# Patient Record
Sex: MALE | Race: WHITE | NOT HISPANIC OR LATINO | URBAN - METROPOLITAN AREA
[De-identification: names, ages, dates, MRNs, and addresses within clinical notes are randomized per-mention and may not be internally consistent; named-entity substitution may affect disease eponyms.]

---

## 2017-01-09 ENCOUNTER — COMMUNICATION - HEALTHEAST (OUTPATIENT)
Dept: FAMILY MEDICINE | Facility: CLINIC | Age: 67
End: 2017-01-09

## 2017-01-09 DIAGNOSIS — I10 HTN (HYPERTENSION): ICD-10-CM

## 2017-01-11 ENCOUNTER — COMMUNICATION - HEALTHEAST (OUTPATIENT)
Dept: FAMILY MEDICINE | Facility: CLINIC | Age: 67
End: 2017-01-11

## 2017-01-11 DIAGNOSIS — I25.10 CORONARY ATHEROSCLEROSIS: ICD-10-CM

## 2017-02-17 ENCOUNTER — COMMUNICATION - HEALTHEAST (OUTPATIENT)
Dept: FAMILY MEDICINE | Facility: CLINIC | Age: 67
End: 2017-02-17

## 2017-02-17 DIAGNOSIS — E11.8 DIABETES MELLITUS TYPE 2 WITH COMPLICATIONS (H): ICD-10-CM

## 2017-03-29 ENCOUNTER — COMMUNICATION - HEALTHEAST (OUTPATIENT)
Dept: FAMILY MEDICINE | Facility: CLINIC | Age: 67
End: 2017-03-29

## 2017-03-29 DIAGNOSIS — E11.8 DIABETES MELLITUS TYPE 2 WITH COMPLICATIONS (H): ICD-10-CM

## 2017-03-29 DIAGNOSIS — I25.10 CORONARY ATHEROSCLEROSIS: ICD-10-CM

## 2017-03-30 ENCOUNTER — COMMUNICATION - HEALTHEAST (OUTPATIENT)
Dept: FAMILY MEDICINE | Facility: CLINIC | Age: 67
End: 2017-03-30

## 2017-03-30 DIAGNOSIS — I10 HTN (HYPERTENSION): ICD-10-CM

## 2017-03-31 ENCOUNTER — COMMUNICATION - HEALTHEAST (OUTPATIENT)
Dept: FAMILY MEDICINE | Facility: CLINIC | Age: 67
End: 2017-03-31

## 2017-04-25 ENCOUNTER — COMMUNICATION - HEALTHEAST (OUTPATIENT)
Dept: NURSING | Facility: CLINIC | Age: 67
End: 2017-04-25

## 2017-06-09 ENCOUNTER — COMMUNICATION - HEALTHEAST (OUTPATIENT)
Dept: FAMILY MEDICINE | Facility: CLINIC | Age: 67
End: 2017-06-09

## 2017-06-09 DIAGNOSIS — E78.5 HYPERLIPIDEMIA: ICD-10-CM

## 2017-06-09 DIAGNOSIS — I10 HTN (HYPERTENSION): ICD-10-CM

## 2017-06-09 DIAGNOSIS — I25.10 CORONARY ATHEROSCLEROSIS: ICD-10-CM

## 2017-06-14 ENCOUNTER — COMMUNICATION - HEALTHEAST (OUTPATIENT)
Dept: FAMILY MEDICINE | Facility: CLINIC | Age: 67
End: 2017-06-14

## 2017-06-14 DIAGNOSIS — I10 HTN (HYPERTENSION): ICD-10-CM

## 2017-11-20 ENCOUNTER — COMMUNICATION - HEALTHEAST (OUTPATIENT)
Dept: FAMILY MEDICINE | Facility: CLINIC | Age: 67
End: 2017-11-20

## 2017-11-20 ENCOUNTER — AMBULATORY - HEALTHEAST (OUTPATIENT)
Dept: NURSING | Facility: CLINIC | Age: 67
End: 2017-11-20

## 2017-11-20 DIAGNOSIS — E78.5 HYPERLIPIDEMIA: ICD-10-CM

## 2017-11-20 DIAGNOSIS — I10 HTN (HYPERTENSION): ICD-10-CM

## 2017-11-20 DIAGNOSIS — I25.10 CORONARY ATHEROSCLEROSIS: ICD-10-CM

## 2017-11-20 DIAGNOSIS — Z23 FLU VACCINE NEED: ICD-10-CM

## 2017-11-24 ENCOUNTER — COMMUNICATION - HEALTHEAST (OUTPATIENT)
Dept: FAMILY MEDICINE | Facility: CLINIC | Age: 67
End: 2017-11-24

## 2017-11-24 DIAGNOSIS — E11.8 TYPE 2 DIABETES WITH COMPLICATION (H): ICD-10-CM

## 2017-11-24 DIAGNOSIS — E11.9 DIABETES (H): ICD-10-CM

## 2017-11-30 ENCOUNTER — COMMUNICATION - HEALTHEAST (OUTPATIENT)
Dept: LAB | Facility: CLINIC | Age: 67
End: 2017-11-30

## 2017-11-30 ENCOUNTER — OFFICE VISIT - HEALTHEAST (OUTPATIENT)
Dept: FAMILY MEDICINE | Facility: CLINIC | Age: 67
End: 2017-11-30

## 2017-11-30 DIAGNOSIS — E11.9 TYPE 2 DIABETES MELLITUS (H): ICD-10-CM

## 2017-11-30 DIAGNOSIS — F43.9 STRESS AT HOME: ICD-10-CM

## 2017-11-30 DIAGNOSIS — I10 ESSENTIAL HYPERTENSION: ICD-10-CM

## 2017-11-30 DIAGNOSIS — E78.2 MIXED HYPERLIPIDEMIA: ICD-10-CM

## 2017-11-30 DIAGNOSIS — Z12.11 SCREEN FOR COLON CANCER: ICD-10-CM

## 2017-11-30 LAB
CHOLEST SERPL-MCNC: 163 MG/DL
FASTING STATUS PATIENT QL REPORTED: YES
HBA1C MFR BLD: >14 % (ref 3.5–6)
HDLC SERPL-MCNC: 48 MG/DL
LDLC SERPL CALC-MCNC: 90 MG/DL
TRIGL SERPL-MCNC: 123 MG/DL

## 2017-12-05 ENCOUNTER — COMMUNICATION - HEALTHEAST (OUTPATIENT)
Dept: FAMILY MEDICINE | Facility: CLINIC | Age: 67
End: 2017-12-05

## 2017-12-13 ENCOUNTER — COMMUNICATION - HEALTHEAST (OUTPATIENT)
Dept: FAMILY MEDICINE | Facility: CLINIC | Age: 67
End: 2017-12-13

## 2017-12-13 DIAGNOSIS — E11.9 DIABETES (H): ICD-10-CM

## 2018-01-08 ENCOUNTER — COMMUNICATION - HEALTHEAST (OUTPATIENT)
Dept: FAMILY MEDICINE | Facility: CLINIC | Age: 68
End: 2018-01-08

## 2018-01-08 DIAGNOSIS — E78.5 HYPERLIPIDEMIA: ICD-10-CM

## 2018-01-11 ENCOUNTER — COMMUNICATION - HEALTHEAST (OUTPATIENT)
Dept: FAMILY MEDICINE | Facility: CLINIC | Age: 68
End: 2018-01-11

## 2018-01-11 DIAGNOSIS — E11.9 DIABETES (H): ICD-10-CM

## 2018-02-16 ENCOUNTER — COMMUNICATION - HEALTHEAST (OUTPATIENT)
Dept: FAMILY MEDICINE | Facility: CLINIC | Age: 68
End: 2018-02-16

## 2018-02-16 DIAGNOSIS — I10 HTN (HYPERTENSION): ICD-10-CM

## 2018-03-07 ENCOUNTER — RECORDS - HEALTHEAST (OUTPATIENT)
Dept: ADMINISTRATIVE | Facility: OTHER | Age: 68
End: 2018-03-07

## 2018-03-16 ENCOUNTER — OFFICE VISIT - HEALTHEAST (OUTPATIENT)
Dept: FAMILY MEDICINE | Facility: CLINIC | Age: 68
End: 2018-03-16

## 2018-03-16 DIAGNOSIS — G47.00 INSOMNIA: ICD-10-CM

## 2018-03-16 DIAGNOSIS — Z00.00 HEALTH CARE MAINTENANCE: ICD-10-CM

## 2018-03-16 DIAGNOSIS — E11.9 TYPE 2 DIABETES MELLITUS (H): ICD-10-CM

## 2018-03-16 LAB
CREAT UR-MCNC: 121.6 MG/DL
HBA1C MFR BLD: 10 % (ref 3.5–6)
MICROALBUMIN UR-MCNC: 16.71 MG/DL (ref 0–1.99)
MICROALBUMIN/CREAT UR: 137.4 MG/G
TSH SERPL DL<=0.005 MIU/L-ACNC: 2.54 UIU/ML (ref 0.3–5)

## 2018-03-19 ENCOUNTER — COMMUNICATION - HEALTHEAST (OUTPATIENT)
Dept: FAMILY MEDICINE | Facility: CLINIC | Age: 68
End: 2018-03-19

## 2018-03-20 ENCOUNTER — COMMUNICATION - HEALTHEAST (OUTPATIENT)
Dept: FAMILY MEDICINE | Facility: CLINIC | Age: 68
End: 2018-03-20

## 2018-03-27 ENCOUNTER — COMMUNICATION - HEALTHEAST (OUTPATIENT)
Dept: FAMILY MEDICINE | Facility: CLINIC | Age: 68
End: 2018-03-27

## 2018-03-27 DIAGNOSIS — E11.8 TYPE 2 DIABETES WITH COMPLICATION (H): ICD-10-CM

## 2018-03-28 ENCOUNTER — COMMUNICATION - HEALTHEAST (OUTPATIENT)
Dept: FAMILY MEDICINE | Facility: CLINIC | Age: 68
End: 2018-03-28

## 2018-03-28 DIAGNOSIS — E11.8 TYPE 2 DIABETES WITH COMPLICATION (H): ICD-10-CM

## 2018-04-11 ENCOUNTER — OFFICE VISIT - HEALTHEAST (OUTPATIENT)
Dept: FAMILY MEDICINE | Facility: CLINIC | Age: 68
End: 2018-04-11

## 2018-04-11 DIAGNOSIS — E11.9 DIABETES (H): ICD-10-CM

## 2018-04-27 ENCOUNTER — COMMUNICATION - HEALTHEAST (OUTPATIENT)
Dept: FAMILY MEDICINE | Facility: CLINIC | Age: 68
End: 2018-04-27

## 2018-04-27 DIAGNOSIS — I25.10 CORONARY ATHEROSCLEROSIS: ICD-10-CM

## 2018-06-04 ENCOUNTER — COMMUNICATION - HEALTHEAST (OUTPATIENT)
Dept: FAMILY MEDICINE | Facility: CLINIC | Age: 68
End: 2018-06-04

## 2018-06-04 DIAGNOSIS — I25.10 CORONARY ATHEROSCLEROSIS: ICD-10-CM

## 2018-06-22 ENCOUNTER — COMMUNICATION - HEALTHEAST (OUTPATIENT)
Dept: FAMILY MEDICINE | Facility: CLINIC | Age: 68
End: 2018-06-22

## 2018-06-22 ENCOUNTER — OFFICE VISIT - HEALTHEAST (OUTPATIENT)
Dept: FAMILY MEDICINE | Facility: CLINIC | Age: 68
End: 2018-06-22

## 2018-06-22 DIAGNOSIS — F33.1 MODERATE EPISODE OF RECURRENT MAJOR DEPRESSIVE DISORDER (H): ICD-10-CM

## 2018-06-22 ASSESSMENT — MIFFLIN-ST. JEOR: SCORE: 1682.98

## 2018-07-09 ENCOUNTER — COMMUNICATION - HEALTHEAST (OUTPATIENT)
Dept: FAMILY MEDICINE | Facility: CLINIC | Age: 68
End: 2018-07-09

## 2018-07-09 DIAGNOSIS — I10 HTN (HYPERTENSION): ICD-10-CM

## 2018-07-26 ENCOUNTER — COMMUNICATION - HEALTHEAST (OUTPATIENT)
Dept: FAMILY MEDICINE | Facility: CLINIC | Age: 68
End: 2018-07-26

## 2018-07-26 DIAGNOSIS — E11.8 TYPE 2 DIABETES WITH COMPLICATION (H): ICD-10-CM

## 2018-08-21 ENCOUNTER — OFFICE VISIT - HEALTHEAST (OUTPATIENT)
Dept: FAMILY MEDICINE | Facility: CLINIC | Age: 68
End: 2018-08-21

## 2018-08-21 DIAGNOSIS — E11.9 DIABETES MELLITUS (H): ICD-10-CM

## 2018-08-21 DIAGNOSIS — F33.9 MAJOR DEPRESSION, RECURRENT (H): ICD-10-CM

## 2018-08-21 LAB — HBA1C MFR BLD: 9.7 % (ref 3.5–6)

## 2018-08-21 ASSESSMENT — MIFFLIN-ST. JEOR: SCORE: 1663.02

## 2018-10-10 ENCOUNTER — COMMUNICATION - HEALTHEAST (OUTPATIENT)
Dept: FAMILY MEDICINE | Facility: CLINIC | Age: 68
End: 2018-10-10

## 2018-10-10 DIAGNOSIS — E11.9 DIABETES (H): ICD-10-CM

## 2018-10-23 ENCOUNTER — AMBULATORY - HEALTHEAST (OUTPATIENT)
Dept: NURSING | Facility: CLINIC | Age: 68
End: 2018-10-23

## 2018-10-23 DIAGNOSIS — Z23 FLU VACCINE NEED: ICD-10-CM

## 2018-10-31 ENCOUNTER — COMMUNICATION - HEALTHEAST (OUTPATIENT)
Dept: FAMILY MEDICINE | Facility: CLINIC | Age: 68
End: 2018-10-31

## 2018-10-31 DIAGNOSIS — I25.10 CORONARY ATHEROSCLEROSIS: ICD-10-CM

## 2018-10-31 DIAGNOSIS — E78.5 HYPERLIPIDEMIA: ICD-10-CM

## 2018-12-20 ENCOUNTER — COMMUNICATION - HEALTHEAST (OUTPATIENT)
Dept: FAMILY MEDICINE | Facility: CLINIC | Age: 68
End: 2018-12-20

## 2018-12-20 DIAGNOSIS — E11.8 TYPE 2 DIABETES WITH COMPLICATION (H): ICD-10-CM

## 2019-01-10 ENCOUNTER — COMMUNICATION - HEALTHEAST (OUTPATIENT)
Dept: FAMILY MEDICINE | Facility: CLINIC | Age: 69
End: 2019-01-10

## 2019-02-22 ENCOUNTER — COMMUNICATION - HEALTHEAST (OUTPATIENT)
Dept: FAMILY MEDICINE | Facility: CLINIC | Age: 69
End: 2019-02-22

## 2019-02-22 DIAGNOSIS — I25.10 CORONARY ATHEROSCLEROSIS: ICD-10-CM

## 2019-03-08 ENCOUNTER — OFFICE VISIT - HEALTHEAST (OUTPATIENT)
Dept: FAMILY MEDICINE | Facility: CLINIC | Age: 69
End: 2019-03-08

## 2019-03-08 DIAGNOSIS — E11.65 TYPE 2 DIABETES MELLITUS WITH HYPERGLYCEMIA, WITHOUT LONG-TERM CURRENT USE OF INSULIN (H): ICD-10-CM

## 2019-03-08 DIAGNOSIS — I25.10 CORONARY ARTERY DISEASE INVOLVING NATIVE HEART WITHOUT ANGINA PECTORIS, UNSPECIFIED VESSEL OR LESION TYPE: ICD-10-CM

## 2019-03-08 DIAGNOSIS — Z12.5 ENCOUNTER FOR SCREENING FOR MALIGNANT NEOPLASM OF PROSTATE: ICD-10-CM

## 2019-03-08 DIAGNOSIS — Z12.11 SCREEN FOR COLON CANCER: ICD-10-CM

## 2019-03-08 DIAGNOSIS — Z00.00 HEALTH CARE MAINTENANCE: ICD-10-CM

## 2019-03-08 DIAGNOSIS — I10 ESSENTIAL HYPERTENSION: ICD-10-CM

## 2019-03-08 LAB
ALBUMIN SERPL-MCNC: 3.8 G/DL (ref 3.5–5)
ALP SERPL-CCNC: 120 U/L (ref 45–120)
ALT SERPL W P-5'-P-CCNC: <9 U/L (ref 0–45)
ANION GAP SERPL CALCULATED.3IONS-SCNC: 12 MMOL/L (ref 5–18)
AST SERPL W P-5'-P-CCNC: 14 U/L (ref 0–40)
BILIRUB SERPL-MCNC: 0.7 MG/DL (ref 0–1)
BUN SERPL-MCNC: 16 MG/DL (ref 8–22)
CALCIUM SERPL-MCNC: 9.4 MG/DL (ref 8.5–10.5)
CHLORIDE BLD-SCNC: 103 MMOL/L (ref 98–107)
CHOLEST SERPL-MCNC: 131 MG/DL
CO2 SERPL-SCNC: 22 MMOL/L (ref 22–31)
CREAT SERPL-MCNC: 0.83 MG/DL (ref 0.7–1.3)
ERYTHROCYTE [DISTWIDTH] IN BLOOD BY AUTOMATED COUNT: 10.8 % (ref 11–14.5)
FASTING STATUS PATIENT QL REPORTED: YES
GFR SERPL CREATININE-BSD FRML MDRD: >60 ML/MIN/1.73M2
GLUCOSE BLD-MCNC: 266 MG/DL (ref 70–125)
HBA1C MFR BLD: 10.8 % (ref 3.5–6)
HCT VFR BLD AUTO: 48 % (ref 40–54)
HDLC SERPL-MCNC: 41 MG/DL
HGB BLD-MCNC: 16.1 G/DL (ref 14–18)
LDLC SERPL CALC-MCNC: 70 MG/DL
MCH RBC QN AUTO: 29.9 PG (ref 27–34)
MCHC RBC AUTO-ENTMCNC: 33.5 G/DL (ref 32–36)
MCV RBC AUTO: 89 FL (ref 80–100)
PLATELET # BLD AUTO: 244 THOU/UL (ref 140–440)
PMV BLD AUTO: 7.8 FL (ref 7–10)
POTASSIUM BLD-SCNC: 3.9 MMOL/L (ref 3.5–5)
PROT SERPL-MCNC: 7 G/DL (ref 6–8)
PSA SERPL-MCNC: 0.2 NG/ML (ref 0–4.5)
RBC # BLD AUTO: 5.37 MILL/UL (ref 4.4–6.2)
SODIUM SERPL-SCNC: 137 MMOL/L (ref 136–145)
TRIGL SERPL-MCNC: 102 MG/DL
WBC: 9 THOU/UL (ref 4–11)

## 2019-03-12 ENCOUNTER — COMMUNICATION - HEALTHEAST (OUTPATIENT)
Dept: FAMILY MEDICINE | Facility: CLINIC | Age: 69
End: 2019-03-12

## 2019-03-12 ENCOUNTER — RECORDS - HEALTHEAST (OUTPATIENT)
Dept: HEALTH INFORMATION MANAGEMENT | Facility: CLINIC | Age: 69
End: 2019-03-12

## 2019-03-14 ENCOUNTER — COMMUNICATION - HEALTHEAST (OUTPATIENT)
Dept: FAMILY MEDICINE | Facility: CLINIC | Age: 69
End: 2019-03-14

## 2019-03-14 ENCOUNTER — RECORDS - HEALTHEAST (OUTPATIENT)
Dept: ADMINISTRATIVE | Facility: OTHER | Age: 69
End: 2019-03-14

## 2019-03-14 LAB — COLOGUARD-ABSTRACT: NEGATIVE

## 2019-03-19 ENCOUNTER — COMMUNICATION - HEALTHEAST (OUTPATIENT)
Dept: FAMILY MEDICINE | Facility: CLINIC | Age: 69
End: 2019-03-19

## 2019-03-19 DIAGNOSIS — E11.9 DIABETES (H): ICD-10-CM

## 2019-04-01 ENCOUNTER — COMMUNICATION - HEALTHEAST (OUTPATIENT)
Dept: FAMILY MEDICINE | Facility: CLINIC | Age: 69
End: 2019-04-01

## 2019-04-01 ENCOUNTER — RECORDS - HEALTHEAST (OUTPATIENT)
Dept: HEALTH INFORMATION MANAGEMENT | Facility: CLINIC | Age: 69
End: 2019-04-01

## 2019-04-02 ENCOUNTER — COMMUNICATION - HEALTHEAST (OUTPATIENT)
Dept: FAMILY MEDICINE | Facility: CLINIC | Age: 69
End: 2019-04-02

## 2019-04-02 DIAGNOSIS — E78.5 HYPERLIPIDEMIA: ICD-10-CM

## 2019-04-16 ENCOUNTER — COMMUNICATION - HEALTHEAST (OUTPATIENT)
Dept: FAMILY MEDICINE | Facility: CLINIC | Age: 69
End: 2019-04-16

## 2019-04-16 DIAGNOSIS — I10 HTN (HYPERTENSION): ICD-10-CM

## 2019-04-16 DIAGNOSIS — E11.8 TYPE 2 DIABETES WITH COMPLICATION (H): ICD-10-CM

## 2019-05-21 ENCOUNTER — COMMUNICATION - HEALTHEAST (OUTPATIENT)
Dept: FAMILY MEDICINE | Facility: CLINIC | Age: 69
End: 2019-05-21

## 2019-05-21 DIAGNOSIS — E11.9 DIABETES (H): ICD-10-CM

## 2019-06-21 ENCOUNTER — OFFICE VISIT - HEALTHEAST (OUTPATIENT)
Dept: FAMILY MEDICINE | Facility: CLINIC | Age: 69
End: 2019-06-21

## 2019-06-21 ENCOUNTER — COMMUNICATION - HEALTHEAST (OUTPATIENT)
Dept: FAMILY MEDICINE | Facility: CLINIC | Age: 69
End: 2019-06-21

## 2019-06-21 DIAGNOSIS — N52.9 ERECTILE DYSFUNCTION, UNSPECIFIED ERECTILE DYSFUNCTION TYPE: ICD-10-CM

## 2019-06-21 DIAGNOSIS — E11.9 DIABETES (H): ICD-10-CM

## 2019-06-21 DIAGNOSIS — I10 ESSENTIAL HYPERTENSION: ICD-10-CM

## 2019-06-21 DIAGNOSIS — F33.1 MODERATE EPISODE OF RECURRENT MAJOR DEPRESSIVE DISORDER (H): ICD-10-CM

## 2019-06-21 DIAGNOSIS — E78.2 MIXED HYPERLIPIDEMIA: ICD-10-CM

## 2019-06-21 DIAGNOSIS — E11.65 TYPE 2 DIABETES MELLITUS WITH HYPERGLYCEMIA, WITHOUT LONG-TERM CURRENT USE OF INSULIN (H): ICD-10-CM

## 2019-06-21 LAB — HBA1C MFR BLD: 9.8 % (ref 3.5–6)

## 2019-07-07 ENCOUNTER — COMMUNICATION - HEALTHEAST (OUTPATIENT)
Dept: FAMILY MEDICINE | Facility: CLINIC | Age: 69
End: 2019-07-07

## 2019-07-07 DIAGNOSIS — I10 HTN (HYPERTENSION): ICD-10-CM

## 2019-07-17 ENCOUNTER — RECORDS - HEALTHEAST (OUTPATIENT)
Dept: ADMINISTRATIVE | Facility: OTHER | Age: 69
End: 2019-07-17

## 2019-07-22 ENCOUNTER — COMMUNICATION - HEALTHEAST (OUTPATIENT)
Dept: FAMILY MEDICINE | Facility: CLINIC | Age: 69
End: 2019-07-22

## 2019-07-22 DIAGNOSIS — E11.8 TYPE 2 DIABETES WITH COMPLICATION (H): ICD-10-CM

## 2019-07-23 ENCOUNTER — RECORDS - HEALTHEAST (OUTPATIENT)
Dept: HEALTH INFORMATION MANAGEMENT | Facility: CLINIC | Age: 69
End: 2019-07-23

## 2019-08-06 ENCOUNTER — COMMUNICATION - HEALTHEAST (OUTPATIENT)
Dept: SCHEDULING | Facility: CLINIC | Age: 69
End: 2019-08-06

## 2019-08-09 ENCOUNTER — OFFICE VISIT - HEALTHEAST (OUTPATIENT)
Dept: FAMILY MEDICINE | Facility: CLINIC | Age: 69
End: 2019-08-09

## 2019-08-09 DIAGNOSIS — M79.602 LEFT ARM PAIN: ICD-10-CM

## 2019-08-20 ENCOUNTER — COMMUNICATION - HEALTHEAST (OUTPATIENT)
Dept: FAMILY MEDICINE | Facility: CLINIC | Age: 69
End: 2019-08-20

## 2019-08-20 DIAGNOSIS — I25.10 CORONARY ATHEROSCLEROSIS: ICD-10-CM

## 2019-10-09 ENCOUNTER — AMBULATORY - HEALTHEAST (OUTPATIENT)
Dept: NURSING | Facility: CLINIC | Age: 69
End: 2019-10-09

## 2019-10-09 DIAGNOSIS — Z23 NEEDS FLU SHOT: ICD-10-CM

## 2019-10-22 ENCOUNTER — COMMUNICATION - HEALTHEAST (OUTPATIENT)
Dept: FAMILY MEDICINE | Facility: CLINIC | Age: 69
End: 2019-10-22

## 2019-10-22 DIAGNOSIS — E11.9 DIABETES (H): ICD-10-CM

## 2019-10-30 ENCOUNTER — COMMUNICATION - HEALTHEAST (OUTPATIENT)
Dept: FAMILY MEDICINE | Facility: CLINIC | Age: 69
End: 2019-10-30

## 2019-10-30 DIAGNOSIS — E11.8 TYPE 2 DIABETES WITH COMPLICATION (H): ICD-10-CM

## 2019-11-07 ENCOUNTER — COMMUNICATION - HEALTHEAST (OUTPATIENT)
Dept: FAMILY MEDICINE | Facility: CLINIC | Age: 69
End: 2019-11-07

## 2019-11-07 DIAGNOSIS — E11.9 DIABETES (H): ICD-10-CM

## 2019-11-15 ENCOUNTER — OFFICE VISIT - HEALTHEAST (OUTPATIENT)
Dept: FAMILY MEDICINE | Facility: CLINIC | Age: 69
End: 2019-11-15

## 2019-11-15 DIAGNOSIS — E11.9 DIABETES (H): ICD-10-CM

## 2019-11-15 DIAGNOSIS — E66.811 CLASS 1 OBESITY WITH SERIOUS COMORBIDITY AND BODY MASS INDEX (BMI) OF 32.0 TO 32.9 IN ADULT, UNSPECIFIED OBESITY TYPE: ICD-10-CM

## 2019-11-15 DIAGNOSIS — F33.2 SEVERE EPISODE OF RECURRENT MAJOR DEPRESSIVE DISORDER, WITHOUT PSYCHOTIC FEATURES (H): ICD-10-CM

## 2019-11-15 DIAGNOSIS — E11.65 TYPE 2 DIABETES MELLITUS WITH HYPERGLYCEMIA, WITHOUT LONG-TERM CURRENT USE OF INSULIN (H): ICD-10-CM

## 2019-11-15 DIAGNOSIS — I10 HTN (HYPERTENSION): ICD-10-CM

## 2019-11-15 LAB
ALBUMIN SERPL-MCNC: 3.9 G/DL (ref 3.5–5)
ALP SERPL-CCNC: 99 U/L (ref 45–120)
ALT SERPL W P-5'-P-CCNC: 12 U/L (ref 0–45)
ANION GAP SERPL CALCULATED.3IONS-SCNC: 10 MMOL/L (ref 5–18)
AST SERPL W P-5'-P-CCNC: 19 U/L (ref 0–40)
BILIRUB SERPL-MCNC: 0.7 MG/DL (ref 0–1)
BUN SERPL-MCNC: 19 MG/DL (ref 8–22)
CALCIUM SERPL-MCNC: 9 MG/DL (ref 8.5–10.5)
CHLORIDE BLD-SCNC: 105 MMOL/L (ref 98–107)
CHOLEST SERPL-MCNC: 134 MG/DL
CO2 SERPL-SCNC: 22 MMOL/L (ref 22–31)
CREAT SERPL-MCNC: 0.8 MG/DL (ref 0.7–1.3)
CREAT UR-MCNC: 219.7 MG/DL
FASTING STATUS PATIENT QL REPORTED: YES
GFR SERPL CREATININE-BSD FRML MDRD: >60 ML/MIN/1.73M2
GLUCOSE BLD-MCNC: 218 MG/DL (ref 70–125)
HBA1C MFR BLD: 11.1 % (ref 3.5–6)
HDLC SERPL-MCNC: 45 MG/DL
LDLC SERPL CALC-MCNC: 64 MG/DL
MICROALBUMIN UR-MCNC: 37.35 MG/DL (ref 0–1.99)
MICROALBUMIN/CREAT UR: 170 MG/G
POTASSIUM BLD-SCNC: 4 MMOL/L (ref 3.5–5)
PROT SERPL-MCNC: 7 G/DL (ref 6–8)
SODIUM SERPL-SCNC: 137 MMOL/L (ref 136–145)
TRIGL SERPL-MCNC: 124 MG/DL

## 2019-11-15 ASSESSMENT — MIFFLIN-ST. JEOR: SCORE: 1791.94

## 2019-11-19 ENCOUNTER — COMMUNICATION - HEALTHEAST (OUTPATIENT)
Dept: FAMILY MEDICINE | Facility: CLINIC | Age: 69
End: 2019-11-19

## 2020-02-11 ENCOUNTER — COMMUNICATION - HEALTHEAST (OUTPATIENT)
Dept: FAMILY MEDICINE | Facility: CLINIC | Age: 70
End: 2020-02-11

## 2020-02-11 DIAGNOSIS — I10 HTN (HYPERTENSION): ICD-10-CM

## 2020-02-17 ENCOUNTER — COMMUNICATION - HEALTHEAST (OUTPATIENT)
Dept: FAMILY MEDICINE | Facility: CLINIC | Age: 70
End: 2020-02-17

## 2020-02-17 ENCOUNTER — AMBULATORY - HEALTHEAST (OUTPATIENT)
Dept: FAMILY MEDICINE | Facility: CLINIC | Age: 70
End: 2020-02-17

## 2020-02-17 DIAGNOSIS — Z79.4 TYPE 2 DIABETES MELLITUS WITHOUT COMPLICATION, WITH LONG-TERM CURRENT USE OF INSULIN (H): ICD-10-CM

## 2020-02-17 DIAGNOSIS — E11.9 TYPE 2 DIABETES MELLITUS WITHOUT COMPLICATION, WITH LONG-TERM CURRENT USE OF INSULIN (H): ICD-10-CM

## 2020-02-27 ENCOUNTER — COMMUNICATION - HEALTHEAST (OUTPATIENT)
Dept: FAMILY MEDICINE | Facility: CLINIC | Age: 70
End: 2020-02-27

## 2020-02-27 DIAGNOSIS — E11.8 TYPE 2 DIABETES WITH COMPLICATION (H): ICD-10-CM

## 2020-03-24 ENCOUNTER — COMMUNICATION - HEALTHEAST (OUTPATIENT)
Dept: FAMILY MEDICINE | Facility: CLINIC | Age: 70
End: 2020-03-24

## 2020-03-24 DIAGNOSIS — E78.5 HYPERLIPIDEMIA: ICD-10-CM

## 2020-04-28 ENCOUNTER — COMMUNICATION - HEALTHEAST (OUTPATIENT)
Dept: FAMILY MEDICINE | Facility: CLINIC | Age: 70
End: 2020-04-28

## 2020-04-28 DIAGNOSIS — I25.10 CORONARY ATHEROSCLEROSIS: ICD-10-CM

## 2020-05-04 ENCOUNTER — COMMUNICATION - HEALTHEAST (OUTPATIENT)
Dept: FAMILY MEDICINE | Facility: CLINIC | Age: 70
End: 2020-05-04

## 2020-05-04 DIAGNOSIS — I10 HTN (HYPERTENSION): ICD-10-CM

## 2020-07-01 ENCOUNTER — COMMUNICATION - HEALTHEAST (OUTPATIENT)
Dept: FAMILY MEDICINE | Facility: CLINIC | Age: 70
End: 2020-07-01

## 2020-07-01 DIAGNOSIS — E11.9 DIABETES (H): ICD-10-CM

## 2020-07-26 ENCOUNTER — COMMUNICATION - HEALTHEAST (OUTPATIENT)
Dept: FAMILY MEDICINE | Facility: CLINIC | Age: 70
End: 2020-07-26

## 2020-07-26 DIAGNOSIS — I25.10 CORONARY ATHEROSCLEROSIS: ICD-10-CM

## 2020-08-10 ENCOUNTER — RECORDS - HEALTHEAST (OUTPATIENT)
Dept: ADMINISTRATIVE | Facility: OTHER | Age: 70
End: 2020-08-10

## 2020-08-10 LAB — RETINOPATHY: NEGATIVE

## 2020-08-13 ENCOUNTER — RECORDS - HEALTHEAST (OUTPATIENT)
Dept: HEALTH INFORMATION MANAGEMENT | Facility: CLINIC | Age: 70
End: 2020-08-13

## 2020-09-03 ENCOUNTER — COMMUNICATION - HEALTHEAST (OUTPATIENT)
Dept: FAMILY MEDICINE | Facility: CLINIC | Age: 70
End: 2020-09-03

## 2020-09-03 DIAGNOSIS — E11.9 DIABETES (H): ICD-10-CM

## 2020-09-04 ENCOUNTER — COMMUNICATION - HEALTHEAST (OUTPATIENT)
Dept: FAMILY MEDICINE | Facility: CLINIC | Age: 70
End: 2020-09-04

## 2020-09-04 DIAGNOSIS — E11.9 DIABETES (H): ICD-10-CM

## 2020-09-19 ASSESSMENT — MIFFLIN-ST. JEOR: SCORE: 1915.22

## 2020-09-20 ASSESSMENT — MIFFLIN-ST. JEOR
SCORE: 1915.22
SCORE: 1915.22

## 2020-09-21 ENCOUNTER — COMMUNICATION - HEALTHEAST (OUTPATIENT)
Dept: SCHEDULING | Facility: CLINIC | Age: 70
End: 2020-09-21

## 2020-09-22 ENCOUNTER — SURGERY - HEALTHEAST (OUTPATIENT)
Dept: CARDIOLOGY | Facility: CLINIC | Age: 70
End: 2020-09-22

## 2020-09-22 ENCOUNTER — SURGERY - HEALTHEAST (OUTPATIENT)
Dept: ADMINISTRATIVE | Facility: CLINIC | Age: 70
End: 2020-09-22

## 2020-09-22 DIAGNOSIS — I25.10 CAD (CORONARY ARTERY DISEASE): ICD-10-CM

## 2020-09-22 DIAGNOSIS — I48.91 NEW ONSET A-FIB (H): ICD-10-CM

## 2020-09-22 ASSESSMENT — MIFFLIN-ST. JEOR: SCORE: 1879.39

## 2020-09-23 ENCOUNTER — AMBULATORY - HEALTHEAST (OUTPATIENT)
Dept: CARDIOLOGY | Facility: CLINIC | Age: 70
End: 2020-09-23

## 2020-09-23 DIAGNOSIS — I50.22 CHRONIC SYSTOLIC CONGESTIVE HEART FAILURE (H): ICD-10-CM

## 2020-09-23 ASSESSMENT — MIFFLIN-ST. JEOR: SCORE: 1884.38

## 2020-09-24 ENCOUNTER — COMMUNICATION - HEALTHEAST (OUTPATIENT)
Dept: FAMILY MEDICINE | Facility: CLINIC | Age: 70
End: 2020-09-24

## 2020-09-24 DIAGNOSIS — E11.69 TYPE 2 DIABETES MELLITUS WITH OTHER SPECIFIED COMPLICATION, WITH LONG-TERM CURRENT USE OF INSULIN (H): ICD-10-CM

## 2020-09-24 DIAGNOSIS — Z79.4 TYPE 2 DIABETES MELLITUS WITH OTHER SPECIFIED COMPLICATION, WITH LONG-TERM CURRENT USE OF INSULIN (H): ICD-10-CM

## 2020-09-25 ENCOUNTER — COMMUNICATION - HEALTHEAST (OUTPATIENT)
Dept: FAMILY MEDICINE | Facility: CLINIC | Age: 70
End: 2020-09-25

## 2020-09-25 DIAGNOSIS — E11.9 DIABETES (H): ICD-10-CM

## 2020-09-28 ENCOUNTER — COMMUNICATION - HEALTHEAST (OUTPATIENT)
Dept: SCHEDULING | Facility: CLINIC | Age: 70
End: 2020-09-28

## 2020-09-28 DIAGNOSIS — Z79.4 TYPE 2 DIABETES MELLITUS WITHOUT COMPLICATION, WITH LONG-TERM CURRENT USE OF INSULIN (H): ICD-10-CM

## 2020-09-28 DIAGNOSIS — Z79.4 TYPE 2 DIABETES MELLITUS WITH OTHER SPECIFIED COMPLICATION, WITH LONG-TERM CURRENT USE OF INSULIN (H): ICD-10-CM

## 2020-09-28 DIAGNOSIS — E11.9 TYPE 2 DIABETES MELLITUS WITHOUT COMPLICATION, WITH LONG-TERM CURRENT USE OF INSULIN (H): ICD-10-CM

## 2020-09-28 DIAGNOSIS — E11.69 TYPE 2 DIABETES MELLITUS WITH OTHER SPECIFIED COMPLICATION, WITH LONG-TERM CURRENT USE OF INSULIN (H): ICD-10-CM

## 2020-09-29 ENCOUNTER — COMMUNICATION - HEALTHEAST (OUTPATIENT)
Dept: CARDIOLOGY | Facility: CLINIC | Age: 70
End: 2020-09-29

## 2020-09-29 ENCOUNTER — OFFICE VISIT - HEALTHEAST (OUTPATIENT)
Dept: PHARMACY | Facility: CLINIC | Age: 70
End: 2020-09-29

## 2020-09-29 DIAGNOSIS — E11.9 DIABETES (H): ICD-10-CM

## 2020-09-29 DIAGNOSIS — E78.5 HYPERLIPIDEMIA, UNSPECIFIED HYPERLIPIDEMIA TYPE: ICD-10-CM

## 2020-09-29 DIAGNOSIS — I48.91 NEW ONSET ATRIAL FIBRILLATION (H): ICD-10-CM

## 2020-09-29 PROCEDURE — 99605 MTMS BY PHARM NP 15 MIN: CPT | Performed by: PHARMACIST

## 2020-09-29 PROCEDURE — 99607 MTMS BY PHARM ADDL 15 MIN: CPT | Performed by: PHARMACIST

## 2020-09-30 ENCOUNTER — COMMUNICATION - HEALTHEAST (OUTPATIENT)
Dept: FAMILY MEDICINE | Facility: CLINIC | Age: 70
End: 2020-09-30

## 2020-09-30 DIAGNOSIS — E11.69 TYPE 2 DIABETES MELLITUS WITH OTHER SPECIFIED COMPLICATION, WITH LONG-TERM CURRENT USE OF INSULIN (H): ICD-10-CM

## 2020-09-30 DIAGNOSIS — Z79.4 TYPE 2 DIABETES MELLITUS WITH OTHER SPECIFIED COMPLICATION, WITH LONG-TERM CURRENT USE OF INSULIN (H): ICD-10-CM

## 2020-10-06 ENCOUNTER — COMMUNICATION - HEALTHEAST (OUTPATIENT)
Dept: CARDIOLOGY | Facility: CLINIC | Age: 70
End: 2020-10-06

## 2020-10-07 ENCOUNTER — AMBULATORY - HEALTHEAST (OUTPATIENT)
Dept: CARDIOLOGY | Facility: CLINIC | Age: 70
End: 2020-10-07

## 2020-10-07 DIAGNOSIS — I50.22 CHRONIC SYSTOLIC CONGESTIVE HEART FAILURE (H): ICD-10-CM

## 2020-10-07 LAB
ANION GAP SERPL CALCULATED.3IONS-SCNC: 10 MMOL/L (ref 5–18)
BUN SERPL-MCNC: 16 MG/DL (ref 8–22)
CALCIUM SERPL-MCNC: 8.7 MG/DL (ref 8.5–10.5)
CHLORIDE BLD-SCNC: 105 MMOL/L (ref 98–107)
CO2 SERPL-SCNC: 21 MMOL/L (ref 22–31)
CREAT SERPL-MCNC: 0.76 MG/DL (ref 0.7–1.3)
GFR SERPL CREATININE-BSD FRML MDRD: >60 ML/MIN/1.73M2
GLUCOSE BLD-MCNC: 334 MG/DL (ref 70–125)
POTASSIUM BLD-SCNC: 4.1 MMOL/L (ref 3.5–5)
SODIUM SERPL-SCNC: 136 MMOL/L (ref 136–145)

## 2020-10-08 ENCOUNTER — OFFICE VISIT - HEALTHEAST (OUTPATIENT)
Dept: FAMILY MEDICINE | Facility: CLINIC | Age: 70
End: 2020-10-08

## 2020-10-08 DIAGNOSIS — I48.91 NEW ONSET ATRIAL FIBRILLATION (H): ICD-10-CM

## 2020-10-08 DIAGNOSIS — E11.9 TYPE 2 DIABETES MELLITUS WITHOUT COMPLICATION, WITH LONG-TERM CURRENT USE OF INSULIN (H): ICD-10-CM

## 2020-10-08 DIAGNOSIS — Z79.4 TYPE 2 DIABETES MELLITUS WITHOUT COMPLICATION, WITH LONG-TERM CURRENT USE OF INSULIN (H): ICD-10-CM

## 2020-10-08 DIAGNOSIS — I25.10 CORONARY ARTERY DISEASE INVOLVING NATIVE HEART WITHOUT ANGINA PECTORIS, UNSPECIFIED VESSEL OR LESION TYPE: ICD-10-CM

## 2020-10-08 DIAGNOSIS — I10 ESSENTIAL HYPERTENSION: ICD-10-CM

## 2020-10-08 ASSESSMENT — MIFFLIN-ST. JEOR: SCORE: 1777.33

## 2020-10-22 ENCOUNTER — COMMUNICATION - HEALTHEAST (OUTPATIENT)
Dept: CARDIOLOGY | Facility: CLINIC | Age: 70
End: 2020-10-22

## 2020-11-04 ENCOUNTER — COMMUNICATION - HEALTHEAST (OUTPATIENT)
Dept: CARDIOLOGY | Facility: CLINIC | Age: 70
End: 2020-11-04

## 2020-11-05 ENCOUNTER — OFFICE VISIT - HEALTHEAST (OUTPATIENT)
Dept: CARDIOLOGY | Facility: CLINIC | Age: 70
End: 2020-11-05

## 2020-11-05 DIAGNOSIS — I48.19 PERSISTENT ATRIAL FIBRILLATION (H): ICD-10-CM

## 2020-11-05 DIAGNOSIS — E78.2 MIXED HYPERLIPIDEMIA: ICD-10-CM

## 2020-11-05 DIAGNOSIS — E11.9 DM2 (DIABETES MELLITUS, TYPE 2) (H): ICD-10-CM

## 2020-11-05 DIAGNOSIS — I10 BENIGN ESSENTIAL HYPERTENSION: ICD-10-CM

## 2020-11-05 DIAGNOSIS — I50.23 ACUTE ON CHRONIC SYSTOLIC CONGESTIVE HEART FAILURE (H): ICD-10-CM

## 2020-11-05 DIAGNOSIS — I25.119 CORONARY ARTERY DISEASE INVOLVING NATIVE HEART WITH ANGINA PECTORIS, UNSPECIFIED VESSEL OR LESION TYPE (H): ICD-10-CM

## 2020-11-05 ASSESSMENT — MIFFLIN-ST. JEOR: SCORE: 1782.77

## 2020-11-06 LAB
ATRIAL RATE - MUSE: 88 BPM
DIASTOLIC BLOOD PRESSURE - MUSE: NORMAL
INTERPRETATION ECG - MUSE: NORMAL
P AXIS - MUSE: NORMAL
PR INTERVAL - MUSE: NORMAL
QRS DURATION - MUSE: 154 MS
QT - MUSE: 410 MS
QTC - MUSE: 547 MS
R AXIS - MUSE: -67 DEGREES
SYSTOLIC BLOOD PRESSURE - MUSE: NORMAL
T AXIS - MUSE: 7 DEGREES
VENTRICULAR RATE- MUSE: 107 BPM

## 2020-12-02 ENCOUNTER — COMMUNICATION - HEALTHEAST (OUTPATIENT)
Dept: CARDIOLOGY | Facility: CLINIC | Age: 70
End: 2020-12-02

## 2020-12-02 ENCOUNTER — HOSPITAL ENCOUNTER (INPATIENT)
Dept: CARDIOLOGY | Facility: CLINIC | Age: 70
Discharge: NURSING, REHAB OR LONG-TERM CARE | End: 2020-12-28
Attending: SURGERY | Admitting: INTERNAL MEDICINE
Payer: COMMERCIAL

## 2020-12-02 ENCOUNTER — TRANSFERRED RECORDS (OUTPATIENT)
Dept: HEALTH INFORMATION MANAGEMENT | Facility: CLINIC | Age: 70
End: 2020-12-02

## 2020-12-02 DIAGNOSIS — Z95.1 S/P CABG (CORONARY ARTERY BYPASS GRAFT): ICD-10-CM

## 2020-12-02 DIAGNOSIS — I50.21 ACUTE SYSTOLIC HEART FAILURE (H): ICD-10-CM

## 2020-12-02 DIAGNOSIS — I25.118 CORONARY ARTERY DISEASE INVOLVING NATIVE HEART WITH OTHER FORM OF ANGINA PECTORIS, UNSPECIFIED VESSEL OR LESION TYPE (H): ICD-10-CM

## 2020-12-02 DIAGNOSIS — T14.8XXA OPEN WOUND: ICD-10-CM

## 2020-12-02 DIAGNOSIS — I48.20 CHRONIC ATRIAL FIBRILLATION (H): ICD-10-CM

## 2020-12-02 DIAGNOSIS — I21.4 NSTEMI (NON-ST ELEVATED MYOCARDIAL INFARCTION) (H): ICD-10-CM

## 2020-12-02 DIAGNOSIS — I48.91 A-FIB (H): ICD-10-CM

## 2020-12-02 DIAGNOSIS — R50.9 FEVER, UNSPECIFIED FEVER CAUSE: ICD-10-CM

## 2020-12-02 DIAGNOSIS — E11.9 DIABETES MELLITUS WITHOUT COMPLICATION (H): ICD-10-CM

## 2020-12-02 DIAGNOSIS — G93.41 METABOLIC ENCEPHALOPATHY: ICD-10-CM

## 2020-12-02 LAB
ALBUMIN SERPL-MCNC: 3.2 G/DL (ref 3.5–5)
ALP SERPL-CCNC: 169 U/L (ref 45–120)
ALT SERPL W P-5'-P-CCNC: 9 U/L (ref 0–45)
ANION GAP SERPL CALCULATED.3IONS-SCNC: 10 MMOL/L (ref 5–18)
APTT PPP: 29 SECONDS (ref 24–37)
AST SERPL W P-5'-P-CCNC: 23 U/L (ref 0–40)
ATRIAL RATE - MUSE: 111 BPM
BILIRUB SERPL-MCNC: 0.8 MG/DL (ref 0–1)
BNP SERPL-MCNC: 674 PG/ML (ref 0–64)
BUN SERPL-MCNC: 17 MG/DL (ref 8–22)
CALCIUM SERPL-MCNC: 9.1 MG/DL (ref 8.5–10.5)
CHLORIDE BLD-SCNC: 104 MMOL/L (ref 98–107)
CO2 SERPL-SCNC: 25 MMOL/L (ref 22–31)
CREAT SERPL-MCNC: 0.91 MG/DL (ref 0.7–1.3)
DIASTOLIC BLOOD PRESSURE - MUSE: 101 MMHG
ERYTHROCYTE [DISTWIDTH] IN BLOOD BY AUTOMATED COUNT: 14.5 % (ref 11–14.5)
GFR SERPL CREATININE-BSD FRML MDRD: >60 ML/MIN/1.73M2
GLUCOSE BLD-MCNC: 324 MG/DL (ref 70–125)
GLUCOSE BLDC GLUCOMTR-MCNC: 288 MG/DL (ref 70–139)
GLUCOSE BLDC GLUCOMTR-MCNC: 302 MG/DL (ref 70–139)
GLUCOSE BLDC GLUCOMTR-MCNC: 378 MG/DL (ref 70–139)
HCT VFR BLD AUTO: 47.7 % (ref 40–54)
HGB BLD-MCNC: 14.7 G/DL (ref 14–18)
INR PPP: 1.08 (ref 0.9–1.1)
INTERPRETATION ECG - MUSE: NORMAL
LACTATE SERPL-SCNC: 1.7 MMOL/L (ref 0.7–2)
MCH RBC QN AUTO: 26.7 PG (ref 27–34)
MCHC RBC AUTO-ENTMCNC: 30.8 G/DL (ref 32–36)
MCV RBC AUTO: 87 FL (ref 80–100)
P AXIS - MUSE: NORMAL
PLATELET # BLD AUTO: 309 THOU/UL (ref 140–440)
PMV BLD AUTO: 9.4 FL (ref 8.5–12.5)
POTASSIUM BLD-SCNC: 4 MMOL/L (ref 3.5–5)
PR INTERVAL - MUSE: NORMAL
PROT SERPL-MCNC: 7.4 G/DL (ref 6–8)
QRS DURATION - MUSE: 146 MS
QT - MUSE: 394 MS
QTC - MUSE: 535 MS
R AXIS - MUSE: -78 DEGREES
RBC # BLD AUTO: 5.51 MILL/UL (ref 4.4–6.2)
SARS-COV-2 PCR COMMENT: NORMAL
SARS-COV-2 RNA SPEC QL NAA+PROBE: NEGATIVE
SARS-COV-2 VIRUS SPECIMEN SOURCE: NORMAL
SODIUM SERPL-SCNC: 139 MMOL/L (ref 136–145)
SYSTOLIC BLOOD PRESSURE - MUSE: 179 MMHG
T AXIS - MUSE: 21 DEGREES
TROPONIN I SERPL-MCNC: 1.21 NG/ML (ref 0–0.29)
TROPONIN I SERPL-MCNC: 1.63 NG/ML (ref 0–0.29)
UFH PPP CHRO-ACNC: 0.44 IU/ML
VENTRICULAR RATE- MUSE: 111 BPM
WBC: 9.4 THOU/UL (ref 4–11)

## 2020-12-02 ASSESSMENT — MIFFLIN-ST. JEOR
SCORE: 1788.21
SCORE: 1788.21
SCORE: 1849
SCORE: 1849

## 2020-12-03 ENCOUNTER — COMMUNICATION - HEALTHEAST (OUTPATIENT)
Dept: SCHEDULING | Facility: CLINIC | Age: 70
End: 2020-12-03

## 2020-12-03 ENCOUNTER — SURGERY - HEALTHEAST (OUTPATIENT)
Dept: ADMINISTRATIVE | Facility: CLINIC | Age: 70
End: 2020-12-03

## 2020-12-03 DIAGNOSIS — I25.10 CAD (CORONARY ARTERY DISEASE): ICD-10-CM

## 2020-12-03 DIAGNOSIS — I48.91 A-FIB (H): ICD-10-CM

## 2020-12-03 LAB
ANION GAP SERPL CALCULATED.3IONS-SCNC: 7 MMOL/L (ref 5–18)
AORTIC ROOT: 4.2 CM
BSA FOR ECHO PROCEDURE: 2.23 M2
BUN SERPL-MCNC: 23 MG/DL (ref 8–22)
CALCIUM SERPL-MCNC: 8.7 MG/DL (ref 8.5–10.5)
CHLORIDE BLD-SCNC: 105 MMOL/L (ref 98–107)
CO2 SERPL-SCNC: 27 MMOL/L (ref 22–31)
CREAT SERPL-MCNC: 0.75 MG/DL (ref 0.7–1.3)
CV BLOOD PRESSURE: ABNORMAL MMHG
CV ECHO HEIGHT: 70 IN
CV ECHO WEIGHT: 223 LBS
DOP CALC LVOT AREA: 3.8 CM2
DOP CALC LVOT DIAMETER: 2.2 CM
DOP CALC LVOT PEAK VEL: 82.9 CM/S
DOP CALC LVOT STROKE VOLUME: 53.6 CM3
DOP CALCLVOT PEAK VEL VTI: 14.1 CM
EJECTION FRACTION: 50 % (ref 55–75)
FRACTIONAL SHORTENING: 18.5 % (ref 28–44)
GFR SERPL CREATININE-BSD FRML MDRD: >60 ML/MIN/1.73M2
GLUCOSE BLD-MCNC: 105 MG/DL (ref 70–125)
GLUCOSE BLDC GLUCOMTR-MCNC: 131 MG/DL (ref 70–139)
GLUCOSE BLDC GLUCOMTR-MCNC: 217 MG/DL (ref 70–139)
GLUCOSE BLDC GLUCOMTR-MCNC: 94 MG/DL (ref 70–139)
GLUCOSE BLDC GLUCOMTR-MCNC: 96 MG/DL (ref 70–139)
HBA1C MFR BLD: 12.6 %
INTERVENTRICULAR SEPTUM IN END DIASTOLE: 1.34 CM (ref 0.6–1)
IVS/PW RATIO: 1
LA AREA 1: 30.4 CM2
LA AREA 2: 28.5 CM2
LEFT ATRIUM LENGTH: 6.4 CM
LEFT ATRIUM SIZE: 4.4 CM
LEFT ATRIUM VOLUME INDEX: 51.6 ML/M2
LEFT ATRIUM VOLUME: 115.1 ML
LEFT VENTRICLE CARDIAC INDEX: 2 L/MIN/M2
LEFT VENTRICLE CARDIAC OUTPUT: 4.5 L/MIN
LEFT VENTRICLE DIASTOLIC VOLUME INDEX: 59.6 CM3/M2 (ref 34–74)
LEFT VENTRICLE DIASTOLIC VOLUME: 133 CM3 (ref 62–150)
LEFT VENTRICLE HEART RATE: 84 BPM
LEFT VENTRICLE MASS INDEX: 103.8 G/M2
LEFT VENTRICLE SYSTOLIC VOLUME INDEX: 30.1 CM3/M2 (ref 11–31)
LEFT VENTRICLE SYSTOLIC VOLUME: 67.1 CM3 (ref 21–61)
LEFT VENTRICULAR INTERNAL DIMENSION IN DIASTOLE: 4.55 CM (ref 4.2–5.8)
LEFT VENTRICULAR INTERNAL DIMENSION IN SYSTOLE: 3.71 CM (ref 2.5–4)
LEFT VENTRICULAR MASS: 231.5 G
LEFT VENTRICULAR OUTFLOW TRACT MEAN GRADIENT: 2 MMHG
LEFT VENTRICULAR OUTFLOW TRACT MEAN VELOCITY: 58.3 CM/S
LEFT VENTRICULAR OUTFLOW TRACT PEAK GRADIENT: 3 MMHG
LEFT VENTRICULAR POSTERIOR WALL IN END DIASTOLE: 1.3 CM (ref 0.6–1)
LV STROKE VOLUME INDEX: 24 ML/M2
MAGNESIUM SERPL-MCNC: 1.8 MG/DL (ref 1.8–2.6)
MV AVERAGE E/E' RATIO: 13.4 CM/S
MV DECELERATION TIME: 264 MS
MV E'TISSUE VEL-LAT: 4.56 CM/S
MV E'TISSUE VEL-MED: 4.33 CM/S
MV LATERAL E/E' RATIO: 13.1
MV MEDIAL E/E' RATIO: 13.8
MV PEAK E VELOCITY: 59.7 CM/S
NUC REST DIASTOLIC VOLUME INDEX: 3568 LBS
NUC REST SYSTOLIC VOLUME INDEX: 70 IN
POTASSIUM BLD-SCNC: 3.7 MMOL/L (ref 3.5–5)
SODIUM SERPL-SCNC: 139 MMOL/L (ref 136–145)
TRICUSPID VALVE ANULAR PLANE SYSTOLIC EXCURSION: 0.9 CM
TROPONIN I SERPL-MCNC: 1.6 NG/ML (ref 0–0.29)
TROPONIN I SERPL-MCNC: 1.88 NG/ML (ref 0–0.29)
UFH PPP CHRO-ACNC: 0.16 IU/ML
UFH PPP CHRO-ACNC: 0.16 IU/ML
UFH PPP CHRO-ACNC: 0.23 IU/ML
UFH PPP CHRO-ACNC: 0.25 IU/ML

## 2020-12-03 ASSESSMENT — MIFFLIN-ST. JEOR
SCORE: 1782.77
SCORE: 1782.77

## 2020-12-04 ENCOUNTER — ANESTHESIA - HEALTHEAST (OUTPATIENT)
Dept: SURGERY | Facility: CLINIC | Age: 70
End: 2020-12-04

## 2020-12-04 LAB
ALBUMIN UR-MCNC: NEGATIVE MG/DL
APPEARANCE UR: CLEAR
BACTERIA #/AREA URNS HPF: ABNORMAL HPF
BILIRUB UR QL STRIP: NEGATIVE
COLOR UR AUTO: YELLOW
GLUCOSE BLDC GLUCOMTR-MCNC: 163 MG/DL (ref 70–139)
GLUCOSE BLDC GLUCOMTR-MCNC: 189 MG/DL (ref 70–139)
GLUCOSE BLDC GLUCOMTR-MCNC: 227 MG/DL (ref 70–139)
GLUCOSE BLDC GLUCOMTR-MCNC: 258 MG/DL (ref 70–139)
GLUCOSE UR STRIP-MCNC: NEGATIVE MG/DL
HGB UR QL STRIP: NEGATIVE
HYALINE CASTS #/AREA URNS LPF: ABNORMAL LPF
KETONES UR STRIP-MCNC: NEGATIVE MG/DL
LEUKOCYTE ESTERASE UR QL STRIP: ABNORMAL
MUCOUS THREADS #/AREA URNS LPF: ABNORMAL LPF
NITRATE UR QL: NEGATIVE
PH UR STRIP: 5 [PH] (ref 4.5–8)
PREALB SERPL-MCNC: 12.8 MG/DL (ref 19–38)
RBC #/AREA URNS AUTO: ABNORMAL HPF
SP GR UR STRIP: 1.01 (ref 1–1.03)
SQUAMOUS #/AREA URNS AUTO: ABNORMAL LPF
UFH PPP CHRO-ACNC: 0.31 IU/ML
UFH PPP CHRO-ACNC: 0.38 IU/ML
UROBILINOGEN UR STRIP-ACNC: ABNORMAL
WBC #/AREA URNS AUTO: ABNORMAL HPF

## 2020-12-04 ASSESSMENT — MIFFLIN-ST. JEOR
SCORE: 1796.38
SCORE: 1796.38

## 2020-12-05 LAB
BACTERIA SPEC CULT: NORMAL
GLUCOSE BLDC GLUCOMTR-MCNC: 210 MG/DL (ref 70–139)
GLUCOSE BLDC GLUCOMTR-MCNC: 229 MG/DL (ref 70–139)
GLUCOSE BLDC GLUCOMTR-MCNC: 259 MG/DL (ref 70–139)
GLUCOSE BLDC GLUCOMTR-MCNC: 269 MG/DL (ref 70–139)
HGB BLD-MCNC: 14 G/DL (ref 14–18)
PLATELET # BLD AUTO: 276 THOU/UL (ref 140–440)
UFH PPP CHRO-ACNC: 0.35 IU/ML

## 2020-12-05 ASSESSMENT — MIFFLIN-ST. JEOR
SCORE: 1769.62
SCORE: 1769.62

## 2020-12-06 LAB
ABO/RH(D): NORMAL
ANION GAP SERPL CALCULATED.3IONS-SCNC: 11 MMOL/L (ref 5–18)
ANTIBODY SCREEN: NEGATIVE
BLD PROD TYP BPU: NORMAL
BLD PROD TYP BPU: NORMAL
BLOOD TYPE: 5100
BLOOD TYPE: 5100
BUN SERPL-MCNC: 15 MG/DL (ref 8–22)
CALCIUM SERPL-MCNC: 8.4 MG/DL (ref 8.5–10.5)
CHLORIDE BLD-SCNC: 97 MMOL/L (ref 98–107)
CO2 SERPL-SCNC: 26 MMOL/L (ref 22–31)
CODING SYSTEM: NORMAL
CODING SYSTEM: NORMAL
COMPONENT (HISTORICAL CONVERSION): NORMAL
COMPONENT (HISTORICAL CONVERSION): NORMAL
CREAT SERPL-MCNC: 0.73 MG/DL (ref 0.7–1.3)
CROSSMATCH: NORMAL
CROSSMATCH: NORMAL
GFR SERPL CREATININE-BSD FRML MDRD: >60 ML/MIN/1.73M2
GLUCOSE BLD-MCNC: 234 MG/DL (ref 70–125)
GLUCOSE BLDC GLUCOMTR-MCNC: 182 MG/DL (ref 70–139)
GLUCOSE BLDC GLUCOMTR-MCNC: 184 MG/DL (ref 70–139)
GLUCOSE BLDC GLUCOMTR-MCNC: 232 MG/DL (ref 70–139)
GLUCOSE BLDC GLUCOMTR-MCNC: 243 MG/DL (ref 70–139)
POTASSIUM BLD-SCNC: 3.7 MMOL/L (ref 3.5–5)
SODIUM SERPL-SCNC: 134 MMOL/L (ref 136–145)
STATUS (HISTORICAL CONVERSION): NORMAL
STATUS (HISTORICAL CONVERSION): NORMAL
UFH PPP CHRO-ACNC: 0.25 IU/ML
UNIT ABO/RH (HISTORICAL CONVERSION): NORMAL
UNIT ABO/RH (HISTORICAL CONVERSION): NORMAL
UNIT NUMBER: NORMAL
UNIT NUMBER: NORMAL

## 2020-12-06 ASSESSMENT — MIFFLIN-ST. JEOR
SCORE: 1770.98
SCORE: 1770.98

## 2020-12-07 ENCOUNTER — SURGERY - HEALTHEAST (OUTPATIENT)
Dept: SURGERY | Facility: CLINIC | Age: 70
End: 2020-12-07

## 2020-12-07 LAB
ANION GAP SERPL CALCULATED.3IONS-SCNC: 7 MMOL/L (ref 5–18)
APTT PPP: 44 SECONDS (ref 24–37)
BASE EXCESS BLDA CALC-SCNC: -0.2 MMOL/L
BASE EXCESS BLDA CALC-SCNC: 1.2 MMOL/L
BASE EXCESS BLDA CALC-SCNC: 3.7 MMOL/L
BASE EXCESS BLDA CALC-SCNC: 3.9 MMOL/L
BASE EXCESS BLDV CALC-SCNC: 3 MMOL/L
BASE EXCESS BLDV CALC-SCNC: 5 MMOL/L
BASE EXCESS BLDV CALC-SCNC: 5 MMOL/L
BASE EXCESS BLDV CALC-SCNC: 7 MMOL/L
BASOPHILS # BLD AUTO: 0.1 THOU/UL (ref 0–0.2)
BASOPHILS NFR BLD AUTO: 0 % (ref 0–2)
BSA FOR ECHO PROCEDURE: 2.23 M2
BUN SERPL-MCNC: 16 MG/DL (ref 8–22)
CA-I BLD-MCNC: 1.12 MMOL/L (ref 1.11–1.3)
CA-I BLD-MCNC: 1.27 MMOL/L (ref 1.11–1.3)
CALCIUM SERPL-MCNC: 8.2 MG/DL (ref 8.5–10.5)
CALCIUM, IONIZED MEASURED: 1.13 MMOL/L (ref 1.11–1.3)
CHLORIDE BLD-SCNC: 108 MMOL/L (ref 98–107)
CO2 SERPL-SCNC: 25 MMOL/L (ref 22–31)
COHGB MFR BLD: 89.1 % (ref 95–96)
COHGB MFR BLD: 96.8 % (ref 95–96)
COHGB MFR BLD: 96.8 % (ref 95–96)
COHGB MFR BLD: 98.5 % (ref 95–96)
CREAT SERPL-MCNC: 0.73 MG/DL (ref 0.7–1.3)
CV BLOOD PRESSURE: NORMAL MMHG
CV ECHO HEIGHT: 70 IN
CV ECHO WEIGHT: 219 LBS
EOSINOPHIL # BLD AUTO: 0.1 THOU/UL (ref 0–0.4)
EOSINOPHIL NFR BLD AUTO: 1 % (ref 0–6)
ERYTHROCYTE [DISTWIDTH] IN BLOOD BY AUTOMATED COUNT: 14.3 % (ref 11–14.5)
ERYTHROCYTE [DISTWIDTH] IN BLOOD BY AUTOMATED COUNT: 14.3 % (ref 11–14.5)
FIBRINOGEN PPP-MCNC: 407 MG/DL (ref 170–410)
GFR SERPL CREATININE-BSD FRML MDRD: >60 ML/MIN/1.73M2
GLUCOSE BLD-MCNC: 144 MG/DL (ref 70–125)
GLUCOSE BLD-MCNC: 145 MG/DL (ref 70–125)
GLUCOSE BLD-MCNC: 168 MG/DL (ref 70–125)
GLUCOSE BLDC GLUCOMTR-MCNC: 110 MG/DL (ref 70–139)
GLUCOSE BLDC GLUCOMTR-MCNC: 134 MG/DL (ref 70–139)
GLUCOSE BLDC GLUCOMTR-MCNC: 140 MG/DL (ref 70–139)
GLUCOSE BLDC GLUCOMTR-MCNC: 145 MG/DL (ref 70–139)
GLUCOSE BLDC GLUCOMTR-MCNC: 150 MG/DL (ref 70–139)
GLUCOSE BLDC GLUCOMTR-MCNC: 151 MG/DL (ref 70–139)
GLUCOSE BLDC GLUCOMTR-MCNC: 152 MG/DL (ref 70–139)
GLUCOSE BLDC GLUCOMTR-MCNC: 154 MG/DL (ref 70–139)
GLUCOSE BLDC GLUCOMTR-MCNC: 154 MG/DL (ref 70–139)
GLUCOSE BLDC GLUCOMTR-MCNC: 156 MG/DL (ref 70–139)
GLUCOSE BLDC GLUCOMTR-MCNC: 157 MG/DL (ref 70–139)
GLUCOSE BLDC GLUCOMTR-MCNC: 159 MG/DL (ref 70–139)
GLUCOSE BLDC GLUCOMTR-MCNC: 175 MG/DL (ref 70–139)
HCO3 BLDA-SCNC: 26.7 MMOL/L (ref 23–29)
HCO3 BLDA-SCNC: 28.4 MMOL/L (ref 23–29)
HCO3 BLDA-SCNC: 28.6 MMOL/L (ref 23–29)
HCO3 BLDV-SCNC: 29.1 MMOL/L (ref 24–30)
HCO3, ARTERIAL CALC - HISTORICAL: 24 MMOL/L (ref 23–29)
HCO3, ARTERIAL CALC - HISTORICAL: 25.3 MMOL/L (ref 23–29)
HCO3, ARTERIAL CALC - HISTORICAL: 26.9 MMOL/L (ref 23–29)
HCO3, ARTERIAL CALC - HISTORICAL: 27.4 MMOL/L (ref 23–29)
HCT VFR BLD AUTO: 36.7 % (ref 40–54)
HCT VFR BLD AUTO: 41.2 % (ref 40–54)
HGB BLD-MCNC: 10.1 G/DL (ref 14–18)
HGB BLD-MCNC: 11.6 G/DL (ref 14–18)
HGB BLD-MCNC: 12.1 G/DL (ref 14–18)
HGB BLD-MCNC: 13.2 G/DL (ref 14–18)
IMM GRANULOCYTES # BLD: 0.2 THOU/UL
IMM GRANULOCYTES NFR BLD: 1 %
INR PPP: 1.26 (ref 0.9–1.1)
INR PPP: 1.35 (ref 0.9–1.1)
ION CA PH 7.4: 1.13 MMOL/L (ref 1.11–1.3)
LACTATE BLD-SCNC: 0.8 MMOL/L (ref 0.7–2)
LACTATE BLD-SCNC: 1.3 MMOL/L (ref 0.7–2)
LEFT VENTRICLE HEART RATE: 78 BPM
LYMPHOCYTES # BLD AUTO: 1.9 THOU/UL (ref 0.8–4.4)
LYMPHOCYTES NFR BLD AUTO: 8 % (ref 20–40)
MAGNESIUM SERPL-MCNC: 2.9 MG/DL (ref 1.8–2.6)
MCH RBC QN AUTO: 26.7 PG (ref 27–34)
MCH RBC QN AUTO: 26.9 PG (ref 27–34)
MCHC RBC AUTO-ENTMCNC: 31.6 G/DL (ref 32–36)
MCHC RBC AUTO-ENTMCNC: 32 G/DL (ref 32–36)
MCV RBC AUTO: 84 FL (ref 80–100)
MCV RBC AUTO: 85 FL (ref 80–100)
MONOCYTES # BLD AUTO: 1.4 THOU/UL (ref 0–0.9)
MONOCYTES NFR BLD AUTO: 6 % (ref 2–10)
NEUTROPHILS # BLD AUTO: 19.4 THOU/UL (ref 2–7.7)
NEUTROPHILS NFR BLD AUTO: 84 % (ref 50–70)
NUC REST DIASTOLIC VOLUME INDEX: 3496 LBS
NUC REST SYSTOLIC VOLUME INDEX: 70 IN
O2/TOTAL GAS SETTING VFR VENT: 0.8 %
O2/TOTAL GAS SETTING VFR VENT: 40 %
O2/TOTAL GAS SETTING VFR VENT: ABNORMAL %
O2/TOTAL GAS SETTING VFR VENT: ABNORMAL %
OXYHEMOGLOBIN - HISTORICAL: 87.1 % (ref 95–96)
OXYHEMOGLOBIN - HISTORICAL: 94.9 % (ref 95–96)
OXYHEMOGLOBIN - HISTORICAL: 95.7 % (ref 95–96)
OXYHEMOGLOBIN - HISTORICAL: 97.1 % (ref 95–96)
PCO2 BLD: 42 MM HG (ref 35–45)
PCO2 BLD: 42 MM HG (ref 35–45)
PCO2 BLD: 46 MM HG (ref 35–45)
PCO2 BLD: 52 MM HG (ref 35–45)
PCO2 BLDA: 46 MMHG (ref 35–45)
PCO2 BLDA: 49 MMHG (ref 35–45)
PCO2 BLDA: 50 MMHG (ref 35–45)
PCO2 BLDV: 56 MMHG (ref 35–50)
PEEP: 5 CM H2O
PEEP: 5 CM H2O
PH BLD: 7.35 [PH] (ref 7.37–7.44)
PH BLD: 7.36 [PH] (ref 7.37–7.44)
PH BLD: 7.4 [PH] (ref 7.37–7.44)
PH BLD: 7.43 [PH] (ref 7.37–7.44)
PH BLDA: 7.39 [PH] (ref 7.37–7.44)
PH BLDA: 7.39 [PH] (ref 7.37–7.44)
PH BLDA: 7.4 [PH] (ref 7.37–7.44)
PH BLDV: 7.37 [PH] (ref 7.35–7.45)
PH: 7.4 (ref 7.35–7.45)
PLATELET # BLD AUTO: 208 THOU/UL (ref 140–440)
PLATELET # BLD AUTO: 247 THOU/UL (ref 140–440)
PMV BLD AUTO: 9.8 FL (ref 8.5–12.5)
PMV BLD AUTO: 9.9 FL (ref 8.5–12.5)
PO2 BLD: 104 MM HG (ref 75–85)
PO2 BLD: 185 MM HG (ref 75–85)
PO2 BLD: 59 MM HG (ref 75–85)
PO2 BLD: 94 MM HG (ref 75–85)
PO2 BLDA: 349 MMHG (ref 75–85)
PO2 BLDA: 410 MMHG (ref 75–85)
PO2 BLDA: 422 MMHG (ref 75–85)
PO2 BLDV: 67 MMHG (ref 25–47)
POTASSIUM BLD-SCNC: 3.4 MMOL/L (ref 3.5–5)
POTASSIUM BLD-SCNC: 3.5 MMOL/L (ref 3.5–5)
POTASSIUM BLD-SCNC: 4.2 MMOL/L (ref 3.5–5)
PSV (LAB BLOOD GAS): 5 CM H2O
RATE: 16 RR/MIN
RBC # BLD AUTO: 4.34 MILL/UL (ref 4.4–6.2)
RBC # BLD AUTO: 4.91 MILL/UL (ref 4.4–6.2)
SAO2 % BLDV: 89.8 % (ref 70–75)
SAT POCT - HISTORICAL: 100 % (ref 95–96)
SODIUM BLD-SCNC: 138 MMOL/L (ref 136–145)
SODIUM BLD-SCNC: 141 MMOL/L (ref 136–145)
SODIUM SERPL-SCNC: 140 MMOL/L (ref 136–145)
TEMPERATURE: 37 DEGREES C
UFH PPP CHRO-ACNC: <0.1 IU/ML
VENTILATION MODE: ABNORMAL
VENTILATION MODE: ABNORMAL
VENTILATOR TIDAL VOLUME: 500 ML
WBC: 22.7 THOU/UL (ref 4–11)
WBC: 23.1 THOU/UL (ref 4–11)

## 2020-12-07 ASSESSMENT — MIFFLIN-ST. JEOR
SCORE: 1762.36
SCORE: 1762.36

## 2020-12-08 LAB
ANION GAP SERPL CALCULATED.3IONS-SCNC: 4 MMOL/L (ref 5–18)
ATRIAL RATE - MUSE: 75 BPM
ATRIAL RATE - MUSE: 84 BPM
BASE EXCESS BLDA CALC-SCNC: 3.4 MMOL/L
BASE EXCESS BLDA CALC-SCNC: 3.9 MMOL/L
BASE EXCESS BLDV CALC-SCNC: 4.7 MMOL/L
BASOPHILS # BLD AUTO: 0 THOU/UL (ref 0–0.2)
BASOPHILS NFR BLD AUTO: 0 % (ref 0–2)
BUN SERPL-MCNC: 18 MG/DL (ref 8–22)
CALCIUM SERPL-MCNC: 8 MG/DL (ref 8.5–10.5)
CALCIUM, IONIZED MEASURED: 1.1 MMOL/L (ref 1.11–1.3)
CHLORIDE BLD-SCNC: 110 MMOL/L (ref 98–107)
CO2 SERPL-SCNC: 27 MMOL/L (ref 22–31)
COHGB MFR BLD: 97.8 % (ref 95–96)
COHGB MFR BLD: 98.3 % (ref 95–96)
CREAT SERPL-MCNC: 0.72 MG/DL (ref 0.7–1.3)
DIASTOLIC BLOOD PRESSURE - MUSE: NORMAL
DIASTOLIC BLOOD PRESSURE - MUSE: NORMAL
EOSINOPHIL # BLD AUTO: 0 THOU/UL (ref 0–0.4)
EOSINOPHIL NFR BLD AUTO: 0 % (ref 0–6)
ERYTHROCYTE [DISTWIDTH] IN BLOOD BY AUTOMATED COUNT: 14.4 % (ref 11–14.5)
GFR SERPL CREATININE-BSD FRML MDRD: >60 ML/MIN/1.73M2
GLUCOSE BLD-MCNC: 146 MG/DL (ref 70–125)
GLUCOSE BLDC GLUCOMTR-MCNC: 103 MG/DL (ref 70–139)
GLUCOSE BLDC GLUCOMTR-MCNC: 104 MG/DL (ref 70–139)
GLUCOSE BLDC GLUCOMTR-MCNC: 104 MG/DL (ref 70–139)
GLUCOSE BLDC GLUCOMTR-MCNC: 107 MG/DL (ref 70–139)
GLUCOSE BLDC GLUCOMTR-MCNC: 108 MG/DL (ref 70–139)
GLUCOSE BLDC GLUCOMTR-MCNC: 112 MG/DL (ref 70–139)
GLUCOSE BLDC GLUCOMTR-MCNC: 113 MG/DL (ref 70–139)
GLUCOSE BLDC GLUCOMTR-MCNC: 114 MG/DL (ref 70–139)
GLUCOSE BLDC GLUCOMTR-MCNC: 117 MG/DL (ref 70–139)
GLUCOSE BLDC GLUCOMTR-MCNC: 121 MG/DL (ref 70–139)
GLUCOSE BLDC GLUCOMTR-MCNC: 128 MG/DL (ref 70–139)
GLUCOSE BLDC GLUCOMTR-MCNC: 129 MG/DL (ref 70–139)
GLUCOSE BLDC GLUCOMTR-MCNC: 97 MG/DL (ref 70–139)
GLUCOSE BLDC GLUCOMTR-MCNC: 98 MG/DL (ref 70–139)
GLUCOSE BLDC GLUCOMTR-MCNC: 99 MG/DL (ref 70–139)
HCO3, ARTERIAL CALC - HISTORICAL: 27.2 MMOL/L (ref 23–29)
HCO3, ARTERIAL CALC - HISTORICAL: 27.9 MMOL/L (ref 23–29)
HCO3, VENOUS, CALC - HISTORICAL: 27.5 MMOL/L (ref 24–30)
HCT VFR BLD AUTO: 33.6 % (ref 40–54)
HGB BLD-MCNC: 10.4 G/DL (ref 14–18)
IMM GRANULOCYTES # BLD: 0.1 THOU/UL
IMM GRANULOCYTES NFR BLD: 1 %
INR PPP: 1.41 (ref 0.9–1.1)
INTERPRETATION ECG - MUSE: NORMAL
INTERPRETATION ECG - MUSE: NORMAL
ION CA PH 7.4: 1.13 MMOL/L (ref 1.11–1.3)
LYMPHOCYTES # BLD AUTO: 0.7 THOU/UL (ref 0.8–4.4)
LYMPHOCYTES NFR BLD AUTO: 6 % (ref 20–40)
MAGNESIUM SERPL-MCNC: 2.1 MG/DL (ref 1.8–2.6)
MCH RBC QN AUTO: 26.5 PG (ref 27–34)
MCHC RBC AUTO-ENTMCNC: 31 G/DL (ref 32–36)
MCV RBC AUTO: 86 FL (ref 80–100)
MONOCYTES # BLD AUTO: 1.1 THOU/UL (ref 0–0.9)
MONOCYTES NFR BLD AUTO: 9 % (ref 2–10)
NEUTROPHILS # BLD AUTO: 11 THOU/UL (ref 2–7.7)
NEUTROPHILS NFR BLD AUTO: 85 % (ref 50–70)
O2/TOTAL GAS SETTING VFR VENT: 30 %
O2/TOTAL GAS SETTING VFR VENT: 30 %
OXYHEMOGLOBIN (VBG) - HISTORICAL: 72.2 % (ref 70–75)
OXYHEMOGLOBIN - HISTORICAL: 96.2 % (ref 95–96)
OXYHEMOGLOBIN - HISTORICAL: 96.4 % (ref 95–96)
OXYHGB MFR BLDV: 73.9 % (ref 70–75)
P AXIS - MUSE: 86 DEGREES
P AXIS - MUSE: 94 DEGREES
PCO2 BLD: 34 MM HG (ref 35–45)
PCO2 BLD: 41 MM HG (ref 35–45)
PCO2 BLDV: 47 MM HG (ref 35–50)
PEEP: 5 CM H2O
PEEP: 5 CM H2O
PH BLD: 7.44 [PH] (ref 7.37–7.44)
PH BLD: 7.51 [PH] (ref 7.37–7.44)
PH BLDV: 7.4 [PH] (ref 7.35–7.45)
PH: 7.45 (ref 7.35–7.45)
PLATELET # BLD AUTO: 189 THOU/UL (ref 140–440)
PMV BLD AUTO: 10 FL (ref 8.5–12.5)
PO2 BLD: 109 MM HG (ref 75–85)
PO2 BLD: 93 MM HG (ref 75–85)
PO2 BLDV: 39 MM HG (ref 25–47)
POTASSIUM BLD-SCNC: 4.5 MMOL/L (ref 3.5–5)
PR INTERVAL - MUSE: 208 MS
PR INTERVAL - MUSE: 216 MS
QRS DURATION - MUSE: 130 MS
QRS DURATION - MUSE: 132 MS
QT - MUSE: 442 MS
QT - MUSE: 492 MS
QTC - MUSE: 522 MS
QTC - MUSE: 549 MS
R AXIS - MUSE: -56 DEGREES
R AXIS - MUSE: -79 DEGREES
RATE: 16 RR/MIN
RATE: 16 RR/MIN
RBC # BLD AUTO: 3.93 MILL/UL (ref 4.4–6.2)
SODIUM SERPL-SCNC: 141 MMOL/L (ref 136–145)
SYSTOLIC BLOOD PRESSURE - MUSE: NORMAL
SYSTOLIC BLOOD PRESSURE - MUSE: NORMAL
T AXIS - MUSE: -53 DEGREES
T AXIS - MUSE: 27 DEGREES
TEMPERATURE: 37 DEGREES C
TEMPERATURE: 37 DEGREES C
VENTILATION MODE: ABNORMAL
VENTILATION MODE: ABNORMAL
VENTILATOR TIDAL VOLUME: 500 ML
VENTILATOR TIDAL VOLUME: 500 ML
VENTRICULAR RATE- MUSE: 75 BPM
VENTRICULAR RATE- MUSE: 84 BPM
WBC: 13 THOU/UL (ref 4–11)

## 2020-12-08 ASSESSMENT — MIFFLIN-ST. JEOR
SCORE: 1766.25
SCORE: 1766.25

## 2020-12-09 LAB
ANION GAP SERPL CALCULATED.3IONS-SCNC: 5 MMOL/L (ref 5–18)
BASE EXCESS BLDA CALC-SCNC: 2.2 MMOL/L
BASE EXCESS BLDA CALC-SCNC: 3.8 MMOL/L
BASE EXCESS BLDV CALC-SCNC: 3.6 MMOL/L
BLD PROD TYP BPU: NORMAL
BLD PROD TYP BPU: NORMAL
BLOOD TYPE: 5100
BLOOD TYPE: 5100
BUN SERPL-MCNC: 27 MG/DL (ref 8–22)
CALCIUM SERPL-MCNC: 8.3 MG/DL (ref 8.5–10.5)
CHLORIDE BLD-SCNC: 110 MMOL/L (ref 98–107)
CO2 SERPL-SCNC: 26 MMOL/L (ref 22–31)
CODING SYSTEM: NORMAL
CODING SYSTEM: NORMAL
COHGB MFR BLD: 93.3 % (ref 95–96)
COHGB MFR BLD: 94.8 % (ref 95–96)
COMPONENT (HISTORICAL CONVERSION): NORMAL
COMPONENT (HISTORICAL CONVERSION): NORMAL
CREAT SERPL-MCNC: 0.71 MG/DL (ref 0.7–1.3)
CROSSMATCH: NORMAL
CROSSMATCH: NORMAL
ERYTHROCYTE [DISTWIDTH] IN BLOOD BY AUTOMATED COUNT: 14.6 % (ref 11–14.5)
GFR SERPL CREATININE-BSD FRML MDRD: >60 ML/MIN/1.73M2
GLUCOSE BLD-MCNC: 208 MG/DL (ref 70–125)
GLUCOSE BLDC GLUCOMTR-MCNC: 142 MG/DL (ref 70–139)
GLUCOSE BLDC GLUCOMTR-MCNC: 152 MG/DL (ref 70–139)
GLUCOSE BLDC GLUCOMTR-MCNC: 154 MG/DL (ref 70–139)
GLUCOSE BLDC GLUCOMTR-MCNC: 167 MG/DL (ref 70–139)
GLUCOSE BLDC GLUCOMTR-MCNC: 175 MG/DL (ref 70–139)
GLUCOSE BLDC GLUCOMTR-MCNC: 176 MG/DL (ref 70–139)
GLUCOSE BLDC GLUCOMTR-MCNC: 178 MG/DL (ref 70–139)
GLUCOSE BLDC GLUCOMTR-MCNC: 214 MG/DL (ref 70–139)
GLUCOSE BLDC GLUCOMTR-MCNC: 215 MG/DL (ref 70–139)
HCO3, ARTERIAL CALC - HISTORICAL: 26.3 MMOL/L (ref 23–29)
HCO3, ARTERIAL CALC - HISTORICAL: 27.6 MMOL/L (ref 23–29)
HCO3, VENOUS, CALC - HISTORICAL: 26.7 MMOL/L (ref 24–30)
HCT VFR BLD AUTO: 33.1 % (ref 40–54)
HGB BLD-MCNC: 10 G/DL (ref 14–18)
LAB AP CHARGES (HE HISTORICAL CONVERSION): NORMAL
MAGNESIUM SERPL-MCNC: 1.9 MG/DL (ref 1.8–2.6)
MCH RBC QN AUTO: 26.2 PG (ref 27–34)
MCHC RBC AUTO-ENTMCNC: 30.2 G/DL (ref 32–36)
MCV RBC AUTO: 87 FL (ref 80–100)
O2/TOTAL GAS SETTING VFR VENT: 0.3 %
O2/TOTAL GAS SETTING VFR VENT: 0.3 %
OXYHEMOGLOBIN (VBG) - HISTORICAL: 59.1 % (ref 70–75)
OXYHEMOGLOBIN - HISTORICAL: 91.1 % (ref 95–96)
OXYHEMOGLOBIN - HISTORICAL: 93.3 % (ref 95–96)
OXYHGB MFR BLDV: 60.4 % (ref 70–75)
PATH REPORT.COMMENTS IMP SPEC: NORMAL
PATH REPORT.FINAL DX SPEC: NORMAL
PATH REPORT.GROSS SPEC: NORMAL
PATH REPORT.MICROSCOPIC SPEC OTHER STN: NORMAL
PATH REPORT.RELEVANT HX SPEC: NORMAL
PCO2 BLD: 37 MM HG (ref 35–45)
PCO2 BLD: 38 MM HG (ref 35–45)
PCO2 BLDV: 42 MM HG (ref 35–50)
PEEP: 5 CM H2O
PEEP: 5 CM H2O
PH BLD: 7.46 [PH] (ref 7.37–7.44)
PH BLD: 7.47 [PH] (ref 7.37–7.44)
PH BLDV: 7.43 [PH] (ref 7.35–7.45)
PLATELET # BLD AUTO: 154 THOU/UL (ref 140–440)
PMV BLD AUTO: 10.3 FL (ref 8.5–12.5)
PO2 BLD: 63 MM HG (ref 75–85)
PO2 BLD: 72 MM HG (ref 75–85)
PO2 BLDV: 32 MM HG (ref 25–47)
POTASSIUM BLD-SCNC: 4.2 MMOL/L (ref 3.5–5)
RATE: 16 RR/MIN
RATE: 16 RR/MIN
RBC # BLD AUTO: 3.81 MILL/UL (ref 4.4–6.2)
RESULT FLAG (HE HISTORICAL CONVERSION): NORMAL
SODIUM SERPL-SCNC: 141 MMOL/L (ref 136–145)
STATUS (HISTORICAL CONVERSION): NORMAL
STATUS (HISTORICAL CONVERSION): NORMAL
TEMPERATURE: 37 DEGREES C
TEMPERATURE: 37 DEGREES C
UNIT ABO/RH (HISTORICAL CONVERSION): NORMAL
UNIT ABO/RH (HISTORICAL CONVERSION): NORMAL
UNIT NUMBER: NORMAL
UNIT NUMBER: NORMAL
VENTILATION MODE: ABNORMAL
VENTILATION MODE: ABNORMAL
VENTILATOR TIDAL VOLUME: 500 ML
VENTILATOR TIDAL VOLUME: 500 ML
WBC: 10.3 THOU/UL (ref 4–11)

## 2020-12-09 ASSESSMENT — MIFFLIN-ST. JEOR
SCORE: 1756.25
SCORE: 1756.25

## 2020-12-10 LAB
ALBUMIN SERPL-MCNC: 2 G/DL (ref 3.5–5)
ALBUMIN UR-MCNC: NEGATIVE MG/DL
ALP SERPL-CCNC: 63 U/L (ref 45–120)
ALT SERPL W P-5'-P-CCNC: <9 U/L (ref 0–45)
AMMONIA PLAS-SCNC: 28 UMOL/L (ref 11–35)
ANION GAP SERPL CALCULATED.3IONS-SCNC: 10 MMOL/L (ref 5–18)
ANION GAP SERPL CALCULATED.3IONS-SCNC: 9 MMOL/L (ref 5–18)
APPEARANCE UR: CLEAR
AST SERPL W P-5'-P-CCNC: 17 U/L (ref 0–40)
BASE EXCESS BLDA CALC-SCNC: 3.2 MMOL/L
BASE EXCESS BLDA CALC-SCNC: 4.5 MMOL/L
BASE EXCESS BLDA CALC-SCNC: 5.3 MMOL/L
BASE EXCESS BLDA CALC-SCNC: 5.9 MMOL/L
BILIRUB DIRECT SERPL-MCNC: 0.5 MG/DL
BILIRUB SERPL-MCNC: 0.9 MG/DL (ref 0–1)
BILIRUB UR QL STRIP: NEGATIVE
BUN SERPL-MCNC: 26 MG/DL (ref 8–22)
BUN SERPL-MCNC: 29 MG/DL (ref 8–22)
CALCIUM SERPL-MCNC: 8.3 MG/DL (ref 8.5–10.5)
CALCIUM SERPL-MCNC: 8.4 MG/DL (ref 8.5–10.5)
CHLORIDE BLD-SCNC: 108 MMOL/L (ref 98–107)
CHLORIDE BLD-SCNC: 108 MMOL/L (ref 98–107)
CO2 SERPL-SCNC: 23 MMOL/L (ref 22–31)
CO2 SERPL-SCNC: 27 MMOL/L (ref 22–31)
COHGB MFR BLD: 95.4 % (ref 95–96)
COHGB MFR BLD: 95.6 % (ref 95–96)
COHGB MFR BLD: 96.7 % (ref 95–96)
COHGB MFR BLD: 98.4 % (ref 95–96)
COLOR UR AUTO: YELLOW
CREAT SERPL-MCNC: 0.73 MG/DL (ref 0.7–1.3)
CREAT SERPL-MCNC: 0.77 MG/DL (ref 0.7–1.3)
ERYTHROCYTE [DISTWIDTH] IN BLOOD BY AUTOMATED COUNT: 14.4 % (ref 11–14.5)
ERYTHROCYTE [DISTWIDTH] IN BLOOD BY AUTOMATED COUNT: 14.5 % (ref 11–14.5)
FLOW: 4 LPM
GFR SERPL CREATININE-BSD FRML MDRD: >60 ML/MIN/1.73M2
GFR SERPL CREATININE-BSD FRML MDRD: >60 ML/MIN/1.73M2
GLUCOSE BLD-MCNC: 200 MG/DL (ref 70–125)
GLUCOSE BLD-MCNC: 216 MG/DL (ref 70–125)
GLUCOSE BLDC GLUCOMTR-MCNC: 174 MG/DL (ref 70–139)
GLUCOSE BLDC GLUCOMTR-MCNC: 179 MG/DL (ref 70–139)
GLUCOSE BLDC GLUCOMTR-MCNC: 182 MG/DL (ref 70–139)
GLUCOSE BLDC GLUCOMTR-MCNC: 185 MG/DL (ref 70–139)
GLUCOSE BLDC GLUCOMTR-MCNC: 190 MG/DL (ref 70–139)
GLUCOSE BLDC GLUCOMTR-MCNC: 212 MG/DL (ref 70–139)
GLUCOSE UR STRIP-MCNC: NEGATIVE MG/DL
HCO3, ARTERIAL CALC - HISTORICAL: 27.2 MMOL/L (ref 23–29)
HCO3, ARTERIAL CALC - HISTORICAL: 28.3 MMOL/L (ref 23–29)
HCO3, ARTERIAL CALC - HISTORICAL: 29 MMOL/L (ref 23–29)
HCO3, ARTERIAL CALC - HISTORICAL: 29.3 MMOL/L (ref 23–29)
HCT VFR BLD AUTO: 33 % (ref 40–54)
HCT VFR BLD AUTO: 36.9 % (ref 40–54)
HGB BLD-MCNC: 10.4 G/DL (ref 14–18)
HGB BLD-MCNC: 11.6 G/DL (ref 14–18)
HGB UR QL STRIP: NEGATIVE
KETONES UR STRIP-MCNC: NEGATIVE MG/DL
LEUKOCYTE ESTERASE UR QL STRIP: NEGATIVE
MAGNESIUM SERPL-MCNC: 2 MG/DL (ref 1.8–2.6)
MCH RBC QN AUTO: 26.4 PG (ref 27–34)
MCH RBC QN AUTO: 26.4 PG (ref 27–34)
MCHC RBC AUTO-ENTMCNC: 31.4 G/DL (ref 32–36)
MCHC RBC AUTO-ENTMCNC: 31.5 G/DL (ref 32–36)
MCV RBC AUTO: 84 FL (ref 80–100)
MCV RBC AUTO: 84 FL (ref 80–100)
NITRATE UR QL: NEGATIVE
O2/TOTAL GAS SETTING VFR VENT: 0.4 %
O2/TOTAL GAS SETTING VFR VENT: 0.4 %
O2/TOTAL GAS SETTING VFR VENT: ABNORMAL %
OXYHEMOGLOBIN - HISTORICAL: 94.2 % (ref 95–96)
OXYHEMOGLOBIN - HISTORICAL: 94.5 % (ref 95–96)
OXYHEMOGLOBIN - HISTORICAL: 95.4 % (ref 95–96)
OXYHEMOGLOBIN - HISTORICAL: 96.8 % (ref 95–96)
PCO2 BLD: 35 MM HG (ref 35–45)
PCO2 BLD: 36 MM HG (ref 35–45)
PCO2 BLD: 36 MM HG (ref 35–45)
PCO2 BLD: 41 MM HG (ref 35–45)
PEEP: 5 CM H2O
PH BLD: 7.47 [PH] (ref 7.37–7.44)
PH BLD: 7.48 [PH] (ref 7.37–7.44)
PH BLD: 7.5 [PH] (ref 7.37–7.44)
PH BLD: 7.5 [PH] (ref 7.37–7.44)
PH UR STRIP: 5 [PH] (ref 4.5–8)
PLATELET # BLD AUTO: 199 THOU/UL (ref 140–440)
PLATELET # BLD AUTO: 256 THOU/UL (ref 140–440)
PMV BLD AUTO: 9.8 FL (ref 8.5–12.5)
PMV BLD AUTO: 9.9 FL (ref 8.5–12.5)
PO2 BLD: 75 MM HG (ref 75–85)
PO2 BLD: 80 MM HG (ref 75–85)
PO2 BLD: 93 MM HG (ref 75–85)
PO2 BLD: 94 MM HG (ref 75–85)
POTASSIUM BLD-SCNC: 3.5 MMOL/L (ref 3.5–5)
POTASSIUM BLD-SCNC: 3.6 MMOL/L (ref 3.5–5)
POTASSIUM BLD-SCNC: 3.6 MMOL/L (ref 3.5–5)
PROT SERPL-MCNC: 5.3 G/DL (ref 6–8)
PSV (LAB BLOOD GAS): 5 CM H2O
RATE: 16 RR/MIN
RATE: 16 RR/MIN
RBC # BLD AUTO: 3.94 MILL/UL (ref 4.4–6.2)
RBC # BLD AUTO: 4.4 MILL/UL (ref 4.4–6.2)
SODIUM SERPL-SCNC: 141 MMOL/L (ref 136–145)
SODIUM SERPL-SCNC: 144 MMOL/L (ref 136–145)
SP GR UR STRIP: 1.01 (ref 1–1.03)
TEMPERATURE: 37 DEGREES C
UROBILINOGEN UR STRIP-ACNC: NORMAL
VENTILATION MODE: ABNORMAL
VENTILATOR TIDAL VOLUME: 500 ML
VENTILATOR TIDAL VOLUME: 500 ML
WBC: 11.8 THOU/UL (ref 4–11)
WBC: 13.1 THOU/UL (ref 4–11)

## 2020-12-10 ASSESSMENT — MIFFLIN-ST. JEOR
SCORE: 1732.88
SCORE: 1732.88

## 2020-12-11 LAB
ANION GAP SERPL CALCULATED.3IONS-SCNC: 10 MMOL/L (ref 5–18)
BACTERIA SPEC CULT: ABNORMAL
BACTERIA SPEC CULT: ABNORMAL
BUN SERPL-MCNC: 26 MG/DL (ref 8–22)
CALCIUM SERPL-MCNC: 7.5 MG/DL (ref 8.5–10.5)
CHLORIDE BLD-SCNC: 114 MMOL/L (ref 98–107)
CO2 SERPL-SCNC: 24 MMOL/L (ref 22–31)
CREAT SERPL-MCNC: 0.76 MG/DL (ref 0.7–1.3)
ERYTHROCYTE [DISTWIDTH] IN BLOOD BY AUTOMATED COUNT: 14.5 % (ref 11–14.5)
GFR SERPL CREATININE-BSD FRML MDRD: >60 ML/MIN/1.73M2
GLUCOSE BLD-MCNC: 204 MG/DL (ref 70–125)
GLUCOSE BLDC GLUCOMTR-MCNC: 186 MG/DL (ref 70–139)
GLUCOSE BLDC GLUCOMTR-MCNC: 194 MG/DL (ref 70–139)
GLUCOSE BLDC GLUCOMTR-MCNC: 214 MG/DL (ref 70–139)
GLUCOSE BLDC GLUCOMTR-MCNC: 216 MG/DL (ref 70–139)
GLUCOSE BLDC GLUCOMTR-MCNC: 217 MG/DL (ref 70–139)
GLUCOSE BLDC GLUCOMTR-MCNC: 229 MG/DL (ref 70–139)
GLUCOSE BLDC GLUCOMTR-MCNC: 229 MG/DL (ref 70–139)
GRAM STAIN RESULT: ABNORMAL
HCT VFR BLD AUTO: 36.4 % (ref 40–54)
HGB BLD-MCNC: 11.1 G/DL (ref 14–18)
LEVETIRACETAM (KEPPRA): 16.6 UG/ML (ref 6–46)
MAGNESIUM SERPL-MCNC: 1.9 MG/DL (ref 1.8–2.6)
MCH RBC QN AUTO: 26.1 PG (ref 27–34)
MCHC RBC AUTO-ENTMCNC: 30.5 G/DL (ref 32–36)
MCV RBC AUTO: 85 FL (ref 80–100)
PLATELET # BLD AUTO: 260 THOU/UL (ref 140–440)
PMV BLD AUTO: 10.1 FL (ref 8.5–12.5)
POTASSIUM BLD-SCNC: 3.2 MMOL/L (ref 3.5–5)
POTASSIUM BLD-SCNC: 3.7 MMOL/L (ref 3.5–5)
RBC # BLD AUTO: 4.26 MILL/UL (ref 4.4–6.2)
SODIUM SERPL-SCNC: 148 MMOL/L (ref 136–145)
WBC: 10.7 THOU/UL (ref 4–11)

## 2020-12-11 ASSESSMENT — MIFFLIN-ST. JEOR
SCORE: 1725.16
SCORE: 1725.16

## 2020-12-12 LAB
ANION GAP SERPL CALCULATED.3IONS-SCNC: 10 MMOL/L (ref 5–18)
ANION GAP SERPL CALCULATED.3IONS-SCNC: 10 MMOL/L (ref 5–18)
ATRIAL RATE - MUSE: 136 BPM
BASE EXCESS BLDA CALC-SCNC: 4.5 MMOL/L
BUN SERPL-MCNC: 25 MG/DL (ref 8–22)
BUN SERPL-MCNC: 29 MG/DL (ref 8–22)
CALCIUM SERPL-MCNC: 8.1 MG/DL (ref 8.5–10.5)
CALCIUM SERPL-MCNC: 8.4 MG/DL (ref 8.5–10.5)
CHLORIDE BLD-SCNC: 115 MMOL/L (ref 98–107)
CHLORIDE BLD-SCNC: 116 MMOL/L (ref 98–107)
CO2 SERPL-SCNC: 23 MMOL/L (ref 22–31)
CO2 SERPL-SCNC: 24 MMOL/L (ref 22–31)
COHGB MFR BLD: 93.4 % (ref 95–96)
CREAT SERPL-MCNC: 0.92 MG/DL (ref 0.7–1.3)
CREAT SERPL-MCNC: 0.92 MG/DL (ref 0.7–1.3)
DIASTOLIC BLOOD PRESSURE - MUSE: NORMAL
ERYTHROCYTE [DISTWIDTH] IN BLOOD BY AUTOMATED COUNT: 14.7 % (ref 11–14.5)
GFR SERPL CREATININE-BSD FRML MDRD: >60 ML/MIN/1.73M2
GFR SERPL CREATININE-BSD FRML MDRD: >60 ML/MIN/1.73M2
GLUCOSE BLD-MCNC: 278 MG/DL (ref 70–125)
GLUCOSE BLD-MCNC: 292 MG/DL (ref 70–125)
GLUCOSE BLDC GLUCOMTR-MCNC: 226 MG/DL (ref 70–139)
GLUCOSE BLDC GLUCOMTR-MCNC: 258 MG/DL (ref 70–139)
GLUCOSE BLDC GLUCOMTR-MCNC: 268 MG/DL (ref 70–139)
GLUCOSE BLDC GLUCOMTR-MCNC: 281 MG/DL (ref 70–139)
GLUCOSE BLDC GLUCOMTR-MCNC: 307 MG/DL (ref 70–139)
GLUCOSE BLDC GLUCOMTR-MCNC: 335 MG/DL (ref 70–139)
GLUCOSE BLDC GLUCOMTR-MCNC: 335 MG/DL (ref 70–139)
HCO3, ARTERIAL CALC - HISTORICAL: 28.3 MMOL/L (ref 23–29)
HCT VFR BLD AUTO: 37.9 % (ref 40–54)
HGB BLD-MCNC: 11.7 G/DL (ref 14–18)
INTERPRETATION ECG - MUSE: NORMAL
MAGNESIUM SERPL-MCNC: 2.2 MG/DL (ref 1.8–2.6)
MCH RBC QN AUTO: 26.5 PG (ref 27–34)
MCHC RBC AUTO-ENTMCNC: 30.9 G/DL (ref 32–36)
MCV RBC AUTO: 86 FL (ref 80–100)
O2/TOTAL GAS SETTING VFR VENT: 21 %
OXYHEMOGLOBIN - HISTORICAL: 91.3 % (ref 95–96)
P AXIS - MUSE: NORMAL
PCO2 BLD: 35 MM HG (ref 35–45)
PH BLD: 7.5 [PH] (ref 7.37–7.44)
PLATELET # BLD AUTO: 403 THOU/UL (ref 140–440)
PMV BLD AUTO: 9.9 FL (ref 8.5–12.5)
PO2 BLD: 63 MM HG (ref 75–85)
POTASSIUM BLD-SCNC: 3.5 MMOL/L (ref 3.5–5)
POTASSIUM BLD-SCNC: 4.2 MMOL/L (ref 3.5–5)
PR INTERVAL - MUSE: NORMAL
QRS DURATION - MUSE: 132 MS
QT - MUSE: 388 MS
QTC - MUSE: 555 MS
R AXIS - MUSE: -72 DEGREES
RBC # BLD AUTO: 4.42 MILL/UL (ref 4.4–6.2)
SODIUM SERPL-SCNC: 149 MMOL/L (ref 136–145)
SODIUM SERPL-SCNC: 149 MMOL/L (ref 136–145)
SODIUM SERPL-SCNC: 150 MMOL/L (ref 136–145)
SODIUM SERPL-SCNC: 150 MMOL/L (ref 136–145)
SYSTOLIC BLOOD PRESSURE - MUSE: NORMAL
T AXIS - MUSE: 84 DEGREES
TEMPERATURE: 37 DEGREES C
VANCOMYCIN SERPL-MCNC: 19.1 UG/ML
VENTILATION MODE: ABNORMAL
VENTRICULAR RATE- MUSE: 123 BPM
WBC: 14.3 THOU/UL (ref 4–11)

## 2020-12-12 ASSESSMENT — MIFFLIN-ST. JEOR
SCORE: 1709.74
SCORE: 1709.74

## 2020-12-13 LAB
ANION GAP SERPL CALCULATED.3IONS-SCNC: 7 MMOL/L (ref 5–18)
BUN SERPL-MCNC: 20 MG/DL (ref 8–22)
CALCIUM SERPL-MCNC: 8 MG/DL (ref 8.5–10.5)
CHLORIDE BLD-SCNC: 117 MMOL/L (ref 98–107)
CO2 SERPL-SCNC: 25 MMOL/L (ref 22–31)
CREAT SERPL-MCNC: 0.82 MG/DL (ref 0.7–1.3)
ERYTHROCYTE [DISTWIDTH] IN BLOOD BY AUTOMATED COUNT: 14.7 % (ref 11–14.5)
GFR SERPL CREATININE-BSD FRML MDRD: >60 ML/MIN/1.73M2
GLUCOSE BLD-MCNC: 166 MG/DL (ref 70–125)
GLUCOSE BLDC GLUCOMTR-MCNC: 104 MG/DL (ref 70–139)
GLUCOSE BLDC GLUCOMTR-MCNC: 108 MG/DL (ref 70–139)
GLUCOSE BLDC GLUCOMTR-MCNC: 110 MG/DL (ref 70–139)
GLUCOSE BLDC GLUCOMTR-MCNC: 116 MG/DL (ref 70–139)
GLUCOSE BLDC GLUCOMTR-MCNC: 118 MG/DL (ref 70–139)
GLUCOSE BLDC GLUCOMTR-MCNC: 121 MG/DL (ref 70–139)
GLUCOSE BLDC GLUCOMTR-MCNC: 122 MG/DL (ref 70–139)
GLUCOSE BLDC GLUCOMTR-MCNC: 142 MG/DL (ref 70–139)
GLUCOSE BLDC GLUCOMTR-MCNC: 147 MG/DL (ref 70–139)
GLUCOSE BLDC GLUCOMTR-MCNC: 151 MG/DL (ref 70–139)
GLUCOSE BLDC GLUCOMTR-MCNC: 151 MG/DL (ref 70–139)
GLUCOSE BLDC GLUCOMTR-MCNC: 178 MG/DL (ref 70–139)
GLUCOSE BLDC GLUCOMTR-MCNC: 184 MG/DL (ref 70–139)
GLUCOSE BLDC GLUCOMTR-MCNC: 211 MG/DL (ref 70–139)
GLUCOSE BLDC GLUCOMTR-MCNC: 231 MG/DL (ref 70–139)
GLUCOSE BLDC GLUCOMTR-MCNC: 232 MG/DL (ref 70–139)
GLUCOSE BLDC GLUCOMTR-MCNC: 277 MG/DL (ref 70–139)
HCT VFR BLD AUTO: 37.4 % (ref 40–54)
HGB BLD-MCNC: 11.1 G/DL (ref 14–18)
MAGNESIUM SERPL-MCNC: 1.8 MG/DL (ref 1.8–2.6)
MCH RBC QN AUTO: 26 PG (ref 27–34)
MCHC RBC AUTO-ENTMCNC: 29.7 G/DL (ref 32–36)
MCV RBC AUTO: 88 FL (ref 80–100)
PLATELET # BLD AUTO: 339 THOU/UL (ref 140–440)
PLATELET # BLD AUTO: 378 THOU/UL (ref 140–440)
PMV BLD AUTO: 9.9 FL (ref 8.5–12.5)
POTASSIUM BLD-SCNC: 3.2 MMOL/L (ref 3.5–5)
POTASSIUM BLD-SCNC: 3.6 MMOL/L (ref 3.5–5)
RBC # BLD AUTO: 4.27 MILL/UL (ref 4.4–6.2)
SODIUM SERPL-SCNC: 142 MMOL/L (ref 136–145)
SODIUM SERPL-SCNC: 146 MMOL/L (ref 136–145)
SODIUM SERPL-SCNC: 148 MMOL/L (ref 136–145)
SODIUM SERPL-SCNC: 149 MMOL/L (ref 136–145)
SODIUM SERPL-SCNC: 149 MMOL/L (ref 136–145)
WBC: 12.1 THOU/UL (ref 4–11)

## 2020-12-14 LAB
ANION GAP SERPL CALCULATED.3IONS-SCNC: 6 MMOL/L (ref 5–18)
ATRIAL RATE - MUSE: 119 BPM
ATRIAL RATE - MUSE: 170 BPM
BUN SERPL-MCNC: 12 MG/DL (ref 8–22)
CALCIUM SERPL-MCNC: 7.8 MG/DL (ref 8.5–10.5)
CHLORIDE BLD-SCNC: 111 MMOL/L (ref 98–107)
CO2 SERPL-SCNC: 26 MMOL/L (ref 22–31)
CREAT SERPL-MCNC: 0.97 MG/DL (ref 0.7–1.3)
DIASTOLIC BLOOD PRESSURE - MUSE: NORMAL
DIASTOLIC BLOOD PRESSURE - MUSE: NORMAL
ERYTHROCYTE [DISTWIDTH] IN BLOOD BY AUTOMATED COUNT: 14.6 % (ref 11–14.5)
GFR SERPL CREATININE-BSD FRML MDRD: >60 ML/MIN/1.73M2
GLUCOSE BLD-MCNC: 199 MG/DL (ref 70–125)
GLUCOSE BLDC GLUCOMTR-MCNC: 145 MG/DL (ref 70–139)
GLUCOSE BLDC GLUCOMTR-MCNC: 159 MG/DL (ref 70–139)
GLUCOSE BLDC GLUCOMTR-MCNC: 192 MG/DL (ref 70–139)
GLUCOSE BLDC GLUCOMTR-MCNC: 192 MG/DL (ref 70–139)
GLUCOSE BLDC GLUCOMTR-MCNC: 197 MG/DL (ref 70–139)
GLUCOSE BLDC GLUCOMTR-MCNC: 263 MG/DL (ref 70–139)
HCT VFR BLD AUTO: 38.5 % (ref 40–54)
HGB BLD-MCNC: 11.7 G/DL (ref 14–18)
INTERPRETATION ECG - MUSE: NORMAL
INTERPRETATION ECG - MUSE: NORMAL
MAGNESIUM SERPL-MCNC: 1.9 MG/DL (ref 1.8–2.6)
MCH RBC QN AUTO: 26.1 PG (ref 27–34)
MCHC RBC AUTO-ENTMCNC: 30.4 G/DL (ref 32–36)
MCV RBC AUTO: 86 FL (ref 80–100)
P AXIS - MUSE: NORMAL
P AXIS - MUSE: NORMAL
PLATELET # BLD AUTO: 369 THOU/UL (ref 140–440)
PMV BLD AUTO: 9.8 FL (ref 8.5–12.5)
POTASSIUM BLD-SCNC: 3.6 MMOL/L (ref 3.5–5)
POTASSIUM BLD-SCNC: 3.7 MMOL/L (ref 3.5–5)
PR INTERVAL - MUSE: NORMAL
PR INTERVAL - MUSE: NORMAL
QRS DURATION - MUSE: 138 MS
QRS DURATION - MUSE: 142 MS
QT - MUSE: 416 MS
QT - MUSE: 422 MS
QTC - MUSE: 557 MS
QTC - MUSE: 580 MS
R AXIS - MUSE: -66 DEGREES
R AXIS - MUSE: -67 DEGREES
RBC # BLD AUTO: 4.48 MILL/UL (ref 4.4–6.2)
SODIUM SERPL-SCNC: 142 MMOL/L (ref 136–145)
SODIUM SERPL-SCNC: 143 MMOL/L (ref 136–145)
SYSTOLIC BLOOD PRESSURE - MUSE: NORMAL
SYSTOLIC BLOOD PRESSURE - MUSE: NORMAL
T AXIS - MUSE: 54 DEGREES
T AXIS - MUSE: 96 DEGREES
VENTRICULAR RATE- MUSE: 105 BPM
VENTRICULAR RATE- MUSE: 117 BPM
WBC: 17.7 THOU/UL (ref 4–11)

## 2020-12-14 ASSESSMENT — MIFFLIN-ST. JEOR
SCORE: 1751.47
SCORE: 1751.47

## 2020-12-15 LAB
AEROBIC BLOOD CULTURE BOTTLE: NO GROWTH
AEROBIC BLOOD CULTURE BOTTLE: NO GROWTH
ALBUMIN UR-MCNC: ABNORMAL MG/DL
ANAEROBIC BLOOD CULTURE BOTTLE: ABNORMAL
ANAEROBIC BLOOD CULTURE BOTTLE: NO GROWTH
APPEARANCE UR: ABNORMAL
ATRIAL RATE - MUSE: 81 BPM
BACTERIA #/AREA URNS HPF: ABNORMAL HPF
BACTERIA SPEC CULT: ABNORMAL
BILIRUB UR QL STRIP: NEGATIVE
COLOR UR AUTO: YELLOW
CREAT SERPL-MCNC: 1.06 MG/DL (ref 0.7–1.3)
DIASTOLIC BLOOD PRESSURE - MUSE: NORMAL
ERYTHROCYTE [DISTWIDTH] IN BLOOD BY AUTOMATED COUNT: 14.7 % (ref 11–14.5)
GFR SERPL CREATININE-BSD FRML MDRD: >60 ML/MIN/1.73M2
GLUCOSE BLDC GLUCOMTR-MCNC: 136 MG/DL (ref 70–139)
GLUCOSE BLDC GLUCOMTR-MCNC: 143 MG/DL (ref 70–139)
GLUCOSE BLDC GLUCOMTR-MCNC: 147 MG/DL (ref 70–139)
GLUCOSE BLDC GLUCOMTR-MCNC: 164 MG/DL (ref 70–139)
GLUCOSE BLDC GLUCOMTR-MCNC: 183 MG/DL (ref 70–139)
GLUCOSE UR STRIP-MCNC: NEGATIVE MG/DL
GRAM STAIN RESULT: ABNORMAL
HCT VFR BLD AUTO: 40.4 % (ref 40–54)
HGB BLD-MCNC: 12.1 G/DL (ref 14–18)
HGB UR QL STRIP: ABNORMAL
INTERPRETATION ECG - MUSE: NORMAL
KETONES UR STRIP-MCNC: ABNORMAL MG/DL
LEUKOCYTE ESTERASE UR QL STRIP: ABNORMAL
LEVETIRACETAM (KEPPRA): 14.4 UG/ML (ref 6–46)
MAGNESIUM SERPL-MCNC: 2 MG/DL (ref 1.8–2.6)
MCH RBC QN AUTO: 25.8 PG (ref 27–34)
MCHC RBC AUTO-ENTMCNC: 30 G/DL (ref 32–36)
MCV RBC AUTO: 86 FL (ref 80–100)
MUCOUS THREADS #/AREA URNS LPF: ABNORMAL LPF
NITRATE UR QL: NEGATIVE
P AXIS - MUSE: NORMAL
PH UR STRIP: 5 [PH] (ref 4.5–8)
PLATELET # BLD AUTO: 335 THOU/UL (ref 140–440)
PMV BLD AUTO: 9.9 FL (ref 8.5–12.5)
POTASSIUM BLD-SCNC: 3.7 MMOL/L (ref 3.5–5)
PR INTERVAL - MUSE: NORMAL
PROCALCITONIN SERPL-MCNC: 0.14 NG/ML (ref 0–0.49)
QRS DURATION - MUSE: 144 MS
QT - MUSE: 466 MS
QTC - MUSE: 563 MS
R AXIS - MUSE: -56 DEGREES
RBC # BLD AUTO: 4.69 MILL/UL (ref 4.4–6.2)
RBC #/AREA URNS AUTO: ABNORMAL HPF
SODIUM SERPL-SCNC: 142 MMOL/L (ref 136–145)
SP GR UR STRIP: 1.02 (ref 1–1.03)
SQUAMOUS #/AREA URNS AUTO: ABNORMAL LPF
SYSTOLIC BLOOD PRESSURE - MUSE: NORMAL
T AXIS - MUSE: 53 DEGREES
UROBILINOGEN UR STRIP-ACNC: ABNORMAL
VENTRICULAR RATE- MUSE: 88 BPM
WBC #/AREA URNS AUTO: ABNORMAL HPF
WBC: 18.3 THOU/UL (ref 4–11)

## 2020-12-15 ASSESSMENT — MIFFLIN-ST. JEOR
SCORE: 1732.88
SCORE: 1732.88

## 2020-12-16 LAB
ANION GAP SERPL CALCULATED.3IONS-SCNC: 10 MMOL/L (ref 5–18)
ATRIAL RATE - MUSE: 107 BPM
BACTERIA SPEC CULT: NO GROWTH
BUN SERPL-MCNC: 16 MG/DL (ref 8–22)
CALCIUM SERPL-MCNC: 7.6 MG/DL (ref 8.5–10.5)
CHLORIDE BLD-SCNC: 113 MMOL/L (ref 98–107)
CO2 SERPL-SCNC: 21 MMOL/L (ref 22–31)
CREAT SERPL-MCNC: 1.1 MG/DL (ref 0.7–1.3)
DIASTOLIC BLOOD PRESSURE - MUSE: NORMAL
ERYTHROCYTE [DISTWIDTH] IN BLOOD BY AUTOMATED COUNT: 14.8 % (ref 11–14.5)
GFR SERPL CREATININE-BSD FRML MDRD: >60 ML/MIN/1.73M2
GLUCOSE BLD-MCNC: 169 MG/DL (ref 70–125)
GLUCOSE BLDC GLUCOMTR-MCNC: 140 MG/DL (ref 70–139)
GLUCOSE BLDC GLUCOMTR-MCNC: 148 MG/DL (ref 70–139)
GLUCOSE BLDC GLUCOMTR-MCNC: 155 MG/DL (ref 70–139)
GLUCOSE BLDC GLUCOMTR-MCNC: 158 MG/DL (ref 70–139)
GLUCOSE BLDC GLUCOMTR-MCNC: 184 MG/DL (ref 70–139)
GLUCOSE BLDC GLUCOMTR-MCNC: 190 MG/DL (ref 70–139)
HCT VFR BLD AUTO: 37 % (ref 40–54)
HGB BLD-MCNC: 11.3 G/DL (ref 14–18)
INTERPRETATION ECG - MUSE: NORMAL
MCH RBC QN AUTO: 26 PG (ref 27–34)
MCHC RBC AUTO-ENTMCNC: 30.5 G/DL (ref 32–36)
MCV RBC AUTO: 85 FL (ref 80–100)
P AXIS - MUSE: NORMAL
PLATELET # BLD AUTO: 263 THOU/UL (ref 140–440)
PMV BLD AUTO: 10 FL (ref 8.5–12.5)
POTASSIUM BLD-SCNC: 3.6 MMOL/L (ref 3.5–5)
PR INTERVAL - MUSE: NORMAL
QRS DURATION - MUSE: 144 MS
QT - MUSE: 454 MS
QTC - MUSE: 573 MS
R AXIS - MUSE: -65 DEGREES
RBC # BLD AUTO: 4.34 MILL/UL (ref 4.4–6.2)
SODIUM SERPL-SCNC: 144 MMOL/L (ref 136–145)
SYSTOLIC BLOOD PRESSURE - MUSE: NORMAL
T AXIS - MUSE: 51 DEGREES
VENTRICULAR RATE- MUSE: 96 BPM
WBC: 13.8 THOU/UL (ref 4–11)

## 2020-12-16 ASSESSMENT — MIFFLIN-ST. JEOR
SCORE: 1711.56

## 2020-12-17 LAB
AEROBIC BLOOD CULTURE BOTTLE: NO GROWTH
AEROBIC BLOOD CULTURE BOTTLE: NO GROWTH
ANAEROBIC BLOOD CULTURE BOTTLE: NO GROWTH
ANAEROBIC BLOOD CULTURE BOTTLE: NO GROWTH
ATRIAL RATE - MUSE: 102 BPM
DIASTOLIC BLOOD PRESSURE - MUSE: NORMAL
GLUCOSE BLDC GLUCOMTR-MCNC: 184 MG/DL (ref 70–139)
GLUCOSE BLDC GLUCOMTR-MCNC: 198 MG/DL (ref 70–139)
GLUCOSE BLDC GLUCOMTR-MCNC: 213 MG/DL (ref 70–139)
GLUCOSE BLDC GLUCOMTR-MCNC: 241 MG/DL (ref 70–139)
INTERPRETATION ECG - MUSE: NORMAL
P AXIS - MUSE: NORMAL
POTASSIUM BLD-SCNC: 3.5 MMOL/L (ref 3.5–5)
POTASSIUM BLD-SCNC: 3.9 MMOL/L (ref 3.5–5)
PR INTERVAL - MUSE: NORMAL
QRS DURATION - MUSE: 146 MS
QT - MUSE: 442 MS
QTC - MUSE: 549 MS
R AXIS - MUSE: -66 DEGREES
SYSTOLIC BLOOD PRESSURE - MUSE: NORMAL
T AXIS - MUSE: 56 DEGREES
VENTRICULAR RATE- MUSE: 93 BPM

## 2020-12-17 ASSESSMENT — MIFFLIN-ST. JEOR
SCORE: 1744.67
SCORE: 1744.67

## 2020-12-18 LAB
ALBUMIN UR-MCNC: NEGATIVE MG/DL
ANION GAP SERPL CALCULATED.3IONS-SCNC: 8 MMOL/L (ref 5–18)
APPEARANCE UR: CLEAR
ATRIAL RATE - MUSE: 131 BPM
BILIRUB UR QL STRIP: NEGATIVE
BUN SERPL-MCNC: 14 MG/DL (ref 8–22)
CALCIUM SERPL-MCNC: 8.1 MG/DL (ref 8.5–10.5)
CHLORIDE BLD-SCNC: 111 MMOL/L (ref 98–107)
CO2 SERPL-SCNC: 26 MMOL/L (ref 22–31)
COLOR UR AUTO: YELLOW
CREAT SERPL-MCNC: 0.83 MG/DL (ref 0.7–1.3)
DIASTOLIC BLOOD PRESSURE - MUSE: NORMAL
ERYTHROCYTE [DISTWIDTH] IN BLOOD BY AUTOMATED COUNT: 15.5 % (ref 11–14.5)
FASTING STATUS PATIENT QL REPORTED: NO
GFR SERPL CREATININE-BSD FRML MDRD: >60 ML/MIN/1.73M2
GLUCOSE BLD-MCNC: 239 MG/DL (ref 70–125)
GLUCOSE BLDC GLUCOMTR-MCNC: 188 MG/DL (ref 70–139)
GLUCOSE BLDC GLUCOMTR-MCNC: 194 MG/DL (ref 70–139)
GLUCOSE BLDC GLUCOMTR-MCNC: 222 MG/DL (ref 70–139)
GLUCOSE BLDC GLUCOMTR-MCNC: 311 MG/DL (ref 70–139)
GLUCOSE UR STRIP-MCNC: ABNORMAL MG/DL
HCT VFR BLD AUTO: 39.4 % (ref 40–54)
HGB BLD-MCNC: 11.8 G/DL (ref 14–18)
HGB UR QL STRIP: NEGATIVE
INTERPRETATION ECG - MUSE: NORMAL
KETONES UR STRIP-MCNC: NEGATIVE MG/DL
LACTATE SERPL-SCNC: 2 MMOL/L (ref 0.7–2)
LEUKOCYTE ESTERASE UR QL STRIP: NEGATIVE
MAGNESIUM SERPL-MCNC: 1.9 MG/DL (ref 1.8–2.6)
MCH RBC QN AUTO: 25.7 PG (ref 27–34)
MCHC RBC AUTO-ENTMCNC: 29.9 G/DL (ref 32–36)
MCV RBC AUTO: 86 FL (ref 80–100)
NITRATE UR QL: NEGATIVE
P AXIS - MUSE: NORMAL
PH UR STRIP: 5.5 [PH] (ref 4.5–8)
PHOSPHATE SERPL-MCNC: 2.7 MG/DL (ref 2.5–4.5)
PLATELET # BLD AUTO: 285 THOU/UL (ref 140–440)
PMV BLD AUTO: 10.3 FL (ref 8.5–12.5)
POTASSIUM BLD-SCNC: 3.8 MMOL/L (ref 3.5–5)
PR INTERVAL - MUSE: NORMAL
QRS DURATION - MUSE: 142 MS
QT - MUSE: 422 MS
QTC - MUSE: 521 MS
R AXIS - MUSE: -61 DEGREES
RBC # BLD AUTO: 4.6 MILL/UL (ref 4.4–6.2)
SODIUM SERPL-SCNC: 145 MMOL/L (ref 136–145)
SP GR UR STRIP: 1.01 (ref 1–1.03)
SYSTOLIC BLOOD PRESSURE - MUSE: NORMAL
T AXIS - MUSE: 45 DEGREES
TRIGL FLD-MCNC: 20 MG/DL
TRIGL SERPL-MCNC: 124 MG/DL
UROBILINOGEN UR STRIP-ACNC: ABNORMAL
VENTRICULAR RATE- MUSE: 92 BPM
WBC: 12.5 THOU/UL (ref 4–11)

## 2020-12-18 ASSESSMENT — MIFFLIN-ST. JEOR
SCORE: 1694.77
SCORE: 1694.77

## 2020-12-19 LAB
ATRIAL RATE - MUSE: 133 BPM
DIASTOLIC BLOOD PRESSURE - MUSE: NORMAL
GLUCOSE BLDC GLUCOMTR-MCNC: 212 MG/DL (ref 70–139)
GLUCOSE BLDC GLUCOMTR-MCNC: 245 MG/DL (ref 70–139)
GLUCOSE BLDC GLUCOMTR-MCNC: 248 MG/DL (ref 70–139)
GLUCOSE BLDC GLUCOMTR-MCNC: 281 MG/DL (ref 70–139)
INTERPRETATION ECG - MUSE: NORMAL
P AXIS - MUSE: NORMAL
POTASSIUM BLD-SCNC: 3.5 MMOL/L (ref 3.5–5)
POTASSIUM BLD-SCNC: 3.8 MMOL/L (ref 3.5–5)
PR INTERVAL - MUSE: NORMAL
QRS DURATION - MUSE: 148 MS
QT - MUSE: 436 MS
QTC - MUSE: 556 MS
R AXIS - MUSE: -71 DEGREES
SYSTOLIC BLOOD PRESSURE - MUSE: NORMAL
T AXIS - MUSE: 63 DEGREES
VENTRICULAR RATE- MUSE: 98 BPM

## 2020-12-19 ASSESSMENT — MIFFLIN-ST. JEOR
SCORE: 1687.06
SCORE: 1687.06

## 2020-12-20 LAB
ANION GAP SERPL CALCULATED.3IONS-SCNC: 9 MMOL/L (ref 5–18)
BUN SERPL-MCNC: 15 MG/DL (ref 8–22)
CALCIUM SERPL-MCNC: 8.1 MG/DL (ref 8.5–10.5)
CHLORIDE BLD-SCNC: 110 MMOL/L (ref 98–107)
CO2 SERPL-SCNC: 24 MMOL/L (ref 22–31)
CREAT SERPL-MCNC: 0.89 MG/DL (ref 0.7–1.3)
GFR SERPL CREATININE-BSD FRML MDRD: >60 ML/MIN/1.73M2
GLUCOSE BLD-MCNC: 273 MG/DL (ref 70–125)
GLUCOSE BLDC GLUCOMTR-MCNC: 246 MG/DL (ref 70–139)
GLUCOSE BLDC GLUCOMTR-MCNC: 262 MG/DL (ref 70–139)
GLUCOSE BLDC GLUCOMTR-MCNC: 284 MG/DL (ref 70–139)
GLUCOSE BLDC GLUCOMTR-MCNC: 322 MG/DL (ref 70–139)
POTASSIUM BLD-SCNC: 3.9 MMOL/L (ref 3.5–5)
SODIUM SERPL-SCNC: 143 MMOL/L (ref 136–145)

## 2020-12-20 ASSESSMENT — MIFFLIN-ST. JEOR
SCORE: 1631.1
SCORE: 1631.1

## 2020-12-21 LAB
AEROBIC BLOOD CULTURE BOTTLE: NO GROWTH
ANAEROBIC BLOOD CULTURE BOTTLE: NO GROWTH
ATRIAL RATE - MUSE: 110 BPM
ATRIAL RATE - MUSE: 66 BPM
DIASTOLIC BLOOD PRESSURE - MUSE: NORMAL
DIASTOLIC BLOOD PRESSURE - MUSE: NORMAL
ERYTHROCYTE [DISTWIDTH] IN BLOOD BY AUTOMATED COUNT: 15.8 % (ref 11–14.5)
GLUCOSE BLDC GLUCOMTR-MCNC: 203 MG/DL (ref 70–139)
GLUCOSE BLDC GLUCOMTR-MCNC: 244 MG/DL (ref 70–139)
GLUCOSE BLDC GLUCOMTR-MCNC: 276 MG/DL (ref 70–139)
GLUCOSE BLDC GLUCOMTR-MCNC: 290 MG/DL (ref 70–139)
GLUCOSE BLDC GLUCOMTR-MCNC: 294 MG/DL (ref 70–139)
HCT VFR BLD AUTO: 40 % (ref 40–54)
HGB BLD-MCNC: 11.9 G/DL (ref 14–18)
INTERPRETATION ECG - MUSE: NORMAL
INTERPRETATION ECG - MUSE: NORMAL
MCH RBC QN AUTO: 25.6 PG (ref 27–34)
MCHC RBC AUTO-ENTMCNC: 29.8 G/DL (ref 32–36)
MCV RBC AUTO: 86 FL (ref 80–100)
P AXIS - MUSE: NORMAL
P AXIS - MUSE: NORMAL
PLATELET # BLD AUTO: 364 THOU/UL (ref 140–440)
PMV BLD AUTO: 10.6 FL (ref 8.5–12.5)
POTASSIUM BLD-SCNC: 3.7 MMOL/L (ref 3.5–5)
PR INTERVAL - MUSE: NORMAL
PR INTERVAL - MUSE: NORMAL
QRS DURATION - MUSE: 148 MS
QRS DURATION - MUSE: 150 MS
QT - MUSE: 448 MS
QT - MUSE: 460 MS
QTC - MUSE: 527 MS
QTC - MUSE: 548 MS
R AXIS - MUSE: -72 DEGREES
R AXIS - MUSE: -80 DEGREES
RBC # BLD AUTO: 4.64 MILL/UL (ref 4.4–6.2)
SYSTOLIC BLOOD PRESSURE - MUSE: NORMAL
SYSTOLIC BLOOD PRESSURE - MUSE: NORMAL
T AXIS - MUSE: 46 DEGREES
T AXIS - MUSE: 54 DEGREES
VENTRICULAR RATE- MUSE: 79 BPM
VENTRICULAR RATE- MUSE: 90 BPM
WBC: 13.1 THOU/UL (ref 4–11)

## 2020-12-21 ASSESSMENT — MIFFLIN-ST. JEOR
SCORE: 1632.63
SCORE: 1632.63

## 2020-12-22 LAB
ATRIAL RATE - MUSE: 250 BPM
DIASTOLIC BLOOD PRESSURE - MUSE: NORMAL
GLUCOSE BLDC GLUCOMTR-MCNC: 216 MG/DL (ref 70–139)
GLUCOSE BLDC GLUCOMTR-MCNC: 263 MG/DL (ref 70–139)
GLUCOSE BLDC GLUCOMTR-MCNC: 268 MG/DL (ref 70–139)
GLUCOSE BLDC GLUCOMTR-MCNC: 293 MG/DL (ref 70–139)
INTERPRETATION ECG - MUSE: NORMAL
P AXIS - MUSE: NORMAL
POTASSIUM BLD-SCNC: 4 MMOL/L (ref 3.5–5)
PR INTERVAL - MUSE: NORMAL
QRS DURATION - MUSE: 148 MS
QT - MUSE: 470 MS
QTC - MUSE: 552 MS
R AXIS - MUSE: -73 DEGREES
SYSTOLIC BLOOD PRESSURE - MUSE: NORMAL
T AXIS - MUSE: 36 DEGREES
VENTRICULAR RATE- MUSE: 83 BPM

## 2020-12-22 ASSESSMENT — MIFFLIN-ST. JEOR
SCORE: 1617.66
SCORE: 1617.66

## 2020-12-23 LAB
ATRIAL RATE - MUSE: 79 BPM
DIASTOLIC BLOOD PRESSURE - MUSE: NORMAL
GLUCOSE BLDC GLUCOMTR-MCNC: 165 MG/DL (ref 70–139)
GLUCOSE BLDC GLUCOMTR-MCNC: 176 MG/DL (ref 70–139)
GLUCOSE BLDC GLUCOMTR-MCNC: 292 MG/DL (ref 70–139)
INTERPRETATION ECG - MUSE: NORMAL
P AXIS - MUSE: NORMAL
POTASSIUM BLD-SCNC: 4 MMOL/L (ref 3.5–5)
PR INTERVAL - MUSE: NORMAL
QRS DURATION - MUSE: 152 MS
QT - MUSE: 466 MS
QTC - MUSE: 531 MS
R AXIS - MUSE: -73 DEGREES
SYSTOLIC BLOOD PRESSURE - MUSE: NORMAL
T AXIS - MUSE: 75 DEGREES
VENTRICULAR RATE- MUSE: 78 BPM

## 2020-12-23 ASSESSMENT — MIFFLIN-ST. JEOR
SCORE: 1605.87
SCORE: 1605.87

## 2020-12-24 LAB
ALBUMIN SERPL-MCNC: 2.7 G/DL (ref 3.5–5)
ALP SERPL-CCNC: 144 U/L (ref 45–120)
ALT SERPL W P-5'-P-CCNC: 10 U/L (ref 0–45)
ANION GAP SERPL CALCULATED.3IONS-SCNC: 11 MMOL/L (ref 5–18)
AST SERPL W P-5'-P-CCNC: 19 U/L (ref 0–40)
ATRIAL RATE - MUSE: NORMAL
BASOPHILS # BLD AUTO: 0.1 THOU/UL (ref 0–0.2)
BASOPHILS NFR BLD AUTO: 0 % (ref 0–2)
BILIRUB SERPL-MCNC: 0.7 MG/DL (ref 0–1)
BUN SERPL-MCNC: 32 MG/DL (ref 8–22)
C DIFF TOX B STL QL: NEGATIVE
CALCIUM SERPL-MCNC: 8.8 MG/DL (ref 8.5–10.5)
CHLORIDE BLD-SCNC: 102 MMOL/L (ref 98–107)
CO2 SERPL-SCNC: 28 MMOL/L (ref 22–31)
CREAT SERPL-MCNC: 1.08 MG/DL (ref 0.7–1.3)
CREAT SERPL-MCNC: 1.19 MG/DL (ref 0.7–1.3)
DIASTOLIC BLOOD PRESSURE - MUSE: NORMAL
EOSINOPHIL # BLD AUTO: 0.4 THOU/UL (ref 0–0.4)
EOSINOPHIL NFR BLD AUTO: 3 % (ref 0–6)
ERYTHROCYTE [DISTWIDTH] IN BLOOD BY AUTOMATED COUNT: 16.4 % (ref 11–14.5)
GFR SERPL CREATININE-BSD FRML MDRD: >60 ML/MIN/1.73M2
GFR SERPL CREATININE-BSD FRML MDRD: >60 ML/MIN/1.73M2
GLUCOSE BLD-MCNC: 179 MG/DL (ref 70–125)
GLUCOSE BLDC GLUCOMTR-MCNC: 142 MG/DL (ref 70–139)
GLUCOSE BLDC GLUCOMTR-MCNC: 158 MG/DL (ref 70–139)
GLUCOSE BLDC GLUCOMTR-MCNC: 177 MG/DL (ref 70–139)
GLUCOSE BLDC GLUCOMTR-MCNC: 198 MG/DL (ref 70–139)
GLUCOSE BLDC GLUCOMTR-MCNC: 233 MG/DL (ref 70–139)
HCT VFR BLD AUTO: 40.6 % (ref 40–54)
HGB BLD-MCNC: 12.7 G/DL (ref 14–18)
IMM GRANULOCYTES # BLD: 0.1 THOU/UL
IMM GRANULOCYTES NFR BLD: 1 %
INTERPRETATION ECG - MUSE: NORMAL
LYMPHOCYTES # BLD AUTO: 1.8 THOU/UL (ref 0.8–4.4)
LYMPHOCYTES NFR BLD AUTO: 12 % (ref 20–40)
MCH RBC QN AUTO: 26.4 PG (ref 27–34)
MCHC RBC AUTO-ENTMCNC: 31.3 G/DL (ref 32–36)
MCV RBC AUTO: 84 FL (ref 80–100)
MONOCYTES # BLD AUTO: 0.8 THOU/UL (ref 0–0.9)
MONOCYTES NFR BLD AUTO: 6 % (ref 2–10)
NEUTROPHILS # BLD AUTO: 11.6 THOU/UL (ref 2–7.7)
NEUTROPHILS NFR BLD AUTO: 79 % (ref 50–70)
P AXIS - MUSE: NORMAL
PLATELET # BLD AUTO: 498 THOU/UL (ref 140–440)
PMV BLD AUTO: 10.9 FL (ref 8.5–12.5)
POTASSIUM BLD-SCNC: 3.8 MMOL/L (ref 3.5–5)
POTASSIUM BLD-SCNC: 4 MMOL/L (ref 3.5–5)
PR INTERVAL - MUSE: NORMAL
PROT SERPL-MCNC: 7.2 G/DL (ref 6–8)
QRS DURATION - MUSE: 156 MS
QT - MUSE: 476 MS
QTC - MUSE: 545 MS
R AXIS - MUSE: -75 DEGREES
RBC # BLD AUTO: 4.81 MILL/UL (ref 4.4–6.2)
RIBOTYPE 027/NAP1/BI: NORMAL
SODIUM SERPL-SCNC: 141 MMOL/L (ref 136–145)
SYSTOLIC BLOOD PRESSURE - MUSE: NORMAL
T AXIS - MUSE: 50 DEGREES
VENTRICULAR RATE- MUSE: 79 BPM
WBC: 14.8 THOU/UL (ref 4–11)

## 2020-12-24 ASSESSMENT — MIFFLIN-ST. JEOR
SCORE: 1601.79
SCORE: 1601.79

## 2020-12-25 ENCOUNTER — AMBULATORY - HEALTHEAST (OUTPATIENT)
Dept: CARDIOLOGY | Facility: CLINIC | Age: 70
End: 2020-12-25

## 2020-12-25 DIAGNOSIS — I63.9 CEREBROVASCULAR ACCIDENT (CVA), UNSPECIFIED MECHANISM (H): ICD-10-CM

## 2020-12-25 LAB
ALBUMIN SERPL-MCNC: 2.5 G/DL (ref 3.5–5)
ALP SERPL-CCNC: 127 U/L (ref 45–120)
ALT SERPL W P-5'-P-CCNC: 10 U/L (ref 0–45)
ANION GAP SERPL CALCULATED.3IONS-SCNC: 10 MMOL/L (ref 5–18)
AST SERPL W P-5'-P-CCNC: 24 U/L (ref 0–40)
BASOPHILS # BLD AUTO: 0.1 THOU/UL (ref 0–0.2)
BASOPHILS NFR BLD AUTO: 0 % (ref 0–2)
BILIRUB SERPL-MCNC: 0.6 MG/DL (ref 0–1)
BSA FOR ECHO PROCEDURE: 2.23 M2
BUN SERPL-MCNC: 32 MG/DL (ref 8–22)
CALCIUM SERPL-MCNC: 8.9 MG/DL (ref 8.5–10.5)
CHLORIDE BLD-SCNC: 102 MMOL/L (ref 98–107)
CO2 SERPL-SCNC: 28 MMOL/L (ref 22–31)
CREAT SERPL-MCNC: 1.22 MG/DL (ref 0.7–1.3)
CV BLOOD PRESSURE: NORMAL MMHG
CV ECHO HEIGHT: 70 IN
CV ECHO WEIGHT: 180 LBS
ECHO EJECTION FRACTION ESTIMATED: 35 %
EJECTION FRACTION: 41 % (ref 55–75)
EOSINOPHIL # BLD AUTO: 0.5 THOU/UL (ref 0–0.4)
EOSINOPHIL NFR BLD AUTO: 3 % (ref 0–6)
ERYTHROCYTE [DISTWIDTH] IN BLOOD BY AUTOMATED COUNT: 16.6 % (ref 11–14.5)
GFR SERPL CREATININE-BSD FRML MDRD: 59 ML/MIN/1.73M2
GLUCOSE BLD-MCNC: 149 MG/DL (ref 70–125)
GLUCOSE BLDC GLUCOMTR-MCNC: 129 MG/DL (ref 70–139)
GLUCOSE BLDC GLUCOMTR-MCNC: 149 MG/DL (ref 70–139)
GLUCOSE BLDC GLUCOMTR-MCNC: 154 MG/DL (ref 70–139)
GLUCOSE BLDC GLUCOMTR-MCNC: 174 MG/DL (ref 70–139)
HCT VFR BLD AUTO: 40.2 % (ref 40–54)
HGB BLD-MCNC: 12.4 G/DL (ref 14–18)
IMM GRANULOCYTES # BLD: 0 THOU/UL
IMM GRANULOCYTES NFR BLD: 0 %
LEFT VENTRICLE DIASTOLIC VOLUME INDEX: 45.7 CM3/M2 (ref 34–74)
LEFT VENTRICLE DIASTOLIC VOLUME: 102 CM3 (ref 62–150)
LEFT VENTRICLE HEART RATE: 84 BPM
LEFT VENTRICLE SYSTOLIC VOLUME INDEX: 26.9 CM3/M2 (ref 11–31)
LEFT VENTRICLE SYSTOLIC VOLUME: 59.9 CM3 (ref 21–61)
LYMPHOCYTES # BLD AUTO: 1.8 THOU/UL (ref 0.8–4.4)
LYMPHOCYTES NFR BLD AUTO: 13 % (ref 20–40)
MCH RBC QN AUTO: 26.3 PG (ref 27–34)
MCHC RBC AUTO-ENTMCNC: 30.8 G/DL (ref 32–36)
MCV RBC AUTO: 85 FL (ref 80–100)
MONOCYTES # BLD AUTO: 0.8 THOU/UL (ref 0–0.9)
MONOCYTES NFR BLD AUTO: 6 % (ref 2–10)
NEUTROPHILS # BLD AUTO: 10.5 THOU/UL (ref 2–7.7)
NEUTROPHILS NFR BLD AUTO: 77 % (ref 50–70)
NUC REST DIASTOLIC VOLUME INDEX: 2875.2 LBS
NUC REST SYSTOLIC VOLUME INDEX: 70 IN
PLATELET # BLD AUTO: 413 THOU/UL (ref 140–440)
PMV BLD AUTO: 10.6 FL (ref 8.5–12.5)
POTASSIUM BLD-SCNC: 3.9 MMOL/L (ref 3.5–5)
PROT SERPL-MCNC: 6.5 G/DL (ref 6–8)
RBC # BLD AUTO: 4.71 MILL/UL (ref 4.4–6.2)
SARS-COV-2 PCR RESULT-HE - HISTORICAL: NEGATIVE
SODIUM SERPL-SCNC: 140 MMOL/L (ref 136–145)
TRICUSPID VALVE ANULAR PLANE SYSTOLIC EXCURSION: 0.7 CM
WBC: 13.6 THOU/UL (ref 4–11)

## 2020-12-25 ASSESSMENT — MIFFLIN-ST. JEOR
SCORE: 1586.36
SCORE: 1586.36

## 2020-12-26 LAB
ALBUMIN SERPL-MCNC: 2.5 G/DL (ref 3.5–5)
ALP SERPL-CCNC: 125 U/L (ref 45–120)
ALT SERPL W P-5'-P-CCNC: 10 U/L (ref 0–45)
ANION GAP SERPL CALCULATED.3IONS-SCNC: 12 MMOL/L (ref 5–18)
AST SERPL W P-5'-P-CCNC: 22 U/L (ref 0–40)
ATRIAL RATE - MUSE: NORMAL
ATRIAL RATE - MUSE: NORMAL
BASOPHILS # BLD AUTO: 0.1 THOU/UL (ref 0–0.2)
BASOPHILS NFR BLD AUTO: 0 % (ref 0–2)
BILIRUB SERPL-MCNC: 0.7 MG/DL (ref 0–1)
BUN SERPL-MCNC: 38 MG/DL (ref 8–22)
CALCIUM SERPL-MCNC: 8.7 MG/DL (ref 8.5–10.5)
CHLORIDE BLD-SCNC: 99 MMOL/L (ref 98–107)
CO2 SERPL-SCNC: 27 MMOL/L (ref 22–31)
CREAT SERPL-MCNC: 1.51 MG/DL (ref 0.7–1.3)
DIASTOLIC BLOOD PRESSURE - MUSE: NORMAL
DIASTOLIC BLOOD PRESSURE - MUSE: NORMAL
EOSINOPHIL # BLD AUTO: 0.4 THOU/UL (ref 0–0.4)
EOSINOPHIL NFR BLD AUTO: 3 % (ref 0–6)
ERYTHROCYTE [DISTWIDTH] IN BLOOD BY AUTOMATED COUNT: 16.5 % (ref 11–14.5)
GFR SERPL CREATININE-BSD FRML MDRD: 46 ML/MIN/1.73M2
GLUCOSE BLD-MCNC: 163 MG/DL (ref 70–125)
GLUCOSE BLDC GLUCOMTR-MCNC: 186 MG/DL (ref 70–139)
GLUCOSE BLDC GLUCOMTR-MCNC: 189 MG/DL (ref 70–139)
GLUCOSE BLDC GLUCOMTR-MCNC: 190 MG/DL (ref 70–139)
GLUCOSE BLDC GLUCOMTR-MCNC: 331 MG/DL (ref 70–139)
HCT VFR BLD AUTO: 39.9 % (ref 40–54)
HGB BLD-MCNC: 12.4 G/DL (ref 14–18)
IMM GRANULOCYTES # BLD: 0.1 THOU/UL
IMM GRANULOCYTES NFR BLD: 0 %
INTERPRETATION ECG - MUSE: NORMAL
INTERPRETATION ECG - MUSE: NORMAL
LACTATE SERPL-SCNC: 1.3 MMOL/L (ref 0.7–2)
LYMPHOCYTES # BLD AUTO: 1.7 THOU/UL (ref 0.8–4.4)
LYMPHOCYTES NFR BLD AUTO: 14 % (ref 20–40)
MCH RBC QN AUTO: 26.3 PG (ref 27–34)
MCHC RBC AUTO-ENTMCNC: 31.1 G/DL (ref 32–36)
MCV RBC AUTO: 85 FL (ref 80–100)
MONOCYTES # BLD AUTO: 0.7 THOU/UL (ref 0–0.9)
MONOCYTES NFR BLD AUTO: 6 % (ref 2–10)
NEUTROPHILS # BLD AUTO: 9.6 THOU/UL (ref 2–7.7)
NEUTROPHILS NFR BLD AUTO: 77 % (ref 50–70)
P AXIS - MUSE: NORMAL
P AXIS - MUSE: NORMAL
PLATELET # BLD AUTO: 394 THOU/UL (ref 140–440)
PMV BLD AUTO: 10.1 FL (ref 8.5–12.5)
POTASSIUM BLD-SCNC: 4 MMOL/L (ref 3.5–5)
PR INTERVAL - MUSE: NORMAL
PR INTERVAL - MUSE: NORMAL
PROT SERPL-MCNC: 6.9 G/DL (ref 6–8)
QRS DURATION - MUSE: 154 MS
QRS DURATION - MUSE: 166 MS
QT - MUSE: 484 MS
QT - MUSE: 498 MS
QTC - MUSE: 560 MS
QTC - MUSE: 562 MS
R AXIS - MUSE: -80 DEGREES
R AXIS - MUSE: -86 DEGREES
RBC # BLD AUTO: 4.72 MILL/UL (ref 4.4–6.2)
SODIUM SERPL-SCNC: 138 MMOL/L (ref 136–145)
SYSTOLIC BLOOD PRESSURE - MUSE: NORMAL
SYSTOLIC BLOOD PRESSURE - MUSE: NORMAL
T AXIS - MUSE: 50 DEGREES
T AXIS - MUSE: 52 DEGREES
VENTRICULAR RATE- MUSE: 76 BPM
VENTRICULAR RATE- MUSE: 81 BPM
WBC: 12.4 THOU/UL (ref 4–11)

## 2020-12-26 ASSESSMENT — MIFFLIN-ST. JEOR
SCORE: 1574.12
SCORE: 1575.48
SCORE: 1575.48
SCORE: 1574.12

## 2020-12-27 LAB
ALBUMIN SERPL-MCNC: 2.6 G/DL (ref 3.5–5)
ALP SERPL-CCNC: 122 U/L (ref 45–120)
ALT SERPL W P-5'-P-CCNC: 10 U/L (ref 0–45)
ANION GAP SERPL CALCULATED.3IONS-SCNC: 12 MMOL/L (ref 5–18)
AST SERPL W P-5'-P-CCNC: 20 U/L (ref 0–40)
BASOPHILS # BLD AUTO: 0.1 THOU/UL (ref 0–0.2)
BASOPHILS NFR BLD AUTO: 1 % (ref 0–2)
BILIRUB SERPL-MCNC: 0.6 MG/DL (ref 0–1)
BUN SERPL-MCNC: 39 MG/DL (ref 8–22)
CALCIUM SERPL-MCNC: 8.5 MG/DL (ref 8.5–10.5)
CHLORIDE BLD-SCNC: 100 MMOL/L (ref 98–107)
CO2 SERPL-SCNC: 27 MMOL/L (ref 22–31)
CREAT SERPL-MCNC: 1.25 MG/DL (ref 0.7–1.3)
EOSINOPHIL # BLD AUTO: 0.4 THOU/UL (ref 0–0.4)
EOSINOPHIL NFR BLD AUTO: 3 % (ref 0–6)
ERYTHROCYTE [DISTWIDTH] IN BLOOD BY AUTOMATED COUNT: 16.3 % (ref 11–14.5)
GFR SERPL CREATININE-BSD FRML MDRD: 57 ML/MIN/1.73M2
GLUCOSE BLD-MCNC: 108 MG/DL (ref 70–125)
GLUCOSE BLDC GLUCOMTR-MCNC: 102 MG/DL (ref 70–139)
GLUCOSE BLDC GLUCOMTR-MCNC: 113 MG/DL (ref 70–139)
GLUCOSE BLDC GLUCOMTR-MCNC: 131 MG/DL (ref 70–139)
GLUCOSE BLDC GLUCOMTR-MCNC: 133 MG/DL (ref 70–139)
GLUCOSE BLDC GLUCOMTR-MCNC: 78 MG/DL (ref 70–139)
GLUCOSE BLDC GLUCOMTR-MCNC: 98 MG/DL (ref 70–139)
HCT VFR BLD AUTO: 40.7 % (ref 40–54)
HGB BLD-MCNC: 12.4 G/DL (ref 14–18)
IMM GRANULOCYTES # BLD: 0.1 THOU/UL
IMM GRANULOCYTES NFR BLD: 0 %
LYMPHOCYTES # BLD AUTO: 1.4 THOU/UL (ref 0.8–4.4)
LYMPHOCYTES NFR BLD AUTO: 12 % (ref 20–40)
MCH RBC QN AUTO: 25.8 PG (ref 27–34)
MCHC RBC AUTO-ENTMCNC: 30.5 G/DL (ref 32–36)
MCV RBC AUTO: 85 FL (ref 80–100)
MONOCYTES # BLD AUTO: 0.7 THOU/UL (ref 0–0.9)
MONOCYTES NFR BLD AUTO: 6 % (ref 2–10)
NEUTROPHILS # BLD AUTO: 9 THOU/UL (ref 2–7.7)
NEUTROPHILS NFR BLD AUTO: 78 % (ref 50–70)
PLATELET # BLD AUTO: 383 THOU/UL (ref 140–440)
PMV BLD AUTO: 10.4 FL (ref 8.5–12.5)
POTASSIUM BLD-SCNC: 3.8 MMOL/L (ref 3.5–5)
PROT SERPL-MCNC: 6.8 G/DL (ref 6–8)
RBC # BLD AUTO: 4.8 MILL/UL (ref 4.4–6.2)
SODIUM SERPL-SCNC: 139 MMOL/L (ref 136–145)
WBC: 11.6 THOU/UL (ref 4–11)

## 2020-12-27 ASSESSMENT — MIFFLIN-ST. JEOR
SCORE: 1572.76
SCORE: 1572.76

## 2020-12-28 ENCOUNTER — HOSPITAL ENCOUNTER (INPATIENT)
Facility: CLINIC | Age: 70
LOS: 16 days | Discharge: NURSING, REHAB OR LONG-TERM CARE | DRG: 056 | End: 2021-01-13
Attending: PHYSICAL MEDICINE & REHABILITATION | Admitting: PHYSICAL MEDICINE & REHABILITATION
Payer: MEDICARE

## 2020-12-28 DIAGNOSIS — G40.909 SEIZURE DISORDER (H): ICD-10-CM

## 2020-12-28 DIAGNOSIS — Z95.1 S/P CABG X 4: ICD-10-CM

## 2020-12-28 DIAGNOSIS — R73.9 HYPERGLYCEMIA: ICD-10-CM

## 2020-12-28 DIAGNOSIS — G93.40 ENCEPHALOPATHY: ICD-10-CM

## 2020-12-28 DIAGNOSIS — I10 HYPERTENSION, UNSPECIFIED TYPE: ICD-10-CM

## 2020-12-28 DIAGNOSIS — R13.12 OROPHARYNGEAL DYSPHAGIA: ICD-10-CM

## 2020-12-28 DIAGNOSIS — I63.10 CEREBROVASCULAR ACCIDENT (CVA) DUE TO EMBOLISM OF PRECEREBRAL ARTERY (H): Primary | ICD-10-CM

## 2020-12-28 LAB
ALBUMIN SERPL-MCNC: 2.8 G/DL (ref 3.5–5)
ALP SERPL-CCNC: 131 U/L (ref 45–120)
ALT SERPL W P-5'-P-CCNC: <9 U/L (ref 0–45)
ANION GAP SERPL CALCULATED.3IONS-SCNC: 13 MMOL/L (ref 5–18)
AST SERPL W P-5'-P-CCNC: 25 U/L (ref 0–40)
ATRIAL RATE - MUSE: 277 BPM
BASOPHILS # BLD AUTO: 0.1 THOU/UL (ref 0–0.2)
BASOPHILS NFR BLD AUTO: 1 % (ref 0–2)
BILIRUB SERPL-MCNC: 0.6 MG/DL (ref 0–1)
BUN SERPL-MCNC: 32 MG/DL (ref 8–22)
CALCIUM SERPL-MCNC: 9.2 MG/DL (ref 8.5–10.5)
CHLORIDE BLD-SCNC: 105 MMOL/L (ref 98–107)
CO2 SERPL-SCNC: 25 MMOL/L (ref 22–31)
CREAT SERPL-MCNC: 0.99 MG/DL (ref 0.7–1.3)
DIASTOLIC BLOOD PRESSURE - MUSE: NORMAL
EOSINOPHIL # BLD AUTO: 0.4 THOU/UL (ref 0–0.4)
EOSINOPHIL NFR BLD AUTO: 3 % (ref 0–6)
ERYTHROCYTE [DISTWIDTH] IN BLOOD BY AUTOMATED COUNT: 16.9 % (ref 11–14.5)
GFR SERPL CREATININE-BSD FRML MDRD: >60 ML/MIN/1.73M2
GLUCOSE BLD-MCNC: 120 MG/DL (ref 70–125)
GLUCOSE BLDC GLUCOMTR-MCNC: 104 MG/DL (ref 70–139)
GLUCOSE BLDC GLUCOMTR-MCNC: 106 MG/DL (ref 70–99)
GLUCOSE BLDC GLUCOMTR-MCNC: 156 MG/DL (ref 70–99)
GLUCOSE BLDC GLUCOMTR-MCNC: 189 MG/DL (ref 70–99)
GLUCOSE BLDC GLUCOMTR-MCNC: 91 MG/DL (ref 70–139)
HCT VFR BLD AUTO: 43.4 % (ref 40–54)
HGB BLD-MCNC: 13.3 G/DL (ref 14–18)
IMM GRANULOCYTES # BLD: 0.1 THOU/UL
IMM GRANULOCYTES NFR BLD: 1 %
INTERPRETATION ECG - MUSE: NORMAL
LYMPHOCYTES # BLD AUTO: 1.3 THOU/UL (ref 0.8–4.4)
LYMPHOCYTES NFR BLD AUTO: 10 % (ref 20–40)
MCH RBC QN AUTO: 26.3 PG (ref 27–34)
MCHC RBC AUTO-ENTMCNC: 30.6 G/DL (ref 32–36)
MCV RBC AUTO: 86 FL (ref 80–100)
MONOCYTES # BLD AUTO: 0.8 THOU/UL (ref 0–0.9)
MONOCYTES NFR BLD AUTO: 6 % (ref 2–10)
NEUTROPHILS # BLD AUTO: 10.3 THOU/UL (ref 2–7.7)
NEUTROPHILS NFR BLD AUTO: 80 % (ref 50–70)
P AXIS - MUSE: NORMAL
PLATELET # BLD AUTO: 368 THOU/UL (ref 140–440)
PMV BLD AUTO: 10.3 FL (ref 8.5–12.5)
POTASSIUM BLD-SCNC: 4.4 MMOL/L (ref 3.5–5)
PR INTERVAL - MUSE: NORMAL
PROT SERPL-MCNC: 7.2 G/DL (ref 6–8)
QRS DURATION - MUSE: 160 MS
QT - MUSE: 490 MS
QTC - MUSE: 558 MS
R AXIS - MUSE: -79 DEGREES
RBC # BLD AUTO: 5.05 MILL/UL (ref 4.4–6.2)
SODIUM SERPL-SCNC: 143 MMOL/L (ref 136–145)
SYSTOLIC BLOOD PRESSURE - MUSE: NORMAL
T AXIS - MUSE: 26 DEGREES
VENTRICULAR RATE- MUSE: 78 BPM
WBC: 13 THOU/UL (ref 4–11)

## 2020-12-28 PROCEDURE — 97162 PT EVAL MOD COMPLEX 30 MIN: CPT | Mod: GP

## 2020-12-28 PROCEDURE — 250N000012 HC RX MED GY IP 250 OP 636 PS 637: Performed by: PHYSICIAN ASSISTANT

## 2020-12-28 PROCEDURE — 250N000009 HC RX 250: Performed by: PHYSICIAN ASSISTANT

## 2020-12-28 PROCEDURE — 99223 1ST HOSP IP/OBS HIGH 75: CPT | Mod: GC | Performed by: PHYSICAL MEDICINE & REHABILITATION

## 2020-12-28 PROCEDURE — 93005 ELECTROCARDIOGRAM TRACING: CPT

## 2020-12-28 PROCEDURE — 250N000013 HC RX MED GY IP 250 OP 250 PS 637: Performed by: PHYSICIAN ASSISTANT

## 2020-12-28 PROCEDURE — 128N000003 HC R&B REHAB

## 2020-12-28 PROCEDURE — 96372 THER/PROPH/DIAG INJ SC/IM: CPT | Performed by: PHYSICIAN ASSISTANT

## 2020-12-28 PROCEDURE — 93010 ELECTROCARDIOGRAM REPORT: CPT | Performed by: INTERNAL MEDICINE

## 2020-12-28 PROCEDURE — 999N001017 HC STATISTIC GLUCOSE BY METER IP

## 2020-12-28 PROCEDURE — 272N000083 HC NUTRITION PRODUCT SEMIELEM INTERMED LITER

## 2020-12-28 PROCEDURE — 250N000011 HC RX IP 250 OP 636: Performed by: PHYSICIAN ASSISTANT

## 2020-12-28 RX ORDER — LANOLIN ALCOHOL/MO/W.PET/CERES
3 CREAM (GRAM) TOPICAL AT BEDTIME
COMMUNITY

## 2020-12-28 RX ORDER — QUETIAPINE FUMARATE 25 MG/1
12.5 TABLET, FILM COATED ORAL AT BEDTIME
Status: ON HOLD | COMMUNITY
End: 2021-01-13

## 2020-12-28 RX ORDER — ATORVASTATIN CALCIUM 80 MG/1
80 TABLET, FILM COATED ORAL EVERY EVENING
Status: DISCONTINUED | OUTPATIENT
Start: 2020-12-28 | End: 2021-01-05

## 2020-12-28 RX ORDER — HEPARIN SODIUM 5000 [USP'U]/.5ML
5000 INJECTION, SOLUTION INTRAVENOUS; SUBCUTANEOUS EVERY 12 HOURS
Status: DISCONTINUED | OUTPATIENT
Start: 2020-12-28 | End: 2021-01-11

## 2020-12-28 RX ORDER — GLIPIZIDE 5 MG/1
5 TABLET ORAL
Status: ON HOLD | COMMUNITY
End: 2021-01-13

## 2020-12-28 RX ORDER — NICOTINE POLACRILEX 4 MG
15-30 LOZENGE BUCCAL
Status: DISCONTINUED | OUTPATIENT
Start: 2020-12-28 | End: 2021-01-13 | Stop reason: HOSPADM

## 2020-12-28 RX ORDER — DEXTROSE MONOHYDRATE 25 G/50ML
25-50 INJECTION, SOLUTION INTRAVENOUS
Status: DISCONTINUED | OUTPATIENT
Start: 2020-12-28 | End: 2021-01-13 | Stop reason: HOSPADM

## 2020-12-28 RX ORDER — METFORMIN HYDROCHLORIDE 500 MG/5ML
1000 SOLUTION ORAL
Status: DISCONTINUED | OUTPATIENT
Start: 2020-12-28 | End: 2021-01-05

## 2020-12-28 RX ORDER — LEVETIRACETAM 500 MG/1
500 TABLET ORAL 2 TIMES DAILY
Status: ON HOLD | COMMUNITY
End: 2021-01-13

## 2020-12-28 RX ORDER — ACETAMINOPHEN 325 MG/10.15ML
650 LIQUID ORAL EVERY 4 HOURS PRN
Status: DISCONTINUED | OUTPATIENT
Start: 2020-12-28 | End: 2021-01-05

## 2020-12-28 RX ORDER — METOPROLOL TARTRATE 25 MG/1
25 TABLET, FILM COATED ORAL 2 TIMES DAILY
Status: ON HOLD | COMMUNITY
End: 2021-01-13

## 2020-12-28 RX ORDER — ATORVASTATIN CALCIUM 80 MG/1
80 TABLET, FILM COATED ORAL EVERY EVENING
Status: ON HOLD | COMMUNITY
End: 2021-01-13

## 2020-12-28 RX ORDER — ASPIRIN 81 MG/1
81 TABLET, CHEWABLE ORAL DAILY
Status: ON HOLD | COMMUNITY
End: 2021-01-13

## 2020-12-28 RX ORDER — AMIODARONE HYDROCHLORIDE 200 MG/1
200 TABLET ORAL DAILY
Status: DISCONTINUED | OUTPATIENT
Start: 2020-12-29 | End: 2021-01-05

## 2020-12-28 RX ORDER — TORSEMIDE 5 MG
50 TABLET ORAL DAILY
Status: DISCONTINUED | OUTPATIENT
Start: 2020-12-29 | End: 2020-12-28 | Stop reason: RX

## 2020-12-28 RX ORDER — LEVETIRACETAM 100 MG/ML
500 SOLUTION ORAL 2 TIMES DAILY
Status: DISCONTINUED | OUTPATIENT
Start: 2020-12-28 | End: 2021-01-05

## 2020-12-28 RX ORDER — FAMOTIDINE 20 MG/1
20 TABLET, FILM COATED ORAL 2 TIMES DAILY
Status: ON HOLD | COMMUNITY
End: 2021-01-13

## 2020-12-28 RX ORDER — TORSEMIDE 5 MG
50 TABLET ORAL DAILY
Status: ON HOLD | COMMUNITY
End: 2021-01-13

## 2020-12-28 RX ORDER — ASPIRIN 81 MG/1
81 TABLET, CHEWABLE ORAL DAILY
Status: DISCONTINUED | OUTPATIENT
Start: 2020-12-29 | End: 2021-01-05

## 2020-12-28 RX ORDER — DEXTROSE MONOHYDRATE 100 MG/ML
INJECTION, SOLUTION INTRAVENOUS CONTINUOUS PRN
Status: DISCONTINUED | OUTPATIENT
Start: 2020-12-28 | End: 2021-01-13 | Stop reason: HOSPADM

## 2020-12-28 RX ORDER — AMIODARONE HYDROCHLORIDE 200 MG/1
200 TABLET ORAL DAILY
Status: ON HOLD | COMMUNITY
End: 2021-01-13

## 2020-12-28 RX ORDER — HEPARIN SODIUM 5000 [USP'U]/.5ML
5000 INJECTION, SOLUTION INTRAVENOUS; SUBCUTANEOUS EVERY 12 HOURS
Status: ON HOLD | COMMUNITY
End: 2021-01-13

## 2020-12-28 RX ORDER — FAMOTIDINE 40 MG/5ML
20 POWDER, FOR SUSPENSION ORAL 2 TIMES DAILY
Status: DISCONTINUED | OUTPATIENT
Start: 2020-12-28 | End: 2021-01-05

## 2020-12-28 RX ADMIN — METOPROLOL TARTRATE 25 MG: 100 TABLET, FILM COATED ORAL at 21:51

## 2020-12-28 RX ADMIN — INSULIN ASPART 1 UNITS: 100 INJECTION, SOLUTION INTRAVENOUS; SUBCUTANEOUS at 22:15

## 2020-12-28 RX ADMIN — HEPARIN SODIUM 5000 UNITS: 5000 INJECTION, SOLUTION INTRAVENOUS; SUBCUTANEOUS at 21:46

## 2020-12-28 RX ADMIN — INSULIN ASPART 1 UNITS: 100 INJECTION, SOLUTION INTRAVENOUS; SUBCUTANEOUS at 17:42

## 2020-12-28 RX ADMIN — FAMOTIDINE 20 MG: 40 POWDER, FOR SUSPENSION ORAL at 21:46

## 2020-12-28 RX ADMIN — LEVETIRACETAM 500 MG: 100 SOLUTION ORAL at 21:47

## 2020-12-28 RX ADMIN — ATORVASTATIN CALCIUM 80 MG: 80 TABLET, FILM COATED ORAL at 21:47

## 2020-12-28 RX ADMIN — Medication 3 MG: at 21:47

## 2020-12-28 RX ADMIN — Medication 12.5 MG: at 21:46

## 2020-12-28 RX ADMIN — INSULIN GLARGINE 25 UNITS: 100 INJECTION, SOLUTION SUBCUTANEOUS at 22:15

## 2020-12-28 RX ADMIN — METFORMIN HYDROCHLORIDE 1000 MG: 500 SOLUTION ORAL at 22:40

## 2020-12-28 RX ADMIN — GLIPIZIDE 5 MG: 5 TABLET ORAL at 17:39

## 2020-12-28 ASSESSMENT — MIFFLIN-ST. JEOR: SCORE: 1595.45

## 2020-12-28 NOTE — PROGRESS NOTES
"   12/28/20 1700   Quick Adds   Type of Visit Initial PT Evaluation      Language English   Living Environment   People in home spouse   Current Living Arrangements house   Home Accessibility stairs to enter home;stairs within home   Transportation Anticipated family or friend will provide   Living Environment Comments PT: Pt is unable to articulate his living situation, states he lives with his \"mom and dad and  and wife\" per chart review, it appears he lives with his wife. Pt replies \"yes\" when asked if he has stairs in his home..   Self-Care   Usual Activity Tolerance fair   Current Activity Tolerance poor   Activity/Exercise/Self-Care Comment PT: Pt unable to articulate prior function, per chart review, it appears he was mostly IND with mobility prior to ED admission, author assumes no use of AD   Disability/Function   Hearing Difficulty or Deaf no   Wear Glasses or Blind no   Concentrating, Remembering or Making Decisions Difficulty no   Difficulty Communicating no   Difficulty Eating/Swallowing no   Walking or Climbing Stairs Difficulty no   Dressing/Bathing Difficulty no   Toileting issues no   Doing Errands Independently Difficulty (such as shopping) no   Fall history within last six months no   Change in Functional Status Since Onset of Current Illness/Injury yes   General Information   Onset of Illness/Injury or Date of Surgery 12/02/20   Referring Physician Dr. Yonathan Jerome MD   Patient/Family Therapy Goals Statement (PT) None articulated d/t aphasia   Pertinent History of Current Problem (include personal factors and/or comorbidities that impact the POC) Taken per chart review: \"Mika Alvarez is a 70 y.o. male who was admitted to [facility] on 12/2/2020 following ED presentation for shortness of breath. He was eventually diagnosed with decompensated heart failure. Our team had actually seen the patient a month prior following coronary angiogram demonstrating severe multi-vessel " "coronary artery disease, but the patient preferred to delay surgery. He was again referred to CV surgery for evaluation for possible coronary artery revascularization. \"   Existing Precautions/Restrictions fall;sternal   General Observations PT: Pt is lying semi supine with sitter present, able to commuincate verbally but voice is very hoarse, difficulty with speech that is difficult to tell if it is aphasic or confusion, history giving   Cognition   Orientation Status (Cognition) person;time   Affect/Mental Status (Cognition) confused   Follows Commands (Cognition) WFL   Cognitive Status Comments PT: Defer to OT, per conversation, pt appears confused    Pain Assessment   Patient Currently in Pain No   Integumentary/Edema   Integumentary/Edema Comments PT: Pt has feeding tube in place, sternal incision   Posture    Posture Forward head position   Range of Motion (ROM)   ROM Comment PT: B LE WFL   Strength   Strength Comments PT: Pt has grossly 4/5 B LE strength   ARC Assessment Only   Acute Rehab Functional Assessment See IP Rehab Daily Documentation Flowsheet for Functional Mobility/ADL Assessment   Balance   Balance Comments PT: Able to sit unsupported EOB without concern, standing with FWW, SBA for ~ 1 min no sway   Sensory Examination   Sensory Perception Comments PT: Pt unable to follow cuing for fine touch test, replies \"yes\" to author asking \"can you feel in your legs?\"   Coordination   Coordination Comments PT: Reduced speed but smooth B heel/shin test   Muscle Tone   Muscle Tone no deficits were identified   Clinical Impression   Criteria for Skilled Therapeutic Intervention yes, treatment indicated   PT Diagnosis (PT) PT: Decreased strength, endurance, and tolerance for all actiivty from baseline values   Influenced by the following impairments Medical Status   Functional limitations due to impairments Ambulation, transfers, out of bed mobility   Clinical Presentation Evolving/Changing   Clinical " Presentation Rationale PMH, recent hospitalization, post surgical status, age   Clinical Decision Making (Complexity) moderate complexity   Therapy Frequency (PT) 6x/week   Predicted Duration of Therapy Intervention (days/wks) 2 weeks   Planned Therapy Interventions (PT) balance training;bed mobility training;cryotherapy;E-stim;gait training;groups;home exercise program;joint mobilization;lumbar stabilization;manual therapy techniques;motor coordination training;neuromuscular re-education;orthotic fitting/training;patient/family education;postural re-education;prosthetic fitting/training;ROM (range of motion);stair training;strengthening;stretching;swiss ball techniques;TENS;thermotherapy;transfer training;wheelchair management/propulsion training   Anticipated Equipment Needs at Discharge (PT)   (Unclear d/t poor subjective history)   Risk & Benefits of therapy have been explained care plan/treatment goals reviewed;evaluation/treatment results reviewed;risks/benefits reviewed;current/potential barriers reviewed;participants voiced agreement with care plan;participants included;patient   Clinical Impression Comments PT: Pt is a 71 y/o male who presents following hospitalization for CABG procedure with subsequent CVA complications. PT evaluation revealed pt to be a poor historian with difficulty in subjective reporting, poor endurance, and strength from documented baseline values. Pt will benefit from skilled PT in order to restore functional mobility, aide in safe discharge, and reduce risk of hospital readmission.    PT Discharge Planning    PT Discharge Recommendation (DC Rec) home with assist;home with home care physical therapy   PT Rationale for DC Rec PT: Current level of function   PT Brief overview of current status  PT: Min A for mobility   Total Evaluation Time   Total Evaluation Time (Minutes) 20

## 2020-12-28 NOTE — PLAN OF CARE
FOCUS/GOAL  Bladder management and Mobility    ASSESSMENT, INTERVENTIONS AND CONTINUING PLAN FOR GOAL:  Patient arrived to unit around 1300 from [facility] [place] [address]. Settled into room and unit orientation gone over. Pt became fixated on wanting ice chips even though per nursing report pt is NPO and on continuous TF. Writer paged admitting MD to ask them if it was ok. Before MD responded pt became more restless and self-transferred x2 and also became increasingly verbally aggressive. Charge RN asked Speech Therapist to evaluate pt for ice chips. Speech therapist gave ok for pt to have ice chips. Pt was calmer after being able to have ice chips but still remained impulsive even with PA and nursing assistant in room. 1:1 initiated and attendant is now at bedside. TF running at 50 ml/hr. Able to void x1 w/ a PVR of 16 ml. Continue w/ plan of care.

## 2020-12-28 NOTE — PLAN OF CARE
Per chart review of prior SLP notes, patient okay to have ice chips provided that oral cares have been completed.  Current program would encourage completion of oral cares each time ice chips are brought to patient's room to decrease risk of aspiration pneumonia.  Further clarification and changes to occur pending dysphagia evaluation on 12/29.

## 2020-12-28 NOTE — PROGRESS NOTES
"CLINICAL NUTRITION SERVICES - ASSESSMENT NOTE     Nutrition Prescription    RECOMMENDATIONS FOR MDs/PROVIDERS TO ORDER:  None today    Malnutrition Status:     Unable to determine due to no NFPA today    Recommendations already ordered by Registered Dietitian (RD):  RD will update TF and flush orders to reflect is a ND tube not NG    Future/Additional Recommendations:  Monitor tolerate to TF, weight and lab trends  Diet advancement if/when appropriate per SLP      REASON FOR ASSESSMENT  Mika Alvarez is a/an 70 year old male assessed by the dietitian for Provider Order - Registered Dietitian to Assess and Order TF per Medical Nutrition Therapy Protocol    NUTRITION HISTORY  Per chart review: Pt admits to ARU for rehabilitation in the setting of acute decompensated heart failure s/p four vessel CABG on 12/7/20. Post operative course was complicated by A-fib with RVR, severe dysphagia, altered mental status, possible simple partial seizure, and left frontal stroke. Other past medical history noted for A-fib, HF, HTN, HLD, DM type 2, and known multi vessel CAD.    Pt transferred on ND TF per prior facility RD notes at continuous goal with Peptamen 1.5.    Face to face visit will be deferred today, pt with confusion, aphasia, presently requiring 1:1 sitter. RD did speak with bedside RN, RN reports no concerns with TF/pt appears to be tolerating thus far.     CURRENT NUTRITION ORDERS  Diet: NPO  Nutrition Support: Continuous NG TF with Peptamen 1.5 at 50 ml/hr x24 hrs with 180 ml water flush q 4 hrs to provide 1800 kcals, 81 gm protein, 220 gm CHO, no fiber, 2004 ml water/day    LABS  Labs reviewed    MEDICATIONS  Keppra, Glucotrol, medium resistance correction scale insulin q 4 hrs, Lantus BID, Metformin, Lipitor, Seroquel, Demadex, MVI, Pepcid, IVF bolus x1 today     ANTHROPOMETRICS  Height: 180.3 cm (5' 11\")  Most Recent Weight: 80.3 kg (177 lb 1.6 oz)    IBW: 78.2 kg  BMI: Normal BMI  Weight History: Difficult to " "determine, per prior facility RD notes, weight trends were in the \"180's\" with pt on diuretic therapy     Dosing Weight: 80.3 kg - current weight     ASSESSED NUTRITION NEEDS  Estimated Energy Needs: 2005 kcals/day (25 kcals/kg)  Justification: Maintenance  Estimated Protein Needs: 80-95 grams protein/day (1 - 1.2 grams of pro/kg)  Justification: Maintenance vs increased needs  Estimated Fluid Needs: 2005 mL/day (25 mL/kg)   Justification: Maintenance    PHYSICAL FINDINGS  Skin tears to lower extremities and surgical incision to chest noted per RN documentation     MALNUTRITION  % Intake: Intake does not meet criteria as pt is being supported by EN   % Weight Loss: Difficult to determine at present   Subcutaneous Fat Loss: Unable to assess today   Muscle Loss: Unable to assess to assess today   Fluid Accumulation/Edema: None noted  Malnutrition Diagnosis: Unable to determine due to no NFPA today     NUTRITION DIAGNOSIS  Inadequate oral intake related to chewing/swallowing difficulty as evidenced by NPO status with pt requiring EN to meet estimated needs       INTERVENTIONS  Implementation   Enteral Nutrition/water flushes - continue as ordered upon admit, RD will update orders to reflect this is an ND tube     Goals  Total avg nutritional intake to meet a minimum of 25 kcal/kg and 1.0 g PRO/kg daily (per dosing wt 80.3 kg).     Monitoring/Evaluation  Progress toward goals will be monitored and evaluated per protocol.    Meredith Howe RD, LD  [facility] RD pager: [phone]  Weekend/holiday pager: [phone]  "

## 2020-12-28 NOTE — H&P
[facility]   Acute Rehabilitation Unit  Admission History and Physical    CHIEF COMPLAINT   Impaired cognition, swallow, strength/activity tolerance, s/p CABG x 4    HISTORY OF PRESENT ILLNESS  Mika Alvarez is a 70 year old man with past medical history of heart failure, A-fib, HTN, HLD,  DM type 2, and known multi vessel CAD who presented with acute decompensated heart failure 12/2/20. He was diuresed and underwent four vessel CABG 12/7/20.  Post operative course was complicated by A-fib with RVR, severe dysphagia, altered mental status, possible simple partial seizure (leg jerking post CABG),  Left frontal stroke, urinary retention, and leucocytosis and fever of unclear etiology.      Per chart review had hospitalization 9/19-9/23  with diagnosis of severe CAD, newly diagnosed A-fib, and pulmonary edema CABG was recommended but patient deferred he presented to cardiology follow ups most recently 11/5/20 with perceived heart failure exacerbation and recommendation to present to hospital, appears patient returned home and was not seen until ED presentation 12/2/20. Upon presentation patient volume status optimized prior to CABG 12/7/20.    While in ED noted to have leg jerking and was seen by neurology felt to be possible simple partial seizure, EEG negative.  Given ongoing altered mental status and leg movement underwent head CT 12/8/20  which demonstrated left frontal lobe stroke, not felt to explain leg movement.  Neurology started keppra and has continued on this medication with recommendation to follow up neurology (Dr. Bailey or Tito to assess ongoing need for levetiracetam).     Was seen by ID for fever, leucocytosis, workup overall unremarkable,  received course of zosyn felt infection possible 2/2 aspiration now off antibiotics.  Severe dysphagia with nasal feeding tube in place with intention diet will advance and patient will be off tube feeds prior to discharge from  "rehab.        During acute hospitalization, patient was seen and evaluated by PT, SLP, and OT, who collectively recommended that patient would benefit from ongoing therapies in the acute inpatient rehabilitation setting.       In review of the therapy notes Mr Alvarez is noted to have dysphagia, impaired cognition, impaired strength , coordination, and activit tolerance.  He is currently sba-cga for seated grooming an hygiene.  He requires max assist for upper body dressing and is dependent for lower body dressing.  He is min assist for sit to stand transfers and ambulating up to 125 feet with cga.  Goals for MO D I with mobility, and basic adls. Goal for advance diete to least restrictive diet and improve communication so he can effectively communicate his needs.     On arrival to rehab is alert to self and somewhat to situation. He has issues with word finding, memory, voice is hoarse on admission.  He is able to follow single step commands, though needs repetition or simplification if longer/ more complicated than this.  He denies n/v/d, sob, fevers, dizziness, and pain.  He reports he is very thirsty, he reports he only feels comfortable with fan blowing, he is distractible and attempted to get out of bed multiple times during visit.  Mr. Alvarez was encouraged to call for help before getting out of bed, states \"I will do that if the fan stays on my face\".  He is motivated to get home with support from daughter Joanie.       PAST MEDICAL HISTORY   Reviewed and updated in Epic.  Past Medical History:   Diagnosis Date     Acute on chronic systolic heart failure (H)      Chronic a-fib (H)      Coronary artery disease involving native coronary artery of native heart      Essential hypertension      Type 2 diabetes mellitus with hyperglycemia, without long-term current use of insulin (H)         SURGICAL HISTORY  Reviewed and updated in Epic.  Past Surgical History:   Procedure Laterality Date     CORONARY ARTERY BYPASS  " 12/07/2020       SOCIAL HISTORY  Reviewed and updated in Epic.  Marital Status:   Living situation: lived in house with 4 sumanth split level  Family support: supportive daughter Joanie who is reportedly helping care for patient's wife  Vocational History: retired  Tobacco use: none  Alcohol use: rare  Illicit drug use: none  Social History     Socioeconomic History     Marital status: Not on file     Spouse name: Not on file     Number of children: Not on file     Years of education: Not on file     Highest education level: Not on file   Occupational History     Not on file   Social Needs     Financial resource strain: Not on file     Food insecurity     Worry: Not on file     Inability: Not on file     Transportation needs     Medical: Not on file     Non-medical: Not on file   Tobacco Use     Smoking status: Not on file   Substance and Sexual Activity     Alcohol use: Not on file     Drug use: Not on file     Sexual activity: Not on file   Lifestyle     Physical activity     Days per week: Not on file     Minutes per session: Not on file     Stress: Not on file   Relationships     Social connections     Talks on phone: Not on file     Gets together: Not on file     Attends Shinto service: Not on file     Active member of club or organization: Not on file     Attends meetings of clubs or organizations: Not on file     Relationship status: Not on file     Intimate partner violence     Fear of current or ex partner: Not on file     Emotionally abused: Not on file     Physically abused: Not on file     Forced sexual activity: Not on file   Other Topics Concern     Not on file   Social History Narrative     Not on file       FAMILY HISTORY  Reviewed and updated in Epic.  No family history on file.   Father with heart disease.       PRIOR FUNCTIONAL HISTORY   Pt was independent with all ADLs/IADLs, transfers, mobility and gait.  Reports he was taking care of wife prior to discharge.     MEDICATIONS  Scheduled  "meds  Medications Prior to Admission   Medication Sig Dispense Refill Last Dose     amiodarone (PACERONE) 200 MG tablet 200 mg by Per Feeding Tube route daily X 14 days        aspirin (ASA) 81 MG chewable tablet 81 mg by Per Feeding Tube route daily        atorvastatin (LIPITOR) 80 MG tablet 80 mg by Per Feeding Tube route every evening        famotidine (PEPCID) 20 MG tablet 20 mg by Per Feeding Tube route 2 times daily        glipiZIDE (GLUCOTROL) 5 MG tablet 5 mg by Per Feeding Tube route 2 times daily (before meals)        Heparin Sodium 5000 UNIT/0.5ML injection Inject 5,000 Units Subcutaneous every 12 hours        insulin aspart (NOVOLOG PEN) 100 UNIT/ML pen Inject Subcutaneous every 4 hours        insulin glargine (LANTUS PEN) 100 UNIT/ML pen Inject 25 Units Subcutaneous 2 times daily        levETIRAcetam (KEPPRA) 500 MG tablet 500 mg by Per Feeding Tube route 2 times daily        melatonin 3 MG tablet 3 mg by Per Feeding Tube route At Bedtime        metFORMIN (GLUCOPHAGE) 500 MG tablet 1,000 mg by Per Feeding Tube route 2 times daily (with meals)        metoprolol tartrate (LOPRESSOR) 25 MG tablet 25 mg by Per Feeding Tube route 2 times daily        multivitamins w/minerals (CERTAVITE) liquid Take 15 mLs by mouth daily        QUEtiapine (SEROQUEL) 25 MG tablet 12.5 mg by Per Feeding Tube route At Bedtime        torsemide (DEMADEX) suspension 50 mg by Per Feeding Tube route daily          ALLERGIES   Not on File      REVIEW OF SYSTEMS  A 10 point ROS was performed and negative unless otherwise noted in HPI.       PHYSICAL EXAM  VITAL SIGNS:  /71   Pulse 75   Temp 96  F (35.6  C) (Oral)   Resp 16   Ht 1.803 m (5' 11\")   Wt 80.3 kg (177 lb 1.6 oz)   SpO2 96%   BMI 24.70 kg/m    BMI:  There is no height or weight on file to calculate BMI.     General: awake alert  HEENT: mucus membranes dry nasal feeding tube in place with bridle  Pulmonary: non labored clear   Cardiovascular: irregularly irregular- " incisions dry without redness or warmth  Abdominal: soft non tender  Extremities: warm dry skin tears bilateral shins no edema.   MSK/neuro:   Mental Status:  alert and oriented (self/situation)    Cranial Nerves: grossly normal    Sensory: reports symmetrical decreased sensation to ble   Strength: 4/5 in all muscle groups of the upper and lower extremities (end of exam very distracted did not demonstrate ankle movement)    Reflexes: Present and symmetrical   Babinski reflex: downgoing bilaterally    Abnormal movements: None    Coordination: No dysmetria on finger to nose b/l    Speech: vocal hoarseness impaired word finding   Cognition: impaired memory- short and long term, impaired command following, distractible, tangential   Gait: not tested  Skin: dry skin, skin tears on left shin, covered bandages on right shin sternal incision cdi.       LABS  12/28  K 4.4    BUN 32- Cr 0.99  Albumin 2.8    WBC 13.0  Hgb 13.3  PLTs 368    CXR 12/27 Stable tiny biapical pneumothoraces with 2 mm pleural separation on the right. No focal pneumonic consolidation or pleural effusion. Stable heart size. Post open-heart surgery. Enteric tube with the distal visualized portion below the diaphragm.    IMPRESSION/PLAN:  Mika Alvarez is a 70 year old man with a past medical history of systolic heart failure, A-fib, DM type 2, severe multivessel CAD, who presented 12/2/20 with acute systolic heart failure exacerbation required diuresis then underwent 4 vessel CABG 12/7/20, course complicated by severe dysphagia, possible sieuzre like activity left frontal lobe stroke, sepsis, acute hypoxia with small bi apical pneumothoraces and, altered mental status. Admitted to acute rehab unit 12/28/20 for ongoing rehabilitation and medical management.       Admission to acute inpatient rehab s/p cabg x 4.    Impairment group code: 0.9      1. PT, OT and SLP 60 minutes of each on a daily basis, in addition to rehab nursing and close management of  physiatrist.      2. Impairment of ADL's: Noted to have impaired strength, coordination, activity tolerance, and cognition leading to impaired ability to complete self cares, continue OT with goal for MOD I to I with basic adls.     3. Impairment of mobility:  Noted to have impaired strength, activity tolerance, and cognition leading to impaired mobility will benefit from ongoing PT with goal for MOD I to I with basic mobility and stairs.     4. Impairment of cognition/language/swallow:  Noted to have severe dysphagia and impaired cognition will benefit from ongoing SLP with goal for least restrictive diet tolerated and improved ability to communicate needs.     5. Medical Conditions  CAD  S/P CABG x 4 (12/7)  Acute on chronic systolic heart failure- 2/2 ischemic cardiomyopathy  HTN  A-fib with RVR-   Most recent Echo 12/25/20 with left ventricular wall globally hypokinetic, EF estimated 35%. Right ventricle mildly dilated with systolic function severely reduced.  Had A-fib with RVR early in hospital stay on amiodarone taper. surgeon preference is to not anticoagulate for a-fib   -continue amiodarone 200 mg daily x 2 weeks then stop  -continue metoprolol 25 mg bid,   -continue asa, statin  -sternal precautions (6 weeks from 12/7)- limit 10 lbs then as tolerated   - continue toresemide 50 mg daily- appreciate hospitalist consult  -monitor bmp, weights, intake and output, edema/sob  -f/u cardiology as scheduled    Subacute Left frontal stroke with petechial hemorrhage  Possible simple partial seizure  Acute metabolic encephalopathy  Neurology consulted for left leg shaking POD 1, EEG neg, head CT obtained in setting of ongoing leg shaking and ams- demonstrated stroke 12/12 (most recent imaging stable)  1.  Petechial hemorrhage in the left frontal opercular infarct correlating with the blood products noted on MRI,  Chronic left cerebellar infarct.  -follow up neurology within one month- Dr. Bailey/ Tito- follow up  possible seizure-   -continue keppra until neurology follow up  -bedside attendant dcd 12/17 though on admission 12/28 attempting to get out of bed- bedside attendant for safety  -continue seroquel at bedtime     DM type 2- Hgb A1C 12.6%.   -continue metformin 1000 mg bid, glipizide 5 mg bid, lantus 25 mg bid, and ssi   -continue to monitor glucose closely  -will need to titrate pending diet advancement/ tf cessation    oral pharyngeal dysphagia  Impaired oral intake -   slp monitoring currently NPO with nd in place 12/21 video swallow with ongoing significant dysphagia s/p ND placed 12/15.  -appreciate ongoing slp  -appreciate nutrition support  -continue tf    Possible aspiration pneumonia- fever, known dysphagia, leucocytosis,  hypoxia, other workup unremarkable received zosyn course seen by id.  Ongoing leucoctyosis WBC 13 12/28.    -appreciate ongoing SLP support  -trend WBC    Bilateral shin wounds- mepilex changes as ordered keep clean and dry.     Urinary retention- gardner recently removed, reportedly voiding without issue  -monitor PVRs  -was started on flomax at prior hospital - had flomax suspension- as npo and only pill form will hold and monitor as above.      Creatinine elevation - Cr 0.99 BUN 32 12/28 recent Cr 1.51 12/26 BUN 38  -monitor bmp  -torsemide as above.     6. Adjustment to disability: plan for psychology consult when more consistently alert  7. FEN: NPO with tt  8. Bowel: monitor  9. Bladder: monitor pvrs on admission.  10. DVT Prophylaxis: subcutaneous heparin  11. GI Prophylaxis: ppi  12. Code: full- prior and patient confirmed on admission  13. Disposition: goal for home  14. ELOS:  2+ weeks.  15. Rehab prognosis:  Fair- medically complex with significant impairments in cognition and swallow and milder strength/activity tolerance deficits goal for home pending supervision needs and family support  16. Follow up Appointments on Discharge: cardiology, cv surgery, pcp.      Seen and  discussed with Dr. Jerome , PM&R staff physician     Yokasta More PA-C  Rehab Service

## 2020-12-28 NOTE — PLAN OF CARE
"Discharge Planner Post-Acute Rehab PT:     Discharge Plan: Unclear at this time, pt is poor historian, unclear if d/t confusion or aphasia. Per chart review, it appears pt lives with wife in house in [place]. When asked if he has stairs, he replies only \"yes.\"    Precautions: Fall, Sternal    Current Status:  Transfer: CGA with FWW  Bed Mobility: Min A for trunk support  Gait: 25 ft in room with FWW, CGA  Stairs: Not tested at eval  Balance: Static standing with FWW, 1 min no sway    Assessment:  Pt is a 69 y/o male who presents following hospitalization for CABG procedure with subsequent CVA complications. PT evaluation revealed pt to be a poor historian with difficulty in subjective reporting, poor endurance, and strength from documented baseline values. Pt will benefit from skilled PT in order to restore functional mobility, aide in safe discharge, and reduce risk of hospital readmission.     Other Barriers to Discharge (DME, Family Training, etc): Unclear at this time, further subjective history is needed.    "

## 2020-12-28 NOTE — PHARMACY-CONSULT NOTE
Pharmacy Tube Feeding Consult    Medication reviewed for administration by feeding tube and for potential food/drug interactions.    Recommendation: No changes are needed at this time. Meds in liquid or crushable form.     Pharmacy will continue to follow as new medications are ordered.    Oumou Costello, EmilyD, BCPS

## 2020-12-29 ENCOUNTER — APPOINTMENT (OUTPATIENT)
Dept: PHYSICAL THERAPY | Facility: CLINIC | Age: 70
End: 2020-12-29
Attending: PHYSICIAN ASSISTANT
Payer: COMMERCIAL

## 2020-12-29 ENCOUNTER — APPOINTMENT (OUTPATIENT)
Dept: OCCUPATIONAL THERAPY | Facility: CLINIC | Age: 70
End: 2020-12-29
Payer: COMMERCIAL

## 2020-12-29 ENCOUNTER — APPOINTMENT (OUTPATIENT)
Dept: SPEECH THERAPY | Facility: CLINIC | Age: 70
End: 2020-12-29
Payer: COMMERCIAL

## 2020-12-29 LAB
ANION GAP SERPL CALCULATED.3IONS-SCNC: 7 MMOL/L (ref 3–14)
BUN SERPL-MCNC: 36 MG/DL (ref 7–30)
CALCIUM SERPL-MCNC: 8.9 MG/DL (ref 8.5–10.1)
CHLORIDE SERPL-SCNC: 104 MMOL/L (ref 94–109)
CO2 SERPL-SCNC: 28 MMOL/L (ref 20–32)
CREAT SERPL-MCNC: 1.08 MG/DL (ref 0.66–1.25)
ERYTHROCYTE [DISTWIDTH] IN BLOOD BY AUTOMATED COUNT: 16.7 % (ref 10–15)
GFR SERPL CREATININE-BSD FRML MDRD: 70 ML/MIN/{1.73_M2}
GLUCOSE BLDC GLUCOMTR-MCNC: 138 MG/DL (ref 70–99)
GLUCOSE BLDC GLUCOMTR-MCNC: 149 MG/DL (ref 70–99)
GLUCOSE BLDC GLUCOMTR-MCNC: 156 MG/DL (ref 70–99)
GLUCOSE BLDC GLUCOMTR-MCNC: 170 MG/DL (ref 70–99)
GLUCOSE BLDC GLUCOMTR-MCNC: 186 MG/DL (ref 70–99)
GLUCOSE BLDC GLUCOMTR-MCNC: 208 MG/DL (ref 70–99)
GLUCOSE SERPL-MCNC: 146 MG/DL (ref 70–99)
HCT VFR BLD AUTO: 46.4 % (ref 40–53)
HGB BLD-MCNC: 13.7 G/DL (ref 13.3–17.7)
INTERPRETATION ECG - MUSE: NORMAL
MCH RBC QN AUTO: 26 PG (ref 26.5–33)
MCHC RBC AUTO-ENTMCNC: 29.5 G/DL (ref 31.5–36.5)
MCV RBC AUTO: 88 FL (ref 78–100)
PLATELET # BLD AUTO: 371 10E9/L (ref 150–450)
POTASSIUM SERPL-SCNC: 4.4 MMOL/L (ref 3.4–5.3)
RBC # BLD AUTO: 5.26 10E12/L (ref 4.4–5.9)
SODIUM SERPL-SCNC: 139 MMOL/L (ref 133–144)
WBC # BLD AUTO: 11.9 10E9/L (ref 4–11)

## 2020-12-29 PROCEDURE — 97110 THERAPEUTIC EXERCISES: CPT | Mod: GP

## 2020-12-29 PROCEDURE — 99207 PR CONSULT E&M CHANGED TO INITIAL LEVEL: CPT | Performed by: INTERNAL MEDICINE

## 2020-12-29 PROCEDURE — 258N000003 HC RX IP 258 OP 636

## 2020-12-29 PROCEDURE — 36415 COLL VENOUS BLD VENIPUNCTURE: CPT | Performed by: PHYSICIAN ASSISTANT

## 2020-12-29 PROCEDURE — 96372 THER/PROPH/DIAG INJ SC/IM: CPT | Performed by: PHYSICIAN ASSISTANT

## 2020-12-29 PROCEDURE — 99207 PR CDG-CODE CATEGORY CHANGED: CPT | Performed by: PSYCHIATRY & NEUROLOGY

## 2020-12-29 PROCEDURE — 250N000011 HC RX IP 250 OP 636: Performed by: PHYSICIAN ASSISTANT

## 2020-12-29 PROCEDURE — 250N000013 HC RX MED GY IP 250 OP 250 PS 637: Performed by: PHYSICIAN ASSISTANT

## 2020-12-29 PROCEDURE — 128N000003 HC R&B REHAB

## 2020-12-29 PROCEDURE — 99223 1ST HOSP IP/OBS HIGH 75: CPT | Performed by: INTERNAL MEDICINE

## 2020-12-29 PROCEDURE — 80048 BASIC METABOLIC PNL TOTAL CA: CPT | Performed by: PHYSICIAN ASSISTANT

## 2020-12-29 PROCEDURE — 250N000013 HC RX MED GY IP 250 OP 250 PS 637: Performed by: PHYSICAL MEDICINE & REHABILITATION

## 2020-12-29 PROCEDURE — 999N001017 HC STATISTIC GLUCOSE BY METER IP

## 2020-12-29 PROCEDURE — 272N000083 HC NUTRITION PRODUCT SEMIELEM INTERMED LITER

## 2020-12-29 PROCEDURE — 97167 OT EVAL HIGH COMPLEX 60 MIN: CPT | Mod: GO | Performed by: STUDENT IN AN ORGANIZED HEALTH CARE EDUCATION/TRAINING PROGRAM

## 2020-12-29 PROCEDURE — 250N000009 HC RX 250: Performed by: PHYSICIAN ASSISTANT

## 2020-12-29 PROCEDURE — 85027 COMPLETE CBC AUTOMATED: CPT | Performed by: PHYSICIAN ASSISTANT

## 2020-12-29 PROCEDURE — 97535 SELF CARE MNGMENT TRAINING: CPT | Mod: GO | Performed by: STUDENT IN AN ORGANIZED HEALTH CARE EDUCATION/TRAINING PROGRAM

## 2020-12-29 PROCEDURE — 99222 1ST HOSP IP/OBS MODERATE 55: CPT | Performed by: PSYCHIATRY & NEUROLOGY

## 2020-12-29 PROCEDURE — 92610 EVALUATE SWALLOWING FUNCTION: CPT | Mod: GN | Performed by: SPEECH-LANGUAGE PATHOLOGIST

## 2020-12-29 RX ORDER — SODIUM CHLORIDE 9 MG/ML
INJECTION, SOLUTION INTRAVENOUS
Status: COMPLETED
Start: 2020-12-29 | End: 2020-12-29

## 2020-12-29 RX ORDER — QUETIAPINE FUMARATE 25 MG/1
25 TABLET, FILM COATED ORAL 3 TIMES DAILY PRN
Status: DISCONTINUED | OUTPATIENT
Start: 2020-12-29 | End: 2021-01-03

## 2020-12-29 RX ORDER — TORSEMIDE 20 MG/1
40 TABLET ORAL DAILY
Status: DISCONTINUED | OUTPATIENT
Start: 2020-12-30 | End: 2020-12-30

## 2020-12-29 RX ADMIN — ASPIRIN 81 MG CHEWABLE TABLET 81 MG: 81 TABLET CHEWABLE at 10:04

## 2020-12-29 RX ADMIN — ATORVASTATIN CALCIUM 80 MG: 80 TABLET, FILM COATED ORAL at 21:59

## 2020-12-29 RX ADMIN — METFORMIN HYDROCHLORIDE 1000 MG: 500 SOLUTION ORAL at 13:21

## 2020-12-29 RX ADMIN — AMIODARONE HYDROCHLORIDE 200 MG: 200 TABLET ORAL at 10:04

## 2020-12-29 RX ADMIN — INSULIN ASPART 1 UNITS: 100 INJECTION, SOLUTION INTRAVENOUS; SUBCUTANEOUS at 10:20

## 2020-12-29 RX ADMIN — INSULIN GLARGINE 25 UNITS: 100 INJECTION, SOLUTION SUBCUTANEOUS at 10:19

## 2020-12-29 RX ADMIN — SODIUM CHLORIDE 250 ML: 9 INJECTION, SOLUTION INTRAVENOUS at 13:21

## 2020-12-29 RX ADMIN — ACETAMINOPHEN 650 MG: 325 SOLUTION ORAL at 02:20

## 2020-12-29 RX ADMIN — INSULIN ASPART 1 UNITS: 100 INJECTION, SOLUTION INTRAVENOUS; SUBCUTANEOUS at 06:11

## 2020-12-29 RX ADMIN — LEVETIRACETAM 500 MG: 100 SOLUTION ORAL at 10:05

## 2020-12-29 RX ADMIN — GLIPIZIDE 5 MG: 5 TABLET ORAL at 10:04

## 2020-12-29 RX ADMIN — MULTIVITAMIN 15 ML: LIQUID ORAL at 10:05

## 2020-12-29 RX ADMIN — Medication 12.5 MG: at 22:00

## 2020-12-29 RX ADMIN — HEPARIN SODIUM 5000 UNITS: 5000 INJECTION, SOLUTION INTRAVENOUS; SUBCUTANEOUS at 22:12

## 2020-12-29 RX ADMIN — Medication 250 ML: at 13:21

## 2020-12-29 RX ADMIN — LEVETIRACETAM 500 MG: 100 SOLUTION ORAL at 22:02

## 2020-12-29 RX ADMIN — INSULIN ASPART 1 UNITS: 100 INJECTION, SOLUTION INTRAVENOUS; SUBCUTANEOUS at 22:08

## 2020-12-29 RX ADMIN — METOPROLOL TARTRATE 25 MG: 100 TABLET, FILM COATED ORAL at 10:05

## 2020-12-29 RX ADMIN — ACETAMINOPHEN 650 MG: 325 SOLUTION ORAL at 22:00

## 2020-12-29 RX ADMIN — FAMOTIDINE 20 MG: 40 POWDER, FOR SUSPENSION ORAL at 22:15

## 2020-12-29 RX ADMIN — HEPARIN SODIUM 5000 UNITS: 5000 INJECTION, SOLUTION INTRAVENOUS; SUBCUTANEOUS at 10:04

## 2020-12-29 RX ADMIN — METFORMIN HYDROCHLORIDE 1000 MG: 500 SOLUTION ORAL at 22:02

## 2020-12-29 RX ADMIN — INSULIN GLARGINE 25 UNITS: 100 INJECTION, SOLUTION SUBCUTANEOUS at 22:03

## 2020-12-29 RX ADMIN — Medication 3 MG: at 22:03

## 2020-12-29 RX ADMIN — INSULIN ASPART 2 UNITS: 100 INJECTION, SOLUTION INTRAVENOUS; SUBCUTANEOUS at 13:30

## 2020-12-29 RX ADMIN — FAMOTIDINE 20 MG: 40 POWDER, FOR SUSPENSION ORAL at 11:55

## 2020-12-29 RX ADMIN — INSULIN ASPART 1 UNITS: 100 INJECTION, SOLUTION INTRAVENOUS; SUBCUTANEOUS at 02:19

## 2020-12-29 RX ADMIN — GLIPIZIDE 5 MG: 5 TABLET ORAL at 17:14

## 2020-12-29 RX ADMIN — TORSEMIDE 50 MG: 20 TABLET ORAL at 10:04

## 2020-12-29 ASSESSMENT — MIFFLIN-ST. JEOR: SCORE: 1586.38

## 2020-12-29 NOTE — PLAN OF CARE
"Discharge Planner Post-Acute Rehab PT:     Discharge Plan: Unclear at this time, pt is poor historian, unclear if d/t confusion or aphasia. Pt does state he lives in a split level home today, bedroom on upper level; no need to go to lower level; there are stairs to enter, pt does not state how many or if either stairs have railings. Pt also reports his wife has a stroke a year ago and that he cares for her at baseline. Pt's dtr may be able to assist pt and spouse    Precautions: Fall, Sternal, NPO; agitated 1:1 sitter    Current Status:  Transfer: [facility] with FWW  Bed Mobility: Min A for trunk support  Gait: 25 ft in room with W, [facility]  Stairs: Not tested at eval  Balance: Static standing with FWW, 1 min no sway    Assessment:  Attempted scheduled session. Pt repeatedly stating he \"hates this place\" and \"I've gotta get out of here.\" PT provided therapeutic listening and discussed safety concerns for discharge at this time. PT also educated pt on PT role, POC and sternal precautions. Pt not open to education, need reinforcement. Pt agitated throughout session, impulsively rolling and sitting up on either side of bed and attempting to pull self to HOB with UE overhead. PT provides assistance and redirection to prevent breaking sternal precautions. Pt does stand several times and transfers to bedside chairx1 but continues to refuse gait. Unable to complete progressive mobility assessment.     Other Barriers to Discharge (DME, Family Training, etc): Unclear at this time, further subjective history is needed.    "

## 2020-12-29 NOTE — PLAN OF CARE
FOCUS/GOAL  Bowel management, Bladder management, and Pain management    ASSESSMENT, INTERVENTIONS AND CONTINUING PLAN FOR GOAL:    Pt was restless and agitated during the overnight, he was constantly asking for  ice chips and food. 1:1 attendant at bedside. Continuous TF running at 50 ml/hr with 180 ml of water flush. Pt tolerating TF well.  and 170, correction insulin was given. Continue with plan of care.

## 2020-12-29 NOTE — CONSULTS
[facility]   Consult Note - Hospitalist Service     Date of Admission:  12/28/2020  Consult Requested by: Yokasta More PA-C  Reason for Consult: Management of HF, encephalopathy and general co management of medical complexities     Assessment & Plan     Mika Alvarez is a 70 year old man with a past medical history of systolic heart failure, A-fib, DM type 2, severe multivessel CAD, who presented 12/2/20 with acute systolic heart failure exacerbation required diuresis then underwent 4 vessel CABG 12/7/20, course complicated by severe dysphagia, possible sieuzre like activity left frontal lobe stroke, sepsis, acute hypoxia with small bi apical pneumothoraces and, altered mental status. Admitted to acute rehab unit 12/28/20 for ongoing rehabilitation and medical management. Internal medicine consulted for co-management.   Individual problems and their management are outlined below      #Multivessel CAD S/P CABG  #Acute diastolic heart failure, improving  - Midline incision healing well  -Continue Torsemide 50 mg daily, Daily weights ( weight as of 12/29 at 175 lbs)   -Monitor BMP Bi weekly   - Resume Metoprolol 25 mg BID   - ASA  - Atorvastatin     #A-fib with RVR  - Currently rate controlled   - Continue amiodarone. Metoprolol as above     #Uncontrolled type II diabetes mellitus, HgbA1c  12.6%  - Continue metformin 1000 mg BID  - Continue glipizide 5 mg BID  - Continue insulin glargine 25 units BID  - SSI( Medium Intesnity) Q 4 hrs while on tube feeding   - Hypoglycemia protocol  - Diabetic diet        Acute hypoxic respiratory failure, resolved  -  Encourage IC  - Supplemental oxygen as needed     Acute metabolic encephalopathy  - Supportive care  - Likely multifactorial ( post operative status , stroke and possibly pre existing status secondary to uncontrolled DM type 2  And HTN)   - We may need to explore more around decision making capacity etc.      Subacute left  frontal stroke with petechial hemorrhage  - ASA  - Atorvastatin  - Continue levetiracetam    #Sepsis, Resolved   - Fever, Leukocytosis, tachycardia noted 12/10/2020  - UA was negative. One of two blood cultures grew staph epi, likely contaminant.  - Sputum culture grew beta-hemolytic strep  - Started on emperic pip-tazo IV,  completed 7d on 12/17, fever and WBC resolving.  - WBC down to 11.9 on 12/29     #Severe pharyngeal dysphagia  # Malnutrition, unclassified     - SLP team to follow patient here in ARU   video swallow study on 12/21 showed ongoing significant dysphagia  - NGT in place  - Clinical monitoring     Primary hypertension  - BP controlled  - Continue metoprolol tartrate  - Monitoring BP    # Bilateral Pneumothoraces   X Ray on 12/26 with  Tiny apical pneumothoraces without significant change. No consolidation or focal pneumonia in the lungs. No pulmonary edema. No other significant change.  Continue to monitor clinically      VTE risk reduction. Subcutaneous heparin.              The patient's care was discussed with the Bedside Nurse, Patient and Primary team.    Paulo Soria MD  [facility]     ______________________________________________________________________    Chief Complaint   Admission to ARU for intense rehabilitation following a complicated post operative course following CABG, don semi-emergently for acute systolic HF    History is obtained from the patient as well as chart review     History of Present Illness   Mika Alvarez is a 70 year old man with a past medical history of systolic heart failure, A-fib, DM type 2, severe multivessel CAD, who presented 12/2/20 with acute systolic heart failure exacerbation required diuresis then underwent 4 vessel CABG 12/7/20, course complicated by severe dysphagia, possible sieuzre like activity left frontal lobe stroke, sepsis, acute hypoxia with small bi apical pneumothoraces and, altered  mental status. Admitted to acute rehab unit 12/28/20 for ongoing rehabilitation and medical management.   This morning, when I went to see patient, he had a bedside sitter, which was initiated last night as patient was becoming fairly restless going back and forth between the bed in the chair.  Patient was very emphatic that he did not like staying here in the acute rehab unit and wanted to be sent home.  At some point he told me that he did not want to talk to me anymore.    I did discuss with him in length that the reason why he was sent over to the acute rehab unit was to get better strength and energy so that he could go home.  He specifically denies any chest pain or shortness of breath.  He denies any fevers or chills.  He denies any nausea, vomiting or diarrhea.  He then closed his eyes and refused to answer any of my questions.  He allowed me to examine him however.         Review of Systems   The 10 point Review of Systems is negative other than noted in the HPI or here.     Past Medical History    I have reviewed this patient's medical history and updated it with pertinent information if needed.   Past Medical History:   Diagnosis Date     Acute on chronic systolic heart failure (H)      Chronic a-fib (H)      Coronary artery disease involving native coronary artery of native heart      Essential hypertension      Type 2 diabetes mellitus with hyperglycemia, without long-term current use of insulin (H)        Past Surgical History   I have reviewed this patient's surgical history and updated it with pertinent information if needed.  Past Surgical History:   Procedure Laterality Date     CORONARY ARTERY BYPASS  12/07/2020       Social History   I have reviewed this patient's social history and updated it with pertinent information if needed.  Social History     Tobacco Use     Smoking status: Not on file   Substance Use Topics     Alcohol use: Not on file     Drug use: Not on file       Family History   I  have reviewed this patient's family history and updated it with pertinent information if needed.       Medications   I have reviewed this patient's current medications    Allergies   Not on File    Physical Exam   Vital Signs: Temp: 95.3  F (35.2  C) Temp src: Oral BP: (!) 84/54 Pulse: 69   Resp: 16 SpO2: 100 % O2 Device: None (Room air)    Weight: 175 lbs 1.6 oz    Constitutional: awake, alert, cooperative, no apparent distress, appears stated age and normal weight  Eyes: Lids and lashes normal, pupils equal, round and reactive to light, extra ocular muscles intact, sclera clear, conjunctiva normal  ENT: Normocephalic, without obvious abnormality, atraumatic, sinuses nontender on palpation, external ears without lesions, oral pharynx with moist mucous membranes  Respiratory: No increased work of breathing, faint crackles bi-basally. Chest midline incision and drain incision appears to be healing well   Cardiovascular: Normal apical impulse, regular rate and rhythm, normal S1 and S2, no S3 or S4, and no murmur noted  GI: No scars, normal bowel sounds, soft, non-distended, non-tender, no masses palpated, no hepatosplenomegally  Skin: no bruising or bleeding  Musculoskeletal: no lower extremity pitting edema present  Neurologic: unable to comprehensively assess neuro exam, Aware that he is in a new hospital. Moving all 4 limbs. ANNE   Data   I personally reviewed no images or EKG's today.

## 2020-12-29 NOTE — PHARMACY-MEDICATION REGIMEN REVIEW
Pharmacy Medication Regimen Review  Mika Alvarez is a 70 year old male who is currently in the Acute Rehab Unit.    Assessment: Upon review of the medications and patient chart the following irregularities were found:     -Psychotropic medications without adequate indications/durations: Patient was not on Seroquel PTA, would be ideal to eventually taper off. However, patient appears to still be requiring it based on noted behaviors/symptoms, so ok to continue for now.    -Significant Drug Interactions:  Amiodarone can increase the amounts of the torsemide, glipizide, and atorvastatin (watch for side effects).     QTC prolonging medications: amiodarone, Keppra, Pepcid, Seroquel, and torsemide. QTC on 12/28 =531.    Plan:   Continue current medications/plan. Continue to watch QTC. Taper Seroquel when able, ok to continue as is for now given current state.    Attending provider will be sent this note for review.  If there are any emergent issues noted above, pharmacist will contact provider directly by phone.      Pharmacy will periodically review the resident's medication regimen for any PRN medications not administered in > 72 hours and discontinue them. The pharmacist will discuss gradual dose reductions of psychopharmacologic medications with interdisciplinary team on a regular basis.    Please contact pharmacy if the above does not answer specific medication questions/concerns.  Oumou Costello, EmilyD, BCPS    Background:  A pharmacist has reviewed all medications and pertinent medical history today.  Medications were reviewed for appropriate use and any irregularities found are listed with recommendations.      Current Facility-Administered Medications:      0.9% sodium chloride BOLUS, 250 mL, Intravenous, Once, Brody Martínez MD     acetaminophen (TYLENOL) solution 650 mg, 650 mg, Per Feeding Tube, Q4H PRN, Yokasta More PA, 650 mg at 12/29/20 0220     amiodarone (PACERONE) tablet 200 mg, 200  mg, Per Feeding Tube, Daily, Yokasta More PA, 200 mg at 12/29/20 1004     aspirin (ASA) chewable tablet 81 mg, 81 mg, Per Feeding Tube, Daily, Yokasta More PA, 81 mg at 12/29/20 1004     atorvastatin (LIPITOR) tablet 80 mg, 80 mg, Per Feeding Tube, QPM, Yokasta More PA, 80 mg at 12/28/20 2147     dextrose 10% infusion, , Intravenous, Continuous PRN, Yokasta More PA     glucose gel 15-30 g, 15-30 g, Oral, Q15 Min PRN **OR** dextrose 50 % injection 25-50 mL, 25-50 mL, Intravenous, Q15 Min PRN **OR** glucagon kit 1 mg, 1 mg, Subcutaneous, Q15 Min PRN, Yokasta More PA     famotidine (PEPCID) suspension 20 mg, 20 mg, Per Feeding Tube, BID, Yokasta More PA, 20 mg at 12/29/20 1155     glipiZIDE (GLUCOTROL) tablet 5 mg, 5 mg, Per Feeding Tube, BID AC, Yokasta More PA, 5 mg at 12/29/20 1004     heparin ANTICOAGULANT injection 5,000 Units, 5,000 Units, Subcutaneous, Q12H, Yokasta More PA, 5,000 Units at 12/29/20 1004     insulin aspart (NovoLOG) injection (RAPID ACTING), 1-6 Units, Subcutaneous, Q4H, Yokasta More PA, 1 Units at 12/29/20 1020     insulin glargine (LANTUS PEN) injection 25 Units, 25 Units, Subcutaneous, BID, Yokasta More PA, 25 Units at 12/29/20 1019     levETIRAcetam (KEPPRA) solution 500 mg, 500 mg, Per Feeding Tube, BID, Yokasta More PA, 500 mg at 12/29/20 1005     melatonin liquid 3 mg, 3 mg, Per Feeding Tube, At Bedtime, oYkasta More PA, 3 mg at 12/28/20 2147     metFORMIN (GLUCOPHAGE) solution 1,000 mg, 1,000 mg, Per Feeding Tube, 2 times daily, Yokasta More PA, 1,000 mg at 12/28/20 2240     metoprolol (LOPRESSOR) suspension 25 mg, 25 mg, Per Feeding Tube, BID, Yokasta More PA, 25 mg at 12/29/20 1005     multivitamins w/minerals (CERTAVITE) liquid 15 mL, 15 mL, Per NG tube, Daily, Yokasta More PA, 15 mL at 12/29/20 1005     QUEtiapine (SEROquel) half-tab 12.5 mg, 12.5 mg, Per Feeding Tube, At Bedtime,  Yokasta More PA, 12.5 mg at 12/28/20 2146     QUEtiapine (SEROquel) tablet 25 mg, 25 mg, Per Feeding Tube, TID PRN, Dmitry Jerome MD     [START ON 12/30/2020] torsemide (DEMADEX) tablet 40 mg, 40 mg, Per Feeding Tube, Daily, Brody Martínez MD  No current outpatient prescriptions on file.   PMH: Acute decompensated heart failure 12/2/20, heart failure, A-fib, HTN, HLD,  DM type 2, and known multi vessel CAD

## 2020-12-29 NOTE — PLAN OF CARE
"SLP -30 pt somnolent,  unable  to stay awake alert, eyes closed except for couple brief moments. Will continue eval attempts later in day. Clarified \"free water\", posted guidelines in room, ok up to approximately 4-5 ice chips per hour after oral cares (will assess if pt ok for teeth brushing with assist), or swab, with supervision, sitting up. Pt may still demonstrate aspiration sx.  "

## 2020-12-29 NOTE — PROGRESS NOTES
I was asked to follow on EKG. Patient is on Afib with controlled rate. He also has RBBB, and LAFB. These are not new,and present prior to admission to [facility]  Internal Medicine  Hospitalist  Pager no: [phone]

## 2020-12-29 NOTE — CONSULTS
"Pt interests: golf, night owl, \"LOVES\" rock n roll music, [employer] band, rock n carl band, best friend Eulalio and grandchildren (6 total). [facility] [employer] golf place in [place] he enjoys going to. Enjoys shopping and browse the internet for 'everything'. Dtr will send pictures of family.     Pt is a night owl per family, goes to bed around 2am and wakes up around 10am. Set therapy schedule? Family stated that they have all been calling pt to encourage him. Suggest if pt declines to call family and have them encourage pt.     See below for details around discussion about discharge.      20 1400   Living Arrangements   People in home spouse   Able to Return to Prior Arrangements other (see comments)   Living Arrangement Comments Pending support and pt needs   Home Safety   Patient Feels Safe Living in Home? other (see comments)  (Did not discuss with pt )   CM/SW Discharge Planning   Expected Discharge Date 21   Patient/Family Anticipates Transition to home with family;home with help/services;other (see comments)  (Pending )   Concerns to be Addressed basic needs;cognitive/perceptual;compliance issue;decision making;discharge planning;home safety   Patient/family educated on Medicare website which has current facility and service quality ratings no   Transportation Anticipated family or friend will provide   Anticipated Discharge Disposition (CM/SW) Home Care;Assisted Living;Transitional Care;Long Term Care   Patient/Family in Agreement with Plan unable to assess        Social Work: Initial Assessment with Discharge Plan    Patient Name: Mika Alvarez  : 1950  Age: 70 year old  MRN: [id number]  Completed assessment with: Chart review and interview with pt dtr Lilly Staples over the phone (due to pt cognition).   Admitted to ARU: 2020    Presenting Information   Date of SW assessment: 2020  Health Care Directive: Provided education and Health Care Directive Agent (if patient not able to " "make decisions)  Primary Health Care Agent: Patient (until further assessed)   Secondary Health Care Agent: Pt wife Bel AKERS, per pt dtr pt wife cognition \"is really good\". Per pt wife, no HCD or POA. Discussed importance of POA and emailed pt dtr a blank copy.   Living Situation: Pt lives in a home with his spouse Bel in [place], [place]. No pets in the home. [address]. Split-level home with 8 stairs up and 7 stairs down. Railing on both side. Walk-in and tub. All needs can be met on the main level. Lower level is where laundry located.   Previous Functional Status: Indep with ADLs PTA. Managing his medications and finances. Pt family stated that he was managing his diabetes PTA. Driving PTA. Pt wife denied falls at home. Not working PTA. In free time, pt shops and watches tv. Enjoys golf. Describes him as a night owl. Typically gets his day started around 10am.   DME available: Grab bars in the walk-in shower (which pt typically doesn't use), grab bars by the toilet, shower chair, raised toilet seat.   Patient and family understanding of hospitalization: Unclear at this time, Agitation and confusion per RN notes. 1:1 sitter.   Cultural/Language/Spiritual Considerations: 69 y/o male, English-speaking and . Ivette and Restorationism not discussed or listed on face sheet.       Physical Health  Reason for admission: Mika Alvarez is a 70 year old man with a past medical history of systolic heart failure, A-fib, DM type 2, severe multivessel CAD, who presented 12/2/20 with acute systolic heart failure exacerbation required diuresis then underwent 4 vessel CABG 12/7/20, course complicated by severe dysphagia, possible sieuzre like activity left frontal lobe stroke, sepsis, acute hypoxia with small bi apical pneumothoraces and, altered mental status. Admitted to acute rehab unit 12/28/20 for ongoing rehabilitation and medical management.    Provider Information   Primary Care Physician: [facility] " "[place]    Angel Claire MD    [address] [place] [address]    [phone] (PHONE)  [phone] (Fax)    : None reported although ILAN and RN CC may be available for support from the [facility] clinic.     Mental Health/Chemical Dependency:   Diagnosis: Per pt dtr, no hx of psych problems per psych note. Note from PCP on 06/21/2019 stated \"Patient continues to have problems with depression but is unwilling to start medication\".     Another office note from pt's PCP on 08/21/2018 stated \"Majority of discussion today discussing patient's uncontrolled depressive symptoms. Patient denies any plan to hurt himself but has had thoughts that he be better off not being here-he clearly states however that he would never act upon it. He is not willing to try another antidepressant medication as he felt so bad after taking 1 of the pills previously of sertraline. Offered him referral to psychology and psychiatry which he declined. Patient's feels the combination of different life factors on him right now are just not able to be changed and so nothing will help him. I discussed with him clearly that I think he could benefit from getting more assistance into his home to help his wife which he would benefit from greatly as well both physically and mentally. Patient is in a work with his wife and their primary doctor to get home care at their home for the wife to hopefully get nursing physical therapy and Occupational Therapy in the home. Again patient declined any medications to help with his depressive symptoms. He has had thoughts on a regular basis on how he does not like his life -but he states he never act upon it he does not have a plan at this time  Like to have him follow-up at shorter intervals-he is to work on getting an appointment his wife's primary physician\"     When asking family, pt wife denied any depression or anxiety. Pt wife denied any anger/frustration and not " "baseline.     Alcohol/Tobacco/Narcotis: Pt family denied.   Support/Services in Place: None reported   Services Needed/Recommended: Supportive services available by consult (Health Psychology and [facility] services)   Sexuality/Intimacy: Not discussed     Support System  Marital Status: Bel, pt wife. Was at  ARU last year. Pt wife stated she needs help with her ADLs. Bel sister and other family coming to assist pt at home. When asking pt how she is managing at home and pt stated \"ok\". Needs help bathing, cooking, shopping and household chores.   Family support: 6 children total, 4 surviving. Son recently  this last year of a suicide (end of September) and another dtr  of [place]. Joanie in [place], another dtr Chad, son in [place] and another in [place]. In-laws. All work full-time and have kids.   Other support available: Not discussed     Community Resources  Current in home services: None reported   Previous services: None reported although pt wife has [facility] (although coming to an end). Pt wife has been to [facility], [place], family familiar with that location.     Financial/Employment/Education  Employment Status: Retired   Income Source: SSI  Education: Not discussed   Financial Concerns:  None reported, denied   Insurance: Medicare and [employer] Open Access     Discharge Plan   Patient and family discharge goal: Pending progress and support at home  Provided Education on discharge plan: Yes  Patient agreeable to discharge plan:  Yes  Provided education and attained signature for Medicare IM and IRF Patient Rights and Privacy Information provided to patient : Verbal consent from wife and dtr over the phone   Provided patient with [place] [facility] [place] Resources: Not at this time   Barriers to discharge: Support, safety and willingness to participate in therapy during ARU stay     Discharge Recommendations   Disposition: See above   Transportation Needs: Family assistance   Name of " Transportation Company and Phone: N/A     Additional comments   Pt currently on 1:1, impulsive, NGT in place and refusing some therapies.     SW completed assessment with pt dtr Joanie and wife Bel over the phone. Discussed pt behaviors and limited participation according to pt chart. Reiterated to pt wife and dtr that pt WILL need 24/7 supervision and assistance. Pt dtr inquired what the reason for 24/7 and SW discussed pt behaviors, cognition, and possibly some physical assistance and IALDs. Stressed to the wife and dtr that this is not only because of pt needs but also because pt wife can not provide. Pointed out pt behaviors from last night according to RN note (trying to bang laptop against the door/wall, aggression with sitter and yelling in the hallway). Stated that ARU LOS is typically 10-14 days and is dependent on pt progress and participation. If pt is not able to go home and still not participating, he would most likely be declined from a TCU for the same reason. SW strongly suggested and encouraged family to discuss together the possibilities of home with 24/7 from other family, home with hiring in help, possible short TCU stay but needing long-term plan vs moving into a facility for both pt and pt wife that can meet their needs. SW informed pt dtr that SW would call upon return Monday to check on the status of that conversation and what family is considering. Pt dtr Joanie stated that they have talked about this for a while but was still hoping pt would be able to return home. Pt wife Bel quickly stated that pt would be able to come home with HHC. SWer educated pt wife on the frequency of HHC and difference between short term care from [facility] vs level of supervision that is anticipated at this time. Pt dtr and wife expressed understanding. SW emailed a blank POA document to pt dtr so that pt wife could complete one. Pt would not be able to complete at this time. SW will continue to follow.     ILAN  suggested in huddle that staff communicate pt behaviors clearly with family if/when they call. Family appears to lack some insight into pt deficits and it's important for them to have the most realistic expectations.       Please invite to Care Conference:  Edgard Benoit Ph: [phone]  Wife Bel Ph: [phone] or Ph: [phone]    HENRIK Lester, [facility] Acute Rehab Unit   Phone: [phone]  I   Pager: [phone]

## 2020-12-29 NOTE — PROGRESS NOTES
Pt is A&Ox4 this shift. Has very soft/whisper voice. Denies pain, SOB, numb/tinging, N/V, dizziness. NPO, cont TF running with flushes running according to orders. Pt has episodes of restlessness and continuous movement. Aof 1 with walker.     Pt was ok'd by speech to have ice chips.Writer gave pt ice chips while pt was sitting up and using chin tuck technique- he still coughed. He verbalized himself that his cough is from the ice chips. Pt coughed with ice chips on two attempts during shift so writer removed ice chips and told pt for now to not have anymore due to choking potential-sticky note written to physicians for this to be re-evaluated. Pt was not happy but agreed. He kept bringing up wanting to eat food through out shift stating he is very hungry- pt had a lot of agitation regarding this. Writer re- explained multiple times that tonight TF will be in place and pt is still not allowed food due to physicians orders bc of dysphagia. Writer explained this is all part of his safety. Pt never accepted any explanation and continued to complain.    He is Inconsistent with cooperation. Writer was not able to do profile questions since pt was so agitated about inability to eat and he would not give appropriate answers. Writer tried to attempt to do more admit questions later in shift but he was very sleepy- passed on to night nurse to have AM nurse follow up.     Daughter called and asked writer questions, all questions were answered appropriately. She asked writer to emphasize to team to please have pt evaluated by speech ASAP so he can get off TF and eat as soon as possible- sticky note written to physicians and asked if they can update her after speech tomorrow.     Cont of urine in urinal this shift. PVRs under 100. No BM this shift, LBM 12/27 per nursing report. Sticky note written to physicians to put in WOC consult for wounds (see LDAs). EKG done and Hospitalist saw results (see his note) no new interventions  at this time.     Pt in bed in lowest position, bed locked at 20 degrees, call light within reach and sitter at bedside. Continue POC.

## 2020-12-29 NOTE — CONSULTS
"          Initial Psychiatric Consult   Consult date: 2020         Reason for Consult, requesting source:    \"Delirium, agitation, SI.   Requesting source: Marleni Jerome        HPI:   From IM consult:  \"Mika Alvarez is a 70 year old man with a past medical history of systolic heart failure, A-fib, DM type 2, severe multivessel CAD, who presented 20 with acute systolic heart failure exacerbation required diuresis then underwent 4 vessel CABG 20, course complicated by severe dysphagia, possible sieuzre like activity left frontal lobe stroke, sepsis, acute hypoxia with small bi apical pneumothoraces and, altered mental status. Admitted to acute rehab unit 20 for ongoing rehabilitation and medical management. Internal medicine consulted for co-management.\"    Per patient; \"I had a stroke\". Psychiatry was consulted in regards to his AMS, intermittent agitation, and the order said suicidal ideation, although I could find no mention in his chart. However, it is conceivable that he said something about SI; per nursing, he is quite sarcastic, says things without thinking, and is very impulsive. Currently has a sitter due to his impulsivity and concern that he would not follow swallowing precautions. Is not on suicide precautions.   He denies being depressed or suicidal. Is not hopeless, but has chronic sleep difficulties for which he has tried melatonin and OTC meds. Minimal help. It is not clear if he has had sleep problems lately. He is a poor historian and very tangential; I could not get him to focus on one subject at time.     When I asked him about number of children he mentioned a daughter had  an a son had  several months ago by suicide after a divorce. I had a chance to speak with his daughter Joanie along with his wife. They verified the suicide and the sister had  of an MI. They have noticed the ongoing confusion and poor memory that Mika is having.           Past Psychiatric " "History:   Per mother and daughter; no history of psychiatric problems.         Substance Use and History:   No problems with drugs, alcohol or tobacco.         Past Medical History:   PAST MEDICAL HISTORY:   Past Medical History:   Diagnosis Date     Acute on chronic systolic heart failure (H)      Chronic a-fib (H)      Coronary artery disease involving native coronary artery of native heart      Essential hypertension      Type 2 diabetes mellitus with hyperglycemia, without long-term current use of insulin (H)        PAST SURGICAL HISTORY:   Past Surgical History:   Procedure Laterality Date     CORONARY ARTERY BYPASS  12/07/2020             Family History:   FAMILY HISTORY: no history of mental illness, CD issues         Social History:   SOCIAL HISTORY:   Social History     Tobacco Use     Smoking status: Not on file   Substance Use Topics     Alcohol use: Not on file     Was living with his wife in [place]. Working until recently. Had 6 children, 4 surviving.          Physical ROS:   The patient endorsed \"no complaints\". The remainder of 10-point review of systems was negative except as noted in HPI.           Medications:     Current Facility-Administered Medications   Medication     acetaminophen (TYLENOL) solution 650 mg     amiodarone (PACERONE) tablet 200 mg     aspirin (ASA) chewable tablet 81 mg     atorvastatin (LIPITOR) tablet 80 mg     dextrose 10% infusion     glucose gel 15-30 g    Or     dextrose 50 % injection 25-50 mL    Or     glucagon kit 1 mg     famotidine (PEPCID) suspension 20 mg     glipiZIDE (GLUCOTROL) tablet 5 mg     heparin ANTICOAGULANT injection 5,000 Units     insulin aspart (NovoLOG) injection (RAPID ACTING)     insulin glargine (LANTUS PEN) injection 25 Units     levETIRAcetam (KEPPRA) solution 500 mg     melatonin liquid 3 mg     metFORMIN (GLUCOPHAGE) solution 1,000 mg     metoprolol (LOPRESSOR) suspension 25 mg     multivitamins w/minerals (CERTAVITE) liquid 15 mL     " QUEtiapine (SEROquel) half-tab 12.5 mg     QUEtiapine (SEROquel) tablet 25 mg     [START ON 12/30/2020] torsemide (DEMADEX) tablet 40 mg              Allergies:   Not on File       Labs:     Recent Results (from the past 48 hour(s))   Glucose by meter    Collection Time: 12/28/20  2:08 PM   Result Value Ref Range    Glucose 106 (H) 70 - 99 mg/dL   Glucose by meter    Collection Time: 12/28/20  5:41 PM   Result Value Ref Range    Glucose 156 (H) 70 - 99 mg/dL   EKG 12-lead, complete    Collection Time: 12/28/20  5:56 PM   Result Value Ref Range    Interpretation ECG Click View Image link to view waveform and result    Glucose by meter    Collection Time: 12/28/20 10:01 PM   Result Value Ref Range    Glucose 189 (H) 70 - 99 mg/dL   Glucose by meter    Collection Time: 12/29/20  2:13 AM   Result Value Ref Range    Glucose 186 (H) 70 - 99 mg/dL   Glucose by meter    Collection Time: 12/29/20  6:03 AM   Result Value Ref Range    Glucose 170 (H) 70 - 99 mg/dL   Basic metabolic panel    Collection Time: 12/29/20  7:03 AM   Result Value Ref Range    Sodium 139 133 - 144 mmol/L    Potassium 4.4 3.4 - 5.3 mmol/L    Chloride 104 94 - 109 mmol/L    Carbon Dioxide 28 20 - 32 mmol/L    Anion Gap 7 3 - 14 mmol/L    Glucose 146 (H) 70 - 99 mg/dL    Urea Nitrogen 36 (H) 7 - 30 mg/dL    Creatinine 1.08 0.66 - 1.25 mg/dL    GFR Estimate 70 >60 mL/min/[1.73_m2]    GFR Estimate If Black 81 >60 mL/min/[1.73_m2]    Calcium 8.9 8.5 - 10.1 mg/dL   CBC with platelets    Collection Time: 12/29/20  7:03 AM   Result Value Ref Range    WBC 11.9 (H) 4.0 - 11.0 10e9/L    RBC Count 5.26 4.4 - 5.9 10e12/L    Hemoglobin 13.7 13.3 - 17.7 g/dL    Hematocrit 46.4 40.0 - 53.0 %    MCV 88 78 - 100 fl    MCH 26.0 (L) 26.5 - 33.0 pg    MCHC 29.5 (L) 31.5 - 36.5 g/dL    RDW 16.7 (H) 10.0 - 15.0 %    Platelet Count 371 150 - 450 10e9/L   Glucose by meter    Collection Time: 12/29/20  9:58 AM   Result Value Ref Range    Glucose 149 (H) 70 - 99 mg/dL   Glucose  "by meter    Collection Time: 12/29/20  1:25 PM   Result Value Ref Range    Glucose 208 (H) 70 - 99 mg/dL          Physical and Psychiatric Examination:     BP 98/68 (BP Location: Right arm)   Pulse 51   Temp 95.3  F (35.2  C) (Oral)   Resp 16   Ht 1.803 m (5' 11\")   Wt 79.4 kg (175 lb 1.6 oz)   SpO2 99%   BMI 24.42 kg/m    Weight is 175 lbs 1.6 oz  Body mass index is 24.42 kg/m .    Physical Exam:  I have reviewed the physical exam as documented by by the medical team and agree with findings and assessment and have no additional findings to add at this time.    Mental Status Exam:  Lying quietly in the hospital bed, and sits up without difficulty. He is fairly well groomed, pleasant, cooperative. Speech slightly dysarthric. Moves upper extremities without difficulty. Associations loose. Mood is \"OK.\"  Affect congruent. Thought process tangential Thought content negative for suicidal thoughts or delusions. Oriented to person and year, not place, day or date.  Recent and remote memory, attention span and concentration are very impaired. Fund of knowledge, use of language; difficult to assess. Insight and judgment poor.              DSM-5 Diagnosis:   Possible delirium   unspecified neurocognitive disorder           Assessment:   I do not see any evidence of depression, anxiety or suicidal ideation. Per notes he had a left frontal CVA, but couldn't find imagining from OSH, so I'm not sure of the extent of the cortical loss. Impulsivity and dysphagia could certainly be due to this. Hopefully the confusion and memory difficulties are related to some residual symptoms of delirium, it which case he should clear with time.   A change in Seroquel dosing may help; at least short term, it is reasonable to use this.           Summary of Recommendations:   Try changing PRN Seroquel to 12.5 to 25 mg TID, but if agitation occurs at a certain time of day you could change it to scheduled.   Would increase HS Seroquel to 25-50 " "mg   May taper off Seroquel if he clears.   Avoid benzodiazepines and anticholinergics.   Delirium precautions (up during the day with lights on; lights off at night, avoid interruptions during the night as much as possible; family phone calls; encourage wearing glasses; reorientation)  Page me at [phone], or re-consult psychiatry as needed.        \"This dictation was performed with voice recognition software and may contain errors,  omissions and inadvertent word substitution.\"           "

## 2020-12-29 NOTE — PLAN OF CARE
Discharge Planner Post-Acute Rehab OT:     Discharge Plan: TBD pending pt's progress.     Precautions: fall, sternal, NPO    Current Status:  ADLs: Pt had low participation this AM and tnus may have required  more assist than necessary. MAX A for LB dressing, MAX A for toileting, MAX A for UB dressing, pt refused grooming and sponge bath. 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 Pt's daughter is taking care of pt's wife now but this is not the long term plan for them  Vision/Cognition: significant cognitive impairments however needs to be assessed more. Pt is impulsive and currently has a 1:1    Assessment: Eval attempted. Pt needed max encouragement to wake up and participate and only seemed to get up when he needed to use the bathroom. Overall pt followed simple directions  however he refused a lot of ADL and would not help with dressing when asked. Pt needs reminders for sternal precautions. Hard to say what the pt's true abilities are at this time. Pt did c/o dizziness and BP was low 87/62. Nurse is aware. ELOS is pending progress, participation.     Other Barriers to Discharge (DME, Family Training, etc): Therapist called pt's daughter for background information and to discuss level of support available at discharge. Daughter said that the family has not figured that out at this time and is hoping that the pt will recover enough to be IND andbe able to be caregiver for his wife again. Educated her on the probable need for 24/7 supervision and assist with IADL especially caregiver responsibilities.

## 2020-12-29 NOTE — PLAN OF CARE
Alert first 1st 2-3 hours of the shift, then pt was sleeping/ somnolent for remainder of the shift. Orientation hard to assess, pt does not respond to some questions and gave sarcastic answers when he did respond. Admission questions unable to be completed due to pt behaviors. Pt was hypotensive at beginning of shift, see flowsheet. Extra water flushes given and order for IV bolus started. PIV initiated. BP is now improving. BG levels 149 and 208, insulin given per order. TF running with no difficulty, new bag hanged with new tubing this shift. New order for free water protocol this shift and new order for PRN seroquel added. Note left by previous RN to put in wound consult, was not specified for what purpose. Pt has blanchable redness on coccyx and multiple wounds covered by mepilex's that were not assessed. Will pass onto ling shift to assess for need for wound consult.

## 2020-12-30 ENCOUNTER — APPOINTMENT (OUTPATIENT)
Dept: SPEECH THERAPY | Facility: CLINIC | Age: 70
End: 2020-12-30
Payer: COMMERCIAL

## 2020-12-30 ENCOUNTER — APPOINTMENT (OUTPATIENT)
Dept: OCCUPATIONAL THERAPY | Facility: CLINIC | Age: 70
End: 2020-12-30
Payer: COMMERCIAL

## 2020-12-30 ENCOUNTER — APPOINTMENT (OUTPATIENT)
Dept: PHYSICAL THERAPY | Facility: CLINIC | Age: 70
End: 2020-12-30
Payer: COMMERCIAL

## 2020-12-30 LAB
GLUCOSE BLDC GLUCOMTR-MCNC: 106 MG/DL (ref 70–99)
GLUCOSE BLDC GLUCOMTR-MCNC: 133 MG/DL (ref 70–99)
GLUCOSE BLDC GLUCOMTR-MCNC: 135 MG/DL (ref 70–99)
GLUCOSE BLDC GLUCOMTR-MCNC: 144 MG/DL (ref 70–99)
GLUCOSE BLDC GLUCOMTR-MCNC: 62 MG/DL (ref 70–99)
GLUCOSE BLDC GLUCOMTR-MCNC: 85 MG/DL (ref 70–99)
GLUCOSE BLDC GLUCOMTR-MCNC: 96 MG/DL (ref 70–99)
GLUCOSE BLDC GLUCOMTR-MCNC: 99 MG/DL (ref 70–99)

## 2020-12-30 PROCEDURE — 250N000013 HC RX MED GY IP 250 OP 250 PS 637: Performed by: PHYSICIAN ASSISTANT

## 2020-12-30 PROCEDURE — 128N000003 HC R&B REHAB

## 2020-12-30 PROCEDURE — 92526 ORAL FUNCTION THERAPY: CPT | Mod: GN | Performed by: SPEECH-LANGUAGE PATHOLOGIST

## 2020-12-30 PROCEDURE — 99233 SBSQ HOSP IP/OBS HIGH 50: CPT | Performed by: INTERNAL MEDICINE

## 2020-12-30 PROCEDURE — 96372 THER/PROPH/DIAG INJ SC/IM: CPT | Performed by: PHYSICIAN ASSISTANT

## 2020-12-30 PROCEDURE — 999N000125 HC STATISTIC PATIENT MED CONFERENCE < 30 MIN: Performed by: OCCUPATIONAL THERAPIST

## 2020-12-30 PROCEDURE — 999N000150 HC STATISTIC PT MED CONFERENCE < 30 MIN: Performed by: STUDENT IN AN ORGANIZED HEALTH CARE EDUCATION/TRAINING PROGRAM

## 2020-12-30 PROCEDURE — 999N001017 HC STATISTIC GLUCOSE BY METER IP

## 2020-12-30 PROCEDURE — 97535 SELF CARE MNGMENT TRAINING: CPT | Mod: GO | Performed by: OCCUPATIONAL THERAPIST

## 2020-12-30 PROCEDURE — 250N000013 HC RX MED GY IP 250 OP 250 PS 637: Performed by: PHYSICAL MEDICINE & REHABILITATION

## 2020-12-30 PROCEDURE — 250N000011 HC RX IP 250 OP 636: Performed by: PHYSICIAN ASSISTANT

## 2020-12-30 PROCEDURE — 999N000125 HC STATISTIC PATIENT MED CONFERENCE < 30 MIN: Performed by: SPEECH-LANGUAGE PATHOLOGIST

## 2020-12-30 PROCEDURE — 99207 PR CDG-CUT & PASTE-POTENTIAL IMPACT ON LEVEL: CPT | Performed by: INTERNAL MEDICINE

## 2020-12-30 PROCEDURE — 272N000083 HC NUTRITION PRODUCT SEMIELEM INTERMED LITER

## 2020-12-30 PROCEDURE — 99233 SBSQ HOSP IP/OBS HIGH 50: CPT | Performed by: PHYSICAL MEDICINE & REHABILITATION

## 2020-12-30 RX ORDER — TORSEMIDE 20 MG/1
20 TABLET ORAL DAILY
Status: DISCONTINUED | OUTPATIENT
Start: 2020-12-30 | End: 2021-01-05

## 2020-12-30 RX ORDER — QUETIAPINE FUMARATE 25 MG/1
25 TABLET, FILM COATED ORAL AT BEDTIME
Status: DISCONTINUED | OUTPATIENT
Start: 2020-12-30 | End: 2021-01-03 | Stop reason: DRUGHIGH

## 2020-12-30 RX ADMIN — METFORMIN HYDROCHLORIDE 1000 MG: 500 SOLUTION ORAL at 09:40

## 2020-12-30 RX ADMIN — ATORVASTATIN CALCIUM 80 MG: 80 TABLET, FILM COATED ORAL at 20:56

## 2020-12-30 RX ADMIN — INSULIN GLARGINE 25 UNITS: 100 INJECTION, SOLUTION SUBCUTANEOUS at 09:57

## 2020-12-30 RX ADMIN — HEPARIN SODIUM 5000 UNITS: 5000 INJECTION, SOLUTION INTRAVENOUS; SUBCUTANEOUS at 09:59

## 2020-12-30 RX ADMIN — FAMOTIDINE 20 MG: 40 POWDER, FOR SUSPENSION ORAL at 20:57

## 2020-12-30 RX ADMIN — FAMOTIDINE 20 MG: 40 POWDER, FOR SUSPENSION ORAL at 09:39

## 2020-12-30 RX ADMIN — ASPIRIN 81 MG CHEWABLE TABLET 81 MG: 81 TABLET CHEWABLE at 09:36

## 2020-12-30 RX ADMIN — QUETIAPINE 25 MG: 25 TABLET ORAL at 03:34

## 2020-12-30 RX ADMIN — INSULIN GLARGINE 25 UNITS: 100 INJECTION, SOLUTION SUBCUTANEOUS at 20:58

## 2020-12-30 RX ADMIN — ACETAMINOPHEN 650 MG: 325 SOLUTION ORAL at 03:33

## 2020-12-30 RX ADMIN — LEVETIRACETAM 500 MG: 100 SOLUTION ORAL at 09:40

## 2020-12-30 RX ADMIN — GLIPIZIDE 5 MG: 5 TABLET ORAL at 18:34

## 2020-12-30 RX ADMIN — METFORMIN HYDROCHLORIDE 1000 MG: 500 SOLUTION ORAL at 20:57

## 2020-12-30 RX ADMIN — GLIPIZIDE 5 MG: 5 TABLET ORAL at 09:37

## 2020-12-30 RX ADMIN — LEVETIRACETAM 500 MG: 100 SOLUTION ORAL at 20:57

## 2020-12-30 RX ADMIN — AMIODARONE HYDROCHLORIDE 200 MG: 200 TABLET ORAL at 09:37

## 2020-12-30 RX ADMIN — QUETIAPINE 25 MG: 25 TABLET ORAL at 20:56

## 2020-12-30 RX ADMIN — QUETIAPINE 25 MG: 25 TABLET ORAL at 22:16

## 2020-12-30 RX ADMIN — ACETAMINOPHEN 650 MG: 325 SOLUTION ORAL at 22:16

## 2020-12-30 RX ADMIN — HEPARIN SODIUM 5000 UNITS: 5000 INJECTION, SOLUTION INTRAVENOUS; SUBCUTANEOUS at 20:56

## 2020-12-30 RX ADMIN — MULTIVITAMIN 15 ML: LIQUID ORAL at 09:41

## 2020-12-30 RX ADMIN — Medication 3 MG: at 20:58

## 2020-12-30 NOTE — PROGRESS NOTES
[facility]     Medicine Progress Note - Hospitalist Service       Date of Admission:  12/28/2020  Assessment & Plan        Mika Alvarez is a 70 year old man with a past medical history of systolic heart failure, A-fib, DM type 2, severe multivessel CAD, who presented 12/2/20 with acute systolic heart failure exacerbation required diuresis then underwent 4 vessel CABG 12/7/20, course complicated by severe dysphagia, possible sieuzre like activity left frontal lobe stroke, sepsis, acute hypoxia with small bi apical pneumothoraces and, altered mental status. Admitted to acute rehab unit 12/28/20 for ongoing rehabilitation and medical management. Internal medicine consulted for co-management.   Individual problems and their management are outlined below        #Multivessel CAD S/P CABG  #Acute diastolic and systolic heart failure, improving  - Midline incision healing well  -Reduce torsemide to 20 mg daily, Daily weights ( weight as of 12/29 at 175 lbs)   -Monitor BMP Bi weekly   - Resume Metoprolol 25 mg BID   - ASA  - Atorvastatin  - Echo on 12/25:  The left ventricular wall motion is globally hypokinetic. The estimated  left ventricular ejection fraction is 35%. This represents a moderately  decreased ejection fraction.The right ventricle is mildly dilated and its systolic function is  severely reduced. TAPSE is abnormal, which is consistent with abnormal  right ventricular systolic function.  Biatrial volume is moderately increased.     #A-fib with RVR  - Currently rate controlled   - Continue amiodarone ( expected for 14 more days and stop) . Metoprolol as above   -Of note, surgeon preference is to not anticoagulate for a-fib s/p LAAE( direct extract from patient's discharge summary)      #Uncontrolled type II diabetes mellitus, HgbA1c  12.6%  - Continue metformin 1000 mg BID  - Continue glipizide 5 mg BID  - Continue insulin glargine 25 units BID  - SSI( Medium Intesnity)  Q 4 hrs while on tube feeding   - Hypoglycemia protocol  - Diabetic diet  - When patient advances with oral diet, the regimen will have to be re-looked into         Acute hypoxic respiratory failure, resolved  -  Encourage IC  - Supplemental oxygen as needed     Acute metabolic encephalopathy  - Supportive care  - Likely multifactorial ( post operative status , stroke and possibly pre existing status secondary to uncontrolled DM type 2  And HTN)   - We may need to explore more around decision making capacity etc.      Subacute left frontal stroke with petechial hemorrhage  - ASA  - Atorvastatin  - Continue levetiracetam    #Sepsis, Resolved   - Fever, Leukocytosis, tachycardia noted 12/10/2020  - UA was negative. One of two blood cultures grew staph epi, likely contaminant.  - Sputum culture grew beta-hemolytic strep  - Started on emperic pip-tazo IV,  completed 7d on 12/17, fever and WBC resolving.  - WBC down to 11.9 on 12/29     #Severe pharyngeal dysphagia  # Malnutrition, unclassified     - SLP team to follow patient here in ARU   video swallow study on 12/21 showed ongoing significant dysphagia  - NGT in place  - Clinical monitoring     Primary hypertension  - BP controlled  - Continue metoprolol tartrate  - Monitoring BP     # Bilateral Pneumothoraces   X Ray on 12/26 with  Tiny apical pneumothoraces without significant change. No consolidation or focal pneumonia in the lungs. No pulmonary edema. No other significant change.  Continue to monitor clinically      VTE risk reduction. Subcutaneous heparin.                  Diet: NPO for Medical/Clinical Reasons Except for: NPO but receiving Tube Feeding, Ice Chips  Adult Formula Drip Feeding: Continuous Peptamen 1.5; Nasoduodenal tube; Goal Rate: 50; mL/hr; Medication - Feeding Tube Flush Frequency: At least 15-30 mL water before and after medication administration and with tube clogging; Amount to Send (Nut...    DVT Prophylaxis: Heparin SQ  Koenig Catheter: not  present  Code Status: Full Code           Disposition Plan   Expected discharge:   To be determined by the primary team     The patient's care was discussed with the Bedside Nurse and Primary team.    Paulo Soria MD  Hospitalist Service  [facility]   Contact information available via [facility] Paging/Directory    ______________________________________________________________________    Interval History   Events in the last 24 hrs noted. Patient was restless overnight, but calmed down after PRN doses of Seroquel.  This morning, when I went to see patient, he was resting comfortable. Appeared very calm and agreed that he will follow with PT/OT today  No fever or chills  Tolerating tube feeds well     Data reviewed today: I reviewed all medications, new labs and imaging results over the last 24 hours. I personally reviewed no images or EKG's today.    Physical Exam   Vital Signs:     BP: 92/72 Pulse: 76   Resp: 16 SpO2: 100 % O2 Device: None (Room air)    Weight: 175 lbs 1.6 oz  General Appearance: Resting comfortably. Not in distress  Respiratory: Clear breath sounds bilaterally   Cardiovascular: Normal heart sounds. No murmurs   GI: Soft, non tender. Normal bowel sounds   Skin: Multiple areas in the lower extremity with dressings   Other: Awake and alert. Moving all 4 limbs     Data   Recent Labs   Lab 12/29/20  0703   WBC 11.9*   HGB 13.7   MCV 88         POTASSIUM 4.4   CHLORIDE 104   CO2 28   BUN 36*   CR 1.08   ANIONGAP 7   JEET 8.9   *

## 2020-12-30 NOTE — PLAN OF CARE
"Acute Rehab Care Conference/Team Rounds      Type: Team Rounds    Present: Dr. Dmitry Jerome, Ivanna Oneil PA, Sofiya Vanessa RN, Mary Ellen Salazar SW, Yasmin Faye OT, Oumou Watt PT, Cindi Reed SLP, Sarah Evans , Meredith Howe Dietician      Discharge Barriers/Treatment/Education    Rehab Diagnosis: CVA, CAD s/p CABG x4      Active Medical Co-morbidities/Prognosis: Seizures, HTN, dysphagia with tube feeds, CHF, ischemic cardiomyopathy, a. Fib with RVR, encephalopathy, DM II, aspiration PNA, bilateral skin wounds, HUMBERTO    Safety: Requires 1:1     Pain: Denies    Medications, Skin, Tubes/Lines: NG tube    Swallowing/Nutrition:   pt was seen for clincical lswallowing evaluation. Pt had been seen by SLP during hospitalization for swallowing including 4x VFSS. Pt with severe oropharyngeal dysphagia.Per recent VFSS does not appear safe for any po texture.  He was discharged with ok for \"free wter\" ice chips only with supervsion after oral cares.  Pt  noted to have dysphonic voice, weak cough. after set up for oral cares (ok small dot paste, swab/spit to clear residue after)observed pt with single ice chips, no outward sx aspiration but cannot r/o silent aspiration . Did not assess nectar or honey 2/2 recent known aspiration. Pt assessed with 1/4 tsp pureed x2, noted cough after second swallow, with pt asking for ice chip \"to help it go down - likely pharyngeal stasis. Pt continues with severe oropharyngeal dysphagia.  Ok for \"free water\" per SLP guidelines at this time limited ice chips after oral cares with supervision (ie 4-5 per hour, but may liberalize as appropriate per SLP). continue NPO, with repeat VFSS before initiating oral diet.   Bowel/Bladder:    Psychosocial: , lives in a home with wife. Dtr supportive and involved in care. Assessment pending at this time. See  assessment for more details.     ADLs/IADLs: OT eval initiated 12/29. Requiring max encouragement to participate, very " fatigued. Pt requiring MAX A for LB dressing, MAX A for toileting, MAX A for UB dressing, and declining grooming/sponge bath. Pt requiring CGA transfers/short distance mobility using fww. Pt performance limited by fatigue, activity precautions, deconditioning, low BP-symptomatic, cognitive impairement-requiring 1:1 supervision, and  limited participation. Goals to maximize IND and safety with ADLs and functional mobility. Further assessment in all areas needed. Pt is the primary caregiver for his wife who had a stroke 1 yr ago, currently the pt daughter is caring for his wife. Family goals is for pt to return home as the primary caregiver. ELOS pending progressing and participation, currently set for 1/11/20. Discharge disposition guarded, currently recommending home with HC vs TCU.     Mobility: early in rehab stay, thus far limited participation d/t fatigue, cognition, agitation, deconditioning, low BP-symptomatic. Requires MIN A for bed mobility d/t sternal precautions, CGA for transfers and gait 25' with FWW. Demo poor safety with FWW picking it up at times, decreased foot clearance B'ly but no LOB.  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 Family goals is for pt to return home as the primary caregiver. ELOS pending progressing and participation, currently set for 1/11/20. Discharge disposition guarded, currently recommending home with 24/7 support, HC vs TCU.     Cognition/Language: No yet formally assessed- plan to complete cognitive-linguistic eval today     Community Re-Entry: not yet ready    Transportation: would need family training for transfer    Plan of Care and goals reviewed and updated.    Discharge Plan/Recommendations    Fall Precautions: continue    Overall plan for the patient:   Medical team assistance appreciated, in particular for monitoring fluid status.  Has been intermittently  agitated and non-participatory in evaluations and therapies.  Requires 1:1 for safety, impulsiveness, and decreased insight.  If continues to not participate will need to begin looking into alternative levels of care.  Will need 24 hr supervision upon discharge regardless and will need to discuss availability from family.       Utilization Review and Continued Stay Justification    Medical Necessity Criteria:    For any criteria that is not met, please document reason and plan for discharge, transfer, or modification of plan of care to address.    Requires intensive rehabilitation program to treat functional deficits?: Yes    Requires 3x per week or greater involvement of rehabilitation physician to oversee rehabilitation program?: Yes    Requires rehabilitation nursing interventions?: Yes    Patient is making functional progress?: Unable to assess at this time    There is a potential for additional functional progress? Yes    Patient is participating in therapy 3 hours per day a minimum of 5 days per week or 15 hours per week in 7 day period?:No    Has discharge needs that require coordinated discharge planning approach?:Yes      Barriers/Concerns related to meeting medical necessity criteria:  Agitation, decreased insight    Team Plan to Address Concern:  Ongoing encouragement, family involvement      Final Physician Sign off    Statement of Approval: I have reviewed and agree with the recommendations and documentation in this care conference note.       Patient Goals  Social Work Goals: Confirm discharge recommendations with therapy, coordinate safe discharge plan and remain available to support and assist as needed.    OT goal: hygiene/grooming: supervision/stand-by assist  OT goal: upper body dressing: Supervision/stand-by assist  OT goal: lower body dressing: Supervision/stand-by assist  OT goal: upper body bathing: Supervision/stand-by assist  OT goal: lower body bathing: Supervision/stand-by assist  OT Goal:  transfer: Supervision/stand-by assist(walk in shower)  OT goal: toilet transfer/toileting: Supervision/stand-by assist, cleaning and garment management, toilet transfer  OT goal: cognitive: Patient/caregiver will verbalize understanding of cognitive assessment results/recommendations as needed for safe discharge planning  OT goal 1: Pt will be SBA for HEP to improve strength and endurance for ADL and mobility     PT Frequency: Daily  PT goal: bed mobility: Independent, Supine to/from sit, Rolling  PT goal: transfers: Supervision/stand-by assist, Sit to/from stand, Bed to/from chair, Assistive device, Within precautions  PT goal: gait: Supervision/stand-by assist, 150 feet, Assistive device  PT goal: stairs: 8 stairs, Supervision/stand-by assist, Assistive device  PT goal: perform aerobic activity with stable cardiovascular response: continuous activity, 10 minutes, NuStep   PT goal 1: IND with HEP at time of discharge to allow for continued healing  PT Goal 2: SUP with car transfer to allow for discharge home with wife.    SLP Frequency: daily  SLP Swallow Goal:  Safely tolerate diet without signs/symptoms of aspiration: one P.O. texture, with use of compensatory swallow strategies, independently, with use of swallow precautions                                     Goal: Skin Integrity: pt will remain free of coccyx pressure ulcer through out ARU stay                    Goal: Safety Management: pt will remain free of falls through out ARU stay              Individualized Goal 1: Pt will be able taken off NPO by 01/05/2020

## 2020-12-30 NOTE — PROGRESS NOTES
12/29/20 1700   General Information   Onset of Illness/Injury or Date of Surgery 12/02/20   Referring Physician Dr. Jerome   Behavioral Issues other (see comments)  (some agitation reported 1:1 sitter present)   Pertinent History of Current Problem Mika Alvarez is a 70 year old man with past medical history of heart failure, A-fib, HTN, HLD,  DM type 2, and known multi vessel CAD who presented with acute decompensated heart failure 12/2/20. He was diuresed and underwent four vessel CABG 12/7/20.  Post operative course was complicated by A-fib with RVR, severe dysphagia, altered mental status, possible simple partial seizure (leg jerking post CABG),  Left frontal stroke, urinary retention, and leucocytosis and fever of unclear etiology.    General Observations SLP: pt had been seen by SLP during hospitalization for swallowing including 4 VFSS. recomending ice chips/free water after oral cares   Past History of Dysphagia   (none known)   Type of Evaluation   Type of Evaluation Swallow Evaluation   Oral Motor   Oral Musculature generally intact   Dentition (Oral Motor)   Dentition (Oral Motor) natural dentition   Vocal Quality/Secretion Management (Oral Motor)   Vocal Quality (Oral Motor) aphonia;dysphonic;hoarse   Comment, Vocal Quality/Secretion Management (Oral Motor) SLP s/p intubation   General Swallowing Observations   Current Diet/Method of Nutritional Intake (General Swallowing Observations, NIS) NPO  (ice chips with supervision)   Respiratory Support (General Swallowing Observations) none   Comment, Secretions/Suctioning   (spits out secretions occasionally)   Swallowing Evaluation Clinical swallow evaluation   Clinical Swallow Evaluation   Clinical Swallow Evaluation Textures Trialed Thin Liquids;Puree Textures   Clinical Swallow Eval: Thin Liquid Texture Trial   Mode of Presentation, Thin Liquids spoon;other (see comments)  (ice chips)   Volume of Liquid or Food Presented   (4 ice chips)   Oral Phase of  "Swallow WFL   Pharyngeal Phase of Swallow other (see comments)  (no outward sx)   Diagnostic Statement SLP pt only given ice chips 2/2 known aspiration risk/histor recent VFSS and clinical reports sx. nursing reporting coughing prior day with ice chips   Clinical Swallow Evaluation: Puree Solid Texture Trial   Mode of Presentation, Puree spoon   Volume of Puree Presented   (1/4 tsp x2)   Oral Phase, Puree WFL   Diagnostic Statement SLP: recent VFSS describe oropharyngeal deficits with aspiration risk, (pt demonstrated cough following limited trials, and also asked for ice chip  \"to help get it down\") did not assess honey or nectar 2/2 ongoing aspiration results on VFSS    Esophageal Phase of Swallow   Patient reports or presents with symptoms of esophageal dysphagia No   Swallowing Recommendations   Diet Consistency Recommendations NPO;ice chips only   Supervision Level for Intake 1:1 supervision needed   Mode of Delivery Recommendations bolus size, small  (1-2 ice chips per swallow)   Instrumental Assessment Recommendations VFSS (videofluroscopic swallowing study)  (when appropriate, pt had past exams 12/2-12/21/2020)   Comment, Swallowing Recommendations pt brushed teeth with set up, small dot paste, wettened brush, spit/swabbed to clear residue after   General Therapy Interventions   Planned Therapy Interventions Dysphagia Treatment   Dysphagia treatment Oropharyngeal exercise training;Modified diet education;Instruction of safe swallow strategies;Compensatory strategies for swallowing   SLP Therapy Assessment/Plan   Criteria for Skilled Therapeutic Interventions Met (SLP Eval) yes   SLP Diagnosis sevre oropharyngeal dysphagia   Rehab Potential (SLP Eval) good, to achieve stated therapy goals   Therapy Frequency (SLP Eval) daily   Predicted Duration of Therapy Intervention (SLP Eval) estimated 2-3 weeks   Comment, Therapy Assessment/Plan (SLP) SLP: pt was seen for clincical lswallowing evaluation. Pt had been " "seen by SLP during hospitalization for swallowing including 4x VFSS. Pt with sever oropharyngeal dysphagia.Per rect VFSS does not appear safe for any po texture.  He was [id number] with ok for \"free wter\" ice chips only with supervsion after oral cares.  Pt  noted to have dysphonic voice, weak cough. aftrer set up for oral cares (ok small dot paste, swa/spit to clear residue after)oberved pt with single ice chips, no outward sx aspiration but cannot r/o silent aspiration did not assesds nextar or honey 2/2 recent known aspiration. Pt assessed with 1/4 tsp pureed x2, noted cough after second swallow, with pt asking for ice chip \"to help it go down - likely pharyngeal stasis. Pt continues with severe oropharyngeal dysphagia.  Ok for \"free wter\" per SLP guidelines at this time limited ice chips after oral cares with supervision (ie 4-5 per hour, but may liberalize as appropriate per SLP). continue NPO, with repeat VFSS before initiating oral diet.     Total Evaluation Time   Total Evaluation Time (Minutes) 30     "

## 2020-12-30 NOTE — PLAN OF CARE
"FOCUS/GOAL  Medical management and Safety management    ASSESSMENT, INTERVENTIONS AND CONTINUING PLAN FOR GOAL:  Patient was up almost the entire night mostly lying in bed and calm. Around 03:00 his TF alarm went off. Writer went to the room and the aide reported patient pulling on the NG tube.  He was lying on it. The issue was fixed. Alerted by the aide call writer found the patient agitated, trying to get of the bed. He stated that he was cold. Warm blanket given, room temperature adjusted. Writer went to  get PRN med in pyxis, found patient at his door fighting with the laptop used by the aide, pushing against it trying to get in the wall completely ignoring the ng tube attached to the pump and the the feeding.  TF disconnected to allowing him to have free movement. Patient started walking in the hallway yelling \"help\" , writer, the aide and the patient walked to the nursing station, Patient was agitated, opened bathroom door.  Male co-worker called to help by the charge Nurse. Patient seemed  Cooperative. He was re conducted in his room with the coworker help.   He asked writer to leave the room as he was trusting the male coworker.  PRN Seroquel and Tylenol given to coworker for administration,  Patient was transferred to coworker has the patient was in trust and did not want to see writer. He had one other agitation moment after that and then calm down for the rest of the shift.  BG were fine no correction needed. Will continue with POC.    "

## 2020-12-30 NOTE — PROGRESS NOTES
Attempted to see pt for scheduled am PT appt, however pt to fatigued for meaningful participation. Was able to help pt reposition in bed such that appears more comfortable and could adjust HOB to 30* d/t TF.  Pt was also able to tell writer that he likes classic rock & roll. Updated white board with info for online music streaming and music preferences to help stimulate pt during day. Missed 60 minutes

## 2020-12-30 NOTE — PLAN OF CARE
"Discharge Planner Post-Acute Rehab SLP:     Discharge Plan:home with assist    Precautions: fall, swallowing    Current Status:  Communication: dysphonic voice, communication to be evaluated  Cognition: to be asseessed  Swallow: *severe oropharyngeal dysphagia      Assessment: SLP: pt was seen for clincical lswallowing evaluation. Pt had been seen by SLP during hospitalization for swallowing including 4x VFSS. Pt with severe oropharyngeal dysphagia.Per recent VFSS does not appear safe for any po texture.  He was discharged with ok for \"free wter\" ice chips only with supervsion after oral cares.  Pt  noted to have dysphonic voice, weak cough. after set up for oral cares (ok small dot paste, swab/spit to clear residue after)observed pt with single ice chips, no outward sx aspiration but cannot r/o silent aspiration . Did not assess nectar or honey 2/2 recent known aspiration. Pt assessed with 1/4 tsp pureed x2, noted cough after second swallow, with pt asking for ice chip \"to help it go down - likely pharyngeal stasis. Pt continues with severe oropharyngeal dysphagia.  Ok for \"free water\" per SLP guidelines at this time limited ice chips after oral cares with supervision (ie 4-5 per hour, but may liberalize as appropriate per SLP). continue NPO, with repeat VFSS before initiating oral diet.         Other Barriers to Discharge (Family Training, etc): to be determined  "

## 2020-12-30 NOTE — PLAN OF CARE
OT: Pt. Declined OT session reporting fatigue.  Pt. Initially sat up to get ice but returned to supine and would not open eyes shaking head no when asked if he would participate.

## 2020-12-30 NOTE — PLAN OF CARE
"Discharge Planner Post-Acute Rehab SLP:      Discharge Plan:home with assist     Precautions: fall, swallowing     Current Status:  Communication: dysphonic voice, communication to be evaluated  Cognition: to be assessed but pt currently is refusing cognitive -linguistic assessment  Swallow: *severe oropharyngeal dysphagia        Assessment: Pt seen for swallow tx initially- pt not wanting to do swallow exs but did complete oral cares with oral swab. Pt taking ice chips - but no t following cues to slow rate and take 1 ice chip at a time-- took several in a row- compliance is limited even with supervision. Pt had some coughing 1x with ice chips- no outward s/s of aspiration with other ice chips. Pt stating he wants to eat - is very angry-- explained again the risks of aspiration as well as results of 4 prior VFSS's . Pt remains at high risk of aspiration- continues with severe oral pharyngeal dysphagia(ok small dot paste, swab/spit to clear residue after) Ok for \"free water\" per SLP guidelines at this time limited ice chips after oral cares with supervision (ie 4-5 per hour, but may liberalize as appropriate per SLP). continue NPO, with repeat VFSS before initiating oral diet.     Attempted to initiate cognitive- linguistic eval- pt refusing to participate and started getting up to get back into bed- had to be cued to wait to get the FWW and gait belt on. Once in bed-pt still refusing to participate in cog-ing eval- will reattempt tomorrow-- missed 25 mins due to this.            Other Barriers to Discharge (Family Training, etc): to be determined  "

## 2020-12-30 NOTE — PLAN OF CARE
Individualized Overall Plan Of Care (IOPOC)      Rehab diagnosis/Impairment Group Code: 0.9  cardiac: s/p [id number] and subacute l frontal embolic stroke 2/2 afib  S/p cabg x 4     Expected functional outcome: 24 hr supervision for ambulation, mobility, and ADLs    Clinical Impression Comments: PT: Pt is a 69 y/o male who presents following hospitalization for CABG procedure with subsequent CVA complications. PT evaluation revealed pt to be a poor historian with difficulty in subjective reporting, poor endurance, and strength from documented baseline values. Pt will benefit from skilled PT in order to restore functional mobility, aide in safe discharge, and reduce risk of hospital readmission.     Mobility:  Transfer: CGA with FWW  Bed Mobility: Min A for trunk support  Gait: 25 ft in room with FWW, CGA  Stairs: Not tested at eval  Balance: Static standing with FWW, 1 min no sway       ADL:    ADLs: Pt had low participation this AM and tnus may have required  more assist than necessary. MAX A for LB dressing, MAX A for toileting, MAX A for UB dressing, pt refused grooming and sponge bath. 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 Pt's daughter is taking care of pt's wife now but this is not the long term plan for them  Vision/Cognition: significant cognitive impairments however needs to be assessed more. Pt is impulsive and currently has a 1:1    Communication/Cognition/Swallow:      Communication: dysphonic voice, communication to be evaluated  Cognition: to be assessed but pt currently is refusing cognitive -linguistic assessment  Swallow: *severe oropharyngeal dysphagia    Intensity of therapy:   PT 60 minutes 7 days per week 14 days  OT 60 minutes 7 days per week 14 days  SLP 60 minutes 7 days per week 14 days    Orthotics None  Education stroke, diabetes and tube feeding  Neuropsychology Testing: TBD  Other:  None      Medical  Prognosis: Fair    Physician summary statement: Pt thus far resistant to participation and intermittently agitated.  He has been impulsive and is on a 1:1 for safety.  Will need improved participation otherwise being looking into different level of care.  Even if begins participating, he will require 24 hr supervision and at this time unclear if that will be available.       Discharge destination: TBD  Discharge rehabilitation needs: PT, OT and SLP      Estimated length of stay: 2 weeks      Rehabilitation Physician Dmitry Jerome MD

## 2020-12-30 NOTE — PROGRESS NOTES
"  [facility]   Acute Rehabilitation Unit  Daily progress note    INTERVAL HISTORY  Overnight events reviewed.  Continues to be agitated intermittently and required PRN dose of Seroquel.  This morning he continues to be non-participatory with therapy or evaluation.  He describes his hate for being at the ARU and wants to go home.  I tried to provide encouragement and that the more he improves the faster he can get home, but he states \"no\".  Denies pain and is angry that \"everyone keeps asking me if I have pain\".  Functionally therapy evaluations/sessions have been limited by lack of participation or fatigue.  He impulsively sat up to the edge of the bed during my visit, nearly pulling at his NG tube, and then laid back down onto the tube.        MEDICATIONS  Scheduled:    amiodarone  200 mg Per Feeding Tube Daily     aspirin  81 mg Per Feeding Tube Daily     atorvastatin  80 mg Per Feeding Tube QPM     famotidine  20 mg Per Feeding Tube BID     glipiZIDE  5 mg Per Feeding Tube BID AC     heparin ANTICOAGULANT  5,000 Units Subcutaneous Q12H     insulin aspart  1-6 Units Subcutaneous Q4H     insulin glargine  25 Units Subcutaneous BID     levETIRAcetam  500 mg Per Feeding Tube BID     melatonin  3 mg Per Feeding Tube At Bedtime     metFORMIN  1,000 mg Per Feeding Tube 2 times daily     metoprolol  25 mg Per Feeding Tube BID     multivitamins w/minerals  15 mL Per NG tube Daily     QUEtiapine  25 mg Per Feeding Tube At Bedtime     torsemide  20 mg Per Feeding Tube Daily        PRN:  acetaminophen, dextrose, glucose **OR** dextrose **OR** glucagon, QUEtiapine       PHYSICAL EXAM  Patient Vitals for the past 24 hrs:   BP Temp Temp src Pulse Resp SpO2   12/30/20 1545 93/67 95.6  F (35.3  C) Oral 60 18 97 %   12/30/20 0736 92/72 -- -- 76 16 100 %   12/29/20 2200 96/69 -- -- -- -- --       GEN: NAD, irritable, restless  HEENT: NG tube in place  CVS: RRR, S1+S2, no m/r/g  PULM: CTA b/l, no " w/r/r  ABD: Soft, NT, ND, bowel sounds present  EXT: No LE edema or calf tenderness b/l  Neuro: Answers appropriately, follows commands.  +Dysphonic.    LABS  CBC RESULTS:   Recent Labs   Lab Test 12/29/20  0703   WBC 11.9*   RBC 5.26   HGB 13.7   HCT 46.4   MCV 88   MCH 26.0*   MCHC 29.5*   RDW 16.7*          Last Comprehensive Metabolic Panel:  Sodium   Date Value Ref Range Status   12/29/2020 139 133 - 144 mmol/L Final     Potassium   Date Value Ref Range Status   12/29/2020 4.4 3.4 - 5.3 mmol/L Final     Comment:     Specimen slightly hemolyzed, potassium may be falsely elevated     Chloride   Date Value Ref Range Status   12/29/2020 104 94 - 109 mmol/L Final     Carbon Dioxide   Date Value Ref Range Status   12/29/2020 28 20 - 32 mmol/L Final     Anion Gap   Date Value Ref Range Status   12/29/2020 7 3 - 14 mmol/L Final     Glucose   Date Value Ref Range Status   12/29/2020 146 (H) 70 - 99 mg/dL Final     Urea Nitrogen   Date Value Ref Range Status   12/29/2020 36 (H) 7 - 30 mg/dL Final     Creatinine   Date Value Ref Range Status   12/29/2020 1.08 0.66 - 1.25 mg/dL Final     GFR Estimate   Date Value Ref Range Status   12/29/2020 70 >60 mL/min/[1.73_m2] Final     Comment:     Non  GFR Calc  Starting 12/18/2018, serum creatinine based estimated GFR (eGFR) will be   calculated using the Chronic Kidney Disease Epidemiology Collaboration   (CKD-EPI) equation.       Calcium   Date Value Ref Range Status   12/29/2020 8.9 8.5 - 10.1 mg/dL Final       Recent Labs   Lab 12/30/20  1325 12/30/20  1322 12/30/20  1004 12/30/20  0658 12/30/20  0353 12/30/20  0134 12/29/20  0703 12/29/20  0703   GLC  --   --   --   --   --   --   --  146*   BGM 85 62* 106* 133* 144* 135*   < >  --     < > = values in this interval not displayed.         IMPRESSION/PLAN:  Mika Alvarez is a 70 year old man with a past medical history of systolic heart failure, A-fib, DM type 2, severe multivessel CAD, who presented 12/2/20  with acute systolic heart failure exacerbation required diuresis then underwent 4 vessel CABG 12/7/20, course complicated by severe dysphagia, possible sieuzre like activity left frontal lobe stroke, sepsis, acute hypoxia with small bi apical pneumothoraces and, altered mental status. Admitted to acute rehab unit 12/28/20 for ongoing rehabilitation and medical management.         Admission to acute inpatient rehab s/p cabg x 4.    Impairment group code: 0.9        1. PT, OT and SLP 60 minutes of each on a daily basis, in addition to rehab nursing and close management of physiatrist.       2. Impairment of ADL's: Noted to have impaired strength, coordination, activity tolerance, and cognition leading to impaired ability to complete self cares, continue OT with goal for MOD I to I with basic adls.      3. Impairment of mobility:  Noted to have impaired strength, activity tolerance, and cognition leading to impaired mobility will benefit from ongoing PT with goal for MOD I to I with basic mobility and stairs.      4. Impairment of cognition/language/swallow:  Noted to have severe dysphagia and impaired cognition will benefit from ongoing SLP with goal for least restrictive diet tolerated and improved ability to communicate needs.      5. Medical Conditions  CAD  S/P CABG x 4 (12/7)  Acute on chronic systolic heart failure- 2/2 ischemic cardiomyopathy  HTN  A-fib with RVR-   Most recent Echo 12/25/20 with left ventricular wall globally hypokinetic, EF estimated 35%. Right ventricle mildly dilated with systolic function severely reduced.  Had A-fib with RVR early in hospital stay on amiodarone taper. surgeon preference is to not anticoagulate for a-fib   -continue amiodarone 200 mg daily x 2 weeks then stop  -continue metoprolol 25 mg bid,   -continue asa, statin  -sternal precautions (6 weeks from 12/7)- limit 10 lbs then as tolerated   - continue toresemide, decreased to 20 mg daily- appreciate hospitalist  consult  -monitor bmp, weights, intake and output, edema/sob  -f/u cardiology as scheduled     Subacute Left frontal stroke with petechial hemorrhage  Possible simple partial seizure  Acute metabolic encephalopathy  Neurology consulted for left leg shaking POD 1, EEG neg, head CT obtained in setting of ongoing leg shaking and ams- demonstrated stroke 12/12 (most recent imaging stable)  1.  Petechial hemorrhage in the left frontal opercular infarct correlating with the blood products noted on MRI,  Chronic left cerebellar infarct.  -follow up neurology within one month- Dr. Bailey/ [facility]- follow up possible seizure-   -continue keppra until neurology follow up  -bedside attendant dcd 12/17 though on admission 12/28 attempting to get out of bed- bedside attendant for safety  -continue seroquel at bedtime.   -Psychiatry assistance appreciated.  Increased Seroquel to 25 mg at bedtime and 25 mg TID PRN for agitation.  He had expressed suicidal ideations to therapy staff, however he denied any to psych team.    DM type 2- Hgb A1C 12.6%.   -continue metformin 1000 mg bid, glipizide 5 mg bid, lantus 25 mg bid, and ssi   -continue to monitor glucose closely  -will need to titrate pending diet advancement/ tf cessation     oral pharyngeal dysphagia  Impaired oral intake -   slp monitoring currently NPO with nd in place 12/21 video swallow with ongoing significant dysphagia s/p ND placed 12/15.  -appreciate ongoing slp  -appreciate nutrition support  -continue tf     Possible aspiration pneumonia- fever, known dysphagia, leucocytosis,  hypoxia, other workup unremarkable received zosyn course seen by id.  Ongoing leucoctyosis WBC 13 12/28.    -appreciate ongoing SLP support  -trend WBC     Bilateral shin wounds- mepilex changes as ordered keep clean and dry.      Urinary retention- gardner recently removed, reportedly voiding without issue  -PVRs x2 were 16 and 23 ml.  May check PRN only.   -was started on flomax at prior  hospital - had flomax suspension- as npo and only pill form will hold and monitor as above.        Creatinine elevation - Cr 1.08 BUN 36 12/29, recent Cr 1.51 12/26 BUN 38  -monitor bmp  -Torsemide dose decreased to 20 mg daily     6. Adjustment to disability: plan for psychology consult when more consistently alert  7. FEN: NPO with tube feeds  8. Bowel: monitor  9. Bladder: PVRs WNL  10. DVT Prophylaxis: subcutaneous heparin  11. GI Prophylaxis: ppi  12. Code: full- prior and patient confirmed on admission  13. Disposition: goal for home  14. ELOS:  2+ weeks.  15. Rehab prognosis:  Fair- medically complex with significant impairments in cognition and swallow and milder strength/activity tolerance deficits goal for home pending supervision needs and family support  16. Follow up Appointments on Discharge: cardiology, cv surgery, pcp.          Yonathan Jerome MD  Department of Rehabilitation Medicine      Time Spent on this Encounter   I, Yonathan Jerome, spent a total of 35 minutes face-to-face or managing the care of Mika Alvarez. Over 50% of my time on the unit was spent counseling the patient and coordinating care. See note for details.

## 2020-12-30 NOTE — PLAN OF CARE
Patient is alert but confused. No SOB/PENA noted. Denied pain and no signs seen. NPO had ice chips and NG tube on continuous at 50ml/hr. BG was low and no insulin covered. Pt was trying to take off the tube and needed more redirection and reorientation.  Daughter called about pt's swallow test and left a sticky note for the provider. Bed side attendant present and will continue with POC.

## 2020-12-30 NOTE — PLAN OF CARE
FOCUS/GOAL   Skin integrity, and Safety management     ASSESSMENT, INTERVENTIONS AND CONTINUING PLAN FOR GOAL:  A/O. VSS. No IV fluids. ND tube- TF 50 ml/hr with free water flush q 4. Aspiration precautions. Seizure precations. NPO. Ok ice chips. Cuing to take slowly and use chin tuck. Infrequent cough. Continent of bladder/bowel, last BM 12/29. Uses urinal. Ax1, gait belt and walker. No c/o n/v/d, cp. Mild pain tonight. PRN tylenol administered. Dressings CDI. NPO. Takes meds through ND tube. Metoprolol held- did not met order parameters. Continue w/ POC and 1:1 attendant for pt safety.

## 2020-12-30 NOTE — PLAN OF CARE
1:1 sitter at bedside for safety. Pleasant and talkative with me this evening. Tolerating tube feeding per NG at 50 ml/hr. Taking ice chips sparingly to moisten mouth. Sitting upright when taking ice chips. Multiple skin wounds/incisions. Most covered with Mepilex (see flowsheet).

## 2020-12-31 ENCOUNTER — APPOINTMENT (OUTPATIENT)
Dept: GENERAL RADIOLOGY | Facility: CLINIC | Age: 70
DRG: 056 | End: 2020-12-31
Attending: INTERNAL MEDICINE
Payer: MEDICARE

## 2020-12-31 LAB
ANION GAP SERPL CALCULATED.3IONS-SCNC: 6 MMOL/L (ref 3–14)
BUN SERPL-MCNC: 36 MG/DL (ref 7–30)
CALCIUM SERPL-MCNC: 9 MG/DL (ref 8.5–10.1)
CHLORIDE SERPL-SCNC: 106 MMOL/L (ref 94–109)
CO2 SERPL-SCNC: 26 MMOL/L (ref 20–32)
CREAT SERPL-MCNC: 1.05 MG/DL (ref 0.66–1.25)
ERYTHROCYTE [DISTWIDTH] IN BLOOD BY AUTOMATED COUNT: 16.6 % (ref 10–15)
GFR SERPL CREATININE-BSD FRML MDRD: 72 ML/MIN/{1.73_M2}
GLUCOSE BLDC GLUCOMTR-MCNC: 110 MG/DL (ref 70–99)
GLUCOSE BLDC GLUCOMTR-MCNC: 113 MG/DL (ref 70–99)
GLUCOSE BLDC GLUCOMTR-MCNC: 72 MG/DL (ref 70–99)
GLUCOSE BLDC GLUCOMTR-MCNC: 73 MG/DL (ref 70–99)
GLUCOSE BLDC GLUCOMTR-MCNC: 76 MG/DL (ref 70–99)
GLUCOSE BLDC GLUCOMTR-MCNC: 83 MG/DL (ref 70–99)
GLUCOSE SERPL-MCNC: 72 MG/DL (ref 70–99)
HCT VFR BLD AUTO: 46.9 % (ref 40–53)
HGB BLD-MCNC: 13.5 G/DL (ref 13.3–17.7)
MCH RBC QN AUTO: 26.7 PG (ref 26.5–33)
MCHC RBC AUTO-ENTMCNC: 28.8 G/DL (ref 31.5–36.5)
MCV RBC AUTO: 93 FL (ref 78–100)
NT-PROBNP SERPL-MCNC: 1732 PG/ML (ref 0–900)
PLATELET # BLD AUTO: 316 10E9/L (ref 150–450)
POTASSIUM SERPL-SCNC: 4.5 MMOL/L (ref 3.4–5.3)
RBC # BLD AUTO: 5.05 10E12/L (ref 4.4–5.9)
SODIUM SERPL-SCNC: 138 MMOL/L (ref 133–144)
WBC # BLD AUTO: 11.5 10E9/L (ref 4–11)

## 2020-12-31 PROCEDURE — 99207 PR CDG-MDM COMPONENT: MEETS HIGH - UP CODED: CPT | Performed by: INTERNAL MEDICINE

## 2020-12-31 PROCEDURE — 97535 SELF CARE MNGMENT TRAINING: CPT | Mod: GO | Performed by: OCCUPATIONAL THERAPIST

## 2020-12-31 PROCEDURE — 83880 ASSAY OF NATRIURETIC PEPTIDE: CPT | Performed by: PHYSICIAN ASSISTANT

## 2020-12-31 PROCEDURE — 128N000003 HC R&B REHAB

## 2020-12-31 PROCEDURE — 80048 BASIC METABOLIC PNL TOTAL CA: CPT | Performed by: PHYSICIAN ASSISTANT

## 2020-12-31 PROCEDURE — 71046 X-RAY EXAM CHEST 2 VIEWS: CPT

## 2020-12-31 PROCEDURE — 97530 THERAPEUTIC ACTIVITIES: CPT | Mod: GP | Performed by: STUDENT IN AN ORGANIZED HEALTH CARE EDUCATION/TRAINING PROGRAM

## 2020-12-31 PROCEDURE — 272N000083 HC NUTRITION PRODUCT SEMIELEM INTERMED LITER

## 2020-12-31 PROCEDURE — 99233 SBSQ HOSP IP/OBS HIGH 50: CPT | Performed by: INTERNAL MEDICINE

## 2020-12-31 PROCEDURE — 36415 COLL VENOUS BLD VENIPUNCTURE: CPT | Performed by: PHYSICIAN ASSISTANT

## 2020-12-31 PROCEDURE — 250N000013 HC RX MED GY IP 250 OP 250 PS 637: Performed by: PHYSICIAN ASSISTANT

## 2020-12-31 PROCEDURE — 999N001017 HC STATISTIC GLUCOSE BY METER IP

## 2020-12-31 PROCEDURE — 97110 THERAPEUTIC EXERCISES: CPT | Mod: GO | Performed by: OCCUPATIONAL THERAPIST

## 2020-12-31 PROCEDURE — 99233 SBSQ HOSP IP/OBS HIGH 50: CPT | Performed by: PHYSICAL MEDICINE & REHABILITATION

## 2020-12-31 PROCEDURE — 250N000013 HC RX MED GY IP 250 OP 250 PS 637: Performed by: INTERNAL MEDICINE

## 2020-12-31 PROCEDURE — 96372 THER/PROPH/DIAG INJ SC/IM: CPT | Performed by: PHYSICIAN ASSISTANT

## 2020-12-31 PROCEDURE — 250N000013 HC RX MED GY IP 250 OP 250 PS 637: Performed by: PHYSICAL MEDICINE & REHABILITATION

## 2020-12-31 PROCEDURE — 85027 COMPLETE CBC AUTOMATED: CPT | Performed by: PHYSICIAN ASSISTANT

## 2020-12-31 PROCEDURE — 250N000011 HC RX IP 250 OP 636: Performed by: PHYSICIAN ASSISTANT

## 2020-12-31 PROCEDURE — 250N000013 HC RX MED GY IP 250 OP 250 PS 637: Performed by: STUDENT IN AN ORGANIZED HEALTH CARE EDUCATION/TRAINING PROGRAM

## 2020-12-31 PROCEDURE — 92526 ORAL FUNCTION THERAPY: CPT | Mod: GN

## 2020-12-31 PROCEDURE — 71046 X-RAY EXAM CHEST 2 VIEWS: CPT | Mod: 26 | Performed by: RADIOLOGY

## 2020-12-31 RX ADMIN — LEVETIRACETAM 500 MG: 100 SOLUTION ORAL at 09:21

## 2020-12-31 RX ADMIN — MULTIVITAMIN 15 ML: LIQUID ORAL at 09:20

## 2020-12-31 RX ADMIN — QUETIAPINE 25 MG: 25 TABLET ORAL at 14:17

## 2020-12-31 RX ADMIN — METFORMIN HYDROCHLORIDE 1000 MG: 500 SOLUTION ORAL at 21:53

## 2020-12-31 RX ADMIN — ASPIRIN 81 MG CHEWABLE TABLET 81 MG: 81 TABLET CHEWABLE at 09:22

## 2020-12-31 RX ADMIN — LEVETIRACETAM 500 MG: 100 SOLUTION ORAL at 19:47

## 2020-12-31 RX ADMIN — Medication 1 ML: at 13:43

## 2020-12-31 RX ADMIN — FAMOTIDINE 20 MG: 40 POWDER, FOR SUSPENSION ORAL at 09:20

## 2020-12-31 RX ADMIN — FAMOTIDINE 20 MG: 40 POWDER, FOR SUSPENSION ORAL at 19:48

## 2020-12-31 RX ADMIN — Medication 3 MG: at 21:54

## 2020-12-31 RX ADMIN — QUETIAPINE 25 MG: 25 TABLET ORAL at 19:46

## 2020-12-31 RX ADMIN — TORSEMIDE 20 MG: 20 TABLET ORAL at 09:21

## 2020-12-31 RX ADMIN — AMIODARONE HYDROCHLORIDE 200 MG: 200 TABLET ORAL at 09:22

## 2020-12-31 RX ADMIN — ATORVASTATIN CALCIUM 80 MG: 80 TABLET, FILM COATED ORAL at 21:53

## 2020-12-31 RX ADMIN — METFORMIN HYDROCHLORIDE 1000 MG: 500 SOLUTION ORAL at 09:21

## 2020-12-31 RX ADMIN — HEPARIN SODIUM 5000 UNITS: 5000 INJECTION, SOLUTION INTRAVENOUS; SUBCUTANEOUS at 09:23

## 2020-12-31 ASSESSMENT — MIFFLIN-ST. JEOR
SCORE: 1593.18
SCORE: 1595

## 2020-12-31 NOTE — PLAN OF CARE
Discharge Planner Post-Acute Rehab OT:      Discharge Plan: TBD pending pt's progress.      Precautions: fall, sternal, NPO     Current Status:  ADLs:  CGA-min for LB dressing after setup using figure four tech. , MAX A for toileting, Min for UB dressing, SBA after setup with washing face,declined full sponge bath. 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 Pt's daughter is taking care of pt's wife now but this is not the long term plan for them  Vision/Cognition: significant cognitive impairments however needs to be assessed more. Pt is impulsive and currently has a 1:1     Assessment:  Pt. CGA with amb. Throughout room/bathroom with iv pole. CGA with toilet transf. myranda with clothing manag. During task.  SBA /Emiliano with bed mob. With HOB elevated.  Cues to use log roll tech./maintain post op prec. /67 HR 70 sitting EOB, 117/78, HR 84 after amb./toileting.     Other Barriers to Discharge (DME, Family Training, etc): Therapist called pt's daughter for background information and to discuss level of support available at discharge. Daughter said that the family has not figured that out at this time and is hoping that the pt will recover enough to be IND andbe able to be caregiver for his wife again. Educated her on the probable need for 24/7 supervision and assist with IADL especially caregiver responsibilities.

## 2020-12-31 NOTE — PLAN OF CARE
Discharge Planner Post-Acute Rehab SLP:      Discharge Plan:home with assist     Precautions: fall, swallowing     Current Status:  Communication: dysphonic voice, communication to be evaluated  Cognition: to be assessed but pt currently is refusing cognitive -linguistic assessment  Swallow: *severe oropharyngeal dysphagia      Assessment: Patient showing coughing with ice chips. When using chin tuck, no further coughing noted. Extended conversation held with patient and family re: swallowing function, cause of the dysphagia, trajectory of recovery, etc. Planning for repeat VFSS to occur some time next week as schedule allows. Did start to note some intermittent voicing that was occurring though generally dysphonic.      Other Barriers to Discharge (Family Training, etc): to be determined

## 2020-12-31 NOTE — PROGRESS NOTES
Pt alert, with intermittent lethargy. Opens eyes to voice and touch. Oriented x2 to person and time, could tell me month but misspoke about year. He knew it was December, had slow responses to orientation questions. Disoriented to place and situation. Ambulates A1 and walker. Pt having intermittent SOB while resting, mostly complains of dry and sore throat. Ordered chloraseptic spray today, speech therapy suggested that he may have it after brushing teeth. Pt mood pleasant in Am and increasingly getting agitated around noon-2pm. Wanted to get spray sooner as source of agitation, physician had been notified of his sore throat, request for spray, and SOB. Hypotension this AM 84/47, went up to 97/47, BP 83/63 at 1300. All other vitals WDL. Tubigrips put on for low BP, instructed to monitor. Chest xray was done today for SOB and cough. NT pro BNP was 1732, physicians aware of all symptoms, labs, vitals, and patient requests. BG this AM 72, 73 at 1330. Glipizide was held today, decreased lantus to 23 units from 25 units previously, and kept metformin 1,000mg BID. Continue POC.

## 2020-12-31 NOTE — PLAN OF CARE
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 Did participate with limited seated EOB and supine therex. 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     Other Barriers to Discharge (DME, Family Training, etc): limited participation thus far, cognition. Pt currently on 1:1 sitter

## 2020-12-31 NOTE — PLAN OF CARE
FOCUS/GOAL  Skin integrity, and Safety management     ASSESSMENT, INTERVENTIONS AND CONTINUING PLAN FOR GOAL:  A/O- forgetful. Impulsive. No behaviors tonight. Some restlessness and agitation. x1 prn effective. VSS. No IV fluids. PIV LUE- SL. ND tube feeding running at 50 ml/hr continuous with free water flushes q 4. Continent of bladder/bowel, last BM 12/29. Uses urinal. Ax1, gait belt. No c/o n/v/d, cp. C/o body aches. PRN tylenol effective. NPO. Ok for ice chips. Utilize chin tuck. Infrequent cough. Continue w/ POC and bedside attendant for pt safety. Aspiration precautions.

## 2020-12-31 NOTE — PROGRESS NOTES
"OT  Pt approached for OT session.  He was shaking his head no while sitting on EOB.  1:1 staff standing behind him on other side of bed.  Pt yelling, \"I don't want to do it, I hate this place.\" Pt repeating this with frequent exploitive's. Th offered to assist him but he con't to decline OT. Pt frequently laying down on the bed then getting back to EOB.  1:1 indicates it is okay for OT to leave and she is okay with the pt or will call for assist as needed.  Plan to re-approach at next scheduled OT session.  "

## 2020-12-31 NOTE — PROGRESS NOTES
[facility]   Acute Rehabilitation Unit  Daily progress note    INTERVAL HISTORY    CC:  Debility    S: Denies SOB. Has been running low BP.    ROS: No nausea. H/O sore throat + Raspy voice + GERD + Gets ice chips . RVS planned.     Overnight events reviewed.  H/O being agitated intermittently and requiring PRN dose of Seroquel.      Functionally therapy evaluations/sessions have been limited by lack of participation or fatigue.          MEDICATIONS  Scheduled:    amiodarone  200 mg Per Feeding Tube Daily     aspirin  81 mg Per Feeding Tube Daily     atorvastatin  80 mg Per Feeding Tube QPM     famotidine  20 mg Per Feeding Tube BID     [Held by provider] glipiZIDE  5 mg Per Feeding Tube BID AC     heparin ANTICOAGULANT  5,000 Units Subcutaneous Q12H     insulin aspart  1-6 Units Subcutaneous Q4H     insulin glargine  23 Units Subcutaneous BID     levETIRAcetam  500 mg Per Feeding Tube BID     melatonin  3 mg Per Feeding Tube At Bedtime     metFORMIN  1,000 mg Per Feeding Tube 2 times daily     metoprolol  12.5 mg Per Feeding Tube BID     multivitamins w/minerals  15 mL Per NG tube Daily     QUEtiapine  25 mg Per Feeding Tube At Bedtime     torsemide  20 mg Per Feeding Tube Daily        PRN:  acetaminophen, dextrose, glucose **OR** dextrose **OR** glucagon, phenol, QUEtiapine       PHYSICAL EXAM  Patient Vitals for the past 24 hrs:   BP Temp Temp src Pulse Resp SpO2 Weight   12/31/20 1300 (!) 83/63 -- -- 94 14 100 % --   12/31/20 1257 -- -- -- 84 -- 100 % --   12/31/20 1249 -- -- -- -- -- 100 % --   12/31/20 1237 -- -- -- -- -- -- 80.1 kg (176 lb 9.6 oz)   12/31/20 0930 97/47 -- -- -- -- -- --   12/31/20 0737 (!) 84/47 -- -- 78 16 97 % --   12/31/20 0600 -- -- -- -- -- -- 80.3 kg (177 lb)   12/30/20 2035 99/60 95.1  F (35.1  C) Axillary 55 16 100 % --   12/30/20 1545 93/67 95.6  F (35.3  C) Oral 60 18 97 % --       GEN: NAD, comfotable.  HEENT: NG tube in place  CVS: RRR, S1+S2,  no m/r/g  PULM: CTA b/l, no w/r/r  ABD: Soft, NT, ND, bowel sounds present  EXT: No LE edema or calf tenderness b/l  Neuro: Answers appropriately, follows commands.  +Dysphonia    LABS  EXAM: XR CHEST 2 VW  12/31/2020 12:23 PM       HISTORY: Cough, SOB     COMPARISON: None.     FINDINGS: Two views of the chest. Postsurgical changes of the chest.  Median sternotomy wires appear intact. Enteric tube is visualized  coursing inferiorly below the left hemidiaphragm and out of the field  of view. Trachea is midline. Cardiomediastinal silhouette and  pulmonary vasculature are within normal limits. No focal air space  opacity identified. No pleural effusion or pneumothorax.                                                                      IMPRESSION:  No acute airspace disease identified. Postsurgical changes of CABG.     I have personally reviewed the examination and initial interpretation  and I agree with the findings.     ZACH KAYE MD    CBC RESULTS:   Recent Labs   Lab Test 12/31/20  0538   WBC 11.5*   RBC 5.05   HGB 13.5   HCT 46.9   MCV 93   MCH 26.7   MCHC 28.8*   RDW 16.6*            CBC RESULTS:   Recent Labs   Lab Test 12/29/20  0703   WBC 11.9*   RBC 5.26   HGB 13.7   HCT 46.4   MCV 88   MCH 26.0*   MCHC 29.5*   RDW 16.7*          Last Comprehensive Metabolic Panel:  Sodium   Date Value Ref Range Status   12/31/2020 138 133 - 144 mmol/L Final     Potassium   Date Value Ref Range Status   12/31/2020 4.5 3.4 - 5.3 mmol/L Final     Chloride   Date Value Ref Range Status   12/31/2020 106 94 - 109 mmol/L Final     Carbon Dioxide   Date Value Ref Range Status   12/31/2020 26 20 - 32 mmol/L Final     Anion Gap   Date Value Ref Range Status   12/31/2020 6 3 - 14 mmol/L Final     Glucose   Date Value Ref Range Status   12/31/2020 72 70 - 99 mg/dL Final     Urea Nitrogen   Date Value Ref Range Status   12/31/2020 36 (H) 7 - 30 mg/dL Final     Creatinine   Date Value Ref Range Status   12/31/2020 1.05  0.66 - 1.25 mg/dL Final     GFR Estimate   Date Value Ref Range Status   12/31/2020 72 >60 mL/min/[1.73_m2] Final     Comment:     Non  GFR Calc  Starting 12/18/2018, serum creatinine based estimated GFR (eGFR) will be   calculated using the Chronic Kidney Disease Epidemiology Collaboration   (CKD-EPI) equation.       Calcium   Date Value Ref Range Status   12/31/2020 9.0 8.5 - 10.1 mg/dL Final       Recent Labs   Lab 12/31/20  1315 12/31/20  0927 12/31/20  0538 12/31/20  0537 12/31/20  0157 12/30/20  2145 12/30/20  1807 12/29/20  0703 12/29/20  0703   GLC  --   --  72  --   --   --   --   --  146*   BGM 73 110*  --  72 113* 96 99   < >  --     < > = values in this interval not displayed.         IMPRESSION/PLAN:  Mika Alvarez is a 70 year old man with a past medical history of systolic heart failure, A-fib, DM type 2, severe multivessel CAD, who presented 12/2/20 with acute systolic heart failure exacerbation required diuresis then underwent 4 vessel CABG 12/7/20, course complicated by severe dysphagia, possible sieuzre like activity left frontal lobe stroke, sepsis, acute hypoxia with small bi apical pneumothoraces and, altered mental status. Admitted to acute rehab unit 12/28/20 for ongoing rehabilitation and medical management.         Admission to acute inpatient rehab s/p cabg x 4.    Impairment group code: 0.9        1. PT, OT and SLP 60 minutes of each on a daily basis, in addition to rehab nursing and close management of physiatrist.       2. Impairment of ADL's: Noted to have impaired strength, coordination, activity tolerance, and cognition leading to impaired ability to complete self cares, continue OT with goal for MOD I to I with basic adls.      3. Impairment of mobility:  Noted to have impaired strength, activity tolerance, and cognition leading to impaired mobility will benefit from ongoing PT with goal for MOD I to I with basic mobility and stairs.      4. Impairment of  cognition/language/swallow:  Noted to have severe dysphagia and impaired cognition will benefit from ongoing SLP with goal for least restrictive diet tolerated and improved ability to communicate needs.      5. Medical Conditions  CAD  S/P CABG x 4 (12/7)  Acute on chronic systolic heart failure- 2/2 ischemic cardiomyopathy  HTN  A-fib with RVR-   Most recent Echo 12/25/20 with left ventricular wall globally hypokinetic, EF estimated 35%. Right ventricle mildly dilated with systolic function severely reduced.  Had A-fib with RVR early in hospital stay on amiodarone taper. surgeon preference is to not anticoagulate for a-fib   -continue amiodarone 200 mg daily x 2 weeks then stop  -continue metoprolol 25 mg bid,   -continue asa, statin  -sternal precautions (6 weeks from 12/7)- limit 10 lbs then as tolerated   - continue toresemide, decreased to 20 mg daily- appreciate hospitalist consult  -monitor bmp, weights, intake and output, edema/sob  -f/u cardiology as scheduled     Subacute Left frontal stroke with petechial hemorrhage  Possible simple partial seizure  Acute metabolic encephalopathy  Neurology consulted for left leg shaking POD 1, EEG neg, head CT obtained in setting of ongoing leg shaking and ams- demonstrated stroke 12/12 (most recent imaging stable)  1.  Petechial hemorrhage in the left frontal opercular infarct correlating with the blood products noted on MRI,  Chronic left cerebellar infarct.  -follow up neurology within one month- Dr. Bailey/ [place]- follow up possible seizure-   -continue keppra until neurology follow up  -bedside attendant dcd 12/17 though on admission 12/28 attempting to get out of bed- bedside attendant for safety  -continue seroquel at bedtime.   -Psychiatry  Increased Seroquel to 25 mg at bedtime and 25 mg TID PRN for agitation.  He had expressed suicidal ideations to therapy staff, however he denied any to psych team.    DM type 2- Hgb A1C 12.6%.   -continue metformin 1000 mg  bid, Off glipizide, On  lantus 23 mg bid, and ssi   -continue to monitor glucose closely  -will need to titrate pending diet advancement/ tf cessation     oral pharyngeal dysphagia  Impaired oral intake -   slp monitoring currently NPO with nd in place 12/21 video swallow with ongoing significant dysphagia s/p ND placed 12/15.  -appreciate ongoing slp  -appreciate nutrition support  -continue tf     Possible aspiration pneumonia- fever, known dysphagia, leucocytosis,  hypoxia, other workup unremarkable received zosyn course seen by id.  Ongoing leucoctyosis WBC 13 12/28.    -appreciate ongoing SLP support  -trend WBC  -CXR as above.    Bilateral shin wounds- mepilex changes as ordered keep clean and dry.      Urinary retention- gardner recently removed, reportedly voiding without issue  -PVRs x2 low  May check PRN only.   -was started on flomax at prior hospital - had flomax suspension- as npo and only pill form will hold and monitor as above.        Creatinine elevation - Cr 1.08 BUN 36 12/29, recent Cr 1.51 12/26 BUN 38  -monitor bmp  -Torsemide dose decreased to 20 mg daily     6. Adjustment to disability: plan for psychology consult when more consistently alert  7. FEN: NPO with tube feeds  8. Bowel: monitor  9. Bladder: PVRs WNL  10. DVT Prophylaxis: subcutaneous heparin  11. GI Prophylaxis: ppi  12. Code: full- prior and patient confirmed on admission  13. Disposition: goal for home  14. ELOS:  2+ weeks.  15. Rehab prognosis:  Fair- medically complex with significant impairments in cognition and swallow and milder strength/activity tolerance deficits goal for home pending supervision needs and family support  16. Follow up Appointments on Discharge: cardiology, cv surgery, pcp.        Phi Cortes MD

## 2020-12-31 NOTE — PROGRESS NOTES
"Re-evaluated patient briefly again  BP (!) 83/63 (BP Location: Left arm)   Pulse 94   Temp 95.1  F (35.1  C) (Axillary)   Resp 14   Ht 1.803 m (5' 11\")   Wt 80.1 kg (176 lb 9.6 oz)   SpO2 100%   BMI 24.63 kg/m    He denies difficulty in breathing and is resting comfortably   "

## 2021-01-01 LAB
GLUCOSE BLDC GLUCOMTR-MCNC: 102 MG/DL (ref 70–99)
GLUCOSE BLDC GLUCOMTR-MCNC: 132 MG/DL (ref 70–99)
GLUCOSE BLDC GLUCOMTR-MCNC: 136 MG/DL (ref 70–99)
GLUCOSE BLDC GLUCOMTR-MCNC: 152 MG/DL (ref 70–99)
GLUCOSE BLDC GLUCOMTR-MCNC: 52 MG/DL (ref 70–99)
GLUCOSE BLDC GLUCOMTR-MCNC: 63 MG/DL (ref 70–99)
GLUCOSE BLDC GLUCOMTR-MCNC: 86 MG/DL (ref 70–99)
GLUCOSE BLDC GLUCOMTR-MCNC: 89 MG/DL (ref 70–99)
GLUCOSE BLDC GLUCOMTR-MCNC: 97 MG/DL (ref 70–99)

## 2021-01-01 PROCEDURE — 99233 SBSQ HOSP IP/OBS HIGH 50: CPT | Performed by: PHYSICAL MEDICINE & REHABILITATION

## 2021-01-01 PROCEDURE — 272N000083 HC NUTRITION PRODUCT SEMIELEM INTERMED LITER

## 2021-01-01 PROCEDURE — 250N000013 HC RX MED GY IP 250 OP 250 PS 637: Performed by: PHYSICIAN ASSISTANT

## 2021-01-01 PROCEDURE — 128N000003 HC R&B REHAB

## 2021-01-01 PROCEDURE — 250N000009 HC RX 250: Performed by: PHYSICAL MEDICINE & REHABILITATION

## 2021-01-01 PROCEDURE — 999N001017 HC STATISTIC GLUCOSE BY METER IP

## 2021-01-01 PROCEDURE — 250N000013 HC RX MED GY IP 250 OP 250 PS 637: Performed by: PHYSICAL MEDICINE & REHABILITATION

## 2021-01-01 PROCEDURE — 96372 THER/PROPH/DIAG INJ SC/IM: CPT | Performed by: PHYSICIAN ASSISTANT

## 2021-01-01 PROCEDURE — 99207 PR CDG-MDM COMPONENT: MEETS HIGH - UP CODED: CPT | Performed by: INTERNAL MEDICINE

## 2021-01-01 PROCEDURE — 96372 THER/PROPH/DIAG INJ SC/IM: CPT | Performed by: STUDENT IN AN ORGANIZED HEALTH CARE EDUCATION/TRAINING PROGRAM

## 2021-01-01 PROCEDURE — 250N000011 HC RX IP 250 OP 636: Performed by: PHYSICIAN ASSISTANT

## 2021-01-01 PROCEDURE — 250N000011 HC RX IP 250 OP 636: Performed by: STUDENT IN AN ORGANIZED HEALTH CARE EDUCATION/TRAINING PROGRAM

## 2021-01-01 PROCEDURE — 99233 SBSQ HOSP IP/OBS HIGH 50: CPT | Performed by: INTERNAL MEDICINE

## 2021-01-01 RX ORDER — OLANZAPINE 10 MG/2ML
5 INJECTION, POWDER, FOR SOLUTION INTRAMUSCULAR DAILY PRN
Status: DISCONTINUED | OUTPATIENT
Start: 2021-01-01 | End: 2021-01-13 | Stop reason: HOSPADM

## 2021-01-01 RX ADMIN — LEVETIRACETAM 500 MG: 100 SOLUTION ORAL at 08:27

## 2021-01-01 RX ADMIN — ASPIRIN 81 MG CHEWABLE TABLET 81 MG: 81 TABLET CHEWABLE at 08:25

## 2021-01-01 RX ADMIN — QUETIAPINE 25 MG: 25 TABLET ORAL at 01:50

## 2021-01-01 RX ADMIN — FAMOTIDINE 20 MG: 40 POWDER, FOR SUSPENSION ORAL at 22:01

## 2021-01-01 RX ADMIN — Medication 3 MG: at 22:01

## 2021-01-01 RX ADMIN — HEPARIN SODIUM 5000 UNITS: 5000 INJECTION, SOLUTION INTRAVENOUS; SUBCUTANEOUS at 09:19

## 2021-01-01 RX ADMIN — ACETAMINOPHEN 650 MG: 325 SOLUTION ORAL at 23:35

## 2021-01-01 RX ADMIN — MULTIVITAMIN 15 ML: LIQUID ORAL at 08:27

## 2021-01-01 RX ADMIN — INSULIN ASPART 1 UNITS: 100 INJECTION, SOLUTION INTRAVENOUS; SUBCUTANEOUS at 22:03

## 2021-01-01 RX ADMIN — ATORVASTATIN CALCIUM 80 MG: 80 TABLET, FILM COATED ORAL at 22:01

## 2021-01-01 RX ADMIN — HEPARIN SODIUM 5000 UNITS: 5000 INJECTION, SOLUTION INTRAVENOUS; SUBCUTANEOUS at 22:02

## 2021-01-01 RX ADMIN — QUETIAPINE 25 MG: 25 TABLET ORAL at 22:00

## 2021-01-01 RX ADMIN — OLANZAPINE 5 MG: 10 INJECTION, POWDER, LYOPHILIZED, FOR SOLUTION INTRAMUSCULAR at 15:25

## 2021-01-01 RX ADMIN — METOPROLOL TARTRATE 6.25 MG: 100 TABLET, FILM COATED ORAL at 22:39

## 2021-01-01 RX ADMIN — AMIODARONE HYDROCHLORIDE 200 MG: 200 TABLET ORAL at 08:25

## 2021-01-01 RX ADMIN — LEVETIRACETAM 500 MG: 100 SOLUTION ORAL at 22:01

## 2021-01-01 RX ADMIN — FAMOTIDINE 20 MG: 40 POWDER, FOR SUSPENSION ORAL at 08:23

## 2021-01-01 NOTE — PLAN OF CARE
FOCUS/GOAL  Bowel management and Medical management    ASSESSMENT, INTERVENTIONS AND CONTINUING PLAN FOR GOAL:  Patient A&O x4. CGA w/ walker. No complaints of pain. NG tube patent. Continent of bowel and bladder. LBM 1/1. After AM hypoglycemia BGs were stable rest of shift. Pt calm, cooperative and flat during shift. Around 1445 pt woke up from a nap and became agitated by pushing TF pole away. Bedside attendant called for staff assist. Writer arrived and noted that pt bit through TF line. Pt would not let writer restart the TF with a new bag nor would he let writer remove the old line that was still connected to his NG tube and dripping. Pt stated that he was hungry, wanted to eat something, and that he wanted to leave. Writer explained to pt that he cannot have any food due to his inability to swallow properly and could not go home at this time. Pt became increasingly agitated and started throwing items across the room at the bedside attendant and writer. Code 21 called. Writer and staff attempted to calm down pt while waiting for Code 21 team to arrive. Pt began to take call light and bang it against the window sill. Code 21 team arrived and began talking with pt. Pt still refused for staff to remove old TF line that was connected from NG tube. Pt also refused PRN medication. Pt starting making verbal threats about hitting staff in room. Order for IM Zyprexa obtained drawn up. Code 21 staff able to assist pt into bed and hold him down. 5 mg IM Zyprexa then given in L deltoid around 1525. Writer able to disconnect old TF line and Code 21 staff then released pt and left. Pt now resting in bed. Pt still declined new TF bag to be started. Antoinette COTO notified. Continue to monitor.

## 2021-01-01 NOTE — PROGRESS NOTES
Paged by the nursing staff at 2:50 PM. Patient agitated, disconnected his tube feeds. Code 21 called for physical restraints. Pt. able to stay calm with the behavioral team. May need anti agitation medication IM, as he his refusing oral meds.    -Zyprexa  5 mg IM PRN daily.  -Continue one to one.    Alexandre Gomez  Pgy[facility]  Pager: [phone]

## 2021-01-01 NOTE — PROGRESS NOTES
[facility]     Medicine Progress Note - Hospitalist Service       Date of Admission:  12/28/2020  Assessment & Plan        Mika Alvarez is a 70 year old man with a past medical history of systolic heart failure, A-fib, DM type 2, severe multivessel CAD, who presented 12/2/20 with acute systolic heart failure exacerbation required diuresis then underwent 4 vessel CABG 12/7/20, course complicated by severe dysphagia, possible sieuzre like activity left frontal lobe stroke, sepsis, acute hypoxia with small bi apical pneumothoraces and, altered mental status. Admitted to acute rehab unit 12/28/20 for ongoing rehabilitation and medical management. Internal medicine consulted for co-management.   Individual problems and their management are outlined below        #Multivessel CAD S/P CABG  #Acute diastolic and systolic heart failure, improving  - Midline incision healing well  -Reduce torsemide to 20 mg daily, Daily weights ( weight as of 12/31 at 177 lbs) - Was 175 on 12/29  BNP on 12/31 at 1732   -Monitor BMP Bi weekly   - Metoprolol  Further reduced to 6.25 mg BID on 1/1 ( Patient has not really been getting his BP due to asymptomatic low hypotension) We will likely be able to give this once patient is off amiodarone   - ASA  - Atorvastatin  - Echo on 12/25:  The left ventricular wall motion is globally hypokinetic. The estimated  left ventricular ejection fraction is 35%. This represents a moderately  decreased ejection fraction.The right ventricle is mildly dilated and its systolic function is  severely reduced. TAPSE is abnormal, which is consistent with abnormal  right ventricular systolic function.  Biatrial volume is moderately increased.     #A-fib with RVR  - Currently rate controlled   - Continue amiodarone ( expected for 14 more days and stop. Last day on 1/11 ) . Metoprolol as above   -Of note, surgeon preference is to not anticoagulate for a-fib s/p LAAE( direct  extract from patient's discharge summary)      #Uncontrolled type II diabetes mellitus, HgbA1c  12.6%  - Stop metformin 1000 mg BID on 1/1   - Stop Glipizide on 12/31 to prevent hypoglycemia   - Reduce  insulin glargine to 18  units BID on 1/1  from 23 units BID  ( from 25 units) on12/31 due to hypoglycemia    - SSI( Medium Intesnity) Q 4 hrs while on tube feeding ( Has not really been requiring this)   - Hypoglycemia protocol  - Diabetic diet  - When patient advances with oral diet, the regimen will have to be re-looked into         Acute hypoxic respiratory failure, resolved  -  Encourage IC  - Supplemental oxygen as needed  - CXR on 12/31 no acute airspace abnormalities      Acute metabolic encephalopathy  - Supportive care  - Likely multifactorial ( post operative status , stroke and possibly pre existing status secondary to uncontrolled DM type 2  And HTN)   - We may need to explore more around decision making capacity etc.      Subacute left frontal stroke with petechial hemorrhage  - ASA  - Atorvastatin  - Continue levetiracetam    #Sepsis, Resolved   - Fever, Leukocytosis, tachycardia noted 12/10/2020  - UA was negative. One of two blood cultures grew staph epi, likely contaminant.  - Sputum culture grew beta-hemolytic strep  - Started on emperic pip-tazo IV,  completed 7d on 12/17, fever and WBC resolving.  - WBC down to 11.5 on 12/31     #Severe pharyngeal dysphagia  # Malnutrition, unclassified     - SLP team to follow patient here in ARU   video swallow study on 12/21 showed ongoing significant dysphagia  - NGT in place  - Clinical monitoring       Primary hypertension, now with asymptomatic hypotension   - BP controlled  - Continue metoprolol tartrate ( has not been able to receive this due to hypotesnion  - Monitoring BP     # Bilateral Pneumothoraces   X Ray on 12/26 with  Tiny apical pneumothoraces without significant change. No consolidation or focal pneumonia in the lungs. No pulmonary edema. No  other significant change.  Continue to monitor clinically.  CXR on 12/31  With resolved pneumothoraces       VTE risk reduction. Subcutaneous heparin.                  Diet: NPO for Medical/Clinical Reasons Except for: NPO but receiving Tube Feeding, Ice Chips  Adult Formula Drip Feeding: Continuous Peptamen 1.5; Nasoduodenal tube; Goal Rate: 50; mL/hr; Medication - Feeding Tube Flush Frequency: At least 15-30 mL water before and after medication administration and with tube clogging; Amount to Send (Nut...    DVT Prophylaxis: Heparin SQ  Koenig Catheter: not present  Code Status: Full Code           Disposition Plan   Expected discharge:   To be determined by the primary team     The patient's care was discussed with the Bedside Nurse and Primary team.    Paulo Soria MD  Hospitalist Service  [facility]   Contact information available via [facility] Paging/Directory    ______________________________________________________________________    Interval History   Events in the last 24 hrs noted. Continued agitation overnight, but calm this morning  He specifically denies chest pain or SOB  No fever or chills       Data reviewed today: I reviewed all medications, new labs and imaging results over the last 24 hours. I personally reviewed no images or EKG's today.    Physical Exam   Vital Signs: Temp: 96.2  F (35.7  C) Temp src: Oral BP: 94/71 Pulse: 91   Resp: 17 SpO2: 97 % O2 Device: None (Room air)    Weight: 176 lbs 9.6 oz  General Appearance: Resting comfortably. Not in distress  Respiratory: Clear breath sounds bilaterally   Cardiovascular: Normal heart sounds. Irregular heart rate No murmurs   GI: Soft, non tender. Normal bowel sounds   Skin: Multiple areas in the lower extremity with dressings   Other: Awake and alert. Moving all 4 limbs     Data   Recent Labs   Lab 12/31/20  0538 12/29/20  0703   WBC 11.5* 11.9*   HGB 13.5 13.7   MCV 93 88    371     139   POTASSIUM 4.5 4.4   CHLORIDE 106 104   CO2 26 28   BUN 36* 36*   CR 1.05 1.08   ANIONGAP 6 7   JEET 9.0 8.9   GLC 72 146*

## 2021-01-01 NOTE — PLAN OF CARE
FOCUS/GOAL  Medical management and Safety management    ASSESSMENT, INTERVENTIONS AND CONTINUING PLAN FOR GOAL:  Pt was intermittently restless and agitated at times. He threw stuff at staff. PRN Seroquel given x1. Pt tried to pull his NJ tube and wanted the head of the bed flat, verbal redirection provided.Tube feeding held for a few minutes twice then restarted.Tube feeding running at 50 ml/hr continuous. Pt is NPO, except for ice chips with supervision. Continent of bladder and bowel x1. Ambulated to the bathroom and room with 1 assist/walker. Sitter at bedside for safety. Continue to monitor per POC.  At 6.45 am pt BG was 52, pt was given 2 apple juice. Hospitalist on call notified. Recheck q15 min and at 7.37 am it was 102.  Pt is currently calm, sleeping. Denies any discomfort. VSS.

## 2021-01-01 NOTE — PROGRESS NOTES
"Pt became agitated and aggressive, he was throwing things in room at sitter.  Pt is stating that he wants to go, wants to \"get out of this shit hole\".  Pt bit though his tube feed and it has been temporarily stopped.  He was given seroquel via tube and is currently lying in bed.  "

## 2021-01-01 NOTE — PLAN OF CARE
VSS. A/Ox 4, aggressive and angry at times and wanting to leave.  Pt settled after PRN seroquel. Denied pain or need for pain control. CMS intact. Tolerated TF diet, TF stopped for about 1.5 hours tonight after patient chewed it apart. Denied any nausea, CP, SOB, lightheadedness or dizziness. Voiding without pain or difficulty. Passing flatus. Resting in bed at this time with call light in reach and able to make needs known.

## 2021-01-01 NOTE — PROGRESS NOTES
"  [facility]   Acute Rehabilitation Unit  Daily progress note    INTERVAL HISTORY    CC:  Debility    S: Denies SOB. Has been running low BP. Dose of meds reduced. Insulin dose reduced.    ROS: No nausea. H/O sore throat + Raspy voice + GERD + Gets ice chips . RVS planned. Low BS    Overnight events reviewed.  H/O being agitated intermittently and requiring PRN dose of Seroquel.  Has 1:1 sitter: \"Pt was intermittently restless and agitated at times. He threw stuff at staff. PRN Seroquel given x1. Pt tried to pull his NJ tube and wanted the head of the bed flat, verbal redirection provided.Tube feeding held for a few minutes twice then restarted.Tube feeding running at 50 ml/hr continuous. Pt is NPO, except for ice chips with supervision. Continent of bladder and bowel x1. Ambulated to the bathroom and room with 1 assist/walker. Sitter at bedside for safety\"    RVS planned next week.    Functionally therapy evaluations/sessions have been limited by lack of participation or fatigue.          MEDICATIONS  Scheduled:    amiodarone  200 mg Per Feeding Tube Daily     aspirin  81 mg Per Feeding Tube Daily     atorvastatin  80 mg Per Feeding Tube QPM     famotidine  20 mg Per Feeding Tube BID     [Held by provider] glipiZIDE  5 mg Per Feeding Tube BID AC     heparin ANTICOAGULANT  5,000 Units Subcutaneous Q12H     insulin aspart  1-6 Units Subcutaneous Q4H     insulin glargine  18 Units Subcutaneous BID     levETIRAcetam  500 mg Per Feeding Tube BID     melatonin  3 mg Per Feeding Tube At Bedtime     [Held by provider] metFORMIN  1,000 mg Per Feeding Tube 2 times daily     metoprolol  6.25 mg Per Feeding Tube BID     metoprolol  6.25 mg Per Feeding Tube BID     multivitamins w/minerals  15 mL Per NG tube Daily     QUEtiapine  25 mg Per Feeding Tube At Bedtime     torsemide  20 mg Per Feeding Tube Daily        PRN:  acetaminophen, dextrose, glucose **OR** dextrose **OR** glucagon, phenol, " QUEtiapine       PHYSICAL EXAM  Patient Vitals for the past 24 hrs:   BP Temp Temp src Pulse Resp SpO2 Weight   01/01/21 0910 95/71 -- -- 69 -- -- --   01/01/21 0700 94/71 96.2  F (35.7  C) Oral 91 17 97 % --   12/31/20 1500 105/48 96.8  F (36  C) Axillary 77 16 96 % --   12/31/20 1300 (!) 83/63 -- -- 94 14 100 % --   12/31/20 1257 -- -- -- 84 -- 100 % --   12/31/20 1249 -- -- -- -- -- 100 % --   12/31/20 1237 -- -- -- -- -- -- 80.1 kg (176 lb 9.6 oz)       GEN: NAD, comfotable.  HEENT: NG tube in place  CVS: RRR, S1+S2, no m/r/g  PULM: CTA b/l, no w/r/r  ABD: Soft, NT, ND, bowel sounds present  EXT: No LE edema or calf tenderness b/l  Neuro: Answers appropriately, follows commands.  +Dysphonia    LABS  EXAM: XR CHEST 2 VW  12/31/2020 12:23 PM       HISTORY: Cough, SOB     COMPARISON: None.     FINDINGS: Two views of the chest. Postsurgical changes of the chest.  Median sternotomy wires appear intact. Enteric tube is visualized  coursing inferiorly below the left hemidiaphragm and out of the field  of view. Trachea is midline. Cardiomediastinal silhouette and  pulmonary vasculature are within normal limits. No focal air space  opacity identified. No pleural effusion or pneumothorax.                                                                      IMPRESSION:  No acute airspace disease identified. Postsurgical changes of CABG.     I have personally reviewed the examination and initial interpretation  and I agree with the findings.     ZACH KAYE MD    CBC RESULTS:   Recent Labs   Lab Test 12/31/20  0538   WBC 11.5*   RBC 5.05   HGB 13.5   HCT 46.9   MCV 93   MCH 26.7   MCHC 28.8*   RDW 16.6*            CBC RESULTS:   Recent Labs   Lab Test 12/29/20  0703   WBC 11.9*   RBC 5.26   HGB 13.7   HCT 46.4   MCV 88   MCH 26.0*   MCHC 29.5*   RDW 16.7*          Last Comprehensive Metabolic Panel:  Sodium   Date Value Ref Range Status   12/31/2020 138 133 - 144 mmol/L Final     Potassium   Date Value Ref  Range Status   12/31/2020 4.5 3.4 - 5.3 mmol/L Final     Chloride   Date Value Ref Range Status   12/31/2020 106 94 - 109 mmol/L Final     Carbon Dioxide   Date Value Ref Range Status   12/31/2020 26 20 - 32 mmol/L Final     Anion Gap   Date Value Ref Range Status   12/31/2020 6 3 - 14 mmol/L Final     Glucose   Date Value Ref Range Status   12/31/2020 72 70 - 99 mg/dL Final     Urea Nitrogen   Date Value Ref Range Status   12/31/2020 36 (H) 7 - 30 mg/dL Final     Creatinine   Date Value Ref Range Status   12/31/2020 1.05 0.66 - 1.25 mg/dL Final     GFR Estimate   Date Value Ref Range Status   12/31/2020 72 >60 mL/min/[1.73_m2] Final     Comment:     Non  GFR Calc  Starting 12/18/2018, serum creatinine based estimated GFR (eGFR) will be   calculated using the Chronic Kidney Disease Epidemiology Collaboration   (CKD-EPI) equation.       Calcium   Date Value Ref Range Status   12/31/2020 9.0 8.5 - 10.1 mg/dL Final       Recent Labs   Lab 01/01/21  1032 01/01/21  0737 01/01/21  0722 01/01/21  0704 01/01/21  0645 01/01/21  0152 12/31/20  0538 12/31/20  0538 12/29/20  0703 12/29/20  0703   GLC  --   --   --   --   --   --   --  72  --  146*   * 102* 89 63* 52* 86   < >  --    < >  --     < > = values in this interval not displayed.         IMPRESSION/PLAN:  Mika Alvarez is a 70 year old man with a past medical history of systolic heart failure, A-fib, DM type 2, severe multivessel CAD, who presented 12/2/20 with acute systolic heart failure exacerbation required diuresis then underwent 4 vessel CABG 12/7/20, course complicated by severe dysphagia, possible sieuzre like activity left frontal lobe stroke, sepsis, acute hypoxia with small bi apical pneumothoraces and, altered mental status. Admitted to acute rehab unit 12/28/20 for ongoing rehabilitation and medical management.         Admission to acute inpatient rehab s/p cabg x 4.    Impairment group code: 0.9        1. PT, OT and SLP 60 minutes of  each on a daily basis, in addition to rehab nursing and close management of physiatrist.       2. Impairment of ADL's: Noted to have impaired strength, coordination, activity tolerance, and cognition leading to impaired ability to complete self cares, continue OT with goal for MOD I to I with basic adls.      3. Impairment of mobility:  Noted to have impaired strength, activity tolerance, and cognition leading to impaired mobility will benefit from ongoing PT with goal for MOD I to I with basic mobility and stairs.      4. Impairment of cognition/language/swallow:  Noted to have severe dysphagia and impaired cognition will benefit from ongoing SLP with goal for least restrictive diet tolerated and improved ability to communicate needs.      5. Medical Conditions  CAD  S/P CABG x 4 (12/7)  Acute on chronic systolic heart failure- 2/2 ischemic cardiomyopathy  HTN  A-fib with RVR-   Most recent Echo 12/25/20 with left ventricular wall globally hypokinetic, EF estimated 35%. Right ventricle mildly dilated with systolic function severely reduced.  Had A-fib with RVR early in hospital stay on amiodarone taper. surgeon preference is to not anticoagulate for a-fib   -continue amiodarone 200 mg daily x 2 weeks then stop  -continue metoprolol at reduced dose   -continue asa, statin  -sternal precautions (6 weeks from 12/7)- limit 10 lbs then as tolerated   - continue toresemide, decreased to 20 mg daily- appreciate hospitalist consult  -monitor bmp, weights, intake and output, edema/sob  -f/u cardiology as scheduled     Subacute Left frontal stroke with petechial hemorrhage  Possible simple partial seizure  Acute metabolic encephalopathy  Neurology consulted for left leg shaking POD 1, EEG neg, head CT obtained in setting of ongoing leg shaking and ams- demonstrated stroke 12/12 (most recent imaging stable)  1.  Petechial hemorrhage in the left frontal opercular infarct correlating with the blood products noted on MRI,   Chronic left cerebellar infarct.  -follow up neurology within one month- Dr. Bailey/ [facility]- follow up possible seizure-   -continue keppra until neurology follow up  -bedside attendant dcd 12/17 though on admission 12/28 attempting to get out of bed- bedside attendant for safety  -continue seroquel at bedtime.   -Psychiatry  Increased Seroquel to 25 mg at bedtime and 25 mg TID PRN for agitation.  He had expressed suicidal ideations to therapy staff, however he denied any to psych team.    DM type 2- Hgb A1C 12.6%.   -Holding metformin 1000 mg bid, Off glipizide, On  lantus 18 units bid, and ssi   -continue to monitor glucose closely  -will need to titrate pending diet advancement/ tf cessation     oral pharyngeal dysphagia  Impaired oral intake -   slp monitoring currently NPO with nd in place 12/21 video swallow with ongoing significant dysphagia s/p ND placed 12/15.  -appreciate ongoing slp  -appreciate nutrition support  -continue tf     Possible aspiration pneumonia- fever, known dysphagia, leucocytosis,  hypoxia, other workup unremarkable received zosyn course seen by id.  Ongoing leucoctyosis WBC 13 12/28.    -appreciate ongoing SLP support  -trend WBC  -CXR as above.    Bilateral shin wounds- mepilex changes as ordered keep clean and dry.      Urinary retention- gardner recently removed, reportedly voiding without issue  -PVRs x2 low  May check PRN only.   -was started on flomax at prior hospital - had flomax suspension- as npo and only pill form will hold and monitor as above.        Creatinine elevation - Cr 1.08 BUN 36 12/29, recent Cr 1.51 12/26 BUN 38  -monitor bmp  -Torsemide dose decreased to 20 mg daily     6. Adjustment to disability: plan for psychology consult when more consistently alert  7. FEN: NPO with tube feeds  8. Bowel: monitor  9. Bladder: PVRs WNL  10. DVT Prophylaxis: subcutaneous heparin  11. GI Prophylaxis: ppi  12. Code: full- prior and patient confirmed on admission  13. Disposition:  goal for home  14. ELOS:  2+ weeks.  15. Rehab prognosis:  Fair- medically complex with significant impairments in cognition and swallow and milder strength/activity tolerance deficits goal for home pending supervision needs and family support  16. Follow up Appointments on Discharge: cardiology, cv surgery, pcp.        Phi Cortes MD

## 2021-01-02 ENCOUNTER — APPOINTMENT (OUTPATIENT)
Dept: PHYSICAL THERAPY | Facility: CLINIC | Age: 71
End: 2021-01-02
Payer: COMMERCIAL

## 2021-01-02 ENCOUNTER — APPOINTMENT (OUTPATIENT)
Dept: SPEECH THERAPY | Facility: CLINIC | Age: 71
End: 2021-01-02
Payer: COMMERCIAL

## 2021-01-02 LAB
GLUCOSE BLDC GLUCOMTR-MCNC: 147 MG/DL (ref 70–99)
GLUCOSE BLDC GLUCOMTR-MCNC: 160 MG/DL (ref 70–99)
GLUCOSE BLDC GLUCOMTR-MCNC: 185 MG/DL (ref 70–99)
GLUCOSE BLDC GLUCOMTR-MCNC: 188 MG/DL (ref 70–99)
GLUCOSE BLDC GLUCOMTR-MCNC: 191 MG/DL (ref 70–99)
GLUCOSE BLDC GLUCOMTR-MCNC: 217 MG/DL (ref 70–99)

## 2021-01-02 PROCEDURE — 250N000013 HC RX MED GY IP 250 OP 250 PS 637: Performed by: INTERNAL MEDICINE

## 2021-01-02 PROCEDURE — 250N000013 HC RX MED GY IP 250 OP 250 PS 637: Performed by: PHYSICIAN ASSISTANT

## 2021-01-02 PROCEDURE — 97116 GAIT TRAINING THERAPY: CPT | Mod: GP

## 2021-01-02 PROCEDURE — 92526 ORAL FUNCTION THERAPY: CPT | Mod: GN | Performed by: SPEECH-LANGUAGE PATHOLOGIST

## 2021-01-02 PROCEDURE — 250N000009 HC RX 250: Performed by: INTERNAL MEDICINE

## 2021-01-02 PROCEDURE — 99207 PR CDG-CUT & PASTE-POTENTIAL IMPACT ON LEVEL: CPT | Performed by: INTERNAL MEDICINE

## 2021-01-02 PROCEDURE — 128N000003 HC R&B REHAB

## 2021-01-02 PROCEDURE — 97530 THERAPEUTIC ACTIVITIES: CPT | Mod: GP

## 2021-01-02 PROCEDURE — 99233 SBSQ HOSP IP/OBS HIGH 50: CPT | Performed by: INTERNAL MEDICINE

## 2021-01-02 PROCEDURE — 99233 SBSQ HOSP IP/OBS HIGH 50: CPT | Performed by: PHYSICAL MEDICINE & REHABILITATION

## 2021-01-02 PROCEDURE — 96372 THER/PROPH/DIAG INJ SC/IM: CPT | Performed by: PHYSICIAN ASSISTANT

## 2021-01-02 PROCEDURE — 250N000013 HC RX MED GY IP 250 OP 250 PS 637: Performed by: PHYSICAL MEDICINE & REHABILITATION

## 2021-01-02 PROCEDURE — 250N000011 HC RX IP 250 OP 636: Performed by: PHYSICIAN ASSISTANT

## 2021-01-02 PROCEDURE — 272N000083 HC NUTRITION PRODUCT SEMIELEM INTERMED LITER

## 2021-01-02 PROCEDURE — 999N001017 HC STATISTIC GLUCOSE BY METER IP

## 2021-01-02 RX ADMIN — FAMOTIDINE 20 MG: 40 POWDER, FOR SUSPENSION ORAL at 09:49

## 2021-01-02 RX ADMIN — AMIODARONE HYDROCHLORIDE 200 MG: 200 TABLET ORAL at 09:53

## 2021-01-02 RX ADMIN — QUETIAPINE 25 MG: 25 TABLET ORAL at 00:54

## 2021-01-02 RX ADMIN — LEVETIRACETAM 500 MG: 100 SOLUTION ORAL at 09:49

## 2021-01-02 RX ADMIN — INSULIN ASPART 1 UNITS: 100 INJECTION, SOLUTION INTRAVENOUS; SUBCUTANEOUS at 03:04

## 2021-01-02 RX ADMIN — METOPROLOL TARTRATE 3.3 MG: 100 TABLET, FILM COATED ORAL at 09:52

## 2021-01-02 RX ADMIN — MULTIVITAMIN 15 ML: LIQUID ORAL at 09:51

## 2021-01-02 RX ADMIN — FAMOTIDINE 20 MG: 40 POWDER, FOR SUSPENSION ORAL at 21:10

## 2021-01-02 RX ADMIN — HEPARIN SODIUM 5000 UNITS: 5000 INJECTION, SOLUTION INTRAVENOUS; SUBCUTANEOUS at 09:52

## 2021-01-02 RX ADMIN — INSULIN ASPART 2 UNITS: 100 INJECTION, SOLUTION INTRAVENOUS; SUBCUTANEOUS at 14:25

## 2021-01-02 RX ADMIN — INSULIN ASPART 1 UNITS: 100 INJECTION, SOLUTION INTRAVENOUS; SUBCUTANEOUS at 06:13

## 2021-01-02 RX ADMIN — QUETIAPINE 25 MG: 25 TABLET ORAL at 19:20

## 2021-01-02 RX ADMIN — Medication 1 ML: at 14:29

## 2021-01-02 RX ADMIN — ATORVASTATIN CALCIUM 80 MG: 80 TABLET, FILM COATED ORAL at 21:11

## 2021-01-02 RX ADMIN — INSULIN ASPART 1 UNITS: 100 INJECTION, SOLUTION INTRAVENOUS; SUBCUTANEOUS at 18:17

## 2021-01-02 RX ADMIN — QUETIAPINE 25 MG: 25 TABLET ORAL at 21:11

## 2021-01-02 RX ADMIN — LEVETIRACETAM 500 MG: 100 SOLUTION ORAL at 21:10

## 2021-01-02 RX ADMIN — TORSEMIDE 20 MG: 20 TABLET ORAL at 09:53

## 2021-01-02 RX ADMIN — Medication 3 MG: at 21:10

## 2021-01-02 RX ADMIN — INSULIN ASPART 2 UNITS: 100 INJECTION, SOLUTION INTRAVENOUS; SUBCUTANEOUS at 22:08

## 2021-01-02 RX ADMIN — ASPIRIN 81 MG CHEWABLE TABLET 81 MG: 81 TABLET CHEWABLE at 09:53

## 2021-01-02 RX ADMIN — INSULIN ASPART 1 UNITS: 100 INJECTION, SOLUTION INTRAVENOUS; SUBCUTANEOUS at 09:48

## 2021-01-02 RX ADMIN — HEPARIN SODIUM 5000 UNITS: 5000 INJECTION, SOLUTION INTRAVENOUS; SUBCUTANEOUS at 21:10

## 2021-01-02 NOTE — PLAN OF CARE
Discharge Planner Post-Acute Rehab SLP:      Discharge Plan:home with assist     Precautions: fall, swallowing     Current Status:  Communication: dysphonic voice, communication to be evaluated, Pt currently refusing  Cognition: to be assessed but pt currently is refusing cognitive -linguistic assessment  Swallow: *severe oropharyngeal dysphagia      Assessment:  Pt citing extreme fatigue d/t previous therapy appt (PT).  Only agreeable to limited tx, targeted single swallowing exc, Pt with minimal effort but did complete 5 consecutive reps.  Pt declined ice chips.  SLP provided education re current POC/rationale for NPO status.  Planning for repeat VFSS to occur some time next week as schedule allows.      Other Barriers to Discharge (Family Training, etc): to be determined

## 2021-01-02 NOTE — PLAN OF CARE
"Patient had emotional outburst on previous shift. This shift patient is very restless, going to and from bed to chair saying the \"bed is uncomfortable\". Patient initially refused BG check and restarting of TF, but after therapeutic communication with writer patient agreed to have BG (97) and restart TF at 1730. Patient has 1:1 bedside attendant for safety.   "

## 2021-01-02 NOTE — PLAN OF CARE
FOCUS/GOAL  Bowel management, Bladder management, Pain management and Cognition/Memory/Judgment/Problem solving    ASSESSMENT, INTERVENTIONS AND CONTINUING PLAN FOR GOAL:  Pt is alert to void and oriented, lethargic at start of shift, can have difficulties communicating needs at times. Pt had just bitten through TF line and was sitting at edge of bed. Pt reported headache and received tylenol prn, TF was restarted and pt education provided. NJ patent and bridled. Reduction in pain visible upon reassessment but pt continued to be anxious and frequently was pulling at NJ tubing so PRN Seroquel was administered with effect. Pt began sleeping for a short period and was more complain with staff. PIV intact in left hand, BG of 147 at 255 am, covered with 1 unit, attendant at bedside, unable to fully assess skin but notable wounds to LLE covered by CDI Mepilex, irregular apical pulse noted, Pt was provided Ice chips and immediatly began coughing after swallowing, no further care concerns at this time continue with POC.

## 2021-01-02 NOTE — PLAN OF CARE
OT: Pt sleeping soundly upon arrival. Pt refusing to participate or get up. 1:1 states he just got back into bed after sitting in chair for a while. Tried engaging pt to find out what a good starting time would be for him but he could not say. -60

## 2021-01-02 NOTE — PROGRESS NOTES
[facility]     Medicine Progress Note - Hospitalist Service       Date of Admission:  12/28/2020  Assessment & Plan        Mika Alvarez is a 70 year old man with a past medical history of systolic heart failure, A-fib, DM type 2, severe multivessel CAD, who presented 12/2/20 with acute systolic heart failure exacerbation required diuresis then underwent 4 vessel CABG 12/7/20, course complicated by severe dysphagia, possible sieuzre like activity left frontal lobe stroke, sepsis, acute hypoxia with small bi apical pneumothoraces and, altered mental status. Admitted to acute rehab unit 12/28/20 for ongoing rehabilitation and medical management. Internal medicine consulted for co-management.   Individual problems and their management are outlined below        #Multivessel CAD S/P CABG  #Acute diastolic and systolic heart failure, improving  - Midline incision healing well  -Reduce torsemide to 20 mg daily, Daily weights ( weight as of 12/31 at 177 lbs) - Was 175 on 12/29  BNP on 12/31 at 1732   -Monitor BMP Bi weekly   - Metoprolol  Further reduced  to 3.125 mg from  6.25 mg BID on 1/2 ( Patient has not really been getting this due to asymptomatic low hypotension) We will likely be able to give this once patient is off amiodarone   - ASA  - Atorvastatin  - Echo on 12/25:  The left ventricular wall motion is globally hypokinetic. The estimated  left ventricular ejection fraction is 35%. This represents a moderately  decreased ejection fraction.The right ventricle is mildly dilated and its systolic function is  severely reduced. TAPSE is abnormal, which is consistent with abnormal  right ventricular systolic function.  Biatrial volume is moderately increased.     #A-fib with RVR  - Currently rate controlled   - Continue amiodarone ( expected for 14 more days and stop. Last day on 1/11 ) . Metoprolol as above   -Of note, surgeon preference is to not anticoagulate for a-fib s/p  EMA( direct extract from patient's discharge summary)      #Uncontrolled type II diabetes mellitus, HgbA1c  12.6%  - Stop metformin 1000 mg BID on 1/1   - Stop Glipizide on 12/31 to prevent hypoglycemia   -  Increase Lantus to 19 units BID on 1/2  From 18  units BID on 1/1 , which was reduced from  23 units BID  ( from 25 units) on12/31 due to hypoglycemia    - SSI( Medium Intesnity) Q 4 hrs while on tube feeding ( Has not really been requiring this)   - Hypoglycemia protocol  - Diabetic diet  - When patient advances with oral diet, the regimen will have to be re-looked into         Acute hypoxic respiratory failure, resolved  -  Encourage IC  - Supplemental oxygen as needed  - CXR on 12/31 no acute airspace abnormalities      Acute metabolic encephalopathy  - Supportive care  - Likely multifactorial ( post operative status , stroke and possibly pre existing status secondary to uncontrolled DM type 2  And HTN)   - We may need to explore more around decision making capacity etc.      Subacute left frontal stroke with petechial hemorrhage  - ASA  - Atorvastatin  - Continue levetiracetam    #Sepsis, Resolved   - Fever, Leukocytosis, tachycardia noted 12/10/2020  - UA was negative. One of two blood cultures grew staph epi, likely contaminant.  - Sputum culture grew beta-hemolytic strep  - Started on emperic pip-tazo IV,  completed 7d on 12/17, fever and WBC resolving.  - WBC down to 11.5 on 12/31     #Severe pharyngeal dysphagia  # Malnutrition, unclassified     - SLP team to follow patient here in ARU   video swallow study on 12/21 showed ongoing significant dysphagia  - NGT in place  - Clinical monitoring       Primary hypertension, now with asymptomatic hypotension   - BP controlled  - Continue metoprolol tartrate ( has not been able to receive this due to hypotesnion  - Monitoring BP     # Bilateral Pneumothoraces   X Ray on 12/26 with  Tiny apical pneumothoraces without significant change. No consolidation or  focal pneumonia in the lungs. No pulmonary edema. No other significant change.  Continue to monitor clinically.  CXR on 12/31  With resolved pneumothoraces       VTE risk reduction. Subcutaneous heparin.                  Diet: NPO for Medical/Clinical Reasons Except for: NPO but receiving Tube Feeding, Ice Chips  Adult Formula Drip Feeding: Continuous Peptamen 1.5; Nasoduodenal tube; Goal Rate: 50; mL/hr; Medication - Feeding Tube Flush Frequency: At least 15-30 mL water before and after medication administration and with tube clogging; Amount to Send (Nut...    DVT Prophylaxis: Heparin SQ  Koenig Catheter: not present  Code Status: Full Code           Disposition Plan   Expected discharge:   To be determined by the primary team     The patient's care was discussed with the Bedside Nurse and Primary team.    Paulo Sroia MD  Hospitalist Service  [facility]   Contact information available via [facility] Paging/Directory    ______________________________________________________________________    Interval History   Events in the last 24 hrs noted. This morning, he is calm but expressing to want to go home  No acute events  Tolerating tube feeds       Data reviewed today: I reviewed all medications, new labs and imaging results over the last 24 hours. I personally reviewed no images or EKG's today.    Physical Exam   Vital Signs:     BP: 98/65 Pulse: 69   Resp: 16 SpO2: 99 % O2 Device: None (Room air)    Weight: 176 lbs 9.6 oz  General Appearance: Resting comfortably. Not in distress  Respiratory: Clear breath sounds bilaterally   Cardiovascular: Normal heart sounds. Irregular heart rate No murmurs   GI: Soft, non tender. Normal bowel sounds   Skin: Multiple areas in the lower extremity with dressings   Other: Awake and alert. Moving all 4 limbs     Data   Recent Labs   Lab 12/31/20  0538 12/29/20  0703   WBC 11.5* 11.9*   HGB 13.5 13.7   MCV 93 88    371     139   POTASSIUM 4.5 4.4   CHLORIDE 106 104   CO2 26 28   BUN 36* 36*   CR 1.05 1.08   ANIONGAP 6 7   JEET 9.0 8.9   GLC 72 146*

## 2021-01-02 NOTE — PLAN OF CARE
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      Other Barriers to Discharge (DME, Family Training, etc): limited participation thus far, cognition. Pt currently on 1:1 sitter

## 2021-01-02 NOTE — PLAN OF CARE
FOCUS/GOAL  Bladder management, Medical management, and Mobility    ASSESSMENT, INTERVENTIONS AND CONTINUING PLAN FOR GOAL:  CGA with walker/gaitbelt for transfers, continued on 1:1 attendant for safety. Continent of bladder today, no BM reported this shift. BP was 81/58 this morning, MD aware and metoprolol dose adjusted (see MAR); 98/65 and 109/74 on later follow up. NG tube intact and patent, TF infusing as ordered; pt tolerated TF and meds via NG tube well. Pt was irritable and uncooperative at times this morning, no aggressive behaviors towards staff observed or reported. Pt would keep his eyes closed and not answer writer's questions, or would mumble and become agitated. Pt appeared more calm this afternoon after PT; pt was conversing with writer about his current situation and also telling writer about when his wife had a stroke. Readily answered writer's questions at that time, and more agreeable to assessment. Pt alert to self and situation, reorientation provided by writer re: place and time. Pt denied pain or shortness of breath, expressed frustration about not being able to eat and having NG tube. Writer acknowledged pt's feelings, offered emotional support and encouragement to participate in therapies as much as he is able. No PRNs needed for behaviors this shift. Will continue with POC.

## 2021-01-02 NOTE — PROGRESS NOTES
Family member Joanie called and requested an update on patient.  Writer was able to give update re: NPO status, tube feeding status, bedside attendant being necessary due to patient's impulsivity, patient intermittently refusing therapies and becoming agitated, needing to call additional assistance from security and other staff at change of shift today (see separate direct care RN note).  Family member also wanted to speak with patient and patient declined, asking for them to try back tomorrow.

## 2021-01-02 NOTE — PLAN OF CARE
SLP:  -30 min.  Pt sleeping upon arrival, RN in room.  Pt refused despite reasoning.  Will plan to decrease intensity of tx given poor participation and refusal to complete cog/ling eval, will focus on dysphagia only at this time.  Cont NPO with Free Water Protocol for ice chips only (4-5/hour with chin tuck).  Planning for VFSS early next week.

## 2021-01-02 NOTE — PROGRESS NOTES
"  [facility]   Acute Rehabilitation Unit  Daily progress note    INTERVAL HISTORY    CC:  Debility    S: Denies SOB. Has been running low BP. Dose of meds reduced. Insulin dose adjusted.  On 1:1. \"Wants to get out of here!\"    ROS: No nausea. H/O sore throat + Raspy voice + GERD + Gets ice chips . RVS planned next week. Low BS    Overnight events reviewed.  Has zyprexa IM prn if needed.    H/O being agitated intermittently and requiring PRN dose of Seroquel.    Has 1:1 sitter: \"Pt is alert to void and oriented, lethargic at start of shift, can have difficulties communicating needs at times. Pt had just bitten through TF line and was sitting at edge of bed. Pt reported headache and received tylenol prn, TF was restarted and pt education provided. NJ patent and bridled. Reduction in pain visible upon reassessment but pt continued to be anxious and frequently was pulling at NJ tubing so PRN Seroquel was administered with effect. Pt began sleeping for a short period and was more complain with staff. PIV intact in left hand, BG of 147 at 255 am, covered with 1 unit, attendant at bedside, unable to fully assess skin but notable wounds to LLE covered by CDI Mepilex, irregular apical pulse noted, Pt was provided Ice chips and immediatly began coughing after swallowing, no further care concerns at this time continue with POC.  \"    RVS planned next week.    Functionally therapy evaluations/sessions have been limited by lack of participation or fatigue.          MEDICATIONS  Scheduled:    amiodarone  200 mg Per Feeding Tube Daily     aspirin  81 mg Per Feeding Tube Daily     atorvastatin  80 mg Per Feeding Tube QPM     famotidine  20 mg Per Feeding Tube BID     [Held by provider] glipiZIDE  5 mg Per Feeding Tube BID AC     heparin ANTICOAGULANT  5,000 Units Subcutaneous Q12H     insulin aspart  1-6 Units Subcutaneous Q4H     insulin glargine  19 Units Subcutaneous BID     levETIRAcetam  " 500 mg Per Feeding Tube BID     melatonin  3 mg Per Feeding Tube At Bedtime     [Held by provider] metFORMIN  1,000 mg Per Feeding Tube 2 times daily     metoprolol  3.3 mg Per Feeding Tube BID     multivitamins w/minerals  15 mL Per NG tube Daily     QUEtiapine  25 mg Per Feeding Tube At Bedtime     torsemide  20 mg Per Feeding Tube Daily        PRN:  acetaminophen, dextrose, glucose **OR** dextrose **OR** glucagon, OLANZapine, phenol, QUEtiapine       PHYSICAL EXAM  Patient Vitals for the past 24 hrs:   BP Pulse Resp SpO2   01/02/21 0951 109/74 74 16 --   01/02/21 0807 98/65 69 -- --   01/02/21 0627 (!) 81/58 73 16 99 %   01/01/21 2145 108/70 -- -- --       GEN: NAD, comfotable.  HEENT: NG tube in place  CVS: RRR, S1+S2, no m/r/g  PULM: CTA b/l, no w/r/r  ABD: Soft, NT, ND, bowel sounds present  EXT: No LE edema or calf tenderness b/l  Neuro: Answers appropriately, follows commands if he wants to.  +Dysphonia    LABS  EXAM: XR CHEST 2 VW  12/31/2020 12:23 PM       HISTORY: Cough, SOB     COMPARISON: None.     FINDINGS: Two views of the chest. Postsurgical changes of the chest.  Median sternotomy wires appear intact. Enteric tube is visualized  coursing inferiorly below the left hemidiaphragm and out of the field  of view. Trachea is midline. Cardiomediastinal silhouette and  pulmonary vasculature are within normal limits. No focal air space  opacity identified. No pleural effusion or pneumothorax.                                                                      IMPRESSION:  No acute airspace disease identified. Postsurgical changes of CABG.     I have personally reviewed the examination and initial interpretation  and I agree with the findings.     ZACH KAYE MD    CBC RESULTS:   Recent Labs   Lab Test 12/31/20  0538   WBC 11.5*   RBC 5.05   HGB 13.5   HCT 46.9   MCV 93   MCH 26.7   MCHC 28.8*   RDW 16.6*              Last Comprehensive Metabolic Panel:  Sodium   Date Value Ref Range Status    12/31/2020 138 133 - 144 mmol/L Final     Potassium   Date Value Ref Range Status   12/31/2020 4.5 3.4 - 5.3 mmol/L Final     Chloride   Date Value Ref Range Status   12/31/2020 106 94 - 109 mmol/L Final     Carbon Dioxide   Date Value Ref Range Status   12/31/2020 26 20 - 32 mmol/L Final     Anion Gap   Date Value Ref Range Status   12/31/2020 6 3 - 14 mmol/L Final     Glucose   Date Value Ref Range Status   12/31/2020 72 70 - 99 mg/dL Final     Urea Nitrogen   Date Value Ref Range Status   12/31/2020 36 (H) 7 - 30 mg/dL Final     Creatinine   Date Value Ref Range Status   12/31/2020 1.05 0.66 - 1.25 mg/dL Final     GFR Estimate   Date Value Ref Range Status   12/31/2020 72 >60 mL/min/[1.73_m2] Final     Comment:     Non  GFR Calc  Starting 12/18/2018, serum creatinine based estimated GFR (eGFR) will be   calculated using the Chronic Kidney Disease Epidemiology Collaboration   (CKD-EPI) equation.       Calcium   Date Value Ref Range Status   12/31/2020 9.0 8.5 - 10.1 mg/dL Final       Recent Labs   Lab 01/02/21  0948 01/02/21  0612 01/02/21  0302 01/01/21  2142 01/01/21  1731 01/01/21  1411 12/31/20  0538 12/31/20  0538 12/29/20  0703 12/29/20  0703   GLC  --   --   --   --   --   --   --  72  --  146*   * 160* 147* 152* 97 136*   < >  --    < >  --     < > = values in this interval not displayed.         IMPRESSION/PLAN:  Mika Alvarez is a 70 year old man with a past medical history of systolic heart failure, A-fib, DM type 2, severe multivessel CAD, who presented 12/2/20 with acute systolic heart failure exacerbation required diuresis then underwent 4 vessel CABG 12/7/20, course complicated by severe dysphagia, possible sieuzre like activity left frontal lobe stroke, sepsis, acute hypoxia with small bi apical pneumothoraces and, altered mental status. Admitted to acute rehab unit 12/28/20 for ongoing rehabilitation and medical management.         Admission to acute inpatient rehab s/p cabg x  4.    Impairment group code: 0.9        1. PT, OT and SLP 60 minutes of each on a daily basis, in addition to rehab nursing and close management of physiatrist.       2. Impairment of ADL's: Noted to have impaired strength, coordination, activity tolerance, and cognition leading to impaired ability to complete self cares, continue OT with goal for MOD I to I with basic adls.      3. Impairment of mobility:  Noted to have impaired strength, activity tolerance, and cognition leading to impaired mobility will benefit from ongoing PT with goal for MOD I to I with basic mobility and stairs.      4. Impairment of cognition/language/swallow:  Noted to have severe dysphagia and impaired cognition will benefit from ongoing SLP with goal for least restrictive diet tolerated and improved ability to communicate needs.      5. Medical Conditions  CAD  S/P CABG x 4 (12/7)  Acute on chronic systolic heart failure- 2/2 ischemic cardiomyopathy  HTN  A-fib with RVR-   Most recent Echo 12/25/20 with left ventricular wall globally hypokinetic, EF estimated 35%. Right ventricle mildly dilated with systolic function severely reduced.  Had A-fib with RVR early in hospital stay on amiodarone taper. surgeon preference is to not anticoagulate for a-fib   -continue amiodarone 200 mg daily x 2 weeks then stop  -continue metoprolol at reduced dose   -continue asa, statin  -sternal precautions (6 weeks from 12/7)- limit 10 lbs then as tolerated   - continue toresemide, decreased to 20 mg daily- appreciate hospitalist consult  -monitor bmp, weights, intake and output, edema/sob  -f/u cardiology as scheduled     Subacute Left frontal stroke with petechial hemorrhage  Possible simple partial seizure  Acute metabolic encephalopathy  Neurology consulted for left leg shaking POD 1, EEG neg, head CT obtained in setting of ongoing leg shaking and ams- demonstrated stroke 12/12 (most recent imaging stable)  1.  Petechial hemorrhage in the left frontal  opercular infarct correlating with the blood products noted on MRI,  Chronic left cerebellar infarct.  -follow up neurology within one month- Dr. Bailey/ [facility]- follow up possible seizure-   -continue keppra until neurology follow up  -bedside attendant dcd 12/17 though on admission 12/28 attempting to get out of bed- bedside attendant for safety  -continue seroquel at bedtime.   -Psychiatry  Increased Seroquel to 25 mg at bedtime and 25 mg TID PRN for agitation.  He had expressed suicidal ideations to therapy staff, however he denied any to psych team.    DM type 2- Hgb A1C 12.6%.   -Holding metformin 1000 mg bid, Off glipizide, On  lantus 18 units bid, and ssi   -continue to monitor glucose closely  -will need to titrate pending diet advancement/ tf cessation     oral pharyngeal dysphagia  Impaired oral intake -   slp monitoring currently NPO with nd in place 12/21 video swallow with ongoing significant dysphagia s/p ND placed 12/15.  -appreciate ongoing slp  -appreciate nutrition support  -continue tf     Possible aspiration pneumonia- fever, known dysphagia, leucocytosis,  hypoxia, other workup unremarkable received zosyn course seen by id.  Ongoing leucoctyosis WBC 13 12/28.    -appreciate ongoing SLP support  -trend WBC  -CXR as above.    Bilateral shin wounds- mepilex changes as ordered keep clean and dry.      Urinary retention- gardner recently removed, reportedly voiding without issue  -PVRs x2 low  May check PRN only.   -was started on flomax at prior hospital - had flomax suspension- as npo and only pill form will hold and monitor as above.        Creatinine elevation - Cr 1.08 BUN 36 12/29, recent Cr better  -monitor bmp  -Torsemide dose decreased to 20 mg daily     6. Adjustment to disability: plan for psychology consult when more consistently alert  7. FEN: NPO with tube feeds  8. Bowel: monitor  9. Bladder: PVRs WNL  10. DVT Prophylaxis: subcutaneous heparin  11. GI Prophylaxis: ppi  12. Code: full-  prior and patient confirmed on admission  13. Disposition: goal for home  14. ELOS:  2+ weeks.  15. Rehab prognosis:  Fair- medically complex with significant impairments in cognition and swallow and milder strength/activity tolerance deficits goal for home pending supervision needs and family support  16. Follow up Appointments on Discharge: cardiology, cv surgery, pcp.        Phi Cortes MD

## 2021-01-03 ENCOUNTER — APPOINTMENT (OUTPATIENT)
Dept: GENERAL RADIOLOGY | Facility: CLINIC | Age: 71
DRG: 056 | End: 2021-01-03
Attending: INTERNAL MEDICINE
Payer: MEDICARE

## 2021-01-03 LAB
ALBUMIN SERPL-MCNC: 2.8 G/DL (ref 3.4–5)
ALP SERPL-CCNC: 138 U/L (ref 40–150)
ALT SERPL W P-5'-P-CCNC: 16 U/L (ref 0–70)
ANION GAP SERPL CALCULATED.3IONS-SCNC: 9 MMOL/L (ref 3–14)
AST SERPL W P-5'-P-CCNC: 22 U/L (ref 0–45)
BASOPHILS # BLD AUTO: 0 10E9/L (ref 0–0.2)
BASOPHILS NFR BLD AUTO: 0.5 %
BILIRUB DIRECT SERPL-MCNC: 0.3 MG/DL (ref 0–0.2)
BILIRUB SERPL-MCNC: 0.8 MG/DL (ref 0.2–1.3)
BUN SERPL-MCNC: 28 MG/DL (ref 7–30)
CALCIUM SERPL-MCNC: 8.8 MG/DL (ref 8.5–10.1)
CHLORIDE SERPL-SCNC: 103 MMOL/L (ref 94–109)
CO2 SERPL-SCNC: 25 MMOL/L (ref 20–32)
CREAT SERPL-MCNC: 1.04 MG/DL (ref 0.66–1.25)
DIFFERENTIAL METHOD BLD: ABNORMAL
EOSINOPHIL # BLD AUTO: 0.4 10E9/L (ref 0–0.7)
EOSINOPHIL NFR BLD AUTO: 4.1 %
ERYTHROCYTE [DISTWIDTH] IN BLOOD BY AUTOMATED COUNT: 16.9 % (ref 10–15)
GFR SERPL CREATININE-BSD FRML MDRD: 73 ML/MIN/{1.73_M2}
GLUCOSE BLDC GLUCOMTR-MCNC: 165 MG/DL (ref 70–99)
GLUCOSE BLDC GLUCOMTR-MCNC: 168 MG/DL (ref 70–99)
GLUCOSE BLDC GLUCOMTR-MCNC: 185 MG/DL (ref 70–99)
GLUCOSE BLDC GLUCOMTR-MCNC: 186 MG/DL (ref 70–99)
GLUCOSE BLDC GLUCOMTR-MCNC: 189 MG/DL (ref 70–99)
GLUCOSE BLDC GLUCOMTR-MCNC: 204 MG/DL (ref 70–99)
GLUCOSE BLDC GLUCOMTR-MCNC: 222 MG/DL (ref 70–99)
GLUCOSE SERPL-MCNC: 224 MG/DL (ref 70–99)
HCT VFR BLD AUTO: 42.6 % (ref 40–53)
HGB BLD-MCNC: 13.2 G/DL (ref 13.3–17.7)
IMM GRANULOCYTES # BLD: 0 10E9/L (ref 0–0.4)
IMM GRANULOCYTES NFR BLD: 0.2 %
LACTATE BLD-SCNC: 1.5 MMOL/L (ref 0.7–2)
LACTATE BLD-SCNC: 3 MMOL/L (ref 0.7–2)
LYMPHOCYTES # BLD AUTO: 1.4 10E9/L (ref 0.8–5.3)
LYMPHOCYTES NFR BLD AUTO: 16.2 %
MCH RBC QN AUTO: 26.8 PG (ref 26.5–33)
MCHC RBC AUTO-ENTMCNC: 31 G/DL (ref 31.5–36.5)
MCV RBC AUTO: 87 FL (ref 78–100)
MONOCYTES # BLD AUTO: 0.6 10E9/L (ref 0–1.3)
MONOCYTES NFR BLD AUTO: 7.1 %
NEUTROPHILS # BLD AUTO: 6.4 10E9/L (ref 1.6–8.3)
NEUTROPHILS NFR BLD AUTO: 71.9 %
NRBC # BLD AUTO: 0 10*3/UL
NRBC BLD AUTO-RTO: 0 /100
PLATELET # BLD AUTO: 301 10E9/L (ref 150–450)
POTASSIUM SERPL-SCNC: 4.4 MMOL/L (ref 3.4–5.3)
PROT SERPL-MCNC: 7.3 G/DL (ref 6.8–8.8)
RBC # BLD AUTO: 4.92 10E12/L (ref 4.4–5.9)
SODIUM SERPL-SCNC: 137 MMOL/L (ref 133–144)
WBC # BLD AUTO: 8.8 10E9/L (ref 4–11)

## 2021-01-03 PROCEDURE — 99233 SBSQ HOSP IP/OBS HIGH 50: CPT | Performed by: INTERNAL MEDICINE

## 2021-01-03 PROCEDURE — 71045 X-RAY EXAM CHEST 1 VIEW: CPT | Mod: 26 | Performed by: RADIOLOGY

## 2021-01-03 PROCEDURE — 96372 THER/PROPH/DIAG INJ SC/IM: CPT | Performed by: PHYSICIAN ASSISTANT

## 2021-01-03 PROCEDURE — 80048 BASIC METABOLIC PNL TOTAL CA: CPT | Performed by: PHYSICAL MEDICINE & REHABILITATION

## 2021-01-03 PROCEDURE — 71045 X-RAY EXAM CHEST 1 VIEW: CPT

## 2021-01-03 PROCEDURE — 250N000011 HC RX IP 250 OP 636: Performed by: PHYSICIAN ASSISTANT

## 2021-01-03 PROCEDURE — 250N000013 HC RX MED GY IP 250 OP 250 PS 637: Performed by: PHYSICIAN ASSISTANT

## 2021-01-03 PROCEDURE — 272N000083 HC NUTRITION PRODUCT SEMIELEM INTERMED LITER

## 2021-01-03 PROCEDURE — 258N000003 HC RX IP 258 OP 636: Performed by: INTERNAL MEDICINE

## 2021-01-03 PROCEDURE — 250N000009 HC RX 250: Performed by: PHYSICAL MEDICINE & REHABILITATION

## 2021-01-03 PROCEDURE — 80076 HEPATIC FUNCTION PANEL: CPT | Performed by: PHYSICAL MEDICINE & REHABILITATION

## 2021-01-03 PROCEDURE — 128N000003 HC R&B REHAB

## 2021-01-03 PROCEDURE — 83605 ASSAY OF LACTIC ACID: CPT | Performed by: INTERNAL MEDICINE

## 2021-01-03 PROCEDURE — 36415 COLL VENOUS BLD VENIPUNCTURE: CPT | Performed by: INTERNAL MEDICINE

## 2021-01-03 PROCEDURE — 250N000013 HC RX MED GY IP 250 OP 250 PS 637: Performed by: INTERNAL MEDICINE

## 2021-01-03 PROCEDURE — 99233 SBSQ HOSP IP/OBS HIGH 50: CPT | Performed by: PHYSICAL MEDICINE & REHABILITATION

## 2021-01-03 PROCEDURE — 85025 COMPLETE CBC W/AUTO DIFF WBC: CPT | Performed by: PHYSICAL MEDICINE & REHABILITATION

## 2021-01-03 PROCEDURE — 999N001017 HC STATISTIC GLUCOSE BY METER IP

## 2021-01-03 PROCEDURE — 250N000013 HC RX MED GY IP 250 OP 250 PS 637: Performed by: PHYSICAL MEDICINE & REHABILITATION

## 2021-01-03 PROCEDURE — 99207 PR CDG-MDM COMPONENT: MEETS HIGH - UP CODED: CPT | Performed by: INTERNAL MEDICINE

## 2021-01-03 PROCEDURE — 250N000011 HC RX IP 250 OP 636: Performed by: PHYSICAL MEDICINE & REHABILITATION

## 2021-01-03 RX ORDER — DEXTROSE MONOHYDRATE, SODIUM CHLORIDE, SODIUM LACTATE, POTASSIUM CHLORIDE, CALCIUM CHLORIDE 5; 600; 310; 179; 20 G/100ML; MG/100ML; MG/100ML; MG/100ML; MG/100ML
INJECTION, SOLUTION INTRAVENOUS CONTINUOUS
Status: DISCONTINUED | OUTPATIENT
Start: 2021-01-03 | End: 2021-01-03

## 2021-01-03 RX ORDER — QUETIAPINE FUMARATE 25 MG/1
25-50 TABLET, FILM COATED ORAL 3 TIMES DAILY
Status: DISCONTINUED | OUTPATIENT
Start: 2021-01-03 | End: 2021-01-03

## 2021-01-03 RX ORDER — SODIUM CHLORIDE 9 MG/ML
INJECTION, SOLUTION INTRAVENOUS
Status: DISCONTINUED
Start: 2021-01-03 | End: 2021-01-04 | Stop reason: HOSPADM

## 2021-01-03 RX ORDER — QUETIAPINE FUMARATE 25 MG/1
25 TABLET, FILM COATED ORAL 3 TIMES DAILY
Status: DISCONTINUED | OUTPATIENT
Start: 2021-01-03 | End: 2021-01-05

## 2021-01-03 RX ADMIN — INSULIN ASPART 1 UNITS: 100 INJECTION, SOLUTION INTRAVENOUS; SUBCUTANEOUS at 21:25

## 2021-01-03 RX ADMIN — LEVETIRACETAM 500 MG: 100 SOLUTION ORAL at 11:01

## 2021-01-03 RX ADMIN — QUETIAPINE 25 MG: 25 TABLET ORAL at 01:11

## 2021-01-03 RX ADMIN — MULTIVITAMIN 15 ML: LIQUID ORAL at 11:00

## 2021-01-03 RX ADMIN — INSULIN ASPART 2 UNITS: 100 INJECTION, SOLUTION INTRAVENOUS; SUBCUTANEOUS at 06:11

## 2021-01-03 RX ADMIN — FAMOTIDINE 20 MG: 40 POWDER, FOR SUSPENSION ORAL at 11:00

## 2021-01-03 RX ADMIN — INSULIN ASPART 1 UNITS: 100 INJECTION, SOLUTION INTRAVENOUS; SUBCUTANEOUS at 02:36

## 2021-01-03 RX ADMIN — INSULIN ASPART 2 UNITS: 100 INJECTION, SOLUTION INTRAVENOUS; SUBCUTANEOUS at 14:28

## 2021-01-03 RX ADMIN — LEVETIRACETAM 500 MG: 100 SOLUTION ORAL at 21:09

## 2021-01-03 RX ADMIN — ASPIRIN 81 MG CHEWABLE TABLET 81 MG: 81 TABLET CHEWABLE at 10:59

## 2021-01-03 RX ADMIN — HEPARIN SODIUM 5000 UNITS: 5000 INJECTION, SOLUTION INTRAVENOUS; SUBCUTANEOUS at 11:01

## 2021-01-03 RX ADMIN — SODIUM CHLORIDE 250 ML: 9 INJECTION, SOLUTION INTRAVENOUS at 14:24

## 2021-01-03 RX ADMIN — FAMOTIDINE 20 MG: 40 POWDER, FOR SUSPENSION ORAL at 21:12

## 2021-01-03 RX ADMIN — QUETIAPINE 25 MG: 25 TABLET ORAL at 10:58

## 2021-01-03 RX ADMIN — INSULIN ASPART 1 UNITS: 100 INJECTION, SOLUTION INTRAVENOUS; SUBCUTANEOUS at 17:45

## 2021-01-03 RX ADMIN — AMIODARONE HYDROCHLORIDE 200 MG: 200 TABLET ORAL at 10:59

## 2021-01-03 RX ADMIN — ATORVASTATIN CALCIUM 80 MG: 80 TABLET, FILM COATED ORAL at 21:08

## 2021-01-03 RX ADMIN — HEPARIN SODIUM 5000 UNITS: 5000 INJECTION, SOLUTION INTRAVENOUS; SUBCUTANEOUS at 21:26

## 2021-01-03 RX ADMIN — TORSEMIDE 20 MG: 20 TABLET ORAL at 10:59

## 2021-01-03 RX ADMIN — POTASSIUM CHLORIDE: 2 INJECTION, SOLUTION, CONCENTRATE INTRAVENOUS at 16:57

## 2021-01-03 RX ADMIN — METOPROLOL TARTRATE 3.3 MG: 100 TABLET, FILM COATED ORAL at 11:21

## 2021-01-03 NOTE — PLAN OF CARE
"OT: Attempted to see pt for therapy session. Pt immediately pointing tot he door and telling therapist to \"get out of my room\" and that he wanted \"nothing to do with you\". Spoke with his nurse who reported that he was agitated this AM. Cx session. -60    Per nurse pt not appropriate for second session as they are awaiting x ray result regarding his feeding tube. -45  "

## 2021-01-03 NOTE — PROGRESS NOTES
This patient meets Sepsis Predictive Model Criteria (more info) and may be septic.    * Monitor vitals every 30 min for 1 hour.   * Order STAT LACTIC ACID level    Predictive Model Details           .4 (Low) Factors Contributing to Score   Calculated 1/3/2021 13:17 52% Number of incisions is 11   Early Detection of Sepsis Model 19% SIRS temperature criterion is met     10% Age is 70     8% SIRS respirations criterion is met     6% Number of pressure ulcers is 10     1% Number of active analgesic antipyretic orders is 2     1% Sex is male     1% Number of peripheral IVs is 1     1% Relationship status is      0% Number of active beta blocker orders is 2     0% Number of active saline orders is 1     0% Number of active loop diuretic orders is 1       Most Recent Vitals:    Temp: 95.3  F (35.2  C) (01/03 1306)  Pulse: 75 (01/03 1306)  Resp: 22 (01/03 1306)  BP: 87/67 (01/03 1306)    Lab Results   Component Value Date    WBC 11.5 12/31/2020       Click Accept and SELECT Standing Order - MD Sign. Then Sign the Order.     Labs ordered. IM following. Doubt sepsis. Added IV fluids.   TF to be held as NJ is now NG.    Phi Cortes MD

## 2021-01-03 NOTE — PLAN OF CARE
"FOCUS/GOAL  Mobility and Safety management    ASSESSMENT, INTERVENTIONS AND CONTINUING PLAN FOR GOAL:  Ao1 with walker/gait belt for transfers, continued on 1:1 attendant for safety. Continent of bladder this shift. Continued to have low BPs this shift, Dr Cortes aware. Pt was resting quietly in bed with eyes closed at change of shift bedside report this morning; approximately one hour later when writer in to check on him, pt appeared restless and agitated (swinging arms in the air, pulling at nasal tube and call light, using profanities). Writer observed that pt's nasal tube bridle had become detached. Nasal tube still in place to right nare. TF immediately stopped, and pt allowed writer to assist him with untangling cord/tube. Pt denied any pain or shortness of breath, but as writer attempted further assessment pt stated multiple times to \"leave me alone\". Shortly after, pt was agreeable to having tape placed to secure nasal tube, nasal tube taped/secured by Doreen COTO flyer. Dr Cortes, Dr Gomez, and Dr Bates all aware of situation; xray ordered to verify NG tube placement. Results of xray were reviewed by Dr Bates, who ordered that the nasal tube is ok to use for meds and feeding. TF was resumed, and morning meds and PRN seroquel administered. Attempt x1 this afternoon by Raina COTO to replace the nasal tube bridle, code green was initiated prior to procedure to ensure pt and staff safety and prevent escalation of pt behaviors; nursing supervisor aware. Procedure was not able to be completed, as pt was uncooperative and spitting at staff. MDs aware, and advised to hold TF and lantus insulin at this time. Septic protocol also triggered this afternoon, with lactate of 3.0 reported; all MDs aware with new orders received. NS bolus administered IV as ordered. New order noted for scheduled seroquel to begin this afternoon; 2pm dose held d/t pt being somnolent, Dr Gomez updated. Will continue with " POC.

## 2021-01-03 NOTE — PROGRESS NOTES
"  [facility]   Acute Rehabilitation Unit  Daily progress note    INTERVAL HISTORY    CC:  Debility    S: Intermittently agitated.  Pulled at his NJ. Now at distal stomach versus pylorus.    Had been running low BP. Dose of meds reduced. Insulin dose held until NJ confirmed with Xray.    On 1:1. His goal:  \"Wants to get out of here!\"    ROS: No nausea. H/O sore throat + Raspy voice + GERD + Gets ice chips . RVS planned next week. Low BS    Has zyprexa IM prn if needed. Prn Seroquel. May need it scheduled TID.    H/O being agitated intermittently and requiring PRN dose of Seroquel.    Has 1:1 sitter: \"Pt is alert to void and oriented, lethargic at start of shift, can have difficulties communicating needs at times. Pt had just bitten through TF line and was sitting at edge of bed. Pt reported headache and received tylenol prn, TF was restarted and pt education provided. NJ patent and bridled. Reduction in pain visible upon reassessment but pt continued to be anxious and frequently was pulling at NJ tubing so PRN Seroquel was administered with effect. Pt began sleeping for a short period and was more complain with staff. PIV intact in left hand, BG of 147 at 255 am, covered with 1 unit, attendant at bedside, unable to fully assess skin but notable wounds to LLE covered by CDI Mepilex, irregular apical pulse noted, Pt was provided Ice chips and immediatly began coughing after swallowing, no further care concerns at this time continue with POC.  \"    RVS planned next week.    Functionally therapy evaluations/sessions have been limited by lack of participation or fatigue.          MEDICATIONS  Scheduled:    amiodarone  200 mg Per Feeding Tube Daily     aspirin  81 mg Per Feeding Tube Daily     atorvastatin  80 mg Per Feeding Tube QPM     famotidine  20 mg Per Feeding Tube BID     [Held by provider] glipiZIDE  5 mg Per Feeding Tube BID AC     heparin ANTICOAGULANT  5,000 Units " Subcutaneous Q12H     insulin aspart  1-6 Units Subcutaneous Q4H     insulin glargine  15 Units Subcutaneous BID     levETIRAcetam  500 mg Per Feeding Tube BID     melatonin  3 mg Per Feeding Tube At Bedtime     [Held by provider] metFORMIN  1,000 mg Per Feeding Tube 2 times daily     metoprolol  3.3 mg Per Feeding Tube BID     multivitamins w/minerals  15 mL Per NG tube Daily     QUEtiapine  25 mg Per Feeding Tube At Bedtime     torsemide  20 mg Per Feeding Tube Daily        PRN:  acetaminophen, dextrose, glucose **OR** dextrose **OR** glucagon, OLANZapine, phenol, QUEtiapine       PHYSICAL EXAM  Patient Vitals for the past 24 hrs:   BP Temp Temp src Pulse Resp SpO2   01/03/21 1051 110/71 -- -- -- -- --   01/03/21 0741 (!) 82/64 95.2  F (35.1  C) Oral 86 17 97 %   01/02/21 2122 102/60 -- -- -- -- --   01/02/21 2110 (!) 85/49 -- -- -- -- --   01/02/21 1528 114/77 95.4  F (35.2  C) Axillary 75 16 97 %       GEN: In bed. Does not interact to conversation.  HEENT: NG tube+   CVS: RRR, S1+S2, no m/r/g  PULM: CTA b/l, no w/r/r  ABD: Soft, NT, ND, bowel sounds present  EXT: No LE edema or calf tenderness b/l  Neuro: Has answered appropriately, follows commands if he wants to.  +Dysphonia    LABS  EXAM: XR CHEST 2 VW  12/31/2020 12:23 PM       HISTORY: Cough, SOB     COMPARISON: None.     FINDINGS: Two views of the chest. Postsurgical changes of the chest.  Median sternotomy wires appear intact. Enteric tube is visualized  coursing inferiorly below the left hemidiaphragm and out of the field  of view. Trachea is midline. Cardiomediastinal silhouette and  pulmonary vasculature are within normal limits. No focal air space  opacity identified. No pleural effusion or pneumothorax.                                                                      IMPRESSION:  No acute airspace disease identified. Postsurgical changes of CABG.     I have personally reviewed the examination and initial interpretation  and I agree with the  findings.     ZACH KAYE MD    CBC RESULTS:   Recent Labs   Lab Test 12/31/20  0538   WBC 11.5*   RBC 5.05   HGB 13.5   HCT 46.9   MCV 93   MCH 26.7   MCHC 28.8*   RDW 16.6*        EXAM: XR CHEST 1 VW 1/3/2021       HISTORY: possible dislodged feeding tube.     COMPARISON: Previous day.      TECHNIQUE: Supine frontal view of the chest.     FINDINGS: Feeding tube tip projects over the medial right upper  quadrant. Post CABG changes. Mild interstitial prominence. Heart is  enlarged. No focal airspace consolidation. No abnormally dilated loops  of bowel in the visualized upper abdomen..                                                                      IMPRESSION: Feeding tube tip projects over the medial right upper  quadrant, possibly near the distal stomach or pylorus.      Last Comprehensive Metabolic Panel:  Sodium   Date Value Ref Range Status   12/31/2020 138 133 - 144 mmol/L Final     Potassium   Date Value Ref Range Status   12/31/2020 4.5 3.4 - 5.3 mmol/L Final     Chloride   Date Value Ref Range Status   12/31/2020 106 94 - 109 mmol/L Final     Carbon Dioxide   Date Value Ref Range Status   12/31/2020 26 20 - 32 mmol/L Final     Anion Gap   Date Value Ref Range Status   12/31/2020 6 3 - 14 mmol/L Final     Glucose   Date Value Ref Range Status   12/31/2020 72 70 - 99 mg/dL Final     Urea Nitrogen   Date Value Ref Range Status   12/31/2020 36 (H) 7 - 30 mg/dL Final     Creatinine   Date Value Ref Range Status   12/31/2020 1.05 0.66 - 1.25 mg/dL Final     GFR Estimate   Date Value Ref Range Status   12/31/2020 72 >60 mL/min/[1.73_m2] Final     Comment:     Non  GFR Calc  Starting 12/18/2018, serum creatinine based estimated GFR (eGFR) will be   calculated using the Chronic Kidney Disease Epidemiology Collaboration   (CKD-EPI) equation.       Calcium   Date Value Ref Range Status   12/31/2020 9.0 8.5 - 10.1 mg/dL Final       Recent Labs   Lab 01/03/21  1042 01/03/21  0610  01/03/21  0235 01/02/21  2206 01/02/21  1809 01/02/21  1408 12/31/20  0538 12/31/20  0538 12/29/20  0703 12/29/20  0703   GLC  --   --   --   --   --   --   --  72  --  146*   * 222* 189* 217* 185* 191*   < >  --    < >  --     < > = values in this interval not displayed.         IMPRESSION/PLAN:  Mika Alvarez is a 70 year old man with a past medical history of systolic heart failure, A-fib, DM type 2, severe multivessel CAD, who presented 12/2/20 with acute systolic heart failure exacerbation required diuresis then underwent 4 vessel CABG 12/7/20, course complicated by severe dysphagia, possible sieuzre like activity left frontal lobe stroke, sepsis, acute hypoxia with small bi apical pneumothoraces and, altered mental status. Admitted to acute rehab unit 12/28/20 for ongoing rehabilitation and medical management.         Admission to acute inpatient rehab s/p cabg x 4.    Impairment group code: 0.9        1. PT, OT and SLP 60 minutes of each on a daily basis, in addition to rehab nursing and close management of physiatrist.       2. Impairment of ADL's: Noted to have impaired strength, coordination, activity tolerance, and cognition leading to impaired ability to complete self cares, continue OT with goal for MOD I to I with basic adls.      3. Impairment of mobility:  Noted to have impaired strength, activity tolerance, and cognition leading to impaired mobility will benefit from ongoing PT with goal for MOD I to I with basic mobility and stairs.      4. Impairment of cognition/language/swallow:  Noted to have severe dysphagia and impaired cognition will benefit from ongoing SLP with goal for least restrictive diet tolerated and improved ability to communicate needs.      5. Medical Conditions  CAD  S/P CABG x 4 (12/7)  Acute on chronic systolic heart failure- 2/2 ischemic cardiomyopathy  HTN  A-fib with RVR-   Most recent Echo 12/25/20 with left ventricular wall globally hypokinetic, EF estimated 35%. Right  ventricle mildly dilated with systolic function severely reduced.  Had A-fib with RVR early in hospital stay on amiodarone taper. surgeon preference is to not anticoagulate for a-fib   -continue amiodarone 200 mg daily x 2 weeks then stop  -continue metoprolol at reduced dose   -continue asa, statin  -sternal precautions (6 weeks from 12/7)- limit 10 lbs then as tolerated   - continue toresemide, decreased to 20 mg daily- appreciate hospitalist consult  -monitor bmp, weights, intake and output, edema/sob  -f/u cardiology as scheduled     Subacute Left frontal stroke with petechial hemorrhage  Possible simple partial seizure  Acute metabolic encephalopathy  Neurology consulted for left leg shaking POD 1, EEG neg, head CT obtained in setting of ongoing leg shaking and ams- demonstrated stroke 12/12 (most recent imaging stable)  1.  Petechial hemorrhage in the left frontal opercular infarct correlating with the blood products noted on MRI,  Chronic left cerebellar infarct.  -follow up neurology within one month- Dr. Bailey/ [facility]- follow up possible seizure-   -continue keppra until neurology follow up  -bedside attendant dcd 12/17 though on admission 12/28 attempting to get out of bed- bedside attendant for safety  -continue seroquel at bedtime.   -Psychiatry  Increased Seroquel to 25 mg at bedtime and 25 mg TID PRN for agitation.  He had expressed suicidal ideations to therapy staff, however he denied any to psych team. Change to scheduled.    DM type 2- Hgb A1C 12.6%.   -Holding metformin 1000 mg bid, Off glipizide, On  lantus 18 units bid, and ssi   -continue to monitor glucose closely  -will need to titrate pending diet advancement/ tf cessation     oral pharyngeal dysphagia  Impaired oral intake -   slp monitoring currently NPO with nd in place 12/21 video swallow with ongoing significant dysphagia s/p ND placed 12/15.  -appreciate ongoing slp  -appreciate nutrition support  -continue tf once tube is in optimal  position.      Possible aspiration pneumonia- fever, known dysphagia, leucocytosis,  hypoxia, other workup unremarkable received zosyn course seen by id.  Ongoing leucoctyosis WBC 13 12/28.    -appreciate ongoing SLP support  -trend WBC  -CXR as above.    Bilateral shin wounds- mepilex changes as ordered keep clean and dry.      Urinary retention- gardner recently removed, reportedly voiding without issue  -PVRs x2 low  May check PRN only.   -was started on flomax at prior hospital - had flomax suspension- as npo and only pill form will hold and monitor as above.        Creatinine elevation - Cr 1.08 BUN 36 12/29, recent Cr better  -monitor bmp  -Torsemide dose decreased to 20 mg daily     6. Adjustment to disability: plan for psychology consult when more consistently alert  7. FEN: NPO with tube feeds  8. Bowel: monitor  9. Bladder: PVRs WNL  10. DVT Prophylaxis: subcutaneous heparin  11. GI Prophylaxis: ppi  12. Code: full- prior and patient confirmed on admission  13. Disposition: goal for home  14. ELOS:  2+ weeks.  15. Rehab prognosis:  Fair- medically complex with significant impairments in cognition and swallow and milder strength/activity tolerance deficits goal for home pending supervision needs and family support  16. Follow up Appointments on Discharge: cardiology, cv surgery, pcp.    May be able to get medications via NG. TF once in NJ.    70 minutes, >50% for care coordination.    Phi Cortes MD

## 2021-01-03 NOTE — PLAN OF CARE
See previous POC note.  Held this pm per team, due to agitation.  Will reassess tomorrow and continue per POC.      Johnathan Tariq, PT  1/3/2021

## 2021-01-03 NOTE — PLAN OF CARE
FOCUS/GOAL  Medication management and Medical management    ASSESSMENT, INTERVENTIONS AND CONTINUING PLAN FOR GOAL:  Alert to self, A1 walker. Needs to be anticipated, bedside attendant present. Pt was agitated during the beginning of night shift; he started to pull at his tube feeding line; PRN seroquel given and patient was relatively quiet for the rest of the night.   Pt denied pain during night.  Continent of B/B, LBM 1/1.

## 2021-01-03 NOTE — PLAN OF CARE
FOCUS/GOAL  Bowel management, Bladder management, Nutrition/Feeding/Swallowing precautions, Pain management, Mobility, and Cognition/Memory/Judgment/Problem solving    ASSESSMENT, INTERVENTIONS AND CONTINUING PLAN FOR GOAL:    Alert and oriented at beginning of shift, began to decline and become irritable and aggressive, throwing things in the room and slamming call light on bed rail. At ~1930, prn Seroquel given. Patient became calm about 30-40mins after dose. Currently sleeping without incident. On call provider aware, Zyprexa is available in patient cubby if needed. Patient is tolerating continuous TF, but did mention he is upset that he cant eat solid foods. /217 this shift. At bedtime patient was sidelying right side and refused to lay lat supine for BP check for metoprolol dose. 85/49 side lying, patient did lay supine for a couple minutes and writer was able to obtain another /60, metoprolol held tonight. Denies pain, uses urinal with staff assistance. LBM 1/1/20 no further concerns at this time.

## 2021-01-03 NOTE — PROGRESS NOTES
"CLINICAL NUTRITION SERVICES - BRIEF NOTE     Nutrition Prescription    RECOMMENDATIONS FOR MDs/PROVIDERS TO ORDER:  None today    Malnutrition Status:    Unable to determine    Recommendations already ordered by Registered Dietitian (RD):  None today    Future/Additional Recommendations:  Adjust EN pending as needed pending advancement of tube      REASON FOR REASSESSMENT  Mika Alvarez is a 70 year old male assessed by the dietitian for Provider Order - \"Registered Dietitian to order TF per Medical Nutrition Therapy Guidelines, Has NG tube now. May need to bolus while sitting three times a day.\".    Findings  Pt pulled NDT, now at distal stomach. Initial plan to transition to bolus feedings. Now plan to hold feeding, give IVF and radiology to advance tube tomorrow.     INTERVENTIONS  Implementation  None today    Follow up/Monitoring  Progress toward goals will be monitored and evaluated per protocol.    Nena Shields MS, RD, LDN  Unit Pager [phone]    "

## 2021-01-03 NOTE — PROGRESS NOTES
Sepsis Evaluation Progress Note    I was called to see Mika Alvarez due to abnormal vital signs triggering the Sepsis SIRS screening alert. He is not known to have an infection.     Physical Exam   Vital Signs:  Temp: 95.3  F (35.2  C) Temp src: Oral BP: (!) 87/67 Pulse: 75   Resp: 22 SpO2: 97 % O2 Device: None (Room air)       General: in no acute distress  Mental Status: baseline mental status.     Patient has ongoing encephalopathy post operatively, including stroke.  He has pulled out his NJ tube out by a few cms and it has been difficult to re-initiate feeding   Denies chest pain or SOB   Denies fevers or chills   Clear lungs  Irregular HR ( known AF)    Data   Lactate for Sepsis Protocol   Date Value Ref Range Status   01/03/2021 3.0 (H) 0.7 - 2.0 mmol/L Final     Comment:     Significant value called to and read back by  MARCI ROBBINS RN AT 1320 ON 1.3.21          Assessment & Plan   NO EVIDENCE OF SEPSIS at this time.  Vital sign, physical exam, and lab findings are likely due to Hypotension .       Plan:    Bolus NS at 250 mls   Following this Start D5 Lactate  with Potassium @ 75mls/hr   Hold off on tube feeding for now till it can be advanced to a J tube tomorrow by radiology. Contine sliding scale insulin and hold Lantus    Recheck Lactat at 1530 hrs ( Will sign out to oncoming HO)  Discussed with primary team     Disposition: The patient will remain on the current unit. We will continue to monitor this patient closely..  Paulo Soria MD    Sepsis Criteria   Sepsis: 2+ SIRS criteria due to infection  Severe Sepsis: Sepsis AND 1+ new sign of acute organ dysfunction (Note: lactate >2 or acute encephalopathy each qualify as organ dysfunction)  Septic Shock: Sepsis AND hypotension despite volume resuscitation with 30 ml/kg crystalloid or lactate >=4  Note: HYPOTENSION is defined as 2 BP readings measured 3 hrs apart that have a SBP <90, MAP <65, or decrease >40 mmHg, occurring 6 hrs before or  after t-zero

## 2021-01-03 NOTE — PROGRESS NOTES
[facility]     Medicine Progress Note - Hospitalist Service       Date of Admission:  12/28/2020  Assessment & Plan        Mika Alvarez is a 70 year old man with a past medical history of systolic heart failure, A-fib, DM type 2, severe multivessel CAD, who presented 12/2/20 with acute systolic heart failure exacerbation required diuresis then underwent 4 vessel CABG 12/7/20, course complicated by severe dysphagia, possible sieuzre like activity left frontal lobe stroke, sepsis, acute hypoxia with small bi apical pneumothoraces and, altered mental status. Admitted to acute rehab unit 12/28/20 for ongoing rehabilitation and medical management. Internal medicine consulted for co-management.   Individual problems and their management are outlined below        #Multivessel CAD S/P CABG  #Acute diastolic and systolic heart failure, improving  - Midline incision healing well  -Reduce torsemide to 20 mg daily, Daily weights ( weight as of 12/31 at 177 lbs) - Was 175 on 12/29  BNP on 12/31 at 1732   -Monitor BMP Bi weekly   - Metoprolol  Further reduced  to 3.125 mg from  6.25 mg BID on 1/2 ( Patient has not really been getting this due to asymptomatic low hypotension) We will likely be able to give this once patient is off amiodarone   - ASA  - Atorvastatin  - Echo on 12/25:  The left ventricular wall motion is globally hypokinetic. The estimated  left ventricular ejection fraction is 35%. This represents a moderately  decreased ejection fraction.The right ventricle is mildly dilated and its systolic function is  severely reduced. TAPSE is abnormal, which is consistent with abnormal  right ventricular systolic function.  Biatrial volume is moderately increased.     #A-fib with RVR  - Currently rate controlled   - Continue amiodarone ( expected for 14 more days and stop. Last day on 1/11 ).  Since he has not converted, it may make sense to stop amiodarone at 7 days  and resume  metoprolol   Metoprolol as above   -Of note, surgeon preference is to not anticoagulate for a-fib s/p LAAE( direct extract from patient's discharge summary)      #Uncontrolled type II diabetes mellitus, HgbA1c  12.6%  - Stopped metformin 1000 mg BID on 1/1   - Stopped Glipizide on 12/31 to prevent hypoglycemia   - Decrease lantus to 15 units BID due to interruption in tube feeding, secondary to pulling out the tube ( NPO otherwise). Admitted with 25 units BID, but was titrated down to 19 units BID   - SSI( Medium Intesnity) Q 4 hrs while on tube feeding ( Has not really been requiring this)   - Hypoglycemia protocol  - When patient advances with oral diet, the regimen will have to be re-looked into         Acute hypoxic respiratory failure, resolved  -  Encourage IC  - Supplemental oxygen as needed  - CXR on 12/31 no acute airspace abnormalities      Acute metabolic encephalopathy  Intermittent agitation   - Supportive care  - Likely multifactorial ( post operative status , stroke and possibly pre existing status secondary to uncontrolled DM type 2  And HTN)   - Encephalopathy with intermittent agitation continues to be a significant barrier to progress   - It does appear that patient does not have decision making cacpcity at this time      Subacute left frontal stroke with petechial hemorrhage  - ASA  - Atorvastatin  - Continue levetiracetam    #Sepsis, Resolved   - Fever, Leukocytosis, tachycardia noted 12/10/2020  - UA was negative. One of two blood cultures grew staph epi, likely contaminant.  - Sputum culture grew beta-hemolytic strep  - Started on emperic pip-tazo IV,  completed 7d on 12/17, fever and WBC resolving.  - WBC down to 11.5 on 12/31     #Severe pharyngeal dysphagia  # Malnutrition, unclassified     - SLP team to follow patient here in ARU   video swallow study on 12/21 showed ongoing significant dysphagia  - NGT in place, but was pulled out and the bridle was disrupted on 1/3 am. Repeat CXR does  show that the feeding tube is still near the pylorus. Secure now with tapes, but it will need to be bridled  - Clinical monitoring       Primary hypertension, now with asymptomatic hypotension   - Continue metoprolol tartrate ( has not been able to receive this due to hypotesnion)  - Monitoring BP     # Bilateral Pneumothoraces   X Ray on 12/26 with  Tiny apical pneumothoraces without significant change. No consolidation or focal pneumonia in the lungs. No pulmonary edema. No other significant change.  Continue to monitor clinically.  CXR on 12/31  With resolved pneumothoraces       VTE risk reduction. Subcutaneous heparin.                  Diet: NPO for Medical/Clinical Reasons Except for: NPO but receiving Tube Feeding, Ice Chips  Adult Formula Drip Feeding: Continuous Peptamen 1.5; Nasoduodenal tube; Goal Rate: 50; mL/hr; Medication - Feeding Tube Flush Frequency: At least 15-30 mL water before and after medication administration and with tube clogging; Amount to Send (Nut...    DVT Prophylaxis: Heparin SQ  Koenig Catheter: not present  Code Status: Full Code           Disposition Plan   Expected discharge:   To be determined by the primary team     The patient's care was discussed with the Bedside Nurse and Primary team.    Paulo Soria MD  Hospitalist Service  [facility]   Contact information available via [facility] Paging/Directory    ______________________________________________________________________    Interval History   Events from last night and early morning noted   appears that he has pulled the NGT a little and the bridle has come off  Angry and upset this morning  Says he does not like to be here  Denies chest pain or SOB      Data reviewed today: I reviewed all medications, new labs and imaging results over the last 24 hours. I personally reviewed no images or EKG's today.    Physical Exam   Vital Signs: Temp: 95.2  F (35.1  C) Temp src: Oral  BP: 110/71 Pulse: 66   Resp: 17 SpO2: 97 % O2 Device: None (Room air)    Weight: 176 lbs 9.6 oz  General Appearance: Resting comfortably. Not in distress  Respiratory: Clear breath sounds bilaterally   Cardiovascular: Normal heart sounds. Irregular heart rate No murmurs   GI: Soft, non tender. Normal bowel sounds   Skin: Multiple areas in the lower extremity with dressings   Other: Awake and alert. Moving all 4 limbs     Data   Recent Labs   Lab 12/31/20  0538 12/29/20  0703   WBC 11.5* 11.9*   HGB 13.5 13.7   MCV 93 88    371    139   POTASSIUM 4.5 4.4   CHLORIDE 106 104   CO2 26 28   BUN 36* 36*   CR 1.05 1.08   ANIONGAP 6 7   JEET 9.0 8.9   GLC 72 146*

## 2021-01-04 ENCOUNTER — APPOINTMENT (OUTPATIENT)
Dept: GENERAL RADIOLOGY | Facility: CLINIC | Age: 71
DRG: 056 | End: 2021-01-04
Attending: PHYSICAL MEDICINE & REHABILITATION
Payer: MEDICARE

## 2021-01-04 LAB
ANION GAP SERPL CALCULATED.3IONS-SCNC: 4 MMOL/L (ref 3–14)
BUN SERPL-MCNC: 26 MG/DL (ref 7–30)
CALCIUM SERPL-MCNC: 9.1 MG/DL (ref 8.5–10.1)
CHLORIDE SERPL-SCNC: 105 MMOL/L (ref 94–109)
CO2 SERPL-SCNC: 31 MMOL/L (ref 20–32)
CREAT SERPL-MCNC: 1.2 MG/DL (ref 0.66–1.25)
ERYTHROCYTE [DISTWIDTH] IN BLOOD BY AUTOMATED COUNT: 17.2 % (ref 10–15)
GFR SERPL CREATININE-BSD FRML MDRD: 61 ML/MIN/{1.73_M2}
GLUCOSE BLDC GLUCOMTR-MCNC: 183 MG/DL (ref 70–99)
GLUCOSE BLDC GLUCOMTR-MCNC: 191 MG/DL (ref 70–99)
GLUCOSE BLDC GLUCOMTR-MCNC: 208 MG/DL (ref 70–99)
GLUCOSE BLDC GLUCOMTR-MCNC: 318 MG/DL (ref 70–99)
GLUCOSE BLDC GLUCOMTR-MCNC: 369 MG/DL (ref 70–99)
GLUCOSE BLDC GLUCOMTR-MCNC: 398 MG/DL (ref 70–99)
GLUCOSE SERPL-MCNC: 186 MG/DL (ref 70–99)
HCT VFR BLD AUTO: 42.3 % (ref 40–53)
HGB BLD-MCNC: 13.4 G/DL (ref 13.3–17.7)
MAGNESIUM SERPL-MCNC: 2.2 MG/DL (ref 1.6–2.3)
MCH RBC QN AUTO: 27.2 PG (ref 26.5–33)
MCHC RBC AUTO-ENTMCNC: 31.7 G/DL (ref 31.5–36.5)
MCV RBC AUTO: 86 FL (ref 78–100)
PHOSPHATE SERPL-MCNC: 4.3 MG/DL (ref 2.5–4.5)
PLATELET # BLD AUTO: 323 10E9/L (ref 150–450)
POTASSIUM SERPL-SCNC: 4.4 MMOL/L (ref 3.4–5.3)
RBC # BLD AUTO: 4.93 10E12/L (ref 4.4–5.9)
SODIUM SERPL-SCNC: 140 MMOL/L (ref 133–144)
WBC # BLD AUTO: 7.7 10E9/L (ref 4–11)

## 2021-01-04 PROCEDURE — 250N000013 HC RX MED GY IP 250 OP 250 PS 637: Performed by: PHYSICAL MEDICINE & REHABILITATION

## 2021-01-04 PROCEDURE — 272N000083 HC NUTRITION PRODUCT SEMIELEM INTERMED LITER

## 2021-01-04 PROCEDURE — 99233 SBSQ HOSP IP/OBS HIGH 50: CPT | Mod: GC | Performed by: PHYSICAL MEDICINE & REHABILITATION

## 2021-01-04 PROCEDURE — 250N000011 HC RX IP 250 OP 636: Performed by: PHYSICAL MEDICINE & REHABILITATION

## 2021-01-04 PROCEDURE — 74230 X-RAY XM SWLNG FUNCJ C+: CPT | Mod: 26 | Performed by: RADIOLOGY

## 2021-01-04 PROCEDURE — 250N000013 HC RX MED GY IP 250 OP 250 PS 637: Performed by: INTERNAL MEDICINE

## 2021-01-04 PROCEDURE — 36415 COLL VENOUS BLD VENIPUNCTURE: CPT | Performed by: PHYSICIAN ASSISTANT

## 2021-01-04 PROCEDURE — 99233 SBSQ HOSP IP/OBS HIGH 50: CPT | Performed by: INTERNAL MEDICINE

## 2021-01-04 PROCEDURE — 99207 PR CDG-MDM COMPONENT: MEETS HIGH - UP CODED: CPT | Performed by: INTERNAL MEDICINE

## 2021-01-04 PROCEDURE — 128N000003 HC R&B REHAB

## 2021-01-04 PROCEDURE — 84100 ASSAY OF PHOSPHORUS: CPT | Performed by: PHYSICIAN ASSISTANT

## 2021-01-04 PROCEDURE — 85027 COMPLETE CBC AUTOMATED: CPT | Performed by: PHYSICIAN ASSISTANT

## 2021-01-04 PROCEDURE — 83735 ASSAY OF MAGNESIUM: CPT | Performed by: PHYSICIAN ASSISTANT

## 2021-01-04 PROCEDURE — 250N000009 HC RX 250: Performed by: PHYSICAL MEDICINE & REHABILITATION

## 2021-01-04 PROCEDURE — 97110 THERAPEUTIC EXERCISES: CPT | Mod: GP

## 2021-01-04 PROCEDURE — 999N001017 HC STATISTIC GLUCOSE BY METER IP

## 2021-01-04 PROCEDURE — 250N000011 HC RX IP 250 OP 636: Performed by: PHYSICIAN ASSISTANT

## 2021-01-04 PROCEDURE — 92611 MOTION FLUOROSCOPY/SWALLOW: CPT | Mod: GN

## 2021-01-04 PROCEDURE — 250N000013 HC RX MED GY IP 250 OP 250 PS 637: Performed by: PHYSICIAN ASSISTANT

## 2021-01-04 PROCEDURE — 96372 THER/PROPH/DIAG INJ SC/IM: CPT | Performed by: PHYSICIAN ASSISTANT

## 2021-01-04 PROCEDURE — 97110 THERAPEUTIC EXERCISES: CPT | Mod: GO | Performed by: STUDENT IN AN ORGANIZED HEALTH CARE EDUCATION/TRAINING PROGRAM

## 2021-01-04 PROCEDURE — 74230 X-RAY XM SWLNG FUNCJ C+: CPT

## 2021-01-04 PROCEDURE — 80048 BASIC METABOLIC PNL TOTAL CA: CPT | Performed by: PHYSICIAN ASSISTANT

## 2021-01-04 RX ORDER — BARIUM SULFATE 400 MG/ML
SUSPENSION ORAL ONCE
Status: COMPLETED | OUTPATIENT
Start: 2021-01-04 | End: 2021-01-04

## 2021-01-04 RX ADMIN — INSULIN ASPART 1 UNITS: 100 INJECTION, SOLUTION INTRAVENOUS; SUBCUTANEOUS at 04:54

## 2021-01-04 RX ADMIN — INSULIN ASPART 2 UNITS: 100 INJECTION, SOLUTION INTRAVENOUS; SUBCUTANEOUS at 10:27

## 2021-01-04 RX ADMIN — QUETIAPINE FUMARATE 25 MG: 25 TABLET ORAL at 20:40

## 2021-01-04 RX ADMIN — POTASSIUM CHLORIDE: 2 INJECTION, SOLUTION, CONCENTRATE INTRAVENOUS at 04:57

## 2021-01-04 RX ADMIN — INSULIN ASPART 5 UNITS: 100 INJECTION, SOLUTION INTRAVENOUS; SUBCUTANEOUS at 14:02

## 2021-01-04 RX ADMIN — ATORVASTATIN CALCIUM 80 MG: 80 TABLET, FILM COATED ORAL at 22:05

## 2021-01-04 RX ADMIN — AMIODARONE HYDROCHLORIDE 200 MG: 200 TABLET ORAL at 11:51

## 2021-01-04 RX ADMIN — TORSEMIDE 20 MG: 20 TABLET ORAL at 11:51

## 2021-01-04 RX ADMIN — Medication 3 MG: at 22:05

## 2021-01-04 RX ADMIN — LEVETIRACETAM 500 MG: 100 SOLUTION ORAL at 22:06

## 2021-01-04 RX ADMIN — ASPIRIN 81 MG CHEWABLE TABLET 81 MG: 81 TABLET CHEWABLE at 11:51

## 2021-01-04 RX ADMIN — HEPARIN SODIUM 5000 UNITS: 5000 INJECTION, SOLUTION INTRAVENOUS; SUBCUTANEOUS at 09:49

## 2021-01-04 RX ADMIN — METOPROLOL TARTRATE 3.3 MG: 100 TABLET, FILM COATED ORAL at 11:51

## 2021-01-04 RX ADMIN — HEPARIN SODIUM 5000 UNITS: 5000 INJECTION, SOLUTION INTRAVENOUS; SUBCUTANEOUS at 22:02

## 2021-01-04 RX ADMIN — LEVETIRACETAM 500 MG: 100 SOLUTION ORAL at 11:50

## 2021-01-04 RX ADMIN — BARIUM SULFATE 20 ML: 400 SUSPENSION ORAL at 11:40

## 2021-01-04 RX ADMIN — BARIUM SULFATE 15 ML: 400 SUSPENSION ORAL at 11:40

## 2021-01-04 RX ADMIN — FAMOTIDINE 20 MG: 40 POWDER, FOR SUSPENSION ORAL at 11:51

## 2021-01-04 RX ADMIN — METOPROLOL TARTRATE 3.3 MG: 100 TABLET, FILM COATED ORAL at 22:06

## 2021-01-04 RX ADMIN — FAMOTIDINE 20 MG: 40 POWDER, FOR SUSPENSION ORAL at 22:05

## 2021-01-04 RX ADMIN — INSULIN ASPART 2 UNITS: 100 INJECTION, SOLUTION INTRAVENOUS; SUBCUTANEOUS at 00:43

## 2021-01-04 RX ADMIN — MULTIVITAMIN 15 ML: LIQUID ORAL at 11:51

## 2021-01-04 NOTE — PROGRESS NOTES
01/04/21 1100   General Information   Onset of Illness/Injury or Date of Surgery 12/02/20   Referring Physician Dr. Jerome   Behavioral Issues other (see comments)  (some agitation reported 1:1 sitter present)   Pertinent History of Current Problem Mika Alvarez is a 70 year old man with past medical history of heart failure, A-fib, HTN, HLD,  DM type 2, and known multi vessel CAD who presented with acute decompensated heart failure 12/2/20. He was diuresed and underwent four vessel CABG 12/7/20.  Post operative course was complicated by A-fib with RVR, severe dysphagia, altered mental status, possible simple partial seizure (leg jerking post CABG),  Left frontal stroke, urinary retention, and leucocytosis and fever of unclear etiology.    General Observations Patient has had multiple prior VFSS studies.  Recommended to complete VFSS approximately 1 to 2 weeks after the most recent study which was completed on 12/21.  Patient has been n.p.o.  Of significant note, patient's NG was pulled by patient.   Type of Evaluation   Type of Evaluation Swallow Evaluation   General Swallowing Observations   Swallowing Evaluation Videofluoroscopic swallow study (VFSS)   VFSS Evaluation   Views Taken left lateral   Physical Location of Procedure [place]   VFSS Textures Trialed Thin Liquids;Nectar-Thick Liquids;Honey-Thick Liquids;Purees;Semi-Solid   VFSS Eval: Thin Liquid Texture Trial   Mode of Presentation, Thin Liquid cup;spoon;self-fed   Order of Presentation 8, 9, 10   Preparatory Phase WFL   Oral Phase, Thin Liquid Premature pharyngeal entry   Pharyngeal Phase, Thin Liquid Residue in valleculae;Residue in pyriform sinus   Rosenbek's Penetration Aspiration Scale: Thin Liquid Trial Results 8 - contrast passes glottis, visible subglottic residue remains, absent patient response (aspiration)   Successful Strategies Trialed During Procedure, Thin Liquid chin tuck   Diagnostic Statement 2 swallow episodes were done via teaspoon  which were tolerated without difficulties.  A small drink by cup resulted in some initial penetration.  This was due to kickback from piriform residue from prior swallows.  The ultimate aspiration that occurred was silent though did present with a delayed cough presumably when aspirated material continue to work its way down the trachea. Consistently requiring multiple swallows.    VFSS Evaluation: Nectar Thick Liquid Texture Trial   Mode of Presentation, Nectar cup;spoon;self-fed   Order of Presentation 5, 6, 7, 13   Preparatory Phase WFL   Oral Phase, Nectar Premature pharyngeal entry   Pharyngeal Phase, Nectar Residue in valleculae;Residue in pyriform sinus   Rosenbek's Penetration Aspiration Scale: Nectar-Thick Liquid Trial Results 1 - no aspiration, contrast does not enter airway   Successful Strategies Trialed During Procedure, Nectar chin tuck   Diagnostic Statement Vallecular and pyriform residue but less than the HTL.    VFSS Evaluation: Honey Thick Liquid Texture Trial   Mode of Presentation, Honey self-fed   Order of Presentation 1, 2, 3, 4   Preparatory Phase WFL   Oral Phase, Honey Premature pharyngeal entry   Pharyngeal Phase, Honey Residue in valleculae;Residue in pyriform sinus   Rosenbek's Penetration Aspiration Scale: Honey Trial Results 4 - contrast contacts vocal cords, no residue remains (penetration)   Successful Strategies Trialed During Procedure, Honey chin tuck   Diagnostic Statement Patient had flash penetration x1 as well as deep penetration to the vocal folds x1.  Both instances of penetration were due to piriform residue and the kickback resulting from subsequent swallows.  Patient consistently requiring multiple swallows to clear the majority of the bolus.   VFSS Evaluation: Puree Solid Texture Trial   Mode of Presentation, Puree spoon;self-fed   Order of Presentation 11   Preparatory Phase WFL   Oral Phase, Puree WFL   Pharyngeal Phase, Puree Residue in valleculae;Residue in pyriform  sinus   Rosenbek's Penetration Aspiration Scale: Puree Food Trial Results 1 - no aspiration, contrast does not enter airway   Diagnostic Statement High amount of vallecular and piriform residue.  Cleared minimally with multiple swallows.  Patient was able to clear with use of liquid wash with nectar thick liquids.   VFSS Evaluation: Semisolid Texture Trial   Mode of Presentation, Semisolid spoon;self-fed   Order of Presentation 12   Preparatory Phase WFL   Oral Phase, Semisolid WFL   Pharyngeal Phase, Semisolid Residue in valleculae;Residue in pyriform sinus   Rosenbek's Penetration Aspiration Scale: Semisolid Food Trial Results 1 - no aspiration, contrast does not enter airway   Diagnostic Statement High amount of piriform and vallecular residue that again cleared with nectar thick liquid wash   Swallowing Recommendations   Diet Consistency Recommendations nectar-thick liquids;dysphagia level 1 (pureed)   Supervision Level for Intake 1:1 supervision needed   Mode of Delivery Recommendations bolus size, small;no straws   Postural Recommendations chin tuck   Swallowing Maneuver Recommendations alternate food and liquid intake   SLP Therapy Assessment/Plan   Criteria for Skilled Therapeutic Interventions Met (SLP Eval) yes   SLP Diagnosis Moderate oral pharyngeal dysphagia   Rehab Potential (SLP Eval) good, to achieve stated therapy goals   Therapy Frequency (SLP Eval) daily   Predicted Duration of Therapy Intervention (SLP Eval) 2 weeks   Comment, Therapy Assessment/Plan (SLP) VFSS completed.  Patient overall showing good improvement since prior VFSS which was completed on 12/21.  Patient consistently showing high amounts of vallecular and piriform residue across all textures.  This was worse with the thicker textures.  The residue in piriforms also led to silent aspiration with thin liquids from the kick back and slowly trickling thin ultimately aspirating with no response.  Patient did have a delayed cough when  bolus went significantly lower.  Chin tuck was helpful for decreasing aspiration risk.  Patient was also able to clear most of the vallecular residue with use of a liquid wash.  After extended conversation with both patient and his family, decision reached to initiate patient on dysphagia level 1 diet with nectar thick liquids.  Patient should utilize chin tuck maneuver as well as alternate solids and liquids with the drier puréed textures.  Patient currently has a one-to-one which will be able to assist in utilizing the strategies.  Patient would have a low threshold for holding p.o. intake if showing outward signs and symptoms of aspiration.  We will plan to follow-up with patient over the next couple of days to ensure consistent tolerance.    Total Evaluation Time   Total Evaluation Time (Minutes) 60

## 2021-01-04 NOTE — PLAN OF CARE
FOCUS/GOAL  Bowel management, Bladder management, Pain management and Cognition/Memory/Judgment/Problem solving    ASSESSMENT, INTERVENTIONS AND CONTINUING PLAN FOR GOAL:  Pt is alert and oriented with slight agitation at start of shift that subsided overnight, Pt denied pain, sob, fever, chills or new numbness/tingling. Continent of bladder using urinal with minimal assistance. NG tube removed as it was clogged and no longer working but agitating patient. Once NG was removed pt became much more compliant and kinder to staff. PIV in left forearm infusing dextrose 5% with Potasium at 75 ml/hr running to maintain BG.  and 183 overnight covered by NPH. Dressing to LLE skin tears intact, unable to visualize sternal and abdominal incisions which are ELVA. Education was provided to patient regarding dysphasia and the possibility of aspiration and why it is possible and likely that a NJ will need to be replaced. Pt was not receptive and stated that he would refuse it. Pt is addiment that he gets video swallow done so that he can eat something, stating that he is dreaming about food. No further care concerns at this time continue with POC.

## 2021-01-04 NOTE — PROGRESS NOTES
CLINICAL NUTRITION SERVICES - REASSESSMENT NOTE     Nutrition Prescription    RECOMMENDATIONS FOR MDs/PROVIDERS TO ORDER:  None today    Malnutrition Status:    Unable to determine     Recommendations already ordered by Registered Dietitian (RD):  Diet education  Magic cup TID  Calorie count    Future/Additional Recommendations:  Adjust supplements per pt preference  Additional recs pending calorie count      EVALUATION OF THE PROGRESS TOWARD GOALS   Diet: Dysphagia Level 1: Pureed and Nectar Thickened Liquids   Nutrition Support: NDT removed  Intake: Ate pudding and had a thickened beverage following VFSS       NEW FINDINGS   Feeding tube was removed 1/3 d/t clog. Pt refused to have it replaced. Has VFSS this morning, diet upgraded to pureed diet with NTL. Pt is extremely happy about diet advancement, was tearful when discussing pureed menu options. Pt stated he does not mind the consistency restrictions as long as he gets to eat. Agreeable to magic cup for AM, PM and HS snack. Has order for 3 day calorie count.  69# (28.2%) wt loss in 3 months  12/31/20  80.1 kg (176 lb 9.6 oz)   12/31/20  80.3 kg (177 lb)   12/29/20  79.4 kg (175 lb 1.6 oz)   12/28/20  80.3 kg (177 lb 1.6 oz)   10/08/20 100.6 kg (221 lb 12.8 oz)   09/23/20 111.3 kg (245 lb 6.4 oz)-reported 35# wt gain d/t fluid during this admit     MALNUTRITION  % Intake: Previously meeting needs via EN  % Weight Loss: > 7.5% in 3 months (severe)  Subcutaneous Fat Loss: Did not assess  Muscle Loss: Did not assess   Fluid Accumulation/Edema: None noted  Malnutrition Diagnosis: Unable to determine due to no NFPA, at least at risk for malnutrition     Previous Goals   Total avg nutritional intake to meet a minimum of 25 kcal/kg and 1.0 g PRO/kg daily (per dosing wt 80.3 kg).  Evaluation: previously met via EN now     Previous Nutrition Diagnosis  Inadequate oral intake related to chewing/swallowing difficulty as evidenced by NPO status with pt requiring EN to meet  estimated needs     Evaluation: Improving    CURRENT NUTRITION DIAGNOSIS  Inadequate oral intake related to chewing/swallowing difficulty as evidenced by altered consistency diet with unknown intakes and refusal to replace feeding tube.     INTERVENTIONS  Implementation  Diet education: discussed pureed/nectar thick menu and supplement options. Encouraged intakes to meet needs and avoid need to replace feeding tube  Magic cup TID  Calorie count    Goals  Patient to consume % of nutritionally adequate meal trays TID, or the equivalent with supplements/snacks.    Monitoring/Evaluation  Progress toward goals will be monitored and evaluated per protocol.    Nena Shields MS, RD, LDN  Unit Pager [phone]

## 2021-01-04 NOTE — PROGRESS NOTES
[facility]   Acute Rehabilitation Unit  Daily progress note    INTERVAL HISTORY  Mika Alvarez seen and examined at the bedside this morning.  Patient sitting comfortably in bed.  Patient got his video swallow study this morning and was cleared for nectar thick diet, patient was happy about it.  We discussed with the patient that we will do calorie counts, see if he is able to tolerate nectar thick liquids well and see the need for PEG tube as required.  Patient denied to get PEG tube, said that he does not want any tubes in his body.    He denies any nausea, headache, vomiting    Functionally  As per PT notes:  Transfer: CGA  Bed Mobility: CGA-MIN A  Gait: 40 feet x 2' CGA with FWW  Stairs: NT  Balance: NT       MEDICATIONS  Scheduled:    amiodarone  200 mg Per Feeding Tube Daily     aspirin  81 mg Per Feeding Tube Daily     atorvastatin  80 mg Per Feeding Tube QPM     famotidine  20 mg Per Feeding Tube BID     [Held by provider] glipiZIDE  5 mg Per Feeding Tube BID AC     heparin ANTICOAGULANT  5,000 Units Subcutaneous Q12H     insulin aspart  1-6 Units Subcutaneous Q4H     [Held by provider] insulin glargine  15 Units Subcutaneous BID     levETIRAcetam  500 mg Per Feeding Tube BID     melatonin  3 mg Per Feeding Tube At Bedtime     [Held by provider] metFORMIN  1,000 mg Per Feeding Tube 2 times daily     metoprolol  3.3 mg Per Feeding Tube BID     multivitamins w/minerals  15 mL Per NG tube Daily     QUEtiapine  25 mg Per Feeding Tube TID     torsemide  20 mg Per Feeding Tube Daily        PRN:  acetaminophen, dextrose, glucose **OR** dextrose **OR** glucagon, OLANZapine, phenol, QUEtiapine       PHYSICAL EXAM  Patient Vitals for the past 24 hrs:   BP Temp Temp src Pulse Resp SpO2   01/04/21 0721 110/77 96.7  F (35.9  C) Oral 54 16 96 %   01/03/21 2202 107/73 97.2  F (36.2  C) Oral 77 16 97 %   01/03/21 2148 107/73 (P) 97.2  F (36.2  C) Axillary 96 16 97 %   01/03/21 1646 106/80  95.4  F (35.2  C) Oral 82 16 92 %   01/03/21 1306 (!) 87/67 95.3  F (35.2  C) Oral 75 22 97 %       GEN: Alert, oriented, not in acute distress, sitting comfortably in bed.  HEENT: Neck supple, extraocular movement intact.  CVS: S1-S2 present, regular, rate, rhythm.  PULM: Clear to auscultation bilaterally, breathing comfortably on room air.  ABD: Soft, NT, ND, bowel sounds present  EXT: No LE edema or calf tenderness b/l  Neuro: Has answered appropriately, follows commands if he wants to.  +Dysphonia    LABS/IMAGING    CBC RESULTS:   Lab Results   Component Value Date    WBC 7.7 01/04/2021     Lab Results   Component Value Date    RBC 4.93 01/04/2021     Lab Results   Component Value Date    HGB 13.4 01/04/2021     Lab Results   Component Value Date    HCT 42.3 01/04/2021     Lab Results   Component Value Date    MCV 86 01/04/2021     Lab Results   Component Value Date    MCH 27.2 01/04/2021     Lab Results   Component Value Date    MCHC 31.7 01/04/2021     Lab Results   Component Value Date    RDW 17.2 01/04/2021     Lab Results   Component Value Date     01/04/2021         Last Comprehensive Metabolic Panel:  Sodium   Date Value Ref Range Status   01/04/2021 140 133 - 144 mmol/L Final     Potassium   Date Value Ref Range Status   01/04/2021 4.4 3.4 - 5.3 mmol/L Final     Chloride   Date Value Ref Range Status   01/04/2021 105 94 - 109 mmol/L Final     Carbon Dioxide   Date Value Ref Range Status   01/04/2021 31 20 - 32 mmol/L Final     Anion Gap   Date Value Ref Range Status   01/04/2021 4 3 - 14 mmol/L Final     Glucose   Date Value Ref Range Status   01/04/2021 186 (H) 70 - 99 mg/dL Final     Urea Nitrogen   Date Value Ref Range Status   01/04/2021 26 7 - 30 mg/dL Final     Creatinine   Date Value Ref Range Status   01/04/2021 1.20 0.66 - 1.25 mg/dL Final     GFR Estimate   Date Value Ref Range Status   01/04/2021 61 >60 mL/min/[1.73_m2] Final     Comment:     Non  GFR Calc  Starting  12/18/2018, serum creatinine based estimated GFR (eGFR) will be   calculated using the Chronic Kidney Disease Epidemiology Collaboration   (CKD-EPI) equation.       Calcium   Date Value Ref Range Status   01/04/2021 9.1 8.5 - 10.1 mg/dL Final       Recent Labs   Lab 01/04/21  1014 01/04/21  0614 01/04/21  0453 01/04/21  0041 01/03/21  2123 01/03/21  1739 01/03/21  1408 01/03/21  1308 12/31/20  0538 12/31/20  0538 12/29/20  0703 12/29/20  0703   GLC  --  186*  --   --   --   --   --  224*  --  72  --  146*   *  --  183* 191* 186* 165* 204*  --    < >  --    < >  --     < > = values in this interval not displayed.     EXAM: XR CHEST 1 VW 1/3/2021       HISTORY: possible dislodged feeding tube.     COMPARISON: Previous day.      TECHNIQUE: Supine frontal view of the chest.     FINDINGS: Feeding tube tip projects over the medial right upper  quadrant. Post CABG changes. Mild interstitial prominence. Heart is  enlarged. No focal airspace consolidation. No abnormally dilated loops  of bowel in the visualized upper abdomen..                                                                      IMPRESSION: Feeding tube tip projects over the medial right upper  quadrant, possibly near the distal stomach or pylorus.        IMPRESSION/PLAN:  Mika Alvarez is a 70 year old man with a past medical history of systolic heart failure, A-fib, DM type 2, severe multivessel CAD, who presented 12/2/20 with acute systolic heart failure exacerbation required diuresis then underwent 4 vessel CABG 12/7/20, course complicated by severe dysphagia, possible sieuzre like activity left frontal lobe stroke, sepsis, acute hypoxia with small bi apical pneumothoraces and, altered mental status. Admitted to acute rehab unit 12/28/20 for ongoing rehabilitation and medical management.         Admission to acute inpatient rehab s/p cabg x 4.    Impairment group code: 0.9     1. PT, OT and SLP 60 minutes of each on a daily basis, in addition to rehab  nursing and close management of physiatrist.       2. Impairment of ADL's: Noted to have impaired strength, coordination, activity tolerance, and cognition leading to impaired ability to complete self cares, continue OT with goal for MOD I to I with basic adls.      3. Impairment of mobility:  Noted to have impaired strength, activity tolerance, and cognition leading to impaired mobility will benefit from ongoing PT with goal for MOD I to I with basic mobility and stairs.      4. Impairment of cognition/language/swallow:  Noted to have severe dysphagia and impaired cognition will benefit from ongoing SLP with goal for least restrictive diet tolerated and improved ability to communicate needs.     Medical Conditions  #CAD  #S/P CABG x 4 (12/7)  #Acute on chronic systolic heart failure- 2/2 ischemic cardiomyopathy  #HTN  #A-fib with RVR-   Most recent Echo 12/25/20 with left ventricular wall globally hypokinetic, EF estimated 35%. Right ventricle mildly dilated with systolic function severely reduced.  Had A-fib with RVR early in hospital stay on amiodarone taper. surgeon preference is to not anticoagulate for a-fib   -continue amiodarone 200 mg daily x 2 weeks then stop  -continue metoprolol at reduced dose   -continue asa, statin  -sternal precautions (6 weeks from 12/7)- limit 10 lbs then as tolerated   -continue toresemide, decreased to 20 mg daily- appreciate hospitalist recs  -monitor bmp, weights, intake and output, edema/sob  -f/u cardiology as scheduled     #Subacute Left frontal stroke with petechial hemorrhage  #Possible simple partial seizure  #Acute metabolic encephalopathy  Neurology consulted for left leg shaking POD 1, EEG neg, head CT obtained in setting of ongoing leg shaking and ams- demonstrated stroke 12/12 (most recent imaging stable)  1.  Petechial hemorrhage in the left frontal opercular infarct correlating with the blood products noted on MRI,  Chronic left cerebellar infarct.  -follow up  neurology within one month- Dr. Bailey/ [facility]- follow up possible seizure-   -continue keppra until neurology follow up  -bedside attendant dcd 12/17 though on admission 12/28 attempting to get out of bed- bedside attendant for safety  -continue seroquel at bedtime.   -Psychiatry  Increased Seroquel to 25 mg at bedtime and 25 mg TID PRN for agitation.  He had expressed suicidal ideations to therapy staff, however he denied any to psych team. Change to scheduled.    #DM type 2- Hgb A1C 12.6%.   -Holding metformin 1000 mg bid, glipizide,   -On  lantus 10 units bid, and ssi   -Continue to monitor glucose closely, Appreciate hospitalist team recs.      #oral pharyngeal dysphagia  #Impaired oral intake -   slp monitoring currently NPO with nd in place 12/21 video swallow with ongoing significant dysphagia s/p ND placed 12/15.  -appreciate ongoing slp  -appreciate nutrition support  -Video swallow study done on 1/4/2021 and was cleared for nectar thick diet.     #Possible aspiration pneumonia- fever, known dysphagia, leucocytosis,  hypoxia, other workup unremarkable received zosyn course seen by id.  Ongoing leucoctyosis WBC 13 12/28.    -appreciate ongoing SLP support  -trend WBC  -CXR as above.    #Bilateral shin wounds- mepilex changes as ordered keep clean and dry.      #Urinary retention- gardner recently removed, reportedly voiding without issue  -PVRs x2 low  May check PRN only.   -was started on flomax at prior hospital - had flomax suspension- as npo and only pill form will hold and monitor as above.        #Creatinine elevation - Cr 1.08 BUN 36 12/29, Cr 1.20 on 1/4/2021  -monitor bmp  -Torsemide dose decreased to 20 mg daily     1. Adjustment to disability: plan for psychology consult when more consistently alert  2. FEN: NPO with tube feeds  3. Bowel: monitor  4. Bladder: PVRs WNL  5. DVT Prophylaxis: subcutaneous heparin  6. GI Prophylaxis: ppi  7. Code: full- prior and patient confirmed on  admission  8. Disposition: goal for home  9. ELOS:  2+ weeks.  10. Rehab prognosis:  Fair- medically complex with significant impairments in cognition and swallow and milder strength/activity tolerance deficits goal for home pending supervision needs and family support  11. Follow up Appointments on Discharge: cardiology, cv surgery, pcp.    Patient was seen and discussed with my staff physician Dr. Jerome, who agrees with my assessment and plan.    Alexandre Gomez   PGY 2  Physical Medicine and Rehabilitation  Pager: [phone]

## 2021-01-04 NOTE — PLAN OF CARE
Focus: Physio  D: Tired this morning as he did not sleep well last night. Refusing to have NJ tube placement. NPO. D5 LR with 20meq of kcl infusing at 75ml hour. Up with assist of one and walker. Continent of bowel and bladder. Last bm 12/1/2021. I: Swallow evaluation at 11:00 today with SLP. P: Continue to monitor.     10:30. Calm and cooperative. He knows he will be going down for his swallow evaluation soon. P: Continue current plan of care.   10:40 Mika's daughter Joanie notified of the swallow study happening at 11:00.  12:00: Mika's daughter Joanie updated on how he is doing with his new diet.   IVF stopped due to eating well. He is drinking well and eating well. P: Continue current plan of care.

## 2021-01-04 NOTE — PROGRESS NOTES
Received email from pt DIL this morning with pictures of pt and pt family. Will print and bring them to pt room.     SW called pt trish Benoit in hopes of following up on discussion last week. Left VM, waiting for a return call.     HENRIK Lester, [facility] Acute Rehab Unit   Phone: [phone]  I   Pager: [phone]

## 2021-01-04 NOTE — PLAN OF CARE
Refill requested for:   Sertraline 100mg tab    Last refill:   10/22/2018 #90, 3 refills    Last office visit: 08/08/2018      Scheduled office visit: 10/25/2019    Medication not refilled per protocol. Refill requested too soon.        VFSS completed.  See progress note for further details.  At this time recommending diet advancement to NDD-1 food textures with nectar thick liquids.  Patient should currently have strict one-to-one supervision with all meals.  Patient should be fully upright and alert for all p.o. intake.  Patient should also use chin tuck maneuver as well as alternating of solids and liquids for the drier textures.  Patient should have a low threshold for holding p.o. intake if showing outward signs and symptoms of aspiration.

## 2021-01-04 NOTE — PLAN OF CARE
Patient is alert, orientation ZAHRAA due to patient being lethargic.  Assist of 1 with walker. Sitter at bedside. Patient denies pain during shift. Continent of bowel and bladder.      Prior to shift patient had pulled out his NG tube, placement was verified per report.  Seroquel and melatonin held because patient very lethargic. PRM on call and hospitalist notified. While giving medications, NG had clogged and remaining medication, Metoprolol, was not given.  Dr. Montgomery on call notified , Zapper unclog attempted, unsuccessful. Dr. Montgomery notified, per MD lopez to withold Metoprolol and placed an order to remove NG tube.     Patient placed on D5 LR KCL 20mEq started at 1657 continous. Blood sugars checked every 4 hours, last checked at 21:23 186. Lactate acid recheck 17:40, 1.5.  Continue POC.

## 2021-01-04 NOTE — PROGRESS NOTES
[facility]     Medicine Progress Note - Hospitalist Service       Date of Admission:  12/28/2020  Assessment & Plan        Mika Alvarez is a 70 year old man with a past medical history of systolic heart failure, A-fib, DM type 2, severe multivessel CAD, who presented 12/2/20 with acute systolic heart failure exacerbation required diuresis then underwent 4 vessel CABG 12/7/20, course complicated by severe dysphagia, possible sieuzre like activity left frontal lobe stroke, sepsis, acute hypoxia with small bi apical pneumothoraces and, altered mental status. Admitted to acute rehab unit 12/28/20 for ongoing rehabilitation and medical management. Internal medicine consulted for co-management.   Individual problems and their management are outlined below        #Multivessel CAD S/P CABG  #Acute diastolic and systolic heart failure, improving  - Midline incision healing well  -Reduce torsemide to 20 mg daily, Daily weights ( weight as of 12/31 at 177 lbs) - Was 175 on 12/29  BNP on 12/31 at 1732   -Monitor BMP Bi weekly   - Metoprolol  Further reduced  to 3.125 mg from  6.25 mg BID on 1/2 ( Patient has not really been getting this due to asymptomatic low hypotension) We will likely be able to give this once patient is off amiodarone   - ASA  - Atorvastatin  Although the above changes have been made, patient has been unable to get his meds for the last 24 hrs as the NJ feeding tube is out and patient is strictly NPO, pending SLP evaluation today ( 1/4)  Per SLP, initiated oral diet on DD1 with nectar thickened fluids     - Echo on 12/25:  The left ventricular wall motion is globally hypokinetic. The estimated  left ventricular ejection fraction is 35%. This represents a moderately  decreased ejection fraction.The right ventricle is mildly dilated and its systolic function is  severely reduced. TAPSE is abnormal, which is consistent with abnormal  right ventricular systolic  function.  Biatrial volume is moderately increased.    Given the reduction in the requirement of diuretics and relatively clear lungs, one would be inclined to think his heart functioning has improved from the 12/25 finding            #A-fib with RVR  - Currently rate controlled   - Continue amiodarone ( expected for 14 more days and stop. Last day on 1/11 ).  Since he has not converted, it may make sense to stop amiodarone at 7 days  and resume metoprolol   Metoprolol as above   -Of note, surgeon preference is to not anticoagulate for a-fib s/p LAAE( direct extract from patient's discharge summary)      #Uncontrolled type II diabetes mellitus, HgbA1c  12.6%  - Stopped metformin 1000 mg BID on 1/1   - Stopped Glipizide on 12/31 to prevent hypoglycemia   - Patient was on BID lantus ( 19 units BID) when he was on continious tube feeding. Given that the feeding tube is out and Patinet is NPO, Lantus iwas held  Following initiation of DD1 type diet, will resume Lantus at 10 units BID ( starting tonight)  - SSI( Medium Intesnity)   - Hypoglycemia protocol  - When patient advances with oral diet, the regimen will have to be re-looked into     Acute hypoxic respiratory failure, resolved  -  Encourage IC  - Supplemental oxygen as needed  - CXR on 12/31 no acute airspace abnormalities      Acute metabolic encephalopathy  Intermittent agitation   - Supportive care  - Likely multifactorial ( post operative status , stroke and possibly pre existing status secondary to uncontrolled DM type 2  And HTN)   - Encephalopathy with intermittent agitation continues to be a significant barrier to progress   - It does appear that patient does not have decision making cacpcity at this time      Subacute left frontal stroke with petechial hemorrhage  - ASA  - Atorvastatin  - Continue levetiracetam    #Sepsis, Resolved   - Fever, Leukocytosis, tachycardia noted 12/10/2020  - UA was negative. One of two blood cultures grew staph epi,  likely contaminant.  - Sputum culture grew beta-hemolytic strep  - Started on emperic pip-tazo IV,  completed 7d on 12/17, fever and WBC resolving.  - WBC down to 11.5 on 12/31     #Severe pharyngeal dysphagia  # Malnutrition, unclassified     - SLP team to follow patient here in ARU   video swallow study on 12/21 showed ongoing significant dysphagia  - NGT in place, but was pulled out and the bridle was disrupted on 1/3 am. Repeat CXR  showed that the feeding tube is still near the pylorus. Subsequently, the NGT was completely pulled out as it appeared to be clogged  - Patient is currently refusing an NGT  -SLP evaluated today and advanced to DD1 diet        Primary hypertension, now with asymptomatic hypotension   - Continue metoprolol tartrate ( has not been able to receive this due to hypotesnion)  - Monitoring BP     # Bilateral Pneumothoraces   X Ray on 12/26 with  Tiny apical pneumothoraces without significant change. No consolidation or focal pneumonia in the lungs. No pulmonary edema. No other significant change.  Continue to monitor clinically.  CXR on 12/31  With resolved pneumothoraces       VTE risk reduction. Subcutaneous heparin.                  Diet: NPO for Medical/Clinical Reasons Except for: NPO but receiving Tube Feeding, Ice Chips    DVT Prophylaxis: Heparin SQ  Koenig Catheter: not present  Code Status: Full Code           Disposition Plan   Expected discharge:   To be determined by the primary team     The patient's care was discussed with the Bedside Nurse and Primary team.    Paulo Soria MD  Hospitalist Service  [facility]   Contact information available via [facility] Paging/Directory    ______________________________________________________________________    Interval History   Events from last night and early morning noted  Lactate normalized. Currently on IV fluids as the NG tube was pulled out completely  No fever or  chills  Patient much more calm after his NGT is out. Refusing to have it replaced.   Says he is hungry and hoping that he will be able to eat something after SLP evaluation  Denies chest pain or SOB         Data reviewed today: I reviewed all medications, new labs and imaging results over the last 24 hours. I personally reviewed no images or EKG's today.    Physical Exam   Vital Signs: Temp: 96.7  F (35.9  C) Temp src: Oral BP: 110/77 Pulse: 54   Resp: 16 SpO2: 96 % O2 Device: None (Room air)    Weight: 176 lbs 9.6 oz  General Appearance: Resting comfortably. Not in distress  Respiratory: Clear breath sounds bilaterally   Cardiovascular: Normal heart sounds. Irregular heart rate No murmurs   GI: Soft, non tender. Normal bowel sounds   Skin: Multiple areas in the lower extremity with dressings   Other: Awake and alert. Moving all 4 limbs     Data   Recent Labs   Lab 01/04/21  0614 01/03/21  1308 12/31/20  0538   WBC 7.7 8.8 11.5*   HGB 13.4 13.2* 13.5   MCV 86 87 93    301 316    137 138   POTASSIUM 4.4 4.4 4.5   CHLORIDE 105 103 106   CO2 31 25 26   BUN 26 28 36*   CR 1.20 1.04 1.05   ANIONGAP 4 9 6   JEET 9.1 8.8 9.0   * 224* 72   ALBUMIN  --  2.8*  --    PROTTOTAL  --  7.3  --    BILITOTAL  --  0.8  --    ALKPHOS  --  138  --    ALT  --  16  --    AST  --  22  --

## 2021-01-04 NOTE — PROGRESS NOTES
Cross cover:     Follow-up lactic acid: wnl (per sign out)  B/P: 107/73, T: 97.2, P: 77, R: 16    Also RN notified me about clogged NG tube, unable to unclog despite multiple attempts, measures. Patient also confused, trying to pull the NG tube. Okay to remove NG tube if not working.   Follow-up with the team in the am.   Ct ivf as ordered.   Pt NPO per EMR  Patient not due for any essential oral medications at this time per RN.   Ct to monitor.   Page prn.     Rasta Montgomery MD   Hospitalist (Internal Medicine)  Cross Cover   Pager: [phone]

## 2021-01-05 ENCOUNTER — APPOINTMENT (OUTPATIENT)
Dept: PHYSICAL THERAPY | Facility: CLINIC | Age: 71
End: 2021-01-05
Payer: COMMERCIAL

## 2021-01-05 ENCOUNTER — OFFICE VISIT - HEALTHEAST (OUTPATIENT)
Dept: CARDIOLOGY | Facility: CLINIC | Age: 71
End: 2021-01-05

## 2021-01-05 ENCOUNTER — APPOINTMENT (OUTPATIENT)
Dept: SPEECH THERAPY | Facility: CLINIC | Age: 71
End: 2021-01-05
Payer: COMMERCIAL

## 2021-01-05 ENCOUNTER — APPOINTMENT (OUTPATIENT)
Dept: OCCUPATIONAL THERAPY | Facility: CLINIC | Age: 71
End: 2021-01-05
Payer: COMMERCIAL

## 2021-01-05 DIAGNOSIS — Z95.1 S/P CABG (CORONARY ARTERY BYPASS GRAFT): ICD-10-CM

## 2021-01-05 LAB
ANION GAP SERPL CALCULATED.3IONS-SCNC: 9 MMOL/L (ref 3–14)
BUN SERPL-MCNC: 31 MG/DL (ref 7–30)
CALCIUM SERPL-MCNC: 8.8 MG/DL (ref 8.5–10.1)
CHLORIDE SERPL-SCNC: 106 MMOL/L (ref 94–109)
CO2 SERPL-SCNC: 25 MMOL/L (ref 20–32)
CREAT SERPL-MCNC: 1.13 MG/DL (ref 0.66–1.25)
ERYTHROCYTE [DISTWIDTH] IN BLOOD BY AUTOMATED COUNT: 16.9 % (ref 10–15)
GFR SERPL CREATININE-BSD FRML MDRD: 66 ML/MIN/{1.73_M2}
GLUCOSE BLDC GLUCOMTR-MCNC: 237 MG/DL (ref 70–99)
GLUCOSE BLDC GLUCOMTR-MCNC: 288 MG/DL (ref 70–99)
GLUCOSE BLDC GLUCOMTR-MCNC: 325 MG/DL (ref 70–99)
GLUCOSE BLDC GLUCOMTR-MCNC: 386 MG/DL (ref 70–99)
GLUCOSE BLDC GLUCOMTR-MCNC: 464 MG/DL (ref 70–99)
GLUCOSE SERPL-MCNC: 311 MG/DL (ref 70–99)
HCT VFR BLD AUTO: 40 % (ref 40–53)
HGB BLD-MCNC: 12.3 G/DL (ref 13.3–17.7)
MCH RBC QN AUTO: 26.9 PG (ref 26.5–33)
MCHC RBC AUTO-ENTMCNC: 30.8 G/DL (ref 31.5–36.5)
MCV RBC AUTO: 87 FL (ref 78–100)
PLATELET # BLD AUTO: 264 10E9/L (ref 150–450)
POTASSIUM SERPL-SCNC: 4.2 MMOL/L (ref 3.4–5.3)
RBC # BLD AUTO: 4.58 10E12/L (ref 4.4–5.9)
SODIUM SERPL-SCNC: 139 MMOL/L (ref 133–144)
WBC # BLD AUTO: 7.8 10E9/L (ref 4–11)

## 2021-01-05 PROCEDURE — 999N001017 HC STATISTIC GLUCOSE BY METER IP

## 2021-01-05 PROCEDURE — 97110 THERAPEUTIC EXERCISES: CPT | Mod: GP

## 2021-01-05 PROCEDURE — 80048 BASIC METABOLIC PNL TOTAL CA: CPT | Performed by: STUDENT IN AN ORGANIZED HEALTH CARE EDUCATION/TRAINING PROGRAM

## 2021-01-05 PROCEDURE — 96372 THER/PROPH/DIAG INJ SC/IM: CPT | Performed by: PHYSICIAN ASSISTANT

## 2021-01-05 PROCEDURE — 97530 THERAPEUTIC ACTIVITIES: CPT | Mod: GP

## 2021-01-05 PROCEDURE — 36415 COLL VENOUS BLD VENIPUNCTURE: CPT | Performed by: STUDENT IN AN ORGANIZED HEALTH CARE EDUCATION/TRAINING PROGRAM

## 2021-01-05 PROCEDURE — 128N000003 HC R&B REHAB

## 2021-01-05 PROCEDURE — 250N000013 HC RX MED GY IP 250 OP 250 PS 637: Performed by: INTERNAL MEDICINE

## 2021-01-05 PROCEDURE — 250N000013 HC RX MED GY IP 250 OP 250 PS 637: Performed by: PHYSICAL MEDICINE & REHABILITATION

## 2021-01-05 PROCEDURE — 97530 THERAPEUTIC ACTIVITIES: CPT | Mod: GO | Performed by: STUDENT IN AN ORGANIZED HEALTH CARE EDUCATION/TRAINING PROGRAM

## 2021-01-05 PROCEDURE — 99232 SBSQ HOSP IP/OBS MODERATE 35: CPT | Mod: GC | Performed by: PHYSICAL MEDICINE & REHABILITATION

## 2021-01-05 PROCEDURE — 250N000013 HC RX MED GY IP 250 OP 250 PS 637: Performed by: PHYSICIAN ASSISTANT

## 2021-01-05 PROCEDURE — 99207 PR CDG-CUT & PASTE-POTENTIAL IMPACT ON LEVEL: CPT | Performed by: INTERNAL MEDICINE

## 2021-01-05 PROCEDURE — 250N000011 HC RX IP 250 OP 636: Performed by: PHYSICIAN ASSISTANT

## 2021-01-05 PROCEDURE — 250N000009 HC RX 250: Performed by: PHYSICAL MEDICINE & REHABILITATION

## 2021-01-05 PROCEDURE — 97535 SELF CARE MNGMENT TRAINING: CPT | Mod: GO | Performed by: STUDENT IN AN ORGANIZED HEALTH CARE EDUCATION/TRAINING PROGRAM

## 2021-01-05 PROCEDURE — 99233 SBSQ HOSP IP/OBS HIGH 50: CPT | Performed by: INTERNAL MEDICINE

## 2021-01-05 PROCEDURE — 272N000083 HC NUTRITION PRODUCT SEMIELEM INTERMED LITER

## 2021-01-05 PROCEDURE — 92526 ORAL FUNCTION THERAPY: CPT | Mod: GN

## 2021-01-05 PROCEDURE — 250N000012 HC RX MED GY IP 250 OP 636 PS 637: Performed by: INTERNAL MEDICINE

## 2021-01-05 PROCEDURE — 85027 COMPLETE CBC AUTOMATED: CPT | Performed by: STUDENT IN AN ORGANIZED HEALTH CARE EDUCATION/TRAINING PROGRAM

## 2021-01-05 PROCEDURE — 96372 THER/PROPH/DIAG INJ SC/IM: CPT | Performed by: INTERNAL MEDICINE

## 2021-01-05 RX ORDER — LEVETIRACETAM 100 MG/ML
500 SOLUTION ORAL 2 TIMES DAILY
Status: DISCONTINUED | OUTPATIENT
Start: 2021-01-05 | End: 2021-01-05

## 2021-01-05 RX ORDER — QUETIAPINE FUMARATE 25 MG/1
25 TABLET, FILM COATED ORAL 2 TIMES DAILY
Status: DISCONTINUED | OUTPATIENT
Start: 2021-01-05 | End: 2021-01-08

## 2021-01-05 RX ORDER — ATORVASTATIN CALCIUM 40 MG/1
80 TABLET, FILM COATED ORAL EVERY EVENING
Status: DISCONTINUED | OUTPATIENT
Start: 2021-01-05 | End: 2021-01-13 | Stop reason: HOSPADM

## 2021-01-05 RX ORDER — LANOLIN ALCOHOL/MO/W.PET/CERES
3 CREAM (GRAM) TOPICAL AT BEDTIME
Status: DISCONTINUED | OUTPATIENT
Start: 2021-01-05 | End: 2021-01-13 | Stop reason: HOSPADM

## 2021-01-05 RX ORDER — METFORMIN HYDROCHLORIDE 500 MG/5ML
1000 SOLUTION ORAL
Status: DISCONTINUED | OUTPATIENT
Start: 2021-01-05 | End: 2021-01-05

## 2021-01-05 RX ORDER — MULTIPLE VITAMINS W/ MINERALS TAB 9MG-400MCG
1 TAB ORAL DAILY
Status: DISCONTINUED | OUTPATIENT
Start: 2021-01-06 | End: 2021-01-13 | Stop reason: HOSPADM

## 2021-01-05 RX ORDER — QUETIAPINE FUMARATE 25 MG/1
25 TABLET, FILM COATED ORAL 2 TIMES DAILY
Status: DISCONTINUED | OUTPATIENT
Start: 2021-01-05 | End: 2021-01-05

## 2021-01-05 RX ORDER — ASPIRIN 81 MG/1
81 TABLET, CHEWABLE ORAL DAILY
Status: DISCONTINUED | OUTPATIENT
Start: 2021-01-06 | End: 2021-01-13 | Stop reason: HOSPADM

## 2021-01-05 RX ORDER — FAMOTIDINE 20 MG/1
20 TABLET, FILM COATED ORAL 2 TIMES DAILY
Status: DISCONTINUED | OUTPATIENT
Start: 2021-01-05 | End: 2021-01-13 | Stop reason: HOSPADM

## 2021-01-05 RX ORDER — ACETAMINOPHEN 325 MG/10.15ML
650 LIQUID ORAL EVERY 4 HOURS PRN
Status: DISCONTINUED | OUTPATIENT
Start: 2021-01-05 | End: 2021-01-13 | Stop reason: HOSPADM

## 2021-01-05 RX ORDER — LEVETIRACETAM 500 MG/1
500 TABLET ORAL 2 TIMES DAILY
Status: DISCONTINUED | OUTPATIENT
Start: 2021-01-05 | End: 2021-01-13 | Stop reason: HOSPADM

## 2021-01-05 RX ORDER — AMIODARONE HYDROCHLORIDE 200 MG/1
200 TABLET ORAL DAILY
Status: COMPLETED | OUTPATIENT
Start: 2021-01-06 | End: 2021-01-11

## 2021-01-05 RX ADMIN — TORSEMIDE 20 MG: 20 TABLET ORAL at 07:40

## 2021-01-05 RX ADMIN — FAMOTIDINE 20 MG: 40 POWDER, FOR SUSPENSION ORAL at 07:39

## 2021-01-05 RX ADMIN — ASPIRIN 81 MG CHEWABLE TABLET 81 MG: 81 TABLET CHEWABLE at 07:40

## 2021-01-05 RX ADMIN — QUETIAPINE 25 MG: 25 TABLET, FILM COATED ORAL at 21:20

## 2021-01-05 RX ADMIN — ATORVASTATIN CALCIUM 80 MG: 80 TABLET, FILM COATED ORAL at 21:20

## 2021-01-05 RX ADMIN — HEPARIN SODIUM 5000 UNITS: 5000 INJECTION, SOLUTION INTRAVENOUS; SUBCUTANEOUS at 07:40

## 2021-01-05 RX ADMIN — LEVETIRACETAM 500 MG: 100 SOLUTION ORAL at 07:39

## 2021-01-05 RX ADMIN — LEVETIRACETAM 500 MG: 500 TABLET, FILM COATED ORAL at 21:20

## 2021-01-05 RX ADMIN — INSULIN ASPART 3 UNITS: 100 INJECTION, SOLUTION INTRAVENOUS; SUBCUTANEOUS at 07:42

## 2021-01-05 RX ADMIN — MELATONIN TAB 3 MG 3 MG: 3 TAB at 22:33

## 2021-01-05 RX ADMIN — FAMOTIDINE 20 MG: 20 TABLET, FILM COATED ORAL at 21:20

## 2021-01-05 RX ADMIN — MULTIVITAMIN 15 ML: LIQUID ORAL at 07:39

## 2021-01-05 RX ADMIN — AMIODARONE HYDROCHLORIDE 200 MG: 200 TABLET ORAL at 07:40

## 2021-01-05 RX ADMIN — INSULIN ASPART 7 UNITS: 100 INJECTION, SOLUTION INTRAVENOUS; SUBCUTANEOUS at 17:47

## 2021-01-05 RX ADMIN — QUETIAPINE FUMARATE 25 MG: 25 TABLET ORAL at 07:40

## 2021-01-05 RX ADMIN — METOPROLOL TARTRATE 3.3 MG: 100 TABLET, FILM COATED ORAL at 07:40

## 2021-01-05 RX ADMIN — HEPARIN SODIUM 5000 UNITS: 5000 INJECTION, SOLUTION INTRAVENOUS; SUBCUTANEOUS at 21:20

## 2021-01-05 RX ADMIN — INSULIN ASPART 5 UNITS: 100 INJECTION, SOLUTION INTRAVENOUS; SUBCUTANEOUS at 12:56

## 2021-01-05 RX ADMIN — METFORMIN HYDROCHLORIDE 1000 MG: 500 SOLUTION ORAL at 12:55

## 2021-01-05 RX ADMIN — INSULIN GLARGINE 12 UNITS: 100 INJECTION, SOLUTION SUBCUTANEOUS at 21:21

## 2021-01-05 RX ADMIN — METFORMIN HYDROCHLORIDE 1000 MG: 500 TABLET ORAL at 17:48

## 2021-01-05 ASSESSMENT — MIFFLIN-ST. JEOR: SCORE: 1592.28

## 2021-01-05 NOTE — PLAN OF CARE
Focus: Physio  D: Alert and orientated times four. Puree with nectar. Takes pills crushed with applesauce or pudding. Some coughing with eating. Strong cough response and able to clear airway. Continent of bowel and bladder. Impulsive at times. Up with cga of one and walker. One episode of hypotension when up. Abdominal binder and tubigrips obtained. Incisions on chest open to air. Leg incisions covered with mepilex's and they were changed today. P: Continue current plan of care.

## 2021-01-05 NOTE — PLAN OF CARE
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 BP taken again in sitting and resolved to WNL. Engaged in seated there ex.     Other Barriers to Discharge (DME, Family Training, etc): limited participation thus far, cognition. Pt currently on 1:1 sitter  "

## 2021-01-05 NOTE — PROGRESS NOTES
[facility]   Acute Rehabilitation Unit  Daily progress note    INTERVAL HISTORY  Mika Alvarez seen and examined at the bedside this morning. Patient lying comfortably in bed, said that he was feeling tired this morning. Patient said that he is able to tolerate thickened liquids well. He denied having any new complaints.    He denies any nausea, headache, vomiting, headache, chest pain, shortness of breath.    Functionally  As per PT notes:  Transfer: CGA  Bed Mobility: CGA-MIN A  Gait: 40 feet x 2' CGA with FWW  Stairs: NT  Balance: NT       MEDICATIONS  Scheduled:    amiodarone  200 mg Per Feeding Tube Daily     aspirin  81 mg Per Feeding Tube Daily     atorvastatin  80 mg Per Feeding Tube QPM     famotidine  20 mg Per Feeding Tube BID     [Held by provider] glipiZIDE  5 mg Per Feeding Tube BID AC     heparin ANTICOAGULANT  5,000 Units Subcutaneous Q12H     insulin aspart  1-7 Units Subcutaneous TID AC     insulin aspart  1-5 Units Subcutaneous At Bedtime     insulin glargine  12 Units Subcutaneous BID     levETIRAcetam  500 mg Per Feeding Tube BID     melatonin  3 mg Per Feeding Tube At Bedtime     metFORMIN  1,000 mg Per Feeding Tube 2 times daily     metoprolol  3.3 mg Per Feeding Tube BID     multivitamins w/minerals  15 mL Per NG tube Daily     QUEtiapine  25 mg Per Feeding Tube TID        PRN:  acetaminophen, dextrose, glucose **OR** dextrose **OR** glucagon, OLANZapine, phenol, QUEtiapine       PHYSICAL EXAM  Patient Vitals for the past 24 hrs:   BP Temp Temp src Pulse Resp SpO2 Weight   01/05/21 1232 -- -- -- -- -- -- 80 kg (176 lb 6.4 oz)   01/05/21 0811 (!) 83/41 -- -- -- -- -- --   01/05/21 0740 102/56 -- -- 73 -- -- --   01/05/21 0605 97/71 95.8  F (35.4  C) Oral 75 18 97 % --   01/04/21 2212 105/70 -- -- 97 -- -- --   01/04/21 1600 105/69 97.3  F (36.3  C) Oral 85 16 97 % --       GEN: Alert, oriented, not in acute distress, sitting comfortably in bed.  HEENT: Neck  supple, extraocular movement intact.  CVS: S1-S2 present, regular, rate, rhythm.  PULM: Clear to auscultation bilaterally, breathing comfortably on room air.  ABD: Soft, nontender, nondistended, bowel sound present.  EXT: No LE edema or calf tenderness b/l  Neuro: Has answered appropriately, follows commands if he wants to.  +Dysphonia, speaking slowly.    LABS/IMAGING    CBC RESULTS:   Lab Results   Component Value Date    WBC 7.7 01/04/2021     Lab Results   Component Value Date    RBC 4.93 01/04/2021     Lab Results   Component Value Date    HGB 13.4 01/04/2021     Lab Results   Component Value Date    HCT 42.3 01/04/2021     Lab Results   Component Value Date    MCV 86 01/04/2021     Lab Results   Component Value Date    MCH 27.2 01/04/2021     Lab Results   Component Value Date    MCHC 31.7 01/04/2021     Lab Results   Component Value Date    RDW 17.2 01/04/2021     Lab Results   Component Value Date     01/04/2021         Last Comprehensive Metabolic Panel:  Sodium   Date Value Ref Range Status   01/05/2021 139 133 - 144 mmol/L Final     Potassium   Date Value Ref Range Status   01/05/2021 4.2 3.4 - 5.3 mmol/L Final     Chloride   Date Value Ref Range Status   01/05/2021 106 94 - 109 mmol/L Final     Carbon Dioxide   Date Value Ref Range Status   01/05/2021 25 20 - 32 mmol/L Final     Anion Gap   Date Value Ref Range Status   01/05/2021 9 3 - 14 mmol/L Final     Glucose   Date Value Ref Range Status   01/05/2021 311 (H) 70 - 99 mg/dL Final     Urea Nitrogen   Date Value Ref Range Status   01/05/2021 31 (H) 7 - 30 mg/dL Final     Creatinine   Date Value Ref Range Status   01/05/2021 1.13 0.66 - 1.25 mg/dL Final     GFR Estimate   Date Value Ref Range Status   01/05/2021 66 >60 mL/min/[1.73_m2] Final     Comment:     Non  GFR Calc  Starting 12/18/2018, serum creatinine based estimated GFR (eGFR) will be   calculated using the Chronic Kidney Disease Epidemiology Collaboration   (CKD-EPI)  equation.       Calcium   Date Value Ref Range Status   01/05/2021 8.8 8.5 - 10.1 mg/dL Final       Recent Labs   Lab 01/05/21  1230 01/05/21  0733 01/05/21  0619 01/04/21  2159 01/04/21 2031 01/04/21  1357 01/04/21  1014 01/04/21  0614 01/03/21  1308 01/03/21  1308 12/31/20  0538 12/31/20  0538   GLC  --   --  311*  --   --   --   --  186*  --  224*  --  72   * 288*  --  369* 318* 398* 208*  --    < >  --    < >  --     < > = values in this interval not displayed.     EXAM: XR CHEST 1 VW 1/3/2021       HISTORY: possible dislodged feeding tube.     COMPARISON: Previous day.      TECHNIQUE: Supine frontal view of the chest.     FINDINGS: Feeding tube tip projects over the medial right upper  quadrant. Post CABG changes. Mild interstitial prominence. Heart is  enlarged. No focal airspace consolidation. No abnormally dilated loops  of bowel in the visualized upper abdomen..                                                                      IMPRESSION: Feeding tube tip projects over the medial right upper  quadrant, possibly near the distal stomach or pylorus.        IMPRESSION/PLAN:  Mika Alvarez is a 70 year old man with a past medical history of systolic heart failure, A-fib, DM type 2, severe multivessel CAD, who presented 12/2/20 with acute systolic heart failure exacerbation required diuresis then underwent 4 vessel CABG 12/7/20, course complicated by severe dysphagia, possible sieuzre like activity left frontal lobe stroke, sepsis, acute hypoxia with small bi apical pneumothoraces and, altered mental status. Admitted to acute rehab unit 12/28/20 for ongoing rehabilitation and medical management.         Admission to acute inpatient rehab s/p cabg x 4.    Impairment group code: 0.9     1. PT, OT and SLP 60 minutes of each on a daily basis, in addition to rehab nursing and close management of physiatrist.       2. Impairment of ADL's: Noted to have impaired strength, coordination, activity tolerance, and  cognition leading to impaired ability to complete self cares, continue OT with goal for MOD I to I with basic adls.      3. Impairment of mobility:  Noted to have impaired strength, activity tolerance, and cognition leading to impaired mobility will benefit from ongoing PT with goal for MOD I to I with basic mobility and stairs.      4. Impairment of cognition/language/swallow:  Noted to have severe dysphagia and impaired cognition will benefit from ongoing SLP with goal for least restrictive diet tolerated and improved ability to communicate needs.     Medical Conditions  #CAD  #S/P CABG x 4 (12/7)  #Acute on chronic systolic heart failure- 2/2 ischemic cardiomyopathy  #HTN  #A-fib with RVR-   Most recent Echo 12/25/20 with left ventricular wall globally hypokinetic, EF estimated 35%. Right ventricle mildly dilated with systolic function severely reduced.  Had A-fib with RVR early in hospital stay on amiodarone taper. surgeon preference is to not anticoagulate for a-fib   -continue amiodarone 200 mg daily x 2 weeks then stop  -continue metoprolol at reduced dose   -continue asa, statin  -sternal precautions (6 weeks from 12/7)- limit 10 lbs then as tolerated   -continue toresemide, decreased to 20 mg daily- appreciate hospitalist recs  -monitor bmp, weights, intake and output, edema/sob  -f/u cardiology as scheduled     #Subacute Left frontal stroke with petechial hemorrhage  #Possible simple partial seizure  #Acute metabolic encephalopathy  Neurology consulted for left leg shaking POD 1, EEG neg, head CT obtained in setting of ongoing leg shaking and ams- demonstrated stroke 12/12 (most recent imaging stable)  1.  Petechial hemorrhage in the left frontal opercular infarct correlating with the blood products noted on MRI,  Chronic left cerebellar infarct.  -follow up neurology within one month- Dr. Bailey/ [facility]- follow up possible seizure-   -continue keppra until neurology follow up  -bedside attendant dcd 12/17  though on admission 12/28 attempting to get out of bed- bedside attendant for safety    #Agitation  -Psychiatry: Seroquel reduced to 12.5 mg 3 times daily as needed, Seroquel scheduled 25 mg twice daily on 1/5/2021.  He had expressed suicidal ideations to therapy staff, however he denied any to psych team. Change to scheduled.    #DM type 2- Hgb A1C 12.6%.   -Restarting Metformin 1000 mg twice daily, continue to hold glipizide.  -Restarted Lantus  12 units bid, and ssi on 1/5/2021 after NG tube was removed.  -Continue to monitor glucose closely, Appreciate hospitalist team recs.      #oral pharyngeal dysphagia  #Impaired oral intake -   slp monitoring currently NPO with nd in place 12/21 video swallow with ongoing significant dysphagia s/p ND placed 12/15.  -appreciate ongoing slp  -appreciate nutrition support  -Video swallow study done on 1/4/2021 and was cleared for nectar thick diet.     #Possible aspiration pneumonia- fever, known dysphagia, leucocytosis,  hypoxia, other workup unremarkable received zosyn course seen by id.  Ongoing leucoctyosis WBC 13 12/28.    -appreciate ongoing SLP support  -trend WBC  -CXR as above.    #Bilateral shin wounds- mepilex changes as ordered keep clean and dry.      #Urinary retention- gardner recently removed, reportedly voiding without issue  -PVRs x2 low  May check PRN only.   -was started on flomax at prior hospital - had flomax suspension- as npo and only pill form will hold and monitor as above.        #Creatinine elevation - Cr 1.08 BUN 36 12/29, Cr 1.20 on 1/4/2021  -monitor bmp  -Torsemide dose decreased to 20 mg daily     1. Adjustment to disability: plan for psychology consult when more consistently alert  2. FEN: NPO with tube feeds  3. Bowel: monitor  4. Bladder: PVRs WNL  5. DVT Prophylaxis: subcutaneous heparin  6. GI Prophylaxis: ppi  7. Code: full- prior and patient confirmed on admission  8. Disposition: goal for home  9. ELOS:  2+ weeks.  10. Rehab prognosis:   Fair- medically complex with significant impairments in cognition and swallow and milder strength/activity tolerance deficits goal for home pending supervision needs and family support  11. Follow up Appointments on Discharge: cardiology, cv surgery, pcp.    Patient was seen and discussed with my staff physician Dr. Jerome, who agrees with my assessment and plan.    Alexandre Gomez   PGY 2  Physical Medicine and Rehabilitation  Pager: [phone]

## 2021-01-05 NOTE — PLAN OF CARE
Patient is A&Ox4. Assist of 1 with walker. Sitter at bedside. Patient denies pain during shift. Continent of bowel and bladder.  ZAHRAA full skin assessment.  At 17:00 and 18:00 declined BG check. He refused dinner, and was intermittently agitated about that time until ADLs completed around 20:30.  Seroquel given, patient calm and cooperative after administration. Continue POC.

## 2021-01-05 NOTE — PLAN OF CARE
Discharge Planner Post-Acute Rehab SLP:      Discharge Plan:home with assist     Precautions: fall, swallowing     Current Status:  Communication: dysphonic voice though slowly improving. communication to be evaluated, Pt currently refusing  Cognition: to be assessed but pt currently is refusing cognitive -linguistic assessment  Swallow: moderate oropharyngeal dysphagia      Assessment:  Patient with NDD-1 and NTL, intermittent cough noted throughout meal. Cough consistently productive. Suspect pharyngeal residue and potential penetration but productive cough encouraging. Improved with more consistent alternating of solids and liquids. At this time, recommend continue with current NDD-1 with NTL. Encourage chin tuck and alternating of solids and liquids.      Other Barriers to Discharge (Family Training, etc): to be determined

## 2021-01-05 NOTE — PLAN OF CARE
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 Improved participation when discussing hobbies such as golf. Pt is able to ambulate 250 ft with FWW, then additional 65 ft with no FWW, wc follow for fatigue and CGA for stability. Pt should continue to wean from device in therapies.   Following seated ex, Pt discontinues further activity d/t fatigue, fixation on thirst.    -20 min     Other Barriers to Discharge (DME, Family Training, etc): limited participation thus far, cognition. Pt currently on 1:1 sitter  "

## 2021-01-05 NOTE — PROGRESS NOTES
Calorie Count:  1/4: 451 kcal and 16 g protein (1 meal/lunch, breakfast not ordered, evening meal refused per nursing notes)     Meredith Howe RD, LD  ARU RD pager: [phone]

## 2021-01-05 NOTE — PLAN OF CARE
Alert and oriented with some cognitive deficits. Able to make needs known. Restless at beginning of night. Sitting up in chair and on edge of bed. Denies pain. No headache, nausea, dizziness, chest pain or SOB. Tolerating nectar thickened liquids.  PIV patent, saline locked. Up to BR with assist of 1, and walker. Continent of bladder. Voiding in urinal. No BM this shift.  Coccyx  and bilateral L/E foam dressings CDI. Chest and abdominal incisions healing, open to air. Sleeping off and on during night. Sitter at bedside for safety. Continue with plan of care.

## 2021-01-05 NOTE — PLAN OF CARE
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 Nurse ordered compression and abdominal binder. Pt does not tolerate much out of bed activity. Second session to would not participate more that sitting at the edge of the bed to take his BP. Not willing to do exercises in bed either. -30 min    Other Barriers to Discharge (DME, Family Training, etc): level of assist, may not have 24/7 assist at home, poor participation in therapy, orthostatic

## 2021-01-05 NOTE — PROGRESS NOTES
"[facility] [place]   Internal Medicine Daily Note           Interval History/Events     Hand off taken from Dr. Bates  Overnight events reviewed  Patient more interested in sleeping  Denies any pain, discomfort  Discussed with Bedside attendant, reports he slept on/off overnight  No agitation       Review of Systems        4 point ROS including Respiratory, CV, GI and , other than that noted above is negative      Medications   I have reviewed current medications  in the \"current medication\" section of Epic.  Relevant changes include:     Physical Exam   General:       Vital signs:    Blood pressure 111/76, pulse 73, temperature 95.8  F (35.4  C), temperature source Oral, resp. rate 18, height 1.803 m (5' 11\"), weight 80 kg (176 lb 6.4 oz), SpO2 97 %.  Estimated body mass index is 24.6 kg/m  as calculated from the following:    Height as of this encounter: 1.803 m (5' 11\").    Weight as of this encounter: 80 kg (176 lb 6.4 oz).      Intake/Output Summary (Last 24 hours) at 1/5/2021 1503  Last data filed at 1/5/2021 1400  Gross per 24 hour   Intake 760 ml   Output 1675 ml   Net -915 ml        Constitutional: In on acute distress  Eye: No icterus, no pallor  Mouth/ENT: Normal oral mucosa  Cardiovascular: S1, S2 normal.   Respiratory: B/L CTA  GI: Soft, NT, BS+  : No gardner's catheter  Neurology: Sleeping, but easily aroused, and follows commands. Falls back to sleep.   Psych: Mood stable.   MSK:   Integumentary:   Heme/Lymph/Imm:      Laboratory and Imaging Studies     I have reviewed  laboratory and imaging studies in the Epic. Pertinent findings are as below:    BMP  Recent Labs   Lab 01/05/21  0619 01/04/21  0614 01/03/21  1308 12/31/20  0538    140 137 138   POTASSIUM 4.2 4.4 4.4 4.5   CHLORIDE 106 105 103 106   JEET 8.8 9.1 8.8 9.0   CO2 25 31 25 26   BUN 31* 26 28 36*   CR 1.13 1.20 1.04 1.05   * 186* 224* 72     CBC  Recent Labs   Lab 01/05/21  0619 01/04/21  0614 " 01/03/21  1308 12/31/20  0538   WBC 7.8 7.7 8.8 11.5*   RBC 4.58 4.93 4.92 5.05   HGB 12.3* 13.4 13.2* 13.5   HCT 40.0 42.3 42.6 46.9   MCV 87 86 87 93   MCH 26.9 27.2 26.8 26.7   MCHC 30.8* 31.7 31.0* 28.8*   RDW 16.9* 17.2* 16.9* 16.6*    323 301 316     INRNo lab results found in last 7 days.  LFTs  Recent Labs   Lab 01/03/21  1308   ALKPHOS 138   AST 22   ALT 16   BILITOTAL 0.8   PROTTOTAL 7.3   ALBUMIN 2.8*      PANCNo lab results found in last 7 days.        Impression/Plan        Mika Alvarez is a 70 year old man with a past medical history of systolic heart failure, A-fib, DM type 2, severe multivessel CAD, who presented 12/2/20 with acute systolic heart failure exacerbation required diuresis then underwent 4 vessel CABG 12/7/20, course complicated by severe dysphagia, possible sieuzre like activity left frontal lobe stroke, sepsis, acute hypoxia with small bi apical pneumothoraces and, altered mental status. Admitted to acute rehab unit 12/28/20 for ongoing rehabilitation and medical management. Internal medicine consulted for co-management.   Individual problems and their management are outlined below        #Multivessel CAD S/P CABG  #Acute diastolic and systolic heart failure, improving  - Midline incision healing well  -Reduce torsemide to 20 mg daily, Daily weights ( weight as of 12/31 at 177 lbs) - Was 175 on 12/29  BNP on 12/31 at 1732   -Monitor BMP Bi weekly   - Metoprolol  Further reduced  to 3.125 mg from  6.25 mg BID on 1/2 ( Patient has not really been getting this due to asymptomatic low hypotension) We will likely be able to give this once patient is off amiodarone   - ASA  - Atorvastatin  Although the above changes have been made, patient has been unable to get his meds for the last 24 hrs as the NJ feeding tube is out and patient is strictly NPO, pending SLP evaluation today ( 1/4)  Per SLP, initiated oral diet on DD1 with nectar thickened fluids      - Echo on 12/25:  The left  ventricular wall motion is globally hypokinetic. The estimated  left ventricular ejection fraction is 35%. This represents a moderately  decreased ejection fraction.The right ventricle is mildly dilated and its systolic function is  severely reduced. TAPSE is abnormal, which is consistent with abnormal  right ventricular systolic function.  Biatrial volume is moderately increased.     Given the reduction in the requirement of diuretics and relatively clear lungs, one would be inclined to think his heart functioning has improved from the 12/25 finding       Hold Torsemide BP soft this AM, reassess tomorrow AM       #A-fib with RVR  - Currently rate controlled   - Continue amiodarone ( expected for 14 more days and stop. Last day on 1/11 ).  Since he has not converted, it may make sense to stop amiodarone at 7 days  and resume metoprolol   Metoprolol as above   -Of note, surgeon preference is to not anticoagulate for a-fib s/p LAAE( direct extract from patient's discharge summary)      #Uncontrolled type II diabetes mellitus, HgbA1c  12.6%  - Stopped metformin 1000 mg BID on 1/1   - Stopped Glipizide on 12/31 to prevent hypoglycemia   - Patient was on BID lantus ( 19 units BID) when he was on continious tube feeding. Given that the feeding tube is out and Patinet is NPO, Lantus iwas held  Following initiation of DD1 type diet,  Insulin Glargine was resumed at 10 units BID ( starting tonight). Blood glucose 200-300  - Increased Insulin Glargine to 12 units BID  - Start Metformin  - Insulin Aspart correction at medium intensity  - Hypoglycemia protocol    Acute hypoxic respiratory failure, resolved  -  Encourage IC  - Supplemental oxygen as needed  - CXR on 12/31 no acute airspace abnormalities      Acute metabolic encephalopathy  Intermittent agitation   - Supportive care  - Likely multifactorial ( post operative status , stroke and possibly pre existing status secondary to uncontrolled DM type 2  And HTN)   -  Encephalopathy with intermittent agitation continues to be a significant barrier to progress   - It does appear that patient does not have decision making cacpcity at this time      Subacute left frontal stroke with petechial hemorrhage  - ASA  - Atorvastatin  - Continue levetiracetam    # Sepsis, Resolved   - Fever, Leukocytosis, tachycardia noted 12/10/2020  - UA was negative. One of two blood cultures grew staph epi, likely contaminant.  - Sputum culture grew beta-hemolytic strep  - Started on emperic pip-tazo IV,  completed 7d on 12/17, fever and WBC resolving.  - WBC down to 11.5 on 12/31     #Severe pharyngeal dysphagia  # Malnutrition, unclassified     - SLP team to follow patient here in ARU   video swallow study on 12/21 showed ongoing significant dysphagia  - NGT in place, but was pulled out and the bridle was disrupted on 1/3 am. Repeat CXR  showed that the feeding tube is still near the pylorus. Subsequently, the NGT was completely pulled out as it appeared to be clogged  - Patient is currently refusing an NGT  - Diet advance per SLP evaluation.         Primary hypertension, now with asymptomatic hypotension   - Continue metoprolol tartrate ( has not been able to receive this due to hypotesnion)  - Monitoring BP     # Bilateral Pneumothoraces   X Ray on 12/26 with  Tiny apical pneumothoraces without significant change. No consolidation or focal pneumonia in the lungs. No pulmonary edema. No other significant change.  Continue to monitor clinically.  CXR on 12/31  With resolved pneumothoraces       VTE risk reduction. Subcutaneous heparin.                      Diet: Orders Placed This Encounter      Dysphagia Diet Level 1 Pureed Nectar Thickened Liquids (pre-thickened or use instant food thickener)       DVT Prophylaxis: Heparin SQ  Koenig Catheter: not present  Code Status: Full Code                                   Pt's care was discussed with bedside RN, patient and  during Care Team Rounds.                Tim Meza MD  Hospitalist ( Internal medicine)  Pager: [phone]

## 2021-01-06 ENCOUNTER — APPOINTMENT (OUTPATIENT)
Dept: OCCUPATIONAL THERAPY | Facility: CLINIC | Age: 71
End: 2021-01-06
Payer: COMMERCIAL

## 2021-01-06 ENCOUNTER — APPOINTMENT (OUTPATIENT)
Dept: PHYSICAL THERAPY | Facility: CLINIC | Age: 71
End: 2021-01-06
Payer: COMMERCIAL

## 2021-01-06 ENCOUNTER — APPOINTMENT (OUTPATIENT)
Dept: SPEECH THERAPY | Facility: CLINIC | Age: 71
End: 2021-01-06
Payer: COMMERCIAL

## 2021-01-06 LAB
GLUCOSE BLDC GLUCOMTR-MCNC: 190 MG/DL (ref 70–99)
GLUCOSE BLDC GLUCOMTR-MCNC: 205 MG/DL (ref 70–99)
GLUCOSE BLDC GLUCOMTR-MCNC: 219 MG/DL (ref 70–99)
GLUCOSE BLDC GLUCOMTR-MCNC: 244 MG/DL (ref 70–99)
GLUCOSE BLDC GLUCOMTR-MCNC: 254 MG/DL (ref 70–99)

## 2021-01-06 PROCEDURE — 128N000003 HC R&B REHAB

## 2021-01-06 PROCEDURE — 96372 THER/PROPH/DIAG INJ SC/IM: CPT | Performed by: INTERNAL MEDICINE

## 2021-01-06 PROCEDURE — 999N000125 HC STATISTIC PATIENT MED CONFERENCE < 30 MIN

## 2021-01-06 PROCEDURE — 96372 THER/PROPH/DIAG INJ SC/IM: CPT | Performed by: PHYSICIAN ASSISTANT

## 2021-01-06 PROCEDURE — 99233 SBSQ HOSP IP/OBS HIGH 50: CPT | Performed by: INTERNAL MEDICINE

## 2021-01-06 PROCEDURE — 250N000013 HC RX MED GY IP 250 OP 250 PS 637: Performed by: STUDENT IN AN ORGANIZED HEALTH CARE EDUCATION/TRAINING PROGRAM

## 2021-01-06 PROCEDURE — 258N000003 HC RX IP 258 OP 636: Performed by: INTERNAL MEDICINE

## 2021-01-06 PROCEDURE — 999N001017 HC STATISTIC GLUCOSE BY METER IP

## 2021-01-06 PROCEDURE — 97110 THERAPEUTIC EXERCISES: CPT | Mod: GO | Performed by: OCCUPATIONAL THERAPIST

## 2021-01-06 PROCEDURE — 97535 SELF CARE MNGMENT TRAINING: CPT | Mod: GO | Performed by: OCCUPATIONAL THERAPIST

## 2021-01-06 PROCEDURE — 97530 THERAPEUTIC ACTIVITIES: CPT | Mod: GP | Performed by: STUDENT IN AN ORGANIZED HEALTH CARE EDUCATION/TRAINING PROGRAM

## 2021-01-06 PROCEDURE — 250N000013 HC RX MED GY IP 250 OP 250 PS 637: Performed by: PHYSICAL MEDICINE & REHABILITATION

## 2021-01-06 PROCEDURE — 250N000011 HC RX IP 250 OP 636: Performed by: PHYSICIAN ASSISTANT

## 2021-01-06 PROCEDURE — 97530 THERAPEUTIC ACTIVITIES: CPT | Mod: GO | Performed by: OCCUPATIONAL THERAPIST

## 2021-01-06 PROCEDURE — 97530 THERAPEUTIC ACTIVITIES: CPT | Mod: GP

## 2021-01-06 PROCEDURE — 99233 SBSQ HOSP IP/OBS HIGH 50: CPT | Performed by: PHYSICAL MEDICINE & REHABILITATION

## 2021-01-06 PROCEDURE — 99207 PR CDG-MDM COMPONENT: MEETS HIGH - UP CODED: CPT | Performed by: INTERNAL MEDICINE

## 2021-01-06 PROCEDURE — 97110 THERAPEUTIC EXERCISES: CPT | Mod: GP | Performed by: STUDENT IN AN ORGANIZED HEALTH CARE EDUCATION/TRAINING PROGRAM

## 2021-01-06 PROCEDURE — 999N000150 HC STATISTIC PT MED CONFERENCE < 30 MIN: Performed by: STUDENT IN AN ORGANIZED HEALTH CARE EDUCATION/TRAINING PROGRAM

## 2021-01-06 PROCEDURE — 999N000125 HC STATISTIC PATIENT MED CONFERENCE < 30 MIN: Performed by: OCCUPATIONAL THERAPIST

## 2021-01-06 PROCEDURE — 92526 ORAL FUNCTION THERAPY: CPT | Mod: GN

## 2021-01-06 PROCEDURE — 250N000012 HC RX MED GY IP 250 OP 636 PS 637: Performed by: INTERNAL MEDICINE

## 2021-01-06 RX ORDER — SENNOSIDES 8.6 MG
8.6 TABLET ORAL 2 TIMES DAILY PRN
Status: DISCONTINUED | OUTPATIENT
Start: 2021-01-06 | End: 2021-01-13 | Stop reason: HOSPADM

## 2021-01-06 RX ADMIN — ATORVASTATIN CALCIUM 80 MG: 80 TABLET, FILM COATED ORAL at 21:11

## 2021-01-06 RX ADMIN — INSULIN GLARGINE 12 UNITS: 100 INJECTION, SOLUTION SUBCUTANEOUS at 09:00

## 2021-01-06 RX ADMIN — FAMOTIDINE 20 MG: 20 TABLET, FILM COATED ORAL at 21:11

## 2021-01-06 RX ADMIN — QUETIAPINE 25 MG: 25 TABLET, FILM COATED ORAL at 21:11

## 2021-01-06 RX ADMIN — SENNOSIDES 8.6 MG: 8.6 TABLET, FILM COATED ORAL at 09:50

## 2021-01-06 RX ADMIN — METFORMIN HYDROCHLORIDE 1000 MG: 500 TABLET ORAL at 07:52

## 2021-01-06 RX ADMIN — AMIODARONE HYDROCHLORIDE 200 MG: 200 TABLET ORAL at 07:54

## 2021-01-06 RX ADMIN — SODIUM CHLORIDE 1000 ML: 9 INJECTION, SOLUTION INTRAVENOUS at 10:43

## 2021-01-06 RX ADMIN — METFORMIN HYDROCHLORIDE 1000 MG: 500 TABLET ORAL at 18:34

## 2021-01-06 RX ADMIN — MELATONIN TAB 3 MG 3 MG: 3 TAB at 21:11

## 2021-01-06 RX ADMIN — INSULIN GLARGINE 12 UNITS: 100 INJECTION, SOLUTION SUBCUTANEOUS at 22:12

## 2021-01-06 RX ADMIN — LEVETIRACETAM 500 MG: 500 TABLET, FILM COATED ORAL at 07:54

## 2021-01-06 RX ADMIN — LEVETIRACETAM 500 MG: 500 TABLET, FILM COATED ORAL at 21:11

## 2021-01-06 RX ADMIN — FAMOTIDINE 20 MG: 20 TABLET, FILM COATED ORAL at 07:53

## 2021-01-06 RX ADMIN — INSULIN ASPART 2 UNITS: 100 INJECTION, SOLUTION INTRAVENOUS; SUBCUTANEOUS at 07:56

## 2021-01-06 RX ADMIN — INSULIN ASPART 2 UNITS: 100 INJECTION, SOLUTION INTRAVENOUS; SUBCUTANEOUS at 18:31

## 2021-01-06 RX ADMIN — QUETIAPINE 25 MG: 25 TABLET, FILM COATED ORAL at 07:54

## 2021-01-06 RX ADMIN — INSULIN ASPART 3 UNITS: 100 INJECTION, SOLUTION INTRAVENOUS; SUBCUTANEOUS at 14:00

## 2021-01-06 RX ADMIN — HEPARIN SODIUM 5000 UNITS: 5000 INJECTION, SOLUTION INTRAVENOUS; SUBCUTANEOUS at 21:11

## 2021-01-06 RX ADMIN — HEPARIN SODIUM 5000 UNITS: 5000 INJECTION, SOLUTION INTRAVENOUS; SUBCUTANEOUS at 07:55

## 2021-01-06 RX ADMIN — MULTIPLE VITAMINS W/ MINERALS TAB 1 TABLET: TAB at 07:55

## 2021-01-06 RX ADMIN — ASPIRIN 81 MG CHEWABLE TABLET 81 MG: 81 TABLET CHEWABLE at 07:54

## 2021-01-06 NOTE — PROGRESS NOTES
See PT note from this morning RE: call with pt family. Plan for TCU placement. Ideally early next week. Pt will need to be off 1:1 sitter before referrals can be sent. Plan to move pt closer to the RN station. Aware that pt has been to [facility] in [place]. SW called pt trish Nair, call went right to . Left  requesting call-back and to discuss TCU placement further.     NEED: Off 1:1 sitter, referrals, acceptance, bed avail, indep day, pas, transport and IMM.     Mary Ellen Niño, HENRIK, [facility] Acute Rehab Unit   Phone: [phone]  I   Pager: [phone]

## 2021-01-06 NOTE — PLAN OF CARE
FOCUS/GOAL  Bowel management, Bladder management, Pain management and Cognition/Memory/Judgment/Problem solving    ASSESSMENT, INTERVENTIONS AND CONTINUING PLAN FOR GOAL:  Pt is alert and oriented, very tired and somewhat lethargic overnight, no reports of pain, sob, lightheadedness, n/v, or new numbness/tingling, continent of bowel and bladder, refused positioning when offered overnight but is capable of self positioning, PIV in left hand, dressing to bilateral lower extremities intact, sternal incision scabbed and ELVA, minimal fluid intact overnight, during transfer to bathroom pt noted dizziness when standing, Orthostatic Hypotension noted with BP of 76/66 when sitting upright in bed, promote liquids when possible, Pt did not have BM overnight and would benefit from increased BM medications as last BM was 1/1/21, no further care concerns at this time continuewith POC.

## 2021-01-06 NOTE — CARE CONFERENCE
Acute Rehab Care Conference/Team Rounds    Type: Team Rounds    Present: Dr. Dmitry Jerome, Susana Woodall RN, Mary Ellen Niño LICSW, Yasmin Faye OT, Oumou Watt PT, Keshawn Vargas SLP, Deysi Albarran , Sae Reeves, Meredith Howe Dietician    Discharge Barriers/Treatment/Education       Rehab Diagnosis: CVA, CAD s/p CABG x4       Active Medical Co-morbidities/Prognosis: Seizures, HTN, dysphagia with tube feeds, CHF, ischemic cardiomyopathy, a. Fib with RVR, encephalopathy, DM II, aspiration PNA, bilateral skin wounds, HUMBERTO    Safety: Pt is alert and oriented, lethargic overnight but still alert to voice, assist of 1 ww to transfer, bed alarms and attendant to in place due to patient impulsivity, using abdominal binder due to orthostatic hypotention.     Pain: No reports of pain overnight.     Medications, Skin, Tubes/Lines: Pt is taking pills crushed in applesauce, dressing to bilateral lower extremity skin tears CDI, sternal incision and chest tube sites ELVA and CDI, PIV in left hand.     Swallowing/Nutrition: NG was self pulled by patient. VFSS completed 1/4. Patient marginally safe for NDD-1 with NTL. Improves with chin tuck and alternating with solids/liquids. Patient does continue to have coughing throughout the meals but cough is productive. Given patient's refusal of having another tube placed together with being marginally safe, will plan to continue with NDD-1 with NTL. Good potential to advance food textures over the next few days. Ongoing SLP likely upon discharge.     Bowel/Bladder: Pt is continent of bowel and bladder using both urinal and toilet. Pt is likely constipated as last BM noted was on 1/1/21, however pt is not complaining of constipation or abdominal discomfort.      Psychosocial: , lives in a home with wife who needs physical assistance. Pt has adult children in the area who work full-time and have families. 24/7 supervision unknown at this time. Per EMR  pt has anxiety and depression. Recent loss of his son due to suicide. No financial concerns reported. No substance abuse reported.     ADLs/IADLs: Pt making gradual progress with ADLs and functional mobility. Pt requiring CGA transfers/mobility no device during ADLs. Pt requiring SBA for hyg at the sink, setup for UB dressing, MOD A for LB dressing. Performance limited by orthostasis, deconditioning, activity precautions, impaired cognition, and variable participation. 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 Pt declining formal cognitive testing, functionally demonstrating limited insight and easily agitated with new or challenging activity. Goals to maximize IND and safety in discharge environment. ELOS TBD pending progress. Recommend home with 24/7 support vs TCU, caregiver support TBD. DME/AE: TBD pending progress.    Mobility: Pt making gradual progress with functional mobility and ADLs. CGA-MIN A for bed mobility, CGA for transfers, gait limited to ~25' CGA with FWW. Stairs yet to be assessed. Performance limited by orthostasis, deconditioning, activity precautions, impaired cognition, and variable participation.  ELOS TBD pending progress.  Recommend home with 24/7 support vs TCU, caregiver support TBD. DME/AD: TBD pending progress.    Cognition/Language: Patient initially refusing cognitive linguistic evaluation. Patient does recognize some changes in his cognition in regards to memory but overall very limited insights. Currently has 1:1 due to impulsivity. Patient stating he is more willing to assess cognition and will be complete this week as schedule allows.    Community Re-Entry: not yet ready    Transportation: will need assist, may need family training for transfer    Plan of Care and goals reviewed and updated.    Discharge Plan/Recommendations    Fall Precautions: continue    Overall plan for the patient:   Pt showing overall improvement, especially since removal  of NG tube.  He is on an NDD1 with NTL diet.  Continues to be on 1:1 for safety, but has been less agitated.  Now participating more consistently with therapy, but limited by orthostasis today.  Adjusting medications and administering fluids, hospitalist support is appreciated.  Will need 24 hr supervision upon discharge for safety.  Per discussion with daughter, it does not appear this is possible and therefore would need TCU unless significant improvement in cognition and safety awareness is seen.        Utilization Review and Continued Stay Justification    Medical Necessity Criteria:    For any criteria that is not met, please document reason and plan for discharge, transfer, or modification of plan of care to address.    Requires intensive rehabilitation program to treat functional deficits?: Yes    Requires 3x per week or greater involvement of rehabilitation physician to oversee rehabilitation program?: Yes    Requires rehabilitation nursing interventions?: Yes    Patient is making functional progress?: Yes    There is a potential for additional functional progress? Yes    Patient is participating in therapy 3 hours per day a minimum of 5 days per week or 15 hours per week in 7 day period?:Yes    Has discharge needs that require coordinated discharge planning approach?:Yes      Barriers/Concerns related to meeting medical necessity criteria:  Now participating     Team Plan to Address Concern:  None      Final Physician Sign off    Statement of Approval: I have reviewed and agree with the recommendations and documentation in this care conference note.       Patient Goals  Social Work Goals: Confirm discharge recommendations with therapy, coordinate safe discharge plan and remain available to support and assist as needed.    OT goal: hygiene/grooming: supervision/stand-by assist  OT goal: upper body dressing: Supervision/stand-by assist  OT goal: lower body dressing: Supervision/stand-by assist  OT goal: upper  body bathing: Supervision/stand-by assist  OT goal: lower body bathing: Supervision/stand-by assist  OT goal: transfer: Supervision/stand-by assist(walk in shower)  OT goal: toilet transfer/toileting: Supervision/stand-by assist, cleaning and garment management, toilet transfer  OT goal: cognitive: Patient/caregiver will verbalize understanding of cognitive assessment results/recommendations as needed for safe discharge planning  OT goal 1: Pt will be SBA for HEP to improve strength and endurance for ADL and mobility     PT Frequency: Daily  PT goal: bed mobility: Independent, Supine to/from sit, Rolling  PT goal: transfers: Supervision/stand-by assist, Sit to/from stand, Bed to/from chair, Assistive device, Within precautions  PT goal: gait: Supervision/stand-by assist, 150 feet, Assistive device  PT goal: stairs: 8 stairs, Supervision/stand-by assist, Assistive device  PT goal: perform aerobic activity with stable cardiovascular response: continuous activity, 10 minutes, NuStep  PT goal 1: IND/sba with vcs as needed with strength HEP for improved functional mobility  PT Goal 2: SUP with car transfer to allow for discharge home with wife.  PT goal 3: family will state 3 of > fall prevention strategies to demo knowledge of how to reduce falls risk      SLP Frequency: daily  SLP Swallow Goal:  Safely tolerate diet without signs/symptoms of aspiration: one P.O. texture, with use of compensatory swallow strategies, independently, with use of swallow precautions                                   Goal: Skin Integrity: pt will remain free of coccyx pressure ulcer through out ARU stay                    Goal: Safety Management: pt will remain free of falls through out ARU stay              Individualized Goal 1: Pt will be able taken off NPO by 01/05/2020

## 2021-01-06 NOTE — PROGRESS NOTES
Post Rounds Family Phone Call  Length of call: 10 minutes  Family involved: trish Nair  Projected discharge date: 1/18 or possibly sooner for TCU  Projected discharge location: would need home with 24/7 support, home therapies  Equipment needs: TBD  Other questions and misc: dtr states family cannot provide 24/7 care and is leaning towards TCU

## 2021-01-06 NOTE — PROGRESS NOTES
"[facility], [place]   Internal Medicine Daily Note           Interval History/Events     Overnight events reviewed  Awake. Reports doing well  Denies any pain issues  Participating well with therapy       Review of Systems        4 point ROS including Respiratory, CV, GI and , other than that noted above is negative      Medications   I have reviewed current medications  in the \"current medication\" section of Epic.  Relevant changes include:     Physical Exam   General:       Vital signs:    Blood pressure (!) 72/56, pulse 70, temperature 96.3  F (35.7  C), temperature source Oral, resp. rate 16, height 1.803 m (5' 11\"), weight 80 kg (176 lb 6.4 oz), SpO2 98 %.  Estimated body mass index is 24.6 kg/m  as calculated from the following:    Height as of this encounter: 1.803 m (5' 11\").    Weight as of this encounter: 80 kg (176 lb 6.4 oz).      Intake/Output Summary (Last 24 hours) at 1/5/2021 1503  Last data filed at 1/5/2021 1400  Gross per 24 hour   Intake 760 ml   Output 1675 ml   Net -915 ml        Constitutional: In on acute distress  Eye: No icterus, no pallor  Mouth/ENT: Normal oral mucosa  Cardiovascular: S1, S2 normal.   Respiratory: B/L CTA  GI: Soft, NT, BS+  : No gardner's catheter  Neurology: Sleeping, but easily aroused, and follows commands. Falls back to sleep.   Psych: Mood stable.   MSK:   Integumentary:   Heme/Lymph/Imm:      Laboratory and Imaging Studies     I have reviewed  laboratory and imaging studies in the Epic. Pertinent findings are as below:    BMP  Recent Labs   Lab 01/05/21  0619 01/04/21  0614 01/03/21  1308 12/31/20  0538    140 137 138   POTASSIUM 4.2 4.4 4.4 4.5   CHLORIDE 106 105 103 106   JEET 8.8 9.1 8.8 9.0   CO2 25 31 25 26   BUN 31* 26 28 36*   CR 1.13 1.20 1.04 1.05   * 186* 224* 72     CBC  Recent Labs   Lab 01/05/21  0619 01/04/21  0614 01/03/21  1308 12/31/20  0538   WBC 7.8 7.7 8.8 11.5*   RBC 4.58 4.93 4.92 5.05   HGB 12.3* " 13.4 13.2* 13.5   HCT 40.0 42.3 42.6 46.9   MCV 87 86 87 93   MCH 26.9 27.2 26.8 26.7   MCHC 30.8* 31.7 31.0* 28.8*   RDW 16.9* 17.2* 16.9* 16.6*    323 301 316     INRNo lab results found in last 7 days.  LFTs  Recent Labs   Lab 01/03/21  1308   ALKPHOS 138   AST 22   ALT 16   BILITOTAL 0.8   PROTTOTAL 7.3   ALBUMIN 2.8*      PANCNo lab results found in last 7 days.        Impression/Plan        Mika Alvarez is a 70 year old man with a past medical history of systolic heart failure, A-fib, DM type 2, severe multivessel CAD, who presented 12/2/20 with acute systolic heart failure exacerbation required diuresis then underwent 4 vessel CABG 12/7/20, course complicated by severe dysphagia, possible sieuzre like activity left frontal lobe stroke, sepsis, acute hypoxia with small bi apical pneumothoraces and, altered mental status. Admitted to acute rehab unit 12/28/20 for ongoing rehabilitation and medical management. Internal medicine consulted for co-management.   Individual problems and their management are outlined below        # Multivessel CAD S/P CABG  # Acute diastolic and systolic heart failure, improving  Echo on 12/25:  The left ventricular wall motion is globally hypokinetic. The estimated  left ventricular ejection fraction is 35%. This represents a moderately  decreased ejection fraction.The right ventricle is mildly dilated and its systolic function is  severely reduced. TAPSE is abnormal, which is consistent with abnormal  right ventricular systolic function.  Biatrial volume is moderately increased.  ON Torsemide, Metoprolol, Amiodarone    Patient had severe orthostasis this AM.  He has not been eating and drinking well. Due to this, will hold Torsemide, Metoprolol (patient has not been getting it). Will give him 1 liters fluid over 4 hours (cautiously)  - Continue to monitor  - Hold Torsemide, Metoprolol for now   - Daily weight  - Continue Aspirin, Amiodarone       # A-fib with RVR  - Currently  rate controlled   - Continue amiodarone ( expected for 14 more days and stop. Last day on 1/11 ).  Since he has not converted, it may make sense to stop amiodarone at 7 days  and   -Of note, surgeon preference is to not anticoagulate for a-fib s/p LAAE( direct extract from patient's discharge summary)      # Uncontrolled type II diabetes mellitus, HgbA1c  12.6%  PTA Metformin, Glipizide, and Lantus. Metformin 1000 mg BID on 1/1, and GLipizide were stoppled. Patient was on BID lantus ( 19 units BID) when he was on continious tube feeding. Given that the feeding tube is out and Patinet is NPO, Lantus iwas held  Following initiation of DD1 type diet,  Insulin Glargine was resumed at 10 units BID ( starting tonight). Blood glucose 200-300  Blood glucose better controlled after increasing the dose of Insulin Glargine to 12 units BID, and reinitiation of Metformin on 01/05/2020  - Continue Insulin Glargine to 12 units BID  - Continue on Metformin  - Continue Insulin Aspart correction at medium intensity  - Hypoglycemia protocol    Acute hypoxic respiratory failure, resolved  -  Encourage IC  - Supplemental oxygen as needed  - CXR on 12/31 no acute airspace abnormalities      Acute metabolic encephalopathy  Intermittent agitation   - Supportive care  - Likely multifactorial ( post operative status , stroke and possibly pre existing status secondary to uncontrolled DM type 2  And HTN)   - Encephalopathy with intermittent agitation continues to be a significant barrier to progress   - It does appear that patient does not have decision making cacpcity at this time   - Encephalopathy seems to be improving, plan to wean down Quetiapine.      Subacute left frontal stroke with petechial hemorrhage  - ASA  - Atorvastatin  - Continue levetiracetam    # Sepsis, Resolved   - Fever, Leukocytosis, tachycardia noted 12/10/2020  - UA was negative. One of two blood cultures grew staph epi, likely contaminant.  - Sputum culture grew  beta-hemolytic strep  - Started on emperic pip-tazo IV,  completed 7d on 12/17, fever and WBC resolving.  - WBC down to 11.5 on 12/31     #Severe pharyngeal dysphagia  # Malnutrition, unclassified     - SLP team to follow patient here in ARU   video swallow study on 12/21 showed ongoing significant dysphagia  - NGT in place, but was pulled out and the bridle was disrupted on 1/3 am. Repeat CXR  showed that the feeding tube is still near the pylorus. Subsequently, the NGT was completely pulled out as it appeared to be clogged  - Patient is currently refusing an NGT  - Diet advance per SLP evaluation.         Primary hypertension, now with asymptomatic hypotension   - Continue metoprolol tartrate ( has not been able to receive this due to hypotesnion)  - Monitoring BP     # Bilateral Pneumothoraces   X Ray on 12/26 with  Tiny apical pneumothoraces without significant change. No consolidation or focal pneumonia in the lungs. No pulmonary edema. No other significant change.  Continue to monitor clinically.  CXR on 12/31  With resolved pneumothoraces       VTE risk reduction. Subcutaneous heparin.                      Diet: Orders Placed This Encounter      Dysphagia Diet Level 1 Pureed Nectar Thickened Liquids (pre-thickened or use instant food thickener)       DVT Prophylaxis: Heparin SQ  Koenig Catheter: not present  Code Status: Full Code                                   Pt's care was discussed with bedside RN, patient and  during Care Team Rounds.               Tim Meza MD  Hospitalist ( Internal medicine)  Pager: [phone]

## 2021-01-06 NOTE — PLAN OF CARE
FOCUS/GOAL  Bowel management, Bladder management, Pain management, Mobility, Skin integrity, and Safety management    ASSESSMENT, INTERVENTIONS AND CONTINUING PLAN FOR GOAL:  A/O, VS stable. Continent of bowel and bladder. No complaints of pain. CGA with walker. Dressings changed on day shift. Skin bruised. Calls appropriately. Pleasant during shift. Continue POC.

## 2021-01-06 NOTE — PROGRESS NOTES
[facility]   Acute Rehabilitation Unit  Daily progress note    INTERVAL HISTORY  Pt seen on unit rounds, and discussed in team rounds as well.  No acute events overnight and he is overall showing improvement in agitation and participation with therapy.  He remains symptomatically orthostatic however which is a barrier, and also continues to have fatigue.  He denies chest pain or shortness of breath, but is having lightheadedness at the time of my visit.  His appetite has been overall good, but he will also miss large portions of meals.  For full functional updates, see team rounds note from today. Remains on 1:1, but will consider attempting to wean in the near future.        MEDICATIONS  Scheduled:    amiodarone  200 mg Oral Daily     aspirin  81 mg Oral Daily     atorvastatin  80 mg Oral QPM     famotidine  20 mg Oral BID     [Held by provider] glipiZIDE  5 mg Per Feeding Tube BID AC     heparin ANTICOAGULANT  5,000 Units Subcutaneous Q12H     insulin aspart  1-7 Units Subcutaneous TID AC     insulin aspart  1-5 Units Subcutaneous At Bedtime     insulin glargine  12 Units Subcutaneous BID     levETIRAcetam  500 mg Oral BID     melatonin  3 mg Oral At Bedtime     metFORMIN  1,000 mg Oral BID w/meals     multivitamin w/minerals  1 tablet Oral Daily     QUEtiapine  25 mg Oral BID        PRN:  acetaminophen, dextrose, glucose **OR** dextrose **OR** glucagon, OLANZapine, phenol, QUEtiapine, sennosides       PHYSICAL EXAM  Patient Vitals for the past 24 hrs:   BP Temp Temp src Pulse Resp SpO2   01/06/21 1457 113/72 95.7  F (35.4  C) Oral 75 16 98 %   01/06/21 0916 (!) 72/56 -- -- 70 -- --   01/06/21 0912 111/75 -- -- 80 -- --   01/06/21 0758 116/75 96.3  F (35.7  C) Oral 54 16 98 %   01/06/21 0656 (!) 76/62 -- -- -- -- --   01/06/21 0646 125/75 95.5  F (35.3  C) Oral 78 16 97 %   01/05/21 1608 117/78 96.4  F (35.8  C) Oral 78 16 96 %       GEN: Alert, oriented, not in acute  distress, lying comfortably in bed.  HEENT: Neck supple, extraocular movement intact.  CVS: S1-S2 present, regular, rate, rhythm.  PULM: Clear to auscultation bilaterally, breathing comfortably on room air.  ABD: Soft, nontender, nondistended, bowel sound present.  +Abdominal binder in place.   EXT: No LE edema or calf tenderness b/l.  Compression stockings in place.   Neuro: Has answered appropriately, follows commands if he wants to.  +Dysphonia, speaking slowly.    LABS/IMAGING  CBC RESULTS:   Recent Labs   Lab Test 01/05/21 0619   WBC 7.8   RBC 4.58   HGB 12.3*   HCT 40.0   MCV 87   MCH 26.9   MCHC 30.8*   RDW 16.9*            Last Comprehensive Metabolic Panel:  Sodium   Date Value Ref Range Status   01/05/2021 139 133 - 144 mmol/L Final     Potassium   Date Value Ref Range Status   01/05/2021 4.2 3.4 - 5.3 mmol/L Final     Chloride   Date Value Ref Range Status   01/05/2021 106 94 - 109 mmol/L Final     Carbon Dioxide   Date Value Ref Range Status   01/05/2021 25 20 - 32 mmol/L Final     Anion Gap   Date Value Ref Range Status   01/05/2021 9 3 - 14 mmol/L Final     Glucose   Date Value Ref Range Status   01/05/2021 311 (H) 70 - 99 mg/dL Final     Urea Nitrogen   Date Value Ref Range Status   01/05/2021 31 (H) 7 - 30 mg/dL Final     Creatinine   Date Value Ref Range Status   01/05/2021 1.13 0.66 - 1.25 mg/dL Final     GFR Estimate   Date Value Ref Range Status   01/05/2021 66 >60 mL/min/[1.73_m2] Final     Comment:     Non  GFR Calc  Starting 12/18/2018, serum creatinine based estimated GFR (eGFR) will be   calculated using the Chronic Kidney Disease Epidemiology Collaboration   (CKD-EPI) equation.       Calcium   Date Value Ref Range Status   01/05/2021 8.8 8.5 - 10.1 mg/dL Final       Recent Labs   Lab 01/06/21  1209 01/06/21  0746 01/06/21  0159 01/05/21  2221 01/05/21  1959 01/05/21  1731 01/05/21  0619 01/05/21  0619 01/04/21  0614 01/04/21  0614 01/03/21  1308 01/03/21  1308  12/31/20  0538 12/31/20  0538   GLC  --   --   --   --   --   --   --  311*  --  186*  --  224*  --  72   * 190* 205* 237* 325* 464*   < >  --    < >  --    < >  --    < >  --     < > = values in this interval not displayed.     EXAM: XR CHEST 1 VW 1/3/2021       HISTORY: possible dislodged feeding tube.     COMPARISON: Previous day.      TECHNIQUE: Supine frontal view of the chest.     FINDINGS: Feeding tube tip projects over the medial right upper  quadrant. Post CABG changes. Mild interstitial prominence. Heart is  enlarged. No focal airspace consolidation. No abnormally dilated loops  of bowel in the visualized upper abdomen..                                                                      IMPRESSION: Feeding tube tip projects over the medial right upper  quadrant, possibly near the distal stomach or pylorus.        IMPRESSION/PLAN:  Mika Alvarez is a 70 year old man with a past medical history of systolic heart failure, A-fib, DM type 2, severe multivessel CAD, who presented 12/2/20 with acute systolic heart failure exacerbation required diuresis then underwent 4 vessel CABG 12/7/20, course complicated by severe dysphagia, possible sieuzre like activity left frontal lobe stroke, sepsis, acute hypoxia with small bi apical pneumothoraces and, altered mental status. Admitted to acute rehab unit 12/28/20 for ongoing rehabilitation and medical management.         Admission to acute inpatient rehab s/p cabg x 4.    Impairment group code: 0.9     1. PT, OT and SLP 60 minutes of each on a daily basis, in addition to rehab nursing and close management of physiatrist.       2. Impairment of ADL's: Noted to have impaired strength, coordination, activity tolerance, and cognition leading to impaired ability to complete self cares, continue OT with goal for MOD I to I with basic adls.      3. Impairment of mobility:  Noted to have impaired strength, activity tolerance, and cognition leading to impaired mobility will  benefit from ongoing PT with goal for MOD I to I with basic mobility and stairs.      4. Impairment of cognition/language/swallow:  Noted to have severe dysphagia and impaired cognition will benefit from ongoing SLP with goal for least restrictive diet tolerated and improved ability to communicate needs.     Medical Conditions    #CAD  #S/P CABG x 4 (12/7)  #Acute on chronic systolic heart failure- 2/2 ischemic cardiomyopathy  #HTN  #A-fib with RVR-   Most recent Echo 12/25/20 with left ventricular wall globally hypokinetic, EF estimated 35%. Right ventricle mildly dilated with systolic function severely reduced.  Had A-fib with RVR early in hospital stay on amiodarone taper. surgeon preference is to not anticoagulate for a-fib   -continue amiodarone 200 mg daily x 2 weeks then stop   -continue asa, statin  -sternal precautions (6 weeks from 12/7)- limit 10 lbs then as tolerated   -Torsemide and Metoprolol discontinued due to orthostatic and clinically hypovolemic. (has not been receiving Metoprolol in several days anyways).  Discussed with medical team, will also administer 1L of fluids today.  -monitor bmp, weights, intake and output, edema/sob  -f/u cardiology as scheduled     #Subacute Left frontal stroke with petechial hemorrhage  #Possible simple partial seizure  #Acute metabolic encephalopathy  Neurology consulted for left leg shaking POD 1, EEG neg, head CT obtained in setting of ongoing leg shaking and ams- demonstrated stroke 12/12 (most recent imaging stable)  1.  Petechial hemorrhage in the left frontal opercular infarct correlating with the blood products noted on MRI,  Chronic left cerebellar infarct.  -follow up neurology within one month- Dr. Bailey/ [facility]- follow up possible seizure-   -continue keppra until neurology follow up  -bedside attendant dcd 12/17 though on admission 12/28 attempting to get out of bed- bedside attendant for safety.  Is showing improvement in agitation.      #Agitation  -Psychiatry: Seroquel now decreased to 25 mg BID.  Will continue to wean as able.     #DM type 2- Hgb A1C 12.6%.   -Restarting Metformin 1000 mg twice daily, continue to hold glipizide.  -Restarted Lantus  12 units bid, and ssi on 1/5/2021 after NG tube was removed.  -Continue to monitor glucose closely, Appreciate hospitalist team recs.      #oral pharyngeal dysphagia  #Impaired oral intake -   slp monitoring currently NPO with nd in place 12/21 video swallow with ongoing significant dysphagia s/p ND placed 12/15.  -appreciate ongoing slp  -appreciate nutrition support  -Video swallow study done on 1/4/2021 and was cleared for nectar thick diet.     #Possible aspiration pneumonia- fever, known dysphagia, leucocytosis,  hypoxia, other workup unremarkable received zosyn course seen by id.  -appreciate ongoing SLP support  -trend WBC, 7.8 on 1/5      #Bilateral shin wounds- mepilex changes as ordered keep clean and dry.      #Urinary retention- gardner recently removed, reportedly voiding without issue  -PVRs x2 low  May check PRN only.   -was started on flomax at prior hospital - had flomax suspension- as npo and only pill form will hold and monitor as above.        #Creatinine elevation - Cr 1.08 BUN 36 12/29, Cr 1.13 on 1/5/2021  -monitor bmp  -Torsemide dose decreased to 20 mg daily     1. Adjustment to disability: plan for psychology consult when more consistently alert  2. FEN: NDD1 and NTLs  3. Bowel: monitor  4. Bladder: PVRs WNL  5. DVT Prophylaxis: subcutaneous heparin  6. GI Prophylaxis: ppi  7. Code: full- prior and patient confirmed on admission  8. Disposition: goal for home  9. ELOS:  2+ weeks.  10. Rehab prognosis:  Fair- medically complex with significant impairments in cognition and swallow and milder strength/activity tolerance deficits goal for home pending supervision needs and family support  11. Follow up Appointments on Discharge: cardiology, cv surgery, pcp.    Yonathan Jerome,  MD  Department of Rehabilitation Medicine    Time Spent on this Encounter   I, Yonathan Jerome, spent a total of 35 minutes face-to-face or managing the care of Mika Alvarez. Over 50% of my time on the unit was spent counseling the patient and coordinating care. See note for details.

## 2021-01-06 NOTE — PLAN OF CARE
Discharge Planner Post-Acute Rehab SLP:      Discharge Plan:home with assist     Precautions: fall, swallowing     Current Status:  Communication: dysphonic voice though slowly improving. communication to be evaluated, Pt currently refusing  Cognition: to be assessed but pt currently is refusing cognitive -linguistic assessment  Swallow: moderate oropharyngeal dysphagia      Assessment:  Patient had finished the majority of his NDD-1 meal prior to clinician arrival. Shown how to thicken the liquids. Patient inconsistent in using chin tuck maneuver but generally using a natural head down position. Intermittent productive cough.      Other Barriers to Discharge (Family Training, etc): to be determined

## 2021-01-06 NOTE — PLAN OF CARE
Patient is A&O, 1:1 attendant at bedside, able to anticipate needs. Patient was up with an assist of 1 walker, orthostatic bp this morning, patient symptomatic, stayed in bed and unable to particpate in therapy. IV bolus given this afternoon, orthostatic BP improved, still dropped, not as drastically as this morning, all other vitals stable. Manages clothing and veronica cares with assistance. Bowel sounds present, last BM 01/01, given senna PRN, voiding spontaneously in the urinal. No complaint of, chest pain or SOB. Dressing to [place]. Ext. CDI, dressing to Coccyx replaced coccyx non-blanchable, woc consult placed, updated doc. Tolerating DD1 diet/nectar thick liquids, no Nausea. Continue POC.

## 2021-01-06 NOTE — PROGRESS NOTES
Calorie Count:  1/5: 618 kcal and 22 g protein (2 meals)      Meredith Howe RD, SHAKEEL SCOTT RD pager: [phone]

## 2021-01-07 ENCOUNTER — APPOINTMENT (OUTPATIENT)
Dept: SPEECH THERAPY | Facility: CLINIC | Age: 71
End: 2021-01-07
Payer: COMMERCIAL

## 2021-01-07 ENCOUNTER — APPOINTMENT (OUTPATIENT)
Dept: PHYSICAL THERAPY | Facility: CLINIC | Age: 71
End: 2021-01-07
Payer: COMMERCIAL

## 2021-01-07 ENCOUNTER — APPOINTMENT (OUTPATIENT)
Dept: OCCUPATIONAL THERAPY | Facility: CLINIC | Age: 71
End: 2021-01-07
Payer: COMMERCIAL

## 2021-01-07 LAB
ANION GAP SERPL CALCULATED.3IONS-SCNC: 6 MMOL/L (ref 3–14)
BUN SERPL-MCNC: 21 MG/DL (ref 7–30)
CALCIUM SERPL-MCNC: 8.6 MG/DL (ref 8.5–10.1)
CHLORIDE SERPL-SCNC: 105 MMOL/L (ref 94–109)
CO2 SERPL-SCNC: 28 MMOL/L (ref 20–32)
CREAT SERPL-MCNC: 0.9 MG/DL (ref 0.66–1.25)
ERYTHROCYTE [DISTWIDTH] IN BLOOD BY AUTOMATED COUNT: 17.2 % (ref 10–15)
GFR SERPL CREATININE-BSD FRML MDRD: 87 ML/MIN/{1.73_M2}
GLUCOSE BLDC GLUCOMTR-MCNC: 157 MG/DL (ref 70–99)
GLUCOSE BLDC GLUCOMTR-MCNC: 203 MG/DL (ref 70–99)
GLUCOSE BLDC GLUCOMTR-MCNC: 227 MG/DL (ref 70–99)
GLUCOSE BLDC GLUCOMTR-MCNC: 248 MG/DL (ref 70–99)
GLUCOSE BLDC GLUCOMTR-MCNC: 266 MG/DL (ref 70–99)
GLUCOSE SERPL-MCNC: 192 MG/DL (ref 70–99)
HCT VFR BLD AUTO: 37.8 % (ref 40–53)
HGB BLD-MCNC: 11.8 G/DL (ref 13.3–17.7)
MCH RBC QN AUTO: 27.3 PG (ref 26.5–33)
MCHC RBC AUTO-ENTMCNC: 31.2 G/DL (ref 31.5–36.5)
MCV RBC AUTO: 87 FL (ref 78–100)
PLATELET # BLD AUTO: 252 10E9/L (ref 150–450)
POTASSIUM SERPL-SCNC: 4 MMOL/L (ref 3.4–5.3)
RBC # BLD AUTO: 4.33 10E12/L (ref 4.4–5.9)
SODIUM SERPL-SCNC: 139 MMOL/L (ref 133–144)
WBC # BLD AUTO: 8.7 10E9/L (ref 4–11)

## 2021-01-07 PROCEDURE — 250N000011 HC RX IP 250 OP 636: Performed by: PHYSICIAN ASSISTANT

## 2021-01-07 PROCEDURE — G0463 HOSPITAL OUTPT CLINIC VISIT: HCPCS

## 2021-01-07 PROCEDURE — 36415 COLL VENOUS BLD VENIPUNCTURE: CPT | Performed by: PHYSICIAN ASSISTANT

## 2021-01-07 PROCEDURE — 999N001017 HC STATISTIC GLUCOSE BY METER IP

## 2021-01-07 PROCEDURE — 97116 GAIT TRAINING THERAPY: CPT | Mod: GP

## 2021-01-07 PROCEDURE — 97110 THERAPEUTIC EXERCISES: CPT | Mod: GP

## 2021-01-07 PROCEDURE — 97530 THERAPEUTIC ACTIVITIES: CPT | Mod: GO | Performed by: STUDENT IN AN ORGANIZED HEALTH CARE EDUCATION/TRAINING PROGRAM

## 2021-01-07 PROCEDURE — 97530 THERAPEUTIC ACTIVITIES: CPT | Mod: GP

## 2021-01-07 PROCEDURE — 96372 THER/PROPH/DIAG INJ SC/IM: CPT | Performed by: PHYSICIAN ASSISTANT

## 2021-01-07 PROCEDURE — 99232 SBSQ HOSP IP/OBS MODERATE 35: CPT | Performed by: PHYSICAL MEDICINE & REHABILITATION

## 2021-01-07 PROCEDURE — 250N000013 HC RX MED GY IP 250 OP 250 PS 637: Performed by: PHYSICAL MEDICINE & REHABILITATION

## 2021-01-07 PROCEDURE — 258N000003 HC RX IP 258 OP 636: Performed by: INTERNAL MEDICINE

## 2021-01-07 PROCEDURE — 92526 ORAL FUNCTION THERAPY: CPT | Mod: GN | Performed by: SPEECH-LANGUAGE PATHOLOGIST

## 2021-01-07 PROCEDURE — 99233 SBSQ HOSP IP/OBS HIGH 50: CPT | Performed by: INTERNAL MEDICINE

## 2021-01-07 PROCEDURE — 128N000003 HC R&B REHAB

## 2021-01-07 PROCEDURE — 99207 PR CDG-CUT & PASTE-POTENTIAL IMPACT ON LEVEL: CPT | Performed by: INTERNAL MEDICINE

## 2021-01-07 PROCEDURE — 85027 COMPLETE CBC AUTOMATED: CPT | Performed by: PHYSICIAN ASSISTANT

## 2021-01-07 PROCEDURE — 97535 SELF CARE MNGMENT TRAINING: CPT | Mod: GO | Performed by: STUDENT IN AN ORGANIZED HEALTH CARE EDUCATION/TRAINING PROGRAM

## 2021-01-07 PROCEDURE — 80048 BASIC METABOLIC PNL TOTAL CA: CPT | Performed by: PHYSICIAN ASSISTANT

## 2021-01-07 PROCEDURE — 97750 PHYSICAL PERFORMANCE TEST: CPT | Mod: GP

## 2021-01-07 RX ORDER — SODIUM CHLORIDE 9 MG/ML
INJECTION, SOLUTION INTRAVENOUS CONTINUOUS
Status: DISPENSED | OUTPATIENT
Start: 2021-01-07 | End: 2021-01-07

## 2021-01-07 RX ADMIN — HEPARIN SODIUM 5000 UNITS: 5000 INJECTION, SOLUTION INTRAVENOUS; SUBCUTANEOUS at 21:33

## 2021-01-07 RX ADMIN — METFORMIN HYDROCHLORIDE 1000 MG: 500 TABLET ORAL at 18:57

## 2021-01-07 RX ADMIN — INSULIN ASPART 3 UNITS: 100 INJECTION, SOLUTION INTRAVENOUS; SUBCUTANEOUS at 13:06

## 2021-01-07 RX ADMIN — LEVETIRACETAM 500 MG: 500 TABLET, FILM COATED ORAL at 21:33

## 2021-01-07 RX ADMIN — QUETIAPINE 25 MG: 25 TABLET, FILM COATED ORAL at 09:03

## 2021-01-07 RX ADMIN — INSULIN ASPART 1 UNITS: 100 INJECTION, SOLUTION INTRAVENOUS; SUBCUTANEOUS at 09:04

## 2021-01-07 RX ADMIN — INSULIN GLARGINE 12 UNITS: 100 INJECTION, SOLUTION SUBCUTANEOUS at 09:05

## 2021-01-07 RX ADMIN — METFORMIN HYDROCHLORIDE 1000 MG: 500 TABLET ORAL at 09:03

## 2021-01-07 RX ADMIN — INSULIN ASPART 2 UNITS: 100 INJECTION, SOLUTION INTRAVENOUS; SUBCUTANEOUS at 18:56

## 2021-01-07 RX ADMIN — LEVETIRACETAM 500 MG: 500 TABLET, FILM COATED ORAL at 09:03

## 2021-01-07 RX ADMIN — HEPARIN SODIUM 5000 UNITS: 5000 INJECTION, SOLUTION INTRAVENOUS; SUBCUTANEOUS at 09:04

## 2021-01-07 RX ADMIN — Medication 12.5 MG: at 05:29

## 2021-01-07 RX ADMIN — FAMOTIDINE 20 MG: 20 TABLET, FILM COATED ORAL at 09:03

## 2021-01-07 RX ADMIN — MELATONIN TAB 3 MG 3 MG: 3 TAB at 21:33

## 2021-01-07 RX ADMIN — ASPIRIN 81 MG CHEWABLE TABLET 81 MG: 81 TABLET CHEWABLE at 09:03

## 2021-01-07 RX ADMIN — AMIODARONE HYDROCHLORIDE 200 MG: 200 TABLET ORAL at 09:03

## 2021-01-07 RX ADMIN — MULTIPLE VITAMINS W/ MINERALS TAB 1 TABLET: TAB at 09:07

## 2021-01-07 RX ADMIN — FAMOTIDINE 20 MG: 20 TABLET, FILM COATED ORAL at 21:33

## 2021-01-07 RX ADMIN — ATORVASTATIN CALCIUM 80 MG: 80 TABLET, FILM COATED ORAL at 21:33

## 2021-01-07 RX ADMIN — SODIUM CHLORIDE: 9 INJECTION, SOLUTION INTRAVENOUS at 18:01

## 2021-01-07 RX ADMIN — QUETIAPINE 25 MG: 25 TABLET, FILM COATED ORAL at 21:33

## 2021-01-07 NOTE — PROGRESS NOTES
Left a vm for pt dtr Kaycee this afternoon at about 12:15pm. No return call. SW emailed pt SALIMA Serna requesting to verify Kaycee's number and to ask Daphne (SALIMA) to have pt dtr call SW directly to discuss discharge plans. No TCU referrals have been sent at this time due to difficulty contacting family.     Mary Ellen Niño, HENRIK, [facility] Acute Rehab Unit   Phone: [phone]  I   Pager: [phone]

## 2021-01-07 NOTE — PROGRESS NOTES
"[facility], [place]   Internal Medicine Daily Note           Interval History/Events     Overnight events reviewed  Reports doing well. Feels tired  No nausea, vomiting, chest pain, shortness of breath, lightheadedness or dizziness       Review of Systems        4 point ROS including Respiratory, CV, GI and , other than that noted above is negative      Medications   I have reviewed current medications  in the \"current medication\" section of Epic.  Relevant changes include:     Physical Exam   General:       Vital signs:    Blood pressure 102/72, pulse 91, temperature 98  F (36.7  C), temperature source Oral, resp. rate 18, height 1.803 m (5' 11\"), weight 80 kg (176 lb 6.4 oz), SpO2 99 %.  Estimated body mass index is 24.6 kg/m  as calculated from the following:    Height as of this encounter: 1.803 m (5' 11\").    Weight as of this encounter: 80 kg (176 lb 6.4 oz).      Intake/Output Summary (Last 24 hours) at 1/5/2021 1503  Last data filed at 1/5/2021 1400  Gross per 24 hour   Intake 760 ml   Output 1675 ml   Net -915 ml        Constitutional: In on acute distress  Eye: No icterus, no pallor  Mouth/ENT: Normal oral mucosa  Cardiovascular: S1, S2 normal.   Respiratory: B/L CTA  GI: Soft, NT, BS+  : No gardner's catheter  Neurology: Sleeping, but easily aroused, and follows commands. Falls back to sleep.   Psych: Mood stable.   MSK:   Integumentary:   Heme/Lymph/Imm:      Laboratory and Imaging Studies     I have reviewed  laboratory and imaging studies in the Epic. Pertinent findings are as below:    BMP  Recent Labs   Lab 01/07/21  0607 01/05/21  0619 01/04/21  0614 01/03/21  1308    139 140 137   POTASSIUM 4.0 4.2 4.4 4.4   CHLORIDE 105 106 105 103   JEET 8.6 8.8 9.1 8.8   CO2 28 25 31 25   BUN 21 31* 26 28   CR 0.90 1.13 1.20 1.04   * 311* 186* 224*     CBC  Recent Labs   Lab 01/07/21  0607 01/05/21  0619 01/04/21  0614 01/03/21  1308   WBC 8.7 7.8 7.7 8.8   RBC 4.33* " 4.58 4.93 4.92   HGB 11.8* 12.3* 13.4 13.2*   HCT 37.8* 40.0 42.3 42.6   MCV 87 87 86 87   MCH 27.3 26.9 27.2 26.8   MCHC 31.2* 30.8* 31.7 31.0*   RDW 17.2* 16.9* 17.2* 16.9*    264 323 301     INRNo lab results found in last 7 days.  LFTs  Recent Labs   Lab 01/03/21  1308   ALKPHOS 138   AST 22   ALT 16   BILITOTAL 0.8   PROTTOTAL 7.3   ALBUMIN 2.8*      PANCNo lab results found in last 7 days.        Impression/Plan        Mika Alvarez is a 70 year old man with a past medical history of systolic heart failure, A-fib, DM type 2, severe multivessel CAD, who presented 12/2/20 with acute systolic heart failure exacerbation required diuresis then underwent 4 vessel CABG 12/7/20, course complicated by severe dysphagia, possible sieuzre like activity left frontal lobe stroke, sepsis, acute hypoxia with small bi apical pneumothoraces and, altered mental status. Admitted to acute rehab unit 12/28/20 for ongoing rehabilitation and medical management. Internal medicine consulted for co-management.   Individual problems and their management are outlined below        # Multivessel CAD S/P CABG  # Acute diastolic and systolic heart failure, improving  Echo on 12/25:  The left ventricular wall motion is globally hypokinetic. The estimated  left ventricular ejection fraction is 35%. This represents a moderately  decreased ejection fraction.The right ventricle is mildly dilated and its systolic function is  severely reduced. TAPSE is abnormal, which is consistent with abnormal  right ventricular systolic function.  Biatrial volume is moderately increased.  ON Torsemide, Metoprolol, Amiodarone    Patient having soft BP, and Orthostatic symptoms intermittently.  He has not been eating and drinking well. Due to this, Torsemide, and Metoprolol had been held (patient has not been getting it).   - Will give him 500 ml  fluid over 5 hours (cautiously)  - Continue to monitor  - Hold Torsemide, Metoprolol for now   - Daily weight  -  Continue Aspirin, Amiodarone       # A-fib with RVR  - Currently rate controlled   - Continue amiodarone ( expected for 14 more days and stop. Last day on 1/11 ).  Since he has not converted, it may make sense to stop amiodarone at 7 days  and   -Of note, surgeon preference is to not anticoagulate for a-fib s/p LAAE( direct extract from patient's discharge summary)      # Uncontrolled type II diabetes mellitus, HgbA1c  12.6%  PTA Metformin, Glipizide, and Lantus. Metformin 1000 mg BID on 1/1, and GLipizide were stoppled. Patient was on BID lantus ( 19 units BID) when he was on continious tube feeding. Given that the feeding tube is out and Patinet is NPO, Lantus iwas held  Following initiation of DD1 type diet,  Insulin Glargine was resumed at 10 units BID ( starting tonight). Blood glucose 200-300  Blood glucose better controlled after increasing the dose of Insulin Glargine to 12 units BID, and reinitiation of Metformin on 01/05/2020  BLood glucose still on the higher levels.   - Increase Insulin Glargine to 14  units BID  - Continue on Metformin  - Continue Insulin Aspart correction at medium intensity  - Hypoglycemia protocol    Acute hypoxic respiratory failure, resolved  -  Encourage IC  - Supplemental oxygen as needed  - CXR on 12/31 no acute airspace abnormalities      Acute metabolic encephalopathy  Intermittent agitation   - Supportive care  - Likely multifactorial ( post operative status , stroke and possibly pre existing status secondary to uncontrolled DM type 2  And HTN)   - Encephalopathy with intermittent agitation continues to be a significant barrier to progress   - It does appear that patient does not have decision making cacpcity at this time   - Encephalopathy seems to be improving, plan to wean down Quetiapine.      Subacute left frontal stroke with petechial hemorrhage  - ASA  - Atorvastatin  - Continue levetiracetam    # Sepsis, Resolved   - Fever, Leukocytosis, tachycardia noted  12/10/2020  - UA was negative. One of two blood cultures grew staph epi, likely contaminant.  - Sputum culture grew beta-hemolytic strep  - Started on emperic pip-tazo IV,  completed 7d on 12/17, fever and WBC resolving.  - WBC down to 11.5 on 12/31     #Severe pharyngeal dysphagia  # Malnutrition, unclassified     - SLP team to follow patient here in ARU   video swallow study on 12/21 showed ongoing significant dysphagia  - NGT in place, but was pulled out and the bridle was disrupted on 1/3 am. Repeat CXR  showed that the feeding tube is still near the pylorus. Subsequently, the NGT was completely pulled out as it appeared to be clogged  - Patient is currently refusing an NGT  - Diet advance per SLP evaluation.         Primary hypertension, now with asymptomatic hypotension   - Continue metoprolol tartrate ( has not been able to receive this due to hypotesnion)  - Monitoring BP     # Bilateral Pneumothoraces   X Ray on 12/26 with  Tiny apical pneumothoraces without significant change. No consolidation or focal pneumonia in the lungs. No pulmonary edema. No other significant change.  Continue to monitor clinically.  CXR on 12/31  With resolved pneumothoraces       VTE risk reduction. Subcutaneous heparin.                      Diet: Orders Placed This Encounter      Dysphagia Diet Level 1 Pureed Nectar Thickened Liquids (pre-thickened or use instant food thickener)       DVT Prophylaxis: Heparin SQ  Koenig Catheter: not present  Code Status: Full Code                                   Pt's care was discussed with bedside RN, patient and  during Care Team Rounds.               Tim Meza MD  Hospitalist ( Internal medicine)  Pager: [phone]

## 2021-01-07 NOTE — PLAN OF CARE
Discharge Planner Post-Acute Rehab PT:      Discharge Plan: TCU as pt will need 24/7 due to cognitive deficits as family unable to provide     Precautions: sternal, HOB 30* with FT     Current Status:  Transfer: CGA without AD  Bed Mobility: SUP  Gait: amb bouts with close BP monitoring, x25' > x200' with FWW and SBA > x200' without AD CGA.   Stairs: NT  Balance: mild LOB on turns without AD, but no overt LOB, SBA with standing activity     Assessment: Continued use of abd binder and compression socks needed to manage BP. BPs 130s/70s at rest, down to 90s/70s with long distance ambulation but pt asymptomatic. Overall functional mobility improving, pt participates and motivated throughout, appropriate interactions throughout session.      Other Barriers to Discharge (DME, Family Training, etc): unable to have 24/7 at home, cognition, pt currently on 1:1 sitter

## 2021-01-07 NOTE — PLAN OF CARE
VS: VSS. /52 - continuous bolus normal saline ordered. Orthostatic hypotension - silvino stockings and abdominal binder used.    O2: 98% on room air.    Output: Voiding spontaneously using toilet in bathroom.   Last BM: LBM 1/6/21.   Activity: AO1 w/ gait belt, walking into the bathroom.   Up for meals? Yes.    Skin: See LDAs.    Pain: None.    CMS: Intact.   Dressing: Multiple mepliex CDI.   Diet: DD1, nectar thickened liquids. Ice chips only between meals and after oral cares.    LDA: L hand PIV. Nonblanchable redness coccyx (covered by mepilex). Wounds (abrasions and skin tears) BLE, sternal incision, wounds abdomen.   Equipment: Abdominal binder.    Plan: Continue therapy sessions. 1:1 sitter; continue to assess.   Additional Info: Pt calm and slept start of shift after full day of therapy ending 1615.  MD increased lantus dose - see order.   0.9% NaCl running at 100 mL/hr continuous per orders.    prior to dinner, insulin given. Scheduled metformin given.  Pt eating at end of shift. Calm, cooperative, pleasant.   iPad chat set up for 1930 for pt to talk with daughter.   No impulsivity during the shift or tugging at the IV.

## 2021-01-07 NOTE — PLAN OF CARE
FOCUS/GOAL  Bowel management, Bladder management and Nutrition/Feeding/Swallowing precautions    ASSESSMENT, INTERVENTIONS AND CONTINUING PLAN FOR GOAL:  Pt is alert to touch overnight sleeping very well, no signs of impulsivity, continent of bladder, no reports of pain, sob, n/v, or new numbness and tingling, orthostatic hypotension, piv patent in left hand, dressing to bilateral LE CDI, dressing on coccyx for protection of non-blanchable redness, 1-1 in room, no further care concerns at this time continue with POC. Pt became agitated at 530 am and started to yell at attendant saying that he wanted him out of the room, pt was administered 12.5 mg Seroquel with pudding, Seroquel  . BG of 205 at 2am, no further care concerns at this time.

## 2021-01-07 NOTE — PROGRESS NOTES
[facility]   Acute Rehabilitation Unit  Daily progress note    INTERVAL HISTORY  Pt seen with medical team today.  Early this morning became agitated and needed a PRN dose of Seroquel.  Unclear of any specific trigger, except was yelling that he wanted the 1:1 out of the room.  At the time of our visit he had no concerns or complaints.  Ate all of breakfast, but he continues to have poor liquid intake due to the nectar thick.  Reason for the thickened liquids was explained to him.  He has a few minutes of dizziness when sitting upright, then resolves.  BPs remain soft, despite receiving 1L fluids yesterday.  Functionally participated well with OT yesterday.  Was SBA with a walker.      MEDICATIONS  Scheduled:    amiodarone  200 mg Oral Daily     aspirin  81 mg Oral Daily     atorvastatin  80 mg Oral QPM     famotidine  20 mg Oral BID     [Held by provider] glipiZIDE  5 mg Per Feeding Tube BID AC     heparin ANTICOAGULANT  5,000 Units Subcutaneous Q12H     insulin aspart  1-7 Units Subcutaneous TID AC     insulin aspart  1-5 Units Subcutaneous At Bedtime     insulin glargine  12 Units Subcutaneous BID     levETIRAcetam  500 mg Oral BID     melatonin  3 mg Oral At Bedtime     metFORMIN  1,000 mg Oral BID w/meals     multivitamin w/minerals  1 tablet Oral Daily     QUEtiapine  25 mg Oral BID        PRN:  acetaminophen, dextrose, glucose **OR** dextrose **OR** glucagon, OLANZapine, phenol, QUEtiapine, sennosides       PHYSICAL EXAM  Patient Vitals for the past 24 hrs:   BP Temp Temp src Pulse Resp SpO2   01/07/21 1025 127/86 -- -- -- -- --   01/07/21 0831 98/69 96.7  F (35.9  C) Oral 82 17 96 %   01/07/21 0430 -- 95.3  F (35.2  C) Oral 76 20 --   01/06/21 2245 (!) 80/59 -- -- 92 18 --   01/06/21 1457 113/72 95.7  F (35.4  C) Oral 75 16 98 %       GEN: Alert, oriented, not in acute distress, lying comfortably in bed.  HEENT: Neck supple, extraocular movement intact.  PULM: breathing  comfortably on room air.  ABD: Soft, nontender, nondistended, bowel sound present.  EXT: No LE edema or calf tenderness b/l.  Compression stockings in place.   Neuro: Has answered appropriately, follows commands.  AAOx1.  +Dysphonia.    LABS/IMAGING  CBC RESULTS:   Recent Labs   Lab Test 01/07/21  0607   WBC 8.7   RBC 4.33*   HGB 11.8*   HCT 37.8*   MCV 87   MCH 27.3   MCHC 31.2*   RDW 17.2*            Last Comprehensive Metabolic Panel:  Sodium   Date Value Ref Range Status   01/07/2021 139 133 - 144 mmol/L Final     Potassium   Date Value Ref Range Status   01/07/2021 4.0 3.4 - 5.3 mmol/L Final     Chloride   Date Value Ref Range Status   01/07/2021 105 94 - 109 mmol/L Final     Carbon Dioxide   Date Value Ref Range Status   01/07/2021 28 20 - 32 mmol/L Final     Anion Gap   Date Value Ref Range Status   01/07/2021 6 3 - 14 mmol/L Final     Glucose   Date Value Ref Range Status   01/07/2021 192 (H) 70 - 99 mg/dL Final     Urea Nitrogen   Date Value Ref Range Status   01/07/2021 21 7 - 30 mg/dL Final     Creatinine   Date Value Ref Range Status   01/07/2021 0.90 0.66 - 1.25 mg/dL Final     GFR Estimate   Date Value Ref Range Status   01/07/2021 87 >60 mL/min/[1.73_m2] Final     Comment:     Non  GFR Calc  Starting 12/18/2018, serum creatinine based estimated GFR (eGFR) will be   calculated using the Chronic Kidney Disease Epidemiology Collaboration   (CKD-EPI) equation.       Calcium   Date Value Ref Range Status   01/07/2021 8.6 8.5 - 10.1 mg/dL Final       Recent Labs   Lab 01/07/21  0902 01/07/21  0607 01/07/21  0219 01/06/21  2113 01/06/21  1821 01/06/21  1209 01/06/21  0746 01/05/21  0619 01/05/21  0619 01/04/21  0614 01/04/21  0614 01/03/21  1308 01/03/21  1308   GLC  --  192*  --   --   --   --   --   --  311*  --  186*  --  224*   *  --  203* 244* 219* 254* 190*   < >  --    < >  --    < >  --     < > = values in this interval not displayed.     EXAM: XR CHEST 1 VW 1/3/2021        HISTORY: possible dislodged feeding tube.     COMPARISON: Previous day.      TECHNIQUE: Supine frontal view of the chest.     FINDINGS: Feeding tube tip projects over the medial right upper  quadrant. Post CABG changes. Mild interstitial prominence. Heart is  enlarged. No focal airspace consolidation. No abnormally dilated loops  of bowel in the visualized upper abdomen..                                                                      IMPRESSION: Feeding tube tip projects over the medial right upper  quadrant, possibly near the distal stomach or pylorus.        IMPRESSION/PLAN:  Mika Alvarez is a 70 year old man with a past medical history of systolic heart failure, A-fib, DM type 2, severe multivessel CAD, who presented 12/2/20 with acute systolic heart failure exacerbation required diuresis then underwent 4 vessel CABG 12/7/20, course complicated by severe dysphagia, possible sieuzre like activity left frontal lobe stroke, sepsis, acute hypoxia with small bi apical pneumothoraces and, altered mental status. Admitted to acute rehab unit 12/28/20 for ongoing rehabilitation and medical management.         Admission to acute inpatient rehab s/p cabg x 4.    Impairment group code: 0.9     1. PT, OT and SLP 60 minutes of each on a daily basis, in addition to rehab nursing and close management of physiatrist.       2. Impairment of ADL's: Noted to have impaired strength, coordination, activity tolerance, and cognition leading to impaired ability to complete self cares, continue OT with goal for MOD I to I with basic adls.      3. Impairment of mobility:  Noted to have impaired strength, activity tolerance, and cognition leading to impaired mobility will benefit from ongoing PT with goal for MOD I to I with basic mobility and stairs.      4. Impairment of cognition/language/swallow:  Noted to have severe dysphagia and impaired cognition will benefit from ongoing SLP with goal for least restrictive diet tolerated  and improved ability to communicate needs.     Medical Conditions    #CAD  #S/P CABG x 4 (12/7)  #Acute on chronic systolic heart failure- 2/2 ischemic cardiomyopathy  #HTN  #A-fib with RVR-   Most recent Echo 12/25/20 with left ventricular wall globally hypokinetic, EF estimated 35%. Right ventricle mildly dilated with systolic function severely reduced.  Had A-fib with RVR early in hospital stay on amiodarone taper. surgeon preference is to not anticoagulate for a-fib   -continue amiodarone 200 mg daily x 2 weeks then stop   -continue asa, statin  -sternal precautions (6 weeks from 12/7)- limit 10 lbs then as tolerated   -Torsemide and Metoprolol discontinued due to orthostatic and clinically hypovolemic. (has not been receiving Metoprolol in several days anyways).  BPs remain soft.  Received 1L of fluids 1/6, and will give further gentle fluids today.   -monitor bmp, weights, intake and output, edema/sob  -f/u cardiology as scheduled     #Subacute Left frontal stroke with petechial hemorrhage  #Possible simple partial seizure  #Acute metabolic encephalopathy  Neurology consulted for left leg shaking POD 1, EEG neg, head CT obtained in setting of ongoing leg shaking and ams- demonstrated stroke 12/12 (most recent imaging stable)  1.  Petechial hemorrhage in the left frontal opercular infarct correlating with the blood products noted on MRI,  Chronic left cerebellar infarct.  -follow up neurology within one month- Dr. Bailey/ [facility]- follow up possible seizure-   -continue keppra until neurology follow up  -bedside attendant dcd 12/17 though on admission 12/28 attempting to get out of bed- bedside attendant for safety.  Is showing improvement in agitation, however still with intermittent episodes..     #Agitation  -Psychiatry: Seroquel now decreased to 25 mg BID.  Will continue to wean as able.     #DM type 2- Hgb A1C 12.6%.   -Restarting Metformin 1000 mg twice daily, continue to hold glipizide.  -Restarted Lantus   12 units bid, and ssi on 1/5/2021 after NG tube was removed.  -Continue to monitor glucose closely, Appreciate hospitalist team recs.      #oral pharyngeal dysphagia  #Impaired oral intake -   slp monitoring currently NPO with nd in place 12/21 video swallow with ongoing significant dysphagia s/p ND placed 12/15 (removed on 1/3)  -appreciate ongoing slp  -appreciate nutrition support  -Video swallow study done on 1/4/2021 and was cleared for nectar thick diet.     #Possible aspiration pneumonia- fever, known dysphagia, leucocytosis,  hypoxia, other workup unremarkable received zosyn course seen by id.  -appreciate ongoing SLP support  -trend WBC, 8.7 on 1/7      #Bilateral shin wounds- mepilex changes as ordered keep clean and dry.      #Urinary retention- gradner recently removed, reportedly voiding without issue  -PVRs x2 low  May check PRN only.   -was started on flomax at prior hospital - had flomax suspension- as npo and only pill form will hold and monitor as above.        #Creatinine elevation - Cr 1.08 BUN 36 12/29, Cr 0.90 on 1/7/2021  -monitor bmp  -Torsemide dose decreased to 20 mg daily     1. Adjustment to disability: plan for psychology consult when more consistently alert  2. FEN: NDD1 and NTLs  3. Bowel: monitor  4. Bladder: PVRs WNL  5. DVT Prophylaxis: subcutaneous heparin  6. GI Prophylaxis: ppi  7. Code: full- prior and patient confirmed on admission  8. Disposition: goal for home  9. ELOS:  2+ weeks.  10. Rehab prognosis:  Fair- medically complex with significant impairments in cognition and swallow and milder strength/activity tolerance deficits goal for home pending supervision needs and family support  11. Follow up Appointments on Discharge: cardiology, cv surgery, pcp.    Yonathan Jerome MD  Department of Rehabilitation Medicine    Time Spent on this Encounter   I, Yonathan Jerome, spent a total of 25 minutes face-to-face or managing the care of Mika Alvarez. Over 50% of my time on the unit was spent  counseling the patient and coordinating care. See note for details.

## 2021-01-07 NOTE — PROGRESS NOTES
01/07/21 1500   Signing Clinician's Name / Credentials   Signing clinician's name / credentials Susana Paulson DPHERO   Flaherty Balance Scale (ELIOT MILLER, MALGORZATA S, ANTONY GOMES, NAWAF CAMPOS: MEASURING BALANCE IN THE ELDERLY: VALIDATION OF AN INSTRUMENT. CAN. J. PUB. HEALTH, JULY/AUGUST SUPPLEMENT 2:S7-11, 1992.)   Sit To Stand 4   Standing Unsupported 4   Sitting Unsupported 4   Stand to Sit 4   Transfers 4   Standing with Eyes Closed 4   Standing Unsupported, Feet Together 4   Reach Forward With Outstretched Arm 3   Retrieve Object From Floor 4   Turning to Look Behind 4   Turn 360 Degrees 4   Placing Alternate Foot on Stool (4-6 inches) 4   Unsupported Tandem Stand (Demonstrate to Subject) 3   One Leg Stand 2   Total Score (A score of 45 or less has been correlated with an increased risk of falls)   Total Score (out of 56) 52     Flaherty Balance Scale (BBS) Cutoff Scores: A score of < 45/56 indicates an increased risk for falls.   Patient Score: 52/56    The BBS is a measure of static and dynamic standing balance that has been validated in community dwelling elderly individuals and individuals who have Parkinson's Disease, MS, and those who are s/p CVA and TBI. The test is administered without an assistive device. Scores from the Flaherty are used to determine the probability of falling based on the patient's previous history of falls and their test performance.     Minimal Detectable Change = 6.5   & Minimal Detectable Change (Parkinson's Disease) = 5 according to Avi & Denizey 2008    Assessment (rationale for performing, application to patient s function & care plan): AD needs, falls risk  Minutes billed as physical performance test: 10

## 2021-01-07 NOTE — PLAN OF CARE
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 Pt got dressed and went for a walk. SBA using the walker. BP soft despite wearing silvino hose and abdominal binder. Good participation this PM as well. Pt walked in hallway without right ear and with CGA.      Other Barriers to Discharge (DME, Family Training, etc): level of assist, may not have 24/7 assist at home, poor participation in therapy, orthostatic

## 2021-01-07 NOTE — PLAN OF CARE
Discharge Planner Post-Acute Rehab SLP:      Discharge Plan:home with assist     Precautions: fall, swallowing     Current Status:  Communication: dysphonic voice though slowly improving. communication to be evaluated, Pt currently refusing  Cognition: to be assessed but pt currently is refusing cognitive -linguistic assessment  Swallow: moderate oropharyngeal dysphagia- on DD1 with nectar thick liquids with use of chin tuck; allowed free water program for ice chips following oral cares.       Assessment: pt seen for swallow tx- trialed a DD2 item- banana bread- cut dry crust off and put butter on it-- still pt had some intermittent coughing - likely due to pharyngeal residue- benfitted from using hard double swallow and alternating with small sips of nectar thick liquids- needs cues to use chin tuck with nectar thick liquids but at other times has chin downward in a natural position with swallowing. Recommend continue with current DD1 with nectar thick liquids, chin down with liquids, small single bites/sips, hard double swallow, alternate consistencies. OK for ice chips as part of free water program- only in between meals and only after oral cares.       Other Barriers to Discharge (Family Training, etc): to be determined

## 2021-01-07 NOTE — PROGRESS NOTES
3-Day Calorie Count Assessment:  1/4: 451 kcal and 16 g protein (1 meal/lunch, breakfast not ordered, evening meal refused per nursing notes)   1/5: 618 kcal and 22 g protein (2 meals)   1/6: 1743 kcal and 69 g protein (3 meals, 2 supplements)  3 day average PO intake = 937 kcal (47% minimum energy needs) and 35 g protein (45% minimum protein needs)    Plan/Recommendations:  As po intakes seem to be improving, especially on 1/6, RD will discontinue calorie count. Continue diet per SLP recommendations, continue supplement as ordered. RD will continue to monitor progress towards goals per protocol.     Meredith Howe, RD, LD  AR[facility] RD pager: [phone]

## 2021-01-07 NOTE — PLAN OF CARE
Alert and oriented x4, cont of B/B LBM 1/6. Pt had good appetite this shift, ate % of both meals. BG levels elevated, see results; insulin given per MAR. Pt was pleasant and cooperative this shift, no need for PRN Seroquel. Pt is orthostatic, abdominal binder and tubigrip socks on all shift. PIV in L hand patent, no fluid bolus ordered for this shift and fluids encouraged. No other concerns, pt remains with bedside attendant for safety concerns.

## 2021-01-07 NOTE — PROGRESS NOTES
WOC Consult    S: WOC Consult to evaluate and treat non-blancable redness to coccyx.    B: Per Dr Dmitry Jerome on 1/6/2020: Mika Alvarez is a 70 year old man with a past medical history of systolic heart failure, A-fib, DM type 2, severe multivessel CAD, who presented 12/2/20 with acute systolic heart failure exacerbation required diuresis then underwent 4 vessel CABG 12/7/20, course complicated by severe dysphagia, possible sieuzre like activity left frontal lobe stroke, sepsis, acute hypoxia with small bi apical pneumothoraces and, altered mental status. Admitted to acute rehab unit 12/28/20 for ongoing rehabilitation and medical management.     A: Patient turned. Mepliex Flex dressing in place over coccyx and low sacrum. Mepilex removed. Skin on sacrum, coccyx, bilateral buttocks and perineum assessed. All skin is intact, blanchable without evidence of pressure injury or wound.    R: WOC will sign off.     Kailey Galaviz RN, CWOCN

## 2021-01-08 ENCOUNTER — APPOINTMENT (OUTPATIENT)
Dept: SPEECH THERAPY | Facility: CLINIC | Age: 71
End: 2021-01-08
Payer: COMMERCIAL

## 2021-01-08 ENCOUNTER — APPOINTMENT (OUTPATIENT)
Dept: PHYSICAL THERAPY | Facility: CLINIC | Age: 71
End: 2021-01-08
Payer: COMMERCIAL

## 2021-01-08 ENCOUNTER — APPOINTMENT (OUTPATIENT)
Dept: OCCUPATIONAL THERAPY | Facility: CLINIC | Age: 71
End: 2021-01-08
Payer: COMMERCIAL

## 2021-01-08 ENCOUNTER — COMMUNICATION - HEALTHEAST (OUTPATIENT)
Dept: CARDIOLOGY | Facility: CLINIC | Age: 71
End: 2021-01-08

## 2021-01-08 LAB
GLUCOSE BLDC GLUCOMTR-MCNC: 141 MG/DL (ref 70–99)
GLUCOSE BLDC GLUCOMTR-MCNC: 146 MG/DL (ref 70–99)
GLUCOSE BLDC GLUCOMTR-MCNC: 195 MG/DL (ref 70–99)
GLUCOSE BLDC GLUCOMTR-MCNC: 250 MG/DL (ref 70–99)

## 2021-01-08 PROCEDURE — 97116 GAIT TRAINING THERAPY: CPT | Mod: GP

## 2021-01-08 PROCEDURE — 97535 SELF CARE MNGMENT TRAINING: CPT | Mod: GO | Performed by: STUDENT IN AN ORGANIZED HEALTH CARE EDUCATION/TRAINING PROGRAM

## 2021-01-08 PROCEDURE — 99207 PR CDG-CUT & PASTE-POTENTIAL IMPACT ON LEVEL: CPT | Performed by: INTERNAL MEDICINE

## 2021-01-08 PROCEDURE — 97112 NEUROMUSCULAR REEDUCATION: CPT | Mod: GP

## 2021-01-08 PROCEDURE — 250N000011 HC RX IP 250 OP 636: Performed by: INTERNAL MEDICINE

## 2021-01-08 PROCEDURE — 999N000125 HC STATISTIC PATIENT MED CONFERENCE < 30 MIN: Performed by: SPEECH-LANGUAGE PATHOLOGIST

## 2021-01-08 PROCEDURE — 97110 THERAPEUTIC EXERCISES: CPT | Mod: GP

## 2021-01-08 PROCEDURE — 96372 THER/PROPH/DIAG INJ SC/IM: CPT | Performed by: PHYSICIAN ASSISTANT

## 2021-01-08 PROCEDURE — 99233 SBSQ HOSP IP/OBS HIGH 50: CPT | Mod: GC | Performed by: PHYSICAL MEDICINE & REHABILITATION

## 2021-01-08 PROCEDURE — 128N000003 HC R&B REHAB

## 2021-01-08 PROCEDURE — 92526 ORAL FUNCTION THERAPY: CPT | Mod: GN | Performed by: SPEECH-LANGUAGE PATHOLOGIST

## 2021-01-08 PROCEDURE — 999N000125 HC STATISTIC PATIENT MED CONFERENCE < 30 MIN: Performed by: STUDENT IN AN ORGANIZED HEALTH CARE EDUCATION/TRAINING PROGRAM

## 2021-01-08 PROCEDURE — 99233 SBSQ HOSP IP/OBS HIGH 50: CPT | Performed by: INTERNAL MEDICINE

## 2021-01-08 PROCEDURE — 999N000150 HC STATISTIC PT MED CONFERENCE < 30 MIN: Performed by: STUDENT IN AN ORGANIZED HEALTH CARE EDUCATION/TRAINING PROGRAM

## 2021-01-08 PROCEDURE — 250N000013 HC RX MED GY IP 250 OP 250 PS 637: Performed by: PHYSICAL MEDICINE & REHABILITATION

## 2021-01-08 PROCEDURE — 999N001017 HC STATISTIC GLUCOSE BY METER IP

## 2021-01-08 PROCEDURE — 250N000011 HC RX IP 250 OP 636: Performed by: PHYSICIAN ASSISTANT

## 2021-01-08 RX ORDER — QUETIAPINE FUMARATE 25 MG/1
25 TABLET, FILM COATED ORAL AT BEDTIME
Status: DISCONTINUED | OUTPATIENT
Start: 2021-01-08 | End: 2021-01-11

## 2021-01-08 RX ORDER — ONDANSETRON 4 MG/1
4 TABLET, ORALLY DISINTEGRATING ORAL EVERY 6 HOURS PRN
Status: DISCONTINUED | OUTPATIENT
Start: 2021-01-08 | End: 2021-01-13 | Stop reason: HOSPADM

## 2021-01-08 RX ADMIN — METFORMIN HYDROCHLORIDE 1000 MG: 500 TABLET ORAL at 08:01

## 2021-01-08 RX ADMIN — INSULIN ASPART 1 UNITS: 100 INJECTION, SOLUTION INTRAVENOUS; SUBCUTANEOUS at 08:05

## 2021-01-08 RX ADMIN — ASPIRIN 81 MG CHEWABLE TABLET 81 MG: 81 TABLET CHEWABLE at 08:00

## 2021-01-08 RX ADMIN — METFORMIN HYDROCHLORIDE 1000 MG: 500 TABLET ORAL at 18:12

## 2021-01-08 RX ADMIN — AMIODARONE HYDROCHLORIDE 200 MG: 200 TABLET ORAL at 08:01

## 2021-01-08 RX ADMIN — LEVETIRACETAM 500 MG: 500 TABLET, FILM COATED ORAL at 21:43

## 2021-01-08 RX ADMIN — HEPARIN SODIUM 5000 UNITS: 5000 INJECTION, SOLUTION INTRAVENOUS; SUBCUTANEOUS at 08:01

## 2021-01-08 RX ADMIN — HEPARIN SODIUM 5000 UNITS: 5000 INJECTION, SOLUTION INTRAVENOUS; SUBCUTANEOUS at 19:41

## 2021-01-08 RX ADMIN — LEVETIRACETAM 500 MG: 500 TABLET, FILM COATED ORAL at 08:01

## 2021-01-08 RX ADMIN — ONDANSETRON 4 MG: 4 TABLET, ORALLY DISINTEGRATING ORAL at 01:25

## 2021-01-08 RX ADMIN — MELATONIN TAB 3 MG 3 MG: 3 TAB at 21:43

## 2021-01-08 RX ADMIN — FAMOTIDINE 20 MG: 20 TABLET, FILM COATED ORAL at 21:43

## 2021-01-08 RX ADMIN — QUETIAPINE 25 MG: 25 TABLET, FILM COATED ORAL at 08:01

## 2021-01-08 RX ADMIN — ATORVASTATIN CALCIUM 80 MG: 80 TABLET, FILM COATED ORAL at 21:43

## 2021-01-08 RX ADMIN — MULTIPLE VITAMINS W/ MINERALS TAB 1 TABLET: TAB at 08:01

## 2021-01-08 RX ADMIN — INSULIN ASPART 3 UNITS: 100 INJECTION, SOLUTION INTRAVENOUS; SUBCUTANEOUS at 18:12

## 2021-01-08 RX ADMIN — INSULIN ASPART 1 UNITS: 100 INJECTION, SOLUTION INTRAVENOUS; SUBCUTANEOUS at 13:11

## 2021-01-08 RX ADMIN — FAMOTIDINE 20 MG: 20 TABLET, FILM COATED ORAL at 08:00

## 2021-01-08 RX ADMIN — QUETIAPINE 25 MG: 25 TABLET, FILM COATED ORAL at 21:43

## 2021-01-08 NOTE — PLAN OF CARE
Discharge Planner Post-Acute Rehab PT:      Discharge Plan: TCU as pt will need 24/7 due to cognitive deficits as family unable to provide     Precautions: sternal, HOB 30* with FT     Current Status:  Transfer: Sup without AD  Bed Mobility: SUP  Gait: amb bouts without right ear 150 feet x 4 .  CGA/ supervision with variable NGOZI   Stairs: NT  Balance: no overt LOB, SBA with standing activity     Assessment: Vitals remained steady during session. Continued use of abd binder and compression socks needed to manage BP. BPs 130s/70s at rest, down to 90s/70s with long distance ambulation but pt asymptomatic. Overall functional mobility improving, pt participates and motivated throughout, appropriate interactions throughout session.      Other Barriers to Discharge (DME, Family Training, etc): unable to have 24/7 at home, cognition, pt currently on 1:1 sitter

## 2021-01-08 NOTE — PROGRESS NOTES
Acute Rehab Care Conference/Team Rounds      Type: Team Rounds    Present: Dr. Dmitry Jerome, Claudio Whitman RN, Mary Ellen Salazar SW, Shira Caruso OT, Oumou Watt PT, Trina Renee SLP, Sarah Evans , Meredith Rodarte Dietician, Leo Murdock RN Case Mgr        Discharge Barriers/Treatment/Education    Rehab Diagnosis: CVA, CAD s/p CABG x4       Active Medical Co-morbidities/Prognosis: Seizures, HTN, dysphagia with tube feeds, CHF, ischemic cardiomyopathy, a. Fib with RVR, encephalopathy, DM II, aspiration PNA, bilateral skin wounds, HUMBERTO     Safety: On 1:1, but starting to wean     Pain: No reports of pain overnight.      Medications, Skin, Tubes/Lines: Pt is taking pills crushed in applesauce, dressing to bilateral lower extremity skin tears CDI, sternal incision and chest tube sites ELVA and CDI, PIV in left hand.        Swallowing/Nutrition:SLP: pt advanced to NDD1/nectar diet with strategies. Pt to use chin tuck, alternate textures, hard/double swallow.  Swallowing moderately impaired with ongoing aspiration risk, but likely reduced with strategies/precautions (1:1 recommended). Pt refused TF.    Bowel/Bladder:    Psychosocial: , lives in a home with wife who needs physical assistance. Pt has adult children in the area who work full-time and have families. 24/7 supervision unknown at this time. Per EMR pt has anxiety and depression. Recent loss of his son due to suicide. No financial concerns reported. No substance abuse reported.     ADLs/IADLs: Pt is SBA for self cares and CGA for mobility without device. Pt is limited by fatigue, orthostatic BP and cognition. Pt follows directions and has been participating well in therapy however he does have poor insight. Pt would require 24/7 supervision at home and assist with IADL including caregiving for his wife and family is unable to provide this. Need to progress pt from the 1:1 and continue working on activity endurance  within precautions.     Mobility: pt with increased participation and improved mobility. Pt is limited by fatigue, orthostatic BP and cognition. Pt follows directions and has been participating well in therapy however he does have poor insight. 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 Mccabe balance 52/56 indicating low risk for falls.  CGA on stairs with B rails, step to pattern. In-pt PT focus on dynamic balance, functional endurance. D/t cognition, pt will likely transition to TCU.    Cognition/Language:SLP; pt refused cognitive linguisticv evaluation    Community Re-Entry: would be close SBA 2/2 cognition and orthostatics    Transportation: would need assist, transfer not a barrier    Plan of Care and goals reviewed and updated.    Discharge Plan/Recommendations    Fall Precautions: continue    Overall plan for the patient:   Improvement in agitation and participation with therapies.  Will begin weaning 1:1.  SLP unable to formally assess cognition previously, but with improvement they will re-address.  He is close SBA for ADLs but limited by poor insight and perseverations.  Scored 52/56 on the MCCABE balance test, and is ambulating up to 200 ft at a time.  Will need 24 hr supervision which is not available, therefore will plan for TCU to continue therapies.        Utilization Review and Continued Stay Justification    Medical Necessity Criteria:    For any criteria that is not met, please document reason and plan for discharge, transfer, or modification of plan of care to address.    Requires intensive rehabilitation program to treat functional deficits?: Yes    Requires 3x per week or greater involvement of rehabilitation physician to oversee rehabilitation program?: Yes    Requires rehabilitation nursing interventions?: Yes    Patient is making functional progress?: Yes    There is a potential for additional functional progress? Yes    Patient is participating  in therapy 3 hours per day a minimum of 5 days per week or 15 hours per week in 7 day period?: Medical exception week 1 due to significant agitation and refusals with 1:1 attendant, week 2 notably better with participation and rehab focus, predict pt will be able to meet 15 hours per week ongoing    Has discharge needs that require coordinated discharge planning approach?:Yes      Barriers/Concerns related to meeting medical necessity criteria:  Lack of 24 hr supervision    Team Plan to Address Concern:  Ongoing dispo planning      Final Physician Sign off    Statement of Approval: I have reviewed and agree with the recommendations and documentation in this care conference note.       Patient Goals  Social Work Goals: SW waiting to follow up with family, planning for TCU placement.      OT goal: hygiene/grooming: supervision/stand-by assist  OT goal: upper body dressing: Supervision/stand-by assist  OT goal: lower body dressing: Supervision/stand-by assist  OT goal: upper body bathing: Supervision/stand-by assist  OT goal: lower body bathing: Supervision/stand-by assist  OT Goal: transfer: Supervision/stand-by assist(walk in shower)  OT goal: toilet transfer/toileting: Supervision/stand-by assist, cleaning and garment management, toilet transfer  OT goal: cognitive: Patient/caregiver will verbalize understanding of cognitive assessment results/recommendations as needed for safe discharge planning  OT goal 1: Pt will be SBA for HEP to improve strength and endurance for ADL and mobility  OT/ELVA face-to-face collaboration occurred, patient progress and plan of care reviewed.     PT Frequency: Daily  PT goal: bed mobility: Independent, Supine to/from sit, Rolling  PT goal: transfers: Supervision/stand-by assist, Sit to/from stand, Bed to/from chair, Assistive device, Within precautions  PT goal: gait: Supervision/stand-by assist, 150 feet, Assistive device  PT goal: stairs: 8 stairs, Supervision/stand-by assist,  Assistive device  PT goal: perform aerobic activity with stable cardiovascular response: continuous activity, 10 minutes, NuStep  PT goal 1: SBA w/ vcs for strength HEP for improved functional mobility  PT Goal 2: SUP with car transfer to allow for discharge home with wife.  PT Goal 3: following education pt's family will state 3 or > fall prevention strategies to demo knowledge of how to reduce falls risk    SLP Frequency: daily  SLP Swallow Goal:  Safely tolerate diet without signs/symptoms of aspiration: one P.O. texture, with use of compensatory swallow strategies, independently, with use of swallow precautions     RN Goals:  Goal 1: Skin Integrity  pt will remain free of coccyx pressure ulcer through out ARU stay     Goal 2: Safety Management  pt will remain free of falls through out ARU stay    Goal 3:   Pt will be able taken off NPO by 01/05/2020

## 2021-01-08 NOTE — PLAN OF CARE
FOCUS/GOAL  Bowel management, Bladder management and Nutrition/Feeding/Swallowing precautions     ASSESSMENT, INTERVENTIONS AND CONTINUING PLAN FOR GOAL:  A/O. Able to make needs known. 1:1 attendant for safety. Pleasant and cooperative for all cares. VSS. Asymptomatic orthostatic hypotension. 500 ml bolus Normal Saline completed tonight. Ab binder when out of bed. PIV in L hand- SL. No other Drains/Lines/Tubes. Continent of bladder/bowel, last BM 1/7. Up with CGA and gaitbelt to bathroom. No c/o pain or cp. x1 episode of nausea 0100. New Orders for prn Zofran obtained. Med administered with effectiveness. Infrequent cough. Productive when laying down.  Clear sputum. Sternal incision and tube sites have dry scabbing. Non-blanchable redness on coccyx covered with foam dressing- CDI. Various (6) bilateral leg abrasions/foot wounds covered with foam dressings- CDI. Regular DD 1 , nectar liquids. Prefers cranberry and apple juice not lemon water. Woke up around 0520. Breakfast ordered. Takes pills whole. Continue w/ POC.

## 2021-01-08 NOTE — PLAN OF CARE
"FOCUS/GOAL  Nutrition/Feeding/Swallowing precautions, Wound care management, Mobility, and Safety management    ASSESSMENT, INTERVENTIONS AND CONTINUING PLAN FOR GOAL:  A/Ox4. On RA. Denies pain, CP, SOB, dizziness, nausea, N/T. Slight orthostatic hypotension but improving. Voiding without difficulty in bathroom, LBM 1/7. Continent B&B. SBAx1 with gb. DDI diet with nectar thickened liquids, takes pills whole.  and 195. All leg wound care done, new mepilexes in place. Blanchable redness on coccyx, new mepilex in place. Sternal incision approximated and ELVA. Pt able to make needs known, no aggressive behaviors, pt was calm and cooperative the entire shift. 1:1 attendent outside of room. Alarms on for safety. Continue POC.    /74 (BP Location: Right arm)   Pulse 78   Temp 96.6  F (35.9  C) (Oral)   Resp 16   Ht 1.803 m (5' 11\")   Wt 80 kg (176 lb 6.4 oz)   SpO2 100%   BMI 24.60 kg/m       "

## 2021-01-08 NOTE — PROGRESS NOTES
[facility]   Acute Rehabilitation Unit  Daily progress note    INTERVAL HISTORY  Mika seen this afternoon lying comfortably in bed.  He said he was too tired after therapy session totally weaned out.  But denied other complaints.  He said that his appetite is good but feels really very tired.  We discussed with the patient that we will decrease his Seroquel dose further.  Patient received bolus of IV fluids over 4 hours yesterday.  Blood pressure this morning was 105/72.  Patient does not like thickened liquids, has decreased p.o. fluid intake.  At the end of the interview patient was happy to see speech therapy as he had bought food for him.  He was able to sit from supine position unassisted on the edge of the bed.    He denied any nausea, vomiting, headache, chest pain, shortness of breath, difficulty urinating.    As per therapy notes  As per PT notes    Transfer: CGA without AD  Bed Mobility: SUP  Gait: amb bouts with close BP monitoring, x25' > x200' with FWW and SBA > x200' without AD CGA.   Stairs: NT  Balance: mild LOB on turns without AD, but no overt LOB, SBA with standing activity      MEDICATIONS  Scheduled:    amiodarone  200 mg Oral Daily     aspirin  81 mg Oral Daily     atorvastatin  80 mg Oral QPM     famotidine  20 mg Oral BID     [Held by provider] glipiZIDE  5 mg Per Feeding Tube BID AC     heparin ANTICOAGULANT  5,000 Units Subcutaneous Q12H     insulin aspart  1-7 Units Subcutaneous TID AC     insulin aspart  1-5 Units Subcutaneous At Bedtime     insulin glargine  14 Units Subcutaneous BID     levETIRAcetam  500 mg Oral BID     melatonin  3 mg Oral At Bedtime     metFORMIN  1,000 mg Oral BID w/meals     multivitamin w/minerals  1 tablet Oral Daily     QUEtiapine  25 mg Oral At Bedtime        PRN:  acetaminophen, dextrose, glucose **OR** dextrose **OR** glucagon, OLANZapine, ondansetron, phenol, QUEtiapine, sennosides       PHYSICAL EXAM  Patient Vitals  for the past 24 hrs:   BP Temp Temp src Pulse Resp SpO2   01/08/21 1135 103/72 -- -- -- -- --   01/08/21 0737 105/72 97.9  F (36.6  C) -- 85 16 97 %   01/07/21 2300 126/86 97.6  F (36.4  C) Oral 83 18 96 %   01/07/21 1516 102/72 98  F (36.7  C) Oral 91 18 99 %       GEN: Alert, oriented, tired looking elderly gentleman lying comfortably in bed.  Not in acute distress.  HEENT: Neck supple, extraocular movement intact.  PULM: Breathing comfortably on room air, clear to auscultation bilaterally  CARDIO: S1-S2 present, regular, rate, rhythm.  ABD: Soft, nontender, nondistended, bowel sound present.  EXT: No LE edema or calf tenderness b/l.  Compression stockings in place.   Neuro: Has answered appropriately, follows commands.  AAOx1.  +Dysphonia.  Patient able to sit from a supine position unassisted.    LABS/IMAGING  CBC RESULTS:   Recent Labs     Lab Results   Component Value Date    WBC 8.7 01/07/2021     Lab Results   Component Value Date    RBC 4.33 01/07/2021     Lab Results   Component Value Date    HGB 11.8 01/07/2021     Lab Results   Component Value Date    HCT 37.8 01/07/2021     Lab Results   Component Value Date    MCV 87 01/07/2021     Lab Results   Component Value Date    MCH 27.3 01/07/2021     Lab Results   Component Value Date    MCHC 31.2 01/07/2021     Lab Results   Component Value Date    RDW 17.2 01/07/2021     Lab Results   Component Value Date     01/07/2021         Last Comprehensive Metabolic Panel:  Sodium   Date Value Ref Range Status   01/07/2021 139 133 - 144 mmol/L Final     Potassium   Date Value Ref Range Status   01/07/2021 4.0 3.4 - 5.3 mmol/L Final     Chloride   Date Value Ref Range Status   01/07/2021 105 94 - 109 mmol/L Final     Carbon Dioxide   Date Value Ref Range Status   01/07/2021 28 20 - 32 mmol/L Final     Anion Gap   Date Value Ref Range Status   01/07/2021 6 3 - 14 mmol/L Final     Glucose   Date Value Ref Range Status   01/07/2021 192 (H) 70 - 99 mg/dL Final      Urea Nitrogen   Date Value Ref Range Status   01/07/2021 21 7 - 30 mg/dL Final     Creatinine   Date Value Ref Range Status   01/07/2021 0.90 0.66 - 1.25 mg/dL Final     GFR Estimate   Date Value Ref Range Status   01/07/2021 87 >60 mL/min/[1.73_m2] Final     Comment:     Non  GFR Calc  Starting 12/18/2018, serum creatinine based estimated GFR (eGFR) will be   calculated using the Chronic Kidney Disease Epidemiology Collaboration   (CKD-EPI) equation.       Calcium   Date Value Ref Range Status   01/07/2021 8.6 8.5 - 10.1 mg/dL Final       Recent Labs   Lab 01/08/21  1249 01/08/21  0734 01/07/21  2142 01/07/21  1854 01/07/21  1251 01/07/21  0902 01/07/21  0607 01/05/21  0619 01/05/21  0619 01/04/21  0614 01/04/21  0614 01/03/21  1308 01/03/21  1308   GLC  --   --   --   --   --   --  192*  --  311*  --  186*  --  224*   * 146* 248* 227* 266* 157*  --    < >  --    < >  --    < >  --     < > = values in this interval not displayed.     EXAM: XR CHEST 1 VW 1/3/2021       HISTORY: possible dislodged feeding tube.     COMPARISON: Previous day.      TECHNIQUE: Supine frontal view of the chest.     FINDINGS: Feeding tube tip projects over the medial right upper  quadrant. Post CABG changes. Mild interstitial prominence. Heart is  enlarged. No focal airspace consolidation. No abnormally dilated loops  of bowel in the visualized upper abdomen..                                                                      IMPRESSION: Feeding tube tip projects over the medial right upper  quadrant, possibly near the distal stomach or pylorus.        IMPRESSION/PLAN:  Mika Alvarez is a 70 year old man with a past medical history of systolic heart failure, A-fib, DM type 2, severe multivessel CAD, who presented 12/2/20 with acute systolic heart failure exacerbation required diuresis then underwent 4 vessel CABG 12/7/20, course complicated by severe dysphagia, possible sieuzre like activity left frontal lobe  stroke, sepsis, acute hypoxia with small bi apical pneumothoraces and, altered mental status. Admitted to acute rehab unit 12/28/20 for ongoing rehabilitation and medical management.         Admission to acute inpatient rehab s/p cabg x 4.    Impairment group code: 0.9     1. PT, OT and SLP 60 minutes of each on a daily basis, in addition to rehab nursing and close management of physiatrist.       2. Impairment of ADL's: Noted to have impaired strength, coordination, activity tolerance, and cognition leading to impaired ability to complete self cares, continue OT with goal for MOD I to I with basic adls.      3. Impairment of mobility:  Noted to have impaired strength, activity tolerance, and cognition leading to impaired mobility will benefit from ongoing PT with goal for MOD I to I with basic mobility and stairs.      4. Impairment of cognition/language/swallow:  Noted to have severe dysphagia and impaired cognition will benefit from ongoing SLP with goal for least restrictive diet tolerated and improved ability to communicate needs.     Medical Conditions    #CAD  #S/P CABG x 4 (12/7)  #Acute on chronic systolic heart failure- 2/2 ischemic cardiomyopathy  #HTN  #A-fib with RVR-   Most recent Echo 12/25/20 with left ventricular wall globally hypokinetic, EF estimated 35%. Right ventricle mildly dilated with systolic function severely reduced.  Had A-fib with RVR early in hospital stay on amiodarone taper. surgeon preference is to not anticoagulate for a-fib   -continue amiodarone 200 mg daily x 2 weeks then stop   -continue asa, statin  -sternal precautions (6 weeks from 12/7)- limit 10 lbs then as tolerated   -Torsemide and Metoprolol discontinued due to orthostatic and clinically hypovolemic. (has not been receiving Metoprolol in several days anyways).  BPs remain soft.  Received 1L of fluids 1/6, 1/7.   -monitor bmp, weights, intake and output, edema/sob  -f/u cardiology as scheduled     #Subacute Left frontal  stroke with petechial hemorrhage  #Possible simple partial seizure  #Acute metabolic encephalopathy  Neurology consulted for left leg shaking POD 1, EEG neg, head CT obtained in setting of ongoing leg shaking and ams- demonstrated stroke 12/12 (most recent imaging stable)  1.  Petechial hemorrhage in the left frontal opercular infarct correlating with the blood products noted on MRI,  Chronic left cerebellar infarct.  -follow up neurology within one month- Dr. Bailey/ [facility]- follow up possible seizure-   -continue keppra until neurology follow up  -bedside attendant dcd 12/17 though on admission 12/28 attempting to get out of bed- bedside attendant for safety.  Is showing improvement in agitation, however still with intermittent episodes.    #Agitation  -Psychiatry: Pt. Feeling tired after his morning PT session on 1/7/2021. Seroquel 25 mg BID will further decrease to 12.5 BID on 1/8/2021.  Will continue to wean as able.   -Continue 1:1 outside the room today and discontinue on 1/9/2021.     #DM type 2- Hgb A1C 12.6%.   -Continue Metformin 1000 mg twice daily, continue to hold glipizide.  -Restarted Lantus  12 units bid increased to 14U on 1/8/2021, and ssi on 1/5/2021 after NG tube was removed.  -Continue to monitor glucose closely, Appreciate hospitalist team recs.      #oral pharyngeal dysphagia  #Impaired oral intake -   slp monitoring currently NPO with nd in place 12/21 video swallow with ongoing significant dysphagia s/p ND placed 12/15 (removed on 1/3)  -appreciate ongoing slp  -appreciate nutrition support  -Video swallow study done on 1/4/2021 and was cleared for nectar thick diet.     #Possible aspiration pneumonia- fever, known dysphagia, leucocytosis,  hypoxia, other workup unremarkable received zosyn course seen by id.  -appreciate ongoing SLP support  -trend WBC, 8.7 on 1/7    #Bilateral shin wounds- mepilex changes as ordered keep clean and dry.      #Urinary retention- gardner recently removed,  reportedly voiding without issue  -PVRs x2 low  May check PRN only.   -was started on flomax at prior hospital - had flomax suspension- as npo and only pill form will hold and monitor as above.     #Creatinine elevation - Cr 1.08 BUN 36 12/29, Cr 0.90 on 1/7/2021  -monitor bmp     1. Adjustment to disability: plan for psychology consult when more consistently alert  2. FEN: NDD1 and NTLs  3. Bowel: monitor  4. Bladder: PVRs WNL  5. DVT Prophylaxis: subcutaneous heparin  6. GI Prophylaxis: ppi  7. Code: full- prior and patient confirmed on admission  8. Disposition: goal for home  9. ELOS:  2+ weeks.  10. Rehab prognosis:  Fair- medically complex with significant impairments in cognition and swallow and milder strength/activity tolerance deficits goal for home pending supervision needs and family support  11. Follow up Appointments on Discharge: cardiology, cv surgery, pcp.    Patient was seen and discussed with my staff physician Dr. Jerome, who agrees with my assessment and plan.    Alexandre Gomez   PGY 2  Physical Medicine and Rehabilitation  Pager: [phone]

## 2021-01-08 NOTE — PLAN OF CARE
Alert and oriented x4, cont of B/B LBM 1/7/21. Pt has good appetite, ate 100% of both meals,. BG levels 146 and 141; insulin given per order. Pt had rounds this AM; indicated that pt has continued to be pleasant and has not shown impulsivity for more than 24 hours so we will start weaning pt off of 1 -1. Now has distant 1 -1 with sitter sitting outside of pt room. No other care concerns at this time, continue POC.

## 2021-01-08 NOTE — PROGRESS NOTES
"[facility], [place]   Internal Medicine Daily Note           Interval History/Events     Overnight events reviewed  Reports doing well.  No nausea, vomiting, chest pain, shortness of breath, lightheadedness or dizziness         Review of Systems        4 point ROS including Respiratory, CV, GI and , other than that noted above is negative      Medications   I have reviewed current medications  in the \"current medication\" section of Epic.  Relevant changes include:     Physical Exam   General:       Vital signs:    Blood pressure 103/72, pulse 85, temperature 97.9  F (36.6  C), resp. rate 16, height 1.803 m (5' 11\"), weight 80 kg (176 lb 6.4 oz), SpO2 97 %.  Estimated body mass index is 24.6 kg/m  as calculated from the following:    Height as of this encounter: 1.803 m (5' 11\").    Weight as of this encounter: 80 kg (176 lb 6.4 oz).      Intake/Output Summary (Last 24 hours) at 1/5/2021 1503  Last data filed at 1/5/2021 1400  Gross per 24 hour   Intake 760 ml   Output 1675 ml   Net -915 ml        Constitutional: In on acute distress  Eye: No icterus, no pallor  Mouth/ENT: Normal oral mucosa  Cardiovascular: S1, S2 normal.   Respiratory: B/L CTA  GI: Soft, NT, BS+  : No gardner's catheter  Neurology: Sleeping, but easily aroused, and follows commands. Falls back to sleep.   Psych: Mood stable.   MSK:   Integumentary:   Heme/Lymph/Imm:      Laboratory and Imaging Studies     I have reviewed  laboratory and imaging studies in the Epic. Pertinent findings are as below:    BMP  Recent Labs   Lab 01/07/21  0607 01/05/21  0619 01/04/21  0614 01/03/21  1308    139 140 137   POTASSIUM 4.0 4.2 4.4 4.4   CHLORIDE 105 106 105 103   JEET 8.6 8.8 9.1 8.8   CO2 28 25 31 25   BUN 21 31* 26 28   CR 0.90 1.13 1.20 1.04   * 311* 186* 224*     CBC  Recent Labs   Lab 01/07/21  0607 01/05/21  0619 01/04/21  0614 01/03/21  1308   WBC 8.7 7.8 7.7 8.8   RBC 4.33* 4.58 4.93 4.92   HGB 11.8* 12.3* " 13.4 13.2*   HCT 37.8* 40.0 42.3 42.6   MCV 87 87 86 87   MCH 27.3 26.9 27.2 26.8   MCHC 31.2* 30.8* 31.7 31.0*   RDW 17.2* 16.9* 17.2* 16.9*    264 323 301     INRNo lab results found in last 7 days.  LFTs  Recent Labs   Lab 01/03/21  1308   ALKPHOS 138   AST 22   ALT 16   BILITOTAL 0.8   PROTTOTAL 7.3   ALBUMIN 2.8*      PANCNo lab results found in last 7 days.        Impression/Plan        Mika Alvarez is a 70 year old man with a past medical history of systolic heart failure, A-fib, DM type 2, severe multivessel CAD, who presented 12/2/20 with acute systolic heart failure exacerbation required diuresis then underwent 4 vessel CABG 12/7/20, course complicated by severe dysphagia, possible sieuzre like activity left frontal lobe stroke, sepsis, acute hypoxia with small bi apical pneumothoraces and, altered mental status. Admitted to acute rehab unit 12/28/20 for ongoing rehabilitation and medical management. Internal medicine consulted for co-management.   Individual problems and their management are outlined below        # Multivessel CAD S/P CABG  # Acute diastolic and systolic heart failure, improving  Echo on 12/25:  The left ventricular wall motion is globally hypokinetic. The estimated  left ventricular ejection fraction is 35%. This represents a moderately  decreased ejection fraction.The right ventricle is mildly dilated and its systolic function is  severely reduced. TAPSE is abnormal, which is consistent with abnormal  right ventricular systolic function.  Biatrial volume is moderately increased.  ON Torsemide, Metoprolol, Amiodarone    Patient having soft BP, and Orthostatic symptoms intermittently.  He has not been eating and drinking well. Due to this, Torsemide, and Metoprolol had been held (patient has not been getting it). He received im 500 ml  fluid on 01/07/2020  - Continue to monitor  - Hold Torsemide, Metoprolol for now   - Daily weight  - Continue Aspirin, Amiodarone       # A-fib with  RVR  - Currently rate controlled   - Continue amiodarone ( expected for 14 more days and stop. Last day on 1/11 ).  Since he has not converted, it may make sense to stop amiodarone at 7 days  and   -Of note, surgeon preference is to not anticoagulate for a-fib s/p LAAE( direct extract from patient's discharge summary)      # Uncontrolled type II diabetes mellitus, HgbA1c  12.6%  PTA Metformin, Glipizide, and Lantus. Metformin 1000 mg BID on 1/1, and GLipizide were stoppled. Patient was on BID lantus ( 19 units BID) when he was on continious tube feeding. Given that the feeding tube is out and Patinet is NPO, Lantus iwas held  Following initiation of DD1 type diet,  Insulin Glargine was resumed at 10 units BID ( starting tonight). Blood glucose 200-300  Insulin Glargine has been increased to 12, and then 14 units BID on 01/07.   BLood glucose better controlled.   - Continue Insulin Glargine  14  units BID  - Continue on Metformin  - Continue Insulin Aspart correction at medium intensity  - Hypoglycemia protocol    Acute hypoxic respiratory failure, resolved  -  Encourage IC  - Supplemental oxygen as needed  - CXR on 12/31 no acute airspace abnormalities      Acute metabolic encephalopathy  Intermittent agitation   - Supportive care  - Likely multifactorial ( post operative status , stroke and possibly pre existing status secondary to uncontrolled DM type 2  And HTN)   - Encephalopathy with intermittent agitation continues to be a significant barrier to progress   - It does appear that patient does not have decision making cacpcity at this time   - Encephalopathy seems to be improving, plan to wean down Quetiapine.      Subacute left frontal stroke with petechial hemorrhage  - ASA  - Atorvastatin  - Continue levetiracetam    # Sepsis, Resolved   - Fever, Leukocytosis, tachycardia noted 12/10/2020  - UA was negative. One of two blood cultures grew staph epi, likely contaminant.  - Sputum culture grew beta-hemolytic  strep  - Started on emperic pip-tazo IV,  completed 7d on 12/17, fever and WBC resolving.  - WBC down to 11.5 on 12/31     #Severe pharyngeal dysphagia  # Malnutrition, unclassified     - SLP team to follow patient here in ARU   video swallow study on 12/21 showed ongoing significant dysphagia  - NGT in place, but was pulled out and the bridle was disrupted on 1/3 am. Repeat CXR  showed that the feeding tube is still near the pylorus. Subsequently, the NGT was completely pulled out as it appeared to be clogged  - Patient is currently refusing an NGT  - Diet advance per SLP evaluation.         Primary hypertension, now with asymptomatic hypotension   BP on the softer side. Not receiving metoprolol tartrate  due to hypotesnion  - Monitoring BP     # Bilateral Pneumothoraces   X Ray on 12/26 with  Tiny apical pneumothoraces without significant change. No consolidation or focal pneumonia in the lungs. No pulmonary edema. No other significant change.  Continue to monitor clinically.  CXR on 12/31  With resolved pneumothoraces       VTE risk reduction. Subcutaneous heparin.                      Diet: Orders Placed This Encounter      Dysphagia Diet Level 1 Pureed Nectar Thickened Liquids (pre-thickened or use instant food thickener)       DVT Prophylaxis: Heparin SQ  Koenig Catheter: not present  Code Status: Full Code                                   Pt's care was discussed with bedside RN, patient and  during Care Team Rounds.               Tim Meza MD  Hospitalist ( Internal medicine)  Pager: [phone]

## 2021-01-08 NOTE — PLAN OF CARE
Discharge Planner Post-Acute Rehab SLP:      Discharge Plan:home with assist     Precautions: fall, swallowing     Current Status:  Communication: dysphonic voice though slowly improving. communication to be evaluated, Pt currently refusing  Cognition: to be assessed but pt currently is refusing cognitive -linguistic assessment  Swallow: moderate oropharyngeal dysphagia- on DD1 with nectar thick liquids with use of chin tuck; allowed free water program for ice chips following oral cares.       Assessment: pt seen for swallow tx- trialed a DD2 item (ground meat-)- pt had some intermittent coughing - likely due to pharyngeal residue/?aspiration- Pt did better with NDD1 diet.  needs cues to use chin tuck with nectar thick liquids but at other times has chin downward in a natural position with swallowing. Recommend continue with current DD1 with nectar thick liquids, chin down with liquids, small single bites/sips, hard double swallow, alternate consistencies. OK for ice chips as part of free water program- only in between meals and only after oral cares.       Other Barriers to Discharge (Family Training, etc): to be determined

## 2021-01-09 ENCOUNTER — APPOINTMENT (OUTPATIENT)
Dept: PHYSICAL THERAPY | Facility: CLINIC | Age: 71
End: 2021-01-09
Payer: COMMERCIAL

## 2021-01-09 ENCOUNTER — APPOINTMENT (OUTPATIENT)
Dept: SPEECH THERAPY | Facility: CLINIC | Age: 71
End: 2021-01-09
Payer: COMMERCIAL

## 2021-01-09 ENCOUNTER — APPOINTMENT (OUTPATIENT)
Dept: OCCUPATIONAL THERAPY | Facility: CLINIC | Age: 71
End: 2021-01-09
Payer: COMMERCIAL

## 2021-01-09 LAB
GLUCOSE BLDC GLUCOMTR-MCNC: 125 MG/DL (ref 70–99)
GLUCOSE BLDC GLUCOMTR-MCNC: 203 MG/DL (ref 70–99)
GLUCOSE BLDC GLUCOMTR-MCNC: 270 MG/DL (ref 70–99)
GLUCOSE BLDC GLUCOMTR-MCNC: 297 MG/DL (ref 70–99)

## 2021-01-09 PROCEDURE — 99231 SBSQ HOSP IP/OBS SF/LOW 25: CPT | Performed by: STUDENT IN AN ORGANIZED HEALTH CARE EDUCATION/TRAINING PROGRAM

## 2021-01-09 PROCEDURE — 999N001017 HC STATISTIC GLUCOSE BY METER IP

## 2021-01-09 PROCEDURE — 250N000013 HC RX MED GY IP 250 OP 250 PS 637: Performed by: PHYSICAL MEDICINE & REHABILITATION

## 2021-01-09 PROCEDURE — 97530 THERAPEUTIC ACTIVITIES: CPT | Mod: GP

## 2021-01-09 PROCEDURE — 92526 ORAL FUNCTION THERAPY: CPT | Mod: GN | Performed by: SPEECH-LANGUAGE PATHOLOGIST

## 2021-01-09 PROCEDURE — 96372 THER/PROPH/DIAG INJ SC/IM: CPT | Performed by: PHYSICIAN ASSISTANT

## 2021-01-09 PROCEDURE — 99233 SBSQ HOSP IP/OBS HIGH 50: CPT | Performed by: INTERNAL MEDICINE

## 2021-01-09 PROCEDURE — 97112 NEUROMUSCULAR REEDUCATION: CPT | Mod: GP

## 2021-01-09 PROCEDURE — 97530 THERAPEUTIC ACTIVITIES: CPT | Mod: GO

## 2021-01-09 PROCEDURE — 97110 THERAPEUTIC EXERCISES: CPT | Mod: GP

## 2021-01-09 PROCEDURE — 99207 PR CDG-CUT & PASTE-POTENTIAL IMPACT ON LEVEL: CPT | Performed by: INTERNAL MEDICINE

## 2021-01-09 PROCEDURE — 128N000003 HC R&B REHAB

## 2021-01-09 PROCEDURE — 250N000011 HC RX IP 250 OP 636: Performed by: PHYSICIAN ASSISTANT

## 2021-01-09 PROCEDURE — 97116 GAIT TRAINING THERAPY: CPT | Mod: GP

## 2021-01-09 RX ADMIN — INSULIN ASPART 3 UNITS: 100 INJECTION, SOLUTION INTRAVENOUS; SUBCUTANEOUS at 19:02

## 2021-01-09 RX ADMIN — HEPARIN SODIUM 5000 UNITS: 5000 INJECTION, SOLUTION INTRAVENOUS; SUBCUTANEOUS at 08:46

## 2021-01-09 RX ADMIN — MELATONIN TAB 3 MG 3 MG: 3 TAB at 21:34

## 2021-01-09 RX ADMIN — FAMOTIDINE 20 MG: 20 TABLET, FILM COATED ORAL at 20:16

## 2021-01-09 RX ADMIN — ATORVASTATIN CALCIUM 80 MG: 80 TABLET, FILM COATED ORAL at 20:16

## 2021-01-09 RX ADMIN — LEVETIRACETAM 500 MG: 500 TABLET, FILM COATED ORAL at 08:46

## 2021-01-09 RX ADMIN — METFORMIN HYDROCHLORIDE 1000 MG: 500 TABLET ORAL at 08:46

## 2021-01-09 RX ADMIN — FAMOTIDINE 20 MG: 20 TABLET, FILM COATED ORAL at 08:46

## 2021-01-09 RX ADMIN — ASPIRIN 81 MG CHEWABLE TABLET 81 MG: 81 TABLET CHEWABLE at 08:46

## 2021-01-09 RX ADMIN — HEPARIN SODIUM 5000 UNITS: 5000 INJECTION, SOLUTION INTRAVENOUS; SUBCUTANEOUS at 20:16

## 2021-01-09 RX ADMIN — AMIODARONE HYDROCHLORIDE 200 MG: 200 TABLET ORAL at 08:46

## 2021-01-09 RX ADMIN — METFORMIN HYDROCHLORIDE 1000 MG: 500 TABLET ORAL at 19:01

## 2021-01-09 RX ADMIN — QUETIAPINE 25 MG: 25 TABLET, FILM COATED ORAL at 21:35

## 2021-01-09 RX ADMIN — LEVETIRACETAM 500 MG: 500 TABLET, FILM COATED ORAL at 20:16

## 2021-01-09 RX ADMIN — MULTIPLE VITAMINS W/ MINERALS TAB 1 TABLET: TAB at 08:46

## 2021-01-09 NOTE — PLAN OF CARE
FOCUS/GOAL  Bladder management, Pain management, and Mobility    ASSESSMENT, INTERVENTIONS AND CONTINUING PLAN FOR GOAL:  Patient was sleeping in bed when received. Continues to be with 1:1 sitter.  Woke up at around 0200 and sat at the bed side. Denied pain. Woke up again at 0430 and used the bathroom. Was continent with bladder. Transferred with minimum assist of 1 using gait belt. Calm and cooperative. He turned on the TV and eventually went back to sleep. Will continue to monitor.

## 2021-01-09 NOTE — PLAN OF CARE
Discharge Planner Post-Acute Rehab SLP:      Discharge Plan:home with assist     Precautions: fall, swallowing     Current Status:  Communication: dysphonic voice though slowly improving. communication to be evaluated, Pt currently refusing  Cognition: to be assessed but pt currently is refusing cognitive -linguistic assessment  Swallow: moderate oropharyngeal dysphagia- on DD1 with nectar thick liquids with use of chin tuck; allowed free water program for ice chips following oral cares.       Assessment: pt seen for swallow tx- continued with NDD1 diet. Occasional cough if pt talks while eating, min cues to use chin tuck with nectar thick liquids but at other times has chin downward in a natural position with swallowing. Recommend continue with current DD1 with nectar thick liquids, chin down with liquids, small single bites/sips, hard double swallow, alternate consistencies. OK for ice chips as part of free water program- only in between meals and only after oral cares.       Other Barriers to Discharge (Family Training, etc): to be determined

## 2021-01-09 NOTE — PROGRESS NOTES
"[facility], [place]   Internal Medicine Daily Note           Interval History/Events     Overnight events reviewed  Denies any pain or discomfort  No nausea, vomiting  No chest pain, shortness of breath       Review of Systems        4 point ROS including Respiratory, CV, GI and , other than that noted above is negative      Medications   I have reviewed current medications  in the \"current medication\" section of Epic.  Relevant changes include:     Physical Exam   General:       Vital signs:    Blood pressure 104/69, pulse 80, temperature 97.8  F (36.6  C), temperature source Oral, resp. rate 16, height 1.803 m (5' 11\"), weight 80 kg (176 lb 6.4 oz), SpO2 96 %.  Estimated body mass index is 24.6 kg/m  as calculated from the following:    Height as of this encounter: 1.803 m (5' 11\").    Weight as of this encounter: 80 kg (176 lb 6.4 oz).      Intake/Output Summary (Last 24 hours) at 1/5/2021 1503  Last data filed at 1/5/2021 1400  Gross per 24 hour   Intake 760 ml   Output 1675 ml   Net -915 ml        Constitutional: In on acute distress  Eye: No icterus, no pallor  Mouth/ENT: Normal oral mucosa  Cardiovascular: S1, S2 normal.   Respiratory: B/L CTA  GI: Soft, NT, BS+  : No gardner's catheter  Neurology: Sleeping, but easily aroused, and follows commands. Falls back to sleep.   Psych: Mood stable.   MSK:   Integumentary:   Heme/Lymph/Imm:      Laboratory and Imaging Studies     I have reviewed  laboratory and imaging studies in the Epic. Pertinent findings are as below:    BMP  Recent Labs   Lab 01/07/21  0607 01/05/21  0619 01/04/21  0614 01/03/21  1308    139 140 137   POTASSIUM 4.0 4.2 4.4 4.4   CHLORIDE 105 106 105 103   JEET 8.6 8.8 9.1 8.8   CO2 28 25 31 25   BUN 21 31* 26 28   CR 0.90 1.13 1.20 1.04   * 311* 186* 224*     CBC  Recent Labs   Lab 01/07/21  0607 01/05/21  0619 01/04/21  0614 01/03/21  1308   WBC 8.7 7.8 7.7 8.8   RBC 4.33* 4.58 4.93 4.92   HGB 11.8* " 12.3* 13.4 13.2*   HCT 37.8* 40.0 42.3 42.6   MCV 87 87 86 87   MCH 27.3 26.9 27.2 26.8   MCHC 31.2* 30.8* 31.7 31.0*   RDW 17.2* 16.9* 17.2* 16.9*    264 323 301     INRNo lab results found in last 7 days.  LFTs  Recent Labs   Lab 01/03/21  1308   ALKPHOS 138   AST 22   ALT 16   BILITOTAL 0.8   PROTTOTAL 7.3   ALBUMIN 2.8*      PANCNo lab results found in last 7 days.        Impression/Plan        Mika Alvarez is a 70 year old man with a past medical history of systolic heart failure, A-fib, DM type 2, severe multivessel CAD, who presented 12/2/20 with acute systolic heart failure exacerbation required diuresis then underwent 4 vessel CABG 12/7/20, course complicated by severe dysphagia, possible sieuzre like activity left frontal lobe stroke, sepsis, acute hypoxia with small bi apical pneumothoraces and, altered mental status. Admitted to acute rehab unit 12/28/20 for ongoing rehabilitation and medical management. Internal medicine consulted for co-management.   Individual problems and their management are outlined below        # Multivessel CAD S/P CABG  # Acute diastolic and systolic heart failure, improving  Echo on 12/25:  The left ventricular wall motion is globally hypokinetic. The estimated  left ventricular ejection fraction is 35%. This represents a moderately  decreased ejection fraction.The right ventricle is mildly dilated and its systolic function is  severely reduced. TAPSE is abnormal, which is consistent with abnormal  right ventricular systolic function.  Biatrial volume is moderately increased.  ON Torsemide, Metoprolol, Amiodarone    Patient having soft BP, and Orthostatic symptoms intermittently.  He has not been eating and drinking well. Due to this, Torsemide, and Metoprolol had been held (patient has not been getting it). He received im 500 ml  fluid on 01/07/2020  - Continue to monitor  - Hold Torsemide, Metoprolol for now . BP still soft, will initiate as soon as BP will allow  - Daily  weight  - Continue Aspirin, Amiodarone       # A-fib with RVR  - Currently rate controlled   - Continue amiodarone ( expected for 14 more days and stop. Last day on 1/11 ).  Since he has not converted, it may make sense to stop amiodarone at 7 days  and   -Of note, surgeon preference is to not anticoagulate for a-fib s/p LAAE( direct extract from patient's discharge summary)      # Uncontrolled type II diabetes mellitus, HgbA1c  12.6%  PTA Metformin, Glipizide, and Lantus. Metformin 1000 mg BID on 1/1, and GLipizide were stoppled. Patient was on BID lantus ( 19 units BID) when he was on continious tube feeding. Given that the feeding tube is out and Patinet is NPO, Lantus iwas held  Following initiation of DD1 type diet,  Insulin Glargine was resumed at 10 units BID ( starting tonight). Blood glucose 200-300  Insulin Glargine has been increased to 12, and then 14 units BID on 01/07.   BLood glucose better controlled.   - Continue Insulin Glargine  14  units BID  - Continue on Metformin  - Continue Insulin Aspart correction at medium intensity  - Hypoglycemia protocol    Acute hypoxic respiratory failure, resolved  -  Encourage IC  - Supplemental oxygen as needed  - CXR on 12/31 no acute airspace abnormalities      Acute metabolic encephalopathy  Intermittent agitation   - Supportive care  - Likely multifactorial ( post operative status , stroke and possibly pre existing status secondary to uncontrolled DM type 2  And HTN)   - Encephalopathy with intermittent agitation continues to be a significant barrier to progress   - It does appear that patient does not have decision making cacpcity at this time   - Encephalopathy seems to be improving, plan to wean down Quetiapine.      Subacute left frontal stroke with petechial hemorrhage  - ASA  - Atorvastatin  - Continue levetiracetam    # Sepsis, Resolved   - Fever, Leukocytosis, tachycardia noted 12/10/2020  - UA was negative. One of two blood cultures grew staph epi,  likely contaminant.  - Sputum culture grew beta-hemolytic strep  - Started on emperic pip-tazo IV,  completed 7d on 12/17, fever and WBC resolving.  - WBC down to 11.5 on 12/31     #Severe pharyngeal dysphagia  # Malnutrition, unclassified     - SLP team to follow patient here in ARU   video swallow study on 12/21 showed ongoing significant dysphagia  - NGT in place, but was pulled out and the bridle was disrupted on 1/3 am. Repeat CXR  showed that the feeding tube is still near the pylorus. Subsequently, the NGT was completely pulled out as it appeared to be clogged  - Patient is currently refusing an NGT  - Diet advance per SLP evaluation.         Primary hypertension, now with asymptomatic hypotension   BP on the softer side. Not receiving metoprolol tartrate  due to hypotesnion  - Monitoring BP     # Bilateral Pneumothoraces   X Ray on 12/26 with  Tiny apical pneumothoraces without significant change. No consolidation or focal pneumonia in the lungs. No pulmonary edema. No other significant change.  Continue to monitor clinically.  CXR on 12/31  With resolved pneumothoraces       VTE risk reduction. Subcutaneous heparin.                      Diet: Orders Placed This Encounter      Dysphagia Diet Level 1 Pureed Nectar Thickened Liquids (pre-thickened or use instant food thickener)       DVT Prophylaxis: Heparin SQ  Koenig Catheter: not present  Code Status: Full Code                                   Pt's care was discussed with bedside RN, patient and  during Care Team Rounds.               Tim Meza MD  Hospitalist ( Internal medicine)  Pager: [phone]

## 2021-01-09 NOTE — PLAN OF CARE
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 HOwever pt very agitated upon approach when sitting in armchair. pt c/o unable to eat a magic cup and its the only thing that helps with his thirst. Educated patient on benefits of skilled OT services in order to max safetya nd independ with BADLs and transfers in order to d/c. patient appears to be increasingy agitated with encouragement to participate in OT session. Maximized therapeutic conversation in attempt to improve motivation and participation with therapy. CGA for steadying using gait belt to bed with alarms on. pt continues to sleep. pt has 1:1 outside of room when done. -15 min d/t refusal    Other Barriers to Discharge (DME, Family Training, etc): level of assist, may not have 24/7 assist at home, poor participation in therapy, orthostatic

## 2021-01-09 NOTE — PROGRESS NOTES
[facility]   Acute Rehabilitation Unit  Daily progress note    INTERVAL HISTORY  Mika seen this morning. Lying in bed. No concerns this morning.  Denies nausea, vomiting, headache, chest pain, shortness of breath, difficulty urinating.  Hospitalist team co-managing, appreciate assistance    Functionally,  Per SLP:  Continue on DD1, due to aspiration with DD2  Per PT:  Transfer: CGA without AD  Bed Mobility: SUP  Gait: amb bouts with close BP monitoring, x25' > x200' with FWW and SBA > x200' without AD CGA.   Stairs: NT  Balance: mild LOB on turns without AD, but no overt LOB, SBA with standing activity      MEDICATIONS  Scheduled:    amiodarone  200 mg Oral Daily     aspirin  81 mg Oral Daily     atorvastatin  80 mg Oral QPM     famotidine  20 mg Oral BID     [Held by provider] glipiZIDE  5 mg Per Feeding Tube BID AC     heparin ANTICOAGULANT  5,000 Units Subcutaneous Q12H     insulin aspart  1-7 Units Subcutaneous TID AC     insulin aspart  1-5 Units Subcutaneous At Bedtime     insulin glargine  14 Units Subcutaneous BID     levETIRAcetam  500 mg Oral BID     melatonin  3 mg Oral At Bedtime     metFORMIN  1,000 mg Oral BID w/meals     multivitamin w/minerals  1 tablet Oral Daily     QUEtiapine  25 mg Oral At Bedtime        PRN:  acetaminophen, dextrose, glucose **OR** dextrose **OR** glucagon, OLANZapine, ondansetron, phenol, QUEtiapine, sennosides       PHYSICAL EXAM  Patient Vitals for the past 24 hrs:   BP Temp Temp src Pulse Resp SpO2   01/09/21 0700 104/69 97.8  F (36.6  C) Oral 80 16 96 %   01/08/21 2320 101/65 97.6  F (36.4  C) Oral 92 16 100 %   01/08/21 1520 114/74 96.6  F (35.9  C) Oral 78 16 100 %       GEN: Alert, oriented, lying in bed.  Not in acute distress.  HEENT: Neck supple, extraocular movement intact.  PULM: Breathing comfortably on room air, clear to auscultation bilaterally  CARDIO: S1-S2 present, regular, rate, rhythm.  ABD: Soft, nontender,  nondistended, bowel sound present.  EXT: No LE edema or calf tenderness b/l.    Neuro: Follows commands. Alert and oriented x 2 this morning.    LABS/IMAGING  CBC RESULTS:   Recent Labs     Lab Results   Component Value Date    WBC 8.7 01/07/2021     Lab Results   Component Value Date    RBC 4.33 01/07/2021     Lab Results   Component Value Date    HGB 11.8 01/07/2021     Lab Results   Component Value Date    HCT 37.8 01/07/2021     Lab Results   Component Value Date    MCV 87 01/07/2021     Lab Results   Component Value Date    MCH 27.3 01/07/2021     Lab Results   Component Value Date    MCHC 31.2 01/07/2021     Lab Results   Component Value Date    RDW 17.2 01/07/2021     Lab Results   Component Value Date     01/07/2021         Last Comprehensive Metabolic Panel:  Sodium   Date Value Ref Range Status   01/07/2021 139 133 - 144 mmol/L Final     Potassium   Date Value Ref Range Status   01/07/2021 4.0 3.4 - 5.3 mmol/L Final     Chloride   Date Value Ref Range Status   01/07/2021 105 94 - 109 mmol/L Final     Carbon Dioxide   Date Value Ref Range Status   01/07/2021 28 20 - 32 mmol/L Final     Anion Gap   Date Value Ref Range Status   01/07/2021 6 3 - 14 mmol/L Final     Glucose   Date Value Ref Range Status   01/07/2021 192 (H) 70 - 99 mg/dL Final     Urea Nitrogen   Date Value Ref Range Status   01/07/2021 21 7 - 30 mg/dL Final     Creatinine   Date Value Ref Range Status   01/07/2021 0.90 0.66 - 1.25 mg/dL Final     GFR Estimate   Date Value Ref Range Status   01/07/2021 87 >60 mL/min/[1.73_m2] Final     Comment:     Non  GFR Calc  Starting 12/18/2018, serum creatinine based estimated GFR (eGFR) will be   calculated using the Chronic Kidney Disease Epidemiology Collaboration   (CKD-EPI) equation.       Calcium   Date Value Ref Range Status   01/07/2021 8.6 8.5 - 10.1 mg/dL Final       Recent Labs   Lab 01/09/21  1205 01/08/21  2145 01/08/21  1712 01/08/21  1249 01/08/21  0734  01/07/21  2142 01/07/21  0607 01/07/21  0607 01/05/21  0619 01/05/21  0619 01/04/21  0614 01/04/21  0614 01/03/21  1308 01/03/21  1308   GLC  --   --   --   --   --   --   --  192*  --  311*  --  186*  --  224*   * 195* 250* 141* 146* 248*   < >  --    < >  --    < >  --    < >  --     < > = values in this interval not displayed.           IMPRESSION/PLAN:  Mika Alvarez is a 70 year old man with a past medical history of systolic heart failure, A-fib, DM type 2, severe multivessel CAD, who presented 12/2/20 with acute systolic heart failure exacerbation required diuresis then underwent 4 vessel CABG 12/7/20, course complicated by severe dysphagia, possible sieuzre like activity left frontal lobe stroke, sepsis, acute hypoxia with small bi apical pneumothoraces and, altered mental status. Admitted to acute rehab unit 12/28/20 for ongoing rehabilitation and medical management.         Admission to acute inpatient rehab s/p cabg x 4.      --Vitals stable. Patient received iv fluid bolus on 1/7/2021, continues with hypotension due to decreased fluid intake (on nectar thick). Hospitalist team co-managing, appreciate assistance.  --Continue ongoing medical management.  --Continue therapies and plan of care.    Elham Rubio MD  Physical Medicine & Rehabilitation    I spent a total of 15 minutes face-to-face and managing the care of the patient. Over 50% of my time on the unit was spent counseling the patient and coordinating care. See note for details.

## 2021-01-09 NOTE — PLAN OF CARE
"  VS: /75 (BP Location: Left arm)   Pulse 85   Temp 97.5  F (36.4  C) (Oral)   Resp 16   Ht 1.803 m (5' 11\")   Wt 80 kg (176 lb 6.4 oz)   SpO2 96%   BMI 24.60 kg/m     O2: Room air, no complaints of SOB   Output: Continent voiding spontaneously w/o difficulty in toilet   Last BM: 1/9/2020   Activity: Up w/ x1 assist using gait belt   Skin: Bilateral lower extremitiy abrasions UTV covered by mepilex. Incision on abdomen and from previous chest tubes WDL, no drainage and open to air. UTV sacral wound, covered.   Pain: Denies   CMS: Intact ex forgetful   Dressing: Foam dressings on patient's bilateral lower extermities covered with foam mepilex, no drainage noted on any dressings on the bilateral lower extremities. Dressing over sacral area foam CDI.   Diet: Dd1 nectar thick liquids pills whole   LDA: L PIV SL   Equipment: 1:1 sitter, bed alarm, call light wihtin reach   Plan: Discharge 1/15/2021 to TCU   Additional Info: Re: patient's 1:1 status. Patient is calling appropriately prior to getting out of bed. No inappropriate behaviors noted by sitter or writer. Forgetful, repeating conversations from earlier in the day.       "

## 2021-01-09 NOTE — PLAN OF CARE
Discharge Planner Post-Acute Rehab PT:      Discharge Plan: TCU as pt will need 24/7 due to cognitive deficits as family unable to provide     Precautions: sternal, HOB 30* with FT     Current Status:  Transfer: Sup without AD  Bed Mobility: SUP  Gait: amb bouts without assistive device 150 feet x 4 .  CGA/ supervision with variable NGOZI   Stairs: NT  Balance: no overt LOB, SBA with standing activity     Assessment: Vitals remained steady during session. Continued use of abd binder and compression socks needed to manage BP. BPs 140s/70s at rest,  Overall functional mobility improving, pt participates and motivated throughout, appropriate interactions throughout session.      Other Barriers to Discharge (DME, Family Training, etc): unable to have 24/7 at home, cognition, pt currently on 1:1 sitter

## 2021-01-10 ENCOUNTER — APPOINTMENT (OUTPATIENT)
Dept: PHYSICAL THERAPY | Facility: CLINIC | Age: 71
End: 2021-01-10
Payer: COMMERCIAL

## 2021-01-10 ENCOUNTER — APPOINTMENT (OUTPATIENT)
Dept: SPEECH THERAPY | Facility: CLINIC | Age: 71
End: 2021-01-10
Payer: COMMERCIAL

## 2021-01-10 LAB
GLUCOSE BLDC GLUCOMTR-MCNC: 126 MG/DL (ref 70–99)
GLUCOSE BLDC GLUCOMTR-MCNC: 142 MG/DL (ref 70–99)
GLUCOSE BLDC GLUCOMTR-MCNC: 153 MG/DL (ref 70–99)
GLUCOSE BLDC GLUCOMTR-MCNC: 184 MG/DL (ref 70–99)

## 2021-01-10 PROCEDURE — 99207 PR CDG-CUT & PASTE-POTENTIAL IMPACT ON LEVEL: CPT | Performed by: INTERNAL MEDICINE

## 2021-01-10 PROCEDURE — 99233 SBSQ HOSP IP/OBS HIGH 50: CPT | Performed by: INTERNAL MEDICINE

## 2021-01-10 PROCEDURE — 97110 THERAPEUTIC EXERCISES: CPT | Mod: GP

## 2021-01-10 PROCEDURE — 92526 ORAL FUNCTION THERAPY: CPT | Mod: GN | Performed by: SPEECH-LANGUAGE PATHOLOGIST

## 2021-01-10 PROCEDURE — 96372 THER/PROPH/DIAG INJ SC/IM: CPT | Performed by: PHYSICIAN ASSISTANT

## 2021-01-10 PROCEDURE — 250N000011 HC RX IP 250 OP 636: Performed by: PHYSICIAN ASSISTANT

## 2021-01-10 PROCEDURE — 250N000013 HC RX MED GY IP 250 OP 250 PS 637: Performed by: PHYSICAL MEDICINE & REHABILITATION

## 2021-01-10 PROCEDURE — 128N000003 HC R&B REHAB

## 2021-01-10 PROCEDURE — 97530 THERAPEUTIC ACTIVITIES: CPT | Mod: GP

## 2021-01-10 PROCEDURE — 999N001017 HC STATISTIC GLUCOSE BY METER IP

## 2021-01-10 PROCEDURE — 97116 GAIT TRAINING THERAPY: CPT | Mod: GP

## 2021-01-10 RX ADMIN — QUETIAPINE 25 MG: 25 TABLET, FILM COATED ORAL at 22:26

## 2021-01-10 RX ADMIN — LEVETIRACETAM 500 MG: 500 TABLET, FILM COATED ORAL at 08:54

## 2021-01-10 RX ADMIN — AMIODARONE HYDROCHLORIDE 200 MG: 200 TABLET ORAL at 08:54

## 2021-01-10 RX ADMIN — INSULIN ASPART 1 UNITS: 100 INJECTION, SOLUTION INTRAVENOUS; SUBCUTANEOUS at 13:17

## 2021-01-10 RX ADMIN — METFORMIN HYDROCHLORIDE 1000 MG: 500 TABLET ORAL at 18:20

## 2021-01-10 RX ADMIN — LEVETIRACETAM 500 MG: 500 TABLET, FILM COATED ORAL at 21:03

## 2021-01-10 RX ADMIN — FAMOTIDINE 20 MG: 20 TABLET, FILM COATED ORAL at 21:03

## 2021-01-10 RX ADMIN — HEPARIN SODIUM 5000 UNITS: 5000 INJECTION, SOLUTION INTRAVENOUS; SUBCUTANEOUS at 08:54

## 2021-01-10 RX ADMIN — ATORVASTATIN CALCIUM 80 MG: 80 TABLET, FILM COATED ORAL at 21:03

## 2021-01-10 RX ADMIN — MULTIPLE VITAMINS W/ MINERALS TAB 1 TABLET: TAB at 08:54

## 2021-01-10 RX ADMIN — ASPIRIN 81 MG CHEWABLE TABLET 81 MG: 81 TABLET CHEWABLE at 08:53

## 2021-01-10 RX ADMIN — METFORMIN HYDROCHLORIDE 1000 MG: 500 TABLET ORAL at 08:54

## 2021-01-10 RX ADMIN — HEPARIN SODIUM 5000 UNITS: 5000 INJECTION, SOLUTION INTRAVENOUS; SUBCUTANEOUS at 21:04

## 2021-01-10 RX ADMIN — MELATONIN TAB 3 MG 3 MG: 3 TAB at 22:26

## 2021-01-10 RX ADMIN — INSULIN ASPART 1 UNITS: 100 INJECTION, SOLUTION INTRAVENOUS; SUBCUTANEOUS at 18:20

## 2021-01-10 RX ADMIN — FAMOTIDINE 20 MG: 20 TABLET, FILM COATED ORAL at 08:53

## 2021-01-10 NOTE — PLAN OF CARE
Assumed care at 1900. Pt alert and oriented x4. Continent of bowel and bladder. LBM 1/9. Assist of 1 with gait belt. Denies pain. Blood sugars and insulin coverage as recorded. DD1 with nectar thick liquids; ate all of dinner with very good appetite. No acute concerns. Pleasant and able to make needs known. Bed alarm on for safety, call light within reach, Ok to continue with care plan.

## 2021-01-10 NOTE — PROGRESS NOTES
"[facility], [place]   Internal Medicine Daily Note           Interval History/Events     Overnight events reviewed  Reports doing well  No nausea, vomiting, chest pain, shortness of breath  No lightheadedness or dizziness.        Review of Systems        4 point ROS including Respiratory, CV, GI and , other than that noted above is negative      Medications   I have reviewed current medications  in the \"current medication\" section of Epic.  Relevant changes include:     Physical Exam   General:       Vital signs:    Blood pressure 119/73, pulse 67, temperature 97.5  F (36.4  C), temperature source Oral, resp. rate 16, height 1.803 m (5' 11\"), weight 80 kg (176 lb 6.4 oz), SpO2 97 %.  Estimated body mass index is 24.6 kg/m  as calculated from the following:    Height as of this encounter: 1.803 m (5' 11\").    Weight as of this encounter: 80 kg (176 lb 6.4 oz).      Intake/Output Summary (Last 24 hours) at 1/5/2021 1503  Last data filed at 1/5/2021 1400  Gross per 24 hour   Intake 760 ml   Output 1675 ml   Net -915 ml        Constitutional: In on acute distress  Eye: No icterus, no pallor  Mouth/ENT: Normal oral mucosa  Cardiovascular: S1, S2 normal.   Respiratory: B/L CTA  GI: Soft, NT, BS+  : No gardner's catheter  Neurology: Alert, awake, oriented. Non focal.   Psych: Mood stable.   MSK:   Integumentary:   Heme/Lymph/Imm:      Laboratory and Imaging Studies     I have reviewed  laboratory and imaging studies in the Epic. Pertinent findings are as below:    BMP  Recent Labs   Lab 01/07/21  0607 01/05/21  0619 01/04/21  0614 01/03/21  1308    139 140 137   POTASSIUM 4.0 4.2 4.4 4.4   CHLORIDE 105 106 105 103   JEET 8.6 8.8 9.1 8.8   CO2 28 25 31 25   BUN 21 31* 26 28   CR 0.90 1.13 1.20 1.04   * 311* 186* 224*     CBC  Recent Labs   Lab 01/07/21  0607 01/05/21  0619 01/04/21  0614 01/03/21  1308   WBC 8.7 7.8 7.7 8.8   RBC 4.33* 4.58 4.93 4.92   HGB 11.8* 12.3* 13.4 13.2* "   HCT 37.8* 40.0 42.3 42.6   MCV 87 87 86 87   MCH 27.3 26.9 27.2 26.8   MCHC 31.2* 30.8* 31.7 31.0*   RDW 17.2* 16.9* 17.2* 16.9*    264 323 301     INRNo lab results found in last 7 days.  LFTs  Recent Labs   Lab 01/03/21  1308   ALKPHOS 138   AST 22   ALT 16   BILITOTAL 0.8   PROTTOTAL 7.3   ALBUMIN 2.8*      PANCNo lab results found in last 7 days.        Impression/Plan        Mika Alvarez is a 70 year old man with a past medical history of systolic heart failure, A-fib, DM type 2, severe multivessel CAD, who presented 12/2/20 with acute systolic heart failure exacerbation required diuresis then underwent 4 vessel CABG 12/7/20, course complicated by severe dysphagia, possible sieuzre like activity left frontal lobe stroke, sepsis, acute hypoxia with small bi apical pneumothoraces and, altered mental status. Admitted to acute rehab unit 12/28/20 for ongoing rehabilitation and medical management. Internal medicine consulted for co-management.   Individual problems and their management are outlined below        # Multivessel CAD S/P CABG  # Acute diastolic and systolic heart failure, improving  Echo on 12/25:  The left ventricular wall motion is globally hypokinetic. The estimated  left ventricular ejection fraction is 35%. This represents a moderately  decreased ejection fraction.The right ventricle is mildly dilated and its systolic function is  severely reduced. TAPSE is abnormal, which is consistent with abnormal  right ventricular systolic function.  Biatrial volume is moderately increased.  ON Torsemide, Metoprolol, Amiodarone    Patient having soft BP, and Orthostatic symptoms intermittently.  He has not been eating and drinking well. Due to this, Torsemide, and Metoprolol had been held (patient has not been getting it). He received im 500 ml  fluid on 01/07/2020  - Continue to monitor  - Hold Torsemide, Metoprolol for now . BP still soft, will initiate as soon as BP will allow  - Daily weight  -  Continue Aspirin, Amiodarone       # A-fib with RVR  - Currently rate controlled   - Continue amiodarone ( expected for 14 more days and stop. Last day on 1/11 ).  Since he has not converted, it may make sense to stop amiodarone at 7 days  and   -Of note, surgeon preference is to not anticoagulate for a-fib s/p LAAE( direct extract from patient's discharge summary)      # Uncontrolled type II diabetes mellitus, HgbA1c  12.6%  PTA Metformin, Glipizide, and Lantus. Metformin 1000 mg BID on 1/1, and GLipizide were stoppled. Patient was on BID lantus ( 19 units BID) when he was on continious tube feeding. Given that the feeding tube is out and Patinet is NPO, Lantus iwas held  Following initiation of DD1 type diet,  Insulin Glargine was resumed at 10 units BID ( starting tonight). Blood glucose 200-300  Insulin Glargine has been increased to 12, and then 14 units BID on 01/07.   BLood glucose better controlled.   - Continue Insulin Glargine  14  units BID  - Continue on Metformin  - Continue Insulin Aspart correction at medium intensity  - Hypoglycemia protocol    Acute hypoxic respiratory failure, resolved  -  Encourage IC  - Supplemental oxygen as needed  - CXR on 12/31 no acute airspace abnormalities      Acute metabolic encephalopathy  Intermittent agitation   - Supportive care  - Likely multifactorial ( post operative status , stroke and possibly pre existing status secondary to uncontrolled DM type 2  And HTN)   - Encephalopathy with intermittent agitation continues to be a significant barrier to progress   - It does appear that patient does not have decision making cacpcity at this time   - Encephalopathy seems to be improving, plan to wean down Quetiapine.      Subacute left frontal stroke with petechial hemorrhage  - ASA  - Atorvastatin  - Continue levetiracetam    # Sepsis, Resolved   - Fever, Leukocytosis, tachycardia noted 12/10/2020  - UA was negative. One of two blood cultures grew staph epi,  likely contaminant.  - Sputum culture grew beta-hemolytic strep  - Started on emperic pip-tazo IV,  completed 7d on 12/17, fever and WBC resolving.  - WBC down to 11.5 on 12/31     #Severe pharyngeal dysphagia  # Malnutrition, unclassified     - SLP team to follow patient here in ARU   video swallow study on 12/21 showed ongoing significant dysphagia  - NGT in place, but was pulled out and the bridle was disrupted on 1/3 am. Repeat CXR  showed that the feeding tube is still near the pylorus. Subsequently, the NGT was completely pulled out as it appeared to be clogged  - Patient is currently refusing an NGT  - Diet advance per SLP evaluation.         Primary hypertension, now with asymptomatic hypotension   BP on the softer side. Not receiving metoprolol tartrate  due to hypotesnion  - Monitoring BP     # Bilateral Pneumothoraces   X Ray on 12/26 with  Tiny apical pneumothoraces without significant change. No consolidation or focal pneumonia in the lungs. No pulmonary edema. No other significant change.  Continue to monitor clinically.  CXR on 12/31  With resolved pneumothoraces       VTE risk reduction. Subcutaneous heparin.                      Diet: Orders Placed This Encounter      Dysphagia Diet Level 1 Pureed Nectar Thickened Liquids (pre-thickened or use instant food thickener)       DVT Prophylaxis: Heparin SQ  Koenig Catheter: not present  Code Status: Full Code                                   Pt's care was discussed with bedside RN, patient and  during Care Team Rounds.               Tim Meza MD  Hospitalist ( Internal medicine)  Pager: [phone]

## 2021-01-10 NOTE — PLAN OF CARE
"/82 (BP Location: Right arm)   Pulse 68   Temp 98.6  F (37  C) (Oral)   Resp 16   Ht 1.803 m (5' 11\")   Wt 80 kg (176 lb 6.4 oz)   SpO2 98%   BMI 24.60 kg/m      VSS. Room air. Denies pain, chest pain, SOB, nausea/vomiting, new numbness or tingling. AO x 4. BG 120s-150s. 1:1 in place for impulsiveness and inappropriate behaviors. Today patient has not demonstrated any impulsiveness and has continued to call before getting out of bed. Patient has also not demonstrated any inappropriate behaviors observed by nursing staff. GI/ WDL. Last BM 1/10/2020. DD1 nectar thick liquids pills whole. Good appetite. Call light within reach.  "

## 2021-01-10 NOTE — PLAN OF CARE
Discharge Planner Post-Acute Rehab SLP:      Discharge Plan:home with assist     Precautions: fall, swallowing     Current Status:  Communication: dysphonic voice though slowly improving. communication to be evaluated, Pt currently refusing  Cognition: to be assessed but pt currently is refusing cognitive -linguistic assessment  Swallow: moderate oropharyngeal dysphagia- on DD1 with nectar thick liquids with use of chin tuck; allowed free water program for ice chips following oral cares.       Assessment: SLP: pt seen for meal trialed NDD2, occcasional cough,, cues for small bites slower pace extra dry swallows no talking, not ready advance yet, Recommend continue with current DD1 with nectar thick liquids, chin down with liquids, small single bites/sips, hard double swallow, alternate consistencies. OK for ice chips as part of free water program- only in between meals and only after oral cares.       Other Barriers to Discharge (Family Training, etc): to be determined

## 2021-01-10 NOTE — PLAN OF CARE
Pt AxO, forgetful. Pt slept most of shift. Denies pain, SOB, N/T. ZAHRAA skin under clothing, BLE compression socks. Sitter at bedside, no inappropriate behaviors or compulsiveness demonstrated. DD1 nectar thick. Continue POC.

## 2021-01-10 NOTE — PLAN OF CARE
Discharge Planner Post-Acute Rehab PT:      Discharge Plan: TCU as pt will need 24/7 due to cognitive deficits as family unable to provide     Precautions: sternal, HOB 30* with FT     Current Status:  Transfer: Sup without AD  Bed Mobility: Ind  Gait: 300 feet with SBA, in pm with more fatigue needed  feet.    Stairs: 12 steps with 1 rail with supervision  Balance: no overt LOB, SBA with standing activity     Assessment: Vitals remained steady during session. Continued use of abd binder and compression socks needed to manage BP. BPs 140s/70s at rest,  Overall functional mobility improving, pt participates and motivated throughout, appropriate interactions throughout session. May meet PT goals prior to discontinue to TCU     Other Barriers to Discharge (DME, Family Training, etc): unable to have 24/7 at home, cognition, pt currently on 1:1 sitter

## 2021-01-11 ENCOUNTER — APPOINTMENT (OUTPATIENT)
Dept: OCCUPATIONAL THERAPY | Facility: CLINIC | Age: 71
End: 2021-01-11
Payer: COMMERCIAL

## 2021-01-11 ENCOUNTER — APPOINTMENT (OUTPATIENT)
Dept: SPEECH THERAPY | Facility: CLINIC | Age: 71
End: 2021-01-11
Payer: COMMERCIAL

## 2021-01-11 ENCOUNTER — APPOINTMENT (OUTPATIENT)
Dept: PHYSICAL THERAPY | Facility: CLINIC | Age: 71
End: 2021-01-11
Payer: COMMERCIAL

## 2021-01-11 LAB
ANION GAP SERPL CALCULATED.3IONS-SCNC: 5 MMOL/L (ref 3–14)
BUN SERPL-MCNC: 10 MG/DL (ref 7–30)
CALCIUM SERPL-MCNC: 8.6 MG/DL (ref 8.5–10.1)
CHLORIDE SERPL-SCNC: 105 MMOL/L (ref 94–109)
CO2 SERPL-SCNC: 29 MMOL/L (ref 20–32)
CREAT SERPL-MCNC: 0.94 MG/DL (ref 0.66–1.25)
ERYTHROCYTE [DISTWIDTH] IN BLOOD BY AUTOMATED COUNT: 17.5 % (ref 10–15)
GFR SERPL CREATININE-BSD FRML MDRD: 83 ML/MIN/{1.73_M2}
GLUCOSE BLDC GLUCOMTR-MCNC: 132 MG/DL (ref 70–99)
GLUCOSE BLDC GLUCOMTR-MCNC: 136 MG/DL (ref 70–99)
GLUCOSE BLDC GLUCOMTR-MCNC: 139 MG/DL (ref 70–99)
GLUCOSE BLDC GLUCOMTR-MCNC: 148 MG/DL (ref 70–99)
GLUCOSE BLDC GLUCOMTR-MCNC: 64 MG/DL (ref 70–99)
GLUCOSE BLDC GLUCOMTR-MCNC: 77 MG/DL (ref 70–99)
GLUCOSE BLDC GLUCOMTR-MCNC: 89 MG/DL (ref 70–99)
GLUCOSE BLDC GLUCOMTR-MCNC: 98 MG/DL (ref 70–99)
GLUCOSE SERPL-MCNC: 67 MG/DL (ref 70–99)
HCT VFR BLD AUTO: 38 % (ref 40–53)
HGB BLD-MCNC: 12 G/DL (ref 13.3–17.7)
MAGNESIUM SERPL-MCNC: 1.8 MG/DL (ref 1.6–2.3)
MCH RBC QN AUTO: 27.8 PG (ref 26.5–33)
MCHC RBC AUTO-ENTMCNC: 31.6 G/DL (ref 31.5–36.5)
MCV RBC AUTO: 88 FL (ref 78–100)
PHOSPHATE SERPL-MCNC: 4 MG/DL (ref 2.5–4.5)
PLATELET # BLD AUTO: 241 10E9/L (ref 150–450)
POTASSIUM SERPL-SCNC: 4.2 MMOL/L (ref 3.4–5.3)
RBC # BLD AUTO: 4.32 10E12/L (ref 4.4–5.9)
SODIUM SERPL-SCNC: 139 MMOL/L (ref 133–144)
WBC # BLD AUTO: 9.8 10E9/L (ref 4–11)

## 2021-01-11 PROCEDURE — 92526 ORAL FUNCTION THERAPY: CPT | Mod: GN | Performed by: SPEECH-LANGUAGE PATHOLOGIST

## 2021-01-11 PROCEDURE — 99233 SBSQ HOSP IP/OBS HIGH 50: CPT | Performed by: INTERNAL MEDICINE

## 2021-01-11 PROCEDURE — 97530 THERAPEUTIC ACTIVITIES: CPT | Mod: GP | Performed by: STUDENT IN AN ORGANIZED HEALTH CARE EDUCATION/TRAINING PROGRAM

## 2021-01-11 PROCEDURE — 83735 ASSAY OF MAGNESIUM: CPT | Performed by: PHYSICIAN ASSISTANT

## 2021-01-11 PROCEDURE — 84100 ASSAY OF PHOSPHORUS: CPT | Performed by: PHYSICIAN ASSISTANT

## 2021-01-11 PROCEDURE — 99207 PR CDG-MDM COMPONENT: MEETS HIGH - UP CODED: CPT | Performed by: INTERNAL MEDICINE

## 2021-01-11 PROCEDURE — 128N000003 HC R&B REHAB

## 2021-01-11 PROCEDURE — 85027 COMPLETE CBC AUTOMATED: CPT | Performed by: PHYSICIAN ASSISTANT

## 2021-01-11 PROCEDURE — 97112 NEUROMUSCULAR REEDUCATION: CPT | Mod: GP | Performed by: STUDENT IN AN ORGANIZED HEALTH CARE EDUCATION/TRAINING PROGRAM

## 2021-01-11 PROCEDURE — 97750 PHYSICAL PERFORMANCE TEST: CPT | Mod: GP | Performed by: STUDENT IN AN ORGANIZED HEALTH CARE EDUCATION/TRAINING PROGRAM

## 2021-01-11 PROCEDURE — 80048 BASIC METABOLIC PNL TOTAL CA: CPT | Performed by: PHYSICIAN ASSISTANT

## 2021-01-11 PROCEDURE — 999N001017 HC STATISTIC GLUCOSE BY METER IP

## 2021-01-11 PROCEDURE — 97530 THERAPEUTIC ACTIVITIES: CPT | Mod: GO | Performed by: STUDENT IN AN ORGANIZED HEALTH CARE EDUCATION/TRAINING PROGRAM

## 2021-01-11 PROCEDURE — 250N000013 HC RX MED GY IP 250 OP 250 PS 637: Performed by: PHYSICAL MEDICINE & REHABILITATION

## 2021-01-11 PROCEDURE — 36415 COLL VENOUS BLD VENIPUNCTURE: CPT | Performed by: PHYSICIAN ASSISTANT

## 2021-01-11 PROCEDURE — 250N000011 HC RX IP 250 OP 636: Performed by: PHYSICIAN ASSISTANT

## 2021-01-11 PROCEDURE — 97535 SELF CARE MNGMENT TRAINING: CPT | Mod: GO | Performed by: STUDENT IN AN ORGANIZED HEALTH CARE EDUCATION/TRAINING PROGRAM

## 2021-01-11 PROCEDURE — 99233 SBSQ HOSP IP/OBS HIGH 50: CPT | Performed by: PHYSICAL MEDICINE & REHABILITATION

## 2021-01-11 PROCEDURE — 96372 THER/PROPH/DIAG INJ SC/IM: CPT | Performed by: PHYSICIAN ASSISTANT

## 2021-01-11 RX ADMIN — FAMOTIDINE 20 MG: 20 TABLET, FILM COATED ORAL at 07:49

## 2021-01-11 RX ADMIN — INSULIN ASPART 1 UNITS: 100 INJECTION, SOLUTION INTRAVENOUS; SUBCUTANEOUS at 17:34

## 2021-01-11 RX ADMIN — METFORMIN HYDROCHLORIDE 1000 MG: 500 TABLET ORAL at 07:48

## 2021-01-11 RX ADMIN — HEPARIN SODIUM 5000 UNITS: 5000 INJECTION, SOLUTION INTRAVENOUS; SUBCUTANEOUS at 07:49

## 2021-01-11 RX ADMIN — LEVETIRACETAM 500 MG: 500 TABLET, FILM COATED ORAL at 07:48

## 2021-01-11 RX ADMIN — ASPIRIN 81 MG CHEWABLE TABLET 81 MG: 81 TABLET CHEWABLE at 07:48

## 2021-01-11 RX ADMIN — FAMOTIDINE 20 MG: 20 TABLET, FILM COATED ORAL at 21:58

## 2021-01-11 RX ADMIN — AMIODARONE HYDROCHLORIDE 200 MG: 200 TABLET ORAL at 07:49

## 2021-01-11 RX ADMIN — Medication 12.5 MG: at 21:58

## 2021-01-11 RX ADMIN — MULTIPLE VITAMINS W/ MINERALS TAB 1 TABLET: TAB at 07:48

## 2021-01-11 RX ADMIN — LEVETIRACETAM 500 MG: 500 TABLET, FILM COATED ORAL at 21:59

## 2021-01-11 RX ADMIN — ATORVASTATIN CALCIUM 80 MG: 80 TABLET, FILM COATED ORAL at 21:58

## 2021-01-11 RX ADMIN — METFORMIN HYDROCHLORIDE 1000 MG: 500 TABLET ORAL at 17:33

## 2021-01-11 RX ADMIN — MELATONIN TAB 3 MG 3 MG: 3 TAB at 21:59

## 2021-01-11 NOTE — PLAN OF CARE
Discharge Planner Post-Acute Rehab SLP:      Discharge Plan:home with assist     Precautions: fall, swallowing     Current Status:  Communication: dysphonic voice though slowly improving. communication to be evaluated, Pt currently refusing  Cognition: to be assessed but pt currently is refusing cognitive -linguistic assessment  Swallow: moderate oropharyngeal dysphagia- on DD1 with nectar thick liquids with use of chin tuck; allowed free water program for ice chips following oral cares.       Assessment: SLP: pt seen for swallowing, meal try delivered in error ahead of treatment session so did not observe trial NDD2. pt also was inadvertently given thickened soda with ice, which is not reliably nectar thick, so removed for safety concerns. pt repeatedly asking for thin fluid (educated RN regarding) reportedly pt perseverating on topic, assured pt will continue to work on safe plan. Pt does not appear to comprehend aspiration/risks. Recommend continue with current DD1 with nectar thick liquids, chin down with liquids, small single bites/sips, hard double swallow, alternate consistencies. OK for ice chips as part of free water program- only in between meals and only after oral cares.       Other Barriers to Discharge (Family Training, etc): to be determined

## 2021-01-11 NOTE — PLAN OF CARE
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 Pt had difficulty with simulated med set up. Pt was 0/2 with set unit(s) and required specific cues and guidance to correct mistakes. Pt recognized that he had difficulty that he would need assist at home.  PM session pt was more agitated and perseverating on drinking regular water. Attempted to do the MOCA but was not able to complete. Will try again at other time.     Other Barriers to Discharge (DME, Family Training, etc): level of assist, may not have 24/7 assist at home, poor participation in therapy, orthostatic

## 2021-01-11 NOTE — PROGRESS NOTES
[facility]   Acute Rehabilitation Unit  Daily progress note    INTERVAL HISTORY  Weekend notes reviewed.  No acute events and Mr. Alvarez has been calm and cooperative.  Will discontinue 1:1 sitter today but maintain bed alarms.  This morning he was nauseous and had low glucose readings into the 60s, and was given glucose snacks.  He ate all of dinner and breakfast today.  He is feeling fatigued after speech therapy but his fatigue levels are overall improved.  Will plan to wean seroquel further tonight.  Functionally continues to demonstrated poor insight and memory, but has refused to work with SLP for formal cognitive testing.        MEDICATIONS  Scheduled:    aspirin  81 mg Oral Daily     atorvastatin  80 mg Oral QPM     famotidine  20 mg Oral BID     [Held by provider] glipiZIDE  5 mg Per Feeding Tube BID AC     heparin ANTICOAGULANT  5,000 Units Subcutaneous Q12H     insulin aspart  1-7 Units Subcutaneous TID AC     insulin aspart  1-5 Units Subcutaneous At Bedtime     insulin glargine  18 Units Subcutaneous BID     levETIRAcetam  500 mg Oral BID     melatonin  3 mg Oral At Bedtime     metFORMIN  1,000 mg Oral BID w/meals     multivitamin w/minerals  1 tablet Oral Daily     QUEtiapine  12.5 mg Oral At Bedtime        PRN:  acetaminophen, dextrose, glucose **OR** dextrose **OR** glucagon, OLANZapine, ondansetron, phenol, QUEtiapine, sennosides       PHYSICAL EXAM  Patient Vitals for the past 24 hrs:   BP Temp Temp src Pulse Resp SpO2   01/11/21 0711 129/67 96.8  F (36  C) Oral 75 16 97 %   01/10/21 2330 110/63 98  F (36.7  C) Oral 67 16 97 %   01/10/21 1626 136/82 98.6  F (37  C) Oral 68 16 98 %       GEN: NAD, pleasant and cooperative today  HEENT: Neck supple, extraocular movement intact.  PULM: Breathing comfortably on room air  ABD: Soft, nontender, nondistended  EXT: No LE edema or calf tenderness b/l.  Compression stockings in place.   Neuro: Has answered appropriately,  follows commands.  AAOx1.  +Dysphonia.     LABS/IMAGING  CBC RESULTS:   Recent Labs   Lab Test 01/11/21  0732   WBC 9.8   RBC 4.32*   HGB 12.0*   HCT 38.0*   MCV 88   MCH 27.8   MCHC 31.6   RDW 17.5*            Last Comprehensive Metabolic Panel:  Sodium   Date Value Ref Range Status   01/11/2021 139 133 - 144 mmol/L Final     Potassium   Date Value Ref Range Status   01/11/2021 4.2 3.4 - 5.3 mmol/L Final     Chloride   Date Value Ref Range Status   01/11/2021 105 94 - 109 mmol/L Final     Carbon Dioxide   Date Value Ref Range Status   01/11/2021 29 20 - 32 mmol/L Final     Anion Gap   Date Value Ref Range Status   01/11/2021 5 3 - 14 mmol/L Final     Glucose   Date Value Ref Range Status   01/11/2021 67 (L) 70 - 99 mg/dL Final     Urea Nitrogen   Date Value Ref Range Status   01/11/2021 10 7 - 30 mg/dL Final     Creatinine   Date Value Ref Range Status   01/11/2021 0.94 0.66 - 1.25 mg/dL Final     GFR Estimate   Date Value Ref Range Status   01/11/2021 83 >60 mL/min/[1.73_m2] Final     Comment:     Non  GFR Calc  Starting 12/18/2018, serum creatinine based estimated GFR (eGFR) will be   calculated using the Chronic Kidney Disease Epidemiology Collaboration   (CKD-EPI) equation.       Calcium   Date Value Ref Range Status   01/11/2021 8.6 8.5 - 10.1 mg/dL Final       Recent Labs   Lab 01/11/21  1224 01/11/21  0915 01/11/21  0840 01/11/21  0820 01/11/21  0753 01/11/21  0733 01/11/21  0732 01/07/21  0607 01/07/21  0607 01/05/21  0619 01/05/21  0619   GLC  --   --   --   --   --   --  67*  --  192*  --  311*   * 132* 98 89 77 64*  --    < >  --    < >  --     < > = values in this interval not displayed.     EXAM: XR CHEST 1 VW 1/3/2021       HISTORY: possible dislodged feeding tube.     COMPARISON: Previous day.      TECHNIQUE: Supine frontal view of the chest.     FINDINGS: Feeding tube tip projects over the medial right upper  quadrant. Post CABG changes. Mild interstitial prominence.  Heart is  enlarged. No focal airspace consolidation. No abnormally dilated loops  of bowel in the visualized upper abdomen..                                                                      IMPRESSION: Feeding tube tip projects over the medial right upper  quadrant, possibly near the distal stomach or pylorus.        IMPRESSION/PLAN:  Mika Alvarez is a 70 year old man with a past medical history of systolic heart failure, A-fib, DM type 2, severe multivessel CAD, who presented 12/2/20 with acute systolic heart failure exacerbation required diuresis then underwent 4 vessel CABG 12/7/20, course complicated by severe dysphagia, possible sieuzre like activity left frontal lobe stroke, sepsis, acute hypoxia with small bi apical pneumothoraces and, altered mental status. Admitted to acute rehab unit 12/28/20 for ongoing rehabilitation and medical management.         Admission to acute inpatient rehab s/p cabg x 4.    Impairment group code: 0.9     1. PT, OT and SLP 60 minutes of each on a daily basis, in addition to rehab nursing and close management of physiatrist.       2. Impairment of ADL's: Noted to have impaired strength, coordination, activity tolerance, and cognition leading to impaired ability to complete self cares, continue OT with goal for MOD I to I with basic adls.      3. Impairment of mobility:  Noted to have impaired strength, activity tolerance, and cognition leading to impaired mobility will benefit from ongoing PT with goal for MOD I to I with basic mobility and stairs.      4. Impairment of cognition/language/swallow:  Noted to have severe dysphagia and impaired cognition will benefit from ongoing SLP with goal for least restrictive diet tolerated and improved ability to communicate needs.     Medical Conditions    #CAD  #S/P CABG x 4 (12/7)  #Acute on chronic systolic heart failure- 2/2 ischemic cardiomyopathy  #HTN  #A-fib with RVR-   Most recent Echo 12/25/20 with left ventricular wall globally  hypokinetic, EF estimated 35%. Right ventricle mildly dilated with systolic function severely reduced.  Had A-fib with RVR early in hospital stay on amiodarone taper. surgeon preference is to not anticoagulate for a-fib   -continue amiodarone 200 mg daily x 2 weeks then stop   -continue asa, statin  -sternal precautions (6 weeks from 12/7)- limit 10 lbs then as tolerated   -Torsemide and Metoprolol discontinued due to orthostatic and clinically hypovolemic. (has not been receiving Metoprolol in several days anyways).  BPs improving.  Received 1L of fluids 1/6, 1/7.   -monitor bmp, weights, intake and output, edema/sob  -f/u cardiology as scheduled     #Subacute Left frontal stroke with petechial hemorrhage  #Possible simple partial seizure  #Acute metabolic encephalopathy  Neurology consulted for left leg shaking POD 1, EEG neg, head CT obtained in setting of ongoing leg shaking and ams- demonstrated stroke 12/12 (most recent imaging stable)  1.  Petechial hemorrhage in the left frontal opercular infarct correlating with the blood products noted on MRI,  Chronic left cerebellar infarct.  -follow up neurology within one month- Dr. Bailey/ [facility]- follow up possible seizure-   -continue keppra until neurology follow up    #Agitation  -Psychiatry:  Agitation continues to improve.  Will decrease Seroquel to 12.5 mg at bedtime tonight and plan to wean off completely in a few days if tolerating well.  Will also discontinue 1:1 sitter today but maintain alarms.       #DM type 2- Hgb A1C 12.6%.   -Continue Metformin 1000 mg twice daily, continue to hold glipizide.  -Restarted Lantus on 1/8/2021, titrated up to 18 units BID but glucose levels were low.  Discussed with hospitalist team, will decrease PM dose.    -Continue to monitor glucose closely, Appreciate hospitalist team recs.      #oral pharyngeal dysphagia  #Impaired oral intake -   slp monitoring currently NPO with nd in place 12/21 video swallow with ongoing  significant dysphagia s/p ND placed 12/15 (removed on 1/3)  -appreciate ongoing slp  -appreciate nutrition support  -Video swallow study done on 1/4/2021 and was cleared for nectar thick diet.     #Possible aspiration pneumonia- fever, known dysphagia, leucocytosis,  hypoxia, other workup unremarkable received zosyn course seen by id.  -appreciate ongoing SLP support  -trend WBC, 9.8 on 1/11    #Bilateral shin wounds- mepilex changes as ordered keep clean and dry.      #Urinary retention- gardner recently removed, reportedly voiding without issue  -PVRs x2 low  May check PRN only.   -was started on flomax at prior hospital - had flomax suspension- as npo and only pill form will hold and monitor as above.     #Creatinine elevation - Cr 1.08 BUN 36 12/29, Cr 0.94 on 1/11/2021  -monitor bmp     1. Adjustment to disability: plan for psychology consult when more consistently alert  2. FEN: NDD1 and NTLs  3. Bowel: monitor  4. Bladder: PVRs WNL  5. DVT Prophylaxis: Now ambulating adequate distances to deter DVT, will stop heparin.   6. GI Prophylaxis: ppi  7. Code: full- prior and patient confirmed on admission  8. Disposition: goal for home  9. ELOS:  2+ weeks.  10. Rehab prognosis:  Fair- medically complex with significant impairments in cognition and swallow and milder strength/activity tolerance deficits goal for home pending supervision needs and family support  11. Follow up Appointments on Discharge: cardiology, cv surgery, pcp.    Yonathan Jerome MD  Department of Rehabilitation Medicine    Time Spent on this Encounter   I, Yonathan Jerome, spent a total of 35 minutes face-to-face or managing the care of Mika Alvarez. Over 50% of my time on the unit was spent counseling the patient and coordinating care. See note for details.

## 2021-01-11 NOTE — PROGRESS NOTES
Attempted to see pt for scheduled am PT appt, however pt sleeping soundly and not able to rouse. Pt had just finished with OT, in addition RN reporting pt was restless noc. Missed 45 minutes

## 2021-01-11 NOTE — PLAN OF CARE
Otitis Media in Children   WHAT YOU NEED TO KNOW:   Otitis media is an ear infection  Your child may have an ear infection in one or both ears  Your child may get an ear infection when his eustachian tubes become swollen or blocked  Eustachian tubes drain fluid away from the middle ear  Your child may have a buildup of fluid and pressure in his ear when he has an ear infection  The ear may become infected by germs, which grow easily in the fluid trapped behind the eardrum  DISCHARGE INSTRUCTIONS:   Return to the emergency department if:   · You see blood or pus draining from your child's ear  · Your child seems confused or cannot stay awake  · Your child has a stiff neck, headache, and a fever  Contact your child's healthcare provider if:   · Your child has a fever  · Your child is still not eating or drinking 24 hours after he takes his medicine  · Your child has pain behind his ear or when you move his earlobe  · Your child's ear is sticking out from his head  · Your child still has signs and symptoms of an ear infection 48 hours after he takes his medicine  · You have questions or concerns about your child's condition or care  Medicines:   · Medicines  may be given to decrease your child's pain or fever, or to treat an infection caused by bacteria  · Do not give aspirin to children under 25years of age  Your child could develop Reye syndrome if he takes aspirin  Reye syndrome can cause life-threatening brain and liver damage  Check your child's medicine labels for aspirin, salicylates, or oil of wintergreen  · Give your child's medicine as directed  Contact your child's healthcare provider if you think the medicine is not working as expected  Tell him or her if your child is allergic to any medicine  Keep a current list of the medicines, vitamins, and herbs your child takes  Include the amounts, and when, how, and why they are taken   Bring the list or the medicines in Pt AxO, makes needs known. Called appropriately on evening shift, impulsive x1 overnight: got up to use BR. Assist x1 with GB, steady. Orthostatic hypotension precautions. Denies SOB, CP, new N/T. 1:1 sitter in hallway. Cooperative with carmen bella. LBM 1/10, continent bladder in BR. Alarm on, CL in reach. Continue POC.    their containers to follow-up visits  Carry your child's medicine list with you in case of an emergency  Care for your child at home:   · Prop your child's head and chest up  while he sleeps  This may decrease his ear pressure and pain  Ask your child's healthcare provider how to safely prop your child's head and chest up  · Have your child lie with his infected ear facing down  to allow excess fluid to drain from his ear  · Use ice or heat  to help decrease your child's ear pain  Ask which of these is best for your child, and use as directed  · Ask about ways to keep water out of your child's ears  when he bathes or swims  Prevent otitis media:   · Wash your and your child's hands often  to help prevent the spread of germs  Encourage everyone in your house to wash their hands with soap and water after they use the bathroom, after they change a diaper, and before they prepare or eat food  · Keep your child away from people who are ill, such as sick playmates  Germs spread easily and quickly in  centers  · If possible, breastfeed your baby  Your baby may be less likely to get an ear infection if he is   · Do not give your child a bottle while he is lying down  This may cause liquid from his sinuses to leak into his eustachian tube  · Keep your child away from people who smoke  · Vaccinate your child  Ask your child's healthcare provider about the shots your child needs  Follow up with your child's healthcare provider as directed:  Write down your questions so you remember to ask them during your child's visits  © 2017 [employer] Information is for End User's use only and may not be sold, redistributed or otherwise used for commercial purposes  All illustrations and images included in CareNotes® are the copyrighted property of A D A M [employer] Inc  or [employer]  The above information is an  only   It is not intended as medical advice for individual conditions or treatments  Talk to your doctor, nurse or pharmacist before following any medical regimen to see if it is safe and effective for you

## 2021-01-11 NOTE — PLAN OF CARE
Discharge Planner Post-Acute Rehab PT:     Discharge Plan: TCU d/t cognition    Precautions: sternal, swallow- nectar thick    Current Status:  Bed Mobility: ind  Transfer: SBA  Gait: SBA no A.D  Stairs: SBA B rails  Balance: 52/56 Flaherty                  21/30 FGA    Assessment:  pt missed time in am d/t somnolence. Good participation in pm. Completed FGA 21/30 indicating increased risk for falls.     Other Barriers to Discharge (DME, Family Training, etc): impaired cognition,

## 2021-01-11 NOTE — PROGRESS NOTES
CLINICAL NUTRITION SERVICES - REASSESSMENT NOTE     Nutrition Prescription    RECOMMENDATIONS FOR MDs/PROVIDERS TO ORDER:  None today    Malnutrition Status:    Non-severe malnutrition in the context of acute on chronic illness     Recommendations already ordered by Registered Dietitian (RD):  Replace magic cup with fruit punch SF gelatein     Future/Additional Recommendations:  Monitor intakes and wt  Adjust supplements per pt preferences  Diet per SLP     EVALUATION OF THE PROGRESS TOWARD GOALS   Diet: Dysphagia Level 1:  Pureed and Nectar Thickened Liquids   Supplements: vanilla magic cup q meal  Intake: % of meals since 1/6 1/4: 451 kcal and 16 g protein (1 meal/lunch, breakfast not ordered, evening meal refused per nursing notes)   1/5: 618 kcal and 22 g protein (2 meals)   1/6: 1743 kcal and 69 g protein (3 meals, 2 supplements)  3 day average PO intake = 937 kcal (47% minimum energy needs) and 35 g protein (45% minimum protein needs)  NEW FINDINGS   Limited information obtained from pt, was just waking up from a nap. Pt reports good appetite, stated he has been eating 100% of all meals. Appears to be ordering appropriately and 3 meals/day per health touch. Pt is no longer eating magic cup d/t being tired of it. Stated he had a gelatein yesterday which he enjoyed. Requesting fruit punch SF gelatein rather tan magic cup. Had no other requests or concerns at this time. MAR and labs reviewed  45# (20.4%) wt loss in 3 months. Wt now stabilizing.   01/05/21 80 kg (176 lb 6.4 oz)   12/31/20  80.1 kg (176 lb 9.6 oz)   12/31/20  80.3 kg (177 lb)   12/29/20  79.4 kg (175 lb 1.6 oz)   12/28/20  80.3 kg (177 lb 1.6 oz)- admit wt   10/08/20 100.6 kg (221 lb 12.8 oz)   09/23/20 111.3 kg (245 lb 6.4 oz)-reported 35# wt gain d/t fluid during this admit     MALNUTRITION  % Intake: Decreased intake does not meet criteria  % Weight Loss: > 7.5% in 3 months (severe)  Subcutaneous Fat Loss: Facial region:  Mild-visual  only  Muscle Loss: Facial & jaw region:  mild and Upper leg (quadricep, hamstring):  Moderate-visual only  Fluid Accumulation/Edema: None noted  Malnutrition Diagnosis: Non-severe malnutrition in the context of acute on chronic illness     Previous Goals   Patient to consume % of nutritionally adequate meal trays TID, or the equivalent with supplements/snacks.  Evaluation: Met    Previous Nutrition Diagnosis  Inadequate oral intake related to chewing/swallowing difficulty as evidenced by altered consistency diet with unknown intakes and refusal to replace feeding tube.   Evaluation: Improving    CURRENT NUTRITION DIAGNOSIS  Predicted inadequate nutrient intake (protein-energy) related to varied appetite and altered consistency diet as evidenced by previously varied intakes with recent improvements and h/o wt loss      INTERVENTIONS  Implementation  Replace magic cup with fruit punch SF gelatein     Goals  Patient to consume % of nutritionally adequate meal trays TID, or the equivalent with supplements/snacks.    Monitoring/Evaluation  Progress toward goals will be monitored and evaluated per protocol.    Nena Shields MS, RD, LDN  Unit Pager [phone]

## 2021-01-11 NOTE — PROGRESS NOTES
Called pt dtr Kaycee this morning. Spoke with Kaycee and pt wife Bel over the phone. Discussed plans for discharge. Pt dtr and wife confirmed that they prefer TCU placement at this time as they don't feel they can provide 24/7 supervision for pt at discharge. SW asked about long-term plans if TCU asks. Pt dtr Kaycee stated that they have 'touched' on it but haven't discussed in great detail. Kaycee and pt wife hopeful that pt will improve and be able to return home without needing 24/7 supervision.     Pulled up list from Medicare.gov and sent the list to Kaycee as well to review. Pt family gave  permission to send referral to the first 5 closest facilities and prefer [facility] (now [facility] in [place]). Discussed that  plans to send the referral tomorrow (hoping pt off 1:1 sitter) and will call to update family late tomorrow vs Wed morning. Told Kaycee and pt wife anticipate discharge end of this week if all is confirmed and in place.     NEED: be off 1:1, send TCU referrals, clinical acceptance, bed availability, insurance auth ([employer] Medicare Advantage), possible COVID test, indep day, PAS screen, transportation and IMM.     Mary Ellen Niño, HENRIK, [facility] Acute Rehab Unit   Phone: [phone]  I   Pager: [phone]

## 2021-01-11 NOTE — PROGRESS NOTES
"[facility], [place]   Internal Medicine Daily Note           Interval History/Events     Overnight events reviewed  Reports doing well  No nausea, vomiting, chest pain, shortness of breath  No lightheadedness or dizziness.        Review of Systems        4 point ROS including Respiratory, CV, GI and , other than that noted above is negative      Medications   I have reviewed current medications  in the \"current medication\" section of Epic.  Relevant changes include:     Physical Exam   General:       Vital signs:    Blood pressure 129/67, pulse 75, temperature 96.8  F (36  C), temperature source Oral, resp. rate 16, height 1.803 m (5' 11\"), weight 80 kg (176 lb 6.4 oz), SpO2 97 %.  Estimated body mass index is 24.6 kg/m  as calculated from the following:    Height as of this encounter: 1.803 m (5' 11\").    Weight as of this encounter: 80 kg (176 lb 6.4 oz).      Intake/Output Summary (Last 24 hours) at 1/5/2021 1503  Last data filed at 1/5/2021 1400  Gross per 24 hour   Intake 760 ml   Output 1675 ml   Net -915 ml        Constitutional: In on acute distress  Eye: No icterus, no pallor  Mouth/ENT: Normal oral mucosa  Cardiovascular: S1, S2 normal.   Respiratory: B/L CTA  GI: Soft, NT, BS+  : No gardner's catheter  Neurology: Alert, awake, oriented. Non focal.   Psych: Mood stable.   MSK:   Integumentary:   Heme/Lymph/Imm:      Laboratory and Imaging Studies     I have reviewed  laboratory and imaging studies in the Epic. Pertinent findings are as below:    BMP  Recent Labs   Lab 01/11/21  0732 01/07/21  0607 01/05/21  0619    139 139   POTASSIUM 4.2 4.0 4.2   CHLORIDE 105 105 106   JEET 8.6 8.6 8.8   CO2 29 28 25   BUN 10 21 31*   CR 0.94 0.90 1.13   GLC 67* 192* 311*     CBC  Recent Labs   Lab 01/11/21  0732 01/07/21  0607 01/05/21  0619   WBC 9.8 8.7 7.8   RBC 4.32* 4.33* 4.58   HGB 12.0* 11.8* 12.3*   HCT 38.0* 37.8* 40.0   MCV 88 87 87   MCH 27.8 27.3 26.9   MCHC 31.6 31.2* " 30.8*   RDW 17.5* 17.2* 16.9*    252 264     INRNo lab results found in last 7 days.  LFTs  No lab results found in last 7 days.   PANCNo lab results found in last 7 days.        Impression/Plan        Mika Alvarez is a 70 year old man with a past medical history of systolic heart failure, A-fib, DM type 2, severe multivessel CAD, who presented 12/2/20 with acute systolic heart failure exacerbation required diuresis then underwent 4 vessel CABG 12/7/20, course complicated by severe dysphagia, possible sieuzre like activity left frontal lobe stroke, sepsis, acute hypoxia with small bi apical pneumothoraces and, altered mental status. Admitted to acute rehab unit 12/28/20 for ongoing rehabilitation and medical management. Internal medicine consulted for co-management.   Individual problems and their management are outlined below        # Multivessel CAD S/P CABG  # Acute diastolic and systolic heart failure, improving  Echo on 12/25:  The left ventricular wall motion is globally hypokinetic. The estimated  left ventricular ejection fraction is 35%. This represents a moderately  decreased ejection fraction.The right ventricle is mildly dilated and its systolic function is  severely reduced. TAPSE is abnormal, which is consistent with abnormal  right ventricular systolic function.  Biatrial volume is moderately increased.  ON Torsemide, Metoprolol, Amiodarone    Patient having soft BP, and Orthostatic symptoms intermittently.  He has not been eating and drinking well. Due to this, Torsemide, and Metoprolol had been held (patient has not been getting it). He received im 500 ml  fluid on 01/07/2020  - Continue to monitor  - Hold Torsemide, Metoprolol for now . BP still soft, will initiate as soon as BP will allow in next few days  - Monitor weight trend  - Continue Aspirin, Amiodarone       # A-fib with RVR  - Currently rate controlled   - Continue amiodarone ( expected for 14 more days and stop. Last day on 1/11  ).  Since he has not converted, it may make sense to stop amiodarone at 7 days  and   -Of note, surgeon preference is to not anticoagulate for a-fib s/p LAAE( direct extract from patient's discharge summary)      # Uncontrolled type II diabetes mellitus, HgbA1c  12.6%  PTA Metformin, Glipizide, and Lantus. Metformin 1000 mg BID on 1/1, and GLipizide were stoppled. Patient was on BID lantus ( 19 units BID) when he was on continious tube feeding. Given that the feeding tube is out and Patinet is NPO, Lantus iwas held  Following initiation of DD1 type diet,  Insulin Glargine was resumed at 10 units BID ( starting tonight). Blood glucose 200-300  Insulin Glargine has been increased to 12, and then 14 units, and 18 units BID on 01/07.   BLood glucose low this AM  - Reduce Insulin Glarigine to 15 units at bed time, and continue Insulin Glargine at 18 units in AM  - Continue on Metformin  - Continue Insulin Aspart correction at medium intensity  - Hypoglycemia protocol    Acute hypoxic respiratory failure, resolved  -  Encourage IC  - Supplemental oxygen as needed  - CXR on 12/31 no acute airspace abnormalities      Acute metabolic encephalopathy  Intermittent agitation   - Supportive care  - Likely multifactorial ( post operative status , stroke and possibly pre existing status secondary to uncontrolled DM type 2  And HTN)   - Encephalopathy with intermittent agitation continues to be a significant barrier to progress   - It does appear that patient does not have decision making cacpcity at this time   - Encephalopathy seems to be improving, plan to wean down Quetiapine.      Subacute left frontal stroke with petechial hemorrhage  - ASA  - Atorvastatin  - Continue levetiracetam    # Sepsis, Resolved   - Fever, Leukocytosis, tachycardia noted 12/10/2020  - UA was negative. One of two blood cultures grew staph epi, likely contaminant.  - Sputum culture grew beta-hemolytic strep  - Started on emperic pip-tazo IV,   completed 7d on 12/17, fever and WBC resolving.  - WBC down to 11.5 on 12/31     #Severe pharyngeal dysphagia  # Malnutrition, unclassified     - SLP team to follow patient here in ARU   video swallow study on 12/21 showed ongoing significant dysphagia  - NGT in place, but was pulled out and the bridle was disrupted on 1/3 am. Repeat CXR  showed that the feeding tube is still near the pylorus. Subsequently, the NGT was completely pulled out as it appeared to be clogged  - Patient is currently refusing an NGT  - Diet advance per SLP evaluation.         Primary hypertension, now with asymptomatic hypotension   BP on the softer side. Not receiving metoprolol tartrate  due to hypotesnion  - Monitoring BP     # Bilateral Pneumothoraces   X Ray on 12/26 with  Tiny apical pneumothoraces without significant change. No consolidation or focal pneumonia in the lungs. No pulmonary edema. No other significant change.  Continue to monitor clinically.  CXR on 12/31  With resolved pneumothoraces       VTE risk reduction. Subcutaneous heparin.                      Diet: Orders Placed This Encounter      Dysphagia Diet Level 1 Pureed Nectar Thickened Liquids (pre-thickened or use instant food thickener)       DVT Prophylaxis: Heparin SQ  Koenig Catheter: not present  Code Status: Full Code                                   Pt's care was discussed with bedside RN, patient and  during Care Team Rounds.               Tim Meza MD  Hospitalist ( Internal medicine)  Pager: [phone]

## 2021-01-11 NOTE — PROGRESS NOTES
01/11/21 1451   Signing Clinician's Name / Credentials   Signing clinician's name / credentials Oumou Alysa DPT   Functional Gait Assessment (KATALINA De La Cruz, DENISE Candelaria, et al. (2004))   1. GAIT LEVEL SURFACE 2  (WBOS, variable foot placement, no LOB, good speed)   2. CHANGE IN GAIT SPEED 1  (little change in gait speed)   3. GAIT WITH HORIZONTAL HEAD TURNS 3   4. GAIT WITH VERTICAL HEAD TURNS 3   5. GAIT AND PIVOT TURN 3   6. STEP OVER OBSTACLE 3   7. GAIT WITH NARROW BASE OF SUPPORT 1   8. GAIT WITH EYES CLOSED 1   9. AMBULATING BACKWARDS 2   10. STEPS 2   Total Functional Gait Assessment Score   TOTAL SCORE: (MAXIMUM SCORE 30) 21     Functional Gait Assessment (FGA): The FGA assesses postural stability during various walking tasks.   Gait assistive device used: None    Patient Score: 21/30  Scores of <22/30 have been correlated with predicting falls in community-dwelling older adults according to Jono & Nikita 2010.   Scores of <18/30 have been correlated with increased risk for falls in patients with Parkinsons Disease according to Myles Campbell Zhou et al 2014.  Minimal Detectable Change for patients with acute/chronic stroke = 4.2 according to Ale & Sarahischel 2009  Minimal Detectable Change for patients with vestibular disorder = 8 according to Jono & Nikita 2010    Assessment (rationale for performing, application to patient s function & care plan): pt s/p CVA with demonstrated impairments in balance, performed as objective outcome measure to assess risk for falls.       Flaherty 52/56    Minutes billed as physical performance test: 15

## 2021-01-12 ENCOUNTER — APPOINTMENT (OUTPATIENT)
Dept: PHYSICAL THERAPY | Facility: CLINIC | Age: 71
End: 2021-01-12
Payer: COMMERCIAL

## 2021-01-12 ENCOUNTER — APPOINTMENT (OUTPATIENT)
Dept: SPEECH THERAPY | Facility: CLINIC | Age: 71
End: 2021-01-12
Payer: COMMERCIAL

## 2021-01-12 LAB
GLUCOSE BLDC GLUCOMTR-MCNC: 101 MG/DL (ref 70–99)
GLUCOSE BLDC GLUCOMTR-MCNC: 123 MG/DL (ref 70–99)
GLUCOSE BLDC GLUCOMTR-MCNC: 161 MG/DL (ref 70–99)
GLUCOSE BLDC GLUCOMTR-MCNC: 182 MG/DL (ref 70–99)
GLUCOSE BLDC GLUCOMTR-MCNC: 96 MG/DL (ref 70–99)

## 2021-01-12 PROCEDURE — 99232 SBSQ HOSP IP/OBS MODERATE 35: CPT | Mod: GC | Performed by: PHYSICAL MEDICINE & REHABILITATION

## 2021-01-12 PROCEDURE — 999N001017 HC STATISTIC GLUCOSE BY METER IP

## 2021-01-12 PROCEDURE — 97530 THERAPEUTIC ACTIVITIES: CPT | Mod: GP

## 2021-01-12 PROCEDURE — 99207 PR CDG-CUT & PASTE-POTENTIAL IMPACT ON LEVEL: CPT | Performed by: INTERNAL MEDICINE

## 2021-01-12 PROCEDURE — 250N000013 HC RX MED GY IP 250 OP 250 PS 637: Performed by: INTERNAL MEDICINE

## 2021-01-12 PROCEDURE — 250N000013 HC RX MED GY IP 250 OP 250 PS 637: Performed by: PHYSICAL MEDICINE & REHABILITATION

## 2021-01-12 PROCEDURE — 97112 NEUROMUSCULAR REEDUCATION: CPT | Mod: GP

## 2021-01-12 PROCEDURE — 99233 SBSQ HOSP IP/OBS HIGH 50: CPT | Performed by: INTERNAL MEDICINE

## 2021-01-12 PROCEDURE — 97110 THERAPEUTIC EXERCISES: CPT | Mod: GP

## 2021-01-12 PROCEDURE — 128N000003 HC R&B REHAB

## 2021-01-12 PROCEDURE — 92526 ORAL FUNCTION THERAPY: CPT | Mod: GN

## 2021-01-12 RX ADMIN — FAMOTIDINE 20 MG: 20 TABLET, FILM COATED ORAL at 21:34

## 2021-01-12 RX ADMIN — METFORMIN HYDROCHLORIDE 1000 MG: 500 TABLET ORAL at 07:41

## 2021-01-12 RX ADMIN — INSULIN ASPART 1 UNITS: 100 INJECTION, SOLUTION INTRAVENOUS; SUBCUTANEOUS at 17:37

## 2021-01-12 RX ADMIN — LEVETIRACETAM 500 MG: 500 TABLET, FILM COATED ORAL at 07:37

## 2021-01-12 RX ADMIN — FAMOTIDINE 20 MG: 20 TABLET, FILM COATED ORAL at 07:38

## 2021-01-12 RX ADMIN — MELATONIN TAB 3 MG 3 MG: 3 TAB at 21:34

## 2021-01-12 RX ADMIN — MULTIPLE VITAMINS W/ MINERALS TAB 1 TABLET: TAB at 07:38

## 2021-01-12 RX ADMIN — Medication 12.5 MG: at 21:35

## 2021-01-12 RX ADMIN — METFORMIN HYDROCHLORIDE 1000 MG: 500 TABLET ORAL at 17:36

## 2021-01-12 RX ADMIN — ASPIRIN 81 MG CHEWABLE TABLET 81 MG: 81 TABLET CHEWABLE at 07:37

## 2021-01-12 RX ADMIN — LEVETIRACETAM 500 MG: 500 TABLET, FILM COATED ORAL at 21:34

## 2021-01-12 RX ADMIN — ATORVASTATIN CALCIUM 80 MG: 80 TABLET, FILM COATED ORAL at 21:34

## 2021-01-12 RX ADMIN — Medication 12.5 MG: at 21:34

## 2021-01-12 ASSESSMENT — MIFFLIN-ST. JEOR: SCORE: 1650.34

## 2021-01-12 NOTE — PROGRESS NOTES
Social Work Progress Note:     SW entered PAS for admission to [facility] [place].    PAS: [id number]     See previous social work note from  from 1/12 for more details on dx plans.     EVELYN Love     [facility] [place]  Pager: [phone]

## 2021-01-12 NOTE — PLAN OF CARE
FOCUS/GOAL  Bowel management, Bladder management, Pain management, Skin integrity and Cognition/Memory/Judgment/Problem solving    ASSESSMENT, INTERVENTIONS AND CONTINUING PLAN FOR GOAL:  Pt is alert and oriented, called using call light to transfer to toilet, slept intermittently waking around 2 am, found to have bg of 101 prior to snack of oatmeal that was only 10% eaten, denied pain, sob, dizziness, lightheadedness, n/v, or new numbness/tingling, continent of bladder, independently repositioning overnight, no S&S of orthostatic hypotension when sitting or standing, dressing on bilateral lower extremity skin tears intact, using abdominal binder and tubi  to counteract orthostatic hypotension, no further care concerns at this time continue with POC.

## 2021-01-12 NOTE — PLAN OF CARE
Speech Language Therapy Discharge Summary    Reason for therapy discharge:    Discharged to transitional care facility.    Progress towards therapy goal(s). See goals on Care Plan in Psychiatric electronic health record for goal details.  Goals not met.  Barriers to achieving goals:   discharge from facility.    Therapy recommendation(s):    Continued therapy is recommended.  Rationale/Recommendations:  Can continue to address cognitive impairments as well as dysphagia related goals.    VFSS was completed on 1/4 showing significant aspiration risk.  At that point, patient had just pulled his NG tube and refused to have it replaced.  Patient was borderline safe for ability to advance his diet to NDD-1 food textures with nectar thick liquids.  Patient has shown some improvement in swallowing function though inconsistent level of acceptance with his meals.  At this time diet has been advanced to NDD-2 food textures with nectar thick liquids.  Patient at times dismissive of the thickened liquids but has been amenable to free water protocol as well as when able to have ultra cold drinks.  This could be accomplished by either thickening water and ice cube trays and adding thickened ice cubes to patient's beverages or taking into consideration the melting factor of the ice when determining quantity of to add to the liquids.  With this arrangement, patient has shown improved acceptance of the thickened liquids.  May want to consider repeat VFSS.  See VFSS report for further details.  Patient also has cognitive linguistic related goals related to attention, memory and reasoning/problem solving.  Limited success and addressing at times due to fixation on problems with the meals.

## 2021-01-12 NOTE — PROGRESS NOTES
See SW note this morning RE: TCU referrals.     Pt family preference was [facility] in [place]. They are unable to accept due to staffing. [facility] called (Priya in admissions PH: [phone]) and pt clinically and financially accepted with a bed available tomorrow. No updated COVID test needed per admissions.     ILAN called pt dtr Kaycee to discuss. ILAN notified Kaycee that the facility has no positive COVID pt's per admissions and pt Kaycee request. Kaycee took information, discussed with other family (including pt wife) and discussed with pt. All on board with the discharge plan. Per Kaycee, pt son Luis Enrique only available early in the morning for transport. SW discussed with bedside RN, Charge RN, therapy scheduling office and TCU admissions. Plan for Luis Enrique (pt son) to come up to the unit before 9am to help pt with discharge and plan to leave around 9am. HUC to send discharge packet to the TCU prior to discharge. ILAN discussed IMM over the phone with pt dtr Kaycee, Kaycee denied concerns or needs at this time.     ILAN met with pt at bedside, pt expressed understanding and agreement with plan. Pt stated that his electric razor is missing. RN at the bedside when pt reporting this. SW asked RN staff to assist pt with searching for it. Notified Charge RN as well and asked Charge RN to contact pt relations if not found. Pt denied any additional needs at this time. RN setting up video conference for pt so he can talk to dtr at the time of SW departure.     PAS screen completed by ILAN Tolentino (Viviane Morales) see note for details.     [facility] [place]-TOMORROW Wednesday 01/13. Family transport, pick-up at 9am.   PH: [phone]  F: [phone]    Mary Ellen Niño, HENRIK, [facility] Acute Rehab Unit   Phone: [phone]  I   Pager: [phone]

## 2021-01-12 NOTE — PROGRESS NOTES
[facility]   Acute Rehabilitation Unit  Daily progress note    INTERVAL HISTORY  Mika seen and examined this morning at the bedside, lying comfortably in bed and sleeping. Patient was easily awakble. He said that she was sleeping and getting prepared for his therapy sessions. He said he slept comfortably over the night. He denied any new complaints.     He denied any nausea, vomiting, headache, chest pain, SOB, nausea, vomiting, abdominal pain.     Later in the day patient told nursing staff  that he had many deaths in the family this year and was willing to talk to psychology team. Will add a psychology consult and appreciate their recommendations..    Functionally:  As per PT notes:  Bed Mobility: ind  Transfer: SBA  Gait: SBA no A.D  Stairs: SBA B rails  Balance: 52/56 Flaherty                  21/30 FGA       MEDICATIONS  Scheduled:    aspirin  81 mg Oral Daily     atorvastatin  80 mg Oral QPM     famotidine  20 mg Oral BID     [Held by provider] glipiZIDE  5 mg Per Feeding Tube BID AC     insulin aspart  1-7 Units Subcutaneous TID AC     insulin aspart  1-5 Units Subcutaneous At Bedtime     insulin glargine  15 Units Subcutaneous At Bedtime     insulin glargine  18 Units Subcutaneous QAM AC     levETIRAcetam  500 mg Oral BID     melatonin  3 mg Oral At Bedtime     metFORMIN  1,000 mg Oral BID w/meals     multivitamin w/minerals  1 tablet Oral Daily     QUEtiapine  12.5 mg Oral At Bedtime        PRN:  acetaminophen, dextrose, glucose **OR** dextrose **OR** glucagon, OLANZapine, ondansetron, phenol, QUEtiapine, sennosides       PHYSICAL EXAM  Patient Vitals for the past 24 hrs:   BP Temp Temp src Pulse Resp SpO2 Weight   01/12/21 0815 128/82 97.9  F (36.6  C) Oral 85 18 96 % --   01/12/21 0700 -- -- -- -- -- -- 85.8 kg (189 lb 3.2 oz)   01/11/21 1533 114/79 97.7  F (36.5  C) Oral 82 16 98 % --       GEN: Alert, not in acute distress, lying comfortably in bed.  HEENT: Neck  supple, extraocular movement intact.  PULM: Breathing comfortably on room air, CTAB  CARDIO: S1 S2 present, RRR  ABD: Soft, nontender, non distended, bowel sounds present  EXT: No LE edema or calf tenderness b/l.  Compression stockings in place.   Neuro: Has answered appropriately, follows commands.  AAOx3.  +Dysphonia.     LABS/IMAGING  CBC RESULTS:   Recent Labs   Lab Test 01/11/21  0732   WBC 9.8   RBC 4.32*   HGB 12.0*   HCT 38.0*   MCV 88   MCH 27.8   MCHC 31.6   RDW 17.5*            Last Comprehensive Metabolic Panel:  Sodium   Date Value Ref Range Status   01/11/2021 139 133 - 144 mmol/L Final     Potassium   Date Value Ref Range Status   01/11/2021 4.2 3.4 - 5.3 mmol/L Final     Chloride   Date Value Ref Range Status   01/11/2021 105 94 - 109 mmol/L Final     Carbon Dioxide   Date Value Ref Range Status   01/11/2021 29 20 - 32 mmol/L Final     Anion Gap   Date Value Ref Range Status   01/11/2021 5 3 - 14 mmol/L Final     Glucose   Date Value Ref Range Status   01/11/2021 67 (L) 70 - 99 mg/dL Final     Urea Nitrogen   Date Value Ref Range Status   01/11/2021 10 7 - 30 mg/dL Final     Creatinine   Date Value Ref Range Status   01/11/2021 0.94 0.66 - 1.25 mg/dL Final     GFR Estimate   Date Value Ref Range Status   01/11/2021 83 >60 mL/min/[1.73_m2] Final     Comment:     Non  GFR Calc  Starting 12/18/2018, serum creatinine based estimated GFR (eGFR) will be   calculated using the Chronic Kidney Disease Epidemiology Collaboration   (CKD-EPI) equation.       Calcium   Date Value Ref Range Status   01/11/2021 8.6 8.5 - 10.1 mg/dL Final       Recent Labs   Lab 01/12/21  0711 01/12/21  0240 01/11/21  2142 01/11/21  1718 01/11/21  1224 01/11/21  0915 01/11/21  0732 01/11/21  0732 01/07/21  0607 01/07/21  0607   GLC  --   --   --   --   --   --   --  67*  --  192*   BGM 96 101* 139* 148* 136* 132*   < >  --    < >  --     < > = values in this interval not displayed.     EXAM: XR CHEST 1 VW  1/3/2021       HISTORY: possible dislodged feeding tube.     COMPARISON: Previous day.      TECHNIQUE: Supine frontal view of the chest.     FINDINGS: Feeding tube tip projects over the medial right upper  quadrant. Post CABG changes. Mild interstitial prominence. Heart is  enlarged. No focal airspace consolidation. No abnormally dilated loops  of bowel in the visualized upper abdomen..                                                                      IMPRESSION: Feeding tube tip projects over the medial right upper  quadrant, possibly near the distal stomach or pylorus.      IMPRESSION/PLAN:  Mika Alvarez is a 70 year old man with a past medical history of systolic heart failure, A-fib, DM type 2, severe multivessel CAD, who presented 12/2/20 with acute systolic heart failure exacerbation required diuresis then underwent 4 vessel CABG 12/7/20, course complicated by severe dysphagia, possible sieuzre like activity left frontal lobe stroke, sepsis, acute hypoxia with small bi apical pneumothoraces and, altered mental status. Admitted to acute rehab unit 12/28/20 for ongoing rehabilitation and medical management.         Admission to acute inpatient rehab s/p cabg x 4.    Impairment group code: 0.9     1. PT, OT and SLP 60 minutes of each on a daily basis, in addition to rehab nursing and close management of physiatrist.       2. Impairment of ADL's: Noted to have impaired strength, coordination, activity tolerance, and cognition leading to impaired ability to complete self cares, continue OT with goal for MOD I to I with basic adls.      3. Impairment of mobility:  Noted to have impaired strength, activity tolerance, and cognition leading to impaired mobility will benefit from ongoing PT with goal for MOD I to I with basic mobility and stairs.      4. Impairment of cognition/language/swallow:  Noted to have severe dysphagia and impaired cognition will benefit from ongoing SLP with goal for least restrictive diet  tolerated and improved ability to communicate needs.     Medical Conditions    #CAD  #S/P CABG x 4 (12/7)  #Acute on chronic systolic heart failure- 2/2 ischemic cardiomyopathy  #HTN  #A-fib with RVR-   Most recent Echo 12/25/20 with left ventricular wall globally hypokinetic, EF estimated 35%. Right ventricle mildly dilated with systolic function severely reduced.  Had A-fib with RVR early in hospital stay on amiodarone taper. surgeon preference is to not anticoagulate for a-fib   -continue amiodarone 200 mg daily x 2 weeks then stop (stopped on 1/11/2021)  -continue asa, statin  -sternal precautions (6 weeks from 12/7)- limit 10 lbs then as tolerated   -Torsemide and Metoprolol discontinued due to orthostatic and clinically hypovolemic. (has not been receiving Metoprolol in several days anyways).  BPs improving.  Received 1L of fluids 1/6, 1/7.   -monitor bmp, weights, intake and output, edema/sob  -f/u cardiology as scheduled     #Subacute Left frontal stroke with petechial hemorrhage  #Possible simple partial seizure  #Acute metabolic encephalopathy  Neurology consulted for left leg shaking POD 1, EEG neg, head CT obtained in setting of ongoing leg shaking and ams- demonstrated stroke 12/12 (most recent imaging stable)  1.  Petechial hemorrhage in the left frontal opercular infarct correlating with the blood products noted on MRI,  Chronic left cerebellar infarct.  -follow up neurology within one month- Dr. Bailey/ [place]- follow up possible seizure-   -continue keppra until neurology follow up    #Agitation  -Psychiatry:  Agitation continues to improve.  Will decrease Seroquel to 12.5 mg at bedtime tonight and plan to wean off completely in a few days if tolerating well.  Sitter d/celine on 1/11/2021  -Patient willing to talk to psychology, will put in a consult on 1/12/2021 and will appreciate recs.     #DM type 2- Hgb A1C 12.6%.   -Continue Metformin 1000 mg twice daily, continue to hold glipizide.  -Restarted  Lantus on 1/8/2021, titrated up to 18 units BID and decresed to Lantus 15 U QPM, 18 QAM.    -Continue to monitor glucose closely, Appreciate hospitalist team recs.      #oral pharyngeal dysphagia(Improving)  #Impaired oral intake -  -Slp monitoring currently NPO with nd in place 12/21 video swallow with ongoing significant dysphagia s/p ND placed 12/15 (removed on 1/3)  -appreciate ongoing slp  -appreciate nutrition support  -Video swallow study done on 1/4/2021 and was cleared for nectar thick diet.     #Possible aspiration pneumonia- fever, known dysphagia, leucocytosis,  hypoxia, other workup unremarkable received zosyn course seen by id.  -appreciate ongoing SLP support  -trend WBC, 9.8 on 1/11    #Bilateral shin wounds- mepilex changes as ordered keep clean and dry.      #Urinary retention- gardner recently removed, reportedly voiding without issue  -PVRs x2 low  May check PRN only.   -was started on flomax at prior hospital - had flomax suspension- as npo and only pill form will hold and monitor as above.     #Creatinine elevation - Cr 1.08 BUN 36 12/29, Cr 0.94 on 1/11/2021  -monitor bmp     1. Adjustment to disability: plan for psychology consult when more consistently alert  2. FEN: NDD1 and NTLs  3. Bowel: monitor  4. Bladder: PVRs WNL  5. DVT Prophylaxis: Now ambulating adequate distances to deter DVT, will stop heparin.   6. GI Prophylaxis: ppi  7. Code: full- prior and patient confirmed on admission  8. Disposition: goal for home  9. ELOS:  2+ weeks.  10. Rehab prognosis:  Fair- medically complex with significant impairments in cognition and swallow and milder strength/activity tolerance deficits goal for home pending supervision needs and family support  11. Follow up Appointments on Discharge: cardiology, cv surgery, pcp.    Patient was seen and discussed with my staff physician Dr. Jerome, who agrees with my assessment and plan.    Alexandre Gomez   PGY 2  Physical Medicine and Rehabilitation  Pager:  [phone]

## 2021-01-12 NOTE — PLAN OF CARE
Discharge Planner Post-Acute Rehab PT:      Discharge Plan: TCU d/t cognition     Precautions: sternal, swallow- nectar thick     Current Status:  Bed Mobility: ind  Transfer: SBA  Gait: SBA no A.D  Stairs: SBA B rails  Balance: 52/56 [place]                  21/30 FGA     Assessment:  IND day items completed this afternoon for planned discharge to TCU tomorrow. Pt SBA for most task 2/2 balance. Pt needing Kristi for higher level balance challenges, particularly when given dual task/ divided attention demand.      Other Barriers to Discharge (DME, Family Training, etc): impaired cognition,

## 2021-01-12 NOTE — PLAN OF CARE
OT: Attempted x3 to wake pt for therapy session. Pt barely stirred. Will try back later as schedule allows. -60 min

## 2021-01-12 NOTE — PROGRESS NOTES
"[facility] [place], [place]   Internal Medicine Daily Note           Interval History/Events     Overnight events reviewed  Reports doing well  No nausea, vomiting, chest pain, shortness of breath  No lightheadedness or dizziness.   No new concern.          Review of Systems        4 point ROS including Respiratory, CV, GI and , other than that noted above is negative      Medications   I have reviewed current medications  in the \"current medication\" section of Epic.  Relevant changes include:     Physical Exam         Vital signs:    Blood pressure 128/82, pulse 85, temperature 97.9  F (36.6  C), temperature source Oral, resp. rate 18, height 1.803 m (5' 11\"), weight 85.8 kg (189 lb 3.2 oz), SpO2 96 %.  Estimated body mass index is 26.39 kg/m  as calculated from the following:    Height as of this encounter: 1.803 m (5' 11\").    Weight as of this encounter: 85.8 kg (189 lb 3.2 oz).        Constitutional: In on acute distress  Eye: No icterus, no pallor  Mouth/ENT: Normal oral mucosa  Cardiovascular: S1, S2 normal.   Respiratory: Non labored.   GI: Soft, ND  : No gardner's catheter  Neurology: Alert, awake, oriented. Non focal.   Psych: Mood stable.        Laboratory and Imaging Studies     I have reviewed  laboratory and imaging studies in the Epic. Pertinent findings are as below:    BMP  Recent Labs   Lab 01/11/21  0732 01/07/21  0607    139   POTASSIUM 4.2 4.0   CHLORIDE 105 105   JEET 8.6 8.6   CO2 29 28   BUN 10 21   CR 0.94 0.90   GLC 67* 192*     CBC  Recent Labs   Lab 01/11/21  0732 01/07/21  0607   WBC 9.8 8.7   RBC 4.32* 4.33*   HGB 12.0* 11.8*   HCT 38.0* 37.8*   MCV 88 87   MCH 27.8 27.3   MCHC 31.6 31.2*   RDW 17.5* 17.2*    252     INRNo lab results found in last 7 days.  LFTs  No lab results found in last 7 days.   PANCNo lab results found in last 7 days.        Impression/Plan        Mika Alvarez is a 70 year old man with a past medical history of systolic heart " failure, A-fib, DM type 2, severe multivessel CAD, who presented 12/2/20 with acute systolic heart failure exacerbation required diuresis then underwent 4 vessel CABG 12/7/20, course complicated by severe dysphagia, possible sieuzre like activity left frontal lobe stroke, sepsis, acute hypoxia with small bi apical pneumothoraces and, altered mental status. Admitted to acute rehab unit 12/28/20 for ongoing rehabilitation and medical management. Internal medicine consulted for co-management.     Individual problems and their management are outlined below    Today's changes 1/12/2021  Resume metoprolol 12.5 mg bid w hold parameters.   No other changes.         # Multivessel CAD S/P CABG  # Acute diastolic and systolic heart failure, improving  Echo on 12/25:  The left ventricular wall motion is globally hypokinetic. The estimated  left ventricular ejection fraction is 35%. This represents a moderately  decreased ejection fraction.The right ventricle is mildly dilated and its systolic function is  severely reduced. TAPSE is abnormal, which is consistent with abnormal  right ventricular systolic function.  Biatrial volume is moderately increased.  ON Torsemide, Metoprolol, Amiodarone    Patient was having soft BP, and Orthostatic symptoms intermittently.  He has not been eating and drinking well. Due to this, Torsemide, and Metoprolol had been held . He received iv 500 ml  fluid on 01/07/2020  - Continue to monitor  - Holding Torsemide, Metoprolol for now .   1/12/2021: Resume metoprolol 12.5 mg bid.   - Monitor weight trend  - Continue Aspirin, Amiodarone       # A-fib with RVR  - Currently rate controlled   - Continue amiodarone ( expected for 14 more days and stop. Last day on 1/11 ).  Since he has not converted, it may make sense to stop amiodarone at 7 days  and   -Of note, surgeon preference is to not anticoagulate for a-fib s/p LAAE( direct extract from patient's discharge summary)      # Uncontrolled type II  diabetes mellitus, HgbA1c  12.6%  PTA Metformin, Glipizide, and Lantus. Metformin 1000 mg BID on 1/1, and GLipizide were stoppled. Patient was on BID lantus ( 19 units BID) when he was on continious tube feeding. Given that the feeding tube is out and Patinet is NPO, Lantus iwas held  Following initiation of DD1 type diet,  Insulin Glargine was resumed at 10 units BID ( starting tonight). Blood glucose 200-300  Insulin Glargine has been increased to 12, and then 14 units, and 18 units BID on 01/07.   BLood glucose low this AM  - Reduce Insulin Glarigine to 15 units at bed time, and continue Insulin Glargine at 18 units in AM  - Continue on Metformin  - Continue Insulin Aspart correction at medium intensity  - Hypoglycemia protocol    Recent Labs   Lab 01/12/21  1200 01/12/21  0711 01/12/21  0240 01/11/21  2142 01/11/21  1718 01/11/21  1224 01/11/21  0732 01/11/21  0732 01/07/21  0607 01/07/21  0607   GLC  --   --   --   --   --   --   --  67*  --  192*   * 96 101* 139* 148* 136*   < >  --    < >  --     < > = values in this interval not displayed.       Acute hypoxic respiratory failure, resolved  -  Encourage IC  - Supplemental oxygen as needed  - CXR on 12/31 no acute airspace abnormalities      Acute metabolic encephalopathy  Intermittent agitation   - Supportive care  - Likely multifactorial ( post operative status , stroke and possibly pre existing status secondary to uncontrolled DM type 2  And HTN)   - Encephalopathy with intermittent agitation continues to be a significant barrier to progress   - It does appear that patient does not have decision making cacpcity at this time   - Encephalopathy seems to be improving, plan to wean down Quetiapine.      Subacute left frontal stroke with petechial hemorrhage  - ASA  - Atorvastatin  - Continue levetiracetam    # Sepsis, Resolved   - Fever, Leukocytosis, tachycardia noted 12/10/2020  - UA was negative. One of two blood cultures grew staph epi,  likely contaminant.  - Sputum culture grew beta-hemolytic strep  - Started on emperic pip-tazo IV,  completed 7d on 12/17, fever and WBC resolving.  - WBC down to 11.5 on 12/31     #Severe pharyngeal dysphagia  # Malnutrition, unclassified     - SLP team to follow patient here in ARU   video swallow study on 12/21 showed ongoing significant dysphagia  - NGT in place, but was pulled out and the bridle was disrupted on 1/3 am. Repeat CXR  showed that the feeding tube is still near the pylorus. Subsequently, the NGT was completely pulled out as it appeared to be clogged  - Patient is currently refusing an NGT  - Diet advance per SLP evaluation.       Primary hypertension, now with asymptomatic hypotension   BP on the softer side. Not receiving metoprolol tartrate  due to hypotesnion  - Monitoring BP     # Bilateral Pneumothoraces   X Ray on 12/26 with  Tiny apical pneumothoraces without significant change. No consolidation or focal pneumonia in the lungs. No pulmonary edema. No other significant change.  Continue to monitor clinically.  CXR on 12/31  With resolved pneumothoraces       VTE risk reduction. Subcutaneous heparin.         Diet: Orders Placed This Encounter      Dysphagia Diet Level 1 Pureed Nectar Thickened Liquids (pre-thickened or use instant food thickener)       DVT Prophylaxis: Heparin SQ  Koenig Catheter: not present  Code Status: Full Code                   Rasta Montgomery MD   Hospitalist (Internal Medicine)

## 2021-01-12 NOTE — PLAN OF CARE
Pt is alert and oriented. Using call light before transferring to toilet.  Denied pain, sob, dizziness, lightheadedness, n/v, or new numbness/tingling, continent of bowel and bladder. Independently repositioning. Dressing on bilateral lower extremity skin tears intact and covered with mepilex. Changed on 1/11/2021. Using abdominal binder and tubi  to counteract orthostatic hypotension. P: Continue current plan of care.     Mika is interested in talking to a therapist about his recent losses and struggles with his family.

## 2021-01-12 NOTE — PROGRESS NOTES
Per discussion with pt wife and pt dtr yesterday, SW sent referrals to the following:     [facility] [address]  PH: [phone], F: [phone]    [facility]  PH: [phone], F: [phone]    [facility]  PH: [phone], F: [phone]    [facility] TCU   PH: [phone]   Admission PH: [phone]  F: [phone]    [facility]  PH Cell: [phone]  PH: [phone]  F: [phone]    [facility], [facility] Unit   Phone: [phone]  I   Pager: [phone]

## 2021-01-13 ENCOUNTER — COMMUNICATION - HEALTHEAST (OUTPATIENT)
Dept: CARDIOLOGY | Facility: CLINIC | Age: 71
End: 2021-01-13

## 2021-01-13 ENCOUNTER — APPOINTMENT (OUTPATIENT)
Dept: OCCUPATIONAL THERAPY | Facility: CLINIC | Age: 71
End: 2021-01-13
Payer: COMMERCIAL

## 2021-01-13 VITALS
RESPIRATION RATE: 16 BRPM | HEART RATE: 69 BPM | HEIGHT: 71 IN | TEMPERATURE: 95.4 F | SYSTOLIC BLOOD PRESSURE: 124 MMHG | WEIGHT: 189.2 LBS | OXYGEN SATURATION: 96 % | BODY MASS INDEX: 26.49 KG/M2 | DIASTOLIC BLOOD PRESSURE: 85 MMHG

## 2021-01-13 LAB
GLUCOSE BLDC GLUCOMTR-MCNC: 114 MG/DL (ref 70–99)
GLUCOSE BLDC GLUCOMTR-MCNC: 72 MG/DL (ref 70–99)
GLUCOSE BLDC GLUCOMTR-MCNC: 85 MG/DL (ref 70–99)

## 2021-01-13 PROCEDURE — 97535 SELF CARE MNGMENT TRAINING: CPT | Mod: GO

## 2021-01-13 PROCEDURE — 99239 HOSP IP/OBS DSCHRG MGMT >30: CPT | Performed by: PHYSICAL MEDICINE & REHABILITATION

## 2021-01-13 PROCEDURE — 250N000013 HC RX MED GY IP 250 OP 250 PS 637: Performed by: PHYSICAL MEDICINE & REHABILITATION

## 2021-01-13 PROCEDURE — 999N001017 HC STATISTIC GLUCOSE BY METER IP

## 2021-01-13 PROCEDURE — 250N000013 HC RX MED GY IP 250 OP 250 PS 637: Performed by: PHYSICIAN ASSISTANT

## 2021-01-13 PROCEDURE — 250N000013 HC RX MED GY IP 250 OP 250 PS 637: Performed by: INTERNAL MEDICINE

## 2021-01-13 RX ORDER — FAMOTIDINE 20 MG/1
20 TABLET, FILM COATED ORAL 2 TIMES DAILY
DISCHARGE
Start: 2021-01-13

## 2021-01-13 RX ORDER — DEXTROSE MONOHYDRATE 25 G/50ML
25-50 INJECTION, SOLUTION INTRAVENOUS
DISCHARGE
Start: 2021-01-13

## 2021-01-13 RX ORDER — METOPROLOL TARTRATE 25 MG/1
12.5 TABLET, FILM COATED ORAL 2 TIMES DAILY
DISCHARGE
Start: 2021-01-13

## 2021-01-13 RX ORDER — NICOTINE POLACRILEX 4 MG
15-30 LOZENGE BUCCAL
DISCHARGE
Start: 2021-01-13

## 2021-01-13 RX ORDER — ASPIRIN 81 MG/1
81 TABLET, CHEWABLE ORAL DAILY
DISCHARGE
Start: 2021-01-13

## 2021-01-13 RX ORDER — LEVETIRACETAM 500 MG/1
500 TABLET ORAL 2 TIMES DAILY
DISCHARGE
Start: 2021-01-13

## 2021-01-13 RX ORDER — ACETAMINOPHEN 325 MG/10.15ML
650 LIQUID ORAL EVERY 4 HOURS PRN
DISCHARGE
Start: 2021-01-13

## 2021-01-13 RX ORDER — LANOLIN ALCOHOL/MO/W.PET/CERES
3 CREAM (GRAM) TOPICAL AT BEDTIME
DISCHARGE
Start: 2021-01-13

## 2021-01-13 RX ORDER — ONDANSETRON 4 MG/1
4 TABLET, ORALLY DISINTEGRATING ORAL EVERY 6 HOURS PRN
DISCHARGE
Start: 2021-01-13

## 2021-01-13 RX ORDER — SENNOSIDES 8.6 MG
1 TABLET ORAL 2 TIMES DAILY PRN
DISCHARGE
Start: 2021-01-13

## 2021-01-13 RX ORDER — MULTIPLE VITAMINS W/ MINERALS TAB 9MG-400MCG
1 TAB ORAL DAILY
DISCHARGE
Start: 2021-01-13

## 2021-01-13 RX ORDER — ATORVASTATIN CALCIUM 80 MG/1
80 TABLET, FILM COATED ORAL EVERY EVENING
DISCHARGE
Start: 2021-01-13

## 2021-01-13 RX ADMIN — ASPIRIN 81 MG CHEWABLE TABLET 81 MG: 81 TABLET CHEWABLE at 08:15

## 2021-01-13 RX ADMIN — FAMOTIDINE 20 MG: 20 TABLET, FILM COATED ORAL at 08:15

## 2021-01-13 RX ADMIN — Medication 12.5 MG: at 08:15

## 2021-01-13 RX ADMIN — LEVETIRACETAM 500 MG: 500 TABLET, FILM COATED ORAL at 08:15

## 2021-01-13 RX ADMIN — METFORMIN HYDROCHLORIDE 1000 MG: 500 TABLET ORAL at 08:15

## 2021-01-13 RX ADMIN — MULTIPLE VITAMINS W/ MINERALS TAB 1 TABLET: TAB at 08:15

## 2021-01-13 RX ADMIN — ACETAMINOPHEN 650 MG: 325 SOLUTION ORAL at 02:44

## 2021-01-13 RX ADMIN — DEXTROSE 15 G: 15 GEL ORAL at 08:34

## 2021-01-13 ASSESSMENT — MIFFLIN-ST. JEOR: SCORE: 1650.34

## 2021-01-13 NOTE — PLAN OF CARE
A&O x4. Denies CP/SOB/dizziness/headache/nausea. Pt had pain around 0230 this morning near R rib, reported trying to pick something up and bumping it on padded side rail. No discoloration or obvious signs of injury. Ice pack and PRN tylenol provided. No difficulty voiding, LBM 1/12/21. Denies numbness/tingling. Mepilex to BLE CDI. Turning independently in bed. SBA for transfers, using call light appropriately. DD2, nectar thick liquids.

## 2021-01-13 NOTE — DISCHARGE INSTRUCTIONS
Follow up Appointments  -follow up neurology within one month- Dr. Bailey/ Tito- follow up possible seizure and CVA       - Follow up with primary care provider, Dr. Angel Claire  Please call [phone] to schedule your follow up appointment when you discharge from TCU.    Address  [facility]                            [address]                            [place] [address]    Phone   [phone]     - Follow up with cardiology  Please call [phone] to schedule your follow up appointment when you discharge from TCU.    Address  [facility] [place]                          [address]                          [address]                          [place] [address]  Phone   [phone]    - Follow up with CV Surgery   Please call [phone] to schedule your follow up appointment when you discharge from TCU.    Address  [facility] [place]                          Heart Clinic - Floor 3, [address]                          [address] [place] [address]  Phone   [phone]      ------------------------      [place] [facility] [place] [facility]  Additional resources and contact information online   [email or web address]  [email or web address]  PH: [phone]

## 2021-01-13 NOTE — DISCHARGE SUMMARY
[facility]   Acute Rehabilitation Unit  Discharge summary     Date of Admission: 12/28/2020  Date of Discharge: 1/13/2021  Disposition: TCU  Primary Care Physician: No primary care provider on file.  Attending physician: Dmitry Jerome MD  Other significant physician provider(s): Alexandre Gomez, PGY-2      DISCHARGE DIAGNOSIS      CVA, CAD s/p CABG x4    Seizures, HTN, dysphagia (previously on tube feeds), CHF, ischemic cardiomyopathy, a. Fib with RVR, encephlaopathy, DM II, aspiration PNA, bilateral shin wounds, HUMBERTO      BRIEF SUMMARY (PER HPI)  Mika Alvarez is a 70 year old man with past medical history of heart failure, A-fib, HTN, HLD,  DM type 2, and known multi vessel CAD who presented with acute decompensated heart failure 12/2/20. He was diuresed and underwent four vessel CABG 12/7/20.  Post operative course was complicated by A-fib with RVR, severe dysphagia, altered mental status, possible simple partial seizure (leg jerking post CABG),  Left frontal stroke, urinary retention, and leucocytosis and fever of unclear etiology.       Per chart review had hospitalization 9/19-9/23  with diagnosis of severe CAD, newly diagnosed A-fib, and pulmonary edema CABG was recommended but patient deferred he presented to cardiology follow ups most recently 11/5/20 with perceived heart failure exacerbation and recommendation to present to hospital, appears patient returned home and was not seen until ED presentation 12/2/20. Upon presentation patient volume status optimized prior to CABG 12/7/20.     While in ED noted to have leg jerking and was seen by neurology felt to be possible simple partial seizure, EEG negative.  Given ongoing altered mental status and leg movement underwent head CT 12/8/20  which demonstrated left frontal lobe stroke, not felt to explain leg movement.  Neurology started keppra and has continued on this medication with recommendation to follow up neurology  (Dr. Bailey or Tito to assess ongoing need for levetiracetam).      Was seen by ID for fever, leucocytosis, workup overall unremarkable,  received course of zosyn felt infection possible 2/2 aspiration now off antibiotics.  Severe dysphagia with nasal feeding tube in place with intention diet will advance and patient will be off tube feeds prior to discharge from rehab.       REHABILITATION COURSE  Mika Alvarez was admitted to the [facility] acute inpatient rehabilitation unit on 12/28/20.  Early in his stay he was very agitated requiring initiation of a sitter and anti-psychotics, however this significantly improved throughout his stay especially with removal of his NG tube.  He is presently calm and cooperative with mental status improving, and has been weaned off of the 1:1 and all anti-psychotics without issues.  He also had orthostasis, likely related to hypovolemia, and this improved with a course of IV fluids and discontinuation of Torsemide.     PT:  Continued therapy is recommended.  Rationale/Recommendations:  dynamic balance and dual task gait for return to community mobility, proximal strength and posture, activity tolerance for return to PLOF .   Krystina 52/56  FGA 21/30. SBA in halls for gait. Stairs MOD I using B rails - pt states B at home.  Pt's deficits greatest in OT and SLP domains, impaired functional cognition and limited insights into deficits. 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     SLP:  Continued therapy is recommended.  Rationale/Recommendations:  Can continue to address cognitive impairments as well as dysphagia related goals.     VFSS was completed on 1/4 showing significant aspiration risk.  At that point, patient had just pulled his NG tube and refused to have it replaced.  Patient was borderline safe for ability to advance his diet to NDD-1 food textures with nectar thick liquids.  Patient has shown some improvement in swallowing function though inconsistent level of acceptance with his meals.   At this time diet has been advanced to NDD-2 food textures with nectar thick liquids.  Patient at times dismissive of the thickened liquids but has been amenable to free water protocol as well as when able to have ultra cold drinks.  This could be accomplished by either thickening water and ice cube trays and adding thickened ice cubes to patient's beverages or taking into consideration the melting factor of the ice when determining quantity of to add to the liquids.  With this arrangement, patient has shown improved acceptance of the thickened liquids.  May want to consider repeat VFSS.  See VFSS report for further details.  Patient also has cognitive linguistic related goals related to attention, memory and reasoning/problem solving.  Limited success and addressing at times due to fixation on problems with the meals.      MEDICAL COURSE  Medical Conditions    #CAD  #S/P CABG x 4 (12/7)  #Acute on chronic systolic heart failure- 2/2 ischemic cardiomyopathy  #HTN  #A-fib with RVR-   Most recent Echo 12/25/20 with left ventricular wall globally hypokinetic, EF estimated 35%. Right ventricle mildly dilated with systolic function severely reduced.  Had A-fib with RVR early in hospital stay on amiodarone taper. surgeon preference is to not anticoagulate for a-fib   -Completed course of amiodarone 200 mg daily x 2 weeks on 1/11/2021)  -continue asa, statin  -sternal precautions (6 weeks from 12/7)- limit 10 lbs then as tolerated   -Torsemide and Metoprolol discontinued due to orthostatic and clinically hypovolemic. (has not been receiving Metoprolol in several days anyways).  BPs improving.  Received 1L of fluids 1/6, 1/7.  Metoprolol re-started on 1/12 at 12.5 mg BID  -f/u cardiology     #Subacute Left frontal stroke with petechial hemorrhage  #Possible simple partial seizure  #Acute metabolic encephalopathy  Neurology consulted for left leg shaking POD 1, EEG neg, head CT obtained in setting of ongoing leg shaking and  ams- demonstrated stroke 12/12 (most recent imaging stable)  1.  Petechial hemorrhage in the left frontal opercular infarct correlating with the blood products noted on MRI,  Chronic left cerebellar infarct.  -follow up neurology within one month- Dr. Bailey/ Tito- follow up possible seizure-   -continue keppra until neurology follow up     #Agitation  -Psychiatry:  Agitation continues to improve and has no episodes of agitation in several days.  Seroquel weaning has been ongoing, and will discontinue today.  Sitter d/celine on 1/11/2021  -Patient willing to talk to psychology, will put in a consult on 1/12/2021 and will appreciate recs.      #DM type 2- Hgb A1C 12.6%.   -Continue Metformin 1000 mg twice daily, continue to hold glipizide.  -Restarted Lantus on 1/8/2021, titrated up to 18 units BID then decreased to Lantus 18 units qAM and 15 units QPM due to morning hypoglycemia  -Continue to monitor glucose closely, Appreciate hospitalist team recs.      #oral pharyngeal dysphagia(Improving)  #Impaired oral intake -  -ND tube removed on 1/3  -appreciate ongoing slp  -appreciate nutrition support  -Video swallow study done on 1/4/2021 and was cleared for nectar thick diet.  -Now upgraded to NDD2 and continues on NTLs     #Possible aspiration pneumonia- fever, known dysphagia, leucocytosis,  hypoxia, other workup unremarkable received zosyn course seen by id.  -appreciate ongoing SLP support  -trend WBC, 9.8 on 1/11     #Bilateral shin wounds- mepilex changes as ordered keep clean and dry.      #Urinary retention- gardner recently removed, reportedly voiding without issue  -PVRs x2 low  May check PRN only.   -Flomax previously started but held due to not being able to administer by NG tube, but has been urinating without difficulty.     #Creatinine elevation - Cr 1.08 BUN 36 12/29, Cr 0.94 on 1/11/2021  -monitor bmp      Follow up Appointments on Discharge: Neurology, cardiology, cv surgery, pcp.    DISCHARGE  MEDICATIONS  Current Discharge Medication List      START taking these medications    Details   acetaminophen (TYLENOL) 325 MG/10.15ML solution Take 20.3 mLs (650 mg) by mouth every 4 hours as needed for mild pain or fever  Qty:      Associated Diagnoses: Cerebrovascular accident (CVA) due to embolism of precerebral artery (H); S/P CABG x 4      dextrose 50 % injection Inject 25-50 mLs into the vein every 15 minutes as needed for low blood sugar  Qty:      Associated Diagnoses: Cerebrovascular accident (CVA) due to embolism of precerebral artery (H); S/P CABG x 4      glucagon 1 MG kit Inject 1 mg Subcutaneous every 15 minutes as needed for low blood sugar (May repeat x 1 only)  Qty:      Associated Diagnoses: Cerebrovascular accident (CVA) due to embolism of precerebral artery (H); S/P CABG x 4      glucose 40 % (400 mg/mL) gel Take 15-30 g by mouth every 15 minutes as needed for low blood sugar  Qty:      Associated Diagnoses: Cerebrovascular accident (CVA) due to embolism of precerebral artery (H); S/P CABG x 4      multivitamin w/minerals (THERA-VIT-M) tablet Take 1 tablet by mouth daily  Qty:      Associated Diagnoses: Cerebrovascular accident (CVA) due to embolism of precerebral artery (H); S/P CABG x 4      ondansetron (ZOFRAN-ODT) 4 MG ODT tab Take 1 tablet (4 mg) by mouth every 6 hours as needed for nausea or vomiting  Qty:      Associated Diagnoses: Cerebrovascular accident (CVA) due to embolism of precerebral artery (H); S/P CABG x 4      sennosides (SENOKOT) 8.6 MG tablet Take 1 tablet by mouth 2 times daily as needed for constipation    Associated Diagnoses: Cerebrovascular accident (CVA) due to embolism of precerebral artery (H); S/P CABG x 4         CONTINUE these medications which have CHANGED    Details   aspirin (ASA) 81 MG chewable tablet Take 1 tablet (81 mg) by mouth daily  Qty:      Associated Diagnoses: Cerebrovascular accident (CVA) due to embolism of precerebral artery (H); S/P CABG x 4       atorvastatin (LIPITOR) 80 MG tablet Take 1 tablet (80 mg) by mouth every evening  Qty:      Associated Diagnoses: Cerebrovascular accident (CVA) due to embolism of precerebral artery (H); S/P CABG x 4      famotidine (PEPCID) 20 MG tablet Take 1 tablet (20 mg) by mouth 2 times daily  Qty:      Associated Diagnoses: Cerebrovascular accident (CVA) due to embolism of precerebral artery (H); Oropharyngeal dysphagia; S/P CABG x 4      !! insulin aspart (NOVOLOG PEN) 100 UNIT/ML pen Inject 1-7 Units Subcutaneous 3 times daily (before meals) Correction Scale - MEDIUM INSULIN RESISTANCE DOSING     Do Not give Correction Insulin if Pre-Meal BG less than 140.   For Pre-Meal  - 189 give 1 unit.   For Pre-Meal  - 239 give 2 units.   For Pre-Meal  - 289 give 3 units.   For Pre-Meal  - 339 give 4 units.   For Pre-Meal - 399 give 5 units.   For Pre-Meal -449 give 6 units  For Pre-Meal BG greater than or equal to 450 give 7 units.   To be given with prandial insulin, and based on pre-meal blood glucose.    Notify provider if glucose greater than or equal to 350 mg/dL after administration of correction dose.  Qty:      Associated Diagnoses: Hyperglycemia      !! insulin aspart (NOVOLOG PEN) 100 UNIT/ML pen Inject 1-5 Units Subcutaneous At Bedtime MEDIUM INSULIN RESISTANCE DOSING    Do Not give Bedtime Correction Insulin if BG less than  200.   For  - 249 give 1 units.   For  - 299 give 2 units.   For  - 349 give 3 units.   For  -399 give 4 units.   For BG greater than or equal to 400 give 5 units.  Notify provider if glucose greater than or equal to 350 mg/dL after administration of correction dose.  Qty:      Associated Diagnoses: Hyperglycemia      !! insulin glargine (LANTUS PEN) 100 UNIT/ML pen Inject 15 Units Subcutaneous At Bedtime  Qty:      Comments: If Lantus is not covered by insurance, may substitute Basaglar at same dose and frequency.    Associated Diagnoses:  Cerebrovascular accident (CVA) due to embolism of precerebral artery (H); Hyperglycemia; S/P CABG x 4      !! insulin glargine (LANTUS PEN) 100 UNIT/ML pen Inject 18 Units Subcutaneous every morning (before breakfast)  Qty:      Comments: If Lantus is not covered by insurance, may substitute Basaglar at same dose and frequency.    Associated Diagnoses: Cerebrovascular accident (CVA) due to embolism of precerebral artery (H); Hyperglycemia; S/P CABG x 4      levETIRAcetam (KEPPRA) 500 MG tablet Take 1 tablet (500 mg) by mouth 2 times daily  Qty:      Associated Diagnoses: Cerebrovascular accident (CVA) due to embolism of precerebral artery (H); Seizure disorder (H); S/P CABG x 4      metFORMIN (GLUCOPHAGE) 1000 MG tablet Take 1 tablet (1,000 mg) by mouth 2 times daily (with meals)  Qty:      Associated Diagnoses: Cerebrovascular accident (CVA) due to embolism of precerebral artery (H); Hyperglycemia; S/P CABG x 4      metoprolol tartrate (LOPRESSOR) 25 MG tablet Take 0.5 tablets (12.5 mg) by mouth 2 times daily  Qty:      Associated Diagnoses: Cerebrovascular accident (CVA) due to embolism of precerebral artery (H); Hypertension, unspecified type; S/P CABG x 4       !! - Potential duplicate medications found. Please discuss with provider.      CONTINUE these medications which have NOT CHANGED    Details   !! melatonin 3 MG tablet Take 1 tablet (3 mg) by mouth At Bedtime  Qty:      Associated Diagnoses: Cerebrovascular accident (CVA) due to embolism of precerebral artery (H); S/P CABG x 4      !! melatonin 3 MG tablet 3 mg by Per Feeding Tube route At Bedtime       !! - Potential duplicate medications found. Please discuss with provider.      STOP taking these medications       amiodarone (PACERONE) 200 MG tablet Comments:   Reason for Stopping:         glipiZIDE (GLUCOTROL) 5 MG tablet Comments:   Reason for Stopping:         Heparin Sodium 5000 UNIT/0.5ML injection Comments:   Reason for Stopping:          multivitamins w/minerals (CERTAVITE) liquid Comments:   Reason for Stopping:         QUEtiapine (SEROQUEL) 25 MG tablet Comments:   Reason for Stopping:         torsemide (DEMADEX) suspension Comments:   Reason for Stopping:                 DISCHARGE INSTRUCTIONS AND FOLLOW UP  Discharge Procedure Orders   General info for SNF   Order Comments: Length of Stay Estimate: Short Term Care: Estimated # of Days <30  Condition at Discharge: Improving  Level of care:skilled   Rehabilitation Potential: Fair  Admission H&P remains valid and up-to-date: Yes  Recent Chemotherapy: N/A  Use Nursing Home Standing Orders: Yes     Reason for your hospital stay   Order Comments: Rehabilitation after cardiac surgery and stroke     Follow Up and recommended labs and tests   Order Comments: Follow up with alf physician.  The following labs/tests are recommended: Monitor kidney function with nectar thick liquid diet.     Activity - Up with nursing assistance     Order Specific Question Answer Comments   Is discharge order? Yes      Additional Discharge Instructions   Order Comments: After discharge follow up with your PCP, cardiology, cardiovascular surgery, and Neurology.     Wound care (specify)   Order Comments: Bilateral shin wounds.   Every three days : Cleanse apply mepilex to open areas- if draining.     Physical Therapy Adult Consult   Order Comments: Evaluate and treat as clinically indicated.    Reason:  Stroke     Occupational Therapy Adult Consult   Order Comments: Evaluate and treat as clinically indicated.    Reason:  Stroke, cognitive deficits     Speech Language Path Adult Consult   Order Comments: Evaluate and treat as clinically indicated.    Reason:  Dysphagia, cognitive deficits     Seizure precautions     Fall precautions     Advance Diet as Tolerated   Order Comments: Follow this diet upon discharge:       Snacks/Supplements Adult: Gelatein sugar-free; With Meals      Dysphagia Diet Level 2 [facility] Altered  Nectar Thickened Liquids (pre-thickened or use instant food thickener)  Advance diet per recommendations with speech therapy     Order Specific Question Answer Comments   Is discharge order? Yes           PHYSICAL EXAMINATION    Most recent Vital Signs:   Vitals:    01/12/21 0815 01/12/21 1528 01/12/21 2135 01/13/21 0700   BP: 128/82 118/78 137/88 124/85   BP Location:  Right arm  Right arm   Pulse: 85 91 84 69   Resp: 18 18  16   Temp: 97.9  F (36.6  C) 95.9  F (35.5  C)  95.4  F (35.2  C)   TempSrc: Oral Oral  Oral   SpO2: 96% 98%  96%   Weight:    85.8 kg (189 lb 3.2 oz)   Height:           GEN: Alert, not in acute distress, sitting up in bed  HEENT: Neck supple, extraocular movement intact  CVS: RRR, S1+S2, no m/r/g  PULM: Breathing comfortably on room air, CTAB  ABD: Soft, nontender, non distended, bowel sounds present  EXT: No LE edema or calf tenderness b/l.  Compression stockings in place.   Neuro: Has answered appropriately, follows commands.  Oriented to person and place.  Needed to look at the board for today's date.  +Dysphonia.       40 minutes spent in discharge, including >50% in counseling and coordination of care, medication review and plan of care recommended on follow up.     Discharge summary was forwarded to No primary care provider on file. (PCP) at the time of discharge, so as to bridge from hospital to outpatient care.     It was our pleasure to care for Mika Alvarez during this hospitalization. Please do not hesitate to contact me should there be questions regarding the hospital course or discharge plan.        Yonathan Jerome MD  Department of Rehabilitation Medicine

## 2021-01-13 NOTE — PROGRESS NOTES
Attempted to see pt for scheduled final PT appt, however pt finishing breakfast, RN managing low blood sugar, and pt preparing to leave. Fortunately ind day items were completed in yesterday's PT session. Missed 45 minutes

## 2021-01-13 NOTE — PLAN OF CARE
FOCUS/GOAL  Medical management, Discharge planning, and Mobility    ASSESSMENT, INTERVENTIONS AND CONTINUING PLAN FOR GOAL:  Pt is alert and oriented x4, denies pain or SOB. Pt discharged to TCU with family transport around 0945. Prior to eating this morning the pt's blood glucose was 72, after eating meal recheck was 85. Pt did not want to drink any of the juices provided, but stated he would take a glucose gel. Pt was given a glucose gel and the recheck of BG was 114. All belongings sent with patient as well as information packet was given to son with instructions to give to receiving facility. Report given to Admissions (Priya) of receiving TCU.

## 2021-01-13 NOTE — PLAN OF CARE
FOCUS/GOAL  Skin integrity, and Safety management    ASSESSMENT, INTERVENTIONS AND CONTINUING PLAN FOR GOAL:  Pt was A/O, able to use call light and make needs known to staff. Denies pain, SOB, chest pain nor dizziness. On DD2, nectar-thick liquids, takes medicines whole with vanilla pudding. SBA with transfers. Continent of BL/BM, LBM-1/12/21. VSS. BGs monitored. [employer] visa card and 's license found. [facility] not found yet, CN updated and DON was informed by CN. For discharge tomorrow. Will continue with current POC.

## 2021-01-13 NOTE — PLAN OF CARE
Physical Therapy Discharge Summary    Reason for therapy discharge:    Discharged to transitional care facility.    Progress towards therapy goal(s). See goals on Care Plan in [facility] electronic health record for goal details.  Goals partially met.  Barriers to achieving goals:   discharge from facility.    Therapy recommendation(s):    Continued therapy is recommended.  Rationale/Recommendations:  dynamic balance and dual task gait for return to community mobility, proximal strength and posture, activity tolerance for return to PLOF .   Krystina 52/56  FGA 21/30. SBA in halls for gait. Stairs MOD I using B rails - pt states B at home.  Pt's deficits greatest in OT and SLP domains, impaired functional cognition and limited insights into deficits. 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

## 2021-01-14 ENCOUNTER — AMBULATORY - HEALTHEAST (OUTPATIENT)
Dept: ADMINISTRATIVE | Facility: CLINIC | Age: 71
End: 2021-01-14

## 2021-01-14 NOTE — PLAN OF CARE
Occupational Therapy Discharge Summary    Reason for therapy discharge:    Discharged to transitional care facility.    Progress towards therapy goal(s). See goals on Care Plan in [facility] electronic [facility] record for goal details.  Goals partially met.  Barriers to achieving goals:   discharge from facility.    Therapy recommendation(s):    Continued therapy is recommended.  Rationale/Recommendations:  Pt is SBA for ADL. Pt limited by cognition and insight and fatigue. Pt's tolerance for therapy was improving. Pt should continue to work on functional cognition and activity independence.

## 2021-01-15 ENCOUNTER — OFFICE VISIT - HEALTHEAST (OUTPATIENT)
Dept: GERIATRICS | Facility: CLINIC | Age: 71
End: 2021-01-15

## 2021-01-15 DIAGNOSIS — E11.65 UNCONTROLLED TYPE 2 DIABETES MELLITUS WITH HYPERGLYCEMIA (H): ICD-10-CM

## 2021-01-15 DIAGNOSIS — I10 ESSENTIAL HYPERTENSION: ICD-10-CM

## 2021-01-15 DIAGNOSIS — I50.21 ACUTE SYSTOLIC HEART FAILURE (H): ICD-10-CM

## 2021-01-15 DIAGNOSIS — I50.31 ACUTE DIASTOLIC HEART FAILURE (H): ICD-10-CM

## 2021-01-15 DIAGNOSIS — R41.0 ACUTE DELIRIUM: ICD-10-CM

## 2021-01-15 DIAGNOSIS — J96.01 ACUTE RESPIRATORY FAILURE WITH HYPOXIA (H): ICD-10-CM

## 2021-01-15 DIAGNOSIS — R13.12 OROPHARYNGEAL DYSPHAGIA: ICD-10-CM

## 2021-01-15 DIAGNOSIS — A41.01 SEPSIS DUE TO METHICILLIN SUSCEPTIBLE STAPHYLOCOCCUS AUREUS, UNSPECIFIED WHETHER ACUTE ORGAN DYSFUNCTION PRESENT (H): ICD-10-CM

## 2021-01-19 ENCOUNTER — OFFICE VISIT - HEALTHEAST (OUTPATIENT)
Dept: GERIATRICS | Facility: CLINIC | Age: 71
End: 2021-01-19

## 2021-01-19 ENCOUNTER — COMMUNICATION - HEALTHEAST (OUTPATIENT)
Dept: CARDIOLOGY | Facility: CLINIC | Age: 71
End: 2021-01-19

## 2021-01-19 DIAGNOSIS — E11.65 UNCONTROLLED TYPE 2 DIABETES MELLITUS WITH HYPERGLYCEMIA (H): ICD-10-CM

## 2021-01-19 DIAGNOSIS — I48.91 ATRIAL FIBRILLATION, UNSPECIFIED TYPE (H): ICD-10-CM

## 2021-01-19 DIAGNOSIS — Z86.73 HISTORY OF CVA (CEREBROVASCULAR ACCIDENT) WITHOUT RESIDUAL DEFICITS: ICD-10-CM

## 2021-01-19 DIAGNOSIS — I50.31 ACUTE DIASTOLIC HEART FAILURE (H): ICD-10-CM

## 2021-01-19 DIAGNOSIS — A41.01 SEPSIS DUE TO METHICILLIN SUSCEPTIBLE STAPHYLOCOCCUS AUREUS, UNSPECIFIED WHETHER ACUTE ORGAN DYSFUNCTION PRESENT (H): ICD-10-CM

## 2021-01-19 DIAGNOSIS — I10 ESSENTIAL HYPERTENSION: ICD-10-CM

## 2021-01-19 DIAGNOSIS — Z95.1 S/P CABG X 4: ICD-10-CM

## 2021-01-19 DIAGNOSIS — R13.12 OROPHARYNGEAL DYSPHAGIA: ICD-10-CM

## 2021-01-21 ENCOUNTER — OFFICE VISIT - HEALTHEAST (OUTPATIENT)
Dept: GERIATRICS | Facility: CLINIC | Age: 71
End: 2021-01-21

## 2021-01-21 DIAGNOSIS — Z86.73 HISTORY OF CVA (CEREBROVASCULAR ACCIDENT) WITHOUT RESIDUAL DEFICITS: ICD-10-CM

## 2021-01-21 DIAGNOSIS — I10 ESSENTIAL HYPERTENSION: ICD-10-CM

## 2021-01-21 DIAGNOSIS — I50.31 ACUTE DIASTOLIC HEART FAILURE (H): ICD-10-CM

## 2021-01-21 DIAGNOSIS — Z95.1 S/P CABG X 4: ICD-10-CM

## 2021-01-21 DIAGNOSIS — E11.65 UNCONTROLLED TYPE 2 DIABETES MELLITUS WITH HYPERGLYCEMIA (H): ICD-10-CM

## 2021-01-21 DIAGNOSIS — I48.91 ATRIAL FIBRILLATION, UNSPECIFIED TYPE (H): ICD-10-CM

## 2021-01-21 DIAGNOSIS — R13.12 OROPHARYNGEAL DYSPHAGIA: ICD-10-CM

## 2021-01-25 ENCOUNTER — COMMUNICATION - HEALTHEAST (OUTPATIENT)
Dept: GERIATRICS | Facility: CLINIC | Age: 71
End: 2021-01-25

## 2021-01-25 ENCOUNTER — AMBULATORY - HEALTHEAST (OUTPATIENT)
Dept: GERIATRICS | Facility: CLINIC | Age: 71
End: 2021-01-25

## 2021-01-26 ENCOUNTER — COMMUNICATION - HEALTHEAST (OUTPATIENT)
Dept: ADMINISTRATIVE | Facility: CLINIC | Age: 71
End: 2021-01-26

## 2021-01-27 ENCOUNTER — COMMUNICATION - HEALTHEAST (OUTPATIENT)
Dept: FAMILY MEDICINE | Facility: CLINIC | Age: 71
End: 2021-01-27

## 2021-01-28 ENCOUNTER — COMMUNICATION - HEALTHEAST (OUTPATIENT)
Dept: FAMILY MEDICINE | Facility: CLINIC | Age: 71
End: 2021-01-28

## 2021-01-28 ENCOUNTER — COMMUNICATION - HEALTHEAST (OUTPATIENT)
Dept: ADMINISTRATIVE | Facility: CLINIC | Age: 71
End: 2021-01-28

## 2021-01-29 ENCOUNTER — RECORDS - HEALTHEAST (OUTPATIENT)
Dept: ADMINISTRATIVE | Facility: OTHER | Age: 71
End: 2021-01-29

## 2021-01-29 ENCOUNTER — COMMUNICATION - HEALTHEAST (OUTPATIENT)
Dept: FAMILY MEDICINE | Facility: CLINIC | Age: 71
End: 2021-01-29

## 2021-01-29 DIAGNOSIS — E11.9 DIABETES (H): ICD-10-CM

## 2021-01-29 DIAGNOSIS — Z00.00 HEALTH CARE MAINTENANCE: ICD-10-CM

## 2021-01-29 DIAGNOSIS — Z79.4 TYPE 2 DIABETES MELLITUS WITHOUT COMPLICATION, WITH LONG-TERM CURRENT USE OF INSULIN (H): ICD-10-CM

## 2021-01-29 DIAGNOSIS — E11.9 TYPE 2 DIABETES MELLITUS WITHOUT COMPLICATION, WITH LONG-TERM CURRENT USE OF INSULIN (H): ICD-10-CM

## 2021-01-29 DIAGNOSIS — Z95.1 S/P CABG (CORONARY ARTERY BYPASS GRAFT): ICD-10-CM

## 2021-01-29 ASSESSMENT — MIFFLIN-ST. JEOR
SCORE: 1669.37
SCORE: 1669.37

## 2021-01-30 ENCOUNTER — COMMUNICATION - HEALTHEAST (OUTPATIENT)
Dept: SCHEDULING | Facility: CLINIC | Age: 71
End: 2021-01-30

## 2021-02-02 ENCOUNTER — SURGERY - HEALTHEAST (OUTPATIENT)
Dept: PODIATRY | Facility: CLINIC | Age: 71
End: 2021-02-02

## 2021-02-02 ENCOUNTER — ANESTHESIA - HEALTHEAST (OUTPATIENT)
Dept: SURGERY | Facility: HOSPITAL | Age: 71
End: 2021-02-02

## 2021-02-02 ENCOUNTER — COMMUNICATION - HEALTHEAST (OUTPATIENT)
Dept: VASCULAR SURGERY | Facility: CLINIC | Age: 71
End: 2021-02-02

## 2021-02-02 ENCOUNTER — SURGERY - HEALTHEAST (OUTPATIENT)
Dept: SURGERY | Facility: HOSPITAL | Age: 71
End: 2021-02-02

## 2021-02-02 DIAGNOSIS — L03.119 CELLULITIS AND ABSCESS OF FOOT EXCLUDING TOE: ICD-10-CM

## 2021-02-02 DIAGNOSIS — L02.619 CELLULITIS AND ABSCESS OF FOOT EXCLUDING TOE: ICD-10-CM

## 2021-02-03 ASSESSMENT — MIFFLIN-ST. JEOR: SCORE: 1616.3

## 2021-02-08 ENCOUNTER — COMMUNICATION - HEALTHEAST (OUTPATIENT)
Dept: CARDIOLOGY | Facility: CLINIC | Age: 71
End: 2021-02-08

## 2021-02-10 ENCOUNTER — COMMUNICATION - HEALTHEAST (OUTPATIENT)
Dept: FAMILY MEDICINE | Facility: CLINIC | Age: 71
End: 2021-02-10

## 2021-02-16 ENCOUNTER — COMMUNICATION - HEALTHEAST (OUTPATIENT)
Dept: FAMILY MEDICINE | Facility: CLINIC | Age: 71
End: 2021-02-16

## 2021-02-17 ENCOUNTER — OFFICE VISIT - HEALTHEAST (OUTPATIENT)
Dept: VASCULAR SURGERY | Facility: CLINIC | Age: 71
End: 2021-02-17

## 2021-02-17 DIAGNOSIS — E11.621 DIABETIC ULCER OF LEFT MIDFOOT ASSOCIATED WITH TYPE 2 DIABETES MELLITUS, WITH NECROSIS OF MUSCLE (H): ICD-10-CM

## 2021-02-17 DIAGNOSIS — L97.423 DIABETIC ULCER OF LEFT MIDFOOT ASSOCIATED WITH TYPE 2 DIABETES MELLITUS, WITH NECROSIS OF MUSCLE (H): ICD-10-CM

## 2021-02-19 ENCOUNTER — COMMUNICATION - HEALTHEAST (OUTPATIENT)
Dept: VASCULAR SURGERY | Facility: CLINIC | Age: 71
End: 2021-02-19

## 2021-02-25 ENCOUNTER — COMMUNICATION - HEALTHEAST (OUTPATIENT)
Dept: CARDIOLOGY | Facility: CLINIC | Age: 71
End: 2021-02-25

## 2021-02-25 ENCOUNTER — COMMUNICATION - HEALTHEAST (OUTPATIENT)
Dept: SCHEDULING | Facility: CLINIC | Age: 71
End: 2021-02-25

## 2021-03-02 ENCOUNTER — COMMUNICATION - HEALTHEAST (OUTPATIENT)
Dept: FAMILY MEDICINE | Facility: CLINIC | Age: 71
End: 2021-03-02

## 2021-03-03 ENCOUNTER — COMMUNICATION - HEALTHEAST (OUTPATIENT)
Dept: FAMILY MEDICINE | Facility: CLINIC | Age: 71
End: 2021-03-03

## 2021-03-04 ENCOUNTER — AMBULATORY - HEALTHEAST (OUTPATIENT)
Dept: OTHER | Facility: CLINIC | Age: 71
End: 2021-03-04

## 2021-03-04 ENCOUNTER — COMMUNICATION - HEALTHEAST (OUTPATIENT)
Dept: FAMILY MEDICINE | Facility: CLINIC | Age: 71
End: 2021-03-04

## 2021-03-04 ENCOUNTER — DOCUMENTATION ONLY (OUTPATIENT)
Dept: OTHER | Facility: CLINIC | Age: 71
End: 2021-03-04

## 2021-03-08 ENCOUNTER — COMMUNICATION - HEALTHEAST (OUTPATIENT)
Dept: SCHEDULING | Facility: CLINIC | Age: 71
End: 2021-03-08

## 2021-03-09 ENCOUNTER — COMMUNICATION - HEALTHEAST (OUTPATIENT)
Dept: ADMINISTRATIVE | Facility: CLINIC | Age: 71
End: 2021-03-09

## 2021-03-09 ENCOUNTER — OFFICE VISIT - HEALTHEAST (OUTPATIENT)
Dept: FAMILY MEDICINE | Facility: CLINIC | Age: 71
End: 2021-03-09

## 2021-03-09 DIAGNOSIS — E11.69 TYPE 2 DIABETES MELLITUS WITH OTHER SPECIFIED COMPLICATION, WITH LONG-TERM CURRENT USE OF INSULIN (H): ICD-10-CM

## 2021-03-09 DIAGNOSIS — I25.810 CORONARY ARTERY DISEASE INVOLVING OTHER CORONARY ARTERY BYPASS GRAFT WITHOUT ANGINA PECTORIS: ICD-10-CM

## 2021-03-09 DIAGNOSIS — Z79.4 TYPE 2 DIABETES MELLITUS WITH OTHER SPECIFIED COMPLICATION, WITH LONG-TERM CURRENT USE OF INSULIN (H): ICD-10-CM

## 2021-03-09 DIAGNOSIS — Z95.1 S/P CABG (CORONARY ARTERY BYPASS GRAFT): ICD-10-CM

## 2021-03-09 DIAGNOSIS — Z86.73 HISTORY OF STROKE: ICD-10-CM

## 2021-03-09 DIAGNOSIS — M19.90 OSTEOARTHRITIS, UNSPECIFIED OSTEOARTHRITIS TYPE, UNSPECIFIED SITE: ICD-10-CM

## 2021-03-09 DIAGNOSIS — Z00.00 HEALTH CARE MAINTENANCE: ICD-10-CM

## 2021-03-09 DIAGNOSIS — I50.23 ACUTE ON CHRONIC SYSTOLIC (CONGESTIVE) HEART FAILURE (H): ICD-10-CM

## 2021-03-10 ENCOUNTER — COMMUNICATION - HEALTHEAST (OUTPATIENT)
Dept: FAMILY MEDICINE | Facility: CLINIC | Age: 71
End: 2021-03-10

## 2021-03-11 ENCOUNTER — COMMUNICATION - HEALTHEAST (OUTPATIENT)
Dept: FAMILY MEDICINE | Facility: CLINIC | Age: 71
End: 2021-03-11

## 2021-03-11 DIAGNOSIS — E11.9 DIABETES (H): ICD-10-CM

## 2021-03-15 ENCOUNTER — COMMUNICATION - HEALTHEAST (OUTPATIENT)
Dept: FAMILY MEDICINE | Facility: CLINIC | Age: 71
End: 2021-03-15

## 2021-03-16 ENCOUNTER — COMMUNICATION - HEALTHEAST (OUTPATIENT)
Dept: ADMINISTRATIVE | Facility: CLINIC | Age: 71
End: 2021-03-16

## 2021-03-18 ENCOUNTER — COMMUNICATION - HEALTHEAST (OUTPATIENT)
Dept: SCHEDULING | Facility: CLINIC | Age: 71
End: 2021-03-18

## 2021-03-19 ENCOUNTER — RECORDS - HEALTHEAST (OUTPATIENT)
Dept: ADMINISTRATIVE | Facility: OTHER | Age: 71
End: 2021-03-19

## 2021-03-22 ENCOUNTER — COMMUNICATION - HEALTHEAST (OUTPATIENT)
Dept: ADMINISTRATIVE | Facility: CLINIC | Age: 71
End: 2021-03-22

## 2021-03-23 ENCOUNTER — COMMUNICATION - HEALTHEAST (OUTPATIENT)
Dept: FAMILY MEDICINE | Facility: CLINIC | Age: 71
End: 2021-03-23

## 2021-03-23 ENCOUNTER — COMMUNICATION - HEALTHEAST (OUTPATIENT)
Dept: PHARMACY | Facility: CLINIC | Age: 71
End: 2021-03-23

## 2021-03-24 ENCOUNTER — RECORDS - HEALTHEAST (OUTPATIENT)
Dept: ADMINISTRATIVE | Facility: OTHER | Age: 71
End: 2021-03-24

## 2021-03-26 ENCOUNTER — COMMUNICATION - HEALTHEAST (OUTPATIENT)
Dept: ADMINISTRATIVE | Facility: CLINIC | Age: 71
End: 2021-03-26

## 2021-03-29 ENCOUNTER — COMMUNICATION - HEALTHEAST (OUTPATIENT)
Dept: FAMILY MEDICINE | Facility: CLINIC | Age: 71
End: 2021-03-29

## 2021-04-05 ENCOUNTER — RECORDS - HEALTHEAST (OUTPATIENT)
Dept: ADMINISTRATIVE | Facility: OTHER | Age: 71
End: 2021-04-05

## 2021-04-07 ENCOUNTER — RECORDS - HEALTHEAST (OUTPATIENT)
Dept: ADMINISTRATIVE | Facility: OTHER | Age: 71
End: 2021-04-07

## 2021-04-12 ENCOUNTER — RECORDS - HEALTHEAST (OUTPATIENT)
Dept: ADMINISTRATIVE | Facility: OTHER | Age: 71
End: 2021-04-12

## 2021-04-14 ENCOUNTER — OFFICE VISIT - HEALTHEAST (OUTPATIENT)
Dept: FAMILY MEDICINE | Facility: CLINIC | Age: 71
End: 2021-04-14

## 2021-04-14 DIAGNOSIS — F33.2 SEVERE EPISODE OF RECURRENT MAJOR DEPRESSIVE DISORDER, WITHOUT PSYCHOTIC FEATURES (H): ICD-10-CM

## 2021-04-14 DIAGNOSIS — I10 ESSENTIAL HYPERTENSION: ICD-10-CM

## 2021-04-14 DIAGNOSIS — Z79.4 TYPE 2 DIABETES MELLITUS WITH FOOT ULCER, WITH LONG-TERM CURRENT USE OF INSULIN (H): ICD-10-CM

## 2021-04-14 DIAGNOSIS — I48.91 ATRIAL FIBRILLATION, UNSPECIFIED TYPE (H): ICD-10-CM

## 2021-04-14 DIAGNOSIS — Z86.73 HISTORY OF STROKE: ICD-10-CM

## 2021-04-14 DIAGNOSIS — I25.10 CORONARY ARTERY DISEASE INVOLVING NATIVE HEART, ANGINA PRESENCE UNSPECIFIED, UNSPECIFIED VESSEL OR LESION TYPE: ICD-10-CM

## 2021-04-14 DIAGNOSIS — L97.509 TYPE 2 DIABETES MELLITUS WITH FOOT ULCER, WITH LONG-TERM CURRENT USE OF INSULIN (H): ICD-10-CM

## 2021-04-14 DIAGNOSIS — E11.621 TYPE 2 DIABETES MELLITUS WITH FOOT ULCER, WITH LONG-TERM CURRENT USE OF INSULIN (H): ICD-10-CM

## 2021-05-03 ENCOUNTER — COMMUNICATION - HEALTHEAST (OUTPATIENT)
Dept: CARDIOLOGY | Facility: CLINIC | Age: 71
End: 2021-05-03

## 2021-05-25 ENCOUNTER — RECORDS - HEALTHEAST (OUTPATIENT)
Dept: ADMINISTRATIVE | Facility: CLINIC | Age: 71
End: 2021-05-25

## 2021-05-26 NOTE — TELEPHONE ENCOUNTER
Please call patient at home and ask him to read back the medications he is taking- his metoprolol has not been filled since 2017 according to the pharmacy.  Dr. Claire

## 2021-05-26 NOTE — TELEPHONE ENCOUNTER
Left message to call back for: Clarification  Information to relay to patient:  See message below.

## 2021-05-26 NOTE — TELEPHONE ENCOUNTER
Patient Returning Call  Reason for call:  Patient is returning call  Information relayed to patient:   Please call patient at home and ask him to read back the medications he is taking- his metoprolol has not been filled since 2017 according to the pharmacy.  Dr. Claire  Patient has additional questions:  Yes  If YES, what are your questions/concerns:  Patient has been taking the 100 mg tablet once per day.  They had on auto fill and prescribed for 160 he had an abundance of it.  Should he be taking 2 times per day  Okay to leave a detailed message?: No

## 2021-05-27 VITALS
HEART RATE: 102 BPM | DIASTOLIC BLOOD PRESSURE: 85 MMHG | OXYGEN SATURATION: 94 % | RESPIRATION RATE: 18 BRPM | SYSTOLIC BLOOD PRESSURE: 151 MMHG | TEMPERATURE: 97.2 F

## 2021-05-27 VITALS
SYSTOLIC BLOOD PRESSURE: 126 MMHG | TEMPERATURE: 97.3 F | HEART RATE: 76 BPM | DIASTOLIC BLOOD PRESSURE: 78 MMHG | RESPIRATION RATE: 18 BRPM

## 2021-05-27 NOTE — TELEPHONE ENCOUNTER
----- Message from Angel Claire MD sent at 4/1/2019  8:11 AM CDT -----  Please inform patient that colon cancer screen was negative- recommend repeat in 3 years.

## 2021-05-27 NOTE — TELEPHONE ENCOUNTER
Reason contacted:  Rx clarification  Information relayed:  Relayed message below. Patient states understanding.   Additional questions:  No  Further follow-up needed:  No  Okay to leave a detailed message:  No

## 2021-05-27 NOTE — TELEPHONE ENCOUNTER
RN cannot approve Refill Request    RN can NOT refill this medication Protocol failed and NO refill given.      Chandrika Prieto, [facility] Triage/Med Refill 4/16/2019    Requested Prescriptions   Pending Prescriptions Disp Refills     glipiZIDE (GLUCOTROL XL) 10 MG 24 hr tablet 180 tablet 0     Sig: Take 2 tablets (20 mg total) by mouth once daily.       Oral Hypoglycemics Refill Protocol Failed - 4/16/2019  8:53 AM        Failed - Microalbumin in last year      Microalbumin, Random Urine   Date Value Ref Range Status   03/16/2018 16.71 (H) 0.00 - 1.99 mg/dL Final                  Passed - Visit with PCP or prescribing provider visit in last 6 months       Last office visit with prescriber/PCP: 3/8/2019 OR same dept: 3/8/2019 Angel Claire MD OR same specialty: 3/8/2019 Angel Claire MD Last physical: Visit date not found Last MTM visit: Visit date not found         Next appt within 3 mo: Visit date not found  Next physical within 3 mo: Visit date not found  Prescriber OR PCP: Angel Claire MD  Last diagnosis associated with med order: 1. Type 2 diabetes with complication (H)  - glipiZIDE (GLUCOTROL XL) 10 MG 24 hr tablet; Take 2 tablets (20 mg total) by mouth once daily.  Dispense: 180 tablet; Refill: 0    2. HTN (hypertension)  - metoprolol succinate (TOPROL-XL) 100 MG 24 hr tablet; Take 1 tablet (100 mg total) by mouth 2 (two) times a day.  Dispense: 180 tablet; Refill: 3  - losartan (COZAAR) 100 MG tablet; Take 1 tablet (100 mg total) by mouth daily.  Dispense: 90 tablet; Refill: 3     If protocol passes may refill for 12 months if within 3 months of last provider visit (or a total of 15 months).           Passed - A1C in last 6 months     Hemoglobin A1c   Date Value Ref Range Status   03/08/2019 10.8 (H) 3.5 - 6.0 % Corrected     Comment:     This is a corrected result. Previous result was <2.5 % on 3/8/2019 at 0817 CST               Passed - Blood pressure in last year     BP  Readings from Last 1 Encounters:   03/08/19 130/74             Passed - Serum creatinine in last year     Creatinine   Date Value Ref Range Status   03/08/2019 0.83 0.70 - 1.30 mg/dL Final           Signed Prescriptions Disp Refills    metoprolol succinate (TOPROL-XL) 100 MG 24 hr tablet 180 tablet 3     Sig: Take 1 tablet (100 mg total) by mouth 2 (two) times a day.       Beta-Blockers Refill Protocol Passed - 4/16/2019  8:53 AM        Passed - PCP or prescribing provider visit in past 12 months or next 3 months     Last office visit with prescriber/PCP: 3/8/2019 Angel Claire MD OR same dept: 3/8/2019 Angel Claire MD OR same specialty: 3/8/2019 Angel Claire MD  Last physical: Visit date not found Last MTM visit: Visit date not found   Next visit within 3 mo: Visit date not found  Next physical within 3 mo: Visit date not found  Prescriber OR PCP: Angel Claire MD  Last diagnosis associated with med order: 1. Type 2 diabetes with complication (H)  - glipiZIDE (GLUCOTROL XL) 10 MG 24 hr tablet; Take 2 tablets (20 mg total) by mouth once daily.  Dispense: 180 tablet; Refill: 0    2. HTN (hypertension)  - metoprolol succinate (TOPROL-XL) 100 MG 24 hr tablet; Take 1 tablet (100 mg total) by mouth 2 (two) times a day.  Dispense: 180 tablet; Refill: 3  - losartan (COZAAR) 100 MG tablet; Take 1 tablet (100 mg total) by mouth daily.  Dispense: 90 tablet; Refill: 3    If protocol passes may refill for 12 months if within 3 months of last provider visit (or a total of 15 months).             Passed - Blood pressure filed in past 12 months     BP Readings from Last 1 Encounters:   03/08/19 130/74            losartan (COZAAR) 100 MG tablet 90 tablet 3     Sig: Take 1 tablet (100 mg total) by mouth daily.       Angiotensin Receptor Blocker Protocol Passed - 4/16/2019  8:53 AM        Passed - PCP or prescribing provider visit in past 12 months       Last office visit with prescriber/PCP:  3/8/2019 Angel Claire MD OR same dept: 3/8/2019 Angel Claire MD OR same specialty: 3/8/2019 Angel Claire MD  Last physical: Visit date not found Last MTM visit: Visit date not found   Next visit within 3 mo: Visit date not found  Next physical within 3 mo: Visit date not found  Prescriber OR PCP: Angel Claire MD  Last diagnosis associated with med order: 1. Type 2 diabetes with complication (H)  - glipiZIDE (GLUCOTROL XL) 10 MG 24 hr tablet; Take 2 tablets (20 mg total) by mouth once daily.  Dispense: 180 tablet; Refill: 0    2. HTN (hypertension)  - metoprolol succinate (TOPROL-XL) 100 MG 24 hr tablet; Take 1 tablet (100 mg total) by mouth 2 (two) times a day.  Dispense: 180 tablet; Refill: 3  - losartan (COZAAR) 100 MG tablet; Take 1 tablet (100 mg total) by mouth daily.  Dispense: 90 tablet; Refill: 3    If protocol passes may refill for 12 months if within 3 months of last provider visit (or a total of 15 months).             Passed - Serum potassium within the past 12 months     Lab Results   Component Value Date    Potassium 3.9 03/08/2019             Passed - Blood pressure filed in past 12 months     BP Readings from Last 1 Encounters:   03/08/19 130/74             Passed - Serum creatinine within the past 12 months     Creatinine   Date Value Ref Range Status   03/08/2019 0.83 0.70 - 1.30 mg/dL Final

## 2021-05-27 NOTE — TELEPHONE ENCOUNTER
Left message to call back for: Results  Information to relay to patient:  LMTCB, Please relay message below from

## 2021-05-27 NOTE — TELEPHONE ENCOUNTER
Refill Approved    Rx renewed per Medication Renewal Policy. Medication was last renewed on 1/8/18.    Chandrika Prieto, [facility] Triage/Med Refill 4/2/2019     Requested Prescriptions   Pending Prescriptions Disp Refills     pravastatin (PRAVACHOL) 40 MG tablet 180 tablet 2     Sig: Take 2 tablets (80 mg total) by mouth daily.    Statins Refill Protocol (Hmg CoA Reductase Inhibitors) Passed - 4/2/2019 10:19 AM       Passed - PCP or prescribing provider visit in past 12 months     Last office visit with prescriber/PCP: 3/8/2019 Angel Claire MD OR same dept: 3/8/2019 Angel Claire MD OR same specialty: 3/8/2019 Angel Claire MD  Last physical: Visit date not found Last MTM visit: Visit date not found   Next visit within 3 mo: Visit date not found  Next physical within 3 mo: Visit date not found  Prescriber OR PCP: Angel Claire MD  Last diagnosis associated with med order: 1. Hyperlipidemia  - pravastatin (PRAVACHOL) 40 MG tablet; Take 2 tablets (80 mg total) by mouth daily.  Dispense: 180 tablet; Refill: 2    If protocol passes may refill for 12 months if within 3 months of last provider visit (or a total of 15 months).

## 2021-05-27 NOTE — TELEPHONE ENCOUNTER
Refill Approved    Rx renewed per Medication Renewal Policy. Medication was last renewed on 2/17/18.    Chandrika Prieto, [facility] Triage/Med Refill 4/16/2019     Requested Prescriptions   Pending Prescriptions Disp Refills     glipiZIDE (GLUCOTROL XL) 10 MG 24 hr tablet 180 tablet 0     Sig: Take 2 tablets (20 mg total) by mouth once daily.       Oral Hypoglycemics Refill Protocol Failed - 4/16/2019  8:53 AM        Failed - Microalbumin in last year      Microalbumin, Random Urine   Date Value Ref Range Status   03/16/2018 16.71 (H) 0.00 - 1.99 mg/dL Final                  Passed - Visit with PCP or prescribing provider visit in last 6 months       Last office visit with prescriber/PCP: 3/8/2019 OR same dept: 3/8/2019 Angel Claire MD OR same specialty: 3/8/2019 Angel Claire MD Last physical: Visit date not found Last MTM visit: Visit date not found         Next appt within 3 mo: Visit date not found  Next physical within 3 mo: Visit date not found  Prescriber OR PCP: Angel Claire MD  Last diagnosis associated with med order: 1. Type 2 diabetes with complication (H)  - glipiZIDE (GLUCOTROL XL) 10 MG 24 hr tablet; Take 2 tablets (20 mg total) by mouth once daily.  Dispense: 180 tablet; Refill: 0    2. HTN (hypertension)  - metoprolol succinate (TOPROL-XL) 100 MG 24 hr tablet; Take 1 tablet (100 mg total) by mouth 2 (two) times a day.  Dispense: 160 tablet; Refill: 0  - losartan (COZAAR) 100 MG tablet; Take 1 tablet (100 mg total) by mouth daily.  Dispense: 90 tablet; Refill: 2     If protocol passes may refill for 12 months if within 3 months of last provider visit (or a total of 15 months).           Passed - A1C in last 6 months     Hemoglobin A1c   Date Value Ref Range Status   03/08/2019 10.8 (H) 3.5 - 6.0 % Corrected     Comment:     This is a corrected result. Previous result was <2.5 % on 3/8/2019 at 0817 CST               Passed - Blood pressure in last year     BP Readings  from Last 1 Encounters:   03/08/19 130/74             Passed - Serum creatinine in last year     Creatinine   Date Value Ref Range Status   03/08/2019 0.83 0.70 - 1.30 mg/dL Final             metoprolol succinate (TOPROL-XL) 100 MG 24 hr tablet 160 tablet 0     Sig: Take 1 tablet (100 mg total) by mouth 2 (two) times a day.       Beta-Blockers Refill Protocol Passed - 4/16/2019  8:53 AM        Passed - PCP or prescribing provider visit in past 12 months or next 3 months     Last office visit with prescriber/PCP: 3/8/2019 Angel Claire MD OR same dept: 3/8/2019 Angel Claire MD OR same specialty: 3/8/2019 Angel Claire MD  Last physical: Visit date not found Last MTM visit: Visit date not found   Next visit within 3 mo: Visit date not found  Next physical within 3 mo: Visit date not found  Prescriber OR PCP: Angel Claire MD  Last diagnosis associated with med order: 1. Type 2 diabetes with complication (H)  - glipiZIDE (GLUCOTROL XL) 10 MG 24 hr tablet; Take 2 tablets (20 mg total) by mouth once daily.  Dispense: 180 tablet; Refill: 0    2. HTN (hypertension)  - metoprolol succinate (TOPROL-XL) 100 MG 24 hr tablet; Take 1 tablet (100 mg total) by mouth 2 (two) times a day.  Dispense: 160 tablet; Refill: 0  - losartan (COZAAR) 100 MG tablet; Take 1 tablet (100 mg total) by mouth daily.  Dispense: 90 tablet; Refill: 2    If protocol passes may refill for 12 months if within 3 months of last provider visit (or a total of 15 months).             Passed - Blood pressure filed in past 12 months     BP Readings from Last 1 Encounters:   03/08/19 130/74             losartan (COZAAR) 100 MG tablet 90 tablet 2     Sig: Take 1 tablet (100 mg total) by mouth daily.       Angiotensin Receptor Blocker Protocol Passed - 4/16/2019  8:53 AM        Passed - PCP or prescribing provider visit in past 12 months       Last office visit with prescriber/PCP: 3/8/2019 Angel Claire MD OR same  dept: 3/8/2019 Angel Claire MD OR same specialty: 3/8/2019 Angel Claire MD  Last physical: Visit date not found Last MTM visit: Visit date not found   Next visit within 3 mo: Visit date not found  Next physical within 3 mo: Visit date not found  Prescriber OR PCP: Angel Claire MD  Last diagnosis associated with med order: 1. Type 2 diabetes with complication (H)  - glipiZIDE (GLUCOTROL XL) 10 MG 24 hr tablet; Take 2 tablets (20 mg total) by mouth once daily.  Dispense: 180 tablet; Refill: 0    2. HTN (hypertension)  - metoprolol succinate (TOPROL-XL) 100 MG 24 hr tablet; Take 1 tablet (100 mg total) by mouth 2 (two) times a day.  Dispense: 160 tablet; Refill: 0  - losartan (COZAAR) 100 MG tablet; Take 1 tablet (100 mg total) by mouth daily.  Dispense: 90 tablet; Refill: 2    If protocol passes may refill for 12 months if within 3 months of last provider visit (or a total of 15 months).             Passed - Serum potassium within the past 12 months     Lab Results   Component Value Date    Potassium 3.9 03/08/2019             Passed - Blood pressure filed in past 12 months     BP Readings from Last 1 Encounters:   03/08/19 130/74             Passed - Serum creatinine within the past 12 months     Creatinine   Date Value Ref Range Status   03/08/2019 0.83 0.70 - 1.30 mg/dL Final

## 2021-05-27 NOTE — TELEPHONE ENCOUNTER
Patient Returning Call  Reason for call:  Results  Information relayed to patient:  Writer read the following to patient per Dr Claire : Please inform patient that colon cancer screen was negative- recommend repeat in 3 years.  Patient has additional questions:  No  If YES, what are your questions/concerns:  NA  Okay to leave a detailed message?: No call back needed

## 2021-05-27 NOTE — TELEPHONE ENCOUNTER
FYI - Status Update  Who is Calling: Patient  Update: Questioning if refills can be processed today due to being out of medication.  Okay to leave a detailed message?:  No return call needed

## 2021-05-28 ENCOUNTER — RECORDS - HEALTHEAST (OUTPATIENT)
Dept: ADMINISTRATIVE | Facility: OTHER | Age: 71
End: 2021-05-28

## 2021-05-29 NOTE — TELEPHONE ENCOUNTER
RN cannot approve Refill Request    RN can NOT refill this medication overdue for office visits and/or labs.    Rodrigue Hsu, [facility] Triage/Med Refill 5/21/2019    Requested Prescriptions   Pending Prescriptions Disp Refills     metFORMIN (GLUCOPHAGE) 1000 MG tablet 180 tablet 0     Sig: Take 1 tablet (1,000 mg total) by mouth 2 (two) times a day with meals.       Metformin Refill Protocol Failed - 5/21/2019  4:04 PM        Failed - Microalbumin in last year      Microalbumin, Random Urine   Date Value Ref Range Status   03/16/2018 16.71 (H) 0.00 - 1.99 mg/dL Final                  Passed - Blood pressure in last 12 months     BP Readings from Last 1 Encounters:   03/08/19 130/74             Passed - LFT or AST or ALT in last 12 months     Albumin   Date Value Ref Range Status   03/08/2019 3.8 3.5 - 5.0 g/dL Final     Bilirubin, Total   Date Value Ref Range Status   03/08/2019 0.7 0.0 - 1.0 mg/dL Final     Bilirubin, Direct   Date Value Ref Range Status   09/14/2010 0.2 <0.6 mg/dL Final     Alkaline Phosphatase   Date Value Ref Range Status   03/08/2019 120 45 - 120 U/L Final     AST   Date Value Ref Range Status   03/08/2019 14 0 - 40 U/L Final     ALT   Date Value Ref Range Status   03/08/2019 <9 0 - 45 U/L Final     Protein, Total   Date Value Ref Range Status   03/08/2019 7.0 6.0 - 8.0 g/dL Final                Passed - GFR or Serum Creatinine in last 6 months     GFR MDRD Non Af Amer   Date Value Ref Range Status   03/08/2019 >60 >60 mL/min/1.73m2 Final     GFR MDRD Af Amer   Date Value Ref Range Status   03/08/2019 >60 >60 mL/min/1.73m2 Final             Passed - Visit with PCP or prescribing provider visit in last 6 months or next 3 months     Last office visit with prescriber/PCP: 3/8/2019 OR same dept: 3/8/2019 Angel Claire MD OR same specialty: 3/8/2019 Angel Claire MD Last physical: Visit date not found Last MTM visit: Visit date not found         Next appt within 3 mo:  Visit date not found  Next physical within 3 mo: Visit date not found  Prescriber OR PCP: Angel Claire MD  Last diagnosis associated with med order: 1. Diabetes (H)  - metFORMIN (GLUCOPHAGE) 1000 MG tablet; Take 1 tablet (1,000 mg total) by mouth 2 (two) times a day with meals.  Dispense: 180 tablet; Refill: 0     If protocol passes may refill for 12 months if within 3 months of last provider visit (or a total of 15 months).           Passed - A1C in last 6 months     Hemoglobin A1c   Date Value Ref Range Status   03/08/2019 10.8 (H) 3.5 - 6.0 % Corrected     Comment:     This is a corrected result. Previous result was <2.5 % on 3/8/2019 at 0817 CST

## 2021-05-29 NOTE — PROGRESS NOTES
ASSESSMENT/PLAN  1. Type 2 diabetes mellitus with hyperglycemia, without long-term current use of insulin (H)  A1c down at 9.8% which was previously greater than 10  We will increase patient's daily blastic letter to 30 units  Discussed the necessity of exercise to help lower glucose levels  Patient will drop off glucose levels in a couple weeks we will continue to adjust insulin  Follow-up in 3 months  - Glycosylated Hemoglobin A1c  - Microalbumin, Random Urine  - Ambulatory referral to Ophthalmology    2. Essential hypertension  Blood pressure goal range continue with current medications    3. Mixed hyperlipidemia  Cholesterol levels have been at goal range with an LDL below 100  Recently checked a few months ago  Continue statin therapy    4. Erectile dysfunction, unspecified erectile dysfunction type  Patient is been having problems with erectile dysfunction  Discussed options and we printed a prescription for Revatio  Discussed possible side effects  - sildenafil (REVATIO) 20 mg tablet; Take 1 tablet (20 mg total) by mouth daily as needed.  Dispense: 30 tablet; Refill: 0    5. Diabetes (H)  Refill of medication sent to the pharmacy  - insulin glargine (BASAGLAR KWIKPEN U-100 INSULIN) 100 unit/mL (3 mL) pen; Inject 30 Units under the skin at bedtime. INJECT 26 UNITS UNDER THE SKIN AT BEDTIME.  Dispense: 15 mL; Refill: 1    6. Moderate episode of recurrent major depressive disorder (H)  Patient continues to suffer from depression  Due to side effects he experienced with the medication years ago he is unwilling to try other medications  Discussed with him trying to make himself more active at home and getting out of the home to help with stress relief        SUBJECTIVE:   Chief Complaint   Patient presents with     Diabetes     Medication check, fasting labs      Personal Problem     Mika Alvarez 69 y.o. male    Current Outpatient Medications   Medication Sig Dispense Refill     amLODIPine (NORVASC) 10 MG tablet  "TAKE 1 TABLET BY MOUTH ONCE DAILY. OVERDUE FOR DIABETIC CHECK  90 tablet 2     aspirin 81 MG EC tablet Take 81 mg by mouth daily.       blood glucose test (ACCU-CHEK ARMANDO PLUS TEST STRP) strips Use 1 each As Directed 3 (three) times a day. Test 3 times daily 200 strip 0     blood-glucose meter (ACCU-CHEK ADVANTAGE DIABETES) kit Test 4 times daily.       glipiZIDE (GLUCOTROL XL) 10 MG 24 hr tablet Take 2 tablets (20 mg total) by mouth once daily. 180 tablet 0     insulin glargine (BASAGLAR KWIKPEN U-100 INSULIN) 100 unit/mL (3 mL) pen Inject 30 Units under the skin at bedtime. INJECT 26 UNITS UNDER THE SKIN AT BEDTIME. 15 mL 1     LANCETS MISC Use As Directed.       losartan (COZAAR) 100 MG tablet Take 1 tablet (100 mg total) by mouth daily. 90 tablet 3     metFORMIN (GLUCOPHAGE) 1000 MG tablet Take 1 tablet (1,000 mg total) by mouth 2 (two) times a day with meals. 180 tablet 0     metoprolol succinate (TOPROL-XL) 100 MG 24 hr tablet Take 1 tablet (100 mg total) by mouth 2 (two) times a day. 180 tablet 3     NEEDLES, INSULIN DISPOSABLE (BD ULTRA-FINE ROBERT PEN NEEDLES MISC) use up to 4 times daily as directed.       nitroglycerin (NITROSTAT) 0.4 MG SL tablet Place 0.4 mg under the tongue every 5 (five) minutes as needed for chest pain.       pen needle, diabetic (BD INSULIN PEN NEEDLE UF SHORT) 31 gauge x 5/16\" Ndle USE UP TO 4 TIMES DAILY AS DIRECTED 300 each 2     prasugrel (EFFIENT) 10 mg Tab tablet Take 1 tablet (10 mg total) by mouth daily. 90 tablet 1     pravastatin (PRAVACHOL) 40 MG tablet Take 2 tablets (80 mg total) by mouth daily. 180 tablet 3     sildenafil (REVATIO) 20 mg tablet Take 1 tablet (20 mg total) by mouth daily as needed. 30 tablet 0     No current facility-administered medications for this visit.      Allergies: Patient has no known allergies.   No LMP for male patient.    HPI:   Patient is here for medication follow-up  Patient is a diabetic and takes both injections and oral medications  His " A1c was previously greater than 10 is now come down to 9  Discussed with him the goal of an A1c closer to 7%  We will increase his insulin to 30 units and continue with oral agents  Patient is not exercising and has a large carbohydrate intake in his diet  Discussed with him the necessity to lower this and exercise on a regular basis  Patient continues to have problems with depression but is unwilling to start medication  We discussed about her options but he declined  He is been having trouble with erectile dysfunction we discussed with him Toradol  Discussed with the medication options and a printed prescription for Revatio was given  Patient is on blood pressure medications and his blood pressure is at goal range will continue with current therapy  Patient is on a statin and tolerating the medication without complication-continue as previous    ROS: negative except as per HPI    OBJECTIVE:   The patient appears well, alert, oriented x 3, in no distress.  /77 (Patient Site: Left Arm, Patient Position: Sitting, Cuff Size: Adult Large)   Pulse 64   Temp 97.6  F (36.4  C) (Oral)   Resp 14   Wt 217 lb (98.4 kg)   BMI 31.14 kg/m      Lungs: clear, good air entry, no wheezes, rhonchi or rales.   Cardiac: S1 and S2 normal, no murmurs, regular rate and rhythm.   Abdomen: normal bowel sounds, soft without tenderness, guarding, mass or organomegaly.   Extremities: show no edema, normal peripheral pulses.   Neurological: normal, no focal findings.  Skin: clear, dry, no rashes/lesions  Psych- normal mood and affect      Pt states an understanding and agrees to the above plan.  Greater than 25 minutes was spent today in interview and examination with Mika Alvarez with more than 50% of that time in counseling and coordination of care.

## 2021-05-30 NOTE — TELEPHONE ENCOUNTER
Refill Approved    Rx renewed per Medication Renewal Policy. Medication was last renewed on 7/9/18.    Chandrika Prieto, [facility] Triage/Med Refill 7/7/2019     Requested Prescriptions   Pending Prescriptions Disp Refills     amLODIPine (NORVASC) 10 MG tablet 90 tablet 0     Sig: TAKE 1 TABLET BY MOUTH ONCE DAILY. OVERDUE FOR DIABETIC CHECK       Calcium-Channel Blockers Protocol Passed - 7/7/2019  7:45 PM        Passed - PCP or prescribing provider visit in past 12 months or next 3 months     Last office visit with prescriber/PCP: 6/21/2019 Angel Claire MD OR same dept: 6/21/2019 Angel Claire MD OR same specialty: 6/21/2019 Angel Claire MD  Last physical: Visit date not found Last MTM visit: Visit date not found   Next visit within 3 mo: Visit date not found  Next physical within 3 mo: Visit date not found  Prescriber OR PCP: Angel Claire MD  Last diagnosis associated with med order: 1. HTN (hypertension)  - amLODIPine (NORVASC) 10 MG tablet; TAKE 1 TABLET BY MOUTH ONCE DAILY. OVERDUE FOR DIABETIC CHECK  Dispense: 90 tablet; Refill: 0    If protocol passes may refill for 12 months if within 3 months of last provider visit (or a total of 15 months).             Passed - Blood pressure filed in past 12 months     BP Readings from Last 1 Encounters:   06/21/19 132/77

## 2021-05-30 NOTE — TELEPHONE ENCOUNTER
RN cannot approve Refill Request    RN can NOT refill this medication Protocol failed and NO refill given.       Chandrika Prieto, [facility] Triage/Med Refill 7/22/2019    Requested Prescriptions   Pending Prescriptions Disp Refills     glipiZIDE (GLUCOTROL XL) 10 MG 24 hr tablet [Pharmacy Med Name: glipiZIDE ER Oral Tablet Extended Release 24 Hour 10 MG] 180 tablet 0     Sig: Take 2 tablets (20 mg total) by mouth once daily.       Oral Hypoglycemics Refill Protocol Failed - 7/22/2019  9:13 AM        Failed - Microalbumin in last year      Microalbumin, Random Urine   Date Value Ref Range Status   03/16/2018 16.71 (H) 0.00 - 1.99 mg/dL Final                  Passed - Visit with PCP or prescribing provider visit in last 6 months       Last office visit with prescriber/PCP: 6/21/2019 OR same dept: 6/21/2019 Angel Claire MD OR same specialty: 6/21/2019 Angel Claire MD Last physical: Visit date not found Last MTM visit: Visit date not found         Next appt within 3 mo: Visit date not found  Next physical within 3 mo: Visit date not found  Prescriber OR PCP: Angel Claire MD  Last diagnosis associated with med order: 1. Type 2 diabetes with complication (H)  - glipiZIDE (GLUCOTROL XL) 10 MG 24 hr tablet [Pharmacy Med Name: glipiZIDE ER Oral Tablet Extended Release 24 Hour 10 MG]; Take 2 tablets (20 mg total) by mouth once daily.  Dispense: 180 tablet; Refill: 0     If protocol passes may refill for 12 months if within 3 months of last provider visit (or a total of 15 months).           Passed - A1C in last 6 months     Hemoglobin A1c   Date Value Ref Range Status   06/21/2019 9.8 (H) 3.5 - 6.0 % Final               Passed - Blood pressure in last year     BP Readings from Last 1 Encounters:   06/21/19 132/77             Passed - Serum creatinine in last year     Creatinine   Date Value Ref Range Status   03/08/2019 0.83 0.70 - 1.30 mg/dL Final

## 2021-05-31 VITALS — BODY MASS INDEX: 28.45 KG/M2 | WEIGHT: 204 LBS

## 2021-05-31 NOTE — TELEPHONE ENCOUNTER
Refill Approved    Rx renewed per Medication Renewal Policy. Medication was last renewed on 2/22/19.    Chandrika Prieto, [facility] Triage/Med Refill 8/21/2019     Requested Prescriptions   Pending Prescriptions Disp Refills     prasugrel (EFFIENT) 10 mg Tab tablet [Pharmacy Med Name: Prasugrel HCl Oral Tablet 10 MG] 90 tablet 0     Sig: Take 1 tablet (10 mg) by mouth once a day       Clopidogrel/Prasugrel/Ticagrelor Refill Protocol Passed - 8/20/2019  7:01 AM        Passed - PCP or prescribing provider visit in past 6 months       Last office visit with prescriber/PCP: 8/9/2019 OR same dept: 8/9/2019 Angel Claire MD OR same specialty: 8/9/2019 Angel Claire MD Last physical: Visit date not found Last MTM visit: Visit date not found     Next appt within 3 mo: Visit date not found  Next physical within 3 mo: Visit date not found  Prescriber OR PCP: Angel Claire MD  Last diagnosis associated with med order: 1. Coronary atherosclerosis  - prasugrel (EFFIENT) 10 mg Tab tablet [Pharmacy Med Name: Prasugrel HCl Oral Tablet 10 MG]; Take 1 tablet (10 mg) by mouth once a day  Dispense: 90 tablet; Refill: 0    If protocol passes may refill for 6 months if within 3 months of last provider visit (or a total of 9 months).              Passed - Hemoglobin in past 12 months     Hemoglobin   Date Value Ref Range Status   03/08/2019 16.1 14.0 - 18.0 g/dL Final

## 2021-05-31 NOTE — TELEPHONE ENCOUNTER
Triage note:    69 year old male called with concerns about left arm pain with movement for 6 months.     The pain is by the left bicep.  It hurts most when throwing ball or putting a shirt on. No injury. No swelling.  He rates the pain a '7' on a scale of 1-10. No pain at rest. No other symptoms.    RN triaged to be seen within 3 days. He agreed.  Patient warm transferred to scheduling for appointment.  He is scheduled 8/9/19 @ 1230.    Vinita Goodwin RN, [facility] Med Refill/Triage, 8/6/2019 9:54 AM        Reason for Disposition    MODERATE pain (e.g. interferes with normal activities) and present > 3 days    Protocols used: ARM PAIN-A-OH

## 2021-05-31 NOTE — PROGRESS NOTES
ASSESSMENT/PLAN  1. Left arm pain  Suspect muscular strain and possible injury to the left upper extremity deltoid and biceps  Patient has had left arm pain with positional movements for the last few months  He is been trying to limit use of the left upper extremity but symptoms have not been improving  Discussed with patient physical therapy referral which she is in agreement  He will follow-up if symptoms are not improving  - Ambulatory referral to Adult PT- Internal        SUBJECTIVE:   Chief Complaint   Patient presents with     Arm Pain     Pt here today to ryan OROZCO upper arm/ shoulder pain, states no known injury x 6+ months     Mika RODRÍGUEZ Antonio 69 y.o. male    Current Outpatient Medications   Medication Sig Dispense Refill     amLODIPine (NORVASC) 10 MG tablet TAKE 1 TABLET BY MOUTH ONCE DAILY. OVERDUE FOR DIABETIC CHECK 90 tablet 3     aspirin 81 MG EC tablet Take 81 mg by mouth daily.       blood glucose test (ACCU-CHEK ARMANDO PLUS TEST STRP) strips Use 1 each As Directed 3 (three) times a day. Test 3 times daily 200 strip 0     blood-glucose meter (ACCU-CHEK ADVANTAGE DIABETES) kit Test 4 times daily.       glipiZIDE (GLUCOTROL XL) 10 MG 24 hr tablet Take 2 tablets (20 mg total) by mouth once daily. 180 tablet 0     insulin glargine (BASAGLAR KWIKPEN U-100 INSULIN) 100 unit/mL (3 mL) pen Inject 30 Units under the skin at bedtime. INJECT 26 UNITS UNDER THE SKIN AT BEDTIME. 15 mL 1     LANCETS MISC Use As Directed.       losartan (COZAAR) 100 MG tablet Take 1 tablet (100 mg total) by mouth daily. 90 tablet 3     metFORMIN (GLUCOPHAGE) 1000 MG tablet Take 1 tablet (1,000 mg total) by mouth 2 (two) times a day with meals. 180 tablet 0     metoprolol succinate (TOPROL-XL) 100 MG 24 hr tablet Take 1 tablet (100 mg total) by mouth 2 (two) times a day. 180 tablet 3     NEEDLES, INSULIN DISPOSABLE (BD ULTRA-FINE ROBERT PEN NEEDLES MISC) use up to 4 times daily as directed.       nitroglycerin (NITROSTAT) 0.4 MG SL  "tablet Place 0.4 mg under the tongue every 5 (five) minutes as needed for chest pain.       pen needle, diabetic (BD INSULIN PEN NEEDLE UF SHORT) 31 gauge x 5/16\" Ndle USE UP TO 4 TIMES DAILY AS DIRECTED 300 each 2     prasugrel (EFFIENT) 10 mg Tab tablet Take 1 tablet (10 mg total) by mouth daily. 90 tablet 1     pravastatin (PRAVACHOL) 40 MG tablet Take 2 tablets (80 mg total) by mouth daily. 180 tablet 3     sildenafil (REVATIO) 20 mg tablet Take 1 tablet (20 mg total) by mouth daily as needed. 30 tablet 0     No current facility-administered medications for this visit.      Allergies: Patient has no known allergies.   No LMP for male patient.    HPI:   Patient here for evaluation of left upper arm discomfort  For months patient has had discomfort in his deltoid and biceps region of his left upper extremity with reaching away from his body or above his head  He cannot think of any trauma that started the irritation  He continues to have problems daily and denies any symptoms below the elbow or the shoulder above  With his arms in neutral position he has no symptoms  Discussed with patient the possibility of muscular strain versus partial muscle injury such as a muscular tear new  No palpable muscle contractures or bulges noted  Discussed with patient the possibility of physical therapy    ROS: negative except as per HPI    OBJECTIVE:   The patient appears well, alert, oriented x 3, in no distress.  /79 (Patient Site: Left Arm, Patient Position: Sitting, Cuff Size: Adult Large)   Pulse 65   Resp 16   Wt 217 lb 3.2 oz (98.5 kg)   SpO2 95%   BMI 31.16 kg/m      Lungs: clear, good air entry, no wheezes, rhonchi or rales.   Cardiac: S1 and S2 normal, no murmurs, regular rate and rhythm.   Abdomen: normal bowel sounds, soft   Extremities: show no edema, normal peripheral pulses.   Left upper extremity: No palpable tenderness on the arm, no masses-discomfort in the deltoid and biceps muscular area with range " of motion away from the body in abduction  Neurological: normal, no focal findings.  Skin: clear, dry, no rashes/lesions  Psych- normal mood and affect      Pt states an understanding and agrees to the above plan.

## 2021-06-01 VITALS — BODY MASS INDEX: 30.21 KG/M2 | WEIGHT: 216.6 LBS

## 2021-06-01 VITALS — WEIGHT: 196.6 LBS | HEIGHT: 70 IN | BODY MASS INDEX: 28.15 KG/M2

## 2021-06-01 VITALS — BODY MASS INDEX: 28.77 KG/M2 | WEIGHT: 206.25 LBS

## 2021-06-01 VITALS — HEIGHT: 71 IN | WEIGHT: 197.5 LBS | BODY MASS INDEX: 27.65 KG/M2

## 2021-06-02 VITALS — WEIGHT: 215.6 LBS | BODY MASS INDEX: 30.94 KG/M2

## 2021-06-02 NOTE — TELEPHONE ENCOUNTER
RN cannot approve Refill Request    RN can NOT refill this medication Protocol failed and NO refill given.       Chandrika Prieto, [facility] Triage/Med Refill 10/30/2019    Requested Prescriptions   Pending Prescriptions Disp Refills     glipiZIDE (GLUCOTROL XL) 10 MG 24 hr tablet 180 tablet 3     Sig: Take 2 tablets (20 mg total) by mouth once daily.       Oral Hypoglycemics Refill Protocol Failed - 10/30/2019  7:40 AM        Failed - Microalbumin in last year      Microalbumin, Random Urine   Date Value Ref Range Status   03/16/2018 16.71 (H) 0.00 - 1.99 mg/dL Final                  Passed - Visit with PCP or prescribing provider visit in last 6 months       Last office visit with prescriber/PCP: 8/9/2019 OR same dept: 8/9/2019 Angel Claire MD OR same specialty: 8/9/2019 Angel Claire MD Last physical: Visit date not found Last MTM visit: Visit date not found         Next appt within 3 mo: Visit date not found  Next physical within 3 mo: Visit date not found  Prescriber OR PCP: Angel Claire MD  Last diagnosis associated with med order: 1. Type 2 diabetes with complication (H)  - glipiZIDE (GLUCOTROL XL) 10 MG 24 hr tablet; Take 2 tablets (20 mg total) by mouth once daily.  Dispense: 180 tablet; Refill: 0     If protocol passes may refill for 12 months if within 3 months of last provider visit (or a total of 15 months).           Passed - A1C in last 6 months     Hemoglobin A1c   Date Value Ref Range Status   06/21/2019 9.8 (H) 3.5 - 6.0 % Final               Passed - Blood pressure in last year     BP Readings from Last 1 Encounters:   08/09/19 117/79             Passed - Serum creatinine in last year     Creatinine   Date Value Ref Range Status   03/08/2019 0.83 0.70 - 1.30 mg/dL Final

## 2021-06-02 NOTE — TELEPHONE ENCOUNTER
RN cannot approve Refill Request    RN can NOT refill this medication PCP messaged that patient is overdue for Labs. Last office visit: 8/9/2019 Angel Claire MD Last Physical: Visit date not found Last MTM visit: Visit date not found Last visit same specialty: 8/9/2019 Angel Claire MD.  Next visit within 3 mo: Visit date not found  Next physical within 3 mo: Visit date not found      Venita Garza, [facility] Triage/Med Refill 10/22/2019    Requested Prescriptions   Pending Prescriptions Disp Refills     insulin glargine (BASAGLAR KWIKPEN) 100 unit/mL (3 mL) pen [Pharmacy Med Name: Basaglar KwikPen Subcutaneous Solution Pen-injector 100 UNIT/ML] 15 mL 0     Sig: Inject 30 Units under the skin nightly at bedtime       Insulin/GLP-1 Refill Protocol Failed - 10/22/2019  4:26 PM        Failed - Microalbumin in last year     Microalbumin, Random Urine   Date Value Ref Range Status   03/16/2018 16.71 (H) 0.00 - 1.99 mg/dL Final                  Passed - Visit with PCP or prescribing provider visit in last 6 months     Last office visit with prescriber/PCP: 8/9/2019 OR same dept: 8/9/2019 Angel Claire MD OR same specialty: 8/9/2019 Angel Claire MD Last physical: Visit date not found Last MTM visit: Visit date not found     Next appt within 3 mo: Visit date not found  Next physical within 3 mo: Visit date not found  Prescriber OR PCP: Angel Claire MD  Last diagnosis associated with med order: 1. Diabetes (H)  - insulin glargine (BASAGLAR KWIKPEN) 100 unit/mL (3 mL) pen [Pharmacy Med Name: Basaglar KwikPen Subcutaneous Solution Pen-injector 100 UNIT/ML]; Inject 30 Units under the skin nightly at bedtime  Dispense: 15 mL; Refill: 0    If protocol passes may refill for 6 months if within 3 months of last provider visit (or a total of 9 months).              Passed - A1C in last 6 months     Hemoglobin A1c   Date Value Ref Range Status   06/21/2019 9.8 (H) 3.5 - 6.0 % Final                Passed - Blood pressure in last year     BP Readings from Last 1 Encounters:   08/09/19 117/79             Passed - Creatinine done in last year     Creatinine   Date Value Ref Range Status   03/08/2019 0.83 0.70 - 1.30 mg/dL Final

## 2021-06-03 VITALS — WEIGHT: 217.2 LBS | BODY MASS INDEX: 31.16 KG/M2

## 2021-06-03 VITALS — WEIGHT: 217 LBS | BODY MASS INDEX: 31.14 KG/M2

## 2021-06-03 NOTE — TELEPHONE ENCOUNTER
----- Message from Angel Claire MD sent at 11/18/2019  1:00 PM CST -----  Mika  Cholesterol levels are normal.  Liver function is normal.  Elevated glucose levels are showing some damage to the kidney function as protein levels in the urine are increasing- continue to work on improving glucose levels.

## 2021-06-03 NOTE — PROGRESS NOTES
ASSESSMENT/PLAN  1. Type 2 diabetes mellitus with hyperglycemia, without long-term current use of insulin (H)  Patient's A1c is elevated greater than 11%  Patient states he has been taking his Lantus and his glipizide but only takes 1 tablet of his metformin  Patient is not diabetic compliant with his food intake and has a high carbohydrate intake  He is not exercising  Discussed with patient at length the need to change his diet and exercise  Discussed increasing Lantus to 35 units  Discussed being compliant with medication  Discussed we does not change diet and exercise will be very difficult to control his diabetes  Patient has uncontrolled depression at this point is not willing to pursue therapy or medications  Is lack of motivation to change or improve his symptoms his weight make improving his diabetes very difficult  - Glycosylated Hemoglobin A1c  - Microalbumin, Random Urine    2. Diabetes (H)  Please see #1 above  - insulin glargine (BASAGLAR KWIKPEN) 100 unit/mL (3 mL) pen; Inject 35 Units under the skin daily.  Dispense: 15 mL; Refill: 1    3. HTN (hypertension)  Patient's blood pressure is elevated today  He has been taking only 1 of his metformin daily I discussed that he should be taking it twice daily or 2 tablets at once  - metoprolol succinate (TOPROL-XL) 100 MG 24 hr tablet; Take 1 tablet (100 mg total) by mouth daily.  Dispense: 90 tablet; Refill: 3    4. Class 1 obesity with serious comorbidity and body mass index (BMI) of 32.0 to 32.9 in adult, unspecified obesity type  Patient is aware of his weight  He is not interested in exercise or changing his diet    5. Severe episode of recurrent major depressive disorder, without psychotic features (H)  Long discussion today over patient's mood and dysthymia overall  Patient states he does not realize with the plan as he thinks that there is no positive outlook moving forward  He states that he is in a difficult relationship and his wife does not  He  is not willing to pursue therapy as he states he is done before and it does not help  He is not willing to try medications as he states he tried sertraline 1 tablet and I gave him a headache and he is no longer willing to try any other medications  Discussed trying to work on lifestyle modifications with change in diet and exercising-he states he is not planning on doing this as he does not feel little change in it  He is perseverating on issues in society that he will not have large influence over things such as taxes and homosexuality and transgender populations are very upsetting to him-discussed with him that he should try to concentrate his efforts on things that he can make a difference and if these issues are bothering him he should try to avoid these topics  Patient states he is having no plans to hurt himself but has thought about not being around  Again patient declines any further help with therapy or medications  Clear discussion with the patient today that he needs to make some lifestyle transition changes otherwise he cannot expect anything to change for him        SUBJECTIVE:   Chief Complaint   Patient presents with     Diabetes     Medication check, fasting labs - has been taking Metformin 1000 mg once daily     Hypertension     Has been taking Metoprolol 100 mg once daily      Mika Alvarez 69 y.o. male    Current Outpatient Medications   Medication Sig Dispense Refill     amLODIPine (NORVASC) 10 MG tablet TAKE 1 TABLET BY MOUTH ONCE DAILY. OVERDUE FOR DIABETIC CHECK 90 tablet 3     aspirin 81 MG EC tablet Take 81 mg by mouth daily.       blood glucose test (ACCU-CHEK ARMANDO PLUS TEST STRP) strips Use 1 each As Directed 3 (three) times a day. Test 3 times daily 200 strip 0     blood-glucose meter (ACCU-CHEK ADVANTAGE DIABETES) kit Test 4 times daily.       glipiZIDE (GLUCOTROL XL) 10 MG 24 hr tablet Take 2 tablets (20 mg total) by mouth once daily. 180 tablet 0     insulin glargine (BASAGLAR KWIKPEN)  "100 unit/mL (3 mL) pen Inject 35 Units under the skin daily. 15 mL 1     LANCETS MISC Use As Directed.       losartan (COZAAR) 100 MG tablet Take 1 tablet (100 mg total) by mouth daily. 90 tablet 3     metFORMIN (GLUCOPHAGE) 1000 MG tablet Take 1 tablet (1,000 mg total) by mouth 2 (two) times a day with meals. 180 tablet 0     metoprolol succinate (TOPROL-XL) 100 MG 24 hr tablet Take 1 tablet (100 mg total) by mouth daily. 90 tablet 3     NEEDLES, INSULIN DISPOSABLE (BD ULTRA-FINE ROBERT PEN NEEDLES MISC) use up to 4 times daily as directed.       nitroglycerin (NITROSTAT) 0.4 MG SL tablet Place 0.4 mg under the tongue every 5 (five) minutes as needed for chest pain.       pen needle, diabetic (BD INSULIN PEN NEEDLE UF SHORT) 31 gauge x 5/16\" Ndle USE UP TO 4 TIMES DAILY AS DIRECTED 300 each 2     prasugrel (EFFIENT) 10 mg Tab tablet Take 1 tablet (10 mg) by mouth once a day 90 tablet 1     pravastatin (PRAVACHOL) 40 MG tablet Take 2 tablets (80 mg total) by mouth daily. 180 tablet 3     No current facility-administered medications for this visit.      Allergies: Patient has no known allergies.   No LMP for male patient.    HPI:   Patient is here for diabetic follow-up  His A1c returns elevated to greater than 11%  We will increase patient's Lantus to 35 units and have him follow-up in 3 months  Patient is larger problem today is ongoing depression which he is not choosing to treat  Is not also acting in any manner to change some of the things that  He is not eating a low carbohydrate diet, he is not exercising, and frankly he is not really willing to make changes  He states openly does not feel with the point is that he does not take anything to make any improvements  He feels his marriage is not going well but is not willing to go through therapy has he states he is done in the past  He is not willing to go to therapy himself  He feels down on a regular basis not motivated to do things and perseverates on multiple " "topics that aggravate him  When discussing the possibility of medications to help with depression he states he is tried these in the past  He is referencing to trying sertraline once for 1 day and he states it gave him a headache and is not willing to try any other medications  He is very upset with taxes homosexuality and transgender  He feels that these things are not going to change and they are wrong  He is not getting out of his house much for exercise at all he states that his wife verbally states things to him that he knows will upset him  Very discussion clearly with him today that he needs to start make some the situation to change  On an increase his Lantus and highly encouraged him to lower carbohydrate intake in his diet and follow-up in 3 months  Again discussed and offered referral to therapy versus medications for depression which he declined  Patient has only been taking 1 of his metformin and 1 of his beta-blocker-discussed his blood pressure is elevated he should be taking 2 tablets daily  His weight is up and he cannot expect the weight to be coming down if he does not exercise or change his diet    ROS: negative except as per HPI    OBJECTIVE:   The patient appears well, alert, oriented x 3, in no distress.  /77 (Patient Site: Left Arm, Patient Position: Sitting, Cuff Size: Adult Large)   Pulse 64   Temp 97.8  F (36.6  C) (Oral)   Resp 16   Ht 5' 9.61\" (1.768 m)   Wt (!) 226 lb 6.4 oz (102.7 kg)   BMI 32.85 kg/m      Lungs: clear, good air entry, no wheezes, rhonchi or rales.   Cardiac: S1 and S2 normal, no murmurs, regular rate and rhythm.   Abdomen: normal bowel sounds, soft  Extremities: show no edema, normal peripheral pulses.   Neurological: normal, no focal findings.  Skin: clear, dry, no rashes/lesions  Psych- normal mood and affect      Pt states an understanding and agrees to the above plan.  Greater than 25 minutes was spent today in interview and examination with Mika RODRÍGUEZ" Alvarez with more than 50% of that time in counseling and coordination of care.

## 2021-06-03 NOTE — TELEPHONE ENCOUNTER
RN cannot approve Refill Request    RN can NOT refill this medication PCP messaged that patient is overdue for Labs. Last office visit: 8/9/2019 Angel Claire MD Last Physical: Visit date not found Last MTM visit: Visit date not found Last visit same specialty: 8/9/2019 Angel Claire MD.  Next visit within 3 mo: Visit date not found  Next physical within 3 mo: Visit date not found      Gabi Ash, [facility] Triage/Med Refill 11/7/2019    Requested Prescriptions   Pending Prescriptions Disp Refills     metFORMIN (GLUCOPHAGE) 1000 MG tablet 180 tablet 0     Sig: Take 1 tablet (1,000 mg total) by mouth 2 (two) times a day with meals.       Metformin Refill Protocol Failed - 11/7/2019  9:36 PM        Failed - Microalbumin in last year      Microalbumin, Random Urine   Date Value Ref Range Status   03/16/2018 16.71 (H) 0.00 - 1.99 mg/dL Final                  Passed - Blood pressure in last 12 months     BP Readings from Last 1 Encounters:   08/09/19 117/79             Passed - LFT or AST or ALT in last 12 months     Albumin   Date Value Ref Range Status   03/08/2019 3.8 3.5 - 5.0 g/dL Final     Bilirubin, Total   Date Value Ref Range Status   03/08/2019 0.7 0.0 - 1.0 mg/dL Final     Bilirubin, Direct   Date Value Ref Range Status   09/14/2010 0.2 <0.6 mg/dL Final     Alkaline Phosphatase   Date Value Ref Range Status   03/08/2019 120 45 - 120 U/L Final     AST   Date Value Ref Range Status   03/08/2019 14 0 - 40 U/L Final     ALT   Date Value Ref Range Status   03/08/2019 <9 0 - 45 U/L Final     Protein, Total   Date Value Ref Range Status   03/08/2019 7.0 6.0 - 8.0 g/dL Final                Passed - GFR or Serum Creatinine in last 6 months     GFR MDRD Non Af Amer   Date Value Ref Range Status   03/08/2019 >60 >60 mL/min/1.73m2 Final     GFR MDRD Af Amer   Date Value Ref Range Status   03/08/2019 >60 >60 mL/min/1.73m2 Final             Passed - Visit with PCP or prescribing provider visit  in last 6 months or next 3 months     Last office visit with prescriber/PCP: 8/9/2019 OR same dept: 8/9/2019 Angel Claire MD OR same specialty: 8/9/2019 Angel Claire MD Last physical: Visit date not found Last MTM visit: Visit date not found         Next appt within 3 mo: Visit date not found  Next physical within 3 mo: Visit date not found  Prescriber OR PCP: Angel Claire MD  Last diagnosis associated with med order: 1. Diabetes (H)  - metFORMIN (GLUCOPHAGE) 1000 MG tablet; Take 1 tablet (1,000 mg total) by mouth 2 (two) times a day with meals.  Dispense: 180 tablet; Refill: 0     If protocol passes may refill for 12 months if within 3 months of last provider visit (or a total of 15 months).           Passed - A1C in last 6 months     Hemoglobin A1c   Date Value Ref Range Status   06/21/2019 9.8 (H) 3.5 - 6.0 % Final

## 2021-06-04 VITALS
BODY MASS INDEX: 32.41 KG/M2 | RESPIRATION RATE: 16 BRPM | HEIGHT: 70 IN | HEART RATE: 64 BPM | DIASTOLIC BLOOD PRESSURE: 77 MMHG | TEMPERATURE: 97.8 F | SYSTOLIC BLOOD PRESSURE: 146 MMHG | WEIGHT: 226.4 LBS

## 2021-06-05 VITALS
HEART RATE: 79 BPM | BODY MASS INDEX: 31.75 KG/M2 | WEIGHT: 221.8 LBS | DIASTOLIC BLOOD PRESSURE: 80 MMHG | TEMPERATURE: 97.4 F | HEIGHT: 70 IN | SYSTOLIC BLOOD PRESSURE: 131 MMHG | RESPIRATION RATE: 16 BRPM

## 2021-06-05 VITALS
OXYGEN SATURATION: 95 % | HEART RATE: 97 BPM | SYSTOLIC BLOOD PRESSURE: 140 MMHG | BODY MASS INDEX: 31.92 KG/M2 | RESPIRATION RATE: 24 BRPM | DIASTOLIC BLOOD PRESSURE: 88 MMHG | HEIGHT: 70 IN | WEIGHT: 223 LBS

## 2021-06-05 VITALS — HEIGHT: 70 IN | WEIGHT: 245.4 LBS | BODY MASS INDEX: 35.13 KG/M2

## 2021-06-05 VITALS
WEIGHT: 176.7 LBS | WEIGHT: 176.7 LBS | HEIGHT: 70 IN | BODY MASS INDEX: 25.3 KG/M2 | HEIGHT: 70 IN | BODY MASS INDEX: 25.3 KG/M2

## 2021-06-05 VITALS
RESPIRATION RATE: 20 BRPM | BODY MASS INDEX: 26.54 KG/M2 | WEIGHT: 185 LBS | DIASTOLIC BLOOD PRESSURE: 90 MMHG | HEART RATE: 88 BPM | SYSTOLIC BLOOD PRESSURE: 128 MMHG

## 2021-06-05 VITALS — BODY MASS INDEX: 26.67 KG/M2 | WEIGHT: 186.3 LBS | HEIGHT: 70 IN

## 2021-06-06 NOTE — TELEPHONE ENCOUNTER
Refill Approved    Rx renewed per Medication Renewal Policy. Medication was last renewed on 4/16/2019       Adolph Austin, [facility] Triage/Med Refill 2/15/2020     Requested Prescriptions   Pending Prescriptions Disp Refills     losartan (COZAAR) 100 MG tablet 90 tablet 3     Sig: Take 1 tablet (100 mg total) by mouth daily.       Angiotensin Receptor Blocker Protocol Passed - 2/11/2020 11:43 AM        Passed - PCP or prescribing provider visit in past 12 months       Last office visit with prescriber/PCP: 11/15/2019 Angel Claire MD OR same dept: 11/15/2019 Angel Claire MD OR same specialty: 11/15/2019 Angel Claire MD  Last physical: Visit date not found Last MTM visit: Visit date not found   Next visit within 3 mo: Visit date not found  Next physical within 3 mo: Visit date not found  Prescriber OR PCP: Angel Claire MD  Last diagnosis associated with med order: 1. HTN (hypertension)  - losartan (COZAAR) 100 MG tablet; Take 1 tablet (100 mg total) by mouth daily.  Dispense: 90 tablet; Refill: 3    If protocol passes may refill for 12 months if within 3 months of last provider visit (or a total of 15 months).             Passed - Serum potassium within the past 12 months     Lab Results   Component Value Date    Potassium 4.0 11/15/2019             Passed - Blood pressure filed in past 12 months     BP Readings from Last 1 Encounters:   11/15/19 146/77             Passed - Serum creatinine within the past 12 months     Creatinine   Date Value Ref Range Status   11/15/2019 0.80 0.70 - 1.30 mg/dL Final

## 2021-06-06 NOTE — TELEPHONE ENCOUNTER
Refill Request  Did you contact pharmacy: Yes  Medication name:   Requested Prescriptions     Pending Prescriptions Disp Refills     glipiZIDE (GLUCOTROL XL) 10 MG 24 hr tablet 180 tablet 0     Sig: Take 2 tablets (20 mg total) by mouth once daily.     Who prescribed the medication: Angel Claire MD  Requested Pharmacy: [facility]  Is patient out of medication: No.  2 days left  Patient notified refills processed in 3 business days:  yes  Okay to leave a detailed message: yes

## 2021-06-06 NOTE — TELEPHONE ENCOUNTER
Refill Approved    Rx renewed per Medication Renewal Policy. Medication was last renewed on 10/30/19.    Gabi Ash, [facility] Triage/Med Refill 2/27/2020     Requested Prescriptions   Pending Prescriptions Disp Refills     glipiZIDE (GLUCOTROL XL) 10 MG 24 hr tablet 180 tablet 0     Sig: Take 2 tablets (20 mg total) by mouth once daily.       Oral Hypoglycemics Refill Protocol Passed - 2/27/2020  3:57 PM        Passed - Visit with PCP or prescribing provider visit in last 6 months       Last office visit with prescriber/PCP: 11/15/2019 OR same dept: 11/15/2019 Angel Claire MD OR same specialty: 11/15/2019 Angel Claire MD Last physical: Visit date not found Last MTM visit: Visit date not found         Next appt within 3 mo: Visit date not found  Next physical within 3 mo: Visit date not found  Prescriber OR PCP: Angel Claire MD  Last diagnosis associated with med order: 1. Type 2 diabetes with complication (H)  - glipiZIDE (GLUCOTROL XL) 10 MG 24 hr tablet; Take 2 tablets (20 mg total) by mouth once daily.  Dispense: 180 tablet; Refill: 0     If protocol passes may refill for 12 months if within 3 months of last provider visit (or a total of 15 months).           Passed - A1C in last 6 months     Hemoglobin A1c   Date Value Ref Range Status   11/15/2019 11.1 (H) 3.5 - 6.0 % Final               Passed - Microalbumin in last year      Microalbumin, Random Urine   Date Value Ref Range Status   11/15/2019 37.35 (H) 0.00 - 1.99 mg/dL Final                  Passed - Blood pressure in last year     BP Readings from Last 1 Encounters:   11/15/19 146/77             Passed - Serum creatinine in last year     Creatinine   Date Value Ref Range Status   11/15/2019 0.80 0.70 - 1.30 mg/dL Final

## 2021-06-06 NOTE — TELEPHONE ENCOUNTER
Drug Change Request  Who is calling?:  Incoming fax from pharmacy  What is the current medication?:    insulin glargine (BASAGLAR KWIKPEN) 100 unit/mL (3 mL) pen 15 mL 1 11/15/2019     Sig - Route: Inject 35 Units under the skin daily. - Subcutaneous        What alternative is being requested?: Lantus  Why the request to change?:  n/a  Requested Pharmacy?: [facility]  Okay to leave a detailed message?:  Yes

## 2021-06-07 NOTE — TELEPHONE ENCOUNTER
Requested Prescriptions     Pending Prescriptions Disp Refills     pravastatin (PRAVACHOL) 40 MG tablet 180 tablet 3     Sig: Take 2 tablets (80 mg total) by mouth daily.     Component      Latest Ref Rng & Units 11/15/2019   Cholesterol      <=199 mg/dL 134   Triglycerides      <=149 mg/dL 124   HDL Cholesterol      >=40 mg/dL 45   LDL Calculated      <=129 mg/dL 64   Patient Fasting > 8hrs?       Yes

## 2021-06-07 NOTE — TELEPHONE ENCOUNTER
Requested Prescriptions     Pending Prescriptions Disp Refills     prasugreL (EFFIENT) 10 mg Tab tablet 90 tablet 3     Sig: Take 1 tablet (10 mg) by mouth once a day

## 2021-06-07 NOTE — TELEPHONE ENCOUNTER
Refill Approved    Rx renewed per Medication Renewal Policy. Medication was last renewed on 11/15/19.    Chandrika Prieto, [facility] [place] Connection Triage/Med Refill 5/6/2020     Requested Prescriptions   Pending Prescriptions Disp Refills     metoprolol succinate (TOPROL-XL) 100 MG 24 hr tablet 90 tablet 3     Sig: Take 1 tablet (100 mg total) by mouth daily.       Beta-Blockers Refill Protocol Passed - 5/4/2020  8:45 AM        Passed - PCP or prescribing provider visit in past 12 months or next 3 months     Last office visit with prescriber/PCP: 11/15/2019 Angel Claire MD OR same dept: 11/15/2019 Angel Claire MD OR same specialty: 11/15/2019 Angel Claire MD  Last physical: Visit date not found Last MTM visit: Visit date not found   Next visit within 3 mo: Visit date not found  Next physical within 3 mo: Visit date not found  Prescriber OR PCP: Angel Claire MD  Last diagnosis associated with med order: 1. HTN (hypertension)  - metoprolol succinate (TOPROL-XL) 100 MG 24 hr tablet; Take 1 tablet (100 mg total) by mouth daily.  Dispense: 90 tablet; Refill: 3    If protocol passes may refill for 12 months if within 3 months of last provider visit (or a total of 15 months).             Passed - Blood pressure filed in past 12 months     BP Readings from Last 1 Encounters:   11/15/19 146/77

## 2021-06-07 NOTE — TELEPHONE ENCOUNTER
RN cannot approve Refill Request    RN can NOT refill this medication Protocol failed and NO refill given         Chandrika Prieto, [facility] Triage/Med Refill 4/29/2020    Requested Prescriptions   Pending Prescriptions Disp Refills     prasugreL (EFFIENT) 10 mg Tab tablet 90 tablet 3     Sig: Take 1 tablet (10 mg) by mouth once a day       Clopidogrel/Prasugrel/Ticagrelor Refill Protocol Failed - 4/28/2020 12:55 PM        Failed - Hemoglobin in past 12 months     Hemoglobin   Date Value Ref Range Status   03/08/2019 16.1 14.0 - 18.0 g/dL Final             Passed - PCP or prescribing provider visit in past 6 months       Last office visit with prescriber/PCP: 11/15/2019 OR same dept: 11/15/2019 Angel Claire MD OR same specialty: 11/15/2019 Angel Claire MD Last physical: Visit date not found Last MTM visit: Visit date not found     Next appt within 3 mo: Visit date not found  Next physical within 3 mo: Visit date not found  Prescriber OR PCP: Angel Claire MD  Last diagnosis associated with med order: 1. Coronary atherosclerosis  - prasugreL (EFFIENT) 10 mg Tab tablet; Take 1 tablet (10 mg) by mouth once a day  Dispense: 90 tablet; Refill: 3    If protocol passes may refill for 6 months if within 3 months of last provider visit (or a total of 9 months).

## 2021-06-09 NOTE — TELEPHONE ENCOUNTER
"RN cannot approve Refill Request    RN can NOT refill this medication Protocol failed and NO refill given.       Chandrika Prieto, [facility] Triage/Med Refill 7/2/2020    Requested Prescriptions   Pending Prescriptions Disp Refills     pen needle, diabetic (BD ULTRA-FINE SHORT PEN NEEDLE) 31 gauge x 5/16\" Ndle 300 each 2     Sig: USE UP TO 4 TIMES DAILY AS DIRECTED       Diabetic Supplies Refill Protocol Failed - 7/1/2020  4:46 PM        Failed - Visit with PCP or prescribing provider visit in last 6 months     Last office visit with prescriber/PCP: 11/15/2019 Angel Claire MD OR same dept: 11/15/2019 Angel Claire MD OR same specialty: 11/15/2019 Anegl Claire MD  Last physical: Visit date not found Last MTM visit: Visit date not found   Next visit within 3 mo: Visit date not found  Next physical within 3 mo: Visit date not found  Prescriber OR PCP: Angel Claire MD  Last diagnosis associated with med order: 1. Diabetes (H)  - pen needle, diabetic (BD ULTRA-FINE SHORT PEN NEEDLE) 31 gauge x 5/16\" Ndle; USE UP TO 4 TIMES DAILY AS DIRECTED  Dispense: 300 each; Refill: 2    If protocol passes may refill for 12 months if within 3 months of last provider visit (or a total of 15 months).             Failed - A1C in last 6 months     Hemoglobin A1c   Date Value Ref Range Status   11/15/2019 11.1 (H) 3.5 - 6.0 % Final                     "

## 2021-06-10 NOTE — TELEPHONE ENCOUNTER
RN cannot approve Refill Request    RN can NOT refill this medication Protocol failed and NO refill given. Last office visit: 11/15/2019 Angel Claire MD Last Physical: Visit date not found Last MTM visit: Visit date not found Last visit same specialty: 11/15/2019 Angel Claire MD.  Next visit within 3 mo: Visit date not found  Next physical within 3 mo: Visit date not found      Chandrika Prieto, [facility] Connection Triage/Med Refill 7/28/2020    Requested Prescriptions   Pending Prescriptions Disp Refills     prasugreL (EFFIENT) 10 mg Tab tablet [Pharmacy Med Name: Prasugrel HCl Oral Tablet 10 MG] 90 tablet 0     Sig: Take 1 tablet (10 mg) by mouth once a day       Clopidogrel/Prasugrel/Ticagrelor Refill Protocol Failed - 7/26/2020  7:02 AM        Failed - PCP or prescribing provider visit in past 6 months       Last office visit with prescriber/PCP: Visit date not found OR same dept: 11/15/2019 Angel Claire MD OR same specialty: 11/15/2019 Angel Claire MD Last physical: Visit date not found Last MTM visit: Visit date not found     Next appt within 3 mo: Visit date not found  Next physical within 3 mo: Visit date not found  Prescriber OR PCP: Angel Claire MD  Last diagnosis associated with med order: 1. Coronary atherosclerosis  - prasugreL (EFFIENT) 10 mg Tab tablet [Pharmacy Med Name: Prasugrel HCl Oral Tablet 10 MG]; Take 1 tablet (10 mg) by mouth once a day  Dispense: 90 tablet; Refill: 0    If protocol passes may refill for 6 months if within 3 months of last provider visit (or a total of 9 months).              Failed - Hemoglobin in past 12 months     Hemoglobin   Date Value Ref Range Status   03/08/2019 16.1 14.0 - 18.0 g/dL Final

## 2021-06-11 NOTE — TELEPHONE ENCOUNTER
RN cannot approve Refill Request    RN can NOT refill this medication Protocol failed and NO refill given. Last office visit: 11/15/2019 Angel Claire MD Last Physical: Visit date not found Last MTM visit: Visit date not found Last visit same specialty: 11/15/2019 Angel Claire MD.  Next visit within 3 mo: Visit date not found  Next physical within 3 mo: Visit date not found      Chandrika Prieto, [facility] Triage/Med Refill 9/4/2020    Requested Prescriptions   Pending Prescriptions Disp Refills     metFORMIN (GLUCOPHAGE) 1000 MG tablet [Pharmacy Med Name: metFORMIN HCl Oral Tablet 1000 MG] 180 tablet 0     Sig: Take 1 tablet (1,000 mg total) by mouth 2 (two) times a day with meals.       Metformin Refill Protocol Failed - 9/4/2020 10:50 AM        Failed - Visit with PCP or prescribing provider visit in last 6 months or next 3 months     Last office visit with prescriber/PCP: Visit date not found OR same dept: 11/15/2019 Angel Claire MD OR same specialty: 11/15/2019 Angel Claire MD Last physical: Visit date not found Last MTM visit: Visit date not found         Next appt within 3 mo: Visit date not found  Next physical within 3 mo: Visit date not found  Prescriber OR PCP: Angel Claire MD  Last diagnosis associated with med order: 1. Diabetes (H)  - metFORMIN (GLUCOPHAGE) 1000 MG tablet [Pharmacy Med Name: metFORMIN HCl Oral Tablet 1000 MG]; Take 1 tablet (1,000 mg total) by mouth 2 (two) times a day with meals.  Dispense: 180 tablet; Refill: 0     If protocol passes may refill for 12 months if within 3 months of last provider visit (or a total of 15 months).           Failed - A1C in last 6 months     Hemoglobin A1c   Date Value Ref Range Status   11/15/2019 11.1 (H) 3.5 - 6.0 % Final               Passed - Blood pressure in last 12 months     BP Readings from Last 1 Encounters:   11/15/19 146/77             Passed - LFT or AST or ALT in last 12 months     Albumin    Date Value Ref Range Status   11/15/2019 3.9 3.5 - 5.0 g/dL Final     Bilirubin, Total   Date Value Ref Range Status   11/15/2019 0.7 0.0 - 1.0 mg/dL Final     Bilirubin, Direct   Date Value Ref Range Status   09/14/2010 0.2 <0.6 mg/dL Final     Alkaline Phosphatase   Date Value Ref Range Status   11/15/2019 99 45 - 120 U/L Final     AST   Date Value Ref Range Status   11/15/2019 19 0 - 40 U/L Final     ALT   Date Value Ref Range Status   11/15/2019 12 0 - 45 U/L Final     Protein, Total   Date Value Ref Range Status   11/15/2019 7.0 6.0 - 8.0 g/dL Final                Passed - GFR or Serum Creatinine in last 6 months     GFR MDRD Non Af Amer   Date Value Ref Range Status   11/15/2019 >60 >60 mL/min/1.73m2 Final     GFR MDRD Af Amer   Date Value Ref Range Status   11/15/2019 >60 >60 mL/min/1.73m2 Final             Passed - Microalbumin in last year      Microalbumin, Random Urine   Date Value Ref Range Status   11/15/2019 37.35 (H) 0.00 - 1.99 mg/dL Final

## 2021-06-11 NOTE — TELEPHONE ENCOUNTER
Message sent via [place]   Orders pended. Please review and sign. Patient plans to go to [facility] shortly to  other supplies and medications.   Has appointment scheduled for 10/8/20.

## 2021-06-11 NOTE — TELEPHONE ENCOUNTER
RN cannot approve Refill Request    RN can NOT refill this medication Protocol failed and NO refill given. Last office visit: 11/15/2019 Angel Claire MD Last Physical: Visit date not found Last MTM visit: Visit date not found Last visit same specialty: 11/15/2019 Angel Claire MD.  Next visit within 3 mo: Visit date not found  Next physical within 3 mo: Visit date not found      Chandrika Prieto, [facility] [place] Connection Triage/Med Refill 9/28/2020    Requested Prescriptions   Pending Prescriptions Disp Refills     LANTUS SOLOSTAR U-100 INSULIN 100 unit/mL (3 mL) pen [Pharmacy Med Name: Lantus SoloStar Subcutaneous Solution Pen-injector 100 UNIT/ML] 15 mL 0     Sig: Inject 35 Units under the skin at bedtime. Do not mix Lantus with any other insulin       Insulin/GLP-1 Refill Protocol Failed - 9/25/2020  9:15 AM        Failed - Visit with PCP or prescribing provider visit in last 6 months     Last office visit with prescriber/PCP: Visit date not found OR same dept: 11/15/2019 Angel Claire MD OR same specialty: 11/15/2019 Angel Claire MD Last physical: Visit date not found Last MTM visit: Visit date not found     Next appt within 3 mo: Visit date not found  Next physical within 3 mo: Visit date not found  Prescriber OR PCP: Angel Claire MD  Last diagnosis associated with med order: 1. Diabetes (H)  - LANTUS SOLOSTAR U-100 INSULIN 100 unit/mL (3 mL) pen [Pharmacy Med Name: Lantus SoloStar Subcutaneous Solution Pen-injector 100 UNIT/ML]; Inject 35 Units under the skin at bedtime. Do not mix Lantus with any other insulin  Dispense: 15 mL; Refill: 0    If protocol passes may refill for 6 months if within 3 months of last provider visit (or a total of 9 months).              Passed - A1C in last 6 months     Hemoglobin A1c   Date Value Ref Range Status   09/20/2020 11.6 (H) <=5.6 % Final     Comment:     Prediabetes:   HBA1c       5.7 to 6.4%        Diabetes:        HBA1c        >=6.5%    Patients with Hgb F >5%, total bilirubin >10.0 mg/dL, abnormal red cell turnover, severe renal or hepatic disease or malignancy should not have this A1C method used to diagnose or monitor diabetes.                   Passed - Microalbumin in last year     Microalbumin, Random Urine   Date Value Ref Range Status   11/15/2019 37.35 (H) 0.00 - 1.99 mg/dL Final                  Passed - Blood pressure in last year     BP Readings from Last 1 Encounters:   09/23/20 124/70             Passed - Creatinine done in last year     Creatinine   Date Value Ref Range Status   09/23/2020 0.79 0.70 - 1.30 mg/dL Final

## 2021-06-11 NOTE — TELEPHONE ENCOUNTER
Medication Request  Medication name:    Disp  Refills  Start  End     metFORMIN (GLUCOPHAGE) 1000 MG tablet  180 tablet  0  9/4/2020      Sig - Route: Take 1 tablet (1,000 mg total) by mouth 2 (two) times a day with meals. - Oral     Sent to pharmacy as: metFORMIN 1,000 mg tablet (GLUCOPHAGE)     E-Prescribing Status: Receipt confirmed by pharmacy (9/4/2020 11:00 AM CDT)         Requested Pharmacy: [facility]  Reason for request: Caller is needing for Angel Claire MD nurse to call in the medication to a different [id number] since he is needing it now and cant wait.   When did you use medication last?:    Patient offered appointment:  yes and patient declined  Okay to leave a detailed message: yes

## 2021-06-11 NOTE — PROGRESS NOTES
MTM Transition of Care Encounter  Assessment & Plan                                                     Post Discharge Medication Reconciliation Status: discharge medications reconciled and changed, per note/orders    1. New onset atrial fibrillation (H)  Symptomatically significantly improved.  He has follow-up scheduled with cardiology and his primary doctor.  Reviewed confusion with his bumetanide dose, he should be taking 1 mg daily, which equals 2 tablets.  He will increase this dosing continue to monitor his weights daily, and bring this to his next follow-up appointment.  Stable on chronic anticoagulation with rivaroxaban.  -Increase bumetanide to 2 tablets daily    2. Hyperlipidemia, unspecified hyperlipidemia type  At goal high-intensity statin per ACC/AHA hypertension guidelines, tolerating without adverse effects, recommend to continue current regimen.    3. Diabetes (H)  Not at goal A1c less than 8% per ADA guidelines, currently not monitoring blood sugars regularly.  Recommend to increase blood sugar monitoring, to ensure safety and efficacy of current medication regimen due to historically poorly controlled blood sugars.  Recommend to bring these numbers to his hospital follow-up appointment with PCP.  -Increase blood sugar monitoring    Follow Up  Return in about 8 days (around 10/7/2020) for Cardiology.    Subjective & Objective                                                       Mika Alvarez is a 70 y.o. male called for a transitions of care visit. he was discharged from [facility] on 9/23/2020 for Pulmonary Edema.    Patient consented to a telehealth visit: Yes    Chief Complaint: Hospital Follow Up     Medication Adherence/Access: medication confusion with new medications at discharge.     Atrial Fibrillation: Notes that he is feeling much better, specifically his breathing, and after all of the weight loss he has had, now down 20 pounds since September 19, 2020, his pants are fitting  much better as well.  He has continued to check his weight daily, states that his weight today is 232 pounds.  He has appropriately increased his metoprolol dose, has started taking Xarelto 20 mg daily in the evening.  He is taking this with food.  He believes that after falling last Wednesday and bruising his ribs that he may actually have been in atrial fibrillation earlier than when he went to the hospital, but the pain in his chest was masked by his rib pain.  He is additionally watching his salt intake as well.  He is taking bumetanide every morning, 1 tablet.  Reviewed directions that he is actually to take 2 tablets every day.    Hypercholesterolemia: He appropriately switched from pravastatin to atorvastatin.  Denies signs or symptoms of adverse effects.  Lab Results   Component Value Date    LDLCALC 64 11/15/2019     Type 2 diabetes: He continues on metformin 1000 mg twice daily, glipizide 20 mg once daily, and 35 units of Lantus insulin daily at bedtime.  He is not currently checking his blood sugars very frequently, but is willing to start doing so.  Lab Results   Component Value Date    HGBA1C 11.6 (H) 09/20/2020    HGBA1C 11.1 (H) 11/15/2019    HGBA1C 9.8 (H) 06/21/2019     Lab Results   Component Value Date    MICROALBUR 37.35 (H) 11/15/2019    LDLCALC 64 11/15/2019    CREATININE 0.79 09/23/2020     PMH: reviewed in EPIC   Allergies/ADRs: reviewed in EPIC   Alcohol: rarely   Tobacco:   Social History     Tobacco Use   Smoking Status Never Smoker   Smokeless Tobacco Never Used     Recent Vitals:   BP Readings from Last 3 Encounters:   09/23/20 124/70   11/15/19 146/77   08/09/19 117/79      Wt Readings from Last 3 Encounters:   09/23/20 (!) 245 lb 6.4 oz (111.3 kg)   11/15/19 (!) 226 lb 6.4 oz (102.7 kg)   08/09/19 217 lb 3.2 oz (98.5 kg)     ----------------    The patient declined an after visit summary    I spent 25 minutes with this patient today; An extra 15 minutes was spent creating the  Medication Action Plan.. All changes were made via collaborative practice agreement with Angel Claire MD. A copy of the visit note was provided to the patient's provider.     Juanpablo Matthews, PharmD, BCACP  Medication Management (MTM) Pharmacist  [facility]    Telemedicine Visit Details    Type of service:  Telephone     Start Time: 2:30PM  End Time (time video/phone call stopped): 2:55PM    Originating Location (pt. Location): Home    Distant Location (provider location):  [place] MED[facility] THERAPY MANAGEMENT [facility]    Mode of Communication:   Telephone     Current Outpatient Medications   Medication Sig Dispense Refill     amLODIPine (NORVASC) 10 MG tablet TAKE 1 TABLET BY MOUTH ONCE DAILY. OVERDUE FOR DIABETIC CHECK 90 tablet 3     aspirin 81 MG EC tablet Take 81 mg by mouth daily.       atorvastatin (LIPITOR) 80 MG tablet Take 1 tablet (80 mg total) by mouth daily. 30 tablet 0     blood-glucose meter (ACCU-CHEK ADVANTAGE DIABETES) kit Test 4 times daily.       bumetanide (BUMEX) 0.5 MG tablet Take 2 tablets (1 mg total) by mouth daily. 60 tablet 0     generic lancets Use 1 each As Directed 2 (two) times a day. Accu-Chek Fastclix Lancets. Patient is insulin dependant. 200 each 3     glipiZIDE (GLUCOTROL XL) 10 MG 24 hr tablet Take 2 tablets (20 mg total) by mouth once daily. 180 tablet 1     insulin glargine (LANTUS SOLOSTAR U-100 INSULIN) 100 unit/mL (3 mL) pen Inject 35 Units under the skin at bedtime. 15 mL 0     losartan (COZAAR) 100 MG tablet Take 1 tablet (100 mg total) by mouth daily. 90 tablet 2     metFORMIN (GLUCOPHAGE) 1000 MG tablet Take 1 tablet (1,000 mg total) by mouth 2 (two) times a day with meals. 180 tablet 0     metoprolol succinate (TOPROL-XL) 100 MG 24 hr tablet Take 1 tablet (100 mg total) by mouth 2 (two) times a day. 90 tablet 1     NEEDLES, INSULIN DISPOSABLE (BD ULTRA-FINE ROBERT PEN NEEDLES MISC) use up to 4 times daily as  "directed.       nitroglycerin (NITROSTAT) 0.4 MG SL tablet Place 0.4 mg under the tongue every 5 (five) minutes as needed for chest pain.       pen needle, diabetic (BD ULTRA-FINE SHORT PEN NEEDLE) 31 gauge x 5/16\" Ndle USE UP TO 4 TIMES DAILY AS DIRECTED 300 each 0     rivaroxaban ANTICOAGULANT (XARELTO) 20 mg tablet Take 1 tablet (20 mg total) by mouth daily with supper. 30 tablet 0     blood glucose test strips Use 1 each As Directed 2 (two) times a day. Accu-Chek Guide test strips. Patient is on insulin. 200 strip 3     No current facility-administered medications for this visit.            "

## 2021-06-11 NOTE — TELEPHONE ENCOUNTER
Wellness Screening Tool  Symptom Screening:  Do you have one of the following NEW symptoms:    Fever (subjective or >100.0)?  No    A new cough?  No    Shortness of breath?  No     Chills? No     New loss of taste or smell? No     Generalized body aches? No     New persistent headache? No     New sore throat? No     Nausea, vomiting, or diarrhea?  No    Within the past 2 weeks, have you been exposed to someone with a known positive illness below:    COVID-19 (known or suspected)?  No    Chicken pox?  No    Mealses?  No    Pertussis?  No    Patient notified of visitor policy- They may have one person accompany them to their appointment, but they will need to wear a mask and will be screened upon arrival for symptoms: Yes  Pt informed to wear a mask: Yes  Pt notified if they develop any symptoms listed above, prior to their appointment, they are to call the clinic directly at [phone] for further instructions.  Yes  Patient's appointment status: Patient will be seen in clinic as scheduled on 9/30

## 2021-06-11 NOTE — TELEPHONE ENCOUNTER
Refill Request  Did you contact pharmacy: No  Medication name:   Requested Prescriptions     Pending Prescriptions Disp Refills     blood glucose test strips 200 strip 3     Sig: Use 1 each As Directed 2 (two) times a day. Accu-Chek Guide test strips.     Who prescribed the medication: Dr Claire  Requested Pharmacy: [facility]  Pharmacy states they need the test strips filled with Medicare part B requirements.  States needs to have on sig or note if the patient is on insulin.  Please send to pharmacy today as they never received the whole fax from clinic.    Okay to leave a detailed message: yes

## 2021-06-11 NOTE — TELEPHONE ENCOUNTER
FYI - Status Update  Who is Calling: Patient  Update: Patient's wife stated patient has been out of medication for 4 days. Questioning if refill can be processed today.   Okay to leave a detailed message?:  No return call needed

## 2021-06-11 NOTE — TELEPHONE ENCOUNTER
----- Message from Angel Claire MD sent at 9/24/2020 12:41 PM CDT -----  Regarding: FW: Diabetes management  Please sha up strips and lancets requested below and help patient schedule hospital follow up.  ----- Message -----  From: Yari Ruiz, Diabetes Ed  Sent: 9/24/2020  11:57 AM CDT  To: Angel Claire MD  Subject: Diabetes management                              Hello-    Wanted to provide an update on pt's diabetes and also get RX for test strips/lancets sent to his pharmacy.     Pt admitted to [facility] with new diagnosis of HF and A. Fib. Also needs CABG (planned in October). I saw pt for diabetes with A1C 11.6%. Pt was not checking BG, was taking basaglar, metformin but only 1x per day and glucotrol. He was not following diabetic diet. This is similar to how he was at your last visit in 2019. I tried to reinforce how important it was to get his diabetes better controlled especially with all of his heart issues and upcoming surgery. I got him a new Accu-check Guide Meter, he reported his current one was old and didn't work/no battery. He will need strips/lancets for this. I encouraged him to f/u with OP CDE, he said he was willing to do this. If his BG numbers don't improve will need medication adjustments.     Recommendations:  1. Please order the following for pt: Accuchek Guide strips and Fast clicks lancets  2. Consider changing to Metformin XR so pt can take 2000mg at one time  3. Could consider SGLT2 given new heart failure diagnosis  4. Outpatient diabetes educator referral.    Thx!   Yari Ruiz RD, LD, CDE  Diabetes Educator

## 2021-06-11 NOTE — TELEPHONE ENCOUNTER
Medication Question or Clarification  Who is calling: Pharmacy  What medication are you calling about (include dose and sig)?:   generic lancets  200 each  3  9/24/2020      Sig - Route: Use 1 each As Directed 2 (two) times a day. Accu-Chek Fastclix Lancets. - Miscellaneous     Sent to pharmacy as: lancets     E-Prescribing Status: Receipt confirmed by pharmacy (9/24/2020  2:41 PM CDT)       AND  generic lancets  200 each  3  9/24/2020      Sig - Route: Use 1 each As Directed 2 (two) times a day. Accu-Chek Fastclix Lancets. - Miscellaneous     Sent to pharmacy as: lancets     E-Prescribing Status: Receipt confirmed by pharmacy (9/24/2020  2:41 PM CDT)           Who prescribed the medication?: Angel Claire MD    What is your question/concern?: Patient is Medicare part B need letter of confirmation that patient is on insulin please.   Requested [facility]: [facility]  Okay to leave a detailed message?: Yes

## 2021-06-12 NOTE — PROGRESS NOTES
ASSESSMENT/PLAN  1. Type 2 diabetes mellitus without complication, with long-term current use of insulin (H)  Recent A1c checked at 11.6% in the hospitalization  Would add mealtime insulin 5 units with each meal in addition to his Lantus  Continue with oral medications  Contact me in about 10 days with glucose readings and we will adjust medication accordingly  Discussed diet and exercise  - insulin lispro (HUMALOG KWIKPEN INSULIN) 100 unit/mL pen; Inject 5 Units under the skin 3 (three) times a day with meals.  Dispense: 15 mL; Refill: 0  - Microalbumin, Random Urine; Future    2. Coronary artery disease involving native heart without angina pectoris, unspecified vessel or lesion type  Patient is aware that he is scheduled for bypass  Questions answered for him today  Bypass scheduled for later in October    3. New onset atrial fibrillation (H)  Patient is here for hospital follow-up his discharge was greater than 14 days ago  Patient is on anticoagulation and is rate controlled with help of medication  Patient due for bypass later this month  Patient currently is asymptomatic with his atrial fibrillation  Patient is following with cardiology    4. Essential hypertension  Blood pressure near goal range continue with current medications  Reviewed laboratory work with him today        SUBJECTIVE:   Chief Complaint   Patient presents with     Follow-up     INF for Edema from 09/19-09/23,Legs have inproved a little     Diabetes     A1c done on 09/20     Mika Alvarez 70 y.o. male    Current Outpatient Medications   Medication Sig Dispense Refill     amLODIPine (NORVASC) 10 MG tablet TAKE 1 TABLET BY MOUTH ONCE DAILY. OVERDUE FOR DIABETIC CHECK 90 tablet 3     aspirin 81 MG EC tablet Take 81 mg by mouth daily.       atorvastatin (LIPITOR) 80 MG tablet Take 1 tablet (80 mg total) by mouth daily. 30 tablet 0     blood glucose test strips Use 1 each As Directed 2 (two) times a day. Accu-Chek Guide test strips. Patient is on  "insulin. 200 strip 3     blood-glucose meter (ACCU-CHEK ADVANTAGE DIABETES) kit Test 4 times daily.       bumetanide (BUMEX) 0.5 MG tablet Take 2 tablets (1 mg total) by mouth daily. 60 tablet 0     generic lancets Use 1 each As Directed 2 (two) times a day. Accu-Chek Fastclix Lancets. Patient is insulin dependant. 200 each 3     glipiZIDE (GLUCOTROL XL) 10 MG 24 hr tablet Take 2 tablets (20 mg total) by mouth once daily. 180 tablet 1     insulin glargine (LANTUS SOLOSTAR U-100 INSULIN) 100 unit/mL (3 mL) pen Inject 35 Units under the skin at bedtime. 15 mL 0     losartan (COZAAR) 100 MG tablet Take 1 tablet (100 mg total) by mouth daily. 90 tablet 2     metFORMIN (GLUCOPHAGE) 1000 MG tablet Take 1 tablet (1,000 mg total) by mouth 2 (two) times a day with meals. 180 tablet 0     metoprolol succinate (TOPROL-XL) 100 MG 24 hr tablet Take 1 tablet (100 mg total) by mouth 2 (two) times a day. 90 tablet 1     NEEDLES, INSULIN DISPOSABLE (BD ULTRA-FINE ROBERT PEN NEEDLES MISC) use up to 4 times daily as directed.       nitroglycerin (NITROSTAT) 0.4 MG SL tablet Place 0.4 mg under the tongue every 5 (five) minutes as needed for chest pain.       pen needle, diabetic (BD ULTRA-FINE SHORT PEN NEEDLE) 31 gauge x 5/16\" Ndle USE UP TO 4 TIMES DAILY AS DIRECTED 300 each 0     rivaroxaban ANTICOAGULANT (XARELTO) 20 mg tablet Take 1 tablet (20 mg total) by mouth daily with supper. 30 tablet 0     insulin lispro (HUMALOG KWIKPEN INSULIN) 100 unit/mL pen Inject 5 Units under the skin 3 (three) times a day with meals. 15 mL 0     No current facility-administered medications for this visit.      Allergies: Patient has no known allergies.   No LMP for male patient.    HPI:   Patient is here for hospital follow-up patient was hospitalized with worsening lower extremity edema.  Found to have severe coronary artery disease requiring bypass, newfound atrial fibrillation and pulmonary edema.  Patient responded well to IV diuretics and was " "placed on a collage of new medications.  His A1c was found to be greater than 11% he has been a poorly controlled diabetic over the last few years.  Patient is on Lantus and we will add mealtime immediate release insulin 3 times daily.  He will update us with glucose levels in about 10 days and we can adjust medication accordingly.  As blood pressure is near goal range will continue with current medications.  He is aware of the need for upcoming bypass.  He is on anticoagulation for his atrial fibrillation.  He is not symptomatic with the A. fib.  Still has some trace to 1+ edema in the bilateral lower extremities.  He is on Bumex.  Recent labs showing normal kidney and potassium levels.    Patient recently lost one of his children, his son who committed suicide.  He is dealing with this grieving process.    Spent time reviewing records and hospitalization with the patient today.    ROS: negative except as per HPI    OBJECTIVE:   The patient appears well, alert, oriented x 3, in no distress.  /80 (Patient Site: Left Arm, Patient Position: Sitting, Cuff Size: Adult Large)   Pulse 79   Temp 97.4  F (36.3  C) (Oral)   Resp 16   Ht 5' 10\" (1.778 m)   Wt 221 lb 12.8 oz (100.6 kg)   BMI 31.82 kg/m      HEENT:  Nose/mouth moist  Lungs: clear, good air entry, no wheezes, rhonchi or rales.   Cardiac: irregular rhythm, regular rate  Abdomen: normal bowel sounds, soft  Extremities: show 1 +edema BL, normal peripheral pulses.   Neurological: normal, no focal findings.  Skin: clear, dry, no rashes/lesions  Psych- normal mood and affect      Pt states an understanding and agrees to the above plan.  Greater than 40 minutes was spent today in interview and examination with Mika Alvarez with more than 50% of that time in counseling and coordination of care.      "

## 2021-06-12 NOTE — PATIENT INSTRUCTIONS - HE
1. I would recommend we sent you to the ER at [facility], but if you choose to go home, I would return to the ER there no later than Monday or immediately if you have any symptoms.  2. Continue on your current heart medications for now.

## 2021-06-13 NOTE — PROGRESS NOTES
"Wound Ostomy  WOC Assessment         Allergies:  No Known Allergies    Diagnosis:   Patient Active Problem List    Diagnosis Date Noted     Acute kidney failure with tubular necrosis (H) 12/16/2020     Disorientation      Cerebrovascular accident (CVA) due to other mechanism (H)      Metabolic encephalopathy      Cerebrovascular accident (CVA), unspecified mechanism (H)      Seizures (H)      Acute respiratory failure with hypoxia (H)      On mechanically assisted ventilation (H)      S/P CABG (coronary artery bypass graft)      Uncontrolled type 2 diabetes mellitus with hyperglycemia (H)      Acute on chronic clinical systolic heart failure (H) 12/02/2020     Atrial fibrillation with rapid ventricular response (H) 12/02/2020     Diabetic leg ulcer (H) 12/02/2020     Acute on chronic systolic CHF (congestive heart failure) (H) 12/02/2020     A-fib (H) 12/02/2020     Acute systolic heart failure (H)      New onset atrial fibrillation (H)      Pulmonary edema cardiac cause (H) 09/19/2020     CAD (coronary artery disease) 09/19/2020     New onset a-fib (H) 09/19/2020     Moderate episode of recurrent major depressive disorder (H) 06/21/2019     Hyperlipidemia, unspecified hyperlipidemia type 04/27/2016     Joint Pain, Localized In The Shoulder      Obesity      ACP Staging Stage 1 Hypertension: 140-159 / 90-99      Coronary artery disease involving native heart with other form of angina pectoris, unspecified vessel or lesion type (H)      Type 2 diabetes mellitus (H)        Height:  5' 10\" (1.778 m)    Weight:   214 lb 9.6 oz (97.3 kg)    Labs:  Recent Labs     12/16/20  0546   HGB 11.3*       Antonio:  Antonio Scale Score: 17    Specialty Bed:  Specialty Bed: [place] Position Pro (ICU only) (STACH)    Wound culture obtained: No    Edema:  Yes:  Localized    Anatomic Site/Laterality: BLE    Reason for ongoing care:   Wound assessment and plan of care     Encounter Type:  Subsequent Encounter Wound Type:   Non-PU Ulcer: " Venous     Tissue Damage:  Exposed fat layer    Associated conditions/Related trauma: Patient has history of venous ulcers and edema, previously had compression but this admission may have CABG per chart.    Assessment:    Bilateral shins with multiple scabs  Right shin 3 areas anterior/lateral 2x2cm and smaller, all moist scabs  Left shin now 4 areas all 2x2cm or smaller and clean partial thickness    Tunneling/Undermining: No    Wound Bed: see above    Exudate: No    Periwound Skin: Dry/Flaky and Hyperpigmentation     Treatment Plan: Silicone foam and start Aqupahor               Nursing care provided was wound care.    Discussed plan of care with nurse and patient.    Outcomes and treatment recommendations are to promote skin integrity and promote wound healing.    Actions taken by WOC RN: 1 minutes of education.    Planned Follow Up: Weekly.    Plan for next visit: Reassess wound(s) and Reassess skin integrity

## 2021-06-13 NOTE — PROGRESS NOTES
Occupational Therapy     12/04/20 1458   Visit Specifics   Eval Type Inital eval   Inital OT Consult  12/04/20   Bed/Tabs/Pad Alarm Applied Not applicable   Treatment Time   ADL Training 15   General   Additional Pertinent History needs CABG, planned for monday 12/7   Chart Reviewed Yes   PT/OT Patient/Caregiver Stated Goals pt states he may need to go to tcu at [place].  will wait and see   Family/Caregiver Present No   Precautions   General Precautions Sternal  (post op)   Home Living   Type of Home House   Home Layout One level;Laundry in basement;Able to live on main level with bedroom/bathroom;Stairs to enter with rails  (4 steps to enter)   Additional Comments pts wife had a stroke.  She is currently being cared for her family while he is gone.   Prior Status   Independent With All ADL's/IADL's   Prior Device Use None of the above   Indoor Mobility Independent   Lives With Spouse   Vocational Retired   ADL   ADL Comments discussed resuming activity post surgery with sternal precautions   Activity Tolerance   Endurance Good   Balance   Sitting Balance Independent   Standing Balance Standby   Bed Mobility   Bed Mobility Rolling   Rolling Independent   Supine to Sit Independent   Sit to Supine Independent   Bed mobility comments demonstrated sternal precautions with bed mobility.  Pt followed precautions well with his transfer and in and out of bed   Functional Transfers   Comments demonstrated with sternal precautions.  Pt able to follow precautions with transfer   Cognition   Overall Cognitive Status WFL   Behavior    Behavior During Session Cooperative   Assessment   Assessment Decreased ADL status;Decreased funtional mobility;Decreased endurance   Prognosis Good   Treatment/Interventions ADL training;Functional transfer training  (cardiac rehab exercise and monitoring)   OT Frequency 2x/day  (after surgery)   Goal Formulation Patient   Recommendations   OT Discharge Recommendation TCU  (TBD after surgery.  lives  with wife with CVA)   Treatment Suggestions for Next Session start POD1 cardiac rehab   OT Summary pt seen for OT and Cardiac rehab preop eval.  Pt is able to get in and outof chair and bed without assistance following precaution.  continue after surgery for cardiac rehab.  No further OT needs at this point   OT Care Plan REVIEWED DAILY Yes, goals remain appropriate   Michelle Caceres, OT  12/4/2020

## 2021-06-13 NOTE — PROGRESS NOTES
Progress Note    Assessment/Plan  S/P CABG X 4 POD/PVI/LAAL # 12  Postop convulsive activity however EEG did not show any seizures activity  Head CT showed punctate focus in the left frontal lobe and MRI showed 2.5 cm subacute infarct in the left frontal lobe associated with petechial hemorrhage  Postop respiratory failure with prolonged oxygen now resolved, currently on room air with intermittent MetaNeb treatment saturation 92%  AVSS, rate controlled A. fib currently on amiodarone therapy 200 mg twice a day  Postop dysphagia, failed video swallow evaluation, currently has an NG tube and tolerating formula  Postop leukocytosis resolving, last WBC 12.5 from 13.8, will continue to monitor closely  Hypomagnesemia, mag 1.9, replaced with magnesium sulfate 22 g IVPB  Poorly controlled type 2 diabetes mellitus, preop A1c 12.6%,  hyperlipidemia, diabetic leg ulcers, acute on chronic systolic congestive heart failure  Sternal incision has some crusting dry and intact and lungs sounds are diminished bilaterally  Chest tube drainage  160/700 cc serous  Leave chest tubes in today due to excessive drainage  Analysis of pleural drainage showed normal lipids  Continue PT/OT to mobilize patient out of bed and ambulate as tolerated  Continue NG tube feeding to optimize nutrition  Will repeat swallow evaluation per speech therapy  Patient may need acute care rehab at discharge, referral sent out yesterday  Currently not medically ready for discharge         Subjective  Laying in bed and sleepy at time of evaluation  Objective    Vital signs in last 24 hours  Temp:  [97.1  F (36.2  C)-98  F (36.7  C)] 97.5  F (36.4  C)  Heart Rate:  [] 99  Resp:  [18-24] 18  BP: (111-140)/(74-93) 137/87  Weight:   201 lb 14.4 oz (91.6 kg)  Preop weight 95.7 kg  Intake/Output last 3 shifts  I/O last 3 completed shifts:  In: 1090 [P.O.:240; I.V.:100; NG/GT:700; IV Piggyback:50]  Out: 2160 [Urine:1900; Chest Tube:260]  Intake/Output this  "shift:  No intake/output data recorded.    Review of Systems   A 12 point comprehensive review of systems was negative except as noted.    Physical Exam  /87 (Patient Position: Semi-hooks)   Pulse 99   Temp 97.5  F (36.4  C) (Oral)   Resp 18   Ht 5' 10\" (1.778 m)   Wt 201 lb 14.4 oz (91.6 kg)   SpO2 94%   BMI 28.97 kg/m    Rate controlled A. fib, incisions are clean, dry and intact, lungs are diminished bilaterally  Abdomen soft and nontender  Bilateral lower extremity diabetic ulcers    Pertinent Labs   Lab Results: personally reviewed.   Lab Results   Component Value Date     12/18/2020    K 3.5 12/19/2020     (H) 12/18/2020    CO2 26 12/18/2020    BUN 14 12/18/2020    CREATININE 0.83 12/18/2020    CALCIUM 8.1 (L) 12/18/2020     Lab Results   Component Value Date    WBC 12.5 (H) 12/18/2020    HGB 11.8 (L) 12/18/2020    HCT 39.4 (L) 12/18/2020    MCV 86 12/18/2020     12/18/2020       Pertinent Radiology   Radiology Results: Not yet available for review  EKG Results: personally reviewed.  and rate controlled A. fib    Ash Lowry            "

## 2021-06-13 NOTE — H&P
Admission History and Physical   Mika Alvarez,  1950, MRN [id number]    [facility]  No admission diagnoses are documented for this encounter.    PCP: Angel Claire MD, [unfilled], [phone]   Code status:  Prior       Extended Emergency Contact Information  Primary Emergency Contact: Bel Alvarez  Address: [address] [place] [address]  Home Phone: [phone]  Mobile Phone: [phone]  Relation: Spouse  Secondary Emergency Contact: GUILLERMO CORREIA   [place]  Mobile Phone: [phone]  Relation: Friend       Assessment and Plan   1.  Acute on chronic systolic congestive heart failure with increasing shortness of breath, hypoxemia, elevated BNP, and chest x-ray demonstrating bilateral pleural effusions with increased vascular congestion and mild pulmonary edema.  Known severe coronary artery disease with EKG without ST elevation but with initial troponin elevated consistent with NSTEMI.  Last echocardiogram with EF 43%.  Atrial fibrillation with uncontrolled ventricular rate also contributing factor.  Responding to IV Lasix with good diuresis and improving oxygenation.  Admit to cardiac telemetry.  Consult cardiology.  Continue IV Lasix.  Continue losartan and metoprolol.    2.  Coronary artery disease with severe multivessel disease on recent coronary angiogram with CABG recommended.  Initial troponin elevated and will check serial troponins.  Aspirin administered.  Begin IV heparin.  Continue statin.    3.  Persistent atrial fibrillation with uncontrolled ventricular rate.  Will use metoprolol for rate control.  He has been noncompliant taking Xarelto.  Initiating IV heparin.    4.  Hypercholesterolemia-continue atorvastatin    5.  Essential hypertension with uncontrolled blood pressure.  Continue losartan and metoprolol.  Will switch amlodipine to diltiazem to help with rate control.  Hydralazine as needed.    6.   Insulin-dependent type 2 diabetes.  Currently with hyperglycemia.  Continue Lantus and Humalog with meals plus sliding scale.  Hold usual home doses of Metformin and glipizide.    7.  Multiple leg ulcers bilateral lower extremities.  Wound care consult.    Over 70 minutes spent in the management of this patient including reviewing records, face-to-face time with patient, and coordination of care.    I anticipate the patient will be admitted to the hospital for at least 2 midnights for the evaluation and treatment of the conditions discussed above.    Principal Problem:    Acute on chronic clinical systolic heart failure (H)  Active Problems:    Atrial fibrillation with rapid ventricular response (H)    Type 2 diabetes mellitus (H)    CAD (coronary artery disease)    Diabetic leg ulcer (H)    ACP Staging Stage 1 Hypertension: 140-159 / 90-99    Hyperlipidemia, unspecified hyperlipidemia type         Chief Complaint:  70-year-old male with increasing shortness of breath     HPI:    Mika Alvarez is a 70 y.o. old male with multivessel coronary artery disease, chronic systolic congestive heart failure, recent diagnosis of atrial fibrillation, and type 2 diabetes requiring insulin.  Recent hospitalization September 2020 with new onset atrial fibrillation and acute systolic congestive heart failure.  Echocardiogram demonstrated decreased left ventricular systolic function with EF of 42%.  Abnormal stress test followed by coronary angiogram demonstrating severe multivessel disease with recommendation to have CABG.  Patient declined proceeding with surgery as recommended.  He did follow-up with cardiology in early November and was found to be experiencing decompensated heart failure with ongoing atrial fibrillation and uncontrolled ventricular rate.  Instructed to proceed immediately to the ER which patient refused to do nor did he go to the ER the following week for agreement.  Now he has run out of several of his  medications including his diuretic Bumex.  Parents think increasing shortness of breath over the past 48 hours.  Denies any chest pain.  Upon presentation to ER today, found to be hypoxic on room air with O2 sat of 89% with oxygen saturations improving with 2 L/min nasal cannula.  Also with atrial fibrillation with uncontrolled ventricular rate.  Chest x-ray demonstrating bilateral pleural effusions with increased vascular congestion and mild pulmonary edema.  BNP elevated at 674.  Initial troponin elevated at 1.21.  Discussed with cardiology.  Starting IV heparin.  IV Lasix administered with excellent diuresis.  Feeling improved at this time but still unstable and plans to admit to cardiac telemetry.  Noted to have severe hyperglycemia.  He has been using Lantus but admits to missing doses of Humalog.         Medical History  Active Ambulatory (Non-Hospital) Problems    Diagnosis     Acute systolic heart failure (H)     New onset atrial fibrillation (H)     Pulmonary edema cardiac cause (H)     New onset a-fib (H)     Moderate episode of recurrent major depressive disorder (H)     Joint Pain, Localized In The Shoulder     Obesity     Coronary artery disease involving native heart with other form of angina pectoris, unspecified vessel or lesion type (H)     Past Medical History:   Diagnosis Date     ACP Staging Stage 1 Hypertension: 140-159 / 90-99      Atrial fibrillation with rapid ventricular response (H) 12/2/2020     Coronary Artery Disease      Joint Pain, Localized In The Shoulder      Obesity      Other and unspecified hyperlipidemia 4/27/2016     Type 2 Diabetes Mellitus With Complication         Surgical History  He  has a past surgical history that includes Coronary Angiogram (N/A, 9/22/2020) and Left Heart Catheterization with Left Ventriculogram (N/A, 9/22/2020).       Social History  Reviewed, and he  reports that he has never smoked. He has never used smokeless tobacco. He reports current alcohol use.  "He reports that he does not use drugs.       Allergies  No Known Allergies Family History  Reviewed, and family history includes Heart disease in his father.          Prior to Admission Medications   (Not in a hospital admission)         Review of Systems:  A 12 point comprehensive review of systems was negative except as noted. Physical Exam:  Temp:  [97.6  F (36.4  C)] 97.6  F (36.4  C)  Heart Rate:  [] 101  Resp:  [21-24] 21  BP: (153-181)/() 181/86    BP (!) 181/86   Pulse (!) 101   Temp 97.6  F (36.4  C) (Oral)   Resp 21   Ht 5' 10\" (1.778 m)   Wt (!) 237 lb 9.6 oz (107.8 kg)   SpO2 94%   BMI 34.09 kg/m      General Appearance:    Alert, cooperative, no distress, appears stated age   Head:    Normocephalic, without obvious abnormality, atraumatic   Eyes:    PERRL, conjunctiva/corneas clear, EOM's intact, fundi     benign, both eyes        Ears:    Normal TM's and external ear canals, both ears   Nose:   Nares normal, septum midline, mucosa normal, no drainage    or sinus tenderness   Throat:   Lips, mucosa, and tongue normal; teeth and gums normal   Neck:   Supple, symmetrical, trachea midline, no adenopathy;        thyroid:  No enlargement/tenderness/nodules; no carotid    bruit or JVD       Lungs:    Bibasilar crackles   Chest wall:    No tenderness or deformity   Heart:   Irregularly irregular tachycardic.  No systolic murmur.   Abdomen:     Soft, non-tender, bowel sounds active all four quadrants,     no masses, no organomegaly           Extremities:  1+ pitting edema with multiple leg ulcers bilaterally   Pulses:   2+ and symmetric all extremities   Skin:   Skin color, texture, turgor normal, no rashes or lesions   Lymph nodes:   Cervical, supraclavicular, and axillary nodes normal   Neurologic:   CNII-XII intact. Normal strength, sensation and reflexes       throughout        Pertinent Labs  Lab Results: personally reviewed.   Lab Results   Component Value Date     12/02/2020    K " 4.0 12/02/2020     12/02/2020    CO2 25 12/02/2020    BUN 17 12/02/2020    CREATININE 0.91 12/02/2020    CALCIUM 9.1 12/02/2020     Lab Results   Component Value Date    CKMB 3 10/31/2011    TROPONINI 1.21 () 12/02/2020     Lab Results   Component Value Date    WBC 9.4 12/02/2020    HGB 14.7 12/02/2020    HCT 47.7 12/02/2020    MCV 87 12/02/2020     12/02/2020       Pertinent Radiology  Radiology Results: Personally reviewed impression/s  EKG Results: personally reviewed.   Atrial fibrillation with rate 110 with RBBB

## 2021-06-13 NOTE — PROGRESS NOTES
"Hospitalist Progress Note    DOS: 12/13/20    S:  Pt resting in bed, no acute complaints.    ROS: 10-point review of systems negative unless otherwise stated as above.    O:  Vital signs:  /86   Pulse (!) 101   Temp 98.7  F (37.1  C) (Oral)   Resp 18   Ht 5' 10\" (1.778 m)   Wt 206 lb 14.4 oz (93.8 kg)   SpO2 98%   BMI 29.69 kg/m      Intake/Output Summary (Last 24 hours) at 12/13/2020 0805  Last data filed at 12/13/2020 0655  Gross per 24 hour   Intake 4463.5 ml   Output 1655 ml   Net 2808.5 ml       Physical Exam:  General: Somnolent this morning. NAD.  Eyes: Eyes tracking.  Neck: Supple.  CV: S1, S2. Tachycardic, irregularly irregular. Peripheral pulses intact. No LE edema.  Lungs: CTAB. Not using accessory muscles. On nasal cannula.  Abdomen: Soft. BS present. No tenderness, guarding, distension.  Neuro: Sleepy this morning.  Skin: Warm, dry. No rashes.    Scheduled Meds:    acetaminophen  650 mg Oral Q6H    Or     acetaminophen  650 mg Rectal Q6H     acetylcysteine (MUCOMYST) 20% inhalation solution  4 mL Inhalation Q8H - RT     albuterol  2.5 mg Nebulization Q8H - RT     aspirin  81 mg Enteral Tube DAILY     atorvastatin  80 mg Enteral Tube QHS     bisacodyL  10 mg Oral Once     famotidine (PEPCID) injection  20 mg Intravenous BID    Or     famotidine  20 mg Oral BID     heparin (PF) ANTICOAGULANT  5,000 Units Subcutaneous Q8H FIXED TIMES     levETIRAcetam (KEPPRA) IVPB  500 mg Intravenous Q12H     magnesium hydroxide  30 mL Oral DAILY     melatonin  3 mg Enteral Tube QHS     metoprolol tartrate  12.5 mg Oral 0330, 1300, 1700 - CV Metoprolol     metoprolol tartrate  50 mg Oral BID     piperacillin-tazobactam  3.375 g Intravenous Q8H     polyethylene glycol  17 g Enteral Tube DAILY     potassium chloride  10 mEq Intravenous Q1H     sodium chloride  10 mL Intravenous Q8H FIXED TIMES     sodium chloride  3 mL Intravenous Q8H FIXED TIMES     vancomycin  1,500 mg Intravenous Q12H     Continuous " Infusions:    amiodarone 900 mg/500 ml standard 24 hour infusion 0.5 mg/min (12/12/20 1900)     dextrose 5% 150 mL/hr (12/13/20 0365)     insulin infusion (1 unit/mL) 1.5 Units/hr (12/13/20 8953)     sodium chloride 0.9% 10 mL/hr (12/12/20 0329)     PRN Meds:.acetaminophen, acetaminophen, aluminum-magnesium hydroxide-simethicone, bisacodyL, bisacodyL, dextrose 50 % (D50W), docusate sodium (COLACE) 50 mg/5 mL oral liquid, glucagon (human recombinant), hydrALAZINE, insulin infusion (1 unit/mL), lidocaine (PF), magnesium hydroxide, metoprolol tartrate, sodium chloride, sodium chloride bacteriostatic, sodium chloride, sodium chloride, sodium chloride, sodium phosphates 133 mL, traMADoL, traZODone, white petrolatum    Significant labs, imaging, and testing reviewed.    A/P:  Mr Alvarez is a 70-year-old male with past medical history of multivessel CAD, chronic systolic and diastolic CHF, recent diagnosis of A. fib, DM 2 who presented with acute CHF, was diuresed and subsequently underwent four-vessel CABG by Dr. Madison on 12/7/2020.  Due to concerns about possible seizures CT of the head was obtained 12/8 and showed punctate focus of increased attenuation within the subcortical region of the left frontal lobe.  Subsequent MRI of the brain 12/9 demonstrated subacute 2.5 cm subacute infarct with petechial hemorrhage in the left frontal lobe.   EEG did not show any seizure activity however Keppra was started 12/10 with resolution of rhythmic left leg movements.  Patient was extubated 12/10 and is weaned down to low flow O2. Started on broad spectrum antibiotics 12/10 due to new fever and worsening leukocytosis, 1/2 blood Cx shows GPC in clusters, respiratory miryam with 4+ beta hemolytic Strep.  VFSS with mild oral and severe pharyngeal dysphagia - NG tube ordered. Noted worsening hypernatremia, rising BUN, tachycardia.    #Multivessel CAD, s/p CABG and PVI/LAAL on 12/7/2020: Mgmt as per CV Surgery. Continue ASA, statin,  metoprolol.    #Afib with RVR, now s/p PVI and DONNA excision. Cardiology following. Holding metoprolol due to NPO. Amiodarone gtt, prn metoprolol. Cardiac tele. Holding AC until cleared by Neurology.    #Acute diastolic heart failure, improved. Holding home bumetanide for now.    #Acute hypoxic respiratory failure, improving: Now on nasal cannula. Wean oxygen as tolerated. Encourage IC. Supplemental O2 to keep sats >94%.    #Acute encephalopathy: CTH from 12/12 showed no acute changes. EEG did not show seizure activity, but pt's rhythmic left leg movement appear to be have improved with Keppra. Neurology following, recs appreciated. Encephalopathy could also be contributed by prolonged hospitalization, hypernatremia, possible pneumonia.    #Subacute left frontal stroke with petechial hemorrhage: on ASA, statin. Neurology recs appreciated.    #Fever, resolved:  #Leukocytosis, improving: Respiratory cultures pending. UA was negative.  One of two blood cultures grow GPC in clusters.  Sputum culture growing beta-hemolytic strep. On IV Zosyn/Vanco, follow up final Cx results. Fever could also be central. ID recs appreciated.    #Severe pharyngeal dysphagia: NPO, NGT to be placed. SLP recs appreciated.    #Hypernatremia: On D5, monitor sodium.    #Hyperglycemia in the setting of T2DM and D5W as above: HDSSI with glargine. Holding home glipizide, metformin.    Chronic issues:  HTN: holding home losartan, amlodipine.    FEN:  D5W 150.  NPO. SLP following.    Ppx:  -VTE: SQH.  -Bowel regimen.    Code: Full  Dispo: Inpatient - currently in ICU     LOS: 11 days     Ida Pacheco DO  [facility]  Pager #: [phone]

## 2021-06-13 NOTE — PROGRESS NOTES
Vt Exp (549 mL)   Minute Ventilation (8.9 L/min)   Total Resp Rate  - 16 BPM  PIP Observed (24 cm H2O)   MAP (8 cm H2O)   Plateau Pressure (15 cm H2O)     Weaned for > 5 hrs and produced the following lung mechanics: NIF -25,  L, RSBI 34, Vt 521 ml; weaning terminated due to agitation/drowsyness. Current vent settings: VCV 16 500 40% 5. sats 99%. BS clear; RT will monitor.    NAMAN MaherT

## 2021-06-13 NOTE — PLAN OF CARE
Patient okay with NG insertion with lidocaine, but unable to advance do to some resistance, will update provider

## 2021-06-13 NOTE — PLAN OF CARE
Patient alert, orientation fluctuates with intermittent confusion. Patient denies pain, up most of the time tonight complaining of feeling thirsty, ice chips given. Patient with impulsive behaviors, easy to redirect but forgets very easily. Patient high risk for falls. Patient on RA, soft Bps, on TF, goal rate at 50 ml/hr, patient tolerating TF well. Patient's CT in place, output of 125 ml from left pleural and 70 from right pleural, serous, yellow drainage. Dressing on the CT site clean/dry/intact, Ct to water seal, no air leak. NGT in place, flushes 100 ml Q4 hours. Patient on telemetric cardiac monitor: Afib. Blood sugar at 0000 and 0600 am, 290 mg/dl and 244 mg/dl, 12 units and 9 units corrective insulin administered. Patient up on the recliner chair. Staff will continue to closely monitor the patient for now.     Problem: Pain  Goal: Patient's pain/discomfort is manageable  Outcome: Progressing     Problem: Safety  Goal: Patient will be injury free during hospitalization  Outcome: Progressing     Problem: Daily Care  Goal: Daily care needs are met  Outcome: Progressing     Problem: Psychosocial Needs  Goal: Demonstrates ability to cope with hospitalization/illness  Outcome: Progressing     Problem: Glucose Imbalance  Goal: Achieve optimal glucose control  Outcome: Progressing     Problem: Blood pressure is elevated above 140 over 90 mmHg  Goal: Blood pressure (BP) is within acceptable limits  Outcome: Progressing

## 2021-06-13 NOTE — PLAN OF CARE
Problem: Pain  Goal: Patient's pain/discomfort is manageable  Outcome: Progressing   PRN Toradol given.    Problem: Safety  Goal: Patient will be injury free during hospitalization  Outcome: Progressing   1:1 aide overnight.    Problem: Daily Care  Goal: Daily care needs are met  Outcome: Progressing     Problem: Psychosocial Needs  Goal: Demonstrates ability to cope with hospitalization/illness  Outcome: Progressing

## 2021-06-13 NOTE — PLAN OF CARE
Pt transferred to 4100 at 2330 from 5100  Problem: Pain  Goal: Patient's pain/discomfort is manageable  Outcome: Progressing   Denies pain overnight   Problem: Safety  Goal: Patient will be injury free during hospitalization  Outcome: Progressing   1:1 in room to prevent from pulling out lines/NG tube   Problem: Daily Care  Goal: Daily care needs are met  Outcome: Progressing   Turn and reposition, needs total assist with ADL's, incontinent of bowel, gardner catheter in place.   Problem: Psychosocial Needs  Goal: Demonstrates ability to cope with hospitalization/illness  Outcome: Progressing   Pt is confused, did swing out at staff when he first arrived to the floor, but has calmed since then and has settled in to new room, resting well  Problem: Activity Intolerance/Impaired Mobility  Goal: Mobility/activity is maintained at optimum level for patient  Outcome: Progressing   Turn and reposition in bed   Problem: Urinary Incontinence  Goal: Perineal skin integrity is maintained or improved  Outcome: Progressing   Gardner catheter in place, pt has two chest tubes in place

## 2021-06-13 NOTE — PROGRESS NOTES
Speech Language/Pathology  Speech Therapy Daily Progress Note    Patient presents as alert and cooperative during this session.  An  was not applicable. Remains on 1:1. NG tube to be placed this AM still.     Objective  Introduced pharyngeal exercises  Effortful Swallow: x15 dry swallows given mod verbal cuing     Assessment  Slow elicitation of effortful swallows despite verbal cuing and prompting. Patient observed to intermittently nod his head (move head) in place of swallows at time requiring more verbal prompting to not just nod head but to swallow. Notable reduction in hyolaryngeal movement.     Plan/Recommendations  Would be appropriate to trial CTAR and continue effortful swallow.     The ST Care Plan has been reviewed and current plan remains appropriate.    20 dysphagia minutes     Yokasta Erickson MS, CCC-SLP

## 2021-06-13 NOTE — PROGRESS NOTES
NEUROLOGY PROGRESS NOTE     ASSESSMENT & PLAN   Hospital Day#: 12    Impression: Left leg jerking post CABG-resolved with being on Keppra-may have been simple partial seizure.  2.  Left frontal ischemic stroke with hemorrhagic transformation.  May be contributing to swallowing difficulties as not unusual with ischemic strokes for swallowing difficulties.  We will check Keppra level in a.m. to make sure not becoming increasingly drowsy due to being on that medication.        Patient Active Problem List    Diagnosis Date Noted     Disorientation      Cerebrovascular accident (CVA) due to other mechanism (H)      Metabolic encephalopathy      Cerebrovascular accident (CVA), unspecified mechanism (H)      Seizures (H)      Acute respiratory failure with hypoxia (H)      On mechanically assisted ventilation (H)      S/P CABG (coronary artery bypass graft)      Uncontrolled type 2 diabetes mellitus with hyperglycemia (H)      Acute on chronic clinical systolic heart failure (H) 12/02/2020     Atrial fibrillation with rapid ventricular response (H) 12/02/2020     Diabetic leg ulcer (H) 12/02/2020     Acute on chronic systolic CHF (congestive heart failure) (H) 12/02/2020     A-fib (H) 12/02/2020     Acute systolic heart failure (H)      New onset atrial fibrillation (H)      Pulmonary edema cardiac cause (H) 09/19/2020     CAD (coronary artery disease) 09/19/2020     New onset a-fib (H) 09/19/2020     Moderate episode of recurrent major depressive disorder (H) 06/21/2019     Hyperlipidemia, unspecified hyperlipidemia type 04/27/2016     Joint Pain, Localized In The Shoulder      Obesity      ACP Staging Stage 1 Hypertension: 140-159 / 90-99      Coronary artery disease involving native heart with other form of angina pectoris, unspecified vessel or lesion type (H)      Type 2 diabetes mellitus (H)      Past Medical History: Patient  has a past medical history of ACP Staging Stage 1 Hypertension: 140-159 / 90-99, Atrial  fibrillation with rapid ventricular response (H) (12/2/2020), Coronary Artery Disease, Joint Pain, Localized In The Shoulder, Obesity, Other and unspecified hyperlipidemia (4/27/2016), and Type 2 Diabetes Mellitus With Complication.     SUBJECTIVE     Patient did horribly on swallow evaluation would be at high risk for aspiration.  Patient does not want NG tube.  Denies any headache.  No jerking of leg.     OBJECTIVE     Vital signs in last 24 hours  Temp:  [97.3  F (36.3  C)-98.8  F (37.1  C)] 97.8  F (36.6  C)  Heart Rate:  [107-125] 121  Resp:  [18-27] 24  BP: (116-166)/(72-98) 116/72    Weight:   216 lb 1.6 oz (98 kg)    I/O last 24 hours    Intake/Output Summary (Last 24 hours) at 12/14/2020 1231  Last data filed at 12/14/2020 1000  Gross per 24 hour   Intake 3408.78 ml   Output 1754 ml   Net 1654.78 ml       Review of Systems   See above    General Physical Exam: Patient is drowsy but oriented x3. Vital signs were reviewed and are documented in EMR. Neurological Exam:  Patient drowsy but will follow simple commands.  Will look to the left and the right.  Face appeared symmetric and tongue midline will squeeze with both hands the somewhat limited grasp on the left hand due to infiltrated IV.  No drift.  Babinski downgoing.     DIAGNOSTIC STUDIES     Pertinent Radiology   Radiology Results: No results found.    Pertinent Labs   Lab Results: reviewed.  Recent Results (from the past 24 hour(s))   POCT Glucose    Collection Time: 12/13/20  1:01 PM    Specimen: Capillary; Blood   Result Value Ref Range    Glucose 110 70 - 139 mg/dL   POCT Glucose    Collection Time: 12/13/20  1:53 PM    Specimen: Capillary; Blood   Result Value Ref Range    Glucose 104 70 - 139 mg/dL   Potassium    Collection Time: 12/13/20  2:12 PM   Result Value Ref Range    Potassium 3.6 3.5 - 5.0 mmol/L   Sodium    Collection Time: 12/13/20  2:12 PM   Result Value Ref Range    Sodium 146 (H) 136 - 145 mmol/L   POCT Glucose    Collection Time:  12/13/20  3:45 PM    Specimen: Blood   Result Value Ref Range    Glucose 151 (H) 70 - 139 mg/dL   POCT Glucose    Collection Time: 12/13/20  7:45 PM    Specimen: Blood   Result Value Ref Range    Glucose 184 (H) 70 - 139 mg/dL   Sodium    Collection Time: 12/13/20  8:47 PM   Result Value Ref Range    Sodium 142 136 - 145 mmol/L   POCT Glucose    Collection Time: 12/13/20 11:53 PM    Specimen: Blood   Result Value Ref Range    Glucose 211 (H) 70 - 139 mg/dL   Sodium    Collection Time: 12/14/20 12:12 AM   Result Value Ref Range    Sodium 142 136 - 145 mmol/L   Potassium    Collection Time: 12/14/20 12:12 AM   Result Value Ref Range    Potassium 3.7 3.5 - 5.0 mmol/L   POCT Glucose    Collection Time: 12/14/20  4:32 AM    Specimen: Blood   Result Value Ref Range    Glucose 263 (H) 70 - 139 mg/dL   POCT Glucose    Collection Time: 12/14/20  9:36 AM    Specimen: Blood   Result Value Ref Range    Glucose 197 (H) 70 - 139 mg/dL   ECG 12 lead nursing unit performed    Collection Time: 12/14/20  9:48 AM   Result Value Ref Range    SYSTOLIC BLOOD PRESSURE      DIASTOLIC BLOOD PRESSURE      VENTRICULAR RATE 117 BPM    ATRIAL RATE 119 BPM    P-R INTERVAL      QRS DURATION 138 ms    Q-T INTERVAL 416 ms    QTC CALCULATION (BEZET) 580 ms    P Axis      R AXIS -67 degrees    T AXIS 54 degrees    MUSE DIAGNOSIS       Atrial fibrillation with rapid ventricular response  Right bundle branch block  Left anterior fascicular block  ** Bifascicular block **  Abnormal ECG  When compared with ECG of 13-DEC-2020 07:11,  No significant change was found     POCT Glucose    Collection Time: 12/14/20 12:14 PM    Specimen: Blood   Result Value Ref Range    Glucose 192 (H) 70 - 139 mg/dL         HOSPITAL MEDICATIONS       acetaminophen  650 mg Oral Q6H    Or     acetaminophen  650 mg Rectal Q6H     amiodarone  150 mg Intravenous Once     aspirin  81 mg Enteral Tube DAILY     aspirin  150 mg Rectal DAILY     atorvastatin  80 mg Enteral Tube QHS      bisacodyL  10 mg Oral Once     famotidine (PEPCID) injection  20 mg Intravenous BID    Or     famotidine  20 mg Oral BID     heparin (PF) ANTICOAGULANT  5,000 Units Subcutaneous Q8H FIXED TIMES     insulin aspart (NovoLOG) injection   Subcutaneous Q4H FIXED TIMES     insulin glargine  5 Units Subcutaneous DAILY     levETIRAcetam (KEPPRA) IVPB  500 mg Intravenous Q12H     lidocaine (PF)  1-5 mL Intradermal Once     magnesium hydroxide  30 mL Oral DAILY     magnesium sulfate IVPB  2 g Intravenous Once     melatonin  3 mg Enteral Tube QHS     metoprolol tartrate  37.5 mg Oral BID     metoprolol tartrate  12.5 mg Oral 0330, 1300, 1700 - CV Metoprolol     piperacillin-tazobactam  3.375 g Intravenous Q8H     polyethylene glycol  17 g Enteral Tube DAILY     QUEtiapine  25 mg Oral QHS     sodium chloride bacteriostatic  1-5 mL Intradermal Once     sodium chloride  10 mL Intravenous Q8H FIXED TIMES     sodium chloride  3 mL Intravenous Q8H FIXED TIMES        Total time spent for face to face visit, reviewing labs/imaging studies, counseling and coordination of care was: 15 minutes. More than 50% of this time was spent on counseling and coordination of care.    Michael Bailey MD  [facility]  Direct Secure Messaging: [email or web address]  Tel: [phone]

## 2021-06-13 NOTE — PROGRESS NOTES
12/07/20 1308   Patient Data   ETCO2 (mmHg) 36 mmHg   $ ETCO2 Monitoring (Productivity) Yes   Vt (observed, mL) 655 mL   Minute Ventilation (L/min) 10.5 L/min   Total Resp Rate  16 BPM   PIP Observed (cm H2O) 26 cm H2O   MAP (cm H2O) 8   Plateau Pressure (cm H2O) 16 cm H2O   Static Compliance (L/cm H2O) 58   Dynamic Compliance (L/cm H2O) 48 L/cm H2O

## 2021-06-13 NOTE — PROGRESS NOTES
Hospitalist Progress Note    Assessment/Plan    A: Patient is a 71 y/o man who has multiple medical problems including diabetes mellitus type II, chronic systolic heart failure and coronary artery disease. Patient presented with dyspnea which appears to be from acute on chronic systolic heart failure. Echocardiogram showed LVEF 30-35%. Patient also has known multi-vessel coronary artery disease that was found on coronary angiogram on 21-Sep-2020. From review, patient had been advised to f/u for CABG in the past but had not yet done so. Diabetes mellitus type II is also uncontrolled and patient has reported symptoms consistent with diabetic neuropathy.     From chart review, patient had not been compliant with diet or medication in the past. Patient was advised to present to hospital on 05-Nov-2020 for acute heart failure but did not do so at that point. Patient had stated that he would present to the hospital the following week but it does not appear that he actually did so.     P:  1.) Acute on chronic systolic heart failure: Patient now off lasix. Monitoring for recurrent symptoms.  2.) Multi-vessel coronary artery disease: Patient currently on heparin drip. Current plan is for CABG tomorrow.  3.) Diabetes mellitus type II, uncontrolled, with hyperglycemia and with diabetic neuropathy: Patient to receive 25 units of lantus this evening as patient is to be NPO after midnight for planned surgery tomorrow.  4.) Atrial fibrillation: Patient on diltiazem. Xarelto on hold..  5.) Hypertension: Patient on metoprolol and losartan.   6.) Hyperlipidemia: Patient on atorvastatin.  7.) Bilateral leg wounds: Receiving wound care.      Principal Problem:    Acute on chronic clinical systolic heart failure (H)  Active Problems:    ACP Staging Stage 1 Hypertension: 140-159 / 90-99    Type 2 diabetes mellitus (H)    Hyperlipidemia, unspecified hyperlipidemia type    CAD (coronary artery disease)    Atrial fibrillation with rapid  ventricular response (H)    Diabetic leg ulcer (H)    Acute on chronic systolic CHF (congestive heart failure) (H)    A-fib (H)    Uncontrolled type 2 diabetes mellitus with hyperglycemia (H)      Barriers to Discharge:  Awaiting CABG      Subjective    Patient reports feeling well, notes no pain, notes no new problems.     Objective    Vital signs in last 24 hours  Temp:  [97.8  F (36.6  C)-98.7  F (37.1  C)] 97.9  F (36.6  C)  Heart Rate:  [70-73] 70  Resp:  [16-20] 18  BP: (106-130)/(63-84) 106/66 90% O2 Device: None (Room air) O2 Flow Rate (L/min): 2 L/min  Weight:   220 lb 6.4 oz (100 kg) Weight change: 4.8 oz (0.136 kg)    Intake/Output last 3 shifts  I/O last 3 completed shifts:  In: 1743 [P.O.:1200; I.V.:543]  Out: 2900 [Urine:2900]  Body mass index is 31.62 kg/m .    Physical Exam    General:     Patient comfortable, NAD.   HEENT:     No scleral icterus or conjunctival injection.   Heart:    RRR, S1 S2 w/o murmurs.   Lungs:     Breath sounds present. No crackles/wheezes heard.   Abdomen:   Soft, nontender.     Pertinent Labs   Lab Results: personally reviewed.   Results from last 7 days   Lab Units 12/06/20  0926 12/03/20  0704 12/02/20  1416   LN-SODIUM mmol/L 134* 139 139   LN-POTASSIUM mmol/L 3.7 3.7 4.0   LN-CHLORIDE mmol/L 97* 105 104   LN-CO2 mmol/L 26 27 25   LN-BLOOD UREA NITROGEN mg/dL 15 23* 17   LN-CREATININE mg/dL 0.73 0.75 0.91   LN-CALCIUM mg/dL 8.4* 8.7 9.1   LN-ALBUMIN g/dL  --   --  3.2*   LN-PROTEIN TOTAL g/dL  --   --  7.4   LN-BILIRUBIN TOTAL mg/dL  --   --  0.8   LN-ALKALINE PHOSPHATASE U/L  --   --  169*   LN-ALT (SGPT) U/L  --   --  9   LN-AST (SGOT) U/L  --   --  23   LN-MAGNESIUM mg/dL  --  1.8  --      Results from last 7 days   Lab Units 12/05/20  0629 12/02/20  1418   LN-WHITE BLOOD CELL COUNT thou/uL  --  9.4   LN-HEMOGLOBIN g/dL 14.0 14.7   LN-HEMATOCRIT %  --  47.7   LN-PLATELET COUNT thou/uL 276 309     Results from last 7 days   Lab Units 12/03/20  0704 12/03/20  0106  12/02/20  1815   LN-TROPONIN I ng/mL 1.60* 1.88* 1.63*     Results from last 7 days   Lab Units 12/06/20  1143 12/06/20  0802 12/05/20  2145 12/05/20  1639 12/05/20  1212 12/05/20  0736 12/04/20  2042 12/04/20  1702 12/04/20  1113 12/04/20  0749   LN-POC GLUCOSE FINGERSTICK- HE mg/dL 243* 232* 210* 269* 259* 229* 189* 163* 227* 258*       Medications  Scheduled Meds:    aspirin  162 mg Oral DAILY     atorvastatin  80 mg Oral QHS     diltiazem  120 mg Oral DAILY     insulin aspart (NovoLOG) injection   Subcutaneous TID with meals     insulin aspart (NovoLOG) injection   Subcutaneous TID with meals     insulin glargine  40 Units Subcutaneous QHS     losartan  100 mg Oral DAILY     metoprolol succinate  100 mg Oral BID     sodium chloride  2.5 mL Intravenous Line Care     sodium chloride  3 mL Intravenous Line Care     sodium chloride  3 mL Intravenous Line Care     Continuous Infusions:    heparin 1,650 Units/hr (12/06/20 0824)     PRN Meds:.acetaminophen, acetaminophen, bisacodyL, dextrose 50 % (D50W), glucagon (human recombinant), hydrALAZINE, magnesium hydroxide, naloxone **OR** naloxone **OR** naloxone **OR** naloxone, nitroglycerin, ondansetron **OR** ondansetron, polyethylene glycol, sodium chloride bacteriostatic, traMADoL

## 2021-06-13 NOTE — PROGRESS NOTES
RCAT Treatment Plan      Patient Score: 13  Patient Acutiy: 3    Clinical Indication for Therapy: prevent atelectasis, decrease secretions    Therapy Ordered: bronchial hygiene, mucomyst, albuterol    Assessment Summary: Pt currently on a 3 lpm NC resting comfortably.  Breath sounds are diminished with scattered rhonchi.  Pt with a strong cough, but does not always follow commands.  Will continue with current therapy and reassess if needed.       12/10/20 7453   Reason for Assessment (RCAT)   RCAT Assesment Initial   Assessment Reason  Cardiac surgery   $ RCAT Eval Time 30 min.  Yes   Vitals (RCAT)   Heart Rate (!) 134   Resp 16   SpO2 96 %   Chart Assessment (RCAT)   Pulmonary Status  0   Surgical Status  3   Chest Xray  1   Patient Assessment (RCAT)    Respiratory Pattern  0   Mental Status  1   Breath Sounds  4   Cough Effectiveness  1   Level of Activity  2   O2 Required SpO2 >= 92%  1   Chart + Pt. Assessment Total Points  13   RCAT Acuity Score 13 (Acuity 3)             Susana Arias, LRT
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
## 2021-06-13 NOTE — PROGRESS NOTES
12/10/20 2010   Respiratory Treatments    Post-Treatment Pulse 101   Post-Treatment Respirations 21   Post-Treatment Sp02 100   Breath Sounds Post-Treatment Right Crackles;Diminished   Breath Sounds Post-Treatment Left Crackles;Diminished     Patient is seen for neb treatment. Shallow breathing are not noted. BS crackle; pt is only achieving < 500 ml of IS. The cough is productive. RT will monitor.    NAMAN MaherT

## 2021-06-13 NOTE — CONSULTS
PULMONARY / CRITICAL CARE CONSULTATION NOTE      Consultation - Pulmonary/Critical Care Medicine  Mika Alvarez,  1950, MRN [id number]  Date / Time of Admission:  2020  1:57 PM    Admitting Dx: Acute systolic heart failure (H) [I50.21]  Diabetes mellitus without complication (H) [E11.9]  Chronic atrial fibrillation (H) [I48.20]  NSTEMI (non-ST elevated myocardial infarction) (H) [I21.4]    PCP: Angel Claire MD, [phone]  Consulting physician:  Quinn Patel MD*   Code status:  Full Code       Extended Emergency Contact Information  Primary Emergency Contact: Bel Alvarez  Address: [address] [place] [address]  Home Phone: [phone]  Mobile Phone: [phone]  Relation: Spouse  Secondary Emergency Contact: GUILLERMO CORREIA   [place]  Mobile Phone: [phone]  Relation: Friend       ID:  Mika Alvarez is a 70 y.o. male who is status post CABG.     ASSESSMENT     70 y M with a history of HTN, HLD, poorly controlled DM, systolic and diastolic CHF, multi vessel CAD who is s/p 4v CABG with Dr. Madison 20.    Advance Directives:  Full Code     PLAN   Systems to Assess:     Pulmonary: Wean supplemental O2 as tolerated; goal O2 sat > 92%.  HOB > 30 degrees to limit aspiration risk.      Check ABG and adjust support as indicated; avoid acidotic state.     Check CXR    Once hemodynamically stable, plan to proceed to wake, wean, and extubate, per CVS pathway     Continuous EtCO2 while intubated    Pleural effusions drained intra-op    Ok for fast track to extubate    Cardiovascular: Cardiac monitoring.     SBP goal > 90 mmHg, MAP goal > 65 mmHg.      Goal CI 2-3     Goal PAD 20    Vasoactive gtts per CV-surgery.     Temporary pacing wires present; will use in setting of symptomatic arrhythmia.     Neurological: Neuro checks per ICU protocol.     Sedate with precedex until ready to wean and extubate.     Pain control:  PRN    GI/:     NPO until extubated and fully awake.     Koenig catheter in place for accurate measurement of I/O.     GI prophylaxis:  Omeprazole    Renal: Monitor I/O's.  Electrolyte repletion PRN.  Avoid/limit nephrotoxic agents.     IVFs: per CV-surgery    Heme/Coag: Monitor H/H.     Transfuse per CV-surgery.     Monitor chest tube output hourly; notify CV-surgery for excessive output or abrupt stop in output.     DVT prophylaxis:  SubQ heparin    Infectious disease: General precautions.     Remove lines and drains once no longer required.     Endocrine:     RHI gtt per ICU protocol.     Musculoskeletal:     Bedrest; initiate mobility protocol.       Code Status:  Full Code    Thank you for this interesting consultation.  Please call if any questions or concerns.      This patient is considered critically ill and requires ICU level of care due to immediate post CV-surgical procedure. High risk for acute hemodynamic collapse and death.     Total Critical Care time, not including separate billable procedure time:  41 minutes.      Deysi Littlejohn MD  Pulmonary and Critical Care Medicine  [facility]  Office: [phone]       Chief Complaint Chief Complaint   Patient presents with     Shortness of Breath          HPI      70 y M with a history of HTN, HLD, poorly controlled DM, systolic and diastolic CHF, multi vessel CAD who is s/p 4v CABG with Dr. Madison 12/7/20.    Uncomplicated case.  No airway difficulties  Received Methadone, receiving reversal  Cell saver, ddAVP  AV pacer, underlying rhythm NS    On epi, precedex, insulin, amicar infusing  R pleural effusion was draioned         Review of Systems   Review of systems not obtained due to inability to communicate with the patient.      Active Problem List   Patient Active Problem List    Diagnosis Date Noted     Uncontrolled type 2 diabetes mellitus with hyperglycemia (H)      Acute on chronic clinical systolic heart failure (H) 12/02/2020      Atrial fibrillation with rapid ventricular response (H) 12/02/2020     Diabetic leg ulcer (H) 12/02/2020     Acute on chronic systolic CHF (congestive heart failure) (H) 12/02/2020     A-fib (H) 12/02/2020     Acute systolic heart failure (H)      New onset atrial fibrillation (H)      Pulmonary edema cardiac cause (H) 09/19/2020     CAD (coronary artery disease) 09/19/2020     New onset a-fib (H) 09/19/2020     Moderate episode of recurrent major depressive disorder (H) 06/21/2019     Hyperlipidemia, unspecified hyperlipidemia type 04/27/2016     Joint Pain, Localized In The Shoulder      Obesity      ACP Staging Stage 1 Hypertension: 140-159 / 90-99      Coronary artery disease involving native heart with other form of angina pectoris, unspecified vessel or lesion type (H)      Type 2 diabetes mellitus (H)          Medical/Surgical History   Past Medical History:   Diagnosis Date     ACP Staging Stage 1 Hypertension: 140-159 / 90-99     Created by Conversion  Replacement Utility updated for latest IMO load     Atrial fibrillation with rapid ventricular response (H) 12/2/2020     Coronary Artery Disease     Created by Conversion  Replacement Utility updated for latest IMO load     Joint Pain, Localized In The Shoulder     Created by Conversion      Obesity     Created by Conversion      Other and unspecified hyperlipidemia 4/27/2016     Type 2 Diabetes Mellitus With Complication     Created by Conversion      Past Surgical History:   Procedure Laterality Date     CV CORONARY ANGIOGRAM N/A 9/22/2020    Procedure: Coronary Angiogram;  Surgeon: Darin Perez MD;  Location: [facility] Cath Lab;  Service: Cardiology     CV LEFT HEART CATHETERIZATION WITH LEFT VENTRICULOGRAM N/A 9/22/2020    Procedure: Left Heart Catheterization with Left Ventriculogram;  Surgeon: Darin Perez MD;  Location: [facility] Cath Lab;  Service: Cardiology         Allergies   No Known Allergies      Medications:  INpatient  medications     acetaminophen  650 mg Oral Q6H    Or     acetaminophen  650 mg Rectal Q6H     [MAR Hold] aspirin  162 mg Oral DAILY     [START ON 12/8/2020] aspirin  81 mg Oral DAILY     [MAR Hold] atorvastatin  80 mg Oral QHS     [START ON 12/8/2020] bisacodyL  10 mg Oral Once     [START ON 12/10/2020] bisacodyL  10 mg Rectal Once     ceFAZolin (ANCEF) IV  2 g Intravenous Q8H     chlorhexidine  15 mL Topical Q12H 09-21     [MAR Hold] diltiazem  120 mg Oral DAILY     [START ON 12/8/2020] docusate sodium  100 mg Oral BID     famotidine (PEPCID) injection  20 mg Intravenous BID    Or     famotidine  20 mg Oral BID     [START ON 12/8/2020] heparin (PF) ANTICOAGULANT  5,000 Units Subcutaneous Q8H FIXED TIMES     [MAR Hold] losartan  100 mg Oral DAILY     [START ON 12/9/2020] magnesium hydroxide  30 mL Oral DAILY     melatonin  3 mg Oral QHS     [MAR Hold] metoprolol succinate  100 mg Oral BID     [START ON 12/8/2020] polyethylene glycol  17 g Oral DAILY     [MAR Hold] sodium chloride  2.5 mL Intravenous Line Care     [MAR Hold] sodium chloride  3 mL Intravenous Line Care           Family History  Social History     Reviewed  Family History   Problem Relation Age of Onset     Heart disease Father        Reviewed  Social History     Socioeconomic History     Marital status:      Spouse name: Not on file     Number of children: Not on file     Years of education: Not on file     Highest education level: Not on file   Occupational History     Not on file   Social Needs     Financial resource strain: Not on file     Food insecurity     Worry: Not on file     Inability: Not on file     Transportation needs     Medical: Not on file     Non-medical: Not on file   Tobacco Use     Smoking status: Never Smoker     Smokeless tobacco: Never Used   Substance and Sexual Activity     Alcohol use: Yes     Frequency: Monthly or less     Drinks per session: 1 or 2     Comment: socially      Drug use: Never     Sexual activity: Not  "on file   Lifestyle     Physical activity     Days per week: Not on file     Minutes per session: Not on file     Stress: Not on file   Relationships     Social connections     Talks on phone: Not on file     Gets together: Not on file     Attends Buddhist service: Not on file     Active member of club or organization: Not on file     Attends meetings of clubs or organizations: Not on file     Relationship status: Not on file     Intimate partner violence     Fear of current or ex partner: Not on file     Emotionally abused: Not on file     Physically abused: Not on file     Forced sexual activity: Not on file   Other Topics Concern     Not on file   Social History Narrative     Not on file      Psychosocial Needs  Social History     Social History Narrative     Not on file     Additional psychosocial needs reviewed per nursing assessment.      Physical Exam   VITALS:  /62   Pulse 73   Temp 97.2  F (36.2  C) (Bladder)   Resp 16   Ht 5' 10\" (1.778 m)   Wt 218 lb 8 oz (99.1 kg)   SpO2 98%   BMI 31.35 kg/m    Temp:  [97.2  F (36.2  C)-98.1  F (36.7  C)] 97.2  F (36.2  C)  Heart Rate:  [65-80] 73  Resp:  [16-20] 16  BP: (111-154)/(62-83) 114/62  SpO2:  [92 %-100 %] 98 %  ETCO2 (mmHg):  [36 mmHg] 36 mmHg  FiO2 (%):  [100 %] 100 %    PHYSICAL EXAM:   GEN: Intubated, sedated  HEENT:  ETT in place  NECK: Supple.    PULM:  Clear in anterior lung fields  CVS:   RRR. Chest tubes in place  ABDOMEN:  Obese, soft, hypoactive BS  EXTREMITIES/SKIN:  Warm. Wrapped LE.  NEURO:  Sedated. Unable to obtain    I&O:      Intake/Output Summary (Last 24 hours) at 12/7/2020 1349  Last data filed at 12/7/2020 1256  Gross per 24 hour   Intake 2840 ml   Output 1240 ml   Net 1600 ml        Pertinent Labs   Lab Results: personally reviewed.   Serum Glucose range:  Recent Labs     12/07/20  1133 12/07/20  1214   POCGLU 144* 145*     Recent Labs     12/07/20  0957   PHART 7.36*   DOT0HUP 52*   PO2ART 59*   OXYHB 87.1*   BEARTCALC 3.7 "   TEMP 37.0     CBC:  Results from last 7 days   Lab Units 12/07/20  1319 12/07/20  1212 12/05/20  0629 12/02/20  1418   LN-WHITE BLOOD CELL COUNT thou/uL 23.1* 22.7*  --  9.4   LN-HEMOGLOBIN g/dL 13.2* 11.6* 14.0 14.7   LN-HEMATOCRIT % 41.2 36.7*  --  47.7   LN-PLATELET COUNT thou/uL 247 208 276 309   LN-NEUTROPHILS RELATIVE PERCENT % 84*  --   --   --    LN-MONOCYTES RELATIVE PERCENT % 6  --   --   --      Chemistry:  Results from last 7 days   Lab Units 12/07/20  1319 12/06/20  0926 12/03/20  0704 12/02/20  1416   LN-SODIUM mmol/L 140 134* 139 139   LN-POTASSIUM mmol/L 3.4* 3.7 3.7 4.0   LN-CHLORIDE mmol/L 108* 97* 105 104   LN-CO2 mmol/L 25 26 27 25   LN-BLOOD UREA NITROGEN mg/dL 16 15 23* 17   LN-CREATININE mg/dL 0.73 0.73 0.75 0.91   LN-CALCIUM mg/dL 8.2* 8.4* 8.7 9.1   LN-MAGNESIUM mg/dL 2.9*  --  1.8  --    LN-ALBUMIN g/dL  --   --   --  3.2*   LN-ALT (SGPT) U/L  --   --   --  9   LN-AST (SGOT) U/L  --   --   --  23   LN-ALKALINE PHOSPHATASE U/L  --   --   --  169*   LN-BILIRUBIN TOTAL mg/dL  --   --   --  0.8     Coags:  Lab Results   Component Value Date    INR 1.26 (H) 12/07/2020    INR 1.35 (H) 12/07/2020    INR 1.08 12/02/2020     Cardiac Markers:  Results from last 7 days   Lab Units 12/03/20  0704 12/03/20  0106 12/02/20  1815   LN-TROPONIN I ng/mL 1.60* 1.88* 1.63*      Cardiac/Radiology Studies   Cardiac:  Echo:    Normal left ventricular size. Mild to moderate left ventricular hypertrophy.    Left ventricle ejection fraction is moderately globally reduced. Left ventricular ejection fraction estimated at 30 to 35%. Abnormal septal motion.    Right ventricle not optimally visualized. Right ventricular systolic function is reduced. Abnormal TAPSE    Moderate left atrial enlargement. Mild right atrial enlargement.    Aortic valve sclerosis without Doppler evidence to suggest aortic stenosis. Mild aortic insufficiency.    Nonspecific thickening of the mitral valve leaflets with mild mitral  regurgitation.    Mild tricuspid regurgitation.    Mild enlargement of the aortic root    Radiology:    Chest X-Ray: bilateral pleural effusions, R > L

## 2021-06-13 NOTE — PROGRESS NOTES
Care Management Follow Up Note    Length of Stay (days) 19     Patient plan of care discussed at Interdisciplinary Rounds: yes  CM Patient/Caregiver Stated Goals: Discharge to TCU             Expected Discharge Date: 20(pending)  Concerns to be Addressed / Barriers to Discharge:  IV Lasix, Patient on 1:1, Chest tubes, NJ tube - pending video swallow study, will the patient have a more long-term feeding tube placed? If so, we need a feeding plan.    Anticipated Discharge Disposition: Skilled Nursing Facility vs. Acute Rehab vs. LTACH  Anticipated Discharge Services:   TBD  Selected Continued Care - Admitted Since 2020    No services have been selected for the patient.        Anticipated Discharge DME:  TBD    Plan:  The patient had a CABG on , and the post-op course was complicated by a subacute left frontal infarct. Subsequent to this, he failed a video swallow and had a NJ tube placed for tube feeding. The patient has a video swallow study to be completed on , and pending the results, there may be a need for a more long-term tube feeding plan.     The question for placement of this patient comes down to his hospital progression. For him to be accepted with [facility], he will need the followin) be off 1:1 for at least 24 hours, 2) be off IV Lasix, 3) have the chest tubes removed, and 4) have a plan for tube feeding, or simply not need tube feeding. Dipti, with [facility], has requested a consult from Physical Medicine and Rehab, and she will be monitoring the patient's hospital course.     Writer also spoke with [facility] (extension [phone]) on LTACH to update her of the patient's status. The patient may still be more appropriate for LTACH pending his progression.     Anthony Crews RN

## 2021-06-13 NOTE — PROGRESS NOTES
Patient was transported to MRI and back to ICU, on MRI vent.  Patient tolerated well.  Transport was done with no complications.  When returned to room was placed back on ventilator on the following settings:    Vent Mode: VCV  FiO2 (%):  [30 %] 30 %  S RR:  [16] 16  S VT:  [500 mL] 500 mL  PEEP/CPAP (cm H2O):  [5 cm H2O] 5 cm H2O  Minute Ventilation (L/min):  [1.2 L/min-9.7 L/min] 9.6 L/min  PIP:  [23 cm H2O-44 cm H2O] 25 cm H2O  MAP (cm H2O):  [8-14] 8    Anson Fuentes, NAMANT

## 2021-06-13 NOTE — PROGRESS NOTES
Hospitalist Progress Note      Date of Service: 12/16/2020     Brief summary:    Mika Michelle a 70 y.o.male with past medical history of T2DM, multivessel CAD, chronic systolic and diastolic CHF, recent diagnosis of A. fib, who presented with acute CHF, was diuresed and subsequently underwent four-vessel CABG by Dr. Madison on 12/7/2020. Post-op course complicated by Afib with RVR, which was initially treated with IV amiodarone given pt's NPO status due to severe dysphagia.     Additionally, hospitalization has been complicated by altered mentation and seizure-like activity on 12/8. CTH on 12/8 showed punctate focus of increased attenuation within the subcortical region of the left frontal lobe. Subsequent MRI of the brain on 12/9 demonstrated subacute 2.5 cm subacute infarct with petechial hemorrhage in the left frontal lobe. EEG did not show any seizure activity, however Keppra was started 12/10 with resolution of rhythmic left leg movements. Neurology following.     Patient was extubated 12/10 and has been weaned down to room air. He was started on broad spectrum antibiotics 12/10 due to new fever and worsening leukocytosis. 1/2 blood Cx grew staph epidermidis, and sputum cultures showed respiratory miryam with 4+ beta hemolytic Strep. Pt is currently on IV pip-tazo.     VFSS with mild oral and severe pharyngeal dysphagia - NGT placed on 12/15. This may have exacerbated hypernatremia, which improved with D5W prior to NGT placement.       Assessment and Plan     Principal Problem:    Acute on chronic clinical systolic heart failure (H)  Active Problems:    ACP Staging Stage 1 Hypertension: 140-159 / 90-99    Type 2 diabetes mellitus (H)    Hyperlipidemia, unspecified hyperlipidemia type    CAD (coronary artery disease)    Atrial fibrillation with rapid ventricular response (H)    Diabetic leg ulcer (H)    Acute on chronic systolic CHF (congestive heart failure) (H)    A-fib (H)    Uncontrolled type 2 diabetes  mellitus with hyperglycemia (H)    Acute respiratory failure with hypoxia (H)    On mechanically assisted ventilation (H)    S/P CABG (coronary artery bypass graft)    Seizures (H)    Cerebrovascular accident (CVA), unspecified mechanism (H)    Cerebrovascular accident (CVA) due to other mechanism (H)    Metabolic encephalopathy    Disorientation    Acute kidney failure with tubular necrosis (H)    Multivessel CAD, s/p CABG and PVI/LAAL on 12/7/2020:   Chest tubes+  Hemodynamically stable  On ASA, statin, metoprolol, IV diuretics.    Mgmt as per CV Surgery.     A Fib with RVR, now s/p PVI and DONNA excision:   Oral amiodarone, metoprolol. Prn metoprolol.  Currently NSR  Mgmt as per Cardiology and CV Surgery following.   Cardiac tele.   Holding AC until cleared by Neurology.     Acute diastolic heart failure, improved.   Holding home bumetanide for now. On IV diuretics  . Mgmt as per Cardiology.     Acute hypoxic respiratory failure, resolved: On room air.   Encourage IC.   Supplemental O2 to keep sats >94%.  Continue pulmonary toilet- On metaneb, flutter valve      Acute Metabolic encephalopathy: Encephalopathy could also be contributed by prolonged hospitalization, hypernatremia, possible pneumonia, medications. CT Head from 12/12 showed no acute changes.   EEG did not show seizure activity, but pt's rhythmic left leg movement appear to be have improved with Keppra. Neurology following, recs appreciated.   Keppra level wnl, pt was started on quetiapine on 12/14   Somnolent today  Continue keppra  Decrease seroquel due to somnolence      Subacute left frontal stroke with petechial hemorrhage:   on ASA, statin.   Neurology recs appreciated.     Fever, resolved:  Leukocytosis: UA was negative. One of two blood cultures grew staph epi - ?contaminant. Sputum culture growing beta-hemolytic strep. On IV pip-tazo for 7d. Fever could also be central. ID recs appreciated. WBC continues to trend up, but pt has not had a  "fever.   Will continue to monitor.     Severe pharyngeal dysphagia: on NG and tube feeding.      Hyperglycemia in the setting of T2DM and D5W as above:   HDSSI with glargine. Holding home glipizide, metformin.     Hypernatremia, resolved.    HTN: holding home losartan, amlodipine.     Code status Full Code  DVT prophylaxis: heparin sq    Barrier's to discharge : chest tube, encephalopathy, dysphagia  Anticipated discharge date:  Several days expected  Disposition:  Likely TCU    Subjective   Somnolent, weak and tired, answers slowly, able to tell me his name and he is in hospital for heart attack. Denies chest pain, shortness of breath, or discomfort. Thirsty.    Chart reviewed, events noted. Pt seen and examined.     Objective  Interval history:    Vital signs in last 24 hours:  Temp:  [97.4  F (36.3  C)-98.5  F (36.9  C)] 98.4  F (36.9  C)  Heart Rate:  [81-97] 81  Resp:  [20-25] 20  BP: ()/(64-89) 110/77    /77 (Patient Position: Semi-hooks)   Pulse 81   Temp 98.4  F (36.9  C) (Axillary)   Resp 20   Ht 5' 10\" (1.778 m)   Wt 207 lb 4.8 oz (94 kg)   SpO2 92%   BMI 29.74 kg/m      Weight:    207 lb 4.8 oz (94 kg)  Weight change: -4 lb 11.2 oz (-2.132 kg)  Body mass index is 29.74 kg/m .  BS reviewed     Intake/Output last 3 shifts:   I/O last 3 completed shifts:  In: 263.4 [I.V.:33.4; NG/GT:180; IV Piggyback:50]  Out: 3160 [Urine:2500; Chest Tube:660]    Body mass index     Physical Exam:       General- Tired, sleepy    HEENT- Atraumatic    Chest- B/L air entry, no wheeze, no crackles, not in distress , chest tubes+    CVS- S1S2 is   regular rate and rhythm, no murmur noted    Abd- Soft, non tender, non distended, BS is present   CNS-  Awake, alert, conversant, moves extremity grossly      Ext- B/L no pedal edema     Lab results: Personally reviewed  Results from last 7 days   Lab Units 12/16/20  0546 12/15/20  0623 12/14/20  1300 12/13/20  0430 12/13/20  0430 12/10/20  0400 12/10/20  0400 "   LN-SODIUM mmol/L 144 142 143   < > 149*   < > 141   LN-POTASSIUM mmol/L 3.6 3.7 3.6   < > 3.2*   < > 3.6  3.6   LN-CHLORIDE mmol/L 113*  --  111*  --  117*   < > 108*   LN-CO2 mmol/L 21*  --  26  --  25   < > 23   LN-BLOOD UREA NITROGEN mg/dL 16  --  12  --  20   < > 29*   LN-CREATININE mg/dL 1.10 1.06 0.97  --  0.82   < > 0.73   LN-CALCIUM mg/dL 7.6*  --  7.8*  --  8.0*   < > 8.3*   LN-ALBUMIN g/dL  --   --   --   --   --   --  2.0*   LN-PROTEIN TOTAL g/dL  --   --   --   --   --   --  5.3*   LN-BILIRUBIN TOTAL mg/dL  --   --   --   --   --   --  0.9   LN-ALKALINE PHOSPHATASE U/L  --   --   --   --   --   --  63   LN-ALT (SGPT) U/L  --   --   --   --   --   --  <9   LN-AST (SGOT) U/L  --   --   --   --   --   --  17   LN-MAGNESIUM mg/dL  --  2.0 1.9  --  1.8   < > 2.0    < > = values in this interval not displayed.     Results from last 7 days   Lab Units 12/16/20  0546 12/15/20  1045 12/14/20  1305   LN-WHITE BLOOD CELL COUNT thou/uL 13.8* 18.3* 17.7*   LN-HEMOGLOBIN g/dL 11.3* 12.1* 11.7*   LN-HEMATOCRIT % 37.0* 40.4 38.5*   LN-PLATELET COUNT thou/uL 263 335 369           MOST RECENT A1c, Iron, TIBC, Coags, TFTs  Lab Results   Component Value Date    HGBA1C 12.6 (H) 12/03/2020    INR 1.41 (H) 12/08/2020    PTT 44 (H) 12/07/2020     No results found for: IRON, TRANSFERRIN  Lab Results   Component Value Date    TSH 2.34 09/21/2020       Radiology results: Personally reviewed impression    Medication: Current medication personally reviewed.      Advanced Care Planning:     Discharge plan was discussed with pt, RN, CM    Vince Tejeda MD  [facility]  Pager [phone]

## 2021-06-13 NOTE — ANESTHESIA PREPROCEDURE EVALUATION
Anesthesia Evaluation      Patient summary reviewed   No history of anesthetic complications     Airway   Mallampati: II  Neck ROM: full   Pulmonary - negative ROS and normal exam                          Cardiovascular - normal exam  Exercise tolerance: > or = 4 METS  (+) hypertension, CAD, dysrhythmias, angina with exertion, CHF, cardiomyopathy, hypercholesterolemia,     ECG reviewed        Neuro/Psych      Endo/Other    (+) diabetes mellitus type 2 poorly controlled using insulin, arthritis, obesity,      GI/Hepatic/Renal - negative ROS           Dental - normal exam                        Anesthesia Plan  Planned anesthetic: general endotracheal  50 mg ketamine IV on induction.  20 mg methadone IV on induction.  4 grams magnesium IV.  GERRI  ASA 4   Induction: intravenous   Anesthetic plan and risks discussed with: patient  Anesthesia plan special considerations: antiemetics, CVP line, arterial catheterization, pulmonary artery catheterization, dexmedetomidine  Post-op plan: extended intubation/vent support

## 2021-06-13 NOTE — PLAN OF CARE
Pt A&Ox4, VSS. Pt denies shortness of breath, dizziness, or numbness/tingling. Pt had burning leg pain rated 10/10, 1000 mg of Tylenol and 50 mg of tramadol was given, legs were elevated, pt said that pain had decreased to a tolerable level. Tele atrial fibrillation with BBB. Lungs sound clear and diminished with some fine crackles in the bases on RA. Lower extremities are reddened, pt has many leg wounds, wounds cleansed with NS, Vaseline gauze with ABP pad was applied and then wrapped with Kerlix until WOC can see. Pt is voiding well overnight. Pt needs and comfort measures are being addressed, call light within reach, will continue to monitor and follow plan of care.    Problem: Pain  Goal: Patient's pain/discomfort is manageable  Outcome: Progressing     Problem: Safety  Goal: Patient will be injury free during hospitalization  Outcome: Progressing     Problem: Daily Care  Goal: Daily care needs are met  Outcome: Progressing     Problem: Psychosocial Needs  Goal: Demonstrates ability to cope with hospitalization/illness  Outcome: Progressing  Goal: Collaborate with patient/family/caregiver to identify patient specific goals for this hospitalization  Outcome: Progressing     Problem: Discharge Barriers  Goal: Patient's discharge needs are met  Outcome: Progressing     Problem: Glucose Imbalance  Goal: Achieve optimal glucose control  Outcome: Progressing     Problem: Blood pressure is elevated above 140 over 90 mmHg  Goal: Blood pressure (BP) is within acceptable limits  Outcome: Progressing

## 2021-06-13 NOTE — PROGRESS NOTES
NEUROLOGY PROGRESS NOTE     ASSESSMENT & PLAN   Hospital Day#: 15    Impression: 1.  Left leg jerking-no recurrence while on levetiracetam.  2.  Left frontal stroke with hemorrhagic transformation.  Clinically he is more interactive today though there has been times of fluctuation.  Nothing further to add neurologically at this time.  We will sign off.  Please call if any questions.  Would recommend follow-up with either Dr. Francis or myself approximately 1 month post discharge to see about whether he needs to continue on levetiracetam or not.        Patient Active Problem List    Diagnosis Date Noted     Acute kidney failure with tubular necrosis (H) 12/16/2020     Disorientation      Cerebrovascular accident (CVA) due to other mechanism (H)      Metabolic encephalopathy      Cerebrovascular accident (CVA), unspecified mechanism (H)      Seizures (H)      Acute respiratory failure with hypoxia (H)      On mechanically assisted ventilation (H)      S/P CABG (coronary artery bypass graft)      Uncontrolled type 2 diabetes mellitus with hyperglycemia (H)      Acute on chronic clinical systolic heart failure (H) 12/02/2020     Atrial fibrillation with rapid ventricular response (H) 12/02/2020     Diabetic leg ulcer (H) 12/02/2020     Acute on chronic systolic CHF (congestive heart failure) (H) 12/02/2020     A-fib (H) 12/02/2020     Acute systolic heart failure (H)      New onset atrial fibrillation (H)      Pulmonary edema cardiac cause (H) 09/19/2020     CAD (coronary artery disease) 09/19/2020     New onset a-fib (H) 09/19/2020     Moderate episode of recurrent major depressive disorder (H) 06/21/2019     Hyperlipidemia, unspecified hyperlipidemia type 04/27/2016     Joint Pain, Localized In The Shoulder      Obesity      ACP Staging Stage 1 Hypertension: 140-159 / 90-99      Coronary artery disease involving native heart with other form of angina pectoris, unspecified vessel or lesion type (H)      Type 2  diabetes mellitus (H)      Past Medical History: Patient  has a past medical history of ACP Staging Stage 1 Hypertension: 140-159 / 90-99, Atrial fibrillation with rapid ventricular response (H) (12/2/2020), Coronary Artery Disease, Joint Pain, Localized In The Shoulder, Obesity, Other and unspecified hyperlipidemia (4/27/2016), and Type 2 Diabetes Mellitus With Complication.     SUBJECTIVE     Patient feeling better at this time.  No leg jerking noted.     OBJECTIVE     Vital signs in last 24 hours  Temp:  [97.4  F (36.3  C)-98.7  F (37.1  C)] 98.6  F (37  C)  Heart Rate:  [] 92  Resp:  [16-20] 18  BP: (105-132)/(72-86) 118/72    Weight:   214 lb 9.6 oz (97.3 kg)    I/O last 24 hours    Intake/Output Summary (Last 24 hours) at 12/17/2020 0947  Last data filed at 12/17/2020 0900  Gross per 24 hour   Intake 260 ml   Output 1850 ml   Net -1590 ml       Review of Systems   See above    General Physical Exam: Patient is alert and oriented x 2.5. Vital signs were reviewed and are documented in EMR. Neurological Exam:  More alert and interactive with me today.  Knew he was in [place] and knew it was December 2020.  He thought he was at [place].  Could tell me the name of the president and the president elect.  He could name objects appropriately and repeat appropriately.  Extraocular movements appeared full face was symmetric tongue midline motor strength appeared equal.  Babinski downgoing.     DIAGNOSTIC STUDIES     Pertinent Radiology   Radiology Results: Xr Chest 1 View Portable    Result Date: 12/16/2020  EXAM: XR CHEST 1 VIEW PORTABLE LOCATION: [facility] DATE/TIME: 12/16/2020 10:31 AM INDICATION: s/p CABg COMPARISON: Portable chest radiography 12/15/2020     Feeding tube extends below the diaphragmatic hiatus and outside the field-of-view. Bilateral chest tubes are unchanged in position. The cardiac silhouette remains enlarged. Unchanged mediastinal interfaces.  Graded opacities in the lower third of both right and left chest relate to pleural effusions and adjacent atelectatic lung, slightly increased from yesterday. No pneumothorax. Sternal wires are aligned. Coronary ostial markers and vascular clips are present from CABG.      Pertinent Labs   Lab Results: reviewed.  Recent Results (from the past 24 hour(s))   POCT Glucose    Collection Time: 12/16/20 11:54 AM    Specimen: Blood   Result Value Ref Range    Glucose 158 (H) 70 - 139 mg/dL   POCT Glucose    Collection Time: 12/16/20  5:57 PM    Specimen: Blood   Result Value Ref Range    Glucose 184 (H) 70 - 139 mg/dL   POCT Glucose    Collection Time: 12/16/20 11:58 PM    Specimen: Blood   Result Value Ref Range    Glucose 190 (H) 70 - 139 mg/dL   POCT Glucose    Collection Time: 12/17/20  6:31 AM    Specimen: Blood   Result Value Ref Range    Glucose 184 (H) 70 - 139 mg/dL   Potassium - Next AM    Collection Time: 12/17/20  6:51 AM   Result Value Ref Range    Potassium 3.5 3.5 - 5.0 mmol/L   ECG 12 lead nursing unit performed    Collection Time: 12/17/20  8:20 AM   Result Value Ref Range    SYSTOLIC BLOOD PRESSURE      DIASTOLIC BLOOD PRESSURE      VENTRICULAR RATE 93 BPM    ATRIAL RATE 102 BPM    P-R INTERVAL      QRS DURATION 146 ms    Q-T INTERVAL 442 ms    QTC CALCULATION (BEZET) 549 ms    P Axis      R AXIS -66 degrees    T AXIS 56 degrees    MUSE DIAGNOSIS       Atrial fibrillation  Right bundle branch block  Left anterior fascicular block  ** Bifascicular block **  Abnormal ECG  When compared with ECG of 16-DEC-2020 07:39,  No significant change was found           HOSPITAL MEDICATIONS       acetaminophen  650 mg Oral Q6H    Or     acetaminophen  650 mg Rectal Q6H     amino acids-protein hydrolys  1 packet Enteral Tube DAILY     amiodarone  200 mg Enteral Tube BID     aspirin  81 mg Enteral Tube DAILY    Or     aspirin  150 mg Rectal DAILY     atorvastatin  80 mg Enteral Tube QHS     bisacodyL  10 mg Oral Once      famotidine (PEPCID) injection  20 mg Intravenous BID    Or     famotidine  20 mg Oral BID     [START ON 12/18/2020] furosemide  40 mg Intravenous DAILY     heparin (PF) ANTICOAGULANT  5,000 Units Subcutaneous Q8H FIXED TIMES     insulin aspart (NovoLOG) injection   Subcutaneous Q6H FIXED TIMES     insulin glargine  8 Units Subcutaneous BID     levETIRAcetam  500 mg Enteral Tube BID     lidocaine (PF)  1-5 mL Intradermal Once     melatonin  3 mg Enteral Tube QHS     metoprolol tartrate  25 mg Enteral Tube BID     multivitamin with iron-mineral  15 mL Enteral Tube DAILY     piperacillin-tazobactam  3.375 g Intravenous Q8H     potassium chloride  10 mEq Intravenous Q1H     QUEtiapine  12.5 mg Oral QHS     sodium chloride bacteriostatic  1-5 mL Intradermal Once     sodium chloride  10 mL Intravenous Q8H FIXED TIMES     sodium chloride  10 mL Intravenous Q8H FIXED TIMES     sodium chloride  3 mL Intravenous Q8H FIXED TIMES        Total time spent for face to face visit, reviewing labs/imaging studies, counseling and coordination of care was:15 minutes} More than 50% of this time was spent on counseling and coordination of care.    Michael Bailey MD  [facility]  Direct Secure Messaging: [email or web address]  Tel: [phone]

## 2021-06-13 NOTE — ED PROVIDER NOTES
Expected Patient Referral to ED  12:35 PM    Referring Clinic/Provider:  Dr. Tabor    Reason for referral/Clinical facts:  Decompensated heart failure. Patient is short of breath, has run out of bumex. He was previously referred for CABG on 11/4/2020 but refused.     Recommendations provided:  Proceed to this facility    Caller was informed that this institution does possess the capabilities and/or resources to provide for patient and should be transferred to our institution.    Based on the information provided, discussed that this patient likely is not a good candidate for direct admission to this institution and that provider could proceed as such.  If however direct admit is sought and any hurdles encountered, this ED would be happy to see the patient and evaluate.    Informed caller that recommendations provided are recommendations based only on the facts provided and that they responsible to accept or reject the advice, or to seek a formal in person consultation as needed and that this ED will see/treat patient should they arrive.      Cory Huddleston M.D.  Emergency Medicine  [facility] EMERGENCY DEPARTMENT  [address] [address]  Dept: [phone]  Loc: [phone]     Cory Huddleston MD  12/02/20 1236

## 2021-06-13 NOTE — PROGRESS NOTES
Hospitalist Progress Note    Assessment/Plan  70-year-old male with past medical history of multivessel CAD, chronic systolic and diastolic CHF, recent diagnosis of A. fib, DM 2 who presented with acute CHF, was diuresed and subsequently underwent four-vessel CABG by Dr. Madison on 12/7/2020.  Due to concerns about possible seizures CT of the head was obtained 12/8 and showed punctate focus of increased attenuation within the subcortical region of the left frontal lobe.  Subsequent MRI of the brain 12/9 demonstrated subacute 2.5 cm subacute infarct with petechial hemorrhage in the left frontal lobe.   EEG did not show any seizure activity however Keppra was started 12/10 to see if it helps with rhythmic left leg movements.  Patient was extubated 12/10 and is weaned down to low flow O2.  He remains encephalopathic but slowly improving.  Started on broad spectrum antibiotics 12/10 due to new fever and worsening leukocytosis, 1/2 blood Cx shows GPC in clusters    1.  Multivessel CAD, status post CABG and PVI/LAAL on 12/7/2020.  Management per CV surgery and cardiology.  On statin, aspirin, metoprolol   2.  Acute respiratory failure with hypoxia.  Extubated 12/10.  Stable on room air.  Repeat SLP eval tomorrow, NPO for now due to concerns about aspiration   3.  Subacute left frontal lobe stroke with petechial hemorrhage.  Likely related to the surgery. Patient is on statin and aspirin.   4.  Acute metabolic  encephalopathy.  Likely multifactorial due to CVA, infection.  Seems to be improving   5.  Fever, leukocytosis.  Respiratory cultures pending. UA was negative.  One of two blood cultures grow GPC in clusters.  On IV Zosyn/Vanco, follow up final Cx results   6. Hypernatremia. Due to free water deficit.  Consider starting D5 if not able to eat/drink water   7.  Persistent AFib.  S/p PVI and LAAL.  On IV amiodarone. No plan for anticoagulation   8.  DM 2.  Monitor with insulin sliding scale      Barriers to  Discharge : Postop recovery   Anticipated Discharge : Multiple day stay   Disposition : TBD        Subjective  Afebrile  Awake  Denies any pain or shortness of breath  Very thirsty     Objective    Vital signs in last 24 hours  Temp:  [97.5  F (36.4  C)-99.5  F (37.5  C)] 98  F (36.7  C)  Heart Rate:  [] 108  Resp:  [16-22] 16  BP: ()/(61-98) 129/75 95% O2 Device: Nasal cannula O2 Flow Rate (L/min): 2 L/min  Weight:   210 lb 4.8 oz (95.4 kg) Weight change: -1 lb 11.2 oz (-0.771 kg)    Intake/Output last 3 shifts  I/O last 3 completed shifts:  In: 2076.3 [I.V.:906.3; NG/GT:60; IV Piggyback:1110]  Out: 2742 [Urine:2582; Chest Tube:160]  Intake/Output this shift:  I/O this shift:  In: 1363.7 [I.V.:163.7; IV Piggyback:1200]  Out: 941 [Urine:671; Chest Tube:270]    Physical Exam:  GENERAL: No acute distress, sitting in a chair   CARDIAC: Regular.  S1 & S2 normal.  No gallops or murmurs. No edema  LUNGS: Coarse breath sounds.   Chest tubes noted  ABDOMEN: Soft, non-tender  : Koenig catheter   SKIN: Multiple bruises  NEURO:  Awake, alert.  CN 2-12 grossly intact. Motor strength: 5/5 all extremities       Pertinent Labs   Lab Results: personally reviewed.   Results from last 7 days   Lab Units 12/11/20  1207 12/11/20  0415 12/10/20  1011 12/10/20  0400 12/09/20  0357   LN-SODIUM mmol/L  --  148* 144 141 141   LN-POTASSIUM mmol/L 3.7 3.2* 3.5 3.6  3.6 4.2   LN-CHLORIDE mmol/L  --  114* 108* 108* 110*   LN-CO2 mmol/L  --  24 27 23 26   LN-BLOOD UREA NITROGEN mg/dL  --  26* 26* 29* 27*   LN-CREATININE mg/dL  --  0.76 0.77 0.73 0.71   LN-CALCIUM mg/dL  --  7.5* 8.4* 8.3* 8.3*   LN-ALBUMIN g/dL  --   --   --  2.0*  --    LN-PROTEIN TOTAL g/dL  --   --   --  5.3*  --    LN-BILIRUBIN TOTAL mg/dL  --   --   --  0.9  --    LN-ALKALINE PHOSPHATASE U/L  --   --   --  63  --    LN-ALT (SGPT) U/L  --   --   --  <9  --    LN-AST (SGOT) U/L  --   --   --  17  --    LN-MAGNESIUM mg/dL  --  1.9  --  2.0 1.9     Results from  last 7 days   Lab Units 12/11/20  0415 12/10/20  1011 12/10/20  0741 12/08/20  0359 12/08/20  0359 12/07/20  1319   LN-WHITE BLOOD CELL COUNT thou/uL 10.7 13.1* 11.8*   < > 13.0* 23.1*   LN-HEMOGLOBIN g/dL 11.1* 11.6* 10.4*   < > 10.4* 13.2*   LN-HEMATOCRIT % 36.4* 36.9* 33.0*   < > 33.6* 41.2   LN-PLATELET COUNT thou/uL 260 256 199   < > 189 247   LN-NEUTROPHILS RELATIVE PERCENT %  --   --   --   --  85* 84*   LN-MONOCYTES RELATIVE PERCENT %  --   --   --   --  9 6    < > = values in this interval not displayed.           Medications  Scheduled Meds:    acetaminophen  650 mg Oral Q6H    Or     acetaminophen  650 mg Rectal Q6H     acetylcysteine (MUCOMYST) 20% inhalation solution  4 mL Inhalation Q8H - RT     albuterol  2.5 mg Nebulization Q8H - RT     amiodarone  200 mg Oral BID     aspirin  81 mg Enteral Tube DAILY     atorvastatin  80 mg Enteral Tube QHS     bisacodyL  10 mg Oral Once     chlorhexidine  15 mL Topical Q12H 09-21     docusate sodium (COLACE) 50 mg/5 mL oral liquid  100 mg Enteral Tube BID     famotidine (PEPCID) injection  20 mg Intravenous BID    Or     famotidine  20 mg Oral BID     [START ON 12/12/2020] furosemide  40 mg Intravenous DAILY     heparin (PF) ANTICOAGULANT  5,000 Units Subcutaneous Q8H FIXED TIMES     insulin aspart (NovoLOG) injection   Subcutaneous Q4H FIXED TIMES     insulin glargine  12 Units Subcutaneous QHS     levETIRAcetam (KEPPRA) IVPB  500 mg Intravenous Q12H     magnesium hydroxide  30 mL Oral DAILY     melatonin  3 mg Enteral Tube QHS     metoprolol succinate  12.5 mg Oral QHS     metoprolol tartrate  12.5 mg Oral 0330, 1300, 1700 - CV Metoprolol     piperacillin-tazobactam  3.375 g Intravenous Q8H     polyethylene glycol  17 g Enteral Tube DAILY     sodium chloride  10 mL Intravenous Q8H FIXED TIMES     sodium chloride  3 mL Intravenous Q8H FIXED TIMES     vancomycin  1,500 mg Intravenous Q12H     Continuous Infusions:    amiodarone 900 mg/500 ml standard 24 hour  infusion Stopped (12/11/20 1411)     nitroprusside       PRN Meds:.acetaminophen, acetaminophen, aluminum-magnesium hydroxide-simethicone, bisacodyL, bisacodyL, dextrose 50 % (D50W), glucagon (human recombinant), hydrALAZINE, ketorolac, magnesium hydroxide, metoprolol tartrate, oxyCODONE, sodium chloride, sodium chloride, sodium chloride, sodium chloride, sodium phosphates 133 mL, traMADoL, traZODone    Pertinent Radiology     No new pertinent data is available        Advanced Care Planning:  Treatment/Discharge Planning discussed with patient and ICU RN    Hayley Leung MD  Internal Medicine Hospitalist  12/11/2020

## 2021-06-13 NOTE — PROGRESS NOTES
NEUROLOGY PROGRESS NOTE     ASSESSMENT & PLAN   Hospital Day#: 11    Impression: 1.  Left leg jerking-no recurrence with being on levetiracetam.  It is possible that he is could have represented partial seizures versus cortical irritability/myoclonus.  Continue on Keppra at this time.  2.  Left frontal cortical infarct.  Does have some hemorrhagic change.  No new changes noted on CT scan.  Clinically more interactive today than he was from yesterday.        Patient Active Problem List    Diagnosis Date Noted     Disorientation      Cerebrovascular accident (CVA) due to other mechanism (H)      Metabolic encephalopathy      Cerebrovascular accident (CVA), unspecified mechanism (H)      Seizures (H)      Acute respiratory failure with hypoxia (H)      On mechanically assisted ventilation (H)      S/P CABG (coronary artery bypass graft)      Uncontrolled type 2 diabetes mellitus with hyperglycemia (H)      Acute on chronic clinical systolic heart failure (H) 12/02/2020     Atrial fibrillation with rapid ventricular response (H) 12/02/2020     Diabetic leg ulcer (H) 12/02/2020     Acute on chronic systolic CHF (congestive heart failure) (H) 12/02/2020     A-fib (H) 12/02/2020     Acute systolic heart failure (H)      New onset atrial fibrillation (H)      Pulmonary edema cardiac cause (H) 09/19/2020     CAD (coronary artery disease) 09/19/2020     New onset a-fib (H) 09/19/2020     Moderate episode of recurrent major depressive disorder (H) 06/21/2019     Hyperlipidemia, unspecified hyperlipidemia type 04/27/2016     Joint Pain, Localized In The Shoulder      Obesity      ACP Staging Stage 1 Hypertension: 140-159 / 90-99      Coronary artery disease involving native heart with other form of angina pectoris, unspecified vessel or lesion type (H)      Type 2 diabetes mellitus (H)      Past Medical History: Patient  has a past medical history of ACP Staging Stage 1 Hypertension: 140-159 / 90-99, Atrial fibrillation with  rapid ventricular response (H) (12/2/2020), Coronary Artery Disease, Joint Pain, Localized In The Shoulder, Obesity, Other and unspecified hyperlipidemia (4/27/2016), and Type 2 Diabetes Mellitus With Complication.     SUBJECTIVE     In speaking with nursing staff, they report patient at times is somnolent and then will be reportedly arousable.  I saw him after he had become much more alert this morning.  Ports no headache.  Reports no vision changes.  No jerking of left leg noted.  CT of head from yesterday reviewed which showed petechial hemorrhage changes in left frontal region which were noted previously on CT and MRI.  No new abnormalities seen.  Sodiums continue to be elevated.     OBJECTIVE     Vital signs in last 24 hours  Temp:  [97.5  F (36.4  C)-99  F (37.2  C)] 98.7  F (37.1  C)  Heart Rate:  [101-131] 115  Resp:  [18-23] 23  BP: (123-150)/() 136/92  FiO2 (%):  [70 %] 70 %    Weight:   206 lb 14.4 oz (93.8 kg)    I/O last 24 hours    Intake/Output Summary (Last 24 hours) at 12/13/2020 1135  Last data filed at 12/13/2020 1100  Gross per 24 hour   Intake 4313.5 ml   Output 1855 ml   Net 2458.5 ml       Review of Systems   See above.    General Physical Exam: Patient is alert and oriented x 2.5.. Vital signs were reviewed and are documented in EMR.Neurological Exam:  Patient oriented to himself and being and [facility] in [place].  Could not tell me the state and would perseverate [place].  Could tell me it was December 2020.  Could tell me if that president elect was Vincent Mcneill.  Could name objects.  He is quicker to respond to questions today than yesterday.  Extraocular movements were full.  Visual fields were full.  Face symmetric.  Slight decrease of fine motor control of right hand versus left.  No drift of arms.  Moves both legs readily.  Babinski downgoing.     DIAGNOSTIC STUDIES     Pertinent Radiology   Radiology Results: Xr Chest 1 View Portable    Result Date:  12/12/2020  EXAM: XR CHEST 1 VIEW PORTABLE LOCATION: [facility] DATE/TIME: 12/12/2020 12:47 PM INDICATION: s/p CABG COMPARISON: 12/8/2020     Interval extubation and removal of the right IJ Jadwin-Annette catheter. Right IJ line terminates over the brachiocephalic-SVC junction. Mediastinal drains are noted. Persistent and marginally increased small bilateral effusions and associated atelectasis. No pneumothorax. Cardiomegaly and prominence of the mediastinum are similar. CABG changes. Mild pulmonary vascular congestion without overt pulmonary edema.    Ct Head Without Contrast    Result Date: 12/12/2020  EXAM: CT HEAD WO CONTRAST LOCATION: [facility] DATE/TIME: 12/12/2020 11:51 AM INDICATION: Stroke. Increasing confusion. COMPARISON: Head MRI 12/09/2020 TECHNIQUE: Routine CT Head without IV contrast. Multiplanar reformats. Dose reduction techniques were used. FINDINGS: INTRACRANIAL CONTENTS: Infarct in the left frontal operculum with subtle associated petechial hemorrhage correlating with the blood products noted on MRI. No gross hemorrhagic transformation. No CT evidence of acute infarct. Chronic infarct in the left cerebellar hemisphere. Mild generalized volume loss. VISUALIZED ORBITS/SINUSES/MASTOIDS: No intraorbital abnormality. No paranasal sinus mucosal disease. No middle ear or mastoid effusion. BONES/SOFT TISSUES: No acute abnormality.     1.  Petechial hemorrhage in the left frontal opercular infarct correlating with the blood products noted on MRI. 2.  No gross hemorrhagic transformation. 3.  Chronic left cerebellar infarct. 4.  No change.      Pertinent Labs   Lab Results: Personally reviewed  Recent Results (from the past 24 hour(s))   Vancomycin (Vancocin )    Collection Time: 12/12/20 12:11 PM   Result Value Ref Range    Vancomycin 19.1 <=25.0 ug/mL   Basic Metabolic Panel    Collection Time: 12/12/20 12:11 PM   Result Value Ref Range    Sodium 149 (H)  136 - 145 mmol/L    Potassium 3.5 3.5 - 5.0 mmol/L    Chloride 115 (H) 98 - 107 mmol/L    CO2 24 22 - 31 mmol/L    Anion Gap, Calculation 10 5 - 18 mmol/L    Glucose 292 (H) 70 - 125 mg/dL    Calcium 8.4 (L) 8.5 - 10.5 mg/dL    BUN 25 (H) 8 - 22 mg/dL    Creatinine 0.92 0.70 - 1.30 mg/dL    GFR MDRD Af Amer >60 >60 mL/min/1.73m2    GFR MDRD Non Af Amer >60 >60 mL/min/1.73m2   POCT Glucose    Collection Time: 12/12/20 12:17 PM    Specimen: Capillary; Blood   Result Value Ref Range    Glucose 307 (H) 70 - 139 mg/dL   Blood Gases, Arterial    Collection Time: 12/12/20  2:55 PM   Result Value Ref Range    pH, Arterial 7.50 (H) 7.37 - 7.44    pCO2, Arterial 35 35 - 45 mm Hg    pO2, Arterial 63 (L) 75 - 85 mm Hg    Bicarbonate, Arterial Calc 28.3 23.0 - 29.0 mmol/L    O2 Sat, Arterial 93.4 (L) 95.0 - 96.0 %    Oxyhemoglobin 91.3 (L) 95.0 - 96.0 %    Base Excess, Arterial Calc 4.5 mmol/L    Ventilation Mode Room Air     FIO2 21.00     Sample Stabilized Temperature 37.0 degrees C   POCT Glucose    Collection Time: 12/12/20  4:30 PM    Specimen: Blood   Result Value Ref Range    Glucose 226 (H) 70 - 139 mg/dL   Sodium    Collection Time: 12/12/20  6:19 PM   Result Value Ref Range    Sodium 150 (H) 136 - 145 mmol/L   Sodium    Collection Time: 12/12/20  8:51 PM   Result Value Ref Range    Sodium 150 (H) 136 - 145 mmol/L   POCT Glucose    Collection Time: 12/12/20  9:33 PM    Specimen: Blood   Result Value Ref Range    Glucose 281 (H) 70 - 139 mg/dL   POCT Glucose    Collection Time: 12/12/20 10:51 PM    Specimen: Blood   Result Value Ref Range    Glucose 335 (H) 70 - 139 mg/dL   POCT Glucose    Collection Time: 12/12/20 11:52 PM    Specimen: Capillary; Blood   Result Value Ref Range    Glucose 335 (H) 70 - 139 mg/dL   Sodium    Collection Time: 12/13/20 12:37 AM   Result Value Ref Range    Sodium 148 (H) 136 - 145 mmol/L   POCT Glucose    Collection Time: 12/13/20 12:52 AM    Specimen: Capillary; Blood   Result Value Ref  Range    Glucose 277 (H) 70 - 139 mg/dL   POCT Glucose    Collection Time: 12/13/20  1:54 AM    Specimen: Capillary; Blood   Result Value Ref Range    Glucose 232 (H) 70 - 139 mg/dL   POCT Glucose    Collection Time: 12/13/20  2:52 AM    Specimen: Blood   Result Value Ref Range    Glucose 231 (H) 70 - 139 mg/dL   POCT Glucose    Collection Time: 12/13/20  3:58 AM    Specimen: Capillary; Blood   Result Value Ref Range    Glucose 178 (H) 70 - 139 mg/dL   Basic Metabolic Panel    Collection Time: 12/13/20  4:30 AM   Result Value Ref Range    Sodium 149 (H) 136 - 145 mmol/L    Potassium 3.2 (L) 3.5 - 5.0 mmol/L    Chloride 117 (H) 98 - 107 mmol/L    CO2 25 22 - 31 mmol/L    Anion Gap, Calculation 7 5 - 18 mmol/L    Glucose 166 (H) 70 - 125 mg/dL    Calcium 8.0 (L) 8.5 - 10.5 mg/dL    BUN 20 8 - 22 mg/dL    Creatinine 0.82 0.70 - 1.30 mg/dL    GFR MDRD Af Amer >60 >60 mL/min/1.73m2    GFR MDRD Non Af Amer >60 >60 mL/min/1.73m2   HM2(CBC W/O DIFF)    Collection Time: 12/13/20  4:30 AM   Result Value Ref Range    WBC 12.1 (H) 4.0 - 11.0 thou/uL    RBC 4.27 (L) 4.40 - 6.20 mill/uL    Hemoglobin 11.1 (L) 14.0 - 18.0 g/dL    Hematocrit 37.4 (L) 40.0 - 54.0 %    MCV 88 80 - 100 fL    MCH 26.0 (L) 27.0 - 34.0 pg    MCHC 29.7 (L) 32.0 - 36.0 g/dL    RDW 14.7 (H) 11.0 - 14.5 %    Platelets 378 140 - 440 thou/uL    MPV 9.9 8.5 - 12.5 fL   Magnesium    Collection Time: 12/13/20  4:30 AM   Result Value Ref Range    Magnesium 1.8 1.8 - 2.6 mg/dL   POCT Glucose    Collection Time: 12/13/20  4:57 AM    Specimen: Capillary; Blood   Result Value Ref Range    Glucose 147 (H) 70 - 139 mg/dL   POCT Glucose    Collection Time: 12/13/20  5:51 AM    Specimen: Capillary; Blood   Result Value Ref Range    Glucose 142 (H) 70 - 139 mg/dL   Sodium    Collection Time: 12/13/20  6:45 AM   Result Value Ref Range    Sodium 149 (H) 136 - 145 mmol/L   Platelet Count - every other day x 3    Collection Time: 12/13/20  6:45 AM   Result Value Ref Range     Platelets 339 140 - 440 thou/uL   POCT Glucose    Collection Time: 12/13/20  6:52 AM    Specimen: Blood   Result Value Ref Range    Glucose 118 70 - 139 mg/dL   ECG 12 lead nursing unit performed    Collection Time: 12/13/20  7:11 AM   Result Value Ref Range    SYSTOLIC BLOOD PRESSURE      DIASTOLIC BLOOD PRESSURE      VENTRICULAR RATE 105 BPM    ATRIAL RATE 170 BPM    P-R INTERVAL      QRS DURATION 142 ms    Q-T INTERVAL 422 ms    QTC CALCULATION (BEZET) 557 ms    P Axis      R AXIS -66 degrees    T AXIS 96 degrees    MUSE DIAGNOSIS       Atrial fibrillation with rapid ventricular response  Right bundle branch block  Left anterior fascicular block  ** Bifascicular block **  Abnormal ECG  When compared with ECG of 12-DEC-2020 07:49,  No significant change was found     POCT Glucose    Collection Time: 12/13/20  7:56 AM    Specimen: Capillary; Blood   Result Value Ref Range    Glucose 116 70 - 139 mg/dL   POCT Glucose    Collection Time: 12/13/20  9:02 AM    Specimen: Capillary; Blood   Result Value Ref Range    Glucose 121 70 - 139 mg/dL   POCT Glucose    Collection Time: 12/13/20  9:54 AM    Specimen: Capillary; Blood   Result Value Ref Range    Glucose 151 (H) 70 - 139 mg/dL         HOSPITAL MEDICATIONS       acetaminophen  650 mg Oral Q6H    Or     acetaminophen  650 mg Rectal Q6H     aspirin  81 mg Enteral Tube DAILY     aspirin  150 mg Rectal DAILY     atorvastatin  80 mg Enteral Tube QHS     bisacodyL  10 mg Oral Once     famotidine (PEPCID) injection  20 mg Intravenous BID    Or     famotidine  20 mg Oral BID     heparin (PF) ANTICOAGULANT  5,000 Units Subcutaneous Q8H FIXED TIMES     insulin aspart (NovoLOG) injection   Subcutaneous Q4H FIXED TIMES     insulin glargine  5 Units Subcutaneous DAILY     levETIRAcetam (KEPPRA) IVPB  500 mg Intravenous Q12H     lidocaine   Topical Once     magnesium hydroxide  30 mL Oral DAILY     magnesium sulfate IVPB  2 g Intravenous Once     melatonin  3 mg Enteral Tube QHS      metoprolol tartrate  12.5 mg Oral 0330, 1300, 1700 - CV Metoprolol     [Held by provider] metoprolol tartrate  50 mg Oral BID     piperacillin-tazobactam  3.375 g Intravenous Q8H     polyethylene glycol  17 g Enteral Tube DAILY     potassium chloride  10 mEq Intravenous Q1H     sodium chloride  10 mL Intravenous Q8H FIXED TIMES     sodium chloride  3 mL Intravenous Q8H FIXED TIMES     vancomycin  1,500 mg Intravenous Q12H        Total time spent for face to face visit, reviewing labs/imaging studies, counseling and coordination of care was: 25  minutes More than 50% of this time was spent on counseling and coordination of care.    Michael Bailey MD  [facility].  Direct Secure Messaging: [email or web address]  Tel: [phone]

## 2021-06-13 NOTE — PROGRESS NOTES
"Hospitalist Progress Note    DOS: 12/15/20    S:  Pt resting in bed, returned from getting NGT. Currently without complaints, denies chest pain, shortness of breath, abdominal pain, diarrhea.    ROS: 10-point review of systems negative unless otherwise stated as above.    O:  Vital signs:  /78   Pulse 85   Temp 98.8  F (37.1  C) (Oral)   Resp 25   Ht 5' 10\" (1.778 m)   Wt 212 lb (96.2 kg)   SpO2 95%   BMI 30.42 kg/m      Intake/Output Summary (Last 24 hours) at 12/15/2020 0756  Last data filed at 12/15/2020 0633  Gross per 24 hour   Intake 2493.56 ml   Output 3655 ml   Net -1161.44 ml       Physical Exam:  General: Sleepy this morning, NAD.  Eyes: Eyes tracking.  HENT: NGT in place.  Neck: Supple.  CV: S1, S2. Irregularly irregular. Peripheral pulses intact. No LE edema.  Lungs: CTAB. Not using accessory muscles.  Abdomen: Soft. BS present. No tenderness, guarding, distension.  Neuro: Sleepy this morning. Nods, shakes head appropriately to questions.  Skin: Warm, dry. No rashes. Mild red patch on the left posterior leg, around patch of dry skin, which is non-tender.    Scheduled Meds:    acetaminophen  650 mg Oral Q6H    Or     acetaminophen  650 mg Rectal Q6H     aspirin  81 mg Enteral Tube DAILY     aspirin  150 mg Rectal DAILY     atorvastatin  80 mg Enteral Tube QHS     bisacodyL  10 mg Oral Once     famotidine (PEPCID) injection  20 mg Intravenous BID    Or     famotidine  20 mg Oral BID     furosemide  20 mg Intravenous DAILY     heparin (PF) ANTICOAGULANT  5,000 Units Subcutaneous Q8H FIXED TIMES     insulin aspart (NovoLOG) injection   Subcutaneous Q4H FIXED TIMES     insulin glargine  5 Units Subcutaneous BID     levETIRAcetam (KEPPRA) IVPB  500 mg Intravenous Q12H     lidocaine (PF)  1-5 mL Intradermal Once     magnesium hydroxide  30 mL Oral DAILY     melatonin  3 mg Enteral Tube QHS     piperacillin-tazobactam  3.375 g Intravenous Q8H     polyethylene glycol  17 g Enteral Tube DAILY     " potassium chloride  10 mEq Intravenous Q1H     QUEtiapine  25 mg Oral QHS     sodium chloride bacteriostatic  1-5 mL Intradermal Once     sodium chloride  10 mL Intravenous Q8H FIXED TIMES     sodium chloride  10 mL Intravenous Q8H FIXED TIMES     sodium chloride  3 mL Intravenous Q8H FIXED TIMES     Continuous Infusions:    amiodarone 900 mg/500 ml standard 24 hour infusion 0.5 mg/min (12/14/20 1814)     dextrose 5% 75 mL/hr (12/14/20 2340)     PRN Meds:.acetaminophen, acetaminophen, aluminum-magnesium hydroxide-simethicone, bisacodyL, bisacodyL, dextrose 50 % (D50W), docusate sodium (COLACE) 50 mg/5 mL oral liquid, glucagon (human recombinant), hydrALAZINE, magnesium hydroxide, metoprolol tartrate, sodium chloride, sodium chloride bacteriostatic, sodium chloride, sodium chloride, sodium chloride, sodium chloride, sodium chloride, sodium chloride, sodium phosphates 133 mL, traMADoL, white petrolatum    Significant labs, imaging, and testing reviewed.    A/P:  Mr Alvarez is a 70-year-old male with past medical history of T2DM, multivessel CAD, chronic systolic and diastolic CHF, recent diagnosis of A. fib, who presented with acute CHF, was diuresed and subsequently underwent four-vessel CABG by Dr. Madison on 12/7/2020. Post-op course complicated by Afib with RVR, which was initially treated with IV amiodarone given pt's NPO status due to severe dysphagia.    Additionally, hospitalization has been complicated by altered mentation and seizure-like activity on 12/8. CTH on 12/8 showed punctate focus of increased attenuation within the subcortical region of the left frontal lobe. Subsequent MRI of the brain on 12/9 demonstrated subacute 2.5 cm subacute infarct with petechial hemorrhage in the left frontal lobe. EEG did not show any seizure activity, however Keppra was started 12/10 with resolution of rhythmic left leg movements. Neurology following.    Patient was extubated 12/10 and has been weaned down to room air.  He was started on broad spectrum antibiotics 12/10 due to new fever and worsening leukocytosis. 1/2 blood Cx grew staph epidermidis, and sputum cultures showed respiratory miryam with 4+ beta hemolytic Strep. Pt is currently on IV pip-tazo.    VFSS with mild oral and severe pharyngeal dysphagia - NGT placed on 12/15. This may have exacerbated hypernatremia, which improved with D5W prior to NGT placement.    #Multivessel CAD, s/p CABG and PVI/LAAL on 12/7/2020: Mgmt as per CV Surgery. On ASA, statin, metoprolol, IV diuretics.     #Afib with RVR, now s/p PVI and DONNA excision: Mgmt as per Cardiology and CV Surgery following. Oral amiodarone, metoprolol. Prn metoprolol. Cardiac tele. Holding AC until cleared by Neurology.    #Acute diastolic heart failure, improved. Holding home bumetanide for now. On IV diuretics. Mgmt as per Cardiology.    #Acute hypoxic respiratory failure, resolved: On room air. Encourage IC. Supplemental O2 to keep sats >94%.    #Acute encephalopathy: Encephalopathy could also be contributed by prolonged hospitalization, hypernatremia, possible pneumonia, medications. CTH from 12/12 showed no acute changes. EEG did not show seizure activity, but pt's rhythmic left leg movement appear to be have improved with Keppra. Neurology following, recs appreciated. Keppra level wnl, pt was started on quetiapine on 12/14 - if pt continues to be somnolent during the day, consider decreasing quetiapine dose.    #Subacute left frontal stroke with petechial hemorrhage: on ASA, statin. Neurology recs appreciated.    #Fever, resolved:  #Leukocytosis: UA was negative. One of two blood cultures grew staph epi - ?contaminant. Sputum culture growing beta-hemolytic strep. On IV pip-tazo for 7d. Fever could also be central. ID recs appreciated. WBC continues to trend up, but pt has not had a fever. Will continue to monitor.    #Severe pharyngeal dysphagia: NGT placed today.    #Hypernatremia, resolved.    #Hyperglycemia in  the setting of T2DM and D5W as above: HDSSI with glargine. Holding home glipizide, metformin.    Chronic issues:  HTN: holding home losartan, amlodipine.    FEN:  TFs.    Ppx:  -VTE: SQH.  -Bowel regimen.    Code: Full  Dispo: Inpatient - currently in ICU     LOS: 13 days     Ida Pacheco DO  [facility] [place]  Pager #: [phone]

## 2021-06-13 NOTE — PLAN OF CARE
Pt's A&O to self and place, fluctuates, disoriented to time and solutions. VSS on RA. Denied pain, nauseous, shortness of breath, or discomforts. Tele's A-fib, HR's 80s-90s. Lungs sounds clear, diminished on ausculation. Tube feeding running at 50 mL/hr.. He c/o thirsty and frequently asks for ice-chips. Call light within reach. Continue to monitor.    Problem: Pain  Goal: Patient's pain/discomfort is manageable  Outcome: Progressing     Problem: Safety  Goal: Patient will be injury free during hospitalization  Outcome: Progressing     Problem: Daily Care  Goal: Daily care needs are met  Outcome: Progressing     Problem: Blood pressure is elevated above 140 over 90 mmHg  Goal: Blood pressure (BP) is within acceptable limits  Outcome: Progressing     Problem: Knowledge Deficit  Goal: Patient/family/caregiver demonstrates understanding of disease process, treatment plan, medications, and discharge instructions  Outcome: Progressing     Problem: Potential for Falls  Goal: Patient will remain free of falls  Outcome: Progressing     Problem: Potential for hemodynamic instability  Goal: Cardiac output is adequate  Outcome: Progressing     Problem: Acute pain with Coronary Artery Disease (CAD)  Goal: Patient and nurse identification of acute coronary pain  Outcome: Progressing     Problem: Inadequate Coping  Goal: Demonstrates ability to cope effectively  Outcome: Progressing

## 2021-06-13 NOTE — PROGRESS NOTES
Speech Language/Pathology  Speech Therapy Daily Progress Note    Patient presents as alert and cooperative during this session.  An  was not applicable.    Objective    Pharyngeal Exercises:  to improve safety of swallow function as relates to oral diet advancement.  -effortful: patient did not recall exercise but had good execution with review. He completed x46 with an ice chip every 4-5 swallows. Cough x1 near end of session.  -CTAR: Reviewed exercise. Pt completed x5 sets of isometric (10 sec) and isokinetic (10) with min cues for accuracy.     Assessment    Patient did not recall pharyngeal exercise introduced previously but had good participation and execution with verbal review. He is in agreement to complete exercises outside of therapy. Patient's 1:1 in the room stated that she will work with him on exercises today. A written handout is provided.     Plan/Recommendations  Continue pharyngeal exercises. Anticipate repeat VFSS early next week.    The ST Care Plan has been reviewed and current plan remains appropriate.    20 dysphagia minutes     Eileen Casillas MA, CCC-SLP

## 2021-06-13 NOTE — PROGRESS NOTES
Hospitalist Progress Note    Assessment/Plan    A: Patient is a 71 y/o man who has multiple medical problems including diabetes mellitus type II, chronic systolic heart failure and coronary artery disease. Patient presented with dyspnea which appears to be from acute on chronic systolic heart failure. Echocardiogram showed LVEF 30-35%. Patient also has known multi-vessel coronary artery disease that was found on coronary angiogram on 21-Sep-2020. From review, patient had been advised to f/u for CABG in the past but had not yet done so. Diabetes mellitus type II is also uncontrolled and patient has reported symptoms consistent with diabetic neuropathy.     From chart review, patient had not been compliant with diet or medication in the past. Patient was advised to present to hospital on 05-Nov-2020 for acute heart failure but did not do so at that point. Patient had stated that he would present to the hospital the following week but it does not appear that he actually did so.    Patient underwent CABG today.     P:  1.) Acute on chronic systolic heart failure: Acute component appears resolved. Monitoring for recurrence.  2.) Multi-vessel coronary artery disease: Patient has undergone CABG and is receiving post-operative care  3.) Diabetes mellitus type II, uncontrolled, with hyperglycemia and with diabetic neuropathy: Patient on insulin drip at present.   4.) Atrial fibrillation: Anticoagulation on hold.  5.) Hypertension: Blood pressure being monitored post-operatively.   6.) Hyperlipidemia: Patient has been on atorvastatin.  7.) Bilateral leg wounds: Receiving wound care.      Principal Problem:    Acute on chronic clinical systolic heart failure (H)  Active Problems:    ACP Staging Stage 1 Hypertension: 140-159 / 90-99    Type 2 diabetes mellitus (H)    Hyperlipidemia, unspecified hyperlipidemia type    CAD (coronary artery disease)    Atrial fibrillation with rapid ventricular response (H)    Diabetic leg ulcer  (H)    Acute on chronic systolic CHF (congestive heart failure) (H)    A-fib (H)    Uncontrolled type 2 diabetes mellitus with hyperglycemia (H)    Acute respiratory failure with hypoxia (H)    On mechanically assisted ventilation (H)    S/P CABG (coronary artery bypass graft)      Subjective    Patient was not answering questions.    Objective    Vital signs in last 24 hours  Temp:  [97.2  F (36.2  C)-98.2  F (36.8  C)] 98.2  F (36.8  C)  Heart Rate:  [65-80] 71  Resp:  [16-20] 16  BP: (111-154)/(62-83) 114/62  Arterial Line BP: (102-121)/(52-60) 115/57  FiO2 (%):  [50 %-100 %] 50 % 99% O2 Device: Ventilator O2 Flow Rate (L/min): 2 L/min  Weight:   218 lb 8 oz (99.1 kg) Weight change: -1 lb 14.4 oz (-0.862 kg)    Intake/Output last 3 shifts  I/O last 3 completed shifts:  In: 3694.3 [I.V.:2634.3; Blood:550; IV Piggyback:510]  Out: 1743 [Urine:883; Blood:600; Chest Tube:260]  Body mass index is 31.35 kg/m .    Physical Exam    General:     Patient intubated, mechanically ventilated. Appears     comfortable.   Heart:    RRR, S1 S2 w/o murmurs.   Lungs:     Breath sounds present. No crackles/wheezes heard.   Abdomen:   Soft.      Pertinent Labs   Lab Results: personally reviewed.   Results from last 7 days   Lab Units 12/07/20  1319 12/06/20  0926 12/03/20  0704 12/02/20  1416   LN-SODIUM mmol/L 140 134* 139 139   LN-POTASSIUM mmol/L 3.4* 3.7 3.7 4.0   LN-CHLORIDE mmol/L 108* 97* 105 104   LN-CO2 mmol/L 25 26 27 25   LN-BLOOD UREA NITROGEN mg/dL 16 15 23* 17   LN-CREATININE mg/dL 0.73 0.73 0.75 0.91   LN-CALCIUM mg/dL 8.2* 8.4* 8.7 9.1   LN-ALBUMIN g/dL  --   --   --  3.2*   LN-PROTEIN TOTAL g/dL  --   --   --  7.4   LN-BILIRUBIN TOTAL mg/dL  --   --   --  0.8   LN-ALKALINE PHOSPHATASE U/L  --   --   --  169*   LN-ALT (SGPT) U/L  --   --   --  9   LN-AST (SGOT) U/L  --   --   --  23   LN-MAGNESIUM mg/dL 2.9*  --  1.8  --      Results from last 7 days   Lab Units 12/07/20  1319 12/07/20  1212 12/05/20  0629  12/02/20  1418   LN-WHITE BLOOD CELL COUNT thou/uL 23.1* 22.7*  --  9.4   LN-HEMOGLOBIN g/dL 13.2* 11.6* 14.0 14.7   LN-HEMATOCRIT % 41.2 36.7*  --  47.7   LN-PLATELET COUNT thou/uL 247 208 276 309   LN-NEUTROPHILS RELATIVE PERCENT % 84*  --   --   --    LN-MONOCYTES RELATIVE PERCENT % 6  --   --   --      Results from last 7 days   Lab Units 12/03/20  0704 12/03/20  0106 12/02/20  1815   LN-TROPONIN I ng/mL 1.60* 1.88* 1.63*     Results from last 7 days   Lab Units 12/07/20  1318 12/07/20  0643 12/07/20  0510 12/06/20  2036 12/06/20  1720 12/06/20  1143 12/06/20  0802 12/05/20  2145 12/05/20  1639 12/05/20  1212   LN-POC GLUCOSE FINGERSTICK- HE mg/dL 150* 175* 159* 184* 182* 243* 232* 210* 269* 259*       Medications  Scheduled Meds:    acetaminophen  650 mg Oral Q6H    Or     acetaminophen  650 mg Rectal Q6H     [MAR Hold] aspirin  162 mg Oral DAILY     [START ON 12/8/2020] aspirin  81 mg Oral DAILY     [MAR Hold] atorvastatin  80 mg Oral QHS     [START ON 12/8/2020] bisacodyL  10 mg Oral Once     [START ON 12/10/2020] bisacodyL  10 mg Rectal Once     ceFAZolin (ANCEF) IV  2 g Intravenous Q8H     chlorhexidine  15 mL Topical Q12H 09-21     [MAR Hold] diltiazem  120 mg Oral DAILY     [START ON 12/8/2020] docusate sodium  100 mg Oral BID     famotidine (PEPCID) injection  20 mg Intravenous BID    Or     famotidine  20 mg Oral BID     [START ON 12/8/2020] heparin (PF) ANTICOAGULANT  5,000 Units Subcutaneous Q8H FIXED TIMES     [MAR Hold] losartan  100 mg Oral DAILY     [START ON 12/9/2020] magnesium hydroxide  30 mL Oral DAILY     melatonin  3 mg Oral QHS     [MAR Hold] metoprolol succinate  100 mg Oral BID     [START ON 12/8/2020] polyethylene glycol  17 g Oral DAILY     potassium chloride  20 mEq Intravenous Q1H     sodium chloride  10 mL Intravenous Q8H FIXED TIMES     [MAR Hold] sodium chloride  2.5 mL Intravenous Line Care     [MAR Hold] sodium chloride  3 mL Intravenous Line Care     sodium chloride  3 mL  Intravenous Q8H FIXED TIMES     Continuous Infusions:    aminocaproic acid 10 grams (0.071 g/mL) Stopped (12/07/20 1325)     dexmedetomidine infusion orderable (PRECEDEX)       dexmedetomidine infusion orderable (PRECEDEX) 0.8 mcg/kg/hr (12/07/20 1420)     DOPamine       epinephrine 0.02 mcg/kg/min (12/07/20 1422)     insulin infusion (1 unit/mL) 3 Units/hr (12/07/20 1430)     niCARdipine Stopped (12/07/20 1320)     phenylephrine Stopped (12/07/20 1325)     sodium chloride 0.9% 50 mL/hr (12/07/20 1345)     vasopressin       PRN Meds:.acetaminophen, acetaminophen, albumin human, aluminum-magnesium hydroxide-simethicone, [START ON 12/9/2020] bisacodyL, [START ON 12/10/2020] bisacodyL, dexmedetomidine infusion orderable (PRECEDEX), diphenhydrAMINE, DOPamine, epinephrine, fentaNYL pf, HYDROmorphone, insulin infusion (1 unit/mL), [START ON 12/9/2020] magnesium hydroxide, niCARdipine, [MAR Hold] nitroglycerin, ondansetron, oxyCODONE, phenylephrine, sodium chloride, [MAR Hold] sodium chloride bacteriostatic, sodium chloride, sodium chloride, sodium chloride, sodium phosphates 133 mL, [MAR Hold] traMADoL, traZODone, vasopressin

## 2021-06-13 NOTE — PROGRESS NOTES
Infectious Disease Progress Note    Assessment/Plan     Impression: Postoperative fevers following coronary bypass graft surgery.  Patient also has subtle intracranial hemorrhage.  This can cause fever.     Single positive blood culture with Staph epi after 2 days incubation. Likely contaminat.      Silent aspiration.     Possible pneumonia with beta-hemolytic strep.     Recommendations: finish 7 days of zosyn.    We'll sign off.  Please call if questions or problems.     Principal Problem:    Acute on chronic clinical systolic heart failure (H)  Active Problems:    ACP Staging Stage 1 Hypertension: 140-159 / 90-99    Type 2 diabetes mellitus (H)    Hyperlipidemia, unspecified hyperlipidemia type    CAD (coronary artery disease)    Atrial fibrillation with rapid ventricular response (H)    Diabetic leg ulcer (H)    Acute on chronic systolic CHF (congestive heart failure) (H)    A-fib (H)    Uncontrolled type 2 diabetes mellitus with hyperglycemia (H)    Acute respiratory failure with hypoxia (H)    On mechanically assisted ventilation (H)    S/P CABG (coronary artery bypass graft)    Seizures (H)    Cerebrovascular accident (CVA), unspecified mechanism (H)    Cerebrovascular accident (CVA) due to other mechanism (H)    Metabolic encephalopathy    Disorientation      Angel Fam MD  [phone]      Subjective  Responds a bit.    Objective    Vital signs in last 24 hours  Temp:  [97.5  F (36.4  C)-99  F (37.2  C)] 97.9  F (36.6  C)  Heart Rate:  [101-131] 113  Resp:  [18-23] 23  BP: (108-162)/() 129/84  FiO2 (%):  [70 %] 70 %  Weight:   206 lb 14.4 oz (93.8 kg)    Intake/Output last 3 shifts  I/O last 3 completed shifts:  In: 5862 [I.V.:3857; IV Piggyback:2005]  Out: 2093 [Urine:1000; Chest Tube:1093]  Intake/Output this shift:  I/O this shift:  In: 100 [IV Piggyback:100]  Out: 78 [Chest Tube:78]    Review of Systems   Pertinent items are noted in HPI., otherwise negative.    Physical Exam  General  appearance: appears stated age, mild distress and slowed mentation  Head: Normocephalic, without obvious abnormality, atraumatic  Eyes: EOMI, no icterus  Neck: no adenopathy and supple, symmetrical, trachea midline  Lungs: some rhonchi.  Chest: Sternal wound noted appears to be intact.  2 chest tubes in place with blood-tinged fluid  Heart: Regular rate and rhythm with somewhat hyperdynamic precordium  Abdomen: soft, nontender  Extremities: Multiple bandaged wounds noted on extremities.  Skin: Skin color, texture, turgor normal. No rashes or lesions  Neurologic: Mental status: Somewhat depressed activity    Pertinent Labs   Lab Results: personally reviewed.     Results from last 7 days   Lab Units 12/13/20  0645 12/13/20  0430 12/12/20  0413 12/11/20  0415   LN-WHITE BLOOD CELL COUNT thou/uL  --  12.1* 14.3* 10.7   LN-HEMOGLOBIN g/dL  --  11.1* 11.7* 11.1*   LN-HEMATOCRIT %  --  37.4* 37.9* 36.4*   LN-PLATELET COUNT thou/uL 339 378 403 260        Results from last 7 days   Lab Units 12/13/20  1412 12/13/20  0645 12/13/20  0430 12/13/20  0037 12/12/20  1211 12/12/20  1211 12/12/20  0413   LN-SODIUM mmol/L  --  149* 149* 148*   < > 149* 149*   LN-POTASSIUM mmol/L 3.6  --  3.2*  --   --  3.5 4.2   LN-CHLORIDE mmol/L  --   --  117*  --   --  115* 116*   LN-CO2 mmol/L  --   --  25  --   --  24 23   LN-BLOOD UREA NITROGEN mg/dL  --   --  20  --   --  25* 29*   LN-CREATININE mg/dL  --   --  0.82  --   --  0.92 0.92   LN-CALCIUM mg/dL  --   --  8.0*  --   --  8.4* 8.1*    < > = values in this interval not displayed.     Procedure Component Value Units Date/Time   Culture, Blood Positive Work-up [[id number]] (Abnormal)  Collected: 12/10/20 1000   Order Status: Completed Specimen: Blood from Central Venous Line Updated: 12/13/20 0720    Culture Staphylococcus epidermidisAbnormal     Gram Stain Result Gram positive cocci in clusters   Susceptibility    Staphylococcus epidermidis (1)    Antibiotic Interpretation Microscan Method  Status   Clindamycin Sensitive <=0.5 PAOLO Final   Cefazolin Resistant <=2 PAOLO Final   Doxycycline Sensitive 1 PAOLO Final   Levofloxacin Resistant >4 PAOLO Final   Oxacillin Resistant >1 PAOLO Final   Vancomycin Sensitive 2 PAOLO Final         Blood Culture from PERIPHERAL SITE (2nd One) [[id number]] Collected: 12/12/20 1819   Order Status: Sent Specimen: Blood from Vein, Peripheral Updated: 12/12/20 1822   Sputum culture [[id number]] (Abnormal) Collected: 12/09/20 1725   Order Status: Completed Specimen: Respiratory Updated: 12/11/20 1451    Culture Usual miryam with     4+  Beta Hemolytic Streptococcus Not Group AAbnormal     Gram Stain Result 4+ Polymorphonuclear leukocytes     4+ Gram positive cocci in pairs     3+ Gram positive bacilli     1+ Gram negative bacilli   Blood culture from CENTRAL LINE [[id number]] (Abnormal) Collected: 12/10/20 1000   Order Status: Completed Specimen: Blood from Central Venous Line Updated: 12/11/20 1004    Anaerobic Blood Culture Bottle Positive after 24 hours incubationAbnormal        Pertinent Radiology   Radiology Results: Personally reviewed image/s and Personally reviewed impression/s    No results found.

## 2021-06-13 NOTE — PROGRESS NOTES
Care Management Follow Up Note    Length of Stay (days) 7     Patient plan of care discussed at Interdisciplinary Rounds: yes  CM Patient/Caregiver Stated Goals: Discharge to TCU             Expected Discharge Date:  12/14/2020  Concerns to be Addressed / Barriers to Discharge:  P. O. D. # 2 Coronary Artery Bypass Graft    Anticipated Discharge Disposition:  TCU  Anticipated Discharge Services:  TCU  Selected Continued Care - Admitted Since 12/2/2020    No services have been selected for the patient.        Anticipated Discharge DME:      Plan:  Therapy is recommending TCU at discharge.    Care Management to discuss the above Therapy recommendation and TCU preferences with the patient.    Care Management to follow.    Assessment History:Patient from home with spouse and independent at baseline. Per previous note patient is worried about going home post CABG due to spouse having a recent stroke. Patient is hoping to go to TCU post CABG since he will need a lot of medications. Will watch for therapy recommendations pending progression post CABG; Home with home care RN vs TCU. Plan for CABG Monday 12/7. Transport TBD. Care manager to follow for further discharge planning pending progression.      Anisa Peralta RN

## 2021-06-13 NOTE — PROGRESS NOTES
"Wound Ostomy  WOC Assessment with Photograph        Allergies:  No Known Allergies    Diagnosis:   Patient Active Problem List    Diagnosis Date Noted     Seizures (H)      Acute respiratory failure with hypoxia (H)      On mechanically assisted ventilation (H)      S/P CABG (coronary artery bypass graft)      Uncontrolled type 2 diabetes mellitus with hyperglycemia (H)      Acute on chronic clinical systolic heart failure (H) 12/02/2020     Atrial fibrillation with rapid ventricular response (H) 12/02/2020     Diabetic leg ulcer (H) 12/02/2020     Acute on chronic systolic CHF (congestive heart failure) (H) 12/02/2020     A-fib (H) 12/02/2020     Acute systolic heart failure (H)      New onset atrial fibrillation (H)      Pulmonary edema cardiac cause (H) 09/19/2020     CAD (coronary artery disease) 09/19/2020     New onset a-fib (H) 09/19/2020     Moderate episode of recurrent major depressive disorder (H) 06/21/2019     Hyperlipidemia, unspecified hyperlipidemia type 04/27/2016     Joint Pain, Localized In The Shoulder      Obesity      ACP Staging Stage 1 Hypertension: 140-159 / 90-99      Coronary artery disease involving native heart with other form of angina pectoris, unspecified vessel or lesion type (H)      Type 2 diabetes mellitus (H)        Height:  5' 10\" (1.778 m)    Weight:   217 lb 2.5 oz (98.5 kg)    Labs:  Recent Labs     12/09/20  0357   HGB 10.0*       Antonio:  Antonio Scale Score: 15    Specialty Bed:  Specialty Bed: [place] Position Pro (ICU only) (STACH)    Wound culture obtained: No    Edema:  Yes:  Localized    Anatomic Site/Laterality: BLE    Reason for ongoing care:   Wound assessment and plan of care     Encounter Type:  Subsequent Encounter Wound Type:   Non-PU Ulcer: Venous     Tissue Damage:  Exposed fat layer    Associated conditions/Related trauma: Patient has history of venous ulcers and edema, previously had compression but this admission may have CABG per " chart.    Assessment:    Bilateral shins with multiple scabs  Right shin 3 areas anterior/lateral 2x2cm and smaller (Medial pink area healed, new skin)  Left shin 2 areas 2 x 2cm and 4 x 2cm - fibrin versus slough  No drainage/weeping noted at all    Tunneling/Undermining: No    Wound Bed: see above    Exudate: No    Periwound Skin: Erythema and Hyperpigmentation     Treatment Plan: Silicone foam               Nursing care provided was wound care.    Discussed plan of care with nurse and patient.    Outcomes and treatment recommendations are to promote skin integrity and promote wound healing.    Actions taken by WOC RN: WOC Discharge recommendations entered.    Planned Follow Up: Later this week or Early next week.    Plan for next visit: Reassess wound(s) and Reassess skin integrity

## 2021-06-13 NOTE — PROGRESS NOTES
Progress Note    Assessment/Plan  S/P CABG X 4 POD/PVI/LAAL # 10  Postop convulsive activity however EEG did not show any seizures activity  Head CT showed punctate focus in the left frontal lobe and MRI showed 2.5 cm subacute infarct in the left frontal lobe associated with petechial hemorrhage  Postop respiratory failure with prolonged oxygen now resolving, currently on room air and undergoes intermittent MetaNeb treatment saturation 96%  AVSS, rate controlled A. fib currently on amiodarone therapy 200 mg twice a day  Postop dysphagia, failed video swallow evaluation yesterday, currently has an NG tube and tolerating formula  Postop leukocytosis resolving, last WBC 13.8 from 18.3, will continue to monitor closely  Poorly controlled type 2 diabetes mellitus, preop A1c 12.6%, hyperlipidemia, diabetic leg ulcers, acute on chronic systolic congestive heart failure  Sternal incision has some crusting dry and intact and lungs sounds are diminished bilaterally  Chest tube drainage 250/600 cc serous  Leave chest tubes in today due to excessive drainage  Continue PT/OT to mobilize patient out of bed and ambulate as tolerated  Continue NG tube feeding to optimize nutrition  Will repeat swallow evaluation per speech therapy  Patient may need acute care rehab at discharge, referral sent out yesterday  Currently not medically ready for discharge        Subjective  Denies incisional chest pain and or shortness of breath  Objective  Awake and alert, laying in the bed at time of evaluation, with no apparent distress and has the best color he has been seen surgery  Vital signs in last 24 hours  Temp:  [97.4  F (36.3  C)-98.7  F (37.1  C)] 98.6  F (37  C)  Heart Rate:  [] 92  Resp:  [16-20] 18  BP: (105-132)/(72-86) 118/72  Weight:   214 lb 9.6 oz (97.3 kg)  Preop weight 79.9 kg  Intake/Output last 3 shifts  I/O last 3 completed shifts:  In: 260 [NG/GT:260]  Out: 1850 [Urine:1250; Chest Tube:600]  Intake/Output this  "shift:  No intake/output data recorded.    Review of Systems   A 12 point comprehensive review of systems was negative except as noted.    Physical Exam  /72 (Patient Position: Semi-hooks)   Pulse 92   Temp 98.6  F (37  C) (Axillary)   Resp 18   Ht 5' 10\" (1.778 m)   Wt 214 lb 9.6 oz (97.3 kg)   SpO2 96%   BMI 30.79 kg/m    Rate controlled A. fib, incisions are clean dry intact, lung sounds diminished bilaterally  Abdomen soft and nontender  Mild generalized edema    Pertinent Labs   Lab Results: personally reviewed.   Lab Results   Component Value Date     12/16/2020    K 3.5 12/17/2020     (H) 12/16/2020    CO2 21 (L) 12/16/2020    BUN 16 12/16/2020    CREATININE 1.10 12/16/2020    CALCIUM 7.6 (L) 12/16/2020     Lab Results   Component Value Date    WBC 13.8 (H) 12/16/2020    HGB 11.3 (L) 12/16/2020    HCT 37.0 (L) 12/16/2020    MCV 85 12/16/2020     12/16/2020       Pertinent Radiology   Radiology Results: Not yet available for review and Chest x-ray since December 15, 2020, will repeat portable chest x-ray in the a.m.  EKG Results: personally reviewed.  and rate controlled A. fib    Ash Lowry            "

## 2021-06-13 NOTE — PROGRESS NOTES
NEUROLOGY PROGRESS NOTE     ASSESSMENT & PLAN   Hospital Day#: 14    Impression: Left leg jerking-no recurrence.  On Keppra.  2.  Left frontal CVA.  This appears stable clinically.  3 dysphagia-multifactorial but could relate in part to his stroke.  Note plan for repeat swallow study likely in 4-6 days time.        Patient Active Problem List    Diagnosis Date Noted     Acute kidney failure with tubular necrosis (H) 12/16/2020     Disorientation      Cerebrovascular accident (CVA) due to other mechanism (H)      Metabolic encephalopathy      Cerebrovascular accident (CVA), unspecified mechanism (H)      Seizures (H)      Acute respiratory failure with hypoxia (H)      On mechanically assisted ventilation (H)      S/P CABG (coronary artery bypass graft)      Uncontrolled type 2 diabetes mellitus with hyperglycemia (H)      Acute on chronic clinical systolic heart failure (H) 12/02/2020     Atrial fibrillation with rapid ventricular response (H) 12/02/2020     Diabetic leg ulcer (H) 12/02/2020     Acute on chronic systolic CHF (congestive heart failure) (H) 12/02/2020     A-fib (H) 12/02/2020     Acute systolic heart failure (H)      New onset atrial fibrillation (H)      Pulmonary edema cardiac cause (H) 09/19/2020     CAD (coronary artery disease) 09/19/2020     New onset a-fib (H) 09/19/2020     Moderate episode of recurrent major depressive disorder (H) 06/21/2019     Hyperlipidemia, unspecified hyperlipidemia type 04/27/2016     Joint Pain, Localized In The Shoulder      Obesity      ACP Staging Stage 1 Hypertension: 140-159 / 90-99      Coronary artery disease involving native heart with other form of angina pectoris, unspecified vessel or lesion type (H)      Type 2 diabetes mellitus (H)      Past Medical History: Patient  has a past medical history of ACP Staging Stage 1 Hypertension: 140-159 / 90-99, Atrial fibrillation with rapid ventricular response (H) (12/2/2020), Coronary Artery Disease, Joint Pain,  Localized In The Shoulder, Obesity, Other and unspecified hyperlipidemia (4/27/2016), and Type 2 Diabetes Mellitus With Complication.     SUBJECTIVE     Speech evaluation from today noted and able to resume some ice chips.  Tolerating feeding tube.  No headache     OBJECTIVE     Vital signs in last 24 hours  Temp:  [97.4  F (36.3  C)-98.5  F (36.9  C)] 97.4  F (36.3  C)  Heart Rate:  [81-97] 90  Resp:  [18-25] 18  BP: ()/(66-79) 105/79    Weight:   207 lb 4.8 oz (94 kg)    I/O last 24 hours    Intake/Output Summary (Last 24 hours) at 12/16/2020 1227  Last data filed at 12/16/2020 1053  Gross per 24 hour   Intake 280 ml   Output 2140 ml   Net -1860 ml       Review of Systems   See above.    General Physical Exam: Patient is alert and oriented x 2. Vital signs were reviewed and are documented in EMR. Neurological Exam:  Voice is soft but follows commands appropriately.  Extraocular movements appeared full visual fields full face symmetric tongue midline hand grasp appeared equal with no drift noted.  Babinski downgoing     DIAGNOSTIC STUDIES     Pertinent Radiology   Radiology Results: Xr Chest 1 View Portable    Result Date: 12/16/2020  EXAM: XR CHEST 1 VIEW PORTABLE LOCATION: [facility] DATE/TIME: 12/16/2020 10:31 AM INDICATION: s/p CABg COMPARISON: Portable chest radiography 12/15/2020     Feeding tube extends below the diaphragmatic hiatus and outside the field-of-view. Bilateral chest tubes are unchanged in position. The cardiac silhouette remains enlarged. Unchanged mediastinal interfaces. Graded opacities in the lower third of both right and left chest relate to pleural effusions and adjacent atelectatic lung, slightly increased from yesterday. No pneumothorax. Sternal wires are aligned. Coronary ostial markers and vascular clips are present from CABG.      Pertinent Labs   Lab Results: Reviewed  Recent Results (from the past 24 hour(s))   POCT Glucose    Collection Time:  12/15/20  4:31 PM    Specimen: Blood   Result Value Ref Range    Glucose 147 (H) 70 - 139 mg/dL   POCT Glucose    Collection Time: 12/15/20  8:10 PM    Specimen: Blood   Result Value Ref Range    Glucose 136 70 - 139 mg/dL   POCT Glucose    Collection Time: 12/16/20 12:18 AM    Specimen: Blood   Result Value Ref Range    Glucose 140 (H) 70 - 139 mg/dL   POCT Glucose    Collection Time: 12/16/20  4:19 AM    Specimen: Blood   Result Value Ref Range    Glucose 155 (H) 70 - 139 mg/dL   Basic Metabolic Panel    Collection Time: 12/16/20  5:46 AM   Result Value Ref Range    Sodium 144 136 - 145 mmol/L    Potassium 3.6 3.5 - 5.0 mmol/L    Chloride 113 (H) 98 - 107 mmol/L    CO2 21 (L) 22 - 31 mmol/L    Anion Gap, Calculation 10 5 - 18 mmol/L    Glucose 169 (H) 70 - 125 mg/dL    Calcium 7.6 (L) 8.5 - 10.5 mg/dL    BUN 16 8 - 22 mg/dL    Creatinine 1.10 0.70 - 1.30 mg/dL    GFR MDRD Af Amer >60 >60 mL/min/1.73m2    GFR MDRD Non Af Amer >60 >60 mL/min/1.73m2   HM2(CBC w/o Differential)    Collection Time: 12/16/20  5:46 AM   Result Value Ref Range    WBC 13.8 (H) 4.0 - 11.0 thou/uL    RBC 4.34 (L) 4.40 - 6.20 mill/uL    Hemoglobin 11.3 (L) 14.0 - 18.0 g/dL    Hematocrit 37.0 (L) 40.0 - 54.0 %    MCV 85 80 - 100 fL    MCH 26.0 (L) 27.0 - 34.0 pg    MCHC 30.5 (L) 32.0 - 36.0 g/dL    RDW 14.8 (H) 11.0 - 14.5 %    Platelets 263 140 - 440 thou/uL    MPV 10.0 8.5 - 12.5 fL   POCT Glucose    Collection Time: 12/16/20  7:39 AM    Specimen: Blood   Result Value Ref Range    Glucose 148 (H) 70 - 139 mg/dL   ECG 12 lead nursing unit performed    Collection Time: 12/16/20  7:39 AM   Result Value Ref Range    SYSTOLIC BLOOD PRESSURE      DIASTOLIC BLOOD PRESSURE      VENTRICULAR RATE 96 BPM    ATRIAL RATE 107 BPM    P-R INTERVAL      QRS DURATION 144 ms    Q-T INTERVAL 454 ms    QTC CALCULATION (BEZET) 573 ms    P Axis      R AXIS -65 degrees    T AXIS 51 degrees    MUSE DIAGNOSIS       Atrial fibrillation  Right bundle branch block  Left  anterior fascicular block  ** Bifascicular block **  Abnormal ECG  When compared with ECG of 15-DEC-2020 07:41,  No significant change was found  Confirmed by TRACI TATE MD LOC:WW ([id number]) on 12/16/2020 10:17:29 AM     POCT Glucose    Collection Time: 12/16/20 11:54 AM    Specimen: Blood   Result Value Ref Range    Glucose 158 (H) 70 - 139 mg/dL         HOSPITAL MEDICATIONS       acetaminophen  650 mg Oral Q6H    Or     acetaminophen  650 mg Rectal Q6H     amino acids-protein hydrolys  1 packet Enteral Tube DAILY     amiodarone  200 mg Enteral Tube BID     aspirin  81 mg Enteral Tube DAILY     aspirin  150 mg Rectal DAILY     atorvastatin  80 mg Enteral Tube QHS     bisacodyL  10 mg Oral Once     famotidine (PEPCID) injection  20 mg Intravenous BID    Or     famotidine  20 mg Oral BID     furosemide  20 mg Intravenous DAILY     heparin (PF) ANTICOAGULANT  5,000 Units Subcutaneous Q8H FIXED TIMES     insulin aspart (NovoLOG) injection   Subcutaneous Q6H FIXED TIMES     insulin glargine  8 Units Subcutaneous BID     levETIRAcetam  500 mg Enteral Tube BID     lidocaine (PF)  1-5 mL Intradermal Once     magnesium hydroxide  30 mL Oral DAILY     melatonin  3 mg Enteral Tube QHS     metoprolol tartrate  25 mg Enteral Tube BID     multivitamin with iron-mineral  15 mL Enteral Tube DAILY     piperacillin-tazobactam  3.375 g Intravenous Q8H     QUEtiapine  12.5 mg Oral QHS     sodium chloride bacteriostatic  1-5 mL Intradermal Once     sodium chloride  10 mL Intravenous Q8H FIXED TIMES     sodium chloride  10 mL Intravenous Q8H FIXED TIMES     sodium chloride  3 mL Intravenous Q8H FIXED TIMES        Total time spent for face to face visit, reviewing labs/imaging studies, counseling and coordination of care was: 15 minutes More than 50% of this time was spent on counseling and coordination of care.    Michael Bailey MD  [facility]  Direct Secure Messaging:  [email or web address]  Tel: [phone]

## 2021-06-13 NOTE — PLAN OF CARE
"  Problem: Pain  Goal: Patient's pain/discomfort is manageable  Outcome: Progressing     Problem: Potential for hemodynamic instability  Goal: Cardiac output is adequate  Outcome: Progressing     Problem: Mechanical Ventilation  Goal: Patient will maintain patent airway  Outcome: Progressing     Problem: Mechanical Ventilation  Goal: ET tube will be managed safely  Outcome: Progressing    Pt weaning on vent at start of shift 2300.  Pt became  increasingly agitated/restless, thrashing limbs and following commands though inconsistently.  Precedex increased with adequate sedation achieved after 20-30 minutes.  Dr. Kraft notified and ordered to stop wean and re-sedate for the night.  Will attempt weaning again in the morning.  Pt resting calmly during the rest of the night until cares provided then becomes abruptly agitated, nods head briskly when asked if \"anxious\" but denies pain.  Pt does not redirect or calm easily without increase in sedation during these times.      "

## 2021-06-13 NOTE — PLAN OF CARE
Disoriented and frustrated. VSS. Afib rates controlled. Unable to assess pain but at times mentions being uncomfortable and appearing restless overnight. Delirium precautions. CT to H2O seal. Serous output. ZAHRAA sites; dressings CDI.   TF at goal 50ml/hr with 180cc flushes Q4.   Problem: Pain  Goal: Patient's pain/discomfort is manageable  Outcome: Progressing     Problem: Safety  Goal: Patient will be injury free during hospitalization  Outcome: Progressing     Problem: Daily Care  Goal: Daily care needs are met  Outcome: Progressing

## 2021-06-13 NOTE — PROGRESS NOTES
Care Management Follow Up Note    Length of Stay (days) 9     Patient plan of care discussed at Interdisciplinary Rounds: yes  CM Patient/Caregiver Stated Goals: Discharge to TCU    Expected Discharge Date: 12/14/20     Concerns to be Addressed / Barriers to Discharge:  Post CABG Cares, 1:1, Fever, IV abx, amiodarone drip, TCU placement    Anticipated Discharge Disposition: Skilled Nursing Facility  Anticipated Discharge Services:      Selected Continued Care - Admitted Since 12/2/2020    No services have been selected for the patient.        Anticipated Discharge DME:      Plan:  Pt is from home with spouse; ind at baseline. s/p CABG 12/7; extubated 12/10. Preop therapy REC was TCU; pt confused on 1:1, Transport TBD.     Writer left message for pt's spouse to discuss TCU and provide TCU list. Messaged left for Bel at [phone] and [phone], requested CB to [phone].    CM to follow.    Denae Hidalgo RN

## 2021-06-13 NOTE — CONSULTS
NEUROLOGY CONSULTATION NOTE     Mika Alvarze,  1950, MRN [id number] Date: 2020      [facility]   Code status:  Full Code   PCP: Angel Claire MD, [phone]      ASSESSMENT & PLAN   Hospital Day#: 6  Diagnosis code: Convulsive activity-rule out seizure    Convulsive activity-rule out seizure      Head CT    EEG negative for ongoing seizures    MRI brain when cleared by CV surgery    Hold off on antiepileptic medication for right now unless imaging study shows significant abnormality.    Seizure precautions    Electrolyte panel noncontributory though calcium was 8.0    WBC 13.0    Thank you again for this referral, please feel free to contact me if you have any questions.     CHIEF COMPLAINT Acute on chronic clinical systolic heart failure (H)     HISTORY OF PRESENT ILLNESS     We have been requested by Dr. Littlejohn to evaluate Mika Alvarez who is a 70 y.o.  male for seizures.    This 70-year-old male on whom neurology been consulted evaluate for seizures.  Patient was admitted for dyspnea from acute on chronic systolic heart failure.  Patient was known to have multivessel coronary disease.  Patient was noncompliant with diet and medication.  Patient needed a CABG yesterday.  This morning there was some rhythmic left leg jerking activity.  Patient was given Ativan which did help resolve this activity.  The jerking lasted for a few minutes.  It did not spread to other extremities.  There was no loss of consciousness.  With the Ativan he did become somnolent.  He was concerned that this might be a seizure.  As result neurology was consulted.  Patient has not had any previous seizures that is known.  No family history of seizures.  No recent head injuries.  No drug use.  EEG was done today.  Patient remains intubated and off sedation for right now.     PAST MEDICAL & SURGICAL HISTORY     Medical History  Patient Active Problem List    Diagnosis Date Noted     Convulsions, unspecified  convulsion type (H)      Acute respiratory failure with hypoxia (H)      On mechanically assisted ventilation (H)      S/P CABG (coronary artery bypass graft)      Uncontrolled type 2 diabetes mellitus with hyperglycemia (H)      Acute on chronic clinical systolic heart failure (H) 12/02/2020     Atrial fibrillation with rapid ventricular response (H) 12/02/2020     Diabetic leg ulcer (H) 12/02/2020     Acute on chronic systolic CHF (congestive heart failure) (H) 12/02/2020     A-fib (H) 12/02/2020     Acute systolic heart failure (H)      New onset atrial fibrillation (H)      Pulmonary edema cardiac cause (H) 09/19/2020     CAD (coronary artery disease) 09/19/2020     New onset a-fib (H) 09/19/2020     Moderate episode of recurrent major depressive disorder (H) 06/21/2019     Hyperlipidemia, unspecified hyperlipidemia type 04/27/2016     Joint Pain, Localized In The Shoulder      Obesity      ACP Staging Stage 1 Hypertension: 140-159 / 90-99      Coronary artery disease involving native heart with other form of angina pectoris, unspecified vessel or lesion type (H)      Type 2 diabetes mellitus (H)      Past Medical History: Patient  has a past medical history of ACP Staging Stage 1 Hypertension: 140-159 / 90-99, Atrial fibrillation with rapid ventricular response (H) (12/2/2020), Coronary Artery Disease, Joint Pain, Localized In The Shoulder, Obesity, Other and unspecified hyperlipidemia (4/27/2016), and Type 2 Diabetes Mellitus With Complication.  Surgical History  He  has a past surgical history that includes Coronary Angiogram (N/A, 9/22/2020) and Left Heart Catheterization with Left Ventriculogram (N/A, 9/22/2020).     SOCIAL HISTORY     Reviewed, and he  reports that he has never smoked. He has never used smokeless tobacco. He reports current alcohol use. He reports that he does not use drugs.     FAMILY HISTORY     Reviewed, and family history includes Heart disease in his father.     ALLERGIES     No Known  Allergies     REVIEW OF SYSTEMS     Unable to do review of systems due to altered mental status.     HOME & HOSPITAL MEDICATIONS     Prior to Admission Medications  Medications Prior to Admission   Medication Sig Dispense Refill Last Dose     glipiZIDE (GLUCOTROL XL) 10 MG 24 hr tablet Take 2 tablets (20 mg total) by mouth once daily. (Patient taking differently: Take 10 mg by mouth once daily. ) 180 tablet 1 Unknown at Unknown time     insulin glargine (LANTUS SOLOSTAR U-100 INSULIN) 100 unit/mL (3 mL) pen Inject 35 Units under the skin at bedtime. 15 mL 0 Unknown at Unknown time     losartan (COZAAR) 100 MG tablet Take 1 tablet (100 mg total) by mouth daily. 90 tablet 2 Unknown at Unknown time     metFORMIN (GLUCOPHAGE) 1000 MG tablet Take 1 tablet (1,000 mg total) by mouth 2 (two) times a day with meals. (Patient taking differently: Take 1,000 mg by mouth daily with breakfast. ) 180 tablet 0 Unknown at Unknown time     metoprolol succinate (TOPROL-XL) 100 MG 24 hr tablet Take 1 tablet (100 mg total) by mouth 2 (two) times a day. (Patient taking differently: Take 100 mg by mouth daily. ) 90 tablet 1 Unknown at Unknown time     nitroglycerin (NITROSTAT) 0.4 MG SL tablet Place 0.4 mg under the tongue every 5 (five) minutes as needed for chest pain.   Unknown at Unknown time     amLODIPine (NORVASC) 10 MG tablet TAKE 1 TABLET BY MOUTH ONCE DAILY. OVERDUE FOR DIABETIC CHECK 90 tablet 3      aspirin 81 MG EC tablet Take 81 mg by mouth daily.   Unknown at Unknown time     atorvastatin (LIPITOR) 80 MG tablet Take 1 tablet (80 mg total) by mouth daily. 30 tablet 0 Unknown at Unknown time     blood glucose test strips Use 1 each As Directed 2 (two) times a day. Accu-Chek Guide test strips. Patient is on insulin. 200 strip 3      blood-glucose meter (ACCU-CHEK ADVANTAGE DIABETES) kit Test 4 times daily.        bumetanide (BUMEX) 0.5 MG tablet Take 2 tablets (1 mg total) by mouth daily. 60 tablet 0 Unknown at Unknown time  "    generic lancets Use 1 each As Directed 2 (two) times a day. Accu-Chek Fastclix Lancets. Patient is insulin dependant. 200 each 3      insulin lispro (HUMALOG KWIKPEN INSULIN) 100 unit/mL pen Inject 5 Units under the skin 3 (three) times a day with meals. 15 mL 0      NEEDLES, INSULIN DISPOSABLE (BD ULTRA-FINE ROBERT PEN NEEDLES MISC) use up to 4 times daily as directed.        pen needle, diabetic (BD ULTRA-FINE SHORT PEN NEEDLE) 31 gauge x 5/16\" Ndle USE UP TO 4 TIMES DAILY AS DIRECTED 300 each 0      rivaroxaban ANTICOAGULANT (XARELTO) 20 mg tablet Take 1 tablet (20 mg total) by mouth daily with supper. 30 tablet 0 Unknown at Unknown time       Hospital Medications    acetaminophen  650 mg Oral Q6H    Or     acetaminophen  650 mg Rectal Q6H     [START ON 12/9/2020] aspirin  81 mg Enteral Tube DAILY     atorvastatin  80 mg Enteral Tube QHS     bisacodyL  10 mg Oral Once     [START ON 12/10/2020] bisacodyL  10 mg Rectal Once     chlorhexidine  15 mL Topical Q12H 09-21     docusate sodium (COLACE) 50 mg/5 mL oral liquid  100 mg Enteral Tube BID     famotidine (PEPCID) injection  20 mg Intravenous BID    Or     famotidine  20 mg Oral BID     heparin (PF) ANTICOAGULANT  5,000 Units Subcutaneous Q8H FIXED TIMES     LORazepam  2 mg Intravenous Once     [START ON 12/9/2020] magnesium hydroxide  30 mL Oral DAILY     melatonin  3 mg Enteral Tube QHS     metoprolol tartrate  12.5 mg Enteral Tube BID     [START ON 12/9/2020] polyethylene glycol  17 g Enteral Tube DAILY     sodium chloride  10 mL Intravenous Q8H FIXED TIMES     sodium chloride  3 mL Intravenous Q8H FIXED TIMES        PHYSICAL EXAM     Vital signs  Temp:  [100.4  F (38  C)-101.8  F (38.8  C)] 100.4  F (38  C)  Heart Rate:  [] 79  Resp:  [12-34] 16  BP: ()/(49-53) 110/53  Arterial Line BP: ()/() 111/53  FiO2 (%):  [30 %-40 %] 30 %    Weight:   219 lb 5.7 oz (99.5 kg)  Patient was given some sedation for the exam for possible " seizures.  Vitals-Reviewed in chart  GENERAL -Health appearing, No apparent distress  EYES- No scleral icterus, no eyelid droop, Pupils - see Neuro section  HEENT - Normocephalic, atraumatic, minimally responsive.  Unable to check hearing.; Oral mucosa moist and pink in color. External Ears and nose intact.   Neck - supple with no obvious lymphadenopathy or thyromegaly  PULM -intubated.  CV- Pulses present with no peripheral edema/ No significant varicosities.  MSK- Gait - see Neuro section; Strength and tone- see Neuro section; Range of motion grossly intact.  Psych-cooperative and not agitated.    Neurological  Mental status - Patient is somnolent.  Does not follow commands.  Does not open eyes minimally.  No speech.  Cranial nerves - Pupils are reactive and symmetric; EOMI with oculocephalics, VFIT partially, NLF symmetric  Motor -no spontaneous movement.  Tone - Tone is symmetric bilaterally in upper and lower extremities.  Reflexes -bilateral toes mute.  Sensation -minimal movement to pain in all 4 extremities.  Coordination - Finger to nose and heel to shin-does not follow commands  Gait and station -unable to ambulate due to altered mental status.  Formal gait testing cannot be done because of safety concerns from ongoing medical issues.     DIAGNOSTIC STUDIES     Pertinent Radiology   Carotid US  IMPRESSION:   1.  Mild plaque formation, velocities consistent with less than 50% stenosis in the right internal carotid artery.  2.  Mild plaque formation, velocities consistent with less than 50% stenosis in the left internal carotid artery.  3.  Flow within the vertebral arteries is antegrade.    Recent Results (from the past 24 hour(s))   POCT Glucose    Collection Time: 12/07/20  7:48 PM    Specimen: Blood   Result Value Ref Range    Glucose 154 (H) 70 - 139 mg/dL   Blood Gases, Arterial    Collection Time: 12/07/20  7:59 PM   Result Value Ref Range    pH, Arterial 7.35 (L) 7.37 - 7.44    pCO2, Arterial 46 (H)  35 - 45 mm Hg    pO2, Arterial 94 (H) 75 - 85 mm Hg    Bicarbonate, Arterial Calc 24.0 23.0 - 29.0 mmol/L    O2 Sat, Arterial 96.8 (H) 95.0 - 96.0 %    Oxyhemoglobin 94.9 (L) 95.0 - 96.0 %    Base Excess, Arterial Calc -0.2 mmol/L    Ventilation Mode CPAP/PS     FIO2 40.00     PSV 5 cm H2O    Peep 5 cm H2O    Sample Stabilized Temperature 37.0 degrees C   POCT Glucose    Collection Time: 12/07/20  8:48 PM    Specimen: Artery; Blood   Result Value Ref Range    Glucose 154 (H) 70 - 139 mg/dL   POCT Glucose    Collection Time: 12/07/20  9:55 PM    Specimen: Artery; Blood   Result Value Ref Range    Glucose 156 (H) 70 - 139 mg/dL   POCT Glucose    Collection Time: 12/07/20 10:53 PM    Specimen: Artery; Blood   Result Value Ref Range    Glucose 134 70 - 139 mg/dL   POCT Glucose    Collection Time: 12/07/20 11:54 PM    Specimen: Artery; Blood   Result Value Ref Range    Glucose 110 70 - 139 mg/dL   POCT Glucose    Collection Time: 12/08/20  1:05 AM    Specimen: Artery; Blood   Result Value Ref Range    Glucose 129 70 - 139 mg/dL   POCT Glucose    Collection Time: 12/08/20  1:58 AM    Specimen: Artery; Blood   Result Value Ref Range    Glucose 121 70 - 139 mg/dL   POCT Glucose    Collection Time: 12/08/20  3:01 AM    Specimen: Artery; Blood   Result Value Ref Range    Glucose 112 70 - 139 mg/dL   Blood Gases, Venous (Release if needed) for SVO2 calibration    Collection Time: 12/08/20  3:45 AM   Result Value Ref Range    pH, Venous 7.40 7.35 - 7.45    pCO2, Venous 47 35 - 50 mm Hg    pO2, Jean-Pierre 39 25 - 47 mm Hg    Base Excess, Venous 4.7 mmol/L    HCO3, Venous 27.5 24.0 - 30.0 mmol/L    Oxyhemoglobin 72.2 70.0 - 75.0 %    O2 Sat, Venous 73.9 70.0 - 75.0 %   Basic Metabolic Panel    Collection Time: 12/08/20  3:59 AM   Result Value Ref Range    Sodium 141 136 - 145 mmol/L    Potassium 4.5 3.5 - 5.0 mmol/L    Chloride 110 (H) 98 - 107 mmol/L    CO2 27 22 - 31 mmol/L    Anion Gap, Calculation 4 (L) 5 - 18 mmol/L    Glucose 146  (H) 70 - 125 mg/dL    Calcium 8.0 (L) 8.5 - 10.5 mg/dL    BUN 18 8 - 22 mg/dL    Creatinine 0.72 0.70 - 1.30 mg/dL    GFR MDRD Af Amer >60 >60 mL/min/1.73m2    GFR MDRD Non Af Amer >60 >60 mL/min/1.73m2   INR    Collection Time: 12/08/20  3:59 AM   Result Value Ref Range    INR 1.41 (H) 0.90 - 1.10   Magnesium    Collection Time: 12/08/20  3:59 AM   Result Value Ref Range    Magnesium 2.1 1.8 - 2.6 mg/dL   Calcium, Ionized, Measured    Collection Time: 12/08/20  3:59 AM   Result Value Ref Range    Calcium, Ionized Measured 1.10 (L) 1.11 - 1.30 mmol/L    Calcium, Ionized pH 7.4 1.13 1.11 - 1.30 mmol/L    pH 7.45 7.35 - 7.45   HM1 (CBC with Diff)    Collection Time: 12/08/20  3:59 AM   Result Value Ref Range    WBC 13.0 (H) 4.0 - 11.0 thou/uL    RBC 3.93 (L) 4.40 - 6.20 mill/uL    Hemoglobin 10.4 (L) 14.0 - 18.0 g/dL    Hematocrit 33.6 (L) 40.0 - 54.0 %    MCV 86 80 - 100 fL    MCH 26.5 (L) 27.0 - 34.0 pg    MCHC 31.0 (L) 32.0 - 36.0 g/dL    RDW 14.4 11.0 - 14.5 %    Platelets 189 140 - 440 thou/uL    MPV 10.0 8.5 - 12.5 fL    Neutrophils % 85 (H) 50 - 70 %    Lymphocytes % 6 (L) 20 - 40 %    Monocytes % 9 2 - 10 %    Eosinophils % 0 0 - 6 %    Basophils % 0 0 - 2 %    Immature Granulocyte % 1 (H) <=0 %    Neutrophils Absolute 11.0 (H) 2.0 - 7.7 thou/uL    Lymphocytes Absolute 0.7 (L) 0.8 - 4.4 thou/uL    Monocytes Absolute 1.1 (H) 0.0 - 0.9 thou/uL    Eosinophils Absolute 0.0 0.0 - 0.4 thou/uL    Basophils Absolute 0.0 0.0 - 0.2 thou/uL    Immature Granulocyte Absolute 0.1 (H) <=0.0 thou/uL   POCT Glucose    Collection Time: 12/08/20  4:03 AM    Specimen: Blood   Result Value Ref Range    Glucose 117 70 - 139 mg/dL   POCT Glucose    Collection Time: 12/08/20  5:03 AM    Specimen: Artery; Blood   Result Value Ref Range    Glucose 128 70 - 139 mg/dL   POCT Glucose    Collection Time: 12/08/20  6:01 AM    Specimen: Blood   Result Value Ref Range    Glucose 97 70 - 139 mg/dL   POCT Glucose    Collection Time: 12/08/20   6:29 AM    Specimen: Artery; Blood   Result Value Ref Range    Glucose 99 70 - 139 mg/dL   POCT Glucose    Collection Time: 12/08/20  6:56 AM    Specimen: Artery; Blood   Result Value Ref Range    Glucose 107 70 - 139 mg/dL   EKG 12 lead    Collection Time: 12/08/20  7:02 AM   Result Value Ref Range    SYSTOLIC BLOOD PRESSURE      DIASTOLIC BLOOD PRESSURE      VENTRICULAR RATE 84 BPM    ATRIAL RATE 84 BPM    P-R INTERVAL 216 ms    QRS DURATION 132 ms    Q-T INTERVAL 442 ms    QTC CALCULATION (BEZET) 522 ms    P Axis 86 degrees    R AXIS -56 degrees    T AXIS 27 degrees    MUSE DIAGNOSIS       Sinus rhythm with 1st degree A-V block with Premature atrial complexes  Right bundle branch block  Left anterior fascicular block  ** Bifascicular block **  Abnormal ECG  When compared with ECG of 07-DEC-2020 13:26,  Sinus rhythm has replaced Electronic ventricular pacemaker  Confirmed by ALKA NOYOLA MD LOC:JN ([id number]) on 12/8/2020 3:37:15 PM     POCT Glucose    Collection Time: 12/08/20  7:52 AM    Specimen: Artery; Blood   Result Value Ref Range    Glucose 113 70 - 139 mg/dL   Blood Gases, Arterial    Collection Time: 12/08/20  8:20 AM   Result Value Ref Range    pH, Arterial 7.44 7.37 - 7.44    pCO2, Arterial 41 35 - 45 mm Hg    pO2, Arterial 93 (H) 75 - 85 mm Hg    Bicarbonate, Arterial Calc 27.2 23.0 - 29.0 mmol/L    O2 Sat, Arterial 98.3 (H) 95.0 - 96.0 %    Oxyhemoglobin 96.2 (H) 95.0 - 96.0 %    Base Excess, Arterial Calc 3.4 mmol/L    Ventilation Mode VCV     Rate 16 rr/min    FIO2 30.00     Peep 5 cm H2O    Sample Stabilized Temperature 37.0 degrees C    Ventilator Tidal Volume 500 mL   POCT Glucose    Collection Time: 12/08/20  9:00 AM    Specimen: Artery; Blood   Result Value Ref Range    Glucose 103 70 - 139 mg/dL   POCT Glucose    Collection Time: 12/08/20 10:55 AM    Specimen: Blood   Result Value Ref Range    Glucose 108 70 - 139 mg/dL   POCT Glucose    Collection Time: 12/08/20 12:57 PM    Specimen: Artery;  Blood   Result Value Ref Range    Glucose 104 70 - 139 mg/dL   POCT Glucose    Collection Time: 12/08/20  3:56 PM    Specimen: Blood   Result Value Ref Range    Glucose 98 70 - 139 mg/dL   Blood Gases, Arterial    Collection Time: 12/08/20  4:23 PM   Result Value Ref Range    pH, Arterial 7.51 (H) 7.37 - 7.44    pCO2, Arterial 34 (L) 35 - 45 mm Hg    pO2, Arterial 109 (H) 75 - 85 mm Hg    Bicarbonate, Arterial Calc 27.9 23.0 - 29.0 mmol/L    O2 Sat, Arterial 97.8 (H) 95.0 - 96.0 %    Oxyhemoglobin 96.4 (H) 95.0 - 96.0 %    Base Excess, Arterial Calc 3.9 mmol/L    Ventilation Mode VCV     Rate 16 rr/min    FIO2 30.00     Peep 5 cm H2O    Sample Stabilized Temperature 37.0 degrees C    Ventilator Tidal Volume 500 mL   POCT Glucose    Collection Time: 12/08/20  5:46 PM    Specimen: Artery; Blood   Result Value Ref Range    Glucose 104 70 - 139 mg/dL       Total time spent for face to face visit, reviewing chart/labs/imaging studies, counseling and coordination of care was: Over 70 min More than 50% of this time was spent on counseling and coordination of care.    Reviewing chart.  Coordination of care with the nursing staff.      Tima Francis MD  Neurologist  [facility]  Tel:- [phone]    This note was dictated using voice recognition software.  Any grammatical or context distortions are unintentional and inherent to the software.

## 2021-06-13 NOTE — PROGRESS NOTES
Speech Language/Pathology  Videofluoroscopic Swallow Study     Problem:  Patient Active Problem List   Diagnosis     Obesity     ACP Staging Stage 1 Hypertension: 140-159 / 90-99     Coronary artery disease involving native heart with other form of angina pectoris, unspecified vessel or lesion type (H)     Type 2 diabetes mellitus (H)     Joint Pain, Localized In The Shoulder     Hyperlipidemia, unspecified hyperlipidemia type     Moderate episode of recurrent major depressive disorder (H)     Pulmonary edema cardiac cause (H)     New onset atrial fibrillation (H)     Acute systolic heart failure (H)     CAD (coronary artery disease)     New onset a-fib (H)     Acute on chronic clinical systolic heart failure (H)     Atrial fibrillation with rapid ventricular response (H)     Diabetic leg ulcer (H)     Acute on chronic systolic CHF (congestive heart failure) (H)     A-fib (H)     Uncontrolled type 2 diabetes mellitus with hyperglycemia (H)     Acute respiratory failure with hypoxia (H)     On mechanically assisted ventilation (H)     S/P CABG (coronary artery bypass graft)     Seizures (H)     Cerebrovascular accident (CVA), unspecified mechanism (H)     Cerebrovascular accident (CVA) due to other mechanism (H)     Metabolic encephalopathy     Disorientation     Acute kidney failure with tubular necrosis (H)       Onset date: 12/2/20  Reason for evaluation: instrumental re-evaluation of swallowing   Pertinent History: previous video swallow studies x2 (12/2/20 and 12/15/20)  Current Diet: NPO  Baseline Diet: regular and thin liquids    Assessment:    Patient demonstrated moderate oral and severe pharyngeal dysphagia.    Patient is at high aspiration risk with honey thick liquids and thinner. Patient is at moderate risk for aspiration from kickback of residue with puree. Patient may have potential to initiate diet of NDD 1 textures and pudding thick liquds, but questionable if patient would be able to meet his  hydration needs with that degree of liquid thickness.      Rehab potential is good based on prior level of function and evaluation results.    Patient is a good candidate to complete therapeutic exercises and/or compensatory strategies to improve oral and/or pharyngeal phase of swallow due to cognitive status.    Recommendations:    Plan:Recommend NPO and theraputic feedings of puree with speech therapy only due to aspiration risk     Strategies: upright to 90 degrees for all po intake, multiple swallows per bite    Referrals: repeat video swallow study in 1-2 weeks    Subjective:    Patient presents as alert and cooperative during this evaluation.  Flat affect. An  was not applicable    Patient was given puree and honey.    Objective:    Dentition/Oral hygiene: adequate    Oral motor function was grossly intact.    Bolus prep and oral control was not impaired.     Anterior-Posterior transit was not impaired.    Premature spill beyond the base of the tongue into the valleculae occurred with honey thick.    Tongue base retraction was mildly impaired.    Oral stasis occurred with all textures trialed. Piecemeal deglutition noted.     Aspiration occurred with honey thick. Patient had very delayed weak cough with aspiration. Aspiration occurred both directly, as well as after swallow from kickback from residual in pyriform sinuses and hypopharynx.    Laryngeal penetration occurred with honey thick. Moderate to deep. Was not expelled from airway.     Swallow response was delayed with honey thick.  Swallow was triggered at the level of pyriform sinuses inconsistently.    Epiglottic movement was in a posterior pattern and past horizontal inconsistently across texture trials. Past horizontal movement with puree, suspect d/t weight/mass. Posterior movement with honey-thick liquids.     Pharyngeal stasis occurred with all textures trialed. Moderate to severe in valleculae with puree. Mild in the pyriform sinuses  and posterior pharyngeal wall. Cleared to moderate with subsequent dry swallow. Mild in valleculae and pyriform sinuses with honey-thick liquids.     Pharyngeal constriction was moderately impaired.    Hyolaryngeal elevation was not impaired. Hyolaryngeal excursion was reduced inconsistently.    Cricopharyngeal function was mildly impaired. Cervical esophageal function was not observed .    Intervention:  The strategies of chin tuck and reduced bolus size, as well as effortful swallow and head turn to right and left were trialed with honey thick resulting in no significant or consistent improvement in swallow safety. Double swallow strategy with puree was effective in reducing vallecular residue somewhat.     15 dysphagia minutes eval, 10 minutes dysphagia minutes treatment    Ed Cook MA, CCC-SLP

## 2021-06-13 NOTE — PLAN OF CARE
Problem: Pain  Goal: Patient's pain/discomfort is manageable  12/6/2020 1900 by Britt Arguello, RN  Outcome: Progressing  12/6/2020 1900 by Britt Arguello RN  Outcome: Progressing     Problem: Safety  Goal: Patient will be injury free during hospitalization  Outcome: Progressing     Problem: Daily Care  Goal: Daily care needs are met  Outcome: Progressing     Problem: Psychosocial Needs  Goal: Demonstrates ability to cope with hospitalization/illness  Outcome: Progressing  Goal: Collaborate with patient/family/caregiver to identify patient specific goals for this hospitalization  Outcome: Progressing   pt scheduled for Bypass surgery tomorrow. NPO after midnight. Showered and full body  cleansed with chlorhexidine wipes.  Heparin  continues running at 1650 units/kg. Ok to continue heparin infusion until tomorrow per CV surgery.

## 2021-06-13 NOTE — PLAN OF CARE
Problem: Pain  Goal: Patient's pain/discomfort is manageable  Outcome: Progressing   Tylenol given x1 this shift for generalized pain, which was helpful per patient  Problem: Safety  Goal: Patient will be injury free during hospitalization  Outcome: Progressing   Pt on 1:1 overnight for safety, pulls at lines  Problem: Daily Care  Goal: Daily care needs are met  Outcome: Progressing   Needs assist to turn and reposition and for pericare  Problem: Psychosocial Needs  Goal: Demonstrates ability to cope with hospitalization/illness  Outcome: Progressing   Pt is confused, but does fluctuate. Oriented to person/place consistantly  Problem: Potential for Falls  Goal: Patient will remain free of falls  Outcome: Progressing

## 2021-06-13 NOTE — PROGRESS NOTES
12/12/20 1300   Oxygen Therapy/Pulse Ox   O2 Device None (Room air)   O2 Flow Rate (L/min) 0 L/min   O2 Titration attempted (Day and Evening Shift) Yes   O2 Therapy Room air   SpO2 94 %   SpO2 Activity  O2 at rest     NOTES:  Patient on room air. Uses O2 at night. Nasal Cannula was removed this morning after breathing treatment.  Maintaining saturations between 92 and 95% on room air

## 2021-06-13 NOTE — PROGRESS NOTES
Patient alert, able to communicate needs to staff. Patient denies pain, on RA, oxygen saturation >90%. Patient's sternal incision, clean/dry/intact. Patient on telemetric cardiac monitor: Afib with BBB. Patient on continuous TF, rate at 30 ml.hr, patient tolerating TF.  CTs in place, R pleural: 260 ml and L pleural: 80 ml serosanguinous drainage. CT dressing in place, clean/dry/intact. Patient on 1:1 sitter for safety reasons. Will continue to monitor the patient for now.     Maria G Soto RN

## 2021-06-13 NOTE — CONSULTS
INPATIENT [place] PM&R CONSULT     Location 4114   Referring provider Jeremy Westfall   Date of admission: 12/2/2020  Date of encounter: 12/21/20      ASSESSMENT AND PLAN   Mika Alvarez is a 70 y.o. male who presents on 12/2/2020 with subacute infarct with petechial hemorrhage in the left frontal lobe, in the setting of CABG x4 on 12/7/20.      I had a long discussion with the patient, his daughter, Joanie, wife as well as RN and sitter.     Today he ambulated 45+12 feet with a walker SBA and required Min-mod assist with transfers. He is SBA with grooming but mod max asist with adl's.   He presents with aphasia, has word finding difficulty, is able to pick the right holiday when given choices but unable to give the right holiday. His problem solving skills are remarkable. He was able to decipher that his wife has brain tumor and would not be able to help 'a great deal' after discharge. He also endorsed that his other support systems all work.   However looking at his trajectory, and examination, he is likely going to be modified independent with transfers and ambulation with a walker.    Would recommend MoCA testing to determine a quantative score.   Recommend ARU placement, given the trajectory. He tends to be wrong with the date, however his problem solving and processing appear to be intact.     Recommend ARU placment with PT/OT/SLP needs 60 min in each discipline   ELOS is 2-3 weeks.     Discharge disposition is home with home care       Thank you for allowing us to participate in the care of this patient. Please page me at [phone] for any questions.   Nel Pike MD, A   Department of PM&R   Executive Medical director of Rehab service line         [facility]   Mika Alvarez is a 70 y.o. male who presented on 12/2/2020 with past medical history of uncontrolled T2DM, severe multivessel CAD, chronic systolic and diastolic CHF, recent diagnosis of A. fib, who presented with acute CHF, was diuresed and subsequently  underwent four-vessel CABG by Dr. Madison on 12/7/2020.     Post-op course complicated by Afib with RVR, which was initially treated with IV amiodarone given pt's NPO status due to severe dysphagia.     Hospitalization complicated by altered mentation and seizure-like activity onset 12/8.     CT Head on 12/8 showed punctate focus of increased attenuation within the subcortical region of the left frontal lobe.    Subsequent MRI of the brain on 12/9 demonstrated subacute 2.5 cm subacute infarct with petechial hemorrhage in the left frontal lobe.   EEG did not show any seizure activity, however Keppra was started 12/10 with resolution of rhythmic left leg movements.     Neurology has followed, and signed off.    Extubated 12/10 and weaned down to room air.     He was started on broad spectrum antibiotics 12/10 due to new fever and worsening leukocytosis. 1 of 2 blood Cx grew staph epidermidis, and sputum cultures showed respiratory miryam with 4+ beta hemolytic Strep. 7 days IV pip-tazo completed 12/17.     VFSS with mild oral and severe pharyngeal dysphagia - NGT placed on 12/15. This may have exacerbated hypernatremia, which improved with D5W prior to NGT placement.     He continues with sitter, cognitively what is the cause, has cest tubes, IV lasix, and repatet radioswallow done for severe dysthagia.       Currently on tube feeds peptamen 1.5       CURRENT FUNCTION   In PT he was noted to be fatigued. Pt required mod A/min A for bed mobility and transfers to maintain sternal precautions. Unable to increase ambulation distance today d/t bathroom needs.   Ambulated 45+ 15 feet with SBA with a walker.   He is SBA with hygiene and grooming with OT.     PREVIOUS LEVEL OF FUCNTION  He was independent prior to this admit.   He lives in a split level home in [facility]     Social history   Relationships   Social connections     Talks on phone: Not on file     Gets together: Not on file     Attends Bahai service:  Not on file     Active member of club or organization: Not on file     Attends meetings of clubs or organizations: Not on file     Relationship status: Not on file         PMH   Past Medical History:   Diagnosis Date     ACP Staging Stage 1 Hypertension: 140-159 / 90-99     Created by Conversion  Replacement Utility updated for latest IMO load     Atrial fibrillation with rapid ventricular response (H) 12/2/2020     Coronary Artery Disease     Created by Conversion  Replacement Utility updated for latest IMO load     Joint Pain, Localized In The Shoulder     Created by Conversion      Obesity     Created by Conversion      Other and unspecified hyperlipidemia 4/27/2016     Type 2 Diabetes Mellitus With Complication     Created by Conversion          Total of 10 systems reviewed   Pertinent positives and negatives are as follows;    Deneis any chest pain   Noted shortness of breath   FT   Bladder and bowel fucntion adequate   Denies any incontinence of bowel and bladder   Slept well   Appetite is good.   Remainder of the review of the systems is negative     On examination   Vitals:    12/21/20 1545   BP: 109/76   Pulse: 85   Resp: 18   Temp: 97.5  F (36.4  C)   SpO2: 91%     Lying in bed   Oriented to place but not date, not sure if this a language deficit   Comfortable   Processing in remarkable   He appears to having difficulty with words intermittently   Names 3/3 objects   He is oriented to place and situation   Examination of the muscle is consistent with proximal weakness   Atrophy f the muscles   No LE edema   Chronic stasis changes noted     Total of 70 min spent in this encounter more than 50% in counseling and coordination.   Nel Pike MD, A

## 2021-06-13 NOTE — PROGRESS NOTES
Pharmacy Note - Admission Medication History    Pertinent Provider Information: Pt not taking his bumex and xarelto since October (probably ran out around the 25th).  Intermittent with his aspirin and other meds.  Not using his short acting Humalog currently.  Decreased his doses of glipizide, metformin, and metoprolol on his own.     ______________________________________________________________________    Prior To Admission (PTA) med list completed and updated in EMR.       PTA Med List   Medication Sig Last Dose     glipiZIDE (GLUCOTROL XL) 10 MG 24 hr tablet Take 2 tablets (20 mg total) by mouth once daily. (Patient taking differently: Take 10 mg by mouth once daily. ) Unknown at Unknown time     insulin glargine (LANTUS SOLOSTAR U-100 INSULIN) 100 unit/mL (3 mL) pen Inject 35 Units under the skin at bedtime. Unknown at Unknown time     losartan (COZAAR) 100 MG tablet Take 1 tablet (100 mg total) by mouth daily. Unknown at Unknown time     metFORMIN (GLUCOPHAGE) 1000 MG tablet Take 1 tablet (1,000 mg total) by mouth 2 (two) times a day with meals. (Patient taking differently: Take 1,000 mg by mouth daily with breakfast. ) Unknown at Unknown time     metoprolol succinate (TOPROL-XL) 100 MG 24 hr tablet Take 1 tablet (100 mg total) by mouth 2 (two) times a day. (Patient taking differently: Take 100 mg by mouth daily. ) Unknown at Unknown time     nitroglycerin (NITROSTAT) 0.4 MG SL tablet Place 0.4 mg under the tongue every 5 (five) minutes as needed for chest pain. Unknown at Unknown time       Information source(s): Patient and CareEverywhere/SureScripts  Method of interview communication: in-person    Patient was asked about OTC/herbal products specifically.  PTA med list reflects this.    In the past week, patient estimated taking medication this percent of the time:  less than 50% due to running out.    Allergies were reviewed, assessed, and updated with the patient.      Patient did not bring any medications  to the hospital and can't retrieve from home. No multi-dose medications are available for use during hospital stay.     The information provided in this note is only as accurate as the sources available at the time of the update(s).    Thank you for the opportunity to participate in the care of this patient.    Mariama Martin, PharmD  12/2/2020 2:53 PM

## 2021-06-13 NOTE — PROGRESS NOTES
Serum sodium 150 at 18:19.  Will increase D5 to 150 ml/hr.  Blood sugars have been >200, anticipate worsening hyperglycemia with increased D5 therefore will initiate insulin drip. Monitor serum sodium Q 4 h.     D/w ICU RN    Hayley Leung MD

## 2021-06-13 NOTE — PROGRESS NOTES
ICU PROGRESS NOTE:    Assessment/Plan:  70 year old male with a history of HTN, HLD, poorly controlled DM, systolic and diastolic CHF, multi vessel CAD who is s/p 4v CABG with Dr. Madison 12/7/20.    CV:  S/p 4 v CABG 12/7. Systolic and diastolic heart failure, EF 35%.  ? SBP goal > 90 mmHg, MAP goal > 65 mmHg  ? Goal CI 2-3   ? Goal PAD 20  ? Vasoactive gtts per CT surgery.  ? QTc 522  ? Cardiology following  ? On atorvastatin high-dose, ASA, metoprolol    RESP:  Intubated post-operatively. Unable to wean this far due to agitated delirium, not following commands, question of seizures. More alert and extubated 12/10. Patient is conversant 12/11, asking for water. EEG with no epileptiform activity. MRI 12/9 with 2.5 cm subacute infarct left frontal lobe associated with petechial hemorrhage.    Extubated 12/10 (POD #3), on low-flow oxygen    Albuterol and mucomyst for bronchial hygiene    Trial of metaneb if tolerated    IS, PT, mobilize as able    RENAL:  ? Koenig catheter in place for accurate measurement of I/O.   ? Stable renal function    ID:  Temp 38.7 AM of 12/10, WBC 13.1 on 12/10, down to 10.7. Was started on pip-tazo and vanco on 12/10. Sputum culture 12/9 with 4+ PMNs, polymicrobial gram stain, culture in process. Blood culture 12/10 1 of 2 bottles with GPCs clusters. UA 12/10 appeared uninfected.    vanco/zosyn started 12/10    Follow blood cultures, sputum culture    Prune antibiotics as able and coordinate with CT surgery    GI:  High risk of dysphagia. SLP evaluation pending.    NEURO:  Question of seizure activity. EEG was negative.  2.5 cm left frontal lobe infarct w petechial hemorrhage on MRI brain  Neuro consult appreciated  Precedex off  Alert, more conversant, asking for water    HEMATOLOGIC:  ? Transfuse per CV-surgery.   ? Monitor chest tube output hourly; notify CV-surgery for excessive output or abrupt stop in output  ? DVT prophylaxis:  SubQ heparin    ENDOCRINE: Diabetes mellitus: Off  "insulin drip, persistently hyperglycemic  ? Increase glargine from 8 to 12 units at bedtime  ? sliding scale aspart    ICU Checklist:  Feeding:  Feeding: No.  Patient is receiving NPO. SLP evaluating 12/11.     Prophylaxis:    Thromboembolic: HSQ     Stress Ulcer: PPI    Restraints? no    Lines:  Right internal jugular vein introducer with PA catheter (12/7)  Chest tubes x 3 (12/7)  Koenig (12/7)    DISPOSITION: ICU    CODE STATUS: Full Code    Total Critical Care Time: 35 minutes    Anthony Waldron MD  Pulmonary and Critical Care Medicine  [facility]  Cell [phone]  Office [phone]  Pager [phone]    ----------------------------    Overnight events:  More alert, asking for water.    Subjective:  Very dry mouth. Very thirsty. Denies pain.    Objective:  Physical Exam:  /76   Pulse (!) 104   Temp 97.5  F (36.4  C) (Oral)   Resp 16   Ht 5' 10\" (1.778 m)   Wt 210 lb 4.8 oz (95.4 kg)   SpO2 98%   BMI 30.17 kg/m      Intake/Output last 3 shifts:  I/O last 3 completed shifts:  In: 2076.3 [I.V.:906.3; NG/GT:60; IV Piggyback:1110]  Out: 2742 [Urine:2582; Chest Tube:160]  Intake/Output this shift:  I/O this shift:  In: -   Out: 551 [Urine:551]    Physical Exam  Gen: alert, conversant  HEENT: no OP lesions, no GUMARO  CV: tachy, regular, no m/g/r  Resp: CTAB. Chest tubes in place with serosanguinous output.  Abd: soft, nontender, BS+  Neuro: PERRL, nonfocal  Ext: no edema  "

## 2021-06-13 NOTE — PLAN OF CARE
"  Problem: Pain  Goal: Patient's pain/discomfort is manageable  Outcome: Progressing     Problem: Safety  Goal: Patient will be injury free during hospitalization  Outcome: Progressing     Problem: Daily Care  Goal: Daily care needs are met  Outcome: Progressing     Problem: Knowledge Deficit  Goal: Patient/family/caregiver demonstrates understanding of disease process, treatment plan, medications, and discharge instructions  Outcome: Progressing     Problem: Potential for Falls  Goal: Patient will remain free of falls  Outcome: Progressing     Pt is alert to self and time, with fluctuating orientation to situation and place.  Calm and cooperative with cares, uses call bell appropriately.  Tube feeding continues, new bag hung this shift.      /71 (Patient Position: Semi-hooks)   Pulse 85   Temp 98.1  F (36.7  C) (Oral)   Resp 20   Ht 5' 10\" (1.778 m)   Wt 189 lb 9 oz (86 kg)   SpO2 95%   BMI 27.20 kg/m      "

## 2021-06-13 NOTE — PROGRESS NOTES
Hospitalist Progress Note    Chief Complaint: Acute dyspnea     Summary: 70 y.o.male with past medical history of uncontrolled T2DM, severe multivessel CAD, chronic systolic and diastolic CHF, recent diagnosis of A. fib, who presented with acute CHF, was diuresed and subsequently underwent four-vessel CABG by Dr. Madison on 12/7/2020. Post-op course complicated by Afib with RVR, which was initially treated with IV amiodarone given pt's NPO status due to severe dysphagia.     Hospitalization complicated by altered mentation and seizure-like activity onset 12/8. CT Head on 12/8 showed punctate focus of increased attenuation within the subcortical region of the left frontal lobe. Subsequent MRI of the brain on 12/9 demonstrated subacute 2.5 cm subacute infarct with petechial hemorrhage in the left frontal lobe. EEG did not show any seizure activity, however Keppra was started 12/10 with resolution of rhythmic left leg movements. Neurology has followed, now signed off.     Extubated 12/10 and weaned down to room air. Started on broad spectrum antibiotics 12/10 due to new fever and worsening leukocytosis. 1 of 2 blood Cx grew staph epidermidis, and sputum cultures showed respiratory miryam with 4+ beta hemolytic Strep. 7 days IV pip-tazo completed 12/17.     VFSS with mild oral and severe pharyngeal dysphagia - NGT placed on 12/15. This may have exacerbated hypernatremia, which improved with D5W prior to NGT placement.     Assessment & Plan  Multivessel CAD, s/p CABG and PVI/LAAL on 12/7/2020:   Increased pleural drainage, negative for chylous fluid; Chest tubes still in place 12/20  CV surgery dosing albumin x 1, increasing IV diuretic dose  Hemodynamically stable  On ASA, statin, metoprolol  Mgmt as per CV Surgery.      A Fib with RVR, now s/p PVI and DONNA excision:   Oral amiodarone, metoprolol.  Currently NSR  Mgmt as per Cardiology and CV Surgery following.   Cardiac tele.      Acute diastolic heart failure,  improving.  Holding home bumetanide for now. On IV diuretics  Mgmt as per Cardiology.     Acute hypoxic respiratory failure, resolved: On room air.   Encourage IC.  Supplemental O2 to keep sats >94%.  Continue pulmonary toilet- On metaneb, flutter valve     Acute Metabolic encephalopathy: improving.  Encephalopathy could also be contributed by prolonged hospitalization, hypernatremia, possible pneumonia, medications. CT Head from 12/12 showed no new changes.   EEG did not show seizure activity, but pt's rhythmic left leg movement appear to be have improved with Keppra. Continue keppra, Keppra level therapeutic, pt was started on quetiapine on 12/14   Decrease at bedtime seroquel due to somnolence     Subacute left frontal stroke with petechial hemorrhage:   on ASA, statin.   Neurology recs appreciated.     Sepsis  Fever, Leukocytosis, tachycardia noted 12/10/2020  UA was negative. One of two blood cultures grew staph epi, likely contaminant.  Sputum culture growing beta-hemolytic strep.   Started on emperic pip-tazo IV,  completed 7d on 12/17, fever and WBC resolving.  ID recs appreciated.     Severe pharyngeal dysphagia: NG and tube feeding.  SLP team following, repeat Video swallow today shows ongoing significant dysphagia     Hyperglycemia in the setting of T2DM and D5W as above:  HDSSI with glargine. Holding home glipizide, metformin.  Increase dose of two times a day glargine again today: 15u two times a day->20u two times a day     Hypernatremia, resolved.     HTN: holding home losartan, amlodipine.     Code status Full Code  DVT prophylaxis: heparin sq     Barrier's to discharge : chest tube, encephalopathy, dysphagia  Anticipated discharge date:  Tuesday  Disposition:  acute rehab vs TCU     Lines: NG feeding tube in     Malnutrition, protein/calorie, Severe: Poor appetite, weight loss, muscle wasting  Class I Obesity w BMI=30-34.9: 214 lb 9.6 oz (97.3 kg) Body mass index is 30.79 kg/m .      Principal  Problem:    Acute on chronic clinical systolic heart failure (H)  Active Problems:    ACP Staging Stage 1 Hypertension: 140-159 / 90-99    Type 2 diabetes mellitus (H)    Hyperlipidemia, unspecified hyperlipidemia type    CAD (coronary artery disease)    Atrial fibrillation with rapid ventricular response (H)    Diabetic leg ulcer (H)    Acute on chronic systolic CHF (congestive heart failure) (H)    A-fib (H)    Uncontrolled type 2 diabetes mellitus with hyperglycemia (H)    Acute respiratory failure with hypoxia (H)    On mechanically assisted ventilation (H)    S/P CABG (coronary artery bypass graft)    Seizures (H)    Cerebrovascular accident (CVA), unspecified mechanism (H)    Cerebrovascular accident (CVA) due to other mechanism (H)    Metabolic encephalopathy    Disorientation    Acute kidney failure with tubular necrosis (H)    Subjective  Patient confused, not sleeping well. Denies pain     ROS: Denies chest pain, denies shortness of breath and passing urine well    Objective    Physical Exam    General Appearance:  HEENT:  Cooperative, in no distress   Normocephalic, atraumatic, conjunctiva clear without icterus   Lungs:   Crackles at bases   Cardiovascular:  Regular Rate and Rythm, S1, S2, no lower extremity edema bilaterally   Abdomen: Soft, non-tender and obese, active bowel sounds   Skin:  Skin color, texture normal and no rashes or lesions over face, neck, arms and legs, turgor normal.   Neurologic: Alert but confused, Facial symmetry preserved and upper & lower extremities moving well with symmetry   Reviewed telemetry, Laboratory results and Consultant recommendations    Time spent: on patient interview, exam, med rec, progress note: 12 min    Counseling of patient at bedside  Diagnosis & prognosis regarding stroke, CHF, dysphagia - 6 min  review of treatment plan diuresing 5 min    Coordination with: Care manager regarding discharge planning  2 min    Total time including time spent in counseling  and coordination of care: 13 minutes  Total time spent  on encounter: >=25 minutes    Khoi Luna MD, FACP  Internal Medicine Hospitalist  12/21/2020  4:01 PM

## 2021-06-13 NOTE — PROGRESS NOTES
Report called to Lauren RN, pt to be transferred to 4116 when room is clean. NA packed up belongings and documented what will be transferred with patient. Family not notified of transfer at this time as it is almost 2300. Please update in the AM.

## 2021-06-13 NOTE — PROGRESS NOTES
"Care Management Follow Up Note    Length of Stay (days) 14     Patient plan of care discussed at Interdisciplinary Rounds: yes, still has 1:1 sitter to prevent pulling NG tube/ feeding tube.        Expected Discharge Date: 12/19/20 pending    Concerns to be Addressed / Barriers to Discharge:   medical surgical clearance s/p coronary artery bypass grafting x4  (CABG), post op confusion requiring 1:1 sitter, chest tubes, IV meds as ordered, therapy recommendations    Anticipated Discharge Disposition: pending- as 12/5 patient planned to go to TCU post discharge       Plan:  Admitted 12/2 and had CABG on 12/7. Post op course complicated by a fib and subacute left frontal infarct., failed video swallow so NJ tube placed and plan for tube feeding. Noted plan to repeat video swallow 12/20 - 12/22.     Patient still has 1:1 sitter due to altered mentation. Will need to be off 1:1 sitter and participating in PT / OT before discharge to TCU. TCU list previously given 12/12.    10:46 AM Called and discussed TCU recommendation with patient's wife, Bel. (Patient previously expressed preference for TCU at discharge). Wife in agreement with TCU and has no preference for facility. Per wife, \"whatever place they find for him is fine by me.\" Discussed due to COVID restrictions, no current visitors allowed at hospital or rehab facilities. No further questions at this time.      Patient and his wife live in [place], [place]. Anticipate TCU referrals to [facility] ([facility]) or [facility].    Spoke with JOSE D Vo with Cardiovascular surgery (CV)- due to feeding tube, requesting referrals also be sent to [facility] and [facility]. Patient has qualifying 3 day ICU stay but not currently requiring a vent. Spoke with [facility] referral specialist re: new referral. Will review.     CM following.       "

## 2021-06-13 NOTE — PROGRESS NOTES
Notified by nursing patient having jerking movements during PS wean.     Seen at the bedside. On minimal precedex, not consistently following commands. Noted to have left leg rhythmic jerking movements, with ETT biting, upward deviated gaze. Overall suggestive of possible focal seizure activity. Given 2 mg IV ativan, with prompt resolution.    Discussed with Chevy Lowry in CV surgery, neurology consultation placed, I ordered an EEG.      Deysi Littlejohn MD  Pulmonary and Critical Care Medicine  [facility]  Office: [phone]

## 2021-06-13 NOTE — PROGRESS NOTES
Progress Note    Brief summary:  70-year-old male with past medical history of coronary artery disease, systolic heart failure, A. fib, diabetes who was recently diagnosed to have severe multivessel disease and was recommended to have CABG but had declined, comes in with worsening shortness of breath.  Chest x-ray showed bilateral pleural effusion with increased vascular congestion, BNP was elevated.  He is admitted with acute heart failure    Assessment/Plan  Principal Problem:    Acute on chronic clinical systolic heart failure (H)  Active Problems:    ACP Staging Stage 1 Hypertension: 140-159 / 90-99    Type 2 diabetes mellitus (H)    Hyperlipidemia, unspecified hyperlipidemia type    CAD (coronary artery disease)    Atrial fibrillation with rapid ventricular response (H)    Diabetic leg ulcer (H)    Acute on chronic systolic CHF (congestive heart failure) (H)    A-fib (H)      1.  Acute on chronic systolic congestive heart failure:   Last echocardiogram with EF 43%.   Responding to IV Lasix with good diuresis and improving oxygenation.  Continue losartan and metoprolol.     2.  Non-STEMI: mild elevation in troponin, could be demand ischemia but he does have untreated CAD as well.  On iv heparin. Cardiology suggesting optimizing HF first before consulting [facility] for possible CABG    3.  Multivessel CAD:  No chest pain. [facility] to evaluate for CABG once optimized fom HF     4.  Persistent atrial fibrillation with uncontrolled ventricular rate.  metoprolol   He has been noncompliant taking Xarelto.  Currently .     5.  Hypercholesterolemia-continue atorvastatin     6.  Essential hypertension with uncontrolled blood pressure.  Continue losartan and metoprolol and diltiazem ( norvasc switched to cardizem yesterday for better rate control)     7.   Insulin-dependent type 2 diabetes.   presented with with hyperglycemia. Due to two consecutive BG of 90s today, will decrease lantus slightly to 30units  Continue Lantus and  Humalog with meals plus sliding scale.  Hold usual home doses of Metformin and glipizide.     8.  Multiple leg ulcers bilateral lower extremities.  Wound care consult.       Subjective  Patient seen and examined  Feeling much better today    Objective    Vital signs in last 24 hours  Temp:  [97.4  F (36.3  C)-98.1  F (36.7  C)] 97.4  F (36.3  C)  Heart Rate:  [] 69  Resp:  [18-22] 20  BP: ()/() 90/61  Weight:   (!) 223 lb (101.2 kg)    Intake/Output last 3 shifts  I/O last 3 completed shifts:  In: 1300 [P.O.:1300]  Out: 5135 [Urine:5135]  Intake/Output this shift:  No intake/output data recorded.    Physical Exam   General: awake, alert, not in distress  Eyes: no pallor  Chest: Clear to auscultation bilaterally  Heart: RRR, normal S1 and S2  Abdomen: soft, non tender  Ext: bilateral pedal edema with open wounds  Neuro: grossly normal      Meds    aspirin  162 mg Oral DAILY     atorvastatin  80 mg Oral QHS     diltiazem  120 mg Oral DAILY     furosemide  40 mg Intravenous Q12H     glipiZIDE  10 mg Oral Daily with brkfst     insulin aspart (NovoLOG) injection   Subcutaneous TID with meals     insulin aspart (NovoLOG) injection   Subcutaneous TID with meals     insulin aspart (NovoLOG) injection   Subcutaneous QHS     insulin glargine  35 Units Subcutaneous QHS     losartan  100 mg Oral DAILY     metoprolol succinate  100 mg Oral BID     sodium chloride  2.5 mL Intravenous Line Care       Pertinent Labs   Lab Results: personally reviewed.   not applicable    Results from last 7 days   Lab Units 12/03/20  0704 12/02/20  1416   LN-SODIUM mmol/L 139 139   LN-POTASSIUM mmol/L 3.7 4.0   LN-CHLORIDE mmol/L 105 104   LN-CO2 mmol/L 27 25   LN-BLOOD UREA NITROGEN mg/dL 23* 17   LN-CREATININE mg/dL 0.75 0.91   LN-CALCIUM mg/dL 8.7 9.1         Results from last 7 days   Lab Units 12/02/20  1418   LN-WHITE BLOOD CELL COUNT thou/uL 9.4   LN-HEMOGLOBIN g/dL 14.7   LN-HEMATOCRIT % 47.7   LN-PLATELET COUNT thou/uL  309         Pertinent Radiology   Radiology Results: Personally reviewed impression/s    Echo Complete    Result Date: 12/3/2020    Technically difficult study. Definity contrast utilized.   Normal left ventricular size. Mild to moderate left ventricular hypertrophy.   Left ventricle ejection fraction is moderately globally reduced. Left ventricular ejection fraction estimated at 30 to 35%. Abnormal septal motion.   Right ventricle not optimally visualized. Right ventricular systolic function is reduced. Abnormal TAPSE   Moderate left atrial enlargement. Mild right atrial enlargement.   Aortic valve sclerosis without Doppler evidence to suggest aortic stenosis. Mild aortic insufficiency.   Nonspecific thickening of the mitral valve leaflets with mild mitral regurgitation.   Mild tricuspid regurgitation.   Mild enlargement of the aortic root   When compared to the previous study dated 9/20/2020, the prior study was also technically challenging. Left ventricular function remains reduced and appears potentially mildly more prominently reduced on the current examination.      Xr Chest 1 View Portable    Result Date: 12/2/2020  EXAM: XR CHEST 1 VIEW PORTABLE LOCATION: [facility] DATE/TIME: 12/2/2020 2:47 PM INDICATION: Shortness of breath with concern for pulmonary edema secondary to diuretic noncompliance COMPARISON: Chest x-ray 09/19/2020     Small left-sided and small to moderate right-sided pleural effusions with adjacent opacities likely reflecting atelectasis. Cardiomegaly with central vascular congestion and mild pulmonary edema.    Us Carotid Bilateral    Result Date: 12/3/2020  EXAM: US CAROTID BILATERAL LOCATION: [facility] DATE/TIME: 12/3/2020 3:12 PM INDICATION: Coronary artery disease. COMPARISON: None. TECHNIQUE: Duplex exam performed utilizing 2D gray-scale imaging, Doppler interrogation with color-flow and spectral waveform analysis. The percent  diameter stenosis is determined using NASCET criteria and Society of Radiologists in Ultrasound Consensus Criteria. FINDINGS: RIGHT: Mild plaque at the bifurcation. The peak systolic velocity in the ICA is less than 125 cm/sec, consistent with less than 50% stenosis. Normal velocities in the ECA. Antegrade flow within the vertebral artery. LEFT: Mild plaque at the bifurcation. The peak systolic velocity in the ICA is less than 125 cm/sec, consistent with less than 50% stenosis. Normal velocities in the ECA. Antegrade flow within the vertebral artery. VELOCITY CHART: CCA   Right: 64/16 cm/s   Left: 91/24 cm/s ICA   Right: 71/27 cm/s   Left: 60/22 cm/s ECA   Right: 64/11 cm/s   Left: 96/14 cm/s ICA/CCA PSV Ratio   Right: 1.1   Left: 0.66     1.  Mild plaque formation, velocities consistent with less than 50% stenosis in the right internal carotid artery. 2.  Mild plaque formation, velocities consistent with less than 50% stenosis in the left internal carotid artery. 3.  Flow within the vertebral arteries is antegrade.        Advanced Care Planning:  Discharge Planning discussed with patient   Anticipated LOS: TBD  Barrier to discharge:acute issues  Disposition:TBD  Discussed care with patient for >35 minutes with greater than 50% of time spent in counseling and coordination of care.      Nicole Longoria MD  Hospitalist  [phone]    This note was dictated using voice recognition software. Any grammatical or context distortions are unintentional and inherent to the software.

## 2021-06-13 NOTE — PLAN OF CARE
Problem: Pain  Goal: Patient's pain/discomfort is manageable  Outcome: Progressing     Problem: Safety  Goal: Patient will be injury free during hospitalization  Outcome: Progressing     Problem: Psychosocial Needs  Goal: Collaborate with patient/family/caregiver to identify patient specific goals for this hospitalization  Outcome: Progressing    Pt A & O X 4 call light within reach calls for help. Pt denies pain throughout shift. Pt  dressing changed during shift. Tele A fib. VSS on RA . continue to monitor

## 2021-06-13 NOTE — PLAN OF CARE
Problem: Nutrition  Goal: Nutrition appropriate for coronary artery disease state  Outcome: Progressing     Problem: Activity Intolerance/Impaired Mobility  Goal: Mobility/activity is maintained at optimum level for patient  Outcome: Progressing     Neuro:  A&O x 3.  Denies lightheadedness with positional changes.  Pt has ambulated several times to the bathroom today, sat in the chair for 3 hours, and ambulated once in the hallway.    Psych:  Cooperative and pleasant.  Non-combative and follows directions.  Is able to use the call light appropriately.     CV - A-fib with BBB and occasional PVCs.    LS - Clear and diminished.  Pleural CT x 2 to wall suction.  Moderate output this shift - see flow sheet.    GI - Tolerating ice chips and tube feeding @ 30 ml/hr.  Denies nausea.     - Koenig catheter removed today and voiding adequate amounts of urine.    Skin - Multiple wounds on lower extremities with wound care following.  Sternum and harvest site cleansed, healing.  Left lower extremity has some redness that was outlined with marker.       Pain - Denies pain.

## 2021-06-13 NOTE — PROGRESS NOTES
Care Management Follow Up Note    Length of Stay (days) 15     Patient plan of care discussed at Interdisciplinary Rounds: yes  CM Patient/Caregiver Stated Goals: Discharge to TCU     Expected Discharge Date: 12/19/20  Concerns to be Addressed / Barriers to Discharge:  IV Lasix, Needs to be off 1:1 for at least 24 hours, PT recommendations    Anticipated Discharge Disposition: Skilled Nursing Facility  Anticipated Discharge Services:    [facility]  Selected Continued Care - Admitted Since 12/2/2020    No services have been selected for the patient.        Anticipated Discharge DME:  TBD    Plan:  Per previous notes, the patient had a CABG on 12/7, and the post-op course was complicated by a subacute left frontal infarct. Subsequent to this, he failed a video swallow and had a NJ tube placed for tube feeding, with a plan to repeat the video swallow evaluation between 12/20-12/22. The patient had a 1:1 sitter until 12/17 at 0700, and it has been discontinued.     Currently, [facility] is considering taking the patient, but there are 3 benchmarks they need for him to be accepted: 1) the patient needs to be off 1:1 at least 24 hours, 2) the patient needs to be on oral Lasix and stable, and 3) he needs to be able to participate in physical therapy.     Physical therapy will be working with the patient today, 12/17. Writer also spoke with Simona at [facility] and updated her. Care Management should follow the patient's hospital progression to update [facility] when the patient will be stable for discharge. If he is not appropriate for acute rehab, consider LTACH. Writer spoke with [facility] (extension [phone]), and updated her on the patient's status.    Anthony Crews RN

## 2021-06-13 NOTE — PROGRESS NOTES
Progress Note    Assessment/Plan  Update  and this morning progress note  Spoke with patient's daughter Joanie and his wife, and updated them on the current  status of the patient including the finding of some petechia hemorrhage under of the head CT scan, and the need to further investigate possible brain bleed with MRI of the brain.  Explained to patient's daughter and wife that depending on what the MRI of the brain shows, will plan vent management accordingly.  Also made patient's family aware that he currently has an NG tube in place that will be used for nutrition optimization if patient is not able to be extubated soon.  Updated family that hemodynamically the patient is stable and on no pressor support.  Hopefully the MRI of the head would not show any acute process we may be able to proceed with weaning and hopefully getting the patient off the vent.    Ash Lowry

## 2021-06-13 NOTE — PROGRESS NOTES
This note also relates to the following rows which could not be included:  Heart Rate - Cannot attach notes to unvalidated device data    Patient doing 1500ml in IS       12/21/20 0652   Reason for Assessment (RCAT)   RCAT Assesment Re-eval   Assessment Reason  Cardiac surgery   $ RCAT Eval Time 15 min.  Yes   Vitals (RCAT)   Resp 20   SpO2 91 %   Chart Assessment (RCAT)   Pulmonary Status  0   Surgical Status  3   Chest Xray  2   Patient Assessment (RCAT)    Respiratory Pattern  0   Mental Status  0   Breath Sounds  2   Cough Effectiveness  0   Level of Activity  1   O2 Required SpO2 >= 92%  0   Chart + Pt. Assessment Total Points  8   RCAT Acuity Score 8 (Acuity 2)   Clinical Indications (RCAT)   RCAT Order Placed  Yes

## 2021-06-13 NOTE — PROGRESS NOTES
Speech Language/Pathology  Speech Therapy Daily Progress Note    Patient presents as alert and cooperative during this session.  An  was not applicable.    Objective    Pharyngeal Exercises:  to improve safety of swallow function as relates to oral diet advancement.  -effortful: patient did not recall exercise but had good execution with verbal cues. He completed x35 with an ice chip every 2-3 swallows. Cough x1 near end of session.  -CTAR: Introduced exercise. Pt completed x2 sets of isometric (15 sec) and isokinetic (15) with min cues for accuracy.     Assessment    Patient did not recall pharyngeal exercise introduced previously but had good participation and execution with verbal cues and explanation. He is in agreement to complete exercises outside of therapy. Ongoing ST warranted to continue training of exercises and add additional as appropriate.     Plan/Recommendations  Continue pharyngeal exercises    The ST Care Plan has been reviewed and current plan remains appropriate.    20 dysphagia minutes     Eileen Casillas MA, CCC-SLP

## 2021-06-13 NOTE — PROGRESS NOTES
"Blood pressure 115/50, pulse 86, temperature 99.5  F (37.5  C), temperature source Oral, resp. rate 14, height 5' 10\" (1.778 m), weight 217 lb 2.5 oz (98.5 kg), SpO2 94 %.      Day 3 on VENT  Vent Mode: CPAP/PSV  FiO2 (%):  [30 %-40 %] 40 %  S RR:  [16] 16  S VT:  [500 mL] 500 mL  PEEP/CPAP (cm H2O):  [5 cm H2O] 5 cm H2O  Minute Ventilation (L/min):  [1.2 L/min-13.9 L/min] 13.9 L/min  PIP:  [12 cm H2O-27 cm H2O] 12 cm H2O  MI SUP:  [5 cm H20] 5 cm H20  MAP (cm H2O):  [7-11] 7    Pt currently weaning 5/5 40% since 1428.  Breath sounds are diminished with some new rhonchi.  Suctioning small tan/yellow secretions.  Sputum sent.  ABG drawn this morning 7.46/37/63/26.3/2.2.  Plan for possible extubation this evening.  RT will continue to follow.   "

## 2021-06-13 NOTE — PLAN OF CARE
Denies pain. VSS. A/O to self and place but this can fluctuate throughout the day and even shift. Pulses weak, pedal pulse audible only with doppler.  A-fib on tele, rate controlled. Lungs are clear and dim on RA. 2 chest tubes to 2 [facility]. NG tube in R nares with Peptamin 1.5 running at goal rate of 50mL/hr with 100 mL Q4h water flushes per orders. Bed alarm on with 3 side rails up for safety, Patient used call light occassionally but did not make attempts to get out of bed without assistance. Call light within reach.       Problem: Pain  Goal: Patient's pain/discomfort is manageable  Outcome: Progressing     Problem: Safety  Goal: Patient will be injury free during hospitalization  Outcome: Progressing     Problem: Daily Care  Goal: Daily care needs are met  Outcome: Progressing     Problem: Psychosocial Needs  Goal: Demonstrates ability to cope with hospitalization/illness  Outcome: Progressing     Problem: Discharge Barriers  Goal: Patient's discharge needs are met  Outcome: Progressing     Problem: Blood pressure is elevated above 140 over 90 mmHg  Goal: Blood pressure (BP) is within acceptable limits  Outcome: Progressing     Problem: Knowledge Deficit  Goal: Patient/family/caregiver demonstrates understanding of disease process, treatment plan, medications, and discharge instructions  Outcome: Progressing     Problem: Acute pain with Coronary Artery Disease (CAD)  Goal: Patient and nurse identification of acute coronary pain  Outcome: Progressing     Problem: Potential for hemodynamic instability  Goal: Cardiac output is adequate  Outcome: Progressing     Problem: Decreased Mental Status Causing Increased Need for Safety  Goal: Provide a safe environment for patient (Implement appropriate elements in plan of care)  Outcome: Progressing

## 2021-06-13 NOTE — PROGRESS NOTES
CVTS Daily Progress Note   12/22/2020   POD#15 s/p CABGx4. CRYSTAL BOO  Attending: Los   LOS: 20 days     SUBJECTIVE/INTERVAL EVENTS:    No acute events overnight. Patient making slow progress. Voice is much stronger today. Alert and oriented on exam. Maintaining oxygen saturations on room air. Normotensive. Currently still with tube feeds; repeat video swallow fail. +BM. UOP adequate. Chest tube output continues to be moderately high on the left although serous; right pleural tube with decreased output. Patient denies pain and has no questions. Realizes that he needs to continue working with therapy to get better.    OBJECTIVE:    Temp:  [97.3  F (36.3  C)-98  F (36.7  C)] 97.7  F (36.5  C)  Heart Rate:  [78-86] 86  Resp:  [16-20] 20  BP: ()/(65-81) 92/65  Wt Readings from Last 2 Encounters:   12/22/20 0611 186 lb 9.6 oz (84.6 kg)   12/21/20 0558 189 lb 14.4 oz (86.1 kg)   12/20/20 0416 189 lb 9 oz (86 kg)   12/19/20 0317 201 lb 14.4 oz (91.6 kg)   12/18/20 0647 203 lb 9.6 oz (92.4 kg)   12/17/20 0517 214 lb 9.6 oz (97.3 kg)   12/16/20 0415 207 lb 4.8 oz (94 kg)   12/16/20 0005 207 lb 4.8 oz (94 kg)   12/15/20 0630 212 lb (96.2 kg)   12/14/20 0400 216 lb 1.6 oz (98 kg)   12/12/20 0400 206 lb 14.4 oz (93.8 kg)   12/11/20 0630 210 lb 4.8 oz (95.4 kg)   12/10/20 0500 212 lb (96.2 kg)   12/09/20 0414 217 lb 2.5 oz (98.5 kg)   12/08/20 0345 219 lb 5.7 oz (99.5 kg)   12/07/20 0417 218 lb 8 oz (99.1 kg)   12/06/20 0301 220 lb 6.4 oz (100 kg)   12/05/20 0340 220 lb 1.6 oz (99.8 kg)   12/04/20 0354 (!) 226 lb (102.5 kg)   12/03/20 1236 (!) 223 lb (101.2 kg)   12/03/20 0441 (!) 223 lb (101.2 kg)   12/02/20 1722 (!) 224 lb 3.2 oz (101.7 kg)   12/02/20 1401 (!) 237 lb 9.6 oz (107.8 kg)   11/05/20 1302 (!) 223 lb (101.2 kg)       Current Medications:    Scheduled Meds:    amino acids-protein hydrolys  2 packet Enteral Tube DAILY     amiodarone  200 mg Enteral Tube BID     aspirin  81 mg Enteral Tube DAILY    Or      aspirin  150 mg Rectal DAILY     atorvastatin  80 mg Enteral Tube QHS     famotidine (PEPCID) injection  20 mg Intravenous BID    Or     famotidine  20 mg Oral BID     furosemide  60 mg Intravenous DAILY     guar gum  1 packet Oral BID     heparin (PF) ANTICOAGULANT  5,000 Units Subcutaneous Q8H FIXED TIMES     insulin aspart (NovoLOG) injection   Subcutaneous Q6H FIXED TIMES     insulin glargine  20 Units Subcutaneous BID     levETIRAcetam  500 mg Enteral Tube BID     lidocaine (PF)  1-5 mL Intradermal Once     melatonin  3 mg Enteral Tube QHS     metoprolol tartrate  50 mg Enteral Tube BID     multivitamin with iron-mineral  15 mL Enteral Tube DAILY     QUEtiapine  12.5 mg Oral QHS     sodium chloride bacteriostatic  1-5 mL Intradermal Once     sodium chloride  3 mL Intravenous Q8H FIXED TIMES     white petrolatum   Topical DAILY     Continuous Infusions:    dextrose 10%       PRN Meds:.acetaminophen, acetaminophen, aluminum-magnesium hydroxide-simethicone, bisacodyL, bisacodyL, dextrose 10%, dextrose 50 % (D50W), docusate sodium (COLACE) 50 mg/5 mL oral liquid, glucagon (human recombinant), lipase-protease-amylase **AND** sodium bicarbonate, magnesium hydroxide, polyethylene glycol, sodium chloride, sodium chloride bacteriostatic, sodium chloride, sodium phosphates 133 mL    Cardiographics:    Telemetry monitoring demonstrates afib with rates in the 70s per my personal review.    Imaging:    Xr Chest 2 Views    Result Date: 12/21/2020  EXAM: XR CHEST 2 VIEWS LOCATION: [facility] DATE/TIME: 12/21/2020 10:20 AM INDICATION: recheck R pleural effusion COMPARISON: PA and lateral views of the chest 12/20/2020     Unchanged position of bilateral chest tubes. Feeding tube extends below the diaphragmatic hiatus and outside the field-of-view. Improved lung inflation. Opacity in the basal right chest has considerably cleared with improved discrimination of the right hemidiaphragm consistent with  evacuation of pleural fluid and improved atelectasis. Thin linear band of atelectasis radiating from the left hilum is unchanged. Decreased lung vascular congestion. Cardiac silhouette is normal in size. Ostial markers and vascular clips from CABG..    Xr Swallow Study W Speech    Result Date: 12/21/2020  EXAM: XR SWALLOW STUDY W SPEECH OR OT LOCATION: [facility] DATE/TIME: 12/21/2020 9:17 AM INDICATION: Difficulty swallowing. COMPARISON: None. TECHNIQUE: Routine. FINDINGS: FLUOROSCOPIC TIME: 2.3 minutes. NUMBER OF IMAGES: 0 Swallow study with Speech Pathology using multiple barium thicknesses. With honey consistency there is marked residual in the vallecula and piriform sinuses and boston silent aspiration. Chin tuck and head turn maneuver did not help. With pudding consistency there is also some moderate residual which clears with additional swallowing with no penetration or aspiration.     1.  High risk for aspiration with honey consistency and likely thinner. 2.  Patient does tolerate pudding consistency.       Lab Results (most recent, reviewed):    Hemoglobin (g/dL)   Date Value   12/21/2020 11.9 (L)     WBC (thou/uL)   Date Value   12/21/2020 13.1 (H)     Potassium (mmol/L)   Date Value   12/22/2020 4.0     Creatinine (mg/dL)   Date Value   12/20/2020 0.89     Hemoglobin A1c (%)   Date Value   12/03/2020 12.6 (H)     Magnesium (mg/dL)   Date Value   12/18/2020 1.9     Calcium (mg/dL)   Date Value   12/20/2020 8.1 (L)       I/O:    Intake/Output Summary (Last 24 hours) at 12/22/2020 0823  Last data filed at 12/22/2020 0550  Gross per 24 hour   Intake 1840 ml   Output 1796 ml   Net 44 ml        UOP: 1.6+L    CT: not documented.    Physical Exam:    General: Patient seen in bed. Awake and alert. Follows commands.  CV: Afib on monitor. 2+ peripheral pulses in all extremities. Mild edema.   Pulm: Non-labored effort on room air. Chest tubes in place, serous output, no air leak. Incision  C/D/I.  Abd: Soft, NT, ND  Ext: Mild pedal edema, warm, distal pulses intact. Bilateral leg sores covered with dressings, dry.  Neuro: CN grossly intact      ASSESSMENT/PLAN:    Mika Alvarez is a 70 y.o. male with a history of CAD who is POD#15 s/p CABGx4.    Principal Problem:    Acute on chronic clinical systolic heart failure (H)  Active Problems:    ACP Staging Stage 1 Hypertension: 140-159 / 90-99    Type 2 diabetes mellitus (H)    Hyperlipidemia, unspecified hyperlipidemia type    CAD (coronary artery disease)    Atrial fibrillation with rapid ventricular response (H)    Diabetic leg ulcer (H)    Acute on chronic systolic CHF (congestive heart failure) (H)    A-fib (H)    Uncontrolled type 2 diabetes mellitus with hyperglycemia (H)    Acute respiratory failure with hypoxia (H)    On mechanically assisted ventilation (H)    S/P CABG (coronary artery bypass graft)    Seizures (H)    Cerebrovascular accident (CVA), unspecified mechanism (H)    Cerebrovascular accident (CVA) due to other mechanism (H)    Metabolic encephalopathy    Disorientation    Acute kidney failure with tubular necrosis (H)        NEURO:   - PRN Tylenol for pain  - Melatonin at bedtime  - No further seizure activity since POD#1 although remains on Keppra  - Seroquel nightly    CV:   - Normotensive  - Metoprolol 50mg two times a day   - Amiodarone 200mg two times a day   - No plan for a-fib anticoagulation given LAAE and surgeon preference  - ASA 81mg  - Atorvastatin 80mg daily  - Right pleural chest tube removed without incident this AM. Left pleural tube remains.     PULM:   - Maintaining oxygen saturations on room air   - Encourage pulmonary toilet     FEN/GI:  - NPO   - Tube feeds, tolerating  - Speech following, appreciate  - Continue electrolyte replacement protocol  - Bowel regimen    RENAL:  - Adequate UOP/hr. Continue to monitor closely.  - Diuresis with 60mg IV Lasix daily    HEME:  - Acute blood loss anemia post-op.   - No bleeding  concerns. Hep subcutaneous (cleared with neuro), [id number]    ID:  - Ronda op ppx complete.  - Afebrile  - Off antibiotics  - Was on Vanco from 12/10-12/13 and Zosyn from 12/10-12/17    ENDO:   - Sliding scale insulin and Lantus  - May eventually need PTA DM meds (insulin, glipizide, Metformin)    PPx:   - DVT: SCDs, SQ heparin three times a day, ambulation  - GI: Omeprazole     DISPO:   - General telemetry floor. Will require rehab stay, TBD.        _______  Emma Alexis PA-C  Cardiothoracic Surgery  [phone]

## 2021-06-13 NOTE — PROGRESS NOTES
Hospitalist Progress Note    Chief Complaint: Acute dyspnea     Summary: 70 y.o.male with past medical history of uncontrolled T2DM, severe multivessel CAD, chronic systolic and diastolic CHF, recent diagnosis of A. fib, who presented with acute CHF, was diuresed and subsequently underwent four-vessel CABG by Dr. Madison on 12/7/2020. Post-op course complicated by Afib with RVR, which was initially treated with IV amiodarone given pt's NPO status due to severe dysphagia.     Hospitalization complicated by altered mentation and seizure-like activity onset 12/8. CT Head on 12/8 showed punctate focus of increased attenuation within the subcortical region of the left frontal lobe. Subsequent MRI of the brain on 12/9 demonstrated subacute 2.5 cm subacute infarct with petechial hemorrhage in the left frontal lobe. EEG did not show any seizure activity, however Keppra was started 12/10 with resolution of rhythmic left leg movements. Neurology has followed, now signed off.     Extubated 12/10 and weaned down to room air. Started on broad spectrum antibiotics 12/10 due to new fever and worsening leukocytosis. 1 of 2 blood Cx grew staph epidermidis, and sputum cultures showed respiratory miryam with 4+ beta hemolytic Strep. 7 days IV pip-tazo completed 12/17.     VFSS with mild oral and severe pharyngeal dysphagia - NGT placed on 12/15. This may have exacerbated hypernatremia, which improved with D5W prior to NGT placement.     Assessment & Plan  Multivessel CAD, s/p CABG and PVI/LAAL on 12/7/2020:   Increased pleural drainage, negative for chylous fluid; Chest tubes still in place 12/20  CV surgery dosing albumin x 1, increasing IV diuretic dose  Hemodynamically stable  On ASA, statin, metoprolol  Mgmt as per CV Surgery.      A Fib with RVR, now s/p PVI and DONNA excision:   Oral amiodarone, metoprolol.  Currently NSR  Mgmt as per Cardiology and CV Surgery following.   Cardiac tele.      Acute diastolic heart failure,  improving.  Holding home bumetanide for now. On IV diuretics  Mgmt as per Cardiology.     Acute hypoxic respiratory failure, resolved: On room air.   Encourage IC.  Supplemental O2 to keep sats >94%.  Continue pulmonary toilet- On metaneb, flutter valve     Acute Metabolic encephalopathy: improving.  Encephalopathy could also be contributed by prolonged hospitalization, hypernatremia, possible pneumonia, medications. CT Head from 12/12 showed no new changes.   EEG did not show seizure activity, but pt's rhythmic left leg movement appear to be have improved with Keppra. Continue keppra, Keppra level therapeutic, pt was started on quetiapine on 12/14   Decrease at bedtime seroquel due to somnolence     Subacute left frontal stroke with petechial hemorrhage:   on ASA, statin.   Neurology recs appreciated.     Fever, Leukocytosis: UA was negative. One of two blood cultures grew staph epi - ?contaminant. Sputum culture growing beta-hemolytic strep. Completed pip-tazo IV 7d on 12/17. ID recs appreciated.     Severe pharyngeal dysphagia: NG and tube feeding.  SLP team following, planning repeat Video swallow next week     Hyperglycemia in the setting of T2DM and D5W as above:  HDSSI with glargine. Holding home glipizide, metformin.  Increase dose of two times a day glargine     Hypernatremia, resolved.     HTN: holding home losartan, amlodipine.     Code status Full Code  DVT prophylaxis: heparin sq     Barrier's to discharge : chest tube, encephalopathy, dysphagia  Anticipated discharge date:  Tuesday  Disposition:  acute rehab vs TCU     Lines: NG feeding tube in     Malnutrition, protein/calorie, Severe: Poor appetite, weight loss, muscle wasting  Class I Obesity w BMI=30-34.9: 214 lb 9.6 oz (97.3 kg) Body mass index is 30.79 kg/m .      Principal Problem:    Acute on chronic clinical systolic heart failure (H)  Active Problems:    ACP Staging Stage 1 Hypertension: 140-159 / 90-99    Type 2 diabetes mellitus (H)     Hyperlipidemia, unspecified hyperlipidemia type    CAD (coronary artery disease)    Atrial fibrillation with rapid ventricular response (H)    Diabetic leg ulcer (H)    Acute on chronic systolic CHF (congestive heart failure) (H)    A-fib (H)    Uncontrolled type 2 diabetes mellitus with hyperglycemia (H)    Acute respiratory failure with hypoxia (H)    On mechanically assisted ventilation (H)    S/P CABG (coronary artery bypass graft)    Seizures (H)    Cerebrovascular accident (CVA), unspecified mechanism (H)    Cerebrovascular accident (CVA) due to other mechanism (H)    Metabolic encephalopathy    Disorientation    Acute kidney failure with tubular necrosis (H)      Discharge plan: acute rehab in 2-3 days    Subjective  Patient confused this am, trying to get out of bed. Knows where he is, the season, but can't remember he has chest tubes in.     ROS: Denies chest pain, denies shortness of breath and passing urine well    Objective    Physical Exam    General Appearance:  HEENT:  Cooperative, in no distress   Normocephalic, atraumatic, conjunctiva clear without icterus   Lungs:   Clear to auscultation bilaterally, no wheezing   Cardiovascular:  Regular Rate and Rythm, S1, S2, 1+ lower extremity edema bilaterally   Abdomen: Soft, non-tender and obese, active bowel sounds   Skin:  Skin color, texture normal and no rashes or lesions over face, neck, arms and legs, turgor normal.   Neurologic: Alert but confused, Facial symmetry preserved and upper & lower extremities moving well with symmetry   Reviewed telemetry, Laboratory results and Consultant recommendations    Time spent: on patient interview, exam, med rec, progress note: 12 min    Counseling of patient at bedside  Diagnosis & prognosis regarding chest tubes - 8 min  review of treatment plan diurese 7 min    Coordination with: Nursing regarding repositioning in bed  4 min    Total time including time spent in counseling and coordination of care: 18  minutes  Total time spent  on encounter: >=35 minutes    Khoi Luna MD, FACP  Internal Medicine Hospitalist  12/20/2020  1:57 PM

## 2021-06-13 NOTE — TELEPHONE ENCOUNTER
"Per Dr. Tabor's 11-5-20 visit note \"Recommended going to the ER for admission at [facility], as he is currently in decompensated heart failure and should be medically optimized and undergo CABG during the same hospitalization. He would rather go home first and do yardwork this weekend, which is against my medical advice. He says he is willing to come in Monday, so I instructed him to come to the ED at [facility] no later than Monday morning, or immediately if his symptoms worsen.\"    Return call to patient who confirmed he was instructed to go to ED after last visit in November and stated he did go on the following Monday and was told that \"they did not know anything about Dr. Tabor's recommendations and was told to go home\" - patient has continued to experience dyspnea, wt gain, edema - denies dizziness or CP.    Patient also reported that he has run out of Bumex, Lipitor and Xarelto but can't exactly remember when he took his last dose and thinks it could have been in October because he was only given 1mo supply upon discharge in Sept - patient does not recall if it was mentioned to Dr. Tabor at visit.    Instructed patient to go to [place] [facility] ASAP and reassured him the ED staff would be contacted with pending arrival - patient verbalized understanding.    Phone call to [place] [facility] - informed ED physician Dr. Huddleston of patient's pending arrival - understanding verbalized.  mg    "

## 2021-06-13 NOTE — PROGRESS NOTES
CVTS Daily Progress Note   12/21/2020   POD#14 s/p CABGx4. CRYSTAL BOO  Attending: Los   LOS: 19 days     SUBJECTIVE/INTERVAL EVENTS:    No acute events overnight. Patient making slow progress. Maintaining oxygen saturations on room air. Normotensive Currently still with tube feeds; repeat video swallow pending. Awake and alert this morning. +BM. UOP adequate. WBC 13.1 and stable. Chest tube output continues to be moderately high although serous. Patient denies pain and has no questions. Realizes that he needs to continue working with therapy to get better.    OBJECTIVE:    Temp:  [97.5  F (36.4  C)-98.6  F (37  C)] 97.6  F (36.4  C)  Heart Rate:  [79-95] 85  Resp:  [16-20] 18  BP: ()/(71-90) 112/72  Wt Readings from Last 2 Encounters:   12/21/20 0558 189 lb 14.4 oz (86.1 kg)   12/20/20 0416 189 lb 9 oz (86 kg)   12/19/20 0317 201 lb 14.4 oz (91.6 kg)   12/18/20 0647 203 lb 9.6 oz (92.4 kg)   12/17/20 0517 214 lb 9.6 oz (97.3 kg)   12/16/20 0415 207 lb 4.8 oz (94 kg)   12/16/20 0005 207 lb 4.8 oz (94 kg)   12/15/20 0630 212 lb (96.2 kg)   12/14/20 0400 216 lb 1.6 oz (98 kg)   12/12/20 0400 206 lb 14.4 oz (93.8 kg)   12/11/20 0630 210 lb 4.8 oz (95.4 kg)   12/10/20 0500 212 lb (96.2 kg)   12/09/20 0414 217 lb 2.5 oz (98.5 kg)   12/08/20 0345 219 lb 5.7 oz (99.5 kg)   12/07/20 0417 218 lb 8 oz (99.1 kg)   12/06/20 0301 220 lb 6.4 oz (100 kg)   12/05/20 0340 220 lb 1.6 oz (99.8 kg)   12/04/20 0354 (!) 226 lb (102.5 kg)   12/03/20 1236 (!) 223 lb (101.2 kg)   12/03/20 0441 (!) 223 lb (101.2 kg)   12/02/20 1722 (!) 224 lb 3.2 oz (101.7 kg)   12/02/20 1401 (!) 237 lb 9.6 oz (107.8 kg)   11/05/20 1302 (!) 223 lb (101.2 kg)       Current Medications:    Scheduled Meds:    amino acids-protein hydrolys  2 packet Enteral Tube DAILY     amiodarone  200 mg Enteral Tube BID     aspirin  81 mg Enteral Tube DAILY    Or     aspirin  150 mg Rectal DAILY     atorvastatin  80 mg Enteral Tube QHS     famotidine (PEPCID)  injection  20 mg Intravenous BID    Or     famotidine  20 mg Oral BID     furosemide  60 mg Intravenous DAILY     heparin (PF) ANTICOAGULANT  5,000 Units Subcutaneous Q8H FIXED TIMES     insulin aspart (NovoLOG) injection   Subcutaneous Q6H FIXED TIMES     insulin glargine  15 Units Subcutaneous BID     levETIRAcetam  500 mg Enteral Tube BID     lidocaine (PF)  1-5 mL Intradermal Once     melatonin  3 mg Enteral Tube QHS     metoprolol tartrate  50 mg Enteral Tube BID     multivitamin with iron-mineral  15 mL Enteral Tube DAILY     QUEtiapine  12.5 mg Oral QHS     sodium chloride bacteriostatic  1-5 mL Intradermal Once     sodium chloride  3 mL Intravenous Q8H FIXED TIMES     white petrolatum   Topical DAILY     Continuous Infusions:    dextrose 10%       PRN Meds:.acetaminophen, acetaminophen, aluminum-magnesium hydroxide-simethicone, bisacodyL, bisacodyL, dextrose 10%, dextrose 50 % (D50W), docusate sodium (COLACE) 50 mg/5 mL oral liquid, glucagon (human recombinant), lipase-protease-amylase **AND** sodium bicarbonate, magnesium hydroxide, polyethylene glycol, sodium chloride, sodium chloride bacteriostatic, sodium chloride, sodium phosphates 133 mL, traMADoL    Cardiographics:    Telemetry monitoring demonstrates afib with rates in the 70s per my personal review.    Imaging:    Xr Chest 1 View Portable    Result Date: 12/20/2020  EXAM: XR CHEST 1 VIEW PORTABLE LOCATION: [facility] [place] DATE/TIME: 12/20/2020 9:01 AM INDICATION: Postop evaluation COMPARISON: 12/16/2020     Bilateral chest tubes. No pneumothoraces. Small right pleural effusion with passive atelectasis in the right lung. Enteric tube tip below diaphragm, off the image. Strand of atelectasis or scarring left midlung. Calcified aortic atherosclerosis. Sternotomy with CABG and vascular markers. No change from prior.      Lab Results (most recent, reviewed):    Hemoglobin (g/dL)   Date Value   12/21/2020 11.9 (L)     WBC (thou/uL)    Date Value   12/21/2020 13.1 (H)     Potassium (mmol/L)   Date Value   12/21/2020 3.7     Creatinine (mg/dL)   Date Value   12/20/2020 0.89     Hemoglobin A1c (%)   Date Value   12/03/2020 12.6 (H)     Magnesium (mg/dL)   Date Value   12/18/2020 1.9     Calcium (mg/dL)   Date Value   12/20/2020 8.1 (L)       I/O:    Intake/Output Summary (Last 24 hours) at 12/21/2020 0834  Last data filed at 12/21/2020 0613  Gross per 24 hour   Intake 1040 ml   Output 2050 ml   Net -1010 ml        UOP: 1.3+L    CT: 745cc    Physical Exam:    General: Patient seen in bed. Awake and alert. Follows commands.  CV: Afib on monitor. 2+ peripheral pulses in all extremities. Mild edema.   Pulm: Non-labored effort on room air. Chest tubes in place, serous output, no air leak. Incision C/D/I.  Abd: Soft, NT, ND  Ext: Mild pedal edema, warm, distal pulses intact. Bilateral leg sores covered with dressings, dry.  Neuro: CN grossly intact      ASSESSMENT/PLAN:    Mika Alvarez is a 70 y.o. male with a history of CAD who is POD#14 s/p CABGx4.    Principal Problem:    Acute on chronic clinical systolic heart failure (H)  Active Problems:    ACP Staging Stage 1 Hypertension: 140-159 / 90-99    Type 2 diabetes mellitus (H)    Hyperlipidemia, unspecified hyperlipidemia type    CAD (coronary artery disease)    Atrial fibrillation with rapid ventricular response (H)    Diabetic leg ulcer (H)    Acute on chronic systolic CHF (congestive heart failure) (H)    A-fib (H)    Uncontrolled type 2 diabetes mellitus with hyperglycemia (H)    Acute respiratory failure with hypoxia (H)    On mechanically assisted ventilation (H)    S/P CABG (coronary artery bypass graft)    Seizures (H)    Cerebrovascular accident (CVA), unspecified mechanism (H)    Cerebrovascular accident (CVA) due to other mechanism (H)    Metabolic encephalopathy    Disorientation    Acute kidney failure with tubular necrosis (H)        NEURO:   - Scheduled Tylenol and PRN Tramadol  -  Melatonin at bedtime  - No further seizure activity since POD#1 although remains on Keppra  - Seroquel nightly    CV:   - Normotensive  - Metoprolol 50mg two times a day   - Amiodarone 200mg two times a day   - No plan for a-fib anticoagulation given LAAE and surgeon preference  - ASA 81mg  - Atorvastatin 80mg daily  - Chest tubes to remain today    PULM:   - Maintaining oxygen saturations on room air   - Encourage pulmonary toilet     FEN/GI:  - NPO   - Tube feeds, tolerating  - Speech following, appreciate  - Continue electrolyte replacement protocol  - Bowel regimen    RENAL:  - Adequate UOP/hr. Continue to monitor closely.  - Diuresis with 60mg IV Lasix daily    HEME:  - Acute blood loss anemia post-op.   - No bleeding concerns. Hep subcutaneous (cleared with neuro), [id number]    ID:  - Ronda op ppx complete.  - Afebrile  - Off antibiotics  - Was on Vanco from 12/10-12/13 and Zosyn from 12/10-12/17    ENDO:   - Sliding scale insulin and Lantus  - May eventually need PTA DM meds (insulin, glipizide, Metformin)    PPx:   - DVT: SCDs, SQ heparin three times a day, ambulation  - GI: Omeprazole     DISPO:   - General telemetry floor. Will require rehab stay, TBD.        _______  Emma Alexis PA-C  Cardiothoracic Surgery  [phone]

## 2021-06-13 NOTE — PROGRESS NOTES
Intensivist MD assessed patient at bedside, wants to see patient a little more responsive before extubating. OK for small precedex to control anxiety.

## 2021-06-13 NOTE — PLAN OF CARE
Pt A&Ox4, VSS. Pt denies shortness of breath, dizziness, or numbness/tingling. Pt had burning leg pain rated 4/10, 50 mg of tramadol was given, legs are elevated, pt said that pain is much better. Tele atrial fibrillation with BBB. Lungs sound clear and diminished with some fine crackles in the bases on RA. Lower extremities are reddened with multiple leg wounds covered with ABD pads and wrapped with Kerlix, dressing CDI, WOC to see today. Pt needs and comfort measures are being addressed, call light within reach, will continue to monitor and follow plan of care.    Problem: Pain  Goal: Patient's pain/discomfort is manageable  Outcome: Progressing     Problem: Safety  Goal: Patient will be injury free during hospitalization  Outcome: Progressing     Problem: Daily Care  Goal: Daily care needs are met  Outcome: Progressing     Problem: Glucose Imbalance  Goal: Achieve optimal glucose control  Outcome: Progressing     Problem: Blood pressure is elevated above 140 over 90 mmHg  Goal: Blood pressure (BP) is within acceptable limits  Outcome: Progressing

## 2021-06-13 NOTE — PLAN OF CARE
Problem: Pain  Goal: Patient's pain/discomfort is manageable  Outcome: Progressing     Problem: Glucose Imbalance  Goal: Achieve optimal glucose control  Outcome: Progressing     Problem: Anxiety  Goal: Anxiety is at manageable level  Outcome: Progressing     Problem: Inadequate Coping  Goal: Verbalizes personal strengths  Outcome: Progressing     Problem: Risk of Injury Due to Unsafe Behavior  Goal: Patient will remain safe while in restraint; physical/psychological needs will be met  Outcome: Progressing  Goal: Alternatives to restraint will be continually assessed with use of least restrictive device and discontinuation as soon as possible  Outcome: Progressing  Goal: Patient/Family will be able to communicate reason for restraint and steps for restraint application and removal  Outcome: Progressing     Problem: POD 2  Goal: Patient/Family/Caregiver demonstrates understanding of daily expectations  Outcome: Progressing  Goal: Patients pain/discomfort managed  Outcome: Progressing     Problem: POD 2  Goal: POD 2 Mobility/activity advancement  Outcome: Not Progressing  Goal: Patients nutritional intake is adequate and patient will participate in bowel management program  Outcome: Not Progressing

## 2021-06-13 NOTE — PROGRESS NOTES
CVTS Daily Progress Note   12/14/2020   POD# 7 s/p CABGx4 kendrick PVI, DONNA excision   DOS 12/7/20 Dr. Madison   LOS: 12 days     Overnight events  Agitated due to being hungry and wanting water. RN reports cough with ice chips. Restraint/mits on earlier for pulling at IV lines. Infiltrated right hand IV with redness and very tender. Pt refusing lab draws. Currently without restraint with 1:1 sitter.    Pt seen in bed and chair, awake alert and upset with not being able to eat and drink, on the phone with his daughter. I spoke with daughter she is understandably frustrated with not being able to be in the hospital to see her father and frustrated with his lack of food and fluids. She understand his swallow issue and is wanting to know next step.     Discussed with Dr. Madison, he would like to retry meds with apple sauce while up in chair.     HTN with Atrial fib with -120s       97.3 Tm single  Blood + gram + cocci- staph epidermidis- per ID  possibly contaminant.     Sputum  cx +Beta hemolytic streptococcus not group A.   On Zosyn- for 7 days       Hypernatremia resolved 142  (149-150)  Kendrick pleural CTs with excessive serous drainage.    Koenig replaced yesterday for urinary retention after removal.     Denies discomfort, nausea, shortness of breath. Main complaint is related to wanting food/fluids and now IV site discomfort due to infiltration.     No further rhythmic movements of left leg.   MRI area of ischemic lesion and EEG - for seizure activity show no correlation with previous non- purposeful rhythmic movement of left leg.      PLAN    Keep pleural CT's for excessive output  Gentle diuresis   Midline IV for meds and blood draws  Repeat swallow study tomorrow  NJ feeding tube if fails swallow study     OBJECTIVE:    Temp:  [97.3  F (36.3  C)-98.8  F (37.1  C)] 97.3  F (36.3  C)  Heart Rate:  [107-119] 116  Resp:  [18-23] 20  BP: (108-166)/() 131/77  Wt Readings from Last 2 Encounters:   12/14/20  0400 216 lb 1.6 oz (98 kg)   12/12/20 0400 206 lb 14.4 oz (93.8 kg)   12/11/20 0630 210 lb 4.8 oz (95.4 kg)   12/10/20 0500 212 lb (96.2 kg)   12/09/20 0414 217 lb 2.5 oz (98.5 kg)   12/08/20 0345 219 lb 5.7 oz (99.5 kg)   12/07/20 0417 218 lb 8 oz (99.1 kg)   12/06/20 0301 220 lb 6.4 oz (100 kg)   12/05/20 0340 220 lb 1.6 oz (99.8 kg)   12/04/20 0354 (!) 226 lb (102.5 kg)   12/03/20 1236 (!) 223 lb (101.2 kg)   12/03/20 0441 (!) 223 lb (101.2 kg)   12/02/20 1722 (!) 224 lb 3.2 oz (101.7 kg)   12/02/20 1401 (!) 237 lb 9.6 oz (107.8 kg)   11/05/20 1302 (!) 223 lb (101.2 kg)       Current Medications:    Scheduled Meds:    acetaminophen  650 mg Oral Q6H    Or     acetaminophen  650 mg Rectal Q6H     amiodarone  200 mg Oral DAILY     aspirin  81 mg Enteral Tube DAILY     aspirin  150 mg Rectal DAILY     atorvastatin  80 mg Enteral Tube QHS     bisacodyL  10 mg Oral Once     famotidine (PEPCID) injection  20 mg Intravenous BID    Or     famotidine  20 mg Oral BID     heparin (PF) ANTICOAGULANT  5,000 Units Subcutaneous Q8H FIXED TIMES     insulin aspart (NovoLOG) injection   Subcutaneous Q4H FIXED TIMES     insulin glargine  5 Units Subcutaneous DAILY     levETIRAcetam (KEPPRA) IVPB  500 mg Intravenous Q12H     lidocaine (PF)  1-5 mL Intradermal Once     magnesium hydroxide  30 mL Oral DAILY     melatonin  3 mg Enteral Tube QHS     metoprolol tartrate  37.5 mg Oral BID     metoprolol tartrate  12.5 mg Oral 0330, 1300, 1700 - CV Metoprolol     piperacillin-tazobactam  3.375 g Intravenous Q8H     polyethylene glycol  17 g Enteral Tube DAILY     sodium chloride bacteriostatic  1-5 mL Intradermal Once     sodium chloride  10 mL Intravenous Q8H FIXED TIMES     sodium chloride  3 mL Intravenous Q8H FIXED TIMES     Continuous Infusions:    dextrose 5% 75 mL/hr (12/14/20 0650)     PRN Meds:.acetaminophen, acetaminophen, aluminum-magnesium hydroxide-simethicone, bisacodyL, bisacodyL, dextrose 50 % (D50W), docusate sodium  (COLACE) 50 mg/5 mL oral liquid, glucagon (human recombinant), hydrALAZINE, lidocaine (PF), magnesium hydroxide, metoprolol tartrate, sodium chloride, sodium chloride bacteriostatic, sodium chloride, sodium chloride, sodium chloride, sodium phosphates 133 mL, traMADoL, traZODone, white petrolatum    Cardiographics:    Telemetry monitoring demonstrates afib with rates in the 120s per my personal review.    Imaging:  DATE/TIME: 12/12/2020 12:47 PM     INDICATION: s/p CABG  COMPARISON: 12/8/2020    IMPRESSION:   Interval extubation and removal of the right IJ [place]-Annette catheter. Right IJ line terminates over the brachiocephalic-SVC junction. Mediastinal drains are noted. Persistent and marginally increased small bilateral effusions and associated   atelectasis. No pneumothorax. Cardiomegaly and prominence of the mediastinum are similar. CABG changes. Mild pulmonary vascular congestion without overt pulmonary edema.      Lab Results (most recent, reviewed):    Hemoglobin (g/dL)   Date Value   12/13/2020 11.1 (L)     WBC (thou/uL)   Date Value   12/13/2020 12.1 (H)     Potassium (mmol/L)   Date Value   12/14/2020 3.7     Creatinine (mg/dL)   Date Value   12/13/2020 0.82     Hemoglobin A1c (%)   Date Value   12/03/2020 12.6 (H)     Magnesium (mg/dL)   Date Value   12/13/2020 1.8     Calcium (mg/dL)   Date Value   12/13/2020 8.0 (L)       UOP: 1.3  CT:  761 cc /24 hours- serous drainage    Left pleural  ml/24 hrs   Right pleural  ml/24 hrs    Physical Exam:    General: Patient seen in bed and up in chair, awake and upset talking on phone.    CV: Afib on monitor. -102  Pulm: Non-labored effort on 2L/nc   Chest tubes in place, serosanguinous output, no air leak.- excessive serosang drainage left pleural CT  Sternal Incision C/D/I.  Abd: Soft, NT, ND + BM  : Koenig replaced for urinary retention  Ext: No pedal edema, SCDs in place, warm, WOC RN seeing for   Neuro  Awake no gross deficits noted. No Rhythmic  movement left leg noted this morning. More alert and interactive this morning.   LE - WO seeing for pre op non-PU ulcer: venous     ASSESSMENT/PLAN:    Mika Alvarez is a 70 y.o. male with a history of CAD who is s/p CABGx4,PVI, DONNA excisions    Principal Problem:    Acute on chronic clinical systolic heart failure (H)  Active Problems:    ACP Staging Stage 1 Hypertension: 140-159 / 90-99    Type 2 diabetes mellitus (H)    Hyperlipidemia, unspecified hyperlipidemia type    CAD (coronary artery disease)    Atrial fibrillation with rapid ventricular response (H)    Diabetic leg ulcer (H)    Acute on chronic systolic CHF (congestive heart failure) (H)    A-fib (H)    Uncontrolled type 2 diabetes mellitus with hyperglycemia (H)    Acute respiratory failure with hypoxia (H)    On mechanically assisted ventilation (H)    S/P CABG (coronary artery bypass graft)    Seizures (H)    Cerebrovascular accident (CVA), unspecified mechanism (H)    Cerebrovascular accident (CVA) due to other mechanism (H)    Metabolic encephalopathy    Disorientation      NEURO:   - Scheduled Tylenol and PRN tramadol for pain  - Melatonin at bedtime    - Neuro following, appreciate. Initial concern for seizure given rhythmic LLE twitching although EEG negative for sz and ischemic area of brain on MRI not consistent with area.     - No further LLE rhythmic movement noted  - Keppra per Neuro started 12/10    CV:   - Normo/hypertensive  - Atrial fib RVR   - Re consulted cardiology for rhythm management  - Metoprolol 37.5 mg bid  - CV BB protocol extra doses  - PRN IV doses  - Amiodarone   mg two times a day   - No plan for a-fib anticoagulation given LAAE and surgeon preference  - ASA 81mg  - Atorvastatin 80mg daily  - Chest tube- Keep pleural CT's another day -  - removed Med CT POD #4   - gentle diuresis      PULM:   - Extubated POD #3   - Maintaining oxygen saturations on 2L/nc   - Encourage pulmonary toilet     FEN/GI:  - meds with apple  sauce   - Faiiled video swallow -   - NG bedside placement for meds unsuccessful  - SLP to see daily  - Continue electrolyte replacement protocol  - Hyper natremia- resolved Na 142  (149 - D5W 75cc/hr)  - Diet: meds with apple sauce   - Bowel regimen + BM 12/12    RENAL:  - Adequate UOP/hr. Continue to monitor closely via gardner.   - Cr 0.82 (0.92)  - gardner removed - obstructed - 12/12  - Gardner replaced for urinary retention 12/13  - gentle diuresis - lasix 20 mg x 1  - Hypernatremia -resolved Na 142 149-150)      HEME:  - Acute blood loss anemia post-op.   - Hgb stable, no bleeding concerns.   - Hep subcutaneous (cleared with neuro),   - [id number]    ID:  - Ronda op ppx complete.  - Afeb   - Sputum culture 12/09 4+ polymorph. Leukocytes 4+ gram + cocci 3+ positive bacilli- Strep beta hemolytic   - Zosyn 12/10  - Vanco 12/10   - UA neg  - Blood cx- positive gram + cocci in clusters - staphy epidermidis- possible contaminate  - Consulted ID rec 7 days of zosyn     ENDO:   - Sliding scale insulin-   - Lantus   - Will eventually need PTA DM meds (insulin, glipizide, Metformin)  - D5W 75 ml/hr   - Na 142      PPx:   - DVT: SCDs,    - SQ heparin TID  - GI: Omeprazole     DISPO:   - Continue critical care in ICU    Follow up   Neurology - 3 weeks with MRI   MRI in 3 weeks

## 2021-06-13 NOTE — OP NOTE
DATE OF SERVICE: 12/7/2020.     PREOPERATIVE DIAGNOSES:  1.  Severe multivessel coronary artery disease.  2.  Ischemic cardiomyopathy.  3.  Non-ST elevation myocardial infarction.  4.  Paroxysmal atrial fibrillation.  5.  Acute on chronic systolic and diastolic dysfunction.  6.  New York Heart Association class IV heart failure.  7.  Bilateral pleural effusions.     POSTOPERATIVE DIAGNOSES:  1.  Severe multivessel coronary artery disease.  2.  Ischemic cardiomyopathy.  3.  Non-ST elevation myocardial infarction.  4.  Paroxysmal atrial fibrillation.  5.  Acute on chronic systolic and diastolic dysfunction.  6.  New York Heart Association class IV heart failure.  7.  Bilateral pleural effusions.    PROCEDURE PERFORMED:  1.  Coronary artery bypass grafting x 4 (reversed saphenous vein graft to the right posterior descending coronary artery, reversed saphenous vein graft to the ramus intermedius branch, reversed saphenous vein graft to the diagonal branch of the left anterior descending coronary artery, and pedicled left internal mammary artery to left anterior descending coronary artery).  2.  Endoscopic vein harvest of the greater saphenous vein from the right lower extremity.  3.  Bilateral pulmonary vein isolation using full thickness radiofrequency ablation.  4.  Left atrial appendage excision     SURGEON:  Emanuel Madison MD, PhD.     RESIDENT SURGEON:  Irma Thrasher MD.     FIRST ASSISTANT:  Teresa Weber, surgical first assistant/surgical technician.    SECOND ASSISTANT: Ash Lowry PA-C.     ANESTHESIA:  General endotracheal anesthesia.     ANESTHESIOLOGIST:  Deangelo Boland MD.     ESTIMATED BLOOD LOSS:  500 mL.     SPECIMEN:  None.     INDICATIONS FOR PROCEDURE: Mr. Alvarez is a 70-year-old man who presents with heart failure and a non-ST elevation myocardial infarction.  Coronary angiography demonstrates severe multivessel coronary artery disease. He ahs had previous paroxysmal atrial fibrillation.  Echocardiography demonstrates moderate-severely diminished left ventricular function and aortic valve sclerosis but no stenosis or regurgitation. The patient understands the risks and benefits of the procedure and wishes to undergo the operation.     OPERATIVE FINDINGS:  The left internal mammary artery was 2 mm in diameter and had excellent flow.  The greater saphenous vein from the left lower extremity was 3.5 mm in diameter and suitable for conduit.  The ascending aorta was free of calcified plaque.  The right posterior descending coronary artery was 1.75 mm in diameter and free of disease at the site of anastomosis. The ramus intermedius branch was 1.75 mm in diameter and diffusely disease but free of disease at the site anastomosis. The diagonal branch of the left anterior descending coronary artery was 1.5 mm in diameter and diffusely diseased.  The left anterior descending coronary artery 2 mm in diameter and free of disease at the site of anastomosis. After reperfusion, sinus rhythm resumed.  Left ventricular function was normal preoperatively and unchanged after bypass on low-dose inotropic support.     OPERATIVE DESCRIPTION IN DETAIL:  After obtaining informed consent, the patient was brought to the operating room and placed in the supine position on the operating room table.  Appropriate lines and devices for monitoring were placed by the  anesthesia team.  The patient underwent smooth induction of general anesthesia, and the trachea was intubated orally.  A right internal jugular Cordis introducer was placed, and a [place]-Annette catheter was placed through this.  The patient was prepped and draped, and a timeout was performed to confirm the correct patient identity, as well as the procedure to be performed.  A median sternotomy was performed and the left internal mammary artery was harvested. The bilateral pleural spaces were opened, and the serous pleural effusions were drained.  The greater saphenous vein  was harvested from the left lower extremity using endoscopic vein harvesting by the physician assistant, Ash Lowry PA-C.     The patient was given full dose heparin, and after cannulation of the distal ascending aorta and the right atrium, cardiopulmonary bypass was commenced.  Antegrade and retrograde cardioplegia cannulae were placed, and the heart was arrested with 1 liter of cold antegrade blood cardioplegia.  Subsequent maintenance doses of 300 mL of cold retrograde blood cardioplegia were given approximately every 20 minutes or after each distal anastomosis.  Topical cold saline slush was applied for additional protection.    Right superior and inferior pulmonary vein isolation using full thickness radiofrequency ablation was performed while on cardiopulmonary bypass before cardioplegic arrest. Left superior and inferior pulmonary vein isolation using full thickness radiofrequency ablation was performed while on cardiopulmonary bypass after cardioplegic arrest. Left atrial appendage excision was performed and the defect was closed with running 3-0 Prolene; the first layer was running horizontal mattress and the second layer was running continuous.     The following grafts were constructed in end-to-side fashion using running 7-0 Prolene:  A reversed saphenous vein graft was sewn to the proximal right posterior descending coronary artery, a reversed saphenous vein graft was sewn to the mid ramus intermedius branch, and a reversed saphenous vein graft was sewn to the mid diagonal branch of the left anterior descending coronary artery. The pedicled left internal mammary artery was sewn to the mid left anterior descending coronary artery in end-to-side fashion using running 8-0 Prolene.  The proximal anastomoses of the 3 vein grafts were constructed on the ascending aorta in end-to-side fashion using running 6-0 Prolene under a single crossclamp.  The crossclamp was released, and the heart was resuscitated.        All bleeding points were controlled.  A bipolar ventricular pacing lead was placed and brought out through a separate stab incision. A back-up bipolar ventricular pacing lead was placed and brought out through a separate stab incision.A bipolar right atrial pacing lead was placed and brought out through a separate stab incision.  The patient weaned from cardiopulmonary bypass, was given protamine and decannulated.  A 24-K[place] Christopher drain was placed in the left pleural space and brought out through a separate stab incision.   A 24-Kos[place] Christopher drain was placed in the right pleural space and brought out through a separate stab incision. A 28-K[place] chest tube was placed in the anterior mediastinum and brought out through a separate stab incision.  The sternal edges were reapposed with 3 interrupted #6 stainless steel sternal wires in the manubrium, 3 double wires in the body of the sternum and 1 additional #6 stainless steel sternal wire at the lower aspect of the sternum.  The muscle, fascia, and skin were closed in layers with absorbable suture.  Dermabond was applied.  All sponge, needle and instrument counts were correct at the end of the case.          BHARAT SALAS MD

## 2021-06-13 NOTE — TREATMENT PLAN
RCAT Treatment Plan      Patient Score: 8  Patient Acutiy: 2    Clinical Indication for Therapy: atelectasis    Therapy Ordered: continue IS and flutter valve, metaneb prn    Assessment Summary: pt had a CABGx4 on 12/07. He has no pulmonary history. No recent CXR, but he does still have two chest tubes in. RR WNL, LS clear/dimininshed. 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 Will continue to follow.         12/18/20 1437   Reason for Assessment (RCAT)   RCAT Assesment Re-eval   Assessment Reason  Cardiac surgery   $ RCAT Eval Time 15 min.  Yes   Chart Assessment (RCAT)   Pulmonary Status  0   Surgical Status  3   Chest Xray  2   Patient Assessment (RCAT)    Respiratory Pattern  0   Mental Status  0   Breath Sounds  2   Cough Effectiveness  0   Level of Activity  1   O2 Required SpO2 >= 92%  0   Chart + Pt. Assessment Total Points  8   RCAT Acuity Score 8 (Acuity 2)   Clinical Indications (RCAT)   Volume Expansion Therapy Prevent atelectasis   RCAT Order Placed  Yes         NEVILLE Martínez

## 2021-06-13 NOTE — CONSULTS
DIABETES CARE  Consulted by Provider for Diabetes Education-elevated A1C    70 year old male with type 2 diabetes. Patient was admitted for acute on chronic heart failure.   Related Co-morbidities include:   Past Medical History:   Diagnosis Date     ACP Staging Stage 1 Hypertension: 140-159 / 90-99     Created by Conversion  Replacement Utility updated for latest IMO load     Atrial fibrillation with rapid ventricular response (H) 12/2/2020     Coronary Artery Disease     Created by Conversion  Replacement Utility updated for latest IMO load     Joint Pain, Localized In The Shoulder     Created by Conversion      Obesity     Created by Conversion      Other and unspecified hyperlipidemia 4/27/2016     Type 2 Diabetes Mellitus With Complication     Created by Conversion      PCP: Angel Claire,   Social: lives at home with wife    Nutrition & Diabetes History:   Pt known to diabetes service from previous admission, seen 9/23/20. At that time pt did not have meter so this was prescribed to him. He wasn't following specific diet, checking BG, etc. It was recommended he have CABG but pt has not done this yet.     Pt saw PCP 10/8/2020. Was started on prandial insulin 5u with meals, continued glipizide, metformin and Lantus. Pt was supposed to f/u in 10 days with BG numbers for ongoing adjustments to medications.    Pt reports today he was not following diabetes recommendations. Did not  a meter, so was not checking BG. Per pharm notes he decreased doses of medications and stopped prandial insulin.     Pt reports CABG on Monday.     Meds for BG Management PTA:  -Metformin 1000mg daily- was ordered 1000mg two times a day   -Glipizide XL 10mg daily- was ordered 20mg daily  -Lantus 35u HS  -Noted that pt stopped Humalog     Current Inpatient Meds for BG Management:  -Novolog correction: standard with meals  -Novolog 5u with meals  -Lantus 35u HS    Labs:  Hemoglobin A1C: 12.6% (12/3/20)         Hgb: 14.7  "(12/2/20)  SCr: 0.75  GFR: >60  BGs:   Results for ELISABETH AZEVEDO (MRN [id number]) as of 12/4/2020 12:20   Ref. Range 12/2/2020 17:24 12/2/2020 20:51 12/2/2020 23:57 12/3/2020 08:02 12/3/2020 12:10   Glucose Latest Ref Range: 70 - 139 mg/dL 288 (H) 378 (H) 302 (H) 96 94       Diet Order:  Cardiac/diabetic  Intake: Not documented  Weight: 102.5kg BMI: Body mass index is 32.43 kg/m .    -Noted to not like pricking his finger to check BG.    DM EDUCATION/COUNSELING:  Barriers to Learning and/or DM Self-Management: hx of precontemplative stage of change.   Previous DM Education:   Yes, when recently inpatient, also educated by CDE in 2015 and OP MD    Current Education and/or visit with Patient and/or caregiver(s):  Met with pt. He was very tired and kept falling asleep so kept visit short today. Discussed that diabetes is contributing factor to his current heart health. Pt understands this and said he needs to make changes to his diabetes. Briefly reviewed technique for checking BG to minimize pain and discussed a CGM that should be covered for him since he is on insulin and has Medicare B. Pt is interested in this, said his son just got one. Will send info to his PCP. Did not do any further education today d/t pt being so tired. Will f/u with him next week after CABG.    (See also \"Diabetic Ed Flowsheet\"  Or Education tab-diabetes for any education topic details.)    ASSESSMENT:  Pt with hx of type 2 diabetes, latest A1C 12.6%. Pt has not been following diabetes recommendations including not checking BG, taking meds as prescribed, diet, etc. Pt did say today he needs to start doing something. He was too tired today for education but will meet with him next week post CABG for further education and make a plan.    RECOMMENDATIONS:  -Continue to monitor BG and adjust medications as needed. Suspect best option for pt at discharge would be basal/bolus + metformin. May also be appropriate for SGLT2 given heart failure, can " "discuss with PCP.   -Pt does not like poking his finger for BG checks so CGM is good option, will provide info to pt on this and also send message to PCP. Should be covered with Medicare part B.    Refer to \"Recommendations for Management of Hyperglycemia in the Hospitalized Patient\" and \"ADA Standards of Medical Care in DM - 2020\", found on the DM Med Order set and on the InfoNet under Clinical Services> Diabetes Care.    F/U PLAN:  Will f/u next week    Thank you,   Yari Ruiz RD, LD, CDE  Diabetes Educator  [facility]  Pager: [phone]            "

## 2021-06-13 NOTE — PROGRESS NOTES
12/22/20 0721   Oxygen Therapy/Pulse Ox   O2 Device None (Room air)   O2 Therapy Room air   SpO2 93 %   SpO2 Activity  R/A at rest

## 2021-06-13 NOTE — PROGRESS NOTES
ICU PROGRESS NOTE:    Assessment/Plan:  70 y M with a history of HTN, HLD, poorly controlled DM, systolic and diastolic CHF, multi vessel CAD who is s/p 4v CABG with Dr. Madison 12/7/20.    CV:  S/p 4 v CABG 12/7. Systolic and diastolic heart failure, EF 35%  ? SBP goal > 90 mmHg, MAP goal > 65 mmHg.    ? Goal CI 2-3   ? Goal PAD 20  ? Vasoactive gtts per CV-surgery.   ? Temporary pacing wires present; will use in setting of symptomatic arrhythmia.  ? QTc 522     RESP:  Intubated post-operatively. Unable to wean this far due to agitated delirium, not following commands, question of seizures.    Discussed with neurology - EEG did not show seizures, no plan for anti-epileptics presently. Imaging findings reviewed. Not felt to be etiology of his mental status.    Has passed several SBTs at this point. ABG during weans with good ventilation. At this point his mental status is the limitation, but I think he is alert enough to trial extubation. He has some secretions, but has a strong cough    Wean to extubate this morning    Albuterol and mucomyst for bronchial hygiene    Some risk of re-intubation given mental status    RENAL:  ? Koenig catheter in place for accurate measurement of I/O.   ? Stable renal function    ID:  Febrile to 101. Rise in WBC  Send Bcx, sputum cx, Ucx  Start empiric vanco/zosyn pending cultures    GI:  Start tube feeds today if not extubating  GI ppx    NEURO:  Question of seizure activity. EEG was negative.  2.5 cm left frontal lobe infarct w petechial hemorrhage on MRI brain  Neuro was consulted  Sedated with precedex, minimal dose  Remains awake but confused, not always purposeful  Strong cough  Hold precedex for SBT this morning  Check labs, ammonia  Possible infection per ID    HEMATOLOGIC:  ? Transfuse per CV-surgery.   ? Monitor chest tube output hourly; notify CV-surgery for excessive output or abrupt stop in output.   ? DVT prophylaxis:  SubQ heparin    ENDOCRINE:  ? RHI gtt per ICU  "protocol.     ICU Checklist:  Feeding:  Feeding: No.  Patient is receiving NPO      Prophylaxis:    Thromboembolic: HSQ     Stress Ulcer: PPI    VAP: peridex    Restraints? Yes      PROVIDER RESTRAINT FOR NON-VIOLENT BEHAVIOR FACE TO FACE EVALUATION    Patient's Immediate Situation:  Patient demonstrated the following behaviors: Pulling/tugging at invasive lines or tubes and does not respond to verbal/non-verbal redirection    Patient's Reaction to the intervention:  Does patient understand the reason for restraint/seclusion? Unable to Express    Medical Condition:  Is there any evidence of compromise of Skin integrity, Respiratory, Cardiovascular, Musculoskeletal, Hydration? No    Behavioral Condition:  In consultation with the RN, is there a need to continue this restraint or seclusion? Yes    See Restraint Flowsheet for complete restraint documentation and assessment.      DISPOSITION: ICU    CODE STATUS: Full Code    Total Critical Care Time : 43 minutes    Upon evaluation, this patient is critically ill with a high probability of imminent life threatening deterioration due to acute respiratory failure on mechanical ventilation, acute stroke, which required my direct personal management.      Deysi Littlejohn MD  Pulmonary and Critical Care Medicine  [facility]  Office: [phone]    ----------------------------    Overnight events:  No acute events  Not extubated due to altered mentation    Subjective:  Unable to obtain    Objective:  Physical Exam:  Vent Mode: CPAP/PSV  FiO2 (%):  [30 %-40 %] 40 %  S RR:  [16] 16  S VT:  [500 mL] 500 mL  PEEP/CPAP (cm H2O):  [5 cm H2O] 5 cm H2O  Minute Ventilation (L/min):  [4.8 L/min-15.7 L/min] 13 L/min  PIP:  [12 cm H2O-25 cm H2O] 20 cm H2O  GA SUP:  [5 cm H20] 5 cm H20  MAP (cm H2O):  [6-8] 7    /73   Pulse (!) 103   Temp 98.7  F (37.1  C)   Resp 22   Ht 5' 10\" (1.778 m)   Wt 212 lb (96.2 kg)   SpO2 95%   BMI 30.42 kg/m      Intake/Output last 3 " shifts:  I/O last 3 completed shifts:  In: 1339.7 [I.V.:1069.7; NG/GT:220; IV Piggyback:50]  Out: 2835 [Urine:2625; Chest Tube:210]  Intake/Output this shift:  I/O this shift:  In: 178.6 [I.V.:68.6; NG/GT:60; IV Piggyback:50]  Out: 695 [Urine:695]    Physical Exam  Gen: Intubated, sedated  HEENT: no OP lesions, no GUMARO  CV: RRR, no m/g/r  Resp: CTAB/L. Chest tubes in place  Abd: soft, nontender, BS+  Neuro: PERRL, nonfocal  Ext: no edema    IMAGING/STUDIES:  CXR:  FINDINGS: Endotracheal tube terminates 3.8 cm above the yariel. Right heart catheter terminates over the right main pulmonary artery. Bilateral chest tubes remain in place. Heart size is enlarged but stable status post median sternotomy. Mild prominence   of central pulmonary vasculature without overt failure. Dense retrocardiac consolidation on the left is unchanged. No pneumothorax.     IMPRESSION:   No significant interval change.    MRI Brain:  The abnormality identified on head CT corresponds to a 2.5 cm subacute infarct in the left frontal lobe associated with petechial hemorrhage. No significant mass effect.

## 2021-06-13 NOTE — PROGRESS NOTES
Progress Note    Assessment/Plan  S/P CABG X 4 POD/PVI/LAAL # 2  Febrile, temp 100.4  F from 99.9  F, T-max 101.1  F  Postop respiratory failure, remains sedated, intubated and on mechanical ventilation  Sedation: Precedex 0.6 mcg/kg/h  Vent: Vt 500, R 16, PEEP 5, FiO2 30%, SPO2 94%  Some jerking movement on the left lower extremity suspicious for seizure activity  Neurology consulted, EEG done and was negative for ongoing seizure activities  CT scan of the head showed:  1.  Punctate focus of increased attenuation within the subcortical region of the left frontal lobe. This may represent a focus of petechial hemorrhage, cortical laminar necrosis, mineralization or subarachnoid hemorrhage. This is best demonstrated on   axial image #23, series 2 and sagittal image #43, series 5.2. Comparison with prior studies would be helpful if available.  2.  Otherwise recommend brain MRI for further evaluation. Additionally follow-up CT is could be performed to ensure stability or resolution  MRI recommended, pacer wires removed and awaiting removal of [place] catheter after which an MRI of the head will be done  Postop cardiogenic shock treated with epinephrine drip, now off epi  Hemodynamics: CI 2.6, CO 5.6, CVP 12, PAP 43/20  Mild postop acute blood loss anemia, Hgb  10.0 from 10.4 yesterday  Poorly controlled insulin-dependent diabetes mellitus, preop A1c 12.6%  Currently on insulin drip 4 units/h  Lantus 12 unit X 1 dose then Accu-Chek every 4 hours  Acute on chronic systolic congestive heart failure  A. fib with RVR status post bilateral PVI and left atrial appendage excision  Incisions are clean dry and intact, lungs sound diminished bilaterally  Chest tube drainage 90/340 cc, serosanguineous  Leave chest tubes in due to excessive drainage  Abdomen is soft and nondistended, bowel sounds present all 4 quadrants  Urine output adequate with diuresis, no BM yet but positive bowel sounds  Weight 98.5 kg from 99.5 kg  "yesterday, admission weight 107.8 kg  Continue Lasix 40 mg IV twice a day for 24 hours  Remove [place] in preparation for MRI of head  If unable to extubate today, will insert an NG tube to start BP meds and trickle feeding and  Will defer to neurology ongoing left lower extremity jerking movement to manage        Subjective  Mildly sedated, intubated and on mechanical ventilation  Objective  Lying in bed at time of evaluation, when asked to open his eyes patient refused with head shake  Vital signs in last 24 hours  Temp:  [99.9  F (37.7  C)-101.1  F (38.4  C)] 100.4  F (38  C)  Heart Rate:  [64-93] 80  Resp:  [15-30] 16  BP: (118-148)/(57-71) 124/57  Arterial Line BP: ()/(45-78) 124/58  FiO2 (%):  [30 %] 30 %  Weight:   217 lb 2.5 oz (98.5 kg)  Preop weight 100 kg  Intake/Output last 3 shifts  I/O last 3 completed shifts:  In: 1898.5 [I.V.:1468.5; NG/GT:180; IV Piggyback:250]  Out: 2586 [Urine:2246; Chest Tube:340]  Intake/Output this shift:  I/O this shift:  In: 43 [I.V.:43]  Out: 37 [Urine:37]    Review of Systems   Review of systems not obtained due to inability to communicate with the patient.     Physical Exam  /57 (Patient Position: Semi-hooks)   Pulse 80   Temp 100.4  F (38  C) (Core)   Resp 16   Ht 5' 10\" (1.778 m)   Wt 217 lb 2.5 oz (98.5 kg)   SpO2 94%   BMI 31.16 kg/m    HRR,, incisions are clean dry and intact, lung sounds diminished bilaterally  Abdomen soft and nontender, bowel sounds present all 4 quadrants  Mild lower extremity edema    Pertinent Labs   Lab Results: personally reviewed.   Lab Results   Component Value Date     12/09/2020    K 4.2 12/09/2020     (H) 12/09/2020    CO2 26 12/09/2020    BUN 27 (H) 12/09/2020    CREATININE 0.71 12/09/2020    CALCIUM 8.3 (L) 12/09/2020     Lab Results   Component Value Date    WBC 10.3 12/09/2020    HGB 10.0 (L) 12/09/2020    HCT 33.1 (L) 12/09/2020    MCV 87 12/09/2020     12/09/2020       Pertinent Radiology "   Radiology Results: Reviewed and discussed with Radiologist, Personally reviewed image/s and see head CT scan reading above, awaiting MRI of the head  EKG Results: personally reviewed.  and normal sinus rhythm per monitor    Ash Lowry

## 2021-06-13 NOTE — PROGRESS NOTES
ICU PROGRESS NOTE:    Assessment/Plan:  70 y M with a history of HTN, HLD, poorly controlled DM, systolic and diastolic CHF, multi vessel CAD who is s/p 4v CABG with Dr. Madison 12/7/20.    CV:  S/p 4 v CABG 12/7. Systolic and diastolic heart failure, EF 35%  ? SBP goal > 90 mmHg, MAP goal > 65 mmHg.    ? Goal CI 2-3   ? Goal PAD 20  ? Vasoactive gtts per CV-surgery.   ? Temporary pacing wires present; will use in setting of symptomatic arrhythmia.  ? QTc 522     RESP:  Intubated post-operatively. Unable to wean overnight due to agitated delirium, not following commands, subsequently extubation held due to concern for seizures.     Have a page out to neuro to discuss findings. EEG did not show seizures, unclear if they will recommend anti-epileptics, this was pending imaging. Suspect will be able to wean in attempt to extubate later today assuming there are no neurologic concerns    RENAL:  ? Koenig catheter in place for accurate measurement of I/O.   ? Stable renal function    ID:    General precautions.   ? Remove lines and drains once no longer required.   ? No indication for abx presently    GI:  Start tube feeds today if not extubating  GI ppx    NEURO:  Question of seizure activity. EEG was negative.  2.5 cm left frontal lobe infarct w petechial hemorrhage on MRI brain  Neuro was consulted  Sedated with precedex, received sedation for MRI  Start sedation vacation for SBT today    HEMATOLOGIC:  ? Transfuse per CV-surgery.   ? Monitor chest tube output hourly; notify CV-surgery for excessive output or abrupt stop in output.   ? DVT prophylaxis:  SubQ heparin - held due to neurofindings    ENDOCRINE:  ? RHI gtt per ICU protocol.     ICU Checklist:  Feeding:  Feeding: No.  Patient is receiving NPO      Prophylaxis:    Thromboembolic: HSQ - held    Stress Ulcer: PPI    VAP: peridex    Restraints? Yes      PROVIDER RESTRAINT FOR NON-VIOLENT BEHAVIOR FACE TO FACE EVALUATION    Patient's Immediate  "Situation:  Patient demonstrated the following behaviors: Pulling/tugging at invasive lines or tubes and does not respond to verbal/non-verbal redirection    Patient's Reaction to the intervention:  Does patient understand the reason for restraint/seclusion? Unable to Express    Medical Condition:  Is there any evidence of compromise of Skin integrity, Respiratory, Cardiovascular, Musculoskeletal, Hydration? No    Behavioral Condition:  In consultation with the RN, is there a need to continue this restraint or seclusion? Yes    See Restraint Flowsheet for complete restraint documentation and assessment.      DISPOSITION: ICU    CODE STATUS: Full Code    Total Critical Care Time : 40 minutes    Upon evaluation, this patient is critically ill with a high probability of imminent life threatening deterioration due to acute respiratory failure on mechanical ventilation, acute stroke, which required my direct personal management.      Deysi Littlejohn MD  Pulmonary and Critical Care Medicine  [facility]  Office: [phone]    ----------------------------    Overnight events:  CT head abnormal, MRI showed 2.5 cm left frontal lobe infarct    Subjective:  Unable to obtain    Objective:  Physical Exam:  Vent Mode: VCV  FiO2 (%):  [30 %] 30 %  S RR:  [16] 16  S VT:  [500 mL] 500 mL  PEEP/CPAP (cm H2O):  [5 cm H2O] 5 cm H2O  Minute Ventilation (L/min):  [1.2 L/min-9.7 L/min] 9.6 L/min  PIP:  [23 cm H2O-44 cm H2O] 25 cm H2O  MAP (cm H2O):  [8-14] 8    /50   Pulse 67   Temp 100.4  F (38  C) (Core)   Resp 17   Ht 5' 10\" (1.778 m)   Wt 217 lb 2.5 oz (98.5 kg)   SpO2 93%   BMI 31.16 kg/m      Intake/Output last 3 shifts:  I/O last 3 completed shifts:  In: 1898.5 [I.V.:1468.5; NG/GT:180; IV Piggyback:250]  Out: 2586 [Urine:2246; Chest Tube:340]  Intake/Output this shift:  I/O this shift:  In: 103 [I.V.:43; NG/GT:60]  Out: 460 [Urine:460]    Physical Exam  Gen: Intubated, sedated  HEENT: no OP lesions, no GUMARO  CV: " RRR, no m/g/r  Resp: CTAB/L. Chest tubes in place  Abd: soft, nontender, BS+  Neuro: PERRL, nonfocal  Ext: no edema    IMAGING/STUDIES:  CXR:  FINDINGS: Endotracheal tube terminates 3.8 cm above the yariel. Right heart catheter terminates over the right main pulmonary artery. Bilateral chest tubes remain in place. Heart size is enlarged but stable status post median sternotomy. Mild prominence   of central pulmonary vasculature without overt failure. Dense retrocardiac consolidation on the left is unchanged. No pneumothorax.     IMPRESSION:   No significant interval change.    MRI Brain:  The abnormality identified on head CT corresponds to a 2.5 cm subacute infarct in the left frontal lobe associated with petechial hemorrhage. No significant mass effect.

## 2021-06-13 NOTE — PROGRESS NOTES
Clinical Nutrition Therapy Nutrition Support Note    Nutrition History:  Information from nurse    Per nurse, the patient has been having worsening diarrhea with increased TF rate    Intervention:  Will adjust TF to elemental formula dt worsening diarrhea    Initiate Peptamen 1.5 @ 30ml/hr and if tolerate and if tolerated and electrolytes stable increase by 10ml every 24 hours until reaching goal of 50ml/hr.    At goal, Peptamen 1.5 will provide 1800kcals, 81g protein, 226g CHO, 67g fat, 922ml free fluid, 600ml water from flushes for a total of 1522ml fluid daily.     Increased Prosource to 2 pkts daily to better meet protein needs bringing totals to 1920kcals and 111g protein daily.

## 2021-06-13 NOTE — PROGRESS NOTES
NEURO-CT of head reviewed.  No change from prior scan, with hemorrhagic infarct in left frontal region.  Michael Bailey MD    CT Head Without Contrast   Imaging  Date: 12/12/2020 Department: [facility] Cardiac/Neuro ICU Released By/Authorizing: Michael Bailey MD (auto-released)   Study Result    EXAM: CT HEAD WO CONTRAST  LOCATION: [facility]  DATE/TIME: 12/12/2020 11:51 AM     INDICATION: Stroke. Increasing confusion.  COMPARISON: Head MRI 12/09/2020  TECHNIQUE: Routine CT Head without IV contrast. Multiplanar reformats. Dose reduction techniques were used.     FINDINGS:  INTRACRANIAL CONTENTS: Infarct in the left frontal operculum with subtle associated petechial hemorrhage correlating with the blood products noted on MRI. No gross hemorrhagic transformation. No CT evidence of acute infarct. Chronic infarct in the left   cerebellar hemisphere. Mild generalized volume loss.      VISUALIZED ORBITS/SINUSES/MASTOIDS: No intraorbital abnormality. No paranasal sinus mucosal disease. No middle ear or mastoid effusion.     BONES/SOFT TISSUES: No acute abnormality.     IMPRESSION:   1.  Petechial hemorrhage in the left frontal opercular infarct correlating with the blood products noted on MRI.  2.  No gross hemorrhagic transformation.  3.  Chronic left cerebellar infarct.  4.  No change.

## 2021-06-13 NOTE — PROGRESS NOTES
Care Management Follow Up Note    Length of Stay (days) 8     Patient plan of care discussed at Interdisciplinary Rounds: yes  CM Patient/Caregiver Stated Goals: Discharge to TCU             Expected Discharge Date: 12/14/20  Concerns to be Addressed / Barriers to Discharge:  pend postop CABG recovery    Anticipated Discharge Disposition: Skilled Nursing Facility  Anticipated Discharge Services:      Selected Continued Care - Admitted Since 12/2/2020    No services have been selected for the patient.        Anticipated Discharge DME:  heart pillow    Plan:  Assessed. Pt is from home with spouse; ind at baseline. s/p CABG 12/7; intubated but weaning on 12/10-hopeful to extubate. Preop therapy REC was TCU; CM to f/u post extubation. Transport TBD. CM to follow.    ABGs improved today; weaning to extubate today. Amio gtt.Therapy will need to see again about TCU discharge plan although it was recommended for medical management.       Florida Fleming RN

## 2021-06-13 NOTE — PROGRESS NOTES
Pt still very drowsy, unable to keep eyes open for conversation, drifts off while following simple commands. But also very restless, kicking LLE where fem art line is located. Precedex used at lowest rate intermittently to keep pt relaxed so as not to kick. Intensivist MD confirmed to continue cpap mode for now and see if patient wakes up more.

## 2021-06-13 NOTE — PROGRESS NOTES
CVTS Daily Progress Note   12/13/2020   POD#6 s/p CABGx4 [place] PVI, DONNA excision   DOS 12/7/20 Dr. Madison   LOS: 11 days     Overnight events  Repeat CT scan of head done yesterday; stable no acute changes, stable petechial hemorrhage in left frontal opercular infarct.     Pt seen in bed, sleepy, but answers questions, not oriented to year but aware he is in the hospital. Discussed failed swallow study of yesterday and risks with feeding and his need for feeding tube placement for nutrition and medications. He wants to eat. He did agreed, but was uncooperative and Radiology unsuccessful in placing NJ this morning.     SLP recommends ice chips only no oral medications due to silent aspiration noted during video swallow.    HTN with Atrial fib with RVR a bit better controlled last night.    Continues to have atrial fib with RVR rates 110-120s.  Medications changed to IV as pt is NPO due to severe aspiration risk.    99.7 Tm  Blood + gram + cocci- staph epidermidis  Sputum  cx +Beta hemolytic streptococcus not group A.   On Zosyn/Vanco. ID following  Leukocytosis- WBC 12.1 (14.3)    Hypernatremia  149- (150)  diuresis held and D5W started as he is now on dry side. Hyperglycemic and insulin gtt resumed     CT with excessive serous drainage will need to keep in. CT pleurovac , hope to discontinue one tube tomorrow if output down.      Gardner was obstructed and removed, pt was voiding but had retention last night and gardner catheter was replaced.    Wife and daughter updated via telephone with pt status regarding repeat CT scan, urinary cath replacement, aware of failed video swallow and silent aspiration. We discussed need for nutrition and SLP treatment and follow up. They are in agreement with NJ placement and we discussed the possibility of needing a PEG if his swallow doesn't improve over the next week.      Denies discomfort, nausea, shortness of breath.     No further rhythmic movements of left leg.   MRI  area of ischemic lesion and EEG - for seizure activity show no correlation with previous non- purposeful rhythmic movement of left leg.      PLAN  Re attempt NG placement if/when pt is more awake, cooperative.  NPO except ice chips  D5W   Replace mag   Transition off of insulin gtt back to sliding scale insulin and lantus   Keep CTs for excessive output   CT pleurovacs- left and right pleural     OBJECTIVE:    Temp:  [97.5  F (36.4  C)-99  F (37.2  C)] 98.7  F (37.1  C)  Heart Rate:  [106-134] 107  Resp:  [18-22] 20  BP: (123-176)/() 138/86  Wt Readings from Last 2 Encounters:   12/12/20 0400 206 lb 14.4 oz (93.8 kg)   12/11/20 0630 210 lb 4.8 oz (95.4 kg)   12/10/20 0500 212 lb (96.2 kg)   12/09/20 0414 217 lb 2.5 oz (98.5 kg)   12/08/20 0345 219 lb 5.7 oz (99.5 kg)   12/07/20 0417 218 lb 8 oz (99.1 kg)   12/06/20 0301 220 lb 6.4 oz (100 kg)   12/05/20 0340 220 lb 1.6 oz (99.8 kg)   12/04/20 0354 (!) 226 lb (102.5 kg)   12/03/20 1236 (!) 223 lb (101.2 kg)   12/03/20 0441 (!) 223 lb (101.2 kg)   12/02/20 1722 (!) 224 lb 3.2 oz (101.7 kg)   12/02/20 1401 (!) 237 lb 9.6 oz (107.8 kg)   11/05/20 1302 (!) 223 lb (101.2 kg)       Current Medications:    Scheduled Meds:    acetaminophen  650 mg Oral Q6H    Or     acetaminophen  650 mg Rectal Q6H     acetylcysteine (MUCOMYST) 20% inhalation solution  4 mL Inhalation Q8H - RT     albuterol  2.5 mg Nebulization Q8H - RT     aspirin  81 mg Enteral Tube DAILY     atorvastatin  80 mg Enteral Tube QHS     bisacodyL  10 mg Oral Once     famotidine (PEPCID) injection  20 mg Intravenous BID    Or     famotidine  20 mg Oral BID     heparin (PF) ANTICOAGULANT  5,000 Units Subcutaneous Q8H FIXED TIMES     levETIRAcetam (KEPPRA) IVPB  500 mg Intravenous Q12H     magnesium hydroxide  30 mL Oral DAILY     melatonin  3 mg Enteral Tube QHS     metoprolol tartrate  12.5 mg Oral 0330, 1300, 1700 - CV Metoprolol     metoprolol tartrate  50 mg Oral BID      piperacillin-tazobactam  3.375 g Intravenous Q8H     polyethylene glycol  17 g Enteral Tube DAILY     potassium chloride  10 mEq Intravenous Q1H     sodium chloride  10 mL Intravenous Q8H FIXED TIMES     sodium chloride  3 mL Intravenous Q8H FIXED TIMES     vancomycin  1,500 mg Intravenous Q12H     Continuous Infusions:    amiodarone 900 mg/500 ml standard 24 hour infusion 0.5 mg/min (12/12/20 1900)     dextrose 5% 150 mL/hr (12/13/20 1097)     insulin infusion (1 unit/mL) 1.5 Units/hr (12/13/20 8053)     sodium chloride 0.9% 10 mL/hr (12/12/20 1831)     PRN Meds:.acetaminophen, acetaminophen, aluminum-magnesium hydroxide-simethicone, bisacodyL, bisacodyL, dextrose 50 % (D50W), docusate sodium (COLACE) 50 mg/5 mL oral liquid, glucagon (human recombinant), hydrALAZINE, insulin infusion (1 unit/mL), lidocaine (PF), magnesium hydroxide, metoprolol tartrate, sodium chloride, sodium chloride bacteriostatic, sodium chloride, sodium chloride, sodium chloride, sodium phosphates 133 mL, traMADoL, traZODone, white petrolatum    Cardiographics:    Telemetry monitoring demonstrates afib with rates in the 100-120s per my personal review.    Imaging:    Xr Chest 1 View Portable    Result Date: 12/12/2020  EXAM: XR CHEST 1 VIEW PORTABLE LOCATION: [facility] DATE/TIME: 12/12/2020 12:47 PM INDICATION: s/p CABG COMPARISON: 12/8/2020     Interval extubation and removal of the right IJ Middlebrook-Annette catheter. Right IJ line terminates over the brachiocephalic-SVC junction. Mediastinal drains are noted. Persistent and marginally increased small bilateral effusions and associated atelectasis. No pneumothorax. Cardiomegaly and prominence of the mediastinum are similar. CABG changes. Mild pulmonary vascular congestion without overt pulmonary edema.    Ct Head Without Contrast    Result Date: 12/12/2020  EXAM: CT HEAD WO CONTRAST LOCATION: [facility] DATE/TIME: 12/12/2020 11:51 AM INDICATION:  Stroke. Increasing confusion. COMPARISON: Head MRI 12/09/2020 TECHNIQUE: Routine CT Head without IV contrast. Multiplanar reformats. Dose reduction techniques were used. FINDINGS: INTRACRANIAL CONTENTS: Infarct in the left frontal operculum with subtle associated petechial hemorrhage correlating with the blood products noted on MRI. No gross hemorrhagic transformation. No CT evidence of acute infarct. Chronic infarct in the left cerebellar hemisphere. Mild generalized volume loss. VISUALIZED ORBITS/SINUSES/MASTOIDS: No intraorbital abnormality. No paranasal sinus mucosal disease. No middle ear or mastoid effusion. BONES/SOFT TISSUES: No acute abnormality.     1.  Petechial hemorrhage in the left frontal opercular infarct correlating with the blood products noted on MRI. 2.  No gross hemorrhagic transformation. 3.  Chronic left cerebellar infarct. 4.  No change.    Xr Swallow Study W Speech    Result Date: 12/12/2020  EXAM: XR SWALLOW STUDY W SPEECH OR OT LOCATION: [facility] DATE/TIME: 12/12/2020 9:28 AM INDICATION: Difficulty swallowing. COMPARISON: None. TECHNIQUE: Routine. FINDINGS: FLUOROSCOPIC TIME: 1.2 minutes NUMBER OF IMAGES: 0 Swallow study with Speech Pathology using multiple barium thicknesses. There is silent aspiration with thin liquid with no cough reflex. There is pooling of all consistencies in the valleculae with some poor over and there is penetration to the vocal cords with nectar and honey thick liquid. Chin tuck technique improved inversion of the epiglottis mildly.     1.  Silent aspiration with thin liquid. Penetration with nectar and honey consistencies. Stasis with pooling in the valleculae.       Lab Results (most recent, reviewed):    Hemoglobin (g/dL)   Date Value   12/13/2020 11.1 (L)     WBC (thou/uL)   Date Value   12/13/2020 12.1 (H)     Potassium (mmol/L)   Date Value   12/13/2020 3.2 (L)     Creatinine (mg/dL)   Date Value   12/13/2020 0.82     Hemoglobin  A1c (%)   Date Value   12/03/2020 12.6 (H)     Magnesium (mg/dL)   Date Value   12/13/2020 1.8     Calcium (mg/dL)   Date Value   12/13/2020 8.0 (L)       UOP: +675  CT:  1L/24 hours- serous drainage    160 ml/8 hours    Physical Exam:    General: Patient seen in bed, sleepy, answers questions, not oriented to year.     CV: Afib on monitor. -102  Pulm: Non-labored effort on 2L/nc   Chest tubes in place, serosanguinous output, no air leak.- excessive serosang drainage   Sternal Incision C/D/I.  Abd: Soft, NT, ND + BM  : Koenig replaced for urinary retention  Ext: No pedal edema, SCDs in place, warm, WOC RN seeing for   Neuro  Awake no gross deficits noted. No Rhythmic movement left leg noted this morning. Sleepiness waxes and wanes      ASSESSMENT/PLAN:    Mika Alvarez is a 70 y.o. male with a history of CAD who is s/p CABGx4,PVI, DONNA excisions    Principal Problem:    Acute on chronic clinical systolic heart failure (H)  Active Problems:    ACP Staging Stage 1 Hypertension: 140-159 / 90-99    Type 2 diabetes mellitus (H)    Hyperlipidemia, unspecified hyperlipidemia type    CAD (coronary artery disease)    Atrial fibrillation with rapid ventricular response (H)    Diabetic leg ulcer (H)    Acute on chronic systolic CHF (congestive heart failure) (H)    A-fib (H)    Uncontrolled type 2 diabetes mellitus with hyperglycemia (H)    Acute respiratory failure with hypoxia (H)    On mechanically assisted ventilation (H)    S/P CABG (coronary artery bypass graft)    Seizures (H)    Cerebrovascular accident (CVA), unspecified mechanism (H)    Cerebrovascular accident (CVA) due to other mechanism (H)    Metabolic encephalopathy    Disorientation        NEURO:   - Scheduled Tylenol and PRN Toradol/tramadol for pain  - Melatonin at bedtime    - Neuro following, appreciate. Initial concern for seizure given rhythmic LLE twitching although EEG negative for sz and ischemic area of brain on MRI not consistent with area.      - No further LLE rhythmic movement noted  - Keppra per Neuro started 12/10  - ?Encephalopathycomponent. Ammonia WNL, LFTs nml    CV:   - Normo/hypertensive  - Atrial fib RVR this am poor  to IV BB/CCB  - Re consulted cardiology for rhythm management  - Metoprolol 37.5 mg this am increase to 50 mg tonight  - CV BB protocol extra doses  - PRN IV doses  - Amiodarone  PO   - No plan for a-fib anticoagulation given LAAE and surgeon preference  - ASA 81mg  - Atorvastatin 80mg daily  - Chest tubes- Keep pleural CT another day -separate cannister's   - removed Med CT POD #4   - Hold diuresis      PULM:   - Extubated POD #3   - Maintaining oxygen saturations on 2L/nc   - Encourage pulmonary toilet     FEN/GI:  - NPO - medications per NG after placement   - Faiiled video swallow -   - NG bedside placement for meds   - NJ placement in am  - SLP to see daily  - Continue electrolyte replacement protocol  - Hyper natremia 149 - D5W 50cc/hr   - Diet: NPO- ice chips   - Bowel regimen + BM 12/12    RENAL:  - Adequate UOP/hr. Continue to monitor closely via gardner.   - Cr 0.82 (0.92)  - gardner removed - obstructed -   - Gardner replaced for urinary retention   - Diuresis stopped 12/12  - Hypernatremia - Na 149 (150)      HEME:  - Acute blood loss anemia post-op.   - Hgb stable, no bleeding concerns.   - Hep subcutaneous (cleared with neuro),   - [id number]    ID:  - Ronda op ppx complete.  - Afeb   - Sputum culture 12/09 4+ polymorph. Leukocytes 4+ gram + cocci 3+ positive bacilli- Strep beta hemolytic   - Zosyn 12/10  - Vanco 12/10   - UA neg  - Blood cx- positive gram + cocci in clusters - staphy epidermidis  - Consulted ID to see    ENDO:   - Sliding scale insulin-   - Lantus   - Will eventually need PTA DM meds (insulin, glipizide, Metformin)  - D5W 150 ml/hr for hypernatremia -   - Na 149-150   - mag 1.8- replaced with 2 gram     PPx:   - DVT: SCDs,    - SQ heparin TID  - GI: Omeprazole     DISPO:   - Continue critical care in  ICU    Follow up   Neurology - 3 weeks with MRI   MRI in 3 weeks

## 2021-06-13 NOTE — PLAN OF CARE
Problem: Pain  Goal: Patient's pain/discomfort is manageable  Outcome: Progressing   Denies pain overnight, continues on scheduled tylenol   Problem: Safety  Goal: Patient will be injury free during hospitalization  Outcome: Progressing   1:1 in room  Problem: Daily Care  Goal: Daily care needs are met  Outcome: Progressing     Problem: Psychosocial Needs  Goal: Demonstrates ability to cope with hospitalization/illness  Outcome: Progressing     Problem: Nutrition  Goal: Nutrition appropriate for coronary artery disease state  Outcome: Progressing   Continues on tube feeding, diabetiscource at 45/hr, no residuals, tolerating well   Problem: Urinary Incontinence  Goal: Perineal skin integrity is maintained or improved  Outcome: Progressing   Koenig catheter in place, draining without problem

## 2021-06-13 NOTE — PROGRESS NOTES
ICU Update Note    Pt is on room air. Hemodynamically stable.    Will sign off of ICU Service, notified primary CV Sx service.    Gema Kinsey,   Pulmonary and Critical Care Attending  pgr [phone]

## 2021-06-13 NOTE — PROGRESS NOTES
"Clinical Nutrition Therapy Assessment Note    Reason for Assessment:   Mika Alvarez is a 70 y.o. male assessed by the registered Dietitian for consult and MD referral. Consulted s/p cardiac surgery. (s/p CABG x 4 12/7/20)    Attended care rounds this morning and patient is intubated with plan to start weaning today.      Nutrition History:  Information from chart.  Patient declined education 9/20/2020 when inpatient here as he declined stating he has received education in the past & demonstrated understanding of diet with plan to watch sodium intake more carefully.  Food allergies or intolerances: NKFA    Current Nutrition Prescription:   Diet: no order currently   Supplements: Boost GC TID  IV dextrose or Fluids:     dexmedetomidine infusion orderable (PRECEDEX), Last Rate: 0.8 mcg/kg/hr (12/08/20 0857)       dexmedetomidine infusion orderable (PRECEDEX)       dexmedetomidine infusion orderable (PRECEDEX), Last Rate: 0.8 mcg/kg/hr (12/08/20 0658)       DOPamine       epinephrine, Last Rate: 0.005 mcg/kg/min (12/08/20 1030)       insulin infusion (1 unit/mL), Last Rate: 1 Units/hr (12/08/20 0800)       niCARdipine, Last Rate: Stopped (12/07/20 1320)       phenylephrine, Last Rate: Stopped (12/07/20 1325)       sodium chloride 0.9%, Last Rate: 25 mL/hr (12/08/20 0400)       vasopressin      Current Nutrition Intake:  NPO currently due to intubation  Prior was eating >75% at most meals noted    Anthropometrics:  Height: 5' 10\" (177.8 cm)  Admission weight:237 lb 9.6 oz,12/2/20  Weight: 219 lb 5.7 oz (99.5 kg),12/8/20  BMI (Calculated): 32  BMI indication: 30-34.9 obesity (class 1)  Ideal body weight: 166 lb(+or-10%)  Usual body weight: ~220 lb  Weight History:218 lb(12/7)    RD Nutrition Focused Physical Exam:  The patient has the following physical signs which could indicate malnutrition: deferred today    GI Status/Output:   GI symptoms include:abdomen rounded  Bowel Sounds hypoactive per nurse  Last BM: 12/6 per " nurse    Skin/Wound:  Antonio score Antonio Scale Score: 14    Medications:  Medications reviewed.  Note:dulcolax once,colace BID,lasix BID-IV,milk of mag,miralax    Labs:  Labs reviewed  Lab Results   Component Value Date/Time    PREALBUMIN 12.8 (L) 12/04/2020 09:36 AM    ALBUMIN 3.2 (L) 12/02/2020 02:16 PM     12/08/2020 03:59 AM    K 4.5 12/08/2020 03:59 AM    BUN 18 12/08/2020 03:59 AM    CREATININE 0.72 12/08/2020 03:59 AM     (H) 12/08/2020 03:59 AM   HgbA1c: 12.6%(12/3/20)-high    Estimated Nutrition Needs:  Assessment weight is 81 kg, adjusted weight    Energy Needs: 6737-6995 kcals daily, 20-25 kcal/kg  Protein Needs: 97 g daily, 1.2 g/kg.  Fluid Needs: 2025 mls daily, 25 mls/kg    Malnutrition: Not noted    Nutrition Risk Level: moderate risk    Nutrition dx:   Altered nutrition related laboratory values r/t DM as evidenced by HgbA1c of 12.6%.    Goal:   PO intake > 75% and Diet advance   No wt gain    Intervention:   None at this time since not appropriate for diet education  D/C'd Boost GC since NPO status  Recommend resuming Boost GC when at least on full liquid diet     Monitoring/Evaluation:   Diet advancement & further education needs before discharge    Electronically signed by:  Cristina Hernandez RD

## 2021-06-13 NOTE — PLAN OF CARE
Pt remained free from falls this shift. A/O x's 3, disorientated to time, VSS, denies pain/ shortness of breath. Lung sounds dim, has been in Afib with BBB, at times with RVR. Pt has tube feeding going at 40 ml/hr. Pt seems to be tolerating tube feeding, no complaints of nausea, bloating, or cramping. Pt on 1:1 for safety. Pt is a 2 assist to the chair.  Pt understands plan of care, will continue to monitor. Call light within reach.   Leah Behr, RN

## 2021-06-13 NOTE — PROGRESS NOTES
Respiratory Care Note    EKG done and transmitted into pts chart. Copy placed in pts binder.    NAMAN MartínezT

## 2021-06-13 NOTE — PROGRESS NOTES
"Speech Language/Pathology  Videofluoroscopic Swallow Study     Problem:  Patient Active Problem List   Diagnosis     Obesity     ACP Staging Stage 1 Hypertension: 140-159 / 90-99     Coronary artery disease involving native heart with other form of angina pectoris, unspecified vessel or lesion type (H)     Type 2 diabetes mellitus (H)     Joint Pain, Localized In The Shoulder     Hyperlipidemia, unspecified hyperlipidemia type     Moderate episode of recurrent major depressive disorder (H)     Pulmonary edema cardiac cause (H)     New onset atrial fibrillation (H)     Acute systolic heart failure (H)     CAD (coronary artery disease)     New onset a-fib (H)     Acute on chronic clinical systolic heart failure (H)     Atrial fibrillation with rapid ventricular response (H)     Diabetic leg ulcer (H)     Acute on chronic systolic CHF (congestive heart failure) (H)     A-fib (H)     Uncontrolled type 2 diabetes mellitus with hyperglycemia (H)     Acute respiratory failure with hypoxia (H)     On mechanically assisted ventilation (H)     S/P CABG (coronary artery bypass graft)     Seizures (H)     Cerebrovascular accident (CVA), unspecified mechanism (H)     Cerebrovascular accident (CVA) due to other mechanism (H)     Metabolic encephalopathy       Onset date: 12/7/2020  Reason for evaluation: evaluate risk of aspiration   Pertinent History: listed above. Per H&P pt \"is a 70 y.o. old male with multivessel coronary artery disease, chronic systolic congestive heart failure, recent diagnosis of atrial fibrillation, and type 2 diabetes requiring insulin.  Recent hospitalization September 2020 with new onset atrial fibrillation and acute systolic congestive heart failure.  Echocardiogram demonstrated decreased left ventricular systolic function with EF of 42%.  Abnormal stress test followed by coronary angiogram demonstrating severe multivessel disease with recommendation to have CABG.  Patient declined proceeding with " "surgery as recommended.  He did follow-up with cardiology in early November and was found to be experiencing decompensated heart failure with ongoing atrial fibrillation and uncontrolled ventricular rate.  Instructed to proceed immediately to the ER which patient refused to do nor did he go to the ER the following week for agreement.  Now he has run out of several of his medications including his diuretic Bumex.  Parents think increasing shortness of breath over the past 48 hours.  Denies any chest pain.  Upon presentation to ER today, found to be hypoxic on room air with O2 sat of 89% with oxygen saturations improving with 2 L/min nasal cannula.  Also with atrial fibrillation with uncontrolled ventricular rate.  Chest x-ray demonstrating bilateral pleural effusions with increased vascular congestion and mild pulmonary edema.  BNP elevated at 674.  Initial troponin elevated at 1.21.  Discussed with cardiology.  Starting IV heparin.  IV Lasix administered with excellent diuresis.  Feeling improved at this time but still unstable and plans to admit to cardiac telemetry.  Noted to have severe hyperglycemia.  He has been using Lantus but admits to missing doses of Humalog.\". Results of MRI on 12/9/20 as follows \"The abnormality identified on head CT corresponds to a 2.5 cm subacute infarct in the left frontal lobe associated with petechial hemorrhage. No significant mass effect.\".     Pt was intubated from 12/7/20-12/10. His O2 sats remain between  on 3L NC. Voice is audible and volume is WNL. RN report intermittent cough with sips of thin and oral swabs. Patient had BSS completed yesterday at which point it was recommended to continue NPO with pills crushed in applesauce. This AM, informal evaluation conducted at which point patient demonstrated inconsistent congested cough with all intake. Difficult to discern aspiration related cough versus baseline congested cough.   Current Diet: NPO  Baseline Diet: regular " textures with thin liquids, presumed     Assessment:    Patient demonstrated mild oral and severe pharyngeal dysphagia.    Patient is at high aspiration risk with all intake. Patient is a silent aspirator therefore clinical observations not reliable.     Rehab potential is fair based on prior level of function and evaluation results.    Patient is a good candidate to trial therapeutic exercises and/or compensatory strategies to improve oral and/or pharyngeal phase of swallow.    Recommendations:    Plan:Recommend NPO due to severe aspiration risk with intake; ice chips OK.     Strategies: NA    Speech therapy 5 times per week    Referrals: repeat video swallow study in 3-5 days or with notable improvement in physical strength and medical status    Subjective:    Patient presents as alert and cooperative during this evaluation.   An  was not applicable    Patient was given puree, honey, nectar and thin.    Objective:    Dentition/Oral hygiene: adequate    Oral motor function was grossly intact. Some lingual groping noted; uncertain re: impact on oral prep however did not appear to largely impact safety of swallow but should be considered in regards to mastication and oral phase.     Bolus prep and oral control was mildly impaired.     Anterior-Posterior transit was mild to moderately impaired; prolonged oral transit.    Premature spill beyond the base of the tongue into the valleculae occurred with all textures trialed.    Tongue base retraction was mildly impaired.    Oral stasis did not occur with all textures trialed.    Silent aspiration occurred with nectar thick during and after the swallow secondary to pharyngeal delay and reduced epiglottic inversion. Patient had no response with aspiration.     Deep laryngeal penetration occurred with honey thick and nectar thick.     The strategies of chin tuck and reduced bolus size were trialed with honey thick resulting in minimal improvement in airway  protection. Chin tuck maneuver did aid in epiglottic inversion but did not yield functional differences in safety of airway.     Swallow response was delayed with all textures trialed.  Swallow was triggered at the level of pyriform sinus with honey and nectar thick liquids. Triggered at valleculae with puree.    Epiglottic movement was in a posterior-inferior pattern to horizontal inconsistently across texture trials. Appeared directly related to density of bolus.    Severe pharyngeal stasis occurred with all textures trialed primarily in valleculae with minimal clearance despite coughing, thinner liquid rinse, dry swallows, and/or chin tuck maneuver.     Pharyngeal constriction was reduced.    Hyolaryngeal elevation was reduced. Hyolaryngeal excursion was reduced.    Cricopharyngeal function was not impaired. Cervical esophageal function was not impaired.    25 dysphagia minutes    Yokasta Erickson MS, CCC-SLP

## 2021-06-13 NOTE — PROGRESS NOTES
Nutrition Update    Pt continues to refuse NG tube and pt is absolutely unsafe for any oral intake per SLP, including medications. SLP feels patient's swallow will be slow to recover and he will require enteral feeding. Waiting for plan per Provider. RD continues to follow.

## 2021-06-13 NOTE — PROGRESS NOTES
Clinical Nutrition Therapy Follow Up Note      Current Nutrition Prescription:   Diet: NPO  Diet Supplements: NC prosource 2 packets daily  IV dextrose or Fluids:     dextrose 10%    Enteral: via ND placed 12/15/20:Peptamen 1.5 currently running at goal  50 cc/h. (formula change from diabetisource AC 12/17/20 d/t diarrhea)  Flushes:100ml water flushes every 4 hours    Current Nutrition Intake:  Enteral at goal providing  The following meeting estimated needs: 1800kcals, 81g protein, 226g CHO, 67g fat, 922ml free fluid, 600ml water from flushes+ 922 cc free fluid from formula= total of 1522ml fluid daily, 0 gm Fiber   ( w/ Modulars: 1920kcals and 111g protein/d)      GI Status/Output:   GI symptoms include: watery stool x 4 per nursing 12/20/20  Bowel Sounds present per nurse    Skin/Wound:  Venous ulcers and surgical incisoins noted    Medications:  Medications reviewed.      Anthropometrics:  Most recent weight: :  Wt Readings from Last 3 Encounters:   12/21/20 189 lb 14.4 oz (86.1 kg)   11/05/20 (!) 223 lb (101.2 kg)   10/08/20 221 lb 12.8 oz (100.6 kg)   12/17/20      214 lb 9.6 oz (97.3 kg) nonpitting edema all extremities and generalized bed wt    Current wt is a scale weight also consistent w/ scale wt 12/20/20 189 #  Pt diuresing  Nonpitting edema per nursing 12/20/20    Labs:  Labs reviewed :  Results from last 7 days   Lab Units 12/21/20  0715   LN-WHITE BLOOD CELL COUNT thou/uL 13.1*   LN-HEMOGLOBIN g/dL 11.9*   LN-HEMATOCRIT % 40.0   LN-PLATELET COUNT thou/uL 364     Results from last 7 days   Lab Units 12/21/20  0715 12/20/20  0741   LN-SODIUM mmol/L  --  143   LN-POTASSIUM mmol/L 3.7 3.9   LN-CHLORIDE mmol/L  --  110*   LN-CO2 mmol/L  --  24   LN-BLOOD UREA NITROGEN mg/dL  --  15   LN-CREATININE mg/dL  --  0.89   LN-CALCIUM mg/dL  --  8.1*       Results from last 7 days   Lab Units 12/18/20  0914   LN-TRIGLYCERIDES mg/dL 124         Results from last 7 days   Lab Units 12/18/20  0708   LN-MAGNESIUM  mg/dL 1.9     Results from last 7 days   Lab Units 12/18/20  0708   LN-PHOSPHORUS mg/dL 2.7     Results from last 7 days   Lab Units 12/20/20  0741 12/18/20  0708 12/16/20  0546 12/14/20  1300   LN-GLUCOSE mg/dL 273* 239* 169* 199*       Estimated Nutrition Needs:  Assessment weight is 81 kg, adjusted weight     Energy Needs: 6133-7118 kcals daily, 20-25 kcal/kg  Protein Needs:  g daily, 1.2-1.8 g/kg.  Fluid Needs: 2025 mls daily, 25 mls/kg or per provider     Malnutrition: Noted previously as severe     Nutrition Risk Level: high risk     Nutrition dx:  Altered nutrition related laboratory values r/t DM as evidenced by HgbA1c of 12.6%.      Swallowing difficulty r/t recent intubation & noted left frontal ischemic stroke as evidenced by dysphagia and failed VFSS     Malnutrition r/t acute illness as evidenced by NPO x 5 days & >2% wt loss in a week.     Goal Status:  PO intake > 75% and Diet advance -not progressing due to high aspiration risk- d/c goal  Tolerate TF & meet estimated needs-progressing  New 12/21/20 : normalize GI function as able    Intervention:  Ordered Nutrisource Fiber   1 packet BID to provide 6 g soluble fiber also adding 8 g CHO to latter load.  Increased flushes to 180 cc q 4 h to support more modulars and meet minimal estimated fluid needs : will provide 1080 cc from flushes and total free fluid of 2002 cc fluid/d.  Adjust insulin per MD to meet BG goals          See Care Plan for Problems, Goals, and Interventions.

## 2021-06-13 NOTE — PLAN OF CARE
Patient admitted to ICU direct from OR s/p CAB x4 accompanied by OR staff, intubated and bagged with 100% FiO2.  Placed on full vent support.  Patient sedated.  Monitor shows sinus rhythm.  Temp pacer present, back up rate 45.  Note one atrial wire that does not capture, per MDA; 2 ventricular wires present - one connected to pacer generator, one back up.  Heart tones with rub.  Epi gtt on to maintain BP goal MAP>65  Also of note two arterial lines present - one left radial to monitor, and one placed in left femoral in case IABP needed.  Plan to pull femoral line tomorrow morning therefore reamins bedrest, HOB < 20 degrees.  Lungs clear, 3 chest tubes present.  Plan to fast track extubation once parameters achieved.  Adequate urine out.  Continue plan of care.  Jennifer Miguel RN

## 2021-06-13 NOTE — PLAN OF CARE
Problem: Pain  Goal: Patient's pain/discomfort is manageable  12/3/2020 1806 by Tracy Kim, RN  Outcome: Progressing  12/3/2020 1805 by Tracy Kim RN  Outcome: Progressing     Problem: Safety  Goal: Patient will be injury free during hospitalization  12/3/2020 1806 by Tracy Kim RN  Outcome: Progressing  12/3/2020 1805 by Tracy Kim RN  Outcome: Progressing     Problem: Acute pain with Coronary Artery Disease (CAD)  Goal: Patient and nurse identification of acute coronary pain  Outcome: Progressing     Problem: Potential for hemodynamic instability  Goal: Cardiac output is adequate  Outcome: Progressing     Problem: Anxiety  Goal: Anxiety is at manageable level  Outcome: Progressing   Pt Aaox4, Afib 60's, RA denies any shortness of breath, heparin @1500mg/hr. Work up in progress for scheduled CABG on Monday (12/7). WOC evaluated [place] ext. Daily drsg changes noted. No s/s of distress, denies any pain, CB and phone in reach. Will continue to monitor.  .Tracy Kim RN

## 2021-06-13 NOTE — ANESTHESIA PROCEDURE NOTES
Arterial Line  Reason for Procedure: hemodynamic monitoring and multiple ABGs  Patient location during procedure: OR pre-induction  Start time: 12/7/2020 7:17 AM  End time: 12/7/2020 7:27 AM  Staffing:  Performing  Anesthesiologist: Acosta Boland MD  Sterile Precautions:  sterile barriers used during insertion: cap, mask, sterile gloves, large sheet, and hand hygiene used.  Arterial Line:   Immediately prior to procedure a time out was called to verify the correct patient, procedure, equipment, support staff and site/side marked as required  Laterality: left  Location: radial  Prepped with: ChloroPrep    Needle gauge: 20 G  Number of Attempts: 1  Secured with: tape, transparent dressing and pressure dressing  Flushed with: saline  1% lidocaine local anesthesia used for skin prep.   See MAR for additional medications given.  Ultrasound evaluation of access site: yes  Vessel patent by US exam    Concurrent real time visualization of needle entry    Permanent ultrasound image captured

## 2021-06-13 NOTE — PROGRESS NOTES
RESPIRATORY CARE NOTE    Vent day:  3    Breath sounds coarse; rub noted on left.  Sx small amount white secretions.  ABG drawn X1.  Pt transported to CT.  No changes to vent settings,    Current settings:  Vent Mode: VCV  FiO2:  30 %  RR:  16  VT:  500 mL  PEEP:  5 cm H2O    7.5  ET 25 @ teeth    Pulse 71    Resp 16    SpO2 92%    Arterial Blood Gas result:  pH 7.47; PaCO2 38; PaO2 72; HCO3 27.6, SaO2 94.8%; base excess 3.8.       12/09/20 0248   Patient Data   PIP Observed (cm H2O) 24 cm H2O   MAP (cm H2O) 8   Auto/Intrinsic PEEP Observed (cm H2O) 0 cm H2O   Plateau Pressure (cm H2O) 14 cm H2O   Static Compliance (L/cm H2O) 57   Dynamic Compliance (L/cm H2O) 48 L/cm H2O   Airway Resistance 12      Joseph Muhammad, LRT

## 2021-06-13 NOTE — PROGRESS NOTES
"Pharmacy Consult: Vancomycin Dosing    Pharmacist consulted to dose vancomycin for Mika Alvarez, a 70 y.o. male.    Ordering provider: Emma Alexis PA-C    Indication for vancomycin therapy: Possible Post-operative Infection    Goal Trough Range:  15-20 mcg/mL based on indication    Other current antimicrobials              vancomycin 1,500 mg in sodium chloride 0.9% 500 mL 1,500 mg (VANCOCIN)  Every 12 hours          piperacillin-tazobactam 3.375 g in NaCl 0.9 % 50 mL (MINI-BAG Plus) (ZOSYN)  Every 8 hours                   Subjective/Objective:    Patient was admitted for Acute on chronic clinical systolic heart failure (H) on 12/2/2020    Height: 5' 10\" (1.778 m)    Actual Body Weight (ABW): 93.8 kg (206 lb 14.4 oz)    Ideal body weight: 73 kg (160 lb 15 oz)  Adjusted ideal body weight: 81.3 kg (179 lb 5.1 oz)    BMI: Body mass index is 29.69 kg/m .    No Known Allergies    Patient Active Problem List   Diagnosis     Obesity     ACP Staging Stage 1 Hypertension: 140-159 / 90-99     Coronary artery disease involving native heart with other form of angina pectoris, unspecified vessel or lesion type (H)     Type 2 diabetes mellitus (H)     Joint Pain, Localized In The Shoulder     Hyperlipidemia, unspecified hyperlipidemia type     Moderate episode of recurrent major depressive disorder (H)     Pulmonary edema cardiac cause (H)     New onset atrial fibrillation (H)     Acute systolic heart failure (H)     CAD (coronary artery disease)     New onset a-fib (H)     Acute on chronic clinical systolic heart failure (H)     Atrial fibrillation with rapid ventricular response (H)     Diabetic leg ulcer (H)     Acute on chronic systolic CHF (congestive heart failure) (H)     A-fib (H)     Uncontrolled type 2 diabetes mellitus with hyperglycemia (H)     Acute respiratory failure with hypoxia (H)     On mechanically assisted ventilation (H)     S/P CABG (coronary artery bypass graft)     Seizures (H)     Cerebrovascular " accident (CVA), unspecified mechanism (H)     Cerebrovascular accident (CVA) due to other mechanism (H)     Metabolic encephalopathy     Disorientation    Past Medical History:   Diagnosis Date     ACP Staging Stage 1 Hypertension: 140-159 / 90-99     Created by Conversion  Replacement Utility updated for latest IMO load     Atrial fibrillation with rapid ventricular response (H) 12/2/2020     Coronary Artery Disease     Created by Conversion  Replacement Utility updated for latest IMO load     Joint Pain, Localized In The Shoulder     Created by Conversion      Obesity     Created by Conversion      Other and unspecified hyperlipidemia 4/27/2016     Type 2 Diabetes Mellitus With Complication     Created by Conversion         Temp Readings from Current Encounter:     12/12/20 0200 12/12/20 0400 12/12/20 0600   Temp: 98.3  F (36.8  C) 98.2  F (36.8  C) 99.7  F (37.6  C)     Net Intake/Output (last 24 hours):  I/O last 3 completed shifts:  In: 2118.7 [I.V.:163.7; IV Piggyback:1955]  Out: 1908 [Urine:1338; Chest Tube:570]    Recent Labs     12/10/20  0400 12/10/20  0741 12/10/20  1011 12/11/20  0415 12/12/20  0413 12/12/20  1211   WBC  --  11.8* 13.1* 10.7 14.3*  --    BUN 29*  --  26* 26* 29* 25*   CREATININE 0.73  --  0.77 0.76 0.92 0.92     Estimated Creatinine Clearance: 88.4 mL/min (by C-G formula based on SCr of 0.92 mg/dL).    Recent Labs     12/09/20  1725   CULTURE Usual miryam with  4+  Beta Hemolytic Streptococcus Not Group A*       Results for orders placed or performed during the hospital encounter of 12/02/20   Sputum culture    Collection Time: 12/09/20  5:25 PM    Specimen: Respiratory   Result Value Status    Culture Usual miryam with Final    Culture 4+  Beta Hemolytic Streptococcus Not Group A (!) Final   Culture, MRSA (ID Only)    Collection Time: 12/04/20  2:09 PM    Specimen: Nares; Swab   Result Value Status    Culture No MRSA isolated Final       Recent labs: (last 7 days)     12/12/20  1211    VANCOMYCIN 19.1       Vancomycin administrations: (last 120 hours)     None          Assessment/Plan:    Pharmacist consulted to dose vancomycin for Possible Post-operative Infection, goal trough range 15-20 mcg/mL.  1. On vancomycin 1500 mg IV every 12 hours (15.6 mg/kg actual body weight) following 2000 mg IV loading dose  2. Vanco level Obtained- result 19.1- this level was drawn approx 2 hours early andticipate true trough to be closer to 15 but still in range for this patient.   3. Pharmacist will plan to recheck a vancomycin trough level if renal function changes or continues for more than 3 more days.  4. Pharmacist will continue to follow.    Thank you for the consult.  Bree Hall [facility], BCPS 12/12/2020 1:12 PM

## 2021-06-13 NOTE — PROGRESS NOTES
"Speech Language/Pathology  Speech Therapy Daily Progress Note  Nursing reports that overnight nursing had concerns re: aspiration with ice chips and has not been given them since.  Patient presents as alert, agitated and restless  during this session d/t \"so thirsty\".  An  was not applicable.  Oral cares provided. Educated re: aspiration risk, reason for NPO status. Expressed understanding but frustration.    Objective  PO trial for safe po intake of observed food/liquids without s/s aspiration  Patient was given 20  bites ice chips (2-3 at a time) with cues to use effortful swallow exercise and presented with delayed cough x3. Notable for adequate mastication and oral control. Significantly less restless and agitated after ice chips.    Assessment  Patient demonstrated inconsistent s/s aspiration with ice chips. However, if he consistently has good oral hygiene, and ice chips are limited to 2-3 at a time, it is unlikely that patient will develop respiratory complications from that aspiration. Oral hygiene beforehand is paramount, however. The rehabilitative benefit of ice chips, along with the satisfaction of dry mouth, at this time is worth the risk of microaspiration as long as oral cavity it clean.    Plan/Recommendations  Continue ice chips, limited to 2-3 at a time. Occasional coughing is acceptable. If increase in congestion or decline in respiratory status is noted, then discontinuation of ice chips would be appropriate.     The ST Care Plan has been reviewed and current plan remains appropriate.    15 dysphagia minutes     Ed Cook MA, CCC-SLP      "

## 2021-06-13 NOTE — PROGRESS NOTES
1308 received patient from CV surgery s/p CAB x 4 and placed on mechanical ventilation. VCV 16, 500, 5, 1.0. 7.5 subglottic ETT at 23 at the teeth tapped at right  of mouth.  BS clear and equal bilaterally. RT to monitor and assess. ABG pending.     RESPIRATORY CARE NOTE    Vent Mode: VCV  FiO2 (%):  [100 %] 100 %  S RR:  [16] 16  S VT:  [500 mL] 500 mL  PEEP/CPAP (cm H2O):  [5 cm H2O] 5 cm H2O  Minute Ventilation (L/min):  [10.5 L/min] 10.5 L/min  PIP:  [26 cm H2O] 26 cm H2O  MAP (cm H2O):  [8] 8       SpO2 100 %, decreased FiO2 to .80.    Colt Grace, LRT

## 2021-06-13 NOTE — PLAN OF CARE
Problem: Physical Therapy  Goal: PT Goals  Description: Patient will participate in evaluation by 12/5/2020 in order to assess functional level and determine safe discharge plan. -met    Goal entered on 12/5/2020 by Cassy Tabor, PT    Outcome: Completed       Physical Therapy Discharge Summary    Date of PT Discharge: 12/5/2020  Recommended Equipment: none  Discharge Destination: Remains in hospital  Discharge Comments: Pt independent with mobility at this time; CABG procedure planned for 12/7 and may need PT re-eval after.    Please note that if goals are not fully met the patient is making progress towards established goals, which is documented in flowsheet notes. If further therapy is recommended it is related to documented deficits, and is necessary to maximize functional independence in order for patient to return to previous level of function.      12/5/2020 by Cassy Tabor, PT

## 2021-06-13 NOTE — PROGRESS NOTES
NEUROLOGY PROGRESS NOTE     ASSESSMENT & PLAN   Hospital Day#: 10    Impression: Jerking of left leg.  No recurrence with being on Keppra at this time.  Would continue on same dosage.  2.  Altered mental status patient not as sharp in terms of answering questions and discussion with nursing staff (first time I am seeing patient) he does have perseveration of answers and does have some right arm weakness.  We will first check CT scan of head to make sure there is no evidence for hemorrhagic transformation.  We will also see if any evidence for new stroke, as patient does have atrial fibrillation which would put him at risk for stroke problems.  Other concerns could include the possibility of of development of infection causing confusion, especially with following difficulties that could lead to pneumonia problems.  Patient getting D5 to help with his increased sodium.        Patient Active Problem List    Diagnosis Date Noted     Cerebrovascular accident (CVA) due to other mechanism (H)      Metabolic encephalopathy      Cerebrovascular accident (CVA), unspecified mechanism (H)      Seizures (H)      Acute respiratory failure with hypoxia (H)      On mechanically assisted ventilation (H)      S/P CABG (coronary artery bypass graft)      Uncontrolled type 2 diabetes mellitus with hyperglycemia (H)      Acute on chronic clinical systolic heart failure (H) 12/02/2020     Atrial fibrillation with rapid ventricular response (H) 12/02/2020     Diabetic leg ulcer (H) 12/02/2020     Acute on chronic systolic CHF (congestive heart failure) (H) 12/02/2020     A-fib (H) 12/02/2020     Acute systolic heart failure (H)      New onset atrial fibrillation (H)      Pulmonary edema cardiac cause (H) 09/19/2020     CAD (coronary artery disease) 09/19/2020     New onset a-fib (H) 09/19/2020     Moderate episode of recurrent major depressive disorder (H) 06/21/2019     Hyperlipidemia, unspecified hyperlipidemia type 04/27/2016     Joint  Pain, Localized In The Shoulder      Obesity      ACP Staging Stage 1 Hypertension: 140-159 / 90-99      Coronary artery disease involving native heart with other form of angina pectoris, unspecified vessel or lesion type (H)      Type 2 diabetes mellitus (H)      Past Medical History: Patient  has a past medical history of ACP Staging Stage 1 Hypertension: 140-159 / 90-99, Atrial fibrillation with rapid ventricular response (H) (12/2/2020), Coronary Artery Disease, Joint Pain, Localized In The Shoulder, Obesity, Other and unspecified hyperlipidemia (4/27/2016), and Type 2 Diabetes Mellitus With Complication.     SUBJECTIVE     Time seeing patient.  History reviewed.  MRI and CT reviewed.  Patient had episode of jerking of left leg which seems to have resolved with being on Keppra.  Does have an area of a hemorrhagic infarct in the left frontal [id number] would not explain the jerking of his left leg.  In speaking with nursing staff, they report patient is not as clear today as he was yesterday.  Denies any headache.  Patient is somewhat slow in responding to questions.  Sodium 149 and starting on D5.  Noted to have problems from an aspiration standpoint swallow study.     OBJECTIVE     Vital signs in last 24 hours  Temp:  [97.5  F (36.4  C)-99.7  F (37.6  C)] 99.7  F (37.6  C)  Heart Rate:  [100-138] 138  Resp:  [18-20] 20  BP: (107-195)/() 131/62    Weight:   206 lb 14.4 oz (93.8 kg)    I/O last 24 hours    Intake/Output Summary (Last 24 hours) at 12/12/2020 1053  Last data filed at 12/12/2020 0700  Gross per 24 hour   Intake 2118.73 ml   Output 1459 ml   Net 659.73 ml       Review of Systems   See above.    General Physical Exam: Patient is alert and oriented to self, [facility].  Could not tell me the month.  Thought the year was 2011 but when told it was 2020, he will perseverate 20/20 on a number of his answers.. Vital signs were reviewed and are documented in EMR.  Neurological  Exam:  Patient alert.  Patient speech sounds fluent.  As mentioned above, could not tell me what month it was or the season.  Thought initially was 2011 but when told it was 2020 he would perseverate that number.  Could tell me the name of the president, but then would say his name when asked a number of other questions in terms of orientation.  His extraocular movements appeared full.  Difficulty with testing visual fields but did not appear to  as readily in the right field as compared to the left.  Face moves symmetrically and tongue midline.  Left-sided strength 5/5.  Moves right leg well.  Does have weakness of his right arm relative to the left.  Positive right Babinski negative on left.     DIAGNOSTIC STUDIES     Pertinent Radiology   Radiology Results: Xr Swallow Study W Speech    Result Date: 12/12/2020  EXAM: XR SWALLOW STUDY W SPEECH OR OT LOCATION: [facility] DATE/TIME: 12/12/2020 9:28 AM INDICATION: Difficulty swallowing. COMPARISON: None. TECHNIQUE: Routine. FINDINGS: FLUOROSCOPIC TIME: 1.2 minutes NUMBER OF IMAGES: 0 Swallow study with Speech Pathology using multiple barium thicknesses. There is silent aspiration with thin liquid with no cough reflex. There is pooling of all consistencies in the valleculae with some poor over and there is penetration to the vocal cords with nectar and honey thick liquid. Chin tuck technique improved inversion of the epiglottis mildly.     1.  Silent aspiration with thin liquid. Penetration with nectar and honey consistencies. Stasis with pooling in the valleculae.       Pertinent Labs   Lab Results: Reviewed  Recent Results (from the past 24 hour(s))   Potassium    Collection Time: 12/11/20 12:07 PM   Result Value Ref Range    Potassium 3.7 3.5 - 5.0 mmol/L   POCT Glucose    Collection Time: 12/11/20 12:07 PM    Specimen: Blood   Result Value Ref Range    Glucose 214 (H) 70 - 139 mg/dL   POCT Glucose    Collection Time: 12/11/20  3:56  PM    Specimen: Blood   Result Value Ref Range    Glucose 229 (H) 70 - 139 mg/dL   POCT Glucose    Collection Time: 12/11/20  7:52 PM    Specimen: Blood   Result Value Ref Range    Glucose 216 (H) 70 - 139 mg/dL   POCT Glucose    Collection Time: 12/11/20 11:48 PM    Specimen: Blood   Result Value Ref Range    Glucose 229 (H) 70 - 139 mg/dL   POCT Glucose    Collection Time: 12/12/20  4:11 AM    Specimen: Blood   Result Value Ref Range    Glucose 268 (H) 70 - 139 mg/dL   Basic Metabolic Panel    Collection Time: 12/12/20  4:13 AM   Result Value Ref Range    Sodium 149 (H) 136 - 145 mmol/L    Potassium 4.2 3.5 - 5.0 mmol/L    Chloride 116 (H) 98 - 107 mmol/L    CO2 23 22 - 31 mmol/L    Anion Gap, Calculation 10 5 - 18 mmol/L    Glucose 278 (H) 70 - 125 mg/dL    Calcium 8.1 (L) 8.5 - 10.5 mg/dL    BUN 29 (H) 8 - 22 mg/dL    Creatinine 0.92 0.70 - 1.30 mg/dL    GFR MDRD Af Amer >60 >60 mL/min/1.73m2    GFR MDRD Non Af Amer >60 >60 mL/min/1.73m2   HM2(CBC W/O DIFF)    Collection Time: 12/12/20  4:13 AM   Result Value Ref Range    WBC 14.3 (H) 4.0 - 11.0 thou/uL    RBC 4.42 4.40 - 6.20 mill/uL    Hemoglobin 11.7 (L) 14.0 - 18.0 g/dL    Hematocrit 37.9 (L) 40.0 - 54.0 %    MCV 86 80 - 100 fL    MCH 26.5 (L) 27.0 - 34.0 pg    MCHC 30.9 (L) 32.0 - 36.0 g/dL    RDW 14.7 (H) 11.0 - 14.5 %    Platelets 403 140 - 440 thou/uL    MPV 9.9 8.5 - 12.5 fL   Magnesium    Collection Time: 12/12/20  4:13 AM   Result Value Ref Range    Magnesium 2.2 1.8 - 2.6 mg/dL   ECG 12 lead nursing unit performed    Collection Time: 12/12/20  7:49 AM   Result Value Ref Range    SYSTOLIC BLOOD PRESSURE      DIASTOLIC BLOOD PRESSURE      VENTRICULAR RATE 123 BPM    ATRIAL RATE 136 BPM    P-R INTERVAL      QRS DURATION 132 ms    Q-T INTERVAL 388 ms    QTC CALCULATION (BEZET) 555 ms    P Axis      R AXIS -72 degrees    T AXIS 84 degrees    MUSE DIAGNOSIS       Atrial fibrillation with rapid ventricular response  Right bundle branch block  Left anterior  fascicular block  ** Bifascicular block **  Abnormal ECG  When compared with ECG of 08-DEC-2020 07:02,  Atrial fibrillation has replaced Sinus rhythm  Non-specific change in ST segment in Anterior leads  T wave inversion now evident in Anterior leads           HOSPITAL MEDICATIONS       acetaminophen  650 mg Oral Q6H    Or     acetaminophen  650 mg Rectal Q6H     acetylcysteine (MUCOMYST) 20% inhalation solution  4 mL Inhalation Q8H - RT     albuterol  2.5 mg Nebulization Q8H - RT     amiodarone  200 mg Oral BID     aspirin  81 mg Enteral Tube DAILY     atorvastatin  80 mg Enteral Tube QHS     bisacodyL  10 mg Oral Once     chlorhexidine  15 mL Topical Q12H 09-21     docusate sodium (COLACE) 50 mg/5 mL oral liquid  100 mg Enteral Tube BID     famotidine (PEPCID) injection  20 mg Intravenous BID    Or     famotidine  20 mg Oral BID     heparin (PF) ANTICOAGULANT  5,000 Units Subcutaneous Q8H FIXED TIMES     insulin aspart (NovoLOG) injection   Subcutaneous Q4H FIXED TIMES     insulin glargine  10 Units Subcutaneous BID     levETIRAcetam (KEPPRA) IVPB  500 mg Intravenous Q12H     magnesium hydroxide  30 mL Oral DAILY     melatonin  3 mg Enteral Tube QHS     metoprolol tartrate  12.5 mg Oral 0330, 1300, 1700 - CV Metoprolol     metoprolol tartrate  50 mg Oral BID     piperacillin-tazobactam  3.375 g Intravenous Q8H     polyethylene glycol  17 g Enteral Tube DAILY     sodium chloride  10 mL Intravenous Q8H FIXED TIMES     sodium chloride  3 mL Intravenous Q8H FIXED TIMES     vancomycin  1,500 mg Intravenous Q12H        Total time spent for face to face visit, reviewing labs/imaging studies, counseling and coordination of care was: 30 minuntes More than 50% of this time was spent on counseling and coordination of care.    Michael Bailey MD  [facility]  Direct Secure Messaging: [email or web address]  Tel: [phone]

## 2021-06-13 NOTE — PROGRESS NOTES
CVTS Daily Progress Note   12/10/2020   POD#3 s/p CABGx4  Attending: Los   LOS: 8 days     SUBJECTIVE/INTERVAL EVENTS:    A-fib with rates 110s last night/this morning, on amio. Tmax 101. Patient remains intubated on minimal settings and sedated. Seems encephalopathic; intermittently follows commands, biting on ET tube, not opening eyes. Continues to have non-purposeful rhythmic movement of left leg that does not correlate with ischemic lesion on MRI. Repeat EEG not suggestive of seizures. Pressure support  trials going well. Normotensive although labile at times with agitation. Currently NPO, -BM. UOP adequate. Chest tube output appropriate. Hgb 10.4. WBC 11.8. Ammonia WNL.     OBJECTIVE:    Temp:  [99.1  F (37.3  C)-101.7  F (38.7  C)] 101.7  F (38.7  C)  Heart Rate:  [] 100  Resp:  [14-26] 18  BP: ()/(50-75) 117/73  Arterial Line BP: ()/(45-70) 117/56  FiO2 (%):  [30 %-40 %] 40 %  Wt Readings from Last 2 Encounters:   12/10/20 0500 212 lb (96.2 kg)   12/09/20 0414 217 lb 2.5 oz (98.5 kg)   12/08/20 0345 219 lb 5.7 oz (99.5 kg)   12/07/20 0417 218 lb 8 oz (99.1 kg)   12/06/20 0301 220 lb 6.4 oz (100 kg)   12/05/20 0340 220 lb 1.6 oz (99.8 kg)   12/04/20 0354 (!) 226 lb (102.5 kg)   12/03/20 1236 (!) 223 lb (101.2 kg)   12/03/20 0441 (!) 223 lb (101.2 kg)   12/02/20 1722 (!) 224 lb 3.2 oz (101.7 kg)   12/02/20 1401 (!) 237 lb 9.6 oz (107.8 kg)   11/05/20 1302 (!) 223 lb (101.2 kg)       Current Medications:    Scheduled Meds:    acetaminophen  650 mg Oral Q6H    Or     acetaminophen  650 mg Rectal Q6H     aspirin  81 mg Enteral Tube DAILY     atorvastatin  80 mg Enteral Tube QHS     bisacodyL  10 mg Oral Once     chlorhexidine  15 mL Topical Q12H 09-21     docusate sodium (COLACE) 50 mg/5 mL oral liquid  100 mg Enteral Tube BID     famotidine (PEPCID) injection  20 mg Intravenous BID    Or     famotidine  20 mg Oral BID     furosemide  40 mg Intravenous BID - diuretic     heparin (PF)  ANTICOAGULANT  5,000 Units Subcutaneous Q8H FIXED TIMES     insulin aspart (NovoLOG) injection   Subcutaneous Q4H FIXED TIMES     magnesium hydroxide  30 mL Oral DAILY     melatonin  3 mg Enteral Tube QHS     metoprolol tartrate  12.5 mg Oral BID     polyethylene glycol  17 g Enteral Tube DAILY     sodium chloride  10 mL Intravenous Q8H FIXED TIMES     sodium chloride  3 mL Intravenous Q8H FIXED TIMES     Continuous Infusions:    amiodarone 900 mg/500 ml standard 24 hour infusion 0.5 mg/min (12/10/20 0330)     dexmedetomidine infusion orderable (PRECEDEX) 0.2 mcg/kg/hr (12/10/20 0935)     sodium chloride 0.9% Stopped (12/09/20 1412)     PRN Meds:.acetaminophen, acetaminophen, aluminum-magnesium hydroxide-simethicone, bisacodyL, bisacodyL, dexmedetomidine infusion orderable (PRECEDEX), dextrose 50 % (D50W), glucagon (human recombinant), HYDROmorphone, ketorolac, magnesium hydroxide, metoprolol tartrate, ondansetron, oxyCODONE, sodium chloride, sodium chloride, sodium chloride, sodium chloride, sodium phosphates 133 mL, traZODone    Cardiographics:    Telemetry monitoring demonstrates afib with rates in the 110s per my personal review.    Imaging:    Mr Brain With Without Contrast    Result Date: 12/9/2020  EXAM: MR BRAIN W WO CONTRAST LOCATION: [facility] DATE/TIME: 12/9/2020 1:07 PM INDICATION: Seizure. Altered mental status. Abnormal head CT, hemorrhage. COMPARISON: Head CT 12/08/2020. CONTRAST: 10 mL of gadobutrol intravenous contrast. TECHNIQUE: Routine multiplanar multisequence head MRI without and with intravenous contrast. FINDINGS: Cortical diffusion hyperintensity without most minimal restriction along the margin of the left middle and inferior frontal gyri demonstrates cortical signal loss on the susceptibility sensitive sequence with gyriform enhancement over a 2.5 x 1.5 x 1 cm area. Constellation of findings are most consistent with a subacute infarct and petechial hemorrhage.  This corresponds to the abnormality identified on the comparison head CT.  Mild mucosal thickening in the sinuses. A few mastoid air cells on each side contain a small amount of fluid. Intraorbital contents are unremarkable. There is mild generalized prominence of the sulci and ventricles, consistent with underlying volume loss. Intracranial flow voids are intact. Small chronic infarct left cerebellum. There is no mass effect, midline shift, or extraaxial collection. There are scattered foci of T2/FLAIR hyperintensity within the cerebral white matter that are nonspecific but most commonly reflect the sequela of chronic small vessel ischemic disease.     The abnormality identified on head CT corresponds to a 2.5 cm subacute infarct in the left frontal lobe associated with petechial hemorrhage. No significant mass effect.       Lab Results (most recent, reviewed):    Hemoglobin (g/dL)   Date Value   12/10/2020 10.4 (L)     WBC (thou/uL)   Date Value   12/10/2020 11.8 (H)     Potassium (mmol/L)   Date Value   12/10/2020 3.6   12/10/2020 3.6     Creatinine (mg/dL)   Date Value   12/10/2020 0.73     Hemoglobin A1c (%)   Date Value   12/03/2020 12.6 (H)     Magnesium (mg/dL)   Date Value   12/10/2020 2.0     Calcium (mg/dL)   Date Value   12/10/2020 8.3 (L)       I/O:    Intake/Output Summary (Last 24 hours) at 12/10/2020 0939  Last data filed at 12/10/2020 0900  Gross per 24 hour   Intake 1365.31 ml   Output 3141 ml   Net -1775.69 ml        UOP: 2.6L    CT: 210cc    Physical Exam:    General: Patient seen in bed. Biting on ET tube. Intermittently following commands.   CV: Afib on monitor. 2+ peripheral pulses in all extremities. Mild edema.   Pulm: Non-labored effort on CMV. Chest tubes in place, serosanguinous output, no air leak. Incision C/D/I.  Abd: Soft, NT, ND  : Koenig with rani urine  Ext: Mild pedal edema, SCDs in place, warm, distal pulses intact  Neuro: Sedated, unable to fully eval. Rhythmic movement left  leg. Intermittently follows commands.      ASSESSMENT/PLAN:    Mika Alvarez is a 70 y.o. male with a history of CAD who is POD#3 s/p CABGx4.    Principal Problem:    Acute on chronic clinical systolic heart failure (H)  Active Problems:    ACP Staging Stage 1 Hypertension: 140-159 / 90-99    Type 2 diabetes mellitus (H)    Hyperlipidemia, unspecified hyperlipidemia type    CAD (coronary artery disease)    Atrial fibrillation with rapid ventricular response (H)    Diabetic leg ulcer (H)    Acute on chronic systolic CHF (congestive heart failure) (H)    A-fib (H)    Uncontrolled type 2 diabetes mellitus with hyperglycemia (H)    Acute respiratory failure with hypoxia (H)    On mechanically assisted ventilation (H)    S/P CABG (coronary artery bypass graft)    Seizures (H)    Cerebrovascular accident (CVA), unspecified mechanism (H)        NEURO:   - Scheduled Tylenol and PRN Toradol/oxycodone/dilaudid for pain  - Sedation with Precedex  - Melatonin at bedtime  - Neuro following, appreciate. Initially concern for seizure given rhythmic LLE twitching although EEG negative for sz and ischemic area of brain on MRI not consistent with area. No anti-epileptics yet per neuro.  - ?Encephalopathy. Ammonia WNL today. LFTs pending    CV:   - Normotensive, labile  - Metoprolol 12.5mg two times a day with small PRN IV doses  - Amiodarone gtt switching to PO tonight for a-fib  - No plan for a-fib anticoagulation given LAAE and surgeon preference  - ASA 81mg  - Atorvastatin 80mg daily  - Chest tubes to remain today    PULM:   - Vent weaning as able  - Maintaining oxygen saturations on minimal settings, possible extubation today  - Encourage pulmonary toilet when extubated.    FEN/GI:  - NPO while intubated  - +OG  - If extubated, needs swallow study and likely NG.  - Continue electrolyte replacement protocol  - Diet: NPO while intubated  - Bowel regimen    RENAL:  - Adequate UOP/hr. Continue to monitor closely via gardner.   - Cr  pending today  - Koenig to remain in for close monitoring of I/O and during period of diuresis/relative immobility.   - Diuresis with 40mg IV Lasix two times a day    HEME:  - Acute blood loss anemia post-op.   - Hgb stable, no bleeding concerns. Hep subcutaneous (cleared with neuro), [id number]    ID:  - Ronda op ppx complete.  - Febrile to 101 this morning.  - Sputum culture pending from 12/09; plan for blood and urine cultures today and empiric antibiotics.    ENDO:   - Sliding scale insulin  - Will eventually need PTA DM meds (insulin, glipizide, Metformin)    PPx:   - DVT: SCDs, SQ heparin TID  - GI: Omeprazole     DISPO:   - Continue critical care in ICU        _______  Emma Alexis PA-C  Cardiothoracic Surgery  [phone]

## 2021-06-13 NOTE — PROGRESS NOTES
"Pharmacy Consult: Vancomycin Dosing    Pharmacist consulted to dose vancomycin for Mika Alvarez, a 70 y.o. male.    Ordering provider: Emma Alexis PA-C    Indication for vancomycin therapy: Possible Post-operative Infection    Goal Trough Range:  15-20 mcg/mL based on indication    Other current antimicrobials              vancomycin 1,500 mg in sodium chloride 0.9% 500 mL 1,500 mg (VANCOCIN)  Every 12 hours          piperacillin-tazobactam 3.375 g in NaCl 0.9 % 50 mL (MINI-BAG Plus) (ZOSYN)  Every 8 hours          vancomycin 2,000 mg in sodium chloride 0.9% 500 mL 2,000 mg (VANCOCIN)  Once                   Subjective/Objective:    Patient was admitted for Acute on chronic clinical systolic heart failure (H) on 12/2/2020    Height: 5' 10\" (1.778 m)    Actual Body Weight (ABW): 96.2 kg (212 lb)    Ideal body weight: 73 kg (160 lb 15 oz)  Adjusted ideal body weight: 82.3 kg (181 lb 5.8 oz)    BMI: Body mass index is 30.42 kg/m .    No Known Allergies    Patient Active Problem List   Diagnosis     Obesity     ACP Staging Stage 1 Hypertension: 140-159 / 90-99     Coronary artery disease involving native heart with other form of angina pectoris, unspecified vessel or lesion type (H)     Type 2 diabetes mellitus (H)     Joint Pain, Localized In The Shoulder     Hyperlipidemia, unspecified hyperlipidemia type     Moderate episode of recurrent major depressive disorder (H)     Pulmonary edema cardiac cause (H)     New onset atrial fibrillation (H)     Acute systolic heart failure (H)     CAD (coronary artery disease)     New onset a-fib (H)     Acute on chronic clinical systolic heart failure (H)     Atrial fibrillation with rapid ventricular response (H)     Diabetic leg ulcer (H)     Acute on chronic systolic CHF (congestive heart failure) (H)     A-fib (H)     Uncontrolled type 2 diabetes mellitus with hyperglycemia (H)     Acute respiratory failure with hypoxia (H)     On mechanically assisted ventilation (H)     S/P " CABG (coronary artery bypass graft)     Seizures (H)     Cerebrovascular accident (CVA), unspecified mechanism (H)    Past Medical History:   Diagnosis Date     ACP Staging Stage 1 Hypertension: 140-159 / 90-99     Created by Conversion  Replacement Utility updated for latest IMO load     Atrial fibrillation with rapid ventricular response (H) 12/2/2020     Coronary Artery Disease     Created by Conversion  Replacement Utility updated for latest IMO load     Joint Pain, Localized In The Shoulder     Created by Conversion      Obesity     Created by Conversion      Other and unspecified hyperlipidemia 4/27/2016     Type 2 Diabetes Mellitus With Complication     Created by Conversion         Temp Readings from Current Encounter:     12/10/20 0800 12/10/20 1100 12/10/20 1200   Temp: (!) 101.7  F (38.7  C) 98.7  F (37.1  C) 99.4  F (37.4  C)     Net Intake/Output (last 24 hours):  I/O last 3 completed shifts:  In: 1190.4 [I.V.:830.4; NG/GT:160; IV Piggyback:200]  Out: 2901 [Urine:2711; Chest Tube:190]    Recent Labs     12/08/20  0359 12/09/20  0357 12/10/20  0400 12/10/20  0741 12/10/20  1011   WBC 13.0* 10.3  --  11.8* 13.1*   NEUTROABS 11.0*  --   --   --   --    BUN 18 27* 29*  --  26*   CREATININE 0.72 0.71 0.73  --  0.77     Estimated Creatinine Clearance: 106.9 mL/min (by C-G formula based on SCr of 0.77 mg/dL).    No results for input(s): CULTURE in the last 72 hours.    Results for orders placed or performed during the hospital encounter of 12/02/20   Culture, MRSA (ID Only)    Collection Time: 12/04/20  2:09 PM    Specimen: Nares; Swab   Result Value Status    Culture No MRSA isolated Final       No results for input(s): VANCOMYCIN in the last 168 hours.    Vancomycin administrations: (last 120 hours)     None          Assessment/Plan:    Pharmacist consulted to dose vancomycin for Possible Post-operative Infection, goal trough range 15-20 mcg/mL.  1. Initiate vancomycin 1500 mg IV every 12 hours (15.6 mg/kg  actual body weight) following 2000 mg IV loading dose  2. No vancomycin level available for assessment.  3. Pharmacist will plan to check a vancomycin trough level after 4th/5th dose.  4. Pharmacist will continue to follow.    Thank you for the consult.  Anthony Alvares, PharmD, BCPS 12/10/2020 3:05 PM

## 2021-06-13 NOTE — PROCEDURES
Midline Insertion Procedure Note  Pt. Name: Mika Alvarez  MRN:        [id number]      Procedure: Insertion of 5fr Dual Lumen BioFlo Midline Catheter, Lot number [id number]    Indications: vascular access, blood draws    Procedure Details   Patient identified with 2 identifiers.  Contraindications : none noted.     Maximum Barrier line insertion bundle followed: hand hygeine performed prior to procedure, site cleansed with cholraprep, hat, mask, sterile gloves,sterile gown worn, patient draped with maximum barrier head to toe drape, sterile field maintained.    The vein was assessed and found to be compressible and of adequate size. 3 ml 1% Lidocaine administered sq to the insertion site. 4fr midline catheter inserted into the brachial vein of the left arm with ultrasound guidance. One attempts required to access vein.   Catheter threaded without difficulty. Good blood return noted.    Modified Seldinger Technique used for insertion.    Catheter secured with Statlock, biopatch and Tegaderm dressing applied.    Findings:  Total catheter length  18 cm, with 0 cm exposed. Mid upper arm circumference is 33 cm. Catheter was flushed with 20 cc NS. Patient  tolerated procedure well.      Patient's primary RN notified midline is ready for use.    Comments:      A midline catheter is a form of peripheral venous access. Not recommended for the infusion of vesicants (Vancomycin, Vasopressors, TPN, etc.)    Juanpablo Faulkner RN

## 2021-06-13 NOTE — PROGRESS NOTES
"Spiritual Care Note    Spiritual Assessment:   made an in person visit with patient this morning. Patient up in his chair when  arrives; one to one is present. Patient seems flat but did make eye contact with the . When asked how he was doing patient quietly said, \"I am fine.\" Patient appears to be doing well overall and making progress. No immediate concerns noted.     Care Provided: Patient denies needs. Words of support provided.     Plan of Care: Spiritual care will continue to follow as part of patient's care team.    TOOTIE Stauffer, BCC    "

## 2021-06-13 NOTE — PROGRESS NOTES
ICU UPDATE:  Mr. Alvarez has been experiencing what appears to be focal seizure-like activity in his left lower extremity and continues to be altered.  He underwent a stat head CT earlier this evening which demonstrates punctate foci of increased attenuation within the subcortical region of the left frontal lobe and the differential diagnoses of these include petechial hemorrhaging, cortical laminar necrosis, mineralization or subarachnoid hemorrhage.  I was able to speak with Dr. Domingo of [facility] and we will proceed with a stat MRI of his brain with and without contrast with SWI sequence. He has additionally undergone an EEG but the results of this are still pending.    **Addendum: Mr. Alvarez still has his temporary pacer wires in place which are not MRI compatible. Have communicated with Dr. Madison and once this is removed (potentially 12/9) the patient will undergo an MRI. Have held subcutaneous heparin given concern for subcortical hemorrhage.     Tawana Kraft MD  Pulmonary and Critical Care  (P) [phone]

## 2021-06-13 NOTE — PROGRESS NOTES
Thank your for the referral, we will continue to follow this patient for post acute placement.    In order to make determination, we will await PT evalualtion.  Pt will need to be off his 1:1 and stable off of IV diuretics for 24 hours prior to admission to inpatient acute rehab.  I anticipate both of those medical needs to be in next few days so we will follow daily.      Left voicemail with status of referral with Mehreen Sanchez RN CM.    Determination of admission is based upon the patient's need for an intensive interdisciplinary approach to rehabilitation, their ability to progress, their ability to tolerate intensive therapies, their need for daily physician management, their need for twenty four hour rehab nursing assistance, and their ability and willingness to participate in such a program.    Alisia Marr, PHILIP  [facility]  [facility] [place]   [phone] (office)

## 2021-06-13 NOTE — PLAN OF CARE
Problem: Mechanical Ventilation  Goal: Patient will maintain patent airway  Outcome: Not Applicable this Shift     Problem: Mechanical Ventilation  Goal: ET tube will be managed safely  Outcome: Not Applicable this Shift     Dimitrios Beckman, LRT

## 2021-06-13 NOTE — PROGRESS NOTES
Pt weaned on 5/5 for approximatly 1.5 hours with no complications.  Pt was extubated to a 4 lpm oxymask.  Pt has strong cough.  Will draw abg in 1 hour

## 2021-06-13 NOTE — PROGRESS NOTES
Hospitalist Progress Note    Assessment/Plan  70-year-old male with past medical history of multivessel CAD, chronic systolic and diastolic CHF, recent diagnosis of A. fib, DM 2 who presented with acute CHF, was diuresed and subsequently underwent four-vessel CABG by Dr. Madison on 12/7/2020.  Due to concerns about possible seizures CT of the head was obtained 12/8 and showed punctate focus of increased attenuation within the subcortical region of the left frontal lobe.  Subsequent MRI of the brain 12/9 demonstrated subacute 2.5 cm subacute infarct with petechial hemorrhage in the left frontal lobe.   EEG did not show any seizure activity however Keppra was started 12/10 with resolution of rhythmic left leg movements.  Patient was extubated 12/10 and is weaned down to low flow O2.  He remains encephalopathic - seems less responsive this morning than yesterday afternoon without any clear focal neurologic deficits.  Started on broad spectrum antibiotics 12/10 due to new fever and worsening leukocytosis, 1/2 blood Cx shows GPC in clusters, respiratory miryam with 4+ beta hemolytic Strep.  VFSS with mild oral and severe pharyngeal dysphagia - NG tube ordered.    Noted worsening hypernatremia, rising BUN, tachycardia - suspect from overdiuresis     1.  Multivessel CAD, status post CABG and PVI/LAAL on 12/7/2020.   On statin, aspirin, metoprolol   2.  Acute respiratory failure with hypoxia.  Extubated 12/10.  Sputum Cx with respiratory miryam with 4+ beta hemolytic Strep - continue broad spectrum antibiotics.   Encourage IS.  Supplemental O2 to keep O2 sats >94%  3.  Severe pharyngeal dysphagia.  NPO, NGT placement ordered for nutrition and free water   4.  Worsening hypernatremia. Free water deficit 3.6 L.  Start D5 at 100 ml/hr and switch to free water flushes once feeding tube is in.   5.  Acute encephalopathy.  Seems to be more altered today without any clear neurologic deficits. Would obtain head CT given recent  stroke with petechial hemorrhage. Hypernatremia and Keppra might be contributing.    6.  AFib RVR.  Suspect due to overdiuresis (high Na, rising BUN, Wt is 31 lb down since admission, poor urine output).  Consider fluid bolus but defer to CV surgery    7.  Subacute left frontal lobe stroke with petechial hemorrhage.  Likely related to the surgery. Patient is on statin and aspirin.   8.  Fever, leukocytosis.  Respiratory cultures pending. UA was negative.  One of two blood cultures grow GPC in clusters.  On IV Zosyn/Vanco, follow up final Cx results   9.  DM 2 with hyperglycemia.  Anticipate worsening hyperglycemia with D5, adjust insulin as need       Barriers to Discharge : Postop recovery   Anticipated Discharge : Multiple day stay   Disposition : TBD        Subjective  Afebrile  Looks more lethargic this morning   Denies any pain or shortness of breath    Objective    Vital signs in last 24 hours  Temp:  [97.5  F (36.4  C)-99.7  F (37.6  C)] 99.7  F (37.6  C)  Heart Rate:  [100-138] 138  Resp:  [18-20] 20  BP: (107-195)/() 131/62 96% O2 Device: Nasal cannula O2 Flow Rate (L/min): 2 L/min  Weight:   206 lb 14.4 oz (93.8 kg) Weight change: -3 lb 6.4 oz (-1.542 kg)    Intake/Output last 3 shifts  I/O last 3 completed shifts:  In: 2118.7 [I.V.:163.7; IV Piggyback:1955]  Out: 1908 [Urine:1338; Chest Tube:570]  Intake/Output this shift:  No intake/output data recorded.    Physical Exam:  GENERAL: No acute distress, sitting in bed   CARDIAC: Irregular, tachycardia. S1 & S2 normal.  No gallops or murmurs. No edema  LUNGS: Coarse breath sounds.   Chest tube noted  ABDOMEN: Soft, non-tender  SKIN: Multiple bruises and skin excoriations   NEURO:  Awake but slow to answer.  CN 2-12: no facial asymmetry. PERRL.  Motor strength: 5/5 all extremities       Pertinent Labs   Lab Results: personally reviewed.   Results from last 7 days   Lab Units 12/12/20  0413 12/11/20  1207 12/11/20  0415 12/10/20  1011 12/10/20  0400    LN-SODIUM mmol/L 149*  --  148* 144 141   LN-POTASSIUM mmol/L 4.2 3.7 3.2* 3.5 3.6  3.6   LN-CHLORIDE mmol/L 116*  --  114* 108* 108*   LN-CO2 mmol/L 23  --  24 27 23   LN-BLOOD UREA NITROGEN mg/dL 29*  --  26* 26* 29*   LN-CREATININE mg/dL 0.92  --  0.76 0.77 0.73   LN-CALCIUM mg/dL 8.1*  --  7.5* 8.4* 8.3*   LN-ALBUMIN g/dL  --   --   --   --  2.0*   LN-PROTEIN TOTAL g/dL  --   --   --   --  5.3*   LN-BILIRUBIN TOTAL mg/dL  --   --   --   --  0.9   LN-ALKALINE PHOSPHATASE U/L  --   --   --   --  63   LN-ALT (SGPT) U/L  --   --   --   --  <9   LN-AST (SGOT) U/L  --   --   --   --  17   LN-MAGNESIUM mg/dL 2.2  --  1.9  --  2.0     Results from last 7 days   Lab Units 12/12/20  0413 12/11/20  0415 12/10/20  1011 12/08/20  0359 12/08/20  0359 12/07/20  1319   LN-WHITE BLOOD CELL COUNT thou/uL 14.3* 10.7 13.1*   < > 13.0* 23.1*   LN-HEMOGLOBIN g/dL 11.7* 11.1* 11.6*   < > 10.4* 13.2*   LN-HEMATOCRIT % 37.9* 36.4* 36.9*   < > 33.6* 41.2   LN-PLATELET COUNT thou/uL 403 260 256   < > 189 247   LN-NEUTROPHILS RELATIVE PERCENT %  --   --   --   --  85* 84*   LN-MONOCYTES RELATIVE PERCENT %  --   --   --   --  9 6    < > = values in this interval not displayed.           Medications  Scheduled Meds:    acetaminophen  650 mg Oral Q6H    Or     acetaminophen  650 mg Rectal Q6H     acetylcysteine (MUCOMYST) 20% inhalation solution  4 mL Inhalation Q8H - RT     albuterol  2.5 mg Nebulization Q8H - RT     amiodarone  200 mg Oral BID     aspirin  81 mg Enteral Tube DAILY     atorvastatin  80 mg Enteral Tube QHS     bisacodyL  10 mg Oral Once     chlorhexidine  15 mL Topical Q12H 09-21     docusate sodium (COLACE) 50 mg/5 mL oral liquid  100 mg Enteral Tube BID     famotidine (PEPCID) injection  20 mg Intravenous BID    Or     famotidine  20 mg Oral BID     heparin (PF) ANTICOAGULANT  5,000 Units Subcutaneous Q8H FIXED TIMES     insulin aspart (NovoLOG) injection   Subcutaneous Q4H FIXED TIMES     insulin glargine  10 Units  Subcutaneous BID     levETIRAcetam (KEPPRA) IVPB  500 mg Intravenous Q12H     magnesium hydroxide  30 mL Oral DAILY     melatonin  3 mg Enteral Tube QHS     metoprolol tartrate  12.5 mg Oral 0330, 1300, 1700 - CV Metoprolol     metoprolol tartrate  50 mg Oral BID     piperacillin-tazobactam  3.375 g Intravenous Q8H     polyethylene glycol  17 g Enteral Tube DAILY     sodium chloride  10 mL Intravenous Q8H FIXED TIMES     sodium chloride  3 mL Intravenous Q8H FIXED TIMES     vancomycin  1,500 mg Intravenous Q12H     Continuous Infusions:    dextrose 5% 50 mL/hr (12/12/20 0826)     nitroprusside       PRN Meds:.acetaminophen, acetaminophen, aluminum-magnesium hydroxide-simethicone, bisacodyL, bisacodyL, dextrose 50 % (D50W), glucagon (human recombinant), hydrALAZINE, ketorolac, lidocaine (PF), magnesium hydroxide, metoprolol tartrate, QUEtiapine, sodium chloride, sodium chloride bacteriostatic, sodium chloride, sodium chloride, sodium chloride, sodium phosphates 133 mL, traMADoL, traZODone    Pertinent Radiology     No new pertinent data is available        Advanced Care Planning:  Treatment/Discharge Planning discussed with patient, CV surgery and ICU RN    Hayley Leung MD  Internal Medicine Hospitalist  12/12/2020

## 2021-06-13 NOTE — PROGRESS NOTES
"Speech Language/Pathology  Speech Therapy Daily Progress Note    Patient presents as lethargic but cooperative during this session.   An  was not applicable.    Objective  Pharyngeal Exercises:  To improve safety of swallow function as relates to oral diet advancement.    CTAR (Chin Tuck Against Resistance): Patient completed 3 sets of the isometric CTAR (i.e., 10-second hold) and 3 sets of the isokinetic CTAR (i.e., 1-second hold; 10 repetitions) with moderate cueing needed for increased effort against resistance ball.    Effortful swallow: Patient perseverated on the CTAR for this exercise. He required mod-max cueing to complete a \"hard\" swallow (as opposed to tipping his chin down). Patient completed x25 repetitions with an ice chip given every 4-5 swallows. Cough x1 near end of session.    Assessment  Patient partially recalled the CTAR exercise, but not the Effortful swallow. He was willing to complete pharyngeal exercises, but his participation was somewhat limited by fatigue. Patient stated, \"I just can't keep my eyes open.\"    Plan/Recommendations  Continue pharyngeal exercises    The ST Care Plan has been reviewed and current plan remains appropriate and continue ST at discharge.    16 dysphagia minutes     Samantha Hector MA, CCC-SLP      "

## 2021-06-13 NOTE — PROGRESS NOTES
Speech Language/Pathology  Speech Therapy Daily Progress Note    Patient presents as initially lethargic, but became more alert as session progressed. He was slow to respond to questions and was nonverbal throughout session.  An  was not applicable.    Objective  Pharyngeal Exercises  Reviewed the Effortful swallow with patient and demonstrated this for him several times throughout session. Patient completed 20 repetitions with an ice chip given each time for comfort, as well as to help stimulate a swallow response. Mod cueing needed for increased effort. Subjectively speaking, hyolaryngeal elevation was markedly reduced on palpation, but seemed to improve after approximately 7-8 repetitions. No coughing noted with ice chips today, but occasional throat clearing observed.    Assessment  Patient was lethargic upon my arrival but demonstrated good participation with Effortful swallow exercise. Ice chips are a motivating factor. Swallow remains quite weak overall.    Plan/Recommendations  OK to resume ice chips to promote oral hygiene and maintain function of the swallowing mechanism. Patient must be alert and sitting fully upright for ice. Direct 1:1 nursing supervision required. Limit ice to 1-2 chips per spoonful. Speech Therapy will continue to follow for dysphagia management. Will attempt to see patient two times a day as schedule permits. Anticipate repeat Video Swallow Study in 4-6 days or with notable improvement in physical strength and medical status.    The ST Care Plan has been reviewed and current plan remains appropriate and continue ST at discharge.    18 dysphagia minutes     Samantha Hector MA, CCC-SLP

## 2021-06-13 NOTE — PROGRESS NOTES
NEUROLOGY PROGRESS NOTE     Mika Alvarez,  1950, MRN [id number] Date: 2020      [facility]   Code status:  Full Code   PCP: Angel Claire MD, [phone]      ASSESSMENT & PLAN   Hospital Day#: 7  Diagnosis code: Convulsive activity-rule out seizure    Convulsive activity-rule out seizure  Stroke      Head CT shows punctate focus in the left frontal lobe    MRI brain shows 2.5 cm subacute infarct in the left frontal lobe associated with petechial hemorrhage    This lesion would not fit with patient left leg jerking activity.    Most likely related to cardiac surgery    Carotid ultrasound does not show any significant stenosis.    Recent echocardiogram showed decrease ejection fraction 38%    Lipid panel and statin    On aspirin.  Subcu heparin can be used.    Blood pressure can be normalized    EEG negative for ongoing seizures    Consider repeat EEG once patient is awake if he continues to have the jerking episodes.    Hold off on antiepileptic medication for right now unless imaging study shows significant abnormality.    Seizure precautions    Electrolyte panel noncontributory though calcium was 8.0    WBC 13.0    Thank you again for this referral, please feel free to contact me if you have any questions.     CHIEF COMPLAINT Acute on chronic clinical systolic heart failure (H)     HISTORY OF PRESENT ILLNESS     We have been requested by Dr. Littlejohn to evaluate Mika Alvarez who is a 70 y.o.  male for seizures.    This 70-year-old male on whom neurology been consulted evaluate for seizures.  Patient was admitted for dyspnea from acute on chronic systolic heart failure.  Patient was known to have multivessel coronary disease.  Patient was noncompliant with diet and medication.  Patient needed a CABG yesterday.  This morning there was some rhythmic left leg jerking activity.  Patient was given Ativan which did help resolve this activity.  The jerking lasted for a few minutes.  It did  not spread to other extremities.  There was no loss of consciousness.  With the Ativan he did become somnolent.  He was concerned that this might be a seizure.  As result neurology was consulted.  Patient has not had any previous seizures that is known.  No family history of seizures.  No recent head injuries.  No drug use.  EEG was done today.  Patient remains intubated and off sedation for right now.    12/9  Patient remains intubated.  He is on sedation because he gets agitated.  Every time he is awake there is some jerking of the left leg.  EEG was negative for ongoing seizures.  Plan to possibly extubate him later today and then reexamine him.  Head CT and MRI did show a subacute infarct with petechial hemorrhage in the left frontal lobe.     PAST MEDICAL & SURGICAL HISTORY     Medical History  Patient Active Problem List    Diagnosis Date Noted     Seizures (H)      Acute respiratory failure with hypoxia (H)      On mechanically assisted ventilation (H)      S/P CABG (coronary artery bypass graft)      Uncontrolled type 2 diabetes mellitus with hyperglycemia (H)      Acute on chronic clinical systolic heart failure (H) 12/02/2020     Atrial fibrillation with rapid ventricular response (H) 12/02/2020     Diabetic leg ulcer (H) 12/02/2020     Acute on chronic systolic CHF (congestive heart failure) (H) 12/02/2020     A-fib (H) 12/02/2020     Acute systolic heart failure (H)      New onset atrial fibrillation (H)      Pulmonary edema cardiac cause (H) 09/19/2020     CAD (coronary artery disease) 09/19/2020     New onset a-fib (H) 09/19/2020     Moderate episode of recurrent major depressive disorder (H) 06/21/2019     Hyperlipidemia, unspecified hyperlipidemia type 04/27/2016     Joint Pain, Localized In The Shoulder      Obesity      ACP Staging Stage 1 Hypertension: 140-159 / 90-99      Coronary artery disease involving native heart with other form of angina pectoris, unspecified vessel or lesion type (H)       Type 2 diabetes mellitus (H)      Past Medical History: Patient  has a past medical history of ACP Staging Stage 1 Hypertension: 140-159 / 90-99, Atrial fibrillation with rapid ventricular response (H) (12/2/2020), Coronary Artery Disease, Joint Pain, Localized In The Shoulder, Obesity, Other and unspecified hyperlipidemia (4/27/2016), and Type 2 Diabetes Mellitus With Complication.  Surgical History  He  has a past surgical history that includes Coronary Angiogram (N/A, 9/22/2020) and Left Heart Catheterization with Left Ventriculogram (N/A, 9/22/2020).     SOCIAL HISTORY     Reviewed, and he  reports that he has never smoked. He has never used smokeless tobacco. He reports current alcohol use. He reports that he does not use drugs.     FAMILY HISTORY     Reviewed, and family history includes Heart disease in his father.     ALLERGIES     No Known Allergies     REVIEW OF SYSTEMS     Unable to do review of systems due to altered mental status.     HOME & HOSPITAL MEDICATIONS     Prior to Admission Medications  Medications Prior to Admission   Medication Sig Dispense Refill Last Dose     glipiZIDE (GLUCOTROL XL) 10 MG 24 hr tablet Take 2 tablets (20 mg total) by mouth once daily. (Patient taking differently: Take 10 mg by mouth once daily. ) 180 tablet 1 Unknown at Unknown time     insulin glargine (LANTUS SOLOSTAR U-100 INSULIN) 100 unit/mL (3 mL) pen Inject 35 Units under the skin at bedtime. 15 mL 0 Unknown at Unknown time     losartan (COZAAR) 100 MG tablet Take 1 tablet (100 mg total) by mouth daily. 90 tablet 2 Unknown at Unknown time     metFORMIN (GLUCOPHAGE) 1000 MG tablet Take 1 tablet (1,000 mg total) by mouth 2 (two) times a day with meals. (Patient taking differently: Take 1,000 mg by mouth daily with breakfast. ) 180 tablet 0 Unknown at Unknown time     metoprolol succinate (TOPROL-XL) 100 MG 24 hr tablet Take 1 tablet (100 mg total) by mouth 2 (two) times a day. (Patient taking differently: Take 100 mg  "by mouth daily. ) 90 tablet 1 Unknown at Unknown time     nitroglycerin (NITROSTAT) 0.4 MG SL tablet Place 0.4 mg under the tongue every 5 (five) minutes as needed for chest pain.   Unknown at Unknown time     amLODIPine (NORVASC) 10 MG tablet TAKE 1 TABLET BY MOUTH ONCE DAILY. OVERDUE FOR DIABETIC CHECK 90 tablet 3      aspirin 81 MG EC tablet Take 81 mg by mouth daily.   Unknown at Unknown time     atorvastatin (LIPITOR) 80 MG tablet Take 1 tablet (80 mg total) by mouth daily. 30 tablet 0 Unknown at Unknown time     blood glucose test strips Use 1 each As Directed 2 (two) times a day. Accu-Chek Guide test strips. Patient is on insulin. 200 strip 3      blood-glucose meter (ACCU-CHEK ADVANTAGE DIABETES) kit Test 4 times daily.        bumetanide (BUMEX) 0.5 MG tablet Take 2 tablets (1 mg total) by mouth daily. 60 tablet 0 Unknown at Unknown time     generic lancets Use 1 each As Directed 2 (two) times a day. Accu-Chek Fastclix Lancets. Patient is insulin dependant. 200 each 3      insulin lispro (HUMALOG KWIKPEN INSULIN) 100 unit/mL pen Inject 5 Units under the skin 3 (three) times a day with meals. 15 mL 0      NEEDLES, INSULIN DISPOSABLE (BD ULTRA-FINE ROBERT PEN NEEDLES MISC) use up to 4 times daily as directed.        pen needle, diabetic (BD ULTRA-FINE SHORT PEN NEEDLE) 31 gauge x 5/16\" Ndle USE UP TO 4 TIMES DAILY AS DIRECTED 300 each 0      rivaroxaban ANTICOAGULANT (XARELTO) 20 mg tablet Take 1 tablet (20 mg total) by mouth daily with supper. 30 tablet 0 Unknown at Unknown time       Hospital Medications    acetaminophen  650 mg Oral Q6H    Or     acetaminophen  650 mg Rectal Q6H     aspirin  81 mg Enteral Tube DAILY     atorvastatin  80 mg Enteral Tube QHS     bisacodyL  10 mg Oral Once     [START ON 12/10/2020] bisacodyL  10 mg Rectal Once     chlorhexidine  15 mL Topical Q12H 09-21     docusate sodium (COLACE) 50 mg/5 mL oral liquid  100 mg Enteral Tube BID     famotidine (PEPCID) injection  20 mg " Intravenous BID    Or     famotidine  20 mg Oral BID     [START ON 12/10/2020] furosemide  40 mg Intravenous Once     [Held by provider] heparin (PF) ANTICOAGULANT  5,000 Units Subcutaneous Q8H FIXED TIMES     insulin aspart (NovoLOG) injection   Subcutaneous Q4H FIXED TIMES     LORazepam  2 mg Intravenous Once     magnesium hydroxide  30 mL Oral DAILY     magnesium sulfate IVPB  2 g Intravenous Once     melatonin  3 mg Enteral Tube QHS     metoprolol tartrate  12.5 mg Oral BID     polyethylene glycol  17 g Enteral Tube DAILY     sodium chloride  10 mL Intravenous Q8H FIXED TIMES     sodium chloride  3 mL Intravenous Q8H FIXED TIMES        PHYSICAL EXAM     Vital signs  Temp:  [99.5  F (37.5  C)-100.6  F (38.1  C)] 100.6  F (38.1  C)  Heart Rate:  [] 102  Resp:  [14-26] 18  BP: (105-148)/(50-75) 115/50  Arterial Line BP: (105-175)/(51-78) 119/57  FiO2 (%):  [30 %-40 %] 40 %    Weight:   217 lb 2.5 oz (98.5 kg)  Patient was given some sedation for the exam for possible seizures.  Vitals-Reviewed in chart  GENERAL -Health appearing, No apparent distress  EYES- No scleral icterus, no eyelid droop, Pupils - see Neuro section  HEENT - Normocephalic, atraumatic, minimally responsive.  Unable to check hearing.; Oral mucosa moist and pink in color. External Ears and nose intact.   Neck - supple with no obvious lymphadenopathy or thyromegaly  PULM -intubated.  CV- Pulses present with no peripheral edema/ No significant varicosities.  MSK- Gait - see Neuro section; Strength and tone- see Neuro section; Range of motion grossly intact.  Psych-cooperative and not agitated.    Neurological  Mental status - Patient is somnolent.  Does not follow commands.  Does not open eyes minimally.  No speech.  Cranial nerves - Pupils are reactive and symmetric; EOMI with oculocephalics, VFIT partially, NLF symmetric  Motor -no spontaneous movement.  Tone - Tone is symmetric bilaterally in upper and lower extremities.  Reflexes  -bilateral toes mute.  Sensation -minimal movement to pain in all 4 extremities.  Coordination - Finger to nose and heel to shin-does not follow commands  Gait and station -unable to ambulate due to altered mental status.  Formal gait testing cannot be done because of safety concerns from ongoing medical issues.     DIAGNOSTIC STUDIES     Pertinent Radiology   Carotid US  IMPRESSION:   1.  Mild plaque formation, velocities consistent with less than 50% stenosis in the right internal carotid artery.  2.  Mild plaque formation, velocities consistent with less than 50% stenosis in the left internal carotid artery.  3.  Flow within the vertebral arteries is antegrade.    CT  IMPRESSION:   1.  Punctate focus of increased attenuation within the subcortical region of the left frontal lobe. This may represent a focus of petechial hemorrhage, cortical laminar necrosis, mineralization or subarachnoid hemorrhage. This is best demonstrated on   axial image #23, series 2 and sagittal image #43, series 5.2. Comparison with prior studies would be helpful if available.  2.  Otherwise recommend brain MRI for further evaluation. Additionally follow-up CT is could be performed to ensure stability or resolution.    MRI  IMPRESSION:   The abnormality identified on head CT corresponds to a 2.5 cm subacute infarct in the left frontal lobe associated with petechial hemorrhage. No significant mass effect.    Recent Results (from the past 24 hour(s))   Blood Gases, Arterial    Collection Time: 12/08/20 11:55 PM   Result Value Ref Range    pH, Arterial 7.47 (H) 7.37 - 7.44    pCO2, Arterial 38 35 - 45 mm Hg    pO2, Arterial 72 (L) 75 - 85 mm Hg    Bicarbonate, Arterial Calc 27.6 23.0 - 29.0 mmol/L    O2 Sat, Arterial 94.8 (L) 95.0 - 96.0 %    Oxyhemoglobin 93.3 (L) 95.0 - 96.0 %    Base Excess, Arterial Calc 3.8 mmol/L    Ventilation Mode VCV     Rate 16 rr/min    FIO2 0.30     Peep 5 cm H2O    Sample Stabilized Temperature 37.0 degrees C     Ventilator Tidal Volume 500 mL   Crossmatch    Collection Time: 12/09/20 12:04 AM   Result Value Ref Range    Crossmatch Compatible     Unit Type O Pos     Unit Number [id number]     Status Released     Component Red Blood Cells     PRODUCT CODE N8242G64     Blood Type 5100     CODING SYSTEM [id number]    Crossmatch    Collection Time: 12/09/20 12:04 AM   Result Value Ref Range    Crossmatch Compatible     Unit Type O Pos     Unit Number [id number]     Status Released     Component Red Blood Cells     PRODUCT CODE [id number]     Blood Type 5100     CODING SYSTEM [id number]    Magnesium    Collection Time: 12/09/20  3:57 AM   Result Value Ref Range    Magnesium 1.9 1.8 - 2.6 mg/dL   HM2(CBC W/O DIFF)    Collection Time: 12/09/20  3:57 AM   Result Value Ref Range    WBC 10.3 4.0 - 11.0 thou/uL    RBC 3.81 (L) 4.40 - 6.20 mill/uL    Hemoglobin 10.0 (L) 14.0 - 18.0 g/dL    Hematocrit 33.1 (L) 40.0 - 54.0 %    MCV 87 80 - 100 fL    MCH 26.2 (L) 27.0 - 34.0 pg    MCHC 30.2 (L) 32.0 - 36.0 g/dL    RDW 14.6 (H) 11.0 - 14.5 %    Platelets 154 140 - 440 thou/uL    MPV 10.3 8.5 - 12.5 fL   Basic Metabolic Panel    Collection Time: 12/09/20  3:57 AM   Result Value Ref Range    Sodium 141 136 - 145 mmol/L    Potassium 4.2 3.5 - 5.0 mmol/L    Chloride 110 (H) 98 - 107 mmol/L    CO2 26 22 - 31 mmol/L    Anion Gap, Calculation 5 5 - 18 mmol/L    Glucose 208 (H) 70 - 125 mg/dL    Calcium 8.3 (L) 8.5 - 10.5 mg/dL    BUN 27 (H) 8 - 22 mg/dL    Creatinine 0.71 0.70 - 1.30 mg/dL    GFR MDRD Af Amer >60 >60 mL/min/1.73m2    GFR MDRD Non Af Amer >60 >60 mL/min/1.73m2   Blood Gases, Venous    Collection Time: 12/09/20  3:57 AM   Result Value Ref Range    pH, Venous 7.43 7.35 - 7.45    pCO2, Venous 42 35 - 50 mm Hg    pO2, Jean-Pierre 32 25 - 47 mm Hg    Base Excess, Venous 3.6 mmol/L    HCO3, Venous 26.7 24.0 - 30.0 mmol/L    Oxyhemoglobin 59.1 (L) 70.0 - 75.0 %    O2 Sat, Venous 60.4 (L) 70.0 - 75.0 %   POCT Glucose    Collection Time: 12/09/20   6:03 AM    Specimen: Blood   Result Value Ref Range    Glucose 214 (H) 70 - 139 mg/dL   POCT Glucose    Collection Time: 12/09/20  7:02 AM    Specimen: Blood   Result Value Ref Range    Glucose 215 (H) 70 - 139 mg/dL   Blood Gases, Arterial    Collection Time: 12/09/20  7:24 AM   Result Value Ref Range    pH, Arterial 7.46 (H) 7.37 - 7.44    pCO2, Arterial 37 35 - 45 mm Hg    pO2, Arterial 63 (L) 75 - 85 mm Hg    Bicarbonate, Arterial Calc 26.3 23.0 - 29.0 mmol/L    O2 Sat, Arterial 93.3 (L) 95.0 - 96.0 %    Oxyhemoglobin 91.1 (L) 95.0 - 96.0 %    Base Excess, Arterial Calc 2.2 mmol/L    Ventilation Mode VCV     Rate 16 rr/min    FIO2 0.30     Peep 5 cm H2O    Sample Stabilized Temperature 37.0 degrees C    Ventilator Tidal Volume 500 mL   POCT Glucose    Collection Time: 12/09/20  8:19 AM    Specimen: Artery; Blood   Result Value Ref Range    Glucose 178 (H) 70 - 139 mg/dL   POCT Glucose    Collection Time: 12/09/20  9:10 AM    Specimen: Artery; Blood   Result Value Ref Range    Glucose 176 (H) 70 - 139 mg/dL   POCT Glucose    Collection Time: 12/09/20 10:18 AM    Specimen: Blood   Result Value Ref Range    Glucose 152 (H) 70 - 139 mg/dL   POCT Glucose    Collection Time: 12/09/20 11:52 AM    Specimen: Blood   Result Value Ref Range    Glucose 154 (H) 70 - 139 mg/dL   POCT Glucose    Collection Time: 12/09/20  1:39 PM    Specimen: Artery; Blood   Result Value Ref Range    Glucose 175 (H) 70 - 139 mg/dL   POCT Glucose    Collection Time: 12/09/20  4:29 PM    Specimen: Blood   Result Value Ref Range    Glucose 167 (H) 70 - 139 mg/dL       Total time spent for face to face visit, reviewing chart/labs/imaging studies, counseling and coordination of care was: Over 35 min More than 50% of this time was spent on counseling and coordination of care.    Reviewing chart.  Coordination of care with the nursing staff.  Discussion about extubation with the ICU staff.    Tima Francis MD  Neurologist  [facility]  Neurology [facility]  Tel:- [phone]    This note was dictated using voice recognition software.  Any grammatical or context distortions are unintentional and inherent to the software.

## 2021-06-13 NOTE — PROGRESS NOTES
Hospitalist Progress Note    Assessment/Plan    A: Patient is a 71 y/o man who has multiple medical problems including diabetes mellitus type II, chronic systolic heart failure and coronary artery disease. Patient presented with dyspnea which appears to be from acute on chronic systolic heart failure. Echocardiogram showed LVEF 30-35%. Patient also has known multi-vessel coronary artery disease that was found on coronary angiogram on 21-Sep-2020. From review, patient had been advised to f/u for CABG in the past but had not yet done so. Diabetes mellitus type II is also uncontrolled.     From chart review, patient had not been compliant with diet or medication in the past. Patient was advised to present to hospital on 05-Nov-2020 for acute heart failure but did not do so at that point. Patient had stated that he would present to the hospital the following week but it does not appear that he actually did so.    P:  1.) Acute on chronic systolic heart failure: Patient on lasix drip at present. Monitoring response.  2.) Multi-vessel coronary artery disease: Patient currently on heparin drip. Tentatively to have CABG this coming week.  3.) Diabetes mellitus type II, uncontrolled, with hyperglycemia: Diabetic educator to see. Stopping oral hypoglycemics. Will increase lantus to 35 units every evening. Will continue 5 units of novolog with each meal. Will continue insulin sliding scale with each meal but not at bedtime. Will monitor response and adjust medication as needed. Discussed need for better glycemic control given patient's multi-vessel coronary artery disease.  4.) Atrial fibrillation: Patient on diltiazem.   5.) Hypertension: Patient on metoprolol and losartan.   6.) Hyperlipidemia: Patient on atorvastatin.  7.) Bilateral leg wounds: Receiving wound care.       Principal Problem:    Acute on chronic clinical systolic heart failure (H)  Active Problems:    ACP Staging Stage 1 Hypertension: 140-159 / 90-99    Type  2 diabetes mellitus (H)    Hyperlipidemia, unspecified hyperlipidemia type    CAD (coronary artery disease)    Atrial fibrillation with rapid ventricular response (H)    Diabetic leg ulcer (H)    Acute on chronic systolic CHF (congestive heart failure) (H)    A-fib (H)      Barriers to Discharge:  On lasix and heparin drips      Subjective    Patient notes feeling well, notes no pain, no dyspnea and no new problems. Patient acknowledges not monitoring blood glucose at home and cites discomfort with fingersticks as the cause.     Objective    Vital signs in last 24 hours  Temp:  [97.4  F (36.3  C)-98.6  F (37  C)] 98.6  F (37  C)  Heart Rate:  [67-75] 75  Resp:  [17-20] 18  BP: ()/(61-81) 126/79 93% O2 Device: None (Room air) O2 Flow Rate (L/min): 2 L/min  Weight:   (!) 226 lb (102.5 kg) Weight change: -14 lb 9.6 oz (-6.623 kg)    Intake/Output last 3 shifts  I/O last 3 completed shifts:  In: 700 [P.O.:700]  Out: -   Body mass index is 32.43 kg/m .    Physical Exam    General:     Patient comfortable, NAD.   HEENT:     No scleral icterus or conjunctival injection.   Heart:    RRR, S1 S2 w/o murmurs.   Lungs:     Breath sounds present but diminished, especially in lower     lung fields. Crackles present in lower lung fields.   Abdomen:   Soft, nontender.      Pertinent Labs   Lab Results: personally reviewed.   Results from last 7 days   Lab Units 12/03/20  0704 12/02/20  1416   LN-SODIUM mmol/L 139 139   LN-POTASSIUM mmol/L 3.7 4.0   LN-CHLORIDE mmol/L 105 104   LN-CO2 mmol/L 27 25   LN-BLOOD UREA NITROGEN mg/dL 23* 17   LN-CREATININE mg/dL 0.75 0.91   LN-CALCIUM mg/dL 8.7 9.1   LN-ALBUMIN g/dL  --  3.2*   LN-PROTEIN TOTAL g/dL  --  7.4   LN-BILIRUBIN TOTAL mg/dL  --  0.8   LN-ALKALINE PHOSPHATASE U/L  --  169*   LN-ALT (SGPT) U/L  --  9   LN-AST (SGOT) U/L  --  23   LN-MAGNESIUM mg/dL 1.8  --      Results from last 7 days   Lab Units 12/02/20  1418   LN-WHITE BLOOD CELL COUNT thou/uL 9.4   LN-HEMOGLOBIN g/dL  14.7   LN-HEMATOCRIT % 47.7   LN-PLATELET COUNT thou/uL 309     Results from last 7 days   Lab Units 12/03/20  0704 12/03/20  0106 12/02/20  1815   LN-TROPONIN I ng/mL 1.60* 1.88* 1.63*     Results from last 7 days   Lab Units 12/04/20  1113 12/04/20  0749 12/03/20  2031 12/03/20  1832 12/03/20  1210 12/03/20  0802 12/02/20  2357 12/02/20  2051 12/02/20  1724   LN-POC GLUCOSE FINGERSTICK- HE mg/dL 227* 258* 217* 131 94 96 302* 378* 288*       Medications  Scheduled Meds:    aspirin  162 mg Oral DAILY     atorvastatin  80 mg Oral QHS     diltiazem  120 mg Oral DAILY     insulin aspart (NovoLOG) injection   Subcutaneous TID with meals     insulin aspart (NovoLOG) injection   Subcutaneous TID with meals     insulin glargine  30 Units Subcutaneous QHS     losartan  100 mg Oral DAILY     metoprolol succinate  100 mg Oral BID     sodium chloride  2.5 mL Intravenous Line Care     Continuous Infusions:    furosemide 10 mg/hr (12/04/20 0934)     heparin 1,650 Units/hr (12/03/20 2318)     PRN Meds:.acetaminophen, acetaminophen, bisacodyL, dextrose 50 % (D50W), glucagon (human recombinant), hydrALAZINE, magnesium hydroxide, naloxone **OR** naloxone **OR** naloxone **OR** naloxone, nitroglycerin, ondansetron **OR** ondansetron, polyethylene glycol, traMADoL

## 2021-06-13 NOTE — PROGRESS NOTES
Speech Language/Pathology  Speech Therapy Daily Progress Note    Patient presents as alert and cooperative during this session. He was sitting up in the chair upon my arrival. Flat affect noted.   An  was not applicable.    Objective  Pharyngeal Exercises:  To improve safety of swallow function as relates to oral diet advancement.     Effortful swallow: Patient did not independently recall exercise, but demonstrated good execution following initial review. Patient completed x40 repetitions with an ice chip given every 4-5 swallows. No coughing, throat clearing, or change in vocal quality noted with ice chips.    Assessment  Good participation this PM. No overt clinical s/s of aspiration observed with ice chips. Subjectively speaking, patient's swallow appears to be getting stronger. However, will need repeat Video Swallow Study to determine whether there has been any functional improvement compared to previous studies (12/12/20 and 12/15/20).    Plan/Recommendations  Repeat Video Swallow Study    The ST Care Plan has been reviewed and current plan remains appropriate and continue ST at discharge.    15 dysphagia minutes     Samantha Hector MA, CCC-SLP

## 2021-06-13 NOTE — PROGRESS NOTES
Hospitalist Progress Note    Assessment/Plan  70-year-old male with past medical history of multivessel CAD, chronic systolic and diastolic CHF, recent diagnosis of A. fib, DM 2 who presented with acute CHF, was diuresed and subsequently underwent four-vessel CABG by Dr. Madison on 12/7/2020.  Due to concerns about possible seizures CT of the head was obtained 12/8 and showed punctate focus of increased attenuation within the subcortical region of the left frontal lobe.  Subsequent MRI of the brain 12/9 demonstrated subacute 2.5 cm subacute infarct with petechial hemorrhage in the left frontal lobe.   EEG did not show any seizure activity and neurology does not recommend AED at this point.  Patient has been extubated this morning.  Given new fever and worsening leukocytosis started on broad spectrum antibiotics.      1.  Multivessel CAD, status post CABG and PVI/LAAL on 12/7/2020.  Management per CV surgery and cardiology.  On statin, aspirin, metoprolol   2.  Acute respiratory failure with hypoxia.  Extubated 12/10   3.  Subacute left frontal lobe stroke with petechial hemorrhage.    4.  Acute metabolic  encephalopathy.  Likely multifactorial due to CVA, infection.  No clear evidence of seizures per Neurology.  Ammonia wnl  5.  Fever, leukocytosis.  Blood and respiratory cultures pending. UA was negative. Started IV Zosyn/Vanco   6.  Persistent AFib.  S/p PVI and LAAL.  On IV amiodarone. No plan for anticoagulation   7.  DM 2.  Monitor with insulin sliding scale        Barriers to Discharge : Postop recovery  Anticipated Discharge : Multiple day stay   Disposition : TBD        Subjective  Unable to obtain review of systems due to encephalopathy  Patient has been extubated this morning. Per nursing staff he remains encephalopathic, follows commands inconsistently  High fever 101.7 this morning  A lot of respiratory secretions      Objective    Vital signs in last 24 hours  Temp:  [98.7  F (37.1  C)-101.7  F (38.7   C)] 99.4  F (37.4  C)  Heart Rate:  [] 124  Resp:  [14-26] 24  BP: ()/(54-88) 149/88  Arterial Line BP: ()/() 151/107  FiO2 (%):  [30 %-40 %] 40 % 98% O2 Device: OxyMask O2 Flow Rate (L/min): 4 L/min  Weight:   212 lb (96.2 kg) Weight change: -5 lb 2.5 oz (-2.337 kg)    Intake/Output last 3 shifts  I/O last 3 completed shifts:  In: 1339.7 [I.V.:1069.7; NG/GT:220; IV Piggyback:50]  Out: 2835 [Urine:2625; Chest Tube:210]  Intake/Output this shift:  I/O this shift:  In: 430.7 [I.V.:220.7; NG/GT:60; IV Piggyback:150]  Out: 765 [Urine:765]    Physical Exam:  GENERAL: No acute distress  CARDIAC: Regular.  S1 & S2 normal.  No gallops or murmurs. No edema  LUNGS: Coarse breath sounds.   Chest tubes noted  ABDOMEN: Soft, non-tender  : Koenig catheter   NEURO:  Lethargic but follows simple commands       Pertinent Labs   Lab Results: personally reviewed.   Results from last 7 days   Lab Units 12/10/20  1011 12/10/20  0400 12/09/20  0357 12/08/20  0359   LN-SODIUM mmol/L 144 141 141 141   LN-POTASSIUM mmol/L 3.5 3.6  3.6 4.2 4.5   LN-CHLORIDE mmol/L 108* 108* 110* 110*   LN-CO2 mmol/L 27 23 26 27   LN-BLOOD UREA NITROGEN mg/dL 26* 29* 27* 18   LN-CREATININE mg/dL 0.77 0.73 0.71 0.72   LN-CALCIUM mg/dL 8.4* 8.3* 8.3* 8.0*   LN-ALBUMIN g/dL  --  2.0*  --   --    LN-PROTEIN TOTAL g/dL  --  5.3*  --   --    LN-BILIRUBIN TOTAL mg/dL  --  0.9  --   --    LN-ALKALINE PHOSPHATASE U/L  --  63  --   --    LN-ALT (SGPT) U/L  --  <9  --   --    LN-AST (SGOT) U/L  --  17  --   --    LN-MAGNESIUM mg/dL  --  2.0 1.9 2.1     Results from last 7 days   Lab Units 12/10/20  1011 12/10/20  0741 12/09/20  0357 12/08/20  0359 12/07/20  1319   LN-WHITE BLOOD CELL COUNT thou/uL 13.1* 11.8* 10.3 13.0* 23.1*   LN-HEMOGLOBIN g/dL 11.6* 10.4* 10.0* 10.4* 13.2*   LN-HEMATOCRIT % 36.9* 33.0* 33.1* 33.6* 41.2   LN-PLATELET COUNT thou/uL 256 199 154 189 247   LN-NEUTROPHILS RELATIVE PERCENT %  --   --   --  85* 84*   LN-MONOCYTES  RELATIVE PERCENT %  --   --   --  9 6           Medications  Scheduled Meds:    acetaminophen  650 mg Oral Q6H    Or     acetaminophen  650 mg Rectal Q6H     acetylcysteine (MUCOMYST) 20% inhalation solution  4 mL Inhalation Q8H - RT     albuterol  2.5 mg Nebulization Q8H - RT     amiodarone  200 mg Oral BID     aspirin  81 mg Enteral Tube DAILY     atorvastatin  80 mg Enteral Tube QHS     bisacodyL  10 mg Oral Once     chlorhexidine  15 mL Topical Q12H 09-21     docusate sodium (COLACE) 50 mg/5 mL oral liquid  100 mg Enteral Tube BID     famotidine (PEPCID) injection  20 mg Intravenous BID    Or     famotidine  20 mg Oral BID     furosemide  40 mg Intravenous BID - diuretic     heparin (PF) ANTICOAGULANT  5,000 Units Subcutaneous Q8H FIXED TIMES     insulin aspart (NovoLOG) injection   Subcutaneous Q4H FIXED TIMES     magnesium hydroxide  30 mL Oral DAILY     melatonin  3 mg Enteral Tube QHS     metoprolol tartrate  25 mg Oral BID     piperacillin-tazobactam  3.375 g Intravenous Q8H     polyethylene glycol  17 g Enteral Tube DAILY     potassium chloride  20 mEq Intravenous Q1H     sodium chloride  10 mL Intravenous Q8H FIXED TIMES     sodium chloride  3 mL Intravenous Q8H FIXED TIMES     vancomycin  2,000 mg Intravenous Once     Continuous Infusions:    amiodarone 900 mg/500 ml standard 24 hour infusion 0.5 mg/min (12/10/20 0330)     dexmedetomidine infusion orderable (PRECEDEX) Stopped (12/10/20 1016)     nitroprusside       sodium chloride 0.9% Stopped (12/09/20 1412)     PRN Meds:.acetaminophen, acetaminophen, aluminum-magnesium hydroxide-simethicone, bisacodyL, bisacodyL, dexmedetomidine infusion orderable (PRECEDEX), dextrose 50 % (D50W), glucagon (human recombinant), hydrALAZINE, HYDROmorphone, ketorolac, magnesium hydroxide, metoprolol tartrate, oxyCODONE, sodium chloride, sodium chloride, sodium chloride, sodium chloride, sodium phosphates 133 mL, traZODone    Pertinent Radiology     No new pertinent data  is available        Advanced Care Planning:  Treatment/Discharge Planning discussed with ICU RN    Hayley Leung MD  Internal Medicine Hospitalist  12/10/2020

## 2021-06-13 NOTE — PROGRESS NOTES
"Clinical Nutrition Therapy Reassessment/Consult Note    Consulted to initiate & manage TF when NJ is being placed in IR today.  VFSS 20 showed severe aspiration risk & failed VFSS this morning remains at high risk with all intake.    Current Nutrition Prescription:   Diet: NPO   IV dextrose or Fluids:     amiodarone 900 mg/500 ml standard 24 hour infusion, Last Rate: 0.5 mg/min (12/15/20 1016)      Current Nutrition Intake:  NPO x 5 days.  NJ placement 12/15/20 & per abdominal x-ray ?tip in distal duodenum  Prior was eating >75% at most meals noted    Anthropometrics:  Height: 5' 10\" (177.8 cm)  Admission weight:237 lb 9.6 oz,20  Weight: 212 lb (96.2 kg),12/15/20  Ideal body weight: 166 lb(+or-10%)  Usual body weight: ~220 lb  Weight History:218 lb(),212 lb(12/10),206 lb(),216 lb()  Wt loss of 2.7-4% in the past week    RD Nutrition Focused Physical Exam:  The patient has the following physical signs which could indicate malnutrition: deferred today     GI Status/Output:   GI symptoms include:  Small & medium loose BM noted today    Skin/Wound:  Antonio score Antonio Scale Score: 15   Bilateral lower extremity Non-PU Ulcer:Venous. WOC note reviewed     Medications:  Medications reviewed.  Note:lipitor,lasix IV daily,SSI,lantus BID,milk of mag(being held),magnesium sulfate-IV,kcl-IV,zosyn-IV    Labs:  Labs reviewed  Lab Results   Component Value Date/Time    PREALBUMIN 12.8 (L) 2020 09:36 AM    ALBUMIN 2.0 (L) 12/10/2020 04:00 AM     12/15/2020 06:23 AM    K 3.7 12/15/2020 06:23 AM    BUN 12 2020 01:00 PM    CREATININE 1.06 12/15/2020 06:23 AM     (H) 2020 01:00 PM   M(12/15/20)-WNL  HgbA1c: 12.6%(12/3/20)-high  FSBG 145-192 mg/dL in the past 24 hrs (not within goal range)    Estimated Nutrition Needs:  Assessment weight is 81 kg, adjusted weight    Energy Needs: 5008-7015 kcals daily, 20-25 kcal/kg  Protein Needs:  g daily, 1.2-1.8 g/kg.  Fluid " Needs: 2025 mls daily, 25 mls/kg or per provider    Malnutrition: meets criteria for severe protein calorie malnutrition in context of acute illness as evidenced by NPO x 5 days & >2% wt loss in a week.    Nutrition Risk Level: high risk    Nutrition dx:   Altered nutrition related laboratory values r/t DM as evidenced by HgbA1c of 12.6%.  Swallowing difficulty r/t recent intubation & noted left frontal ischemic stroke as evidenced by dysphagia and failed VFSS.    Goals:   PO intake > 75% and Diet advance -not progressing due to high aspiration risk  Tolerate TF & meet estimated needs-new    Intervention:   When feeding tube ok to use per provider:  Diabetisource AC @15 ml/hr & increase by 5 ml q 6 hrs as tolerated till goal of 60 ml/hr + no carb prosource daily.  Enteral nutrition will provide when at goal: 1440 ml,1788 kcal,101 grams protein,144 grams CHO,22 grams fiber,1175 ml free water. + water flush 100 ml q 4 hrs. Total free water is 1775 ml daily  Add MVI w/minerals daily due to poor nutrition x 5 days to ensure meeting micronutrient needs    Monitoring/Evaluation:   Labs, wt, & TF initiation/tolerance,further education needs before discharge when appropriate, & skin integrity    Electronically signed by:  Cristina Hernandez RD

## 2021-06-13 NOTE — PROGRESS NOTES
Care Management Follow Up Note    Length of Stay (days) 12     Patient plan of care discussed at Interdisciplinary Rounds: yes  CM Patient/Caregiver Stated Goals: Discharge to TCU          Expected Discharge Date: 12/18/20(pend CABG recovery)  Concerns to be Addressed / Barriers to Discharge: s/p CABG, Chest tubes, NPO and refusing NG tube, 1:1, IV amiodarone. Cardiac rehab to re evaluate. IV Zosyn x 7 days (first dose 12/10/20)    Anticipated Discharge Disposition: Skilled Nursing Facility   Anticipated Discharge Services:  TCU  Anticipated Discharge DME: TBD    Plan:  Pt is from home with spouse and is independent at baseline. Cardiac rehab saw pt prior to CABG and goal was TCU. Pt has not been evaluated post CABG. Requested new order and updated OT. CM to follow their updated recommendation. Previous CM provided TCU list to pt. He is currently on a 1:1. Pt is NPO and refusing an NG tube at this time. CM to continue to follow medical progression and care team recommendations and assist with any discharge planning needs.     Itzel Jc RN

## 2021-06-13 NOTE — PROGRESS NOTES
Pt remain on full support below. Unable to do SBT due to pt given pain med and not ready to take extra breath.  RT continue checking on him and place on wean when appropriate tonight for extubation.  Vent Mode: VCV  FiO2 (%):  [40 %-100 %] 40 %  S RR:  [16] 16  S VT:  [500 mL] 500 mL  PEEP/CPAP (cm H2O):  [5 cm H2O] 5 cm H2O  Minute Ventilation (L/min):  [10.5 L/min] 10.5 L/min  PIP:  [26 cm H2O] 26 cm H2O  MAP (cm H2O):  [8] 8      Lico Joe, LRT

## 2021-06-13 NOTE — PROGRESS NOTES
Pt refusing afternoon treatments, stating he only wants a drink.  Will approach later this evening.

## 2021-06-13 NOTE — PROGRESS NOTES
Hospitalist Progress Note    Chief Complaint: Acute dyspnea     Summary: 70 y.o.male with past medical history of uncontrolled T2DM, severe multivessel CAD, chronic systolic and diastolic CHF, recent diagnosis of A. fib, who presented with acute CHF, was diuresed and subsequently underwent four-vessel CABG by Dr. Madison on 12/7/2020. Post-op course complicated by Afib with RVR, which was initially treated with IV amiodarone given pt's NPO status due to severe dysphagia.     Hospitalization complicated by altered mentation and seizure-like activity onset 12/8. CT Head on 12/8 showed punctate focus of increased attenuation within the subcortical region of the left frontal lobe. Subsequent MRI of the brain on 12/9 demonstrated subacute 2.5 cm subacute infarct with petechial hemorrhage in the left frontal lobe. EEG did not show any seizure activity, however Keppra was started 12/10 with resolution of rhythmic left leg movements. Neurology has followed, now signed off.     Extubated 12/10 and weaned down to room air. Started on broad spectrum antibiotics 12/10 due to new fever and worsening leukocytosis. 1 of 2 blood Cx grew staph epidermidis, and sputum cultures showed respiratory miryam with 4+ beta hemolytic Strep. 7 days IV pip-tazo completed 12/17.     VFSS with mild oral and severe pharyngeal dysphagia - NGT placed on 12/15. This may have exacerbated hypernatremia, which improved with D5W prior to NGT placement.     Assessment & Plan  Multivessel CAD, s/p CABG and PVI/LAAL on 12/7/2020:   Increased pleural drainage, negative for chylous fluid; Chest tubes still in place 12/19  Hemodynamically stable  On ASA, statin, metoprolol, low dose IV diuretics.   Mgmt as per CV Surgery.      A Fib with RVR, now s/p PVI and DONNA excision:   Oral amiodarone, metoprolol. Prn metoprolol.  Currently NSR  Mgmt as per Cardiology and CV Surgery following.   Cardiac tele.      Acute diastolic heart failure, improving.  Holding home  bumetanide for now. On IV diuretics  Mgmt as per Cardiology.     Acute hypoxic respiratory failure, resolved: On room air.   Encourage IC.  Supplemental O2 to keep sats >94%.  Continue pulmonary toilet- On metaneb, flutter valve     Acute Metabolic encephalopathy: improving.  Encephalopathy could also be contributed by prolonged hospitalization, hypernatremia, possible pneumonia, medications. CT Head from 12/12 showed no new changes.   EEG did not show seizure activity, but pt's rhythmic left leg movement appear to be have improved with Keppra. Continue keppra, Keppra level therapeutic, pt was started on quetiapine on 12/14   Decrease at bedtime seroquel due to somnolence     Subacute left frontal stroke with petechial hemorrhage:   on ASA, statin.   Neurology recs appreciated.     Fever, Leukocytosis: UA was negative. One of two blood cultures grew staph epi - ?contaminant. Sputum culture growing beta-hemolytic strep. Completed pip-tazo IV 7d on 12/17. ID recs appreciated.     Severe pharyngeal dysphagia: NG and tube feeding.  SLP team following, planning repeat Video swallow next week     Hyperglycemia in the setting of T2DM and D5W as above:  HDSSI with glargine. Holding home glipizide, metformin.  Increase dose of two times a day glargine     Hypernatremia, resolved.     HTN: holding home losartan, amlodipine.     Code status Full Code  DVT prophylaxis: heparin sq     Barrier's to discharge : chest tube, encephalopathy, dysphagia  Anticipated discharge date:  Tuesday  Disposition:  acute rehab vs TCU     Lines: NG feeding tube in     Malnutrition, protein/calorie, Severe: Poor appetite, weight loss, muscle wasting  Class I Obesity w BMI=30-34.9: 214 lb 9.6 oz (97.3 kg) Body mass index is 30.79 kg/m .      Principal Problem:    Acute on chronic clinical systolic heart failure (H)  Active Problems:    ACP Staging Stage 1 Hypertension: 140-159 / 90-99    Type 2 diabetes mellitus (H)    Hyperlipidemia, unspecified  hyperlipidemia type    CAD (coronary artery disease)    Atrial fibrillation with rapid ventricular response (H)    Diabetic leg ulcer (H)    Acute on chronic systolic CHF (congestive heart failure) (H)    A-fib (H)    Uncontrolled type 2 diabetes mellitus with hyperglycemia (H)    Acute respiratory failure with hypoxia (H)    On mechanically assisted ventilation (H)    S/P CABG (coronary artery bypass graft)    Seizures (H)    Cerebrovascular accident (CVA), unspecified mechanism (H)    Cerebrovascular accident (CVA) due to other mechanism (H)    Metabolic encephalopathy    Disorientation    Acute kidney failure with tubular necrosis (H)    Subjective  Patient feels sleepy this am, did not sleep well last pm.     ROS: Denies chest pain, denies shortness of breath, Moving bowels well, passing urine well and slept well    Objective    Physical Exam    General Appearance:  HEENT:  Cooperative, in no distress   Normocephalic, atraumatic, conjunctiva clear without icterus   Lungs:   reduced breath sounds at bases   Cardiovascular:  Regular Rate and Rythm, S1, S2, 1+ lower extremity edema bilaterally   Abdomen: Soft, non-tender and obese, active bowel sounds   Skin:  Skin color, texture normal and no rashes or lesions over face, neck, arms and legs, turgor normal.   Neurologic: Sleepy, Facial symmetry preserved and upper & lower extremities moving well with symmetry   Reviewed telemetry, Laboratory results and Consultant recommendations    Time spent: on patient interview, exam, med rec, progress note: 12 min    Counseling of patient at bedside  Diagnosis & prognosis regarding post surgery recovery - 8 min  review of treatment plan adjusting meds, waiting for chest fluids to reduce 7 min    Coordination with: Nursing regarding discharge planning  2 min    Total time including time spent in counseling and coordination of care: 13 minutes  Total time spent  on encounter: >=25 minutes    Khoi Luna MD, FACP  Internal  Medicine Hospitalist  12/19/2020  9:50 AM

## 2021-06-13 NOTE — PROGRESS NOTES
Speech Language/Pathology  Speech Therapy Daily Progress Note    Patient presents as alert and cooperative during this session.  An  was not applicable. 1:1 in place. RA upon arrival. Continues to demonstrate baseline congested coughing.     Objective  Therapeutic Feeding:  Puree - x4 bites; delayed throat clear, wet vocal quality concerning for stasis, and delayed cough 1x  Nectar - x3 oz via spoon and cup; inconsistent wet cough  Thin - x3 sips via cup; inconsistent wet cough  Ice chip - x1 bite; delayed wet cough  Reduced hyolaryngeal movement. Slight delayed initiation per SLP observation.   Wet vocal quality and weak phonation suggestive of weak breath support.   NOTE: did have nebulizer prior to assessment which may have contributed to increased congestion/coughing with loosening of secretions.     Assessment  Continues to demonstrate s/s of aspiration with intake but difficult to discern if related to aspiration versus baseline congestion. VFSS would be appropriate to identify aspiration risk. Continue pills in puree. RN handoff provided.    Plan/Recommendations  VFSS.    The ST Care Plan has been reviewed and current plan remains appropriate.    25 dysphagia minutes     Yokasta Erickson MS, CCC-SLP

## 2021-06-13 NOTE — PROGRESS NOTES
CVTS Daily Progress Note   12/12/2020   POD#5 s/p CABGx4 [place] PVI, DONNA excision   DOS 12/7/20 Dr. Madison   LOS: 10 days     Overnight events  HTN with Atrial fib with RVR noted during the night treated with IV and oral metoprolol and IV cardizem.    Continues to have atrial fib with RVR, BP high with stimulation related swallow eval, normal after he rested.     A-fib with rates >130 on po amio.    Low grade temp. Blood + gram + cocci  Sputum  cx +Beta hemolytic streptococcus not group A. On Zosyn/Vanco.   Leukocytosis- WBC 14.3 ( 10.7)    Hypernatremia  149- D5W started as he is now on dry side.    Pt failed bedside and video swallow and FT recommended by SLP, Radiology no longer in house. Will order for tomorrow am NJ placement per radiology if can't be place on floor. SLP recommends ice chips only no oral medications.    Patient seen up in chair and back in bed, appears sleepy but appropriate, seems more tired after video swallow study, no gross motor deficits noted. Denies discomfort, nausea, shortness of breath.     No further rhythmic movements of left leg.   MRI area of ischemic lesion and EEG - for seizure activity show no correlation with previous non- purposeful rhythmic movement of left leg.      PLAN  NG placement at bedside  NJ in am  NPO except ice chips  D5W    Lantus two times a day dosing  Cardiology consult   ID consulted-+ blood Cx   Picc line  Discontinue cordis   Discontinue lasix     OBJECTIVE:    Temp:  [97.5  F (36.4  C)-99.7  F (37.6  C)] 99.7  F (37.6  C)  Heart Rate:  [100-138] 138  Resp:  [18-20] 20  BP: (107-195)/() 131/62  Wt Readings from Last 2 Encounters:   12/12/20 0400 206 lb 14.4 oz (93.8 kg)   12/11/20 0630 210 lb 4.8 oz (95.4 kg)   12/10/20 0500 212 lb (96.2 kg)   12/09/20 0414 217 lb 2.5 oz (98.5 kg)   12/08/20 0345 219 lb 5.7 oz (99.5 kg)   12/07/20 0417 218 lb 8 oz (99.1 kg)   12/06/20 0301 220 lb 6.4 oz (100 kg)   12/05/20 0340 220 lb 1.6 oz (99.8 kg)   12/04/20 0354  (!) 226 lb (102.5 kg)   12/03/20 1236 (!) 223 lb (101.2 kg)   12/03/20 0441 (!) 223 lb (101.2 kg)   12/02/20 1722 (!) 224 lb 3.2 oz (101.7 kg)   12/02/20 1401 (!) 237 lb 9.6 oz (107.8 kg)   11/05/20 1302 (!) 223 lb (101.2 kg)       Current Medications:    Scheduled Meds:    acetaminophen  650 mg Oral Q6H    Or     acetaminophen  650 mg Rectal Q6H     acetylcysteine (MUCOMYST) 20% inhalation solution  4 mL Inhalation Q8H - RT     albuterol  2.5 mg Nebulization Q8H - RT     amiodarone  200 mg Oral BID     aspirin  81 mg Enteral Tube DAILY     atorvastatin  80 mg Enteral Tube QHS     bisacodyL  10 mg Oral Once     chlorhexidine  15 mL Topical Q12H 09-21     docusate sodium (COLACE) 50 mg/5 mL oral liquid  100 mg Enteral Tube BID     famotidine (PEPCID) injection  20 mg Intravenous BID    Or     famotidine  20 mg Oral BID     heparin (PF) ANTICOAGULANT  5,000 Units Subcutaneous Q8H FIXED TIMES     insulin aspart (NovoLOG) injection   Subcutaneous Q4H FIXED TIMES     insulin glargine  10 Units Subcutaneous BID     levETIRAcetam (KEPPRA) IVPB  500 mg Intravenous Q12H     magnesium hydroxide  30 mL Oral DAILY     melatonin  3 mg Enteral Tube QHS     metoprolol tartrate  12.5 mg Oral 0330, 1300, 1700 - CV Metoprolol     metoprolol tartrate  50 mg Oral BID     piperacillin-tazobactam  3.375 g Intravenous Q8H     polyethylene glycol  17 g Enteral Tube DAILY     sodium chloride  10 mL Intravenous Q8H FIXED TIMES     sodium chloride  3 mL Intravenous Q8H FIXED TIMES     vancomycin  1,500 mg Intravenous Q12H     Continuous Infusions:    dextrose 5% 50 mL/hr (12/12/20 0826)     nitroprusside       PRN Meds:.acetaminophen, acetaminophen, aluminum-magnesium hydroxide-simethicone, bisacodyL, bisacodyL, dextrose 50 % (D50W), glucagon (human recombinant), hydrALAZINE, lidocaine (PF), magnesium hydroxide, metoprolol tartrate, QUEtiapine, sodium chloride, sodium chloride bacteriostatic, sodium chloride, sodium chloride, sodium chloride,  sodium phosphates 133 mL, traMADoL, traZODone    Cardiographics:    Telemetry monitoring demonstrates afib with rates in the 130s per my personal review.    Imaging:    Xr Swallow Study W Speech    Result Date: 12/12/2020  EXAM: XR SWALLOW STUDY W SPEECH OR OT LOCATION: [facility] [place] DATE/TIME: 12/12/2020 9:28 AM INDICATION: Difficulty swallowing. COMPARISON: None. TECHNIQUE: Routine. FINDINGS: FLUOROSCOPIC TIME: 1.2 minutes NUMBER OF IMAGES: 0 Swallow study with Speech Pathology using multiple barium thicknesses. There is silent aspiration with thin liquid with no cough reflex. There is pooling of all consistencies in the valleculae with some poor over and there is penetration to the vocal cords with nectar and honey thick liquid. Chin tuck technique improved inversion of the epiglottis mildly.     1.  Silent aspiration with thin liquid. Penetration with nectar and honey consistencies. Stasis with pooling in the valleculae.       Lab Results (most recent, reviewed):    Hemoglobin (g/dL)   Date Value   12/12/2020 11.7 (L)     WBC (thou/uL)   Date Value   12/12/2020 14.3 (H)     Potassium (mmol/L)   Date Value   12/12/2020 4.2     Creatinine (mg/dL)   Date Value   12/12/2020 0.92     Hemoglobin A1c (%)   Date Value   12/03/2020 12.6 (H)     Magnesium (mg/dL)   Date Value   12/12/2020 2.2     Calcium (mg/dL)   Date Value   12/12/2020 8.1 (L)       UOP: 1.3L    CT: 570cc    Physical Exam:    General: Patient seen up in chair Awake but appears Sleepy. Oriented and appropriate.   CV: Afib on monitor. RVR  Pulm: Non-labored effort on RA  Chest tubes in place, serosanguinous output, no air leak.- excessive serosang drainage   Sternal Incision C/D/I.  Abd: Soft, NT, ND  : Koenig with light rani urine  Ext: Mild pedal edema, SCDs in place, warm,   Neuro:Awake no gross deficits noted. No Rhythmic movement left leg noted this morning. Intermittently follows commands.      ASSESSMENT/PLAN:    Mika RODRÍGUEZ  Antonio is a 70 y.o. male with a history of CAD who is s/p CABGx4,PVI, DONNA excisions    Principal Problem:    Acute on chronic clinical systolic heart failure (H)  Active Problems:    ACP Staging Stage 1 Hypertension: 140-159 / 90-99    Type 2 diabetes mellitus (H)    Hyperlipidemia, unspecified hyperlipidemia type    CAD (coronary artery disease)    Atrial fibrillation with rapid ventricular response (H)    Diabetic leg ulcer (H)    Acute on chronic systolic CHF (congestive heart failure) (H)    A-fib (H)    Uncontrolled type 2 diabetes mellitus with hyperglycemia (H)    Acute respiratory failure with hypoxia (H)    On mechanically assisted ventilation (H)    S/P CABG (coronary artery bypass graft)    Seizures (H)    Cerebrovascular accident (CVA), unspecified mechanism (H)    Cerebrovascular accident (CVA) due to other mechanism (H)    Metabolic encephalopathy    Disorientation        NEURO:   - Scheduled Tylenol and PRN Toradol/tramadol for pain  - Melatonin at bedtime    - Neuro following, appreciate. Initially concern for seizure given rhythmic LLE twitching although EEG negative for sz and ischemic area of brain on MRI not consistent with area.     - No further rhythmic movement noted  - Keppra per Neuro started 12/10  - ?Encephalopathycomponent. Ammonia WNL, LFTs nml    CV:   - Normo/hypertensive  - Atrial fib RVR this am poor  to IV BB/CCB  - Re consulted cardiology for rhythm management  - Metoprolol 37.5 mg this am increase to 50 mg tonight  - CV BB protocol extra doses  - PRN IV doses  - Amiodarone  PO   - No plan for a-fib anticoagulation given LAAE and surgeon preference  - ASA 81mg  - Atorvastatin 80mg daily  - Chest tubes- Keep pleural CT another day   - removed Med CT POD #4   - Hold diuresis      PULM:   - Extubated POD #3   - Maintaining oxygen saturations on 3L/nc   - Encourage pulmonary toilet     FEN/GI:  - NPO - medications per NG after placement   - Faiiled video swallow -   - NG bedside  placement for meds   - NJ placement in am  - SLP to see daily  - Continue electrolyte replacement protocol  - Hyper natremia 149 - D5W 50cc/hr   - Once NG placed can do free water  - Diet:NPO- ice chips   - Bowel regimen + BM 12/12    RENAL:  - Adequate UOP/hr. Continue to monitor closely via gardner.   - Cr 0.92 (0.76)  - Gardner to remain in for close monitoring of I/O and during period of diuresis/relative immobility.   - Diuresis stopped 12/12  - Hypernatremia   -     HEME:  - Acute blood loss anemia post-op.   - Hgb stable, no bleeding concerns.   - Hep subcutaneous (cleared with neuro),   - [id number]    ID:  - Ronda op ppx complete.  - Afeb   - Sputum culture 12/09 4+ polymorph. Leukocytes 4+ gram + cocci 3+ positive bacilli- Strep beta hemolytic   - Zosyn 12/10  - Vanco 12/10   - UA neg  - Blood cx- positive gram + cocci in clusters   - Consulted ID to see    ENDO:   - Sliding scale insulin- every 4 hours- Resistant scale  - Will eventually need PTA DM meds (insulin, glipizide, Metformin)  - changed to Lantus 10 u two times a day   -   PPx:   - DVT: SCDs,    - SQ heparin TID  - GI: Omeprazole     DISPO:   - Continue critical care in ICU    Follow up   Neurology - 3 weeks with MRI   MRI in 3 weeks

## 2021-06-13 NOTE — PLAN OF CARE
Up in mckeon with 2, tolerates well. Remains npo, TF at 50/hour. Per dietician water flush 180cc at 1400. NJ tube intact. Mepilex intact to bilateral shins. 1-2 icechips, tolerates well.

## 2021-06-13 NOTE — PROGRESS NOTES
Speech Language/Pathology  Speech Therapy Daily Progress Note    Patient presents as lethargic during this session. Session shortened due to bathroom needs. Per RN report earlier today, patient has been coughing with ice chips as well as moistened oral swabs.    An  was not applicable.    Objective  Pharyngeal Exercises  Reviewed the Effortful swallow with patient. He completed 5 repetitions with an ice chip given each time for comfort, as well as to help stimulate a swallow response. Mod-max cueing needed for increased effort. Patient's swallow response was weak and delayed. Coughing noted after ice chips.    Educated patient on plan to repeat Video Swallow Study tomorrow to assess for any improvement in swallow and determine safety of oral intake. Patient nodded to indicated understanding. Question his true comprehension given current deficits in cognition and memory.    Assessment  Patient required moderate cueing to complete the Effortful swallow exercise. His swallow response appears delayed and weak. Clinical s/s of aspiration observed with ice chips.    Plan/Recommendations  Repeat Video Swallow Study tomorrow AM per request of Dr. Madison.    The ST Care Plan has been reviewed and goals have been revised and continue ST at discharge.    8 dysphagia minutes     Samantha Hector MA, CCC-SLP

## 2021-06-13 NOTE — PROGRESS NOTES
NEUROLOGY PROGRESS NOTE     Mika Alvaerz,  1950, MRN [id number] Date: 12/10/2020      [facility]   Code status:  Full Code   PCP: Angel Claire MD, [phone]      ASSESSMENT & PLAN   Hospital Day#: 8  Diagnosis code: Convulsive activity-rule out seizure    Convulsive activity-rule out seizure  Stroke      Head CT shows punctate focus in the left frontal lobe    MRI brain shows 2.5 cm subacute infarct in the left frontal lobe associated with petechial hemorrhage    This lesion would not fit with patient left leg jerking activity.    Most likely related to cardiac surgery    Carotid ultrasound does not show any significant stenosis.    Recent echocardiogram showed decrease ejection fraction 38%    Lipid panel and statin    On aspirin.  Subcu heparin can be used.    Blood pressure can be normalized    EEG negative for ongoing seizures    We will try Keppra to see if that helps with these jerking movements.  Could consider repeat EEG tomorrow    Keppra level    Seizure precautions    Electrolyte panel noncontributory though calcium was 8.0    WBC 13.0    Thank you again for this referral, please feel free to contact me if you have any questions.     CHIEF COMPLAINT Acute on chronic clinical systolic heart failure (H)     HISTORY OF PRESENT ILLNESS     We have been requested by Dr. Littlejohn to evaluate Mika Alvarez who is a 70 y.o.  male for seizures.    This 70-year-old male on whom neurology been consulted evaluate for seizures.  Patient was admitted for dyspnea from acute on chronic systolic heart failure.  Patient was known to have multivessel coronary disease.  Patient was noncompliant with diet and medication.  Patient needed a CABG yesterday.  This morning there was some rhythmic left leg jerking activity.  Patient was given Ativan which did help resolve this activity.  The jerking lasted for a few minutes.  It did not spread to other extremities.  There was no loss of consciousness.   With the Ativan he did become somnolent.  He was concerned that this might be a seizure.  As result neurology was consulted.  Patient has not had any previous seizures that is known.  No family history of seizures.  No recent head injuries.  No drug use.  EEG was done today.  Patient remains intubated and off sedation for right now.    12/9  Patient remains intubated.  He is on sedation because he gets agitated.  Every time he is awake there is some jerking of the left leg.  EEG was negative for ongoing seizures.  Plan to possibly extubate him later today and then reexamine him.  Head CT and MRI did show a subacute infarct with petechial hemorrhage in the left frontal lobe.    12/10  Patient was extubated yesterday.  Is more alert today but still somnolent.  Continues to have some jerking movement of the left leg.  Ortiz with nursing staff about starting seizure medication.     PAST MEDICAL & SURGICAL HISTORY     Medical History  Patient Active Problem List    Diagnosis Date Noted     Cerebrovascular accident (CVA), unspecified mechanism (H)      Seizures (H)      Acute respiratory failure with hypoxia (H)      On mechanically assisted ventilation (H)      S/P CABG (coronary artery bypass graft)      Uncontrolled type 2 diabetes mellitus with hyperglycemia (H)      Acute on chronic clinical systolic heart failure (H) 12/02/2020     Atrial fibrillation with rapid ventricular response (H) 12/02/2020     Diabetic leg ulcer (H) 12/02/2020     Acute on chronic systolic CHF (congestive heart failure) (H) 12/02/2020     A-fib (H) 12/02/2020     Acute systolic heart failure (H)      New onset atrial fibrillation (H)      Pulmonary edema cardiac cause (H) 09/19/2020     CAD (coronary artery disease) 09/19/2020     New onset a-fib (H) 09/19/2020     Moderate episode of recurrent major depressive disorder (H) 06/21/2019     Hyperlipidemia, unspecified hyperlipidemia type 04/27/2016     Joint Pain, Localized In The Shoulder       Obesity      ACP Staging Stage 1 Hypertension: 140-159 / 90-99      Coronary artery disease involving native heart with other form of angina pectoris, unspecified vessel or lesion type (H)      Type 2 diabetes mellitus (H)      Past Medical History: Patient  has a past medical history of ACP Staging Stage 1 Hypertension: 140-159 / 90-99, Atrial fibrillation with rapid ventricular response (H) (12/2/2020), Coronary Artery Disease, Joint Pain, Localized In The Shoulder, Obesity, Other and unspecified hyperlipidemia (4/27/2016), and Type 2 Diabetes Mellitus With Complication.  Surgical History  He  has a past surgical history that includes Coronary Angiogram (N/A, 9/22/2020) and Left Heart Catheterization with Left Ventriculogram (N/A, 9/22/2020).     SOCIAL HISTORY     Reviewed, and he  reports that he has never smoked. He has never used smokeless tobacco. He reports current alcohol use. He reports that he does not use drugs.     FAMILY HISTORY     Reviewed, and family history includes Heart disease in his father.     ALLERGIES     No Known Allergies     REVIEW OF SYSTEMS     Unable to do review of systems due to altered mental status.     HOME & HOSPITAL MEDICATIONS     Prior to Admission Medications  Medications Prior to Admission   Medication Sig Dispense Refill Last Dose     glipiZIDE (GLUCOTROL XL) 10 MG 24 hr tablet Take 2 tablets (20 mg total) by mouth once daily. (Patient taking differently: Take 10 mg by mouth once daily. ) 180 tablet 1 Unknown at Unknown time     insulin glargine (LANTUS SOLOSTAR U-100 INSULIN) 100 unit/mL (3 mL) pen Inject 35 Units under the skin at bedtime. 15 mL 0 Unknown at Unknown time     losartan (COZAAR) 100 MG tablet Take 1 tablet (100 mg total) by mouth daily. 90 tablet 2 Unknown at Unknown time     metFORMIN (GLUCOPHAGE) 1000 MG tablet Take 1 tablet (1,000 mg total) by mouth 2 (two) times a day with meals. (Patient taking differently: Take 1,000 mg by mouth daily with breakfast. )  "180 tablet 0 Unknown at Unknown time     metoprolol succinate (TOPROL-XL) 100 MG 24 hr tablet Take 1 tablet (100 mg total) by mouth 2 (two) times a day. (Patient taking differently: Take 100 mg by mouth daily. ) 90 tablet 1 Unknown at Unknown time     nitroglycerin (NITROSTAT) 0.4 MG SL tablet Place 0.4 mg under the tongue every 5 (five) minutes as needed for chest pain.   Unknown at Unknown time     amLODIPine (NORVASC) 10 MG tablet TAKE 1 TABLET BY MOUTH ONCE DAILY. OVERDUE FOR DIABETIC CHECK 90 tablet 3      aspirin 81 MG EC tablet Take 81 mg by mouth daily.   Unknown at Unknown time     atorvastatin (LIPITOR) 80 MG tablet Take 1 tablet (80 mg total) by mouth daily. 30 tablet 0 Unknown at Unknown time     blood glucose test strips Use 1 each As Directed 2 (two) times a day. Accu-Chek Guide test strips. Patient is on insulin. 200 strip 3      blood-glucose meter (ACCU-CHEK ADVANTAGE DIABETES) kit Test 4 times daily.        bumetanide (BUMEX) 0.5 MG tablet Take 2 tablets (1 mg total) by mouth daily. 60 tablet 0 Unknown at Unknown time     generic lancets Use 1 each As Directed 2 (two) times a day. Accu-Chek Fastclix Lancets. Patient is insulin dependant. 200 each 3      insulin lispro (HUMALOG KWIKPEN INSULIN) 100 unit/mL pen Inject 5 Units under the skin 3 (three) times a day with meals. 15 mL 0      NEEDLES, INSULIN DISPOSABLE (BD ULTRA-FINE ROBERT PEN NEEDLES MISC) use up to 4 times daily as directed.        pen needle, diabetic (BD ULTRA-FINE SHORT PEN NEEDLE) 31 gauge x 5/16\" Ndle USE UP TO 4 TIMES DAILY AS DIRECTED 300 each 0      rivaroxaban ANTICOAGULANT (XARELTO) 20 mg tablet Take 1 tablet (20 mg total) by mouth daily with supper. 30 tablet 0 Unknown at Unknown time       Hospital Medications    acetaminophen  650 mg Oral Q6H    Or     acetaminophen  650 mg Rectal Q6H     acetylcysteine (MUCOMYST) 20% inhalation solution  4 mL Inhalation Q8H - RT     albuterol  2.5 mg Nebulization Q8H - RT     amiodarone  " 200 mg Oral BID     aspirin  81 mg Enteral Tube DAILY     atorvastatin  80 mg Enteral Tube QHS     bisacodyL  10 mg Oral Once     chlorhexidine  15 mL Topical Q12H 09-21     docusate sodium (COLACE) 50 mg/5 mL oral liquid  100 mg Enteral Tube BID     famotidine (PEPCID) injection  20 mg Intravenous BID    Or     famotidine  20 mg Oral BID     furosemide  40 mg Intravenous BID - diuretic     heparin (PF) ANTICOAGULANT  5,000 Units Subcutaneous Q8H FIXED TIMES     insulin aspart (NovoLOG) injection   Subcutaneous Q4H FIXED TIMES     levETIRAcetam (KEPPRA) IVPB  500 mg Intravenous Q12H     magnesium hydroxide  30 mL Oral DAILY     melatonin  3 mg Enteral Tube QHS     metoprolol tartrate  25 mg Oral BID     piperacillin-tazobactam  3.375 g Intravenous Q8H     polyethylene glycol  17 g Enteral Tube DAILY     sodium chloride  10 mL Intravenous Q8H FIXED TIMES     sodium chloride  3 mL Intravenous Q8H FIXED TIMES     [START ON 12/11/2020] vancomycin  1,500 mg Intravenous Q12H     vancomycin  2,000 mg Intravenous Once        PHYSICAL EXAM     Vital signs  Temp:  [98.7  F (37.1  C)-101.7  F (38.7  C)] 99.4  F (37.4  C)  Heart Rate:  [] 134  Resp:  [14-26] 16  BP: ()/(54-88) 131/81  Arterial Line BP: ()/() 151/107  FiO2 (%):  [30 %-40 %] 40 %    Weight:   212 lb (96.2 kg)  Patient was given some sedation for the exam for possible seizures.  Vitals-Reviewed in chart  GENERAL -Health appearing, No apparent distress  EYES- No scleral icterus, no eyelid droop, Pupils - see Neuro section  HEENT - Normocephalic, atraumatic, minimally responsive.  Unable to check hearing.; Oral mucosa moist and pink in color. External Ears and nose intact.   Neck - supple with no obvious lymphadenopathy or thyromegaly  PULM -on facemask  CV- Pulses present with no peripheral edema/ No significant varicosities.  MSK- Gait - see Neuro section; Strength and tone- see Neuro section; Range of motion grossly  intact.  Psych-cooperative and not agitated.    Neurological  Mental status - Patient is somnolent.  Does not follow commands.  Does not open eyes minimally.  Minimal speech.  Nods head yes or no.  Cranial nerves - Pupils are reactive and symmetric; EOMI with oculocephalics, VFIT partially, NLF symmetric  Motor -no spontaneous movement.  Mild jerking of the left leg was seen.  This is very minimal.  Tone - Tone is symmetric bilaterally in upper and lower extremities.  Coordination - Finger to nose and heel to shin-does not follow commands  Gait and station -unable to ambulate due to altered mental status.  Formal gait testing cannot be done because of safety concerns from ongoing medical issues.     DIAGNOSTIC STUDIES     Pertinent Radiology   Carotid US  IMPRESSION:   1.  Mild plaque formation, velocities consistent with less than 50% stenosis in the right internal carotid artery.  2.  Mild plaque formation, velocities consistent with less than 50% stenosis in the left internal carotid artery.  3.  Flow within the vertebral arteries is antegrade.    CT  IMPRESSION:   1.  Punctate focus of increased attenuation within the subcortical region of the left frontal lobe. This may represent a focus of petechial hemorrhage, cortical laminar necrosis, mineralization or subarachnoid hemorrhage. This is best demonstrated on   axial image #23, series 2 and sagittal image #43, series 5.2. Comparison with prior studies would be helpful if available.  2.  Otherwise recommend brain MRI for further evaluation. Additionally follow-up CT is could be performed to ensure stability or resolution.    MRI  IMPRESSION:   The abnormality identified on head CT corresponds to a 2.5 cm subacute infarct in the left frontal lobe associated with petechial hemorrhage. No significant mass effect.    Recent Results (from the past 24 hour(s))   POCT Glucose    Collection Time: 12/09/20  4:29 PM    Specimen: Blood   Result Value Ref Range    Glucose  167 (H) 70 - 139 mg/dL   Sputum culture    Collection Time: 12/09/20  5:25 PM    Specimen: Respiratory   Result Value Ref Range    Gram Stain Result 4+ Polymorphonuclear leukocytes     Gram Stain Result 4+ Gram positive cocci in pairs     Gram Stain Result 3+ Gram positive bacilli     Gram Stain Result 1+ Gram negative bacilli    POCT Glucose    Collection Time: 12/09/20  8:04 PM    Specimen: Blood   Result Value Ref Range    Glucose 142 (H) 70 - 139 mg/dL   POCT Glucose    Collection Time: 12/10/20 12:03 AM    Specimen: Blood   Result Value Ref Range    Glucose 190 (H) 70 - 139 mg/dL   Blood Gases, Arterial    Collection Time: 12/10/20  1:54 AM   Result Value Ref Range    pH, Arterial 7.50 (H) 7.37 - 7.44    pCO2, Arterial 36 35 - 45 mm Hg    pO2, Arterial 75 75 - 85 mm Hg    Bicarbonate, Arterial Calc 29.0 23.0 - 29.0 mmol/L    O2 Sat, Arterial 95.4 95.0 - 96.0 %    Oxyhemoglobin 94.2 (L) 95.0 - 96.0 %    Base Excess, Arterial Calc 5.3 mmol/L    Ventilation Mode VCV     Rate 16 rr/min    FIO2 0.40     Peep 5 cm H2O    Sample Stabilized Temperature 37.0 degrees C    Ventilator Tidal Volume 500 mL   Potassium - Next AM    Collection Time: 12/10/20  4:00 AM   Result Value Ref Range    Potassium 3.6 3.5 - 5.0 mmol/L   Magnesium    Collection Time: 12/10/20  4:00 AM   Result Value Ref Range    Magnesium 2.0 1.8 - 2.6 mg/dL   Basic Metabolic Panel    Collection Time: 12/10/20  4:00 AM   Result Value Ref Range    Sodium 141 136 - 145 mmol/L    Potassium 3.6 3.5 - 5.0 mmol/L    Chloride 108 (H) 98 - 107 mmol/L    CO2 23 22 - 31 mmol/L    Anion Gap, Calculation 10 5 - 18 mmol/L    Glucose 216 (H) 70 - 125 mg/dL    Calcium 8.3 (L) 8.5 - 10.5 mg/dL    BUN 29 (H) 8 - 22 mg/dL    Creatinine 0.73 0.70 - 1.30 mg/dL    GFR MDRD Af Amer >60 >60 mL/min/1.73m2    GFR MDRD Non Af Amer >60 >60 mL/min/1.73m2   Hepatic Profile    Collection Time: 12/10/20  4:00 AM   Result Value Ref Range    Bilirubin, Total 0.9 0.0 - 1.0 mg/dL     Bilirubin, Direct 0.5 <=0.5 mg/dL    Protein, Total 5.3 (L) 6.0 - 8.0 g/dL    Albumin 2.0 (L) 3.5 - 5.0 g/dL    Alkaline Phosphatase 63 45 - 120 U/L    AST 17 0 - 40 U/L    ALT <9 0 - 45 U/L   POCT Glucose    Collection Time: 12/10/20  4:04 AM    Specimen: Blood   Result Value Ref Range    Glucose 179 (H) 70 - 139 mg/dL   Blood Gases, Arterial    Collection Time: 12/10/20  7:40 AM   Result Value Ref Range    pH, Arterial 7.48 (H) 7.37 - 7.44    pCO2, Arterial 36 35 - 45 mm Hg    pO2, Arterial 94 (H) 75 - 85 mm Hg    Bicarbonate, Arterial Calc 27.2 23.0 - 29.0 mmol/L    O2 Sat, Arterial 98.4 (H) 95.0 - 96.0 %    Oxyhemoglobin 96.8 (H) 95.0 - 96.0 %    Base Excess, Arterial Calc 3.2 mmol/L    Ventilation Mode VCV     Rate 16 rr/min    FIO2 0.40     Peep 5 cm H2O    Sample Stabilized Temperature 37.0 degrees C    Ventilator Tidal Volume 500 mL   HM2(CBC W/O DIFF)    Collection Time: 12/10/20  7:41 AM   Result Value Ref Range    WBC 11.8 (H) 4.0 - 11.0 thou/uL    RBC 3.94 (L) 4.40 - 6.20 mill/uL    Hemoglobin 10.4 (L) 14.0 - 18.0 g/dL    Hematocrit 33.0 (L) 40.0 - 54.0 %    MCV 84 80 - 100 fL    MCH 26.4 (L) 27.0 - 34.0 pg    MCHC 31.5 (L) 32.0 - 36.0 g/dL    RDW 14.4 11.0 - 14.5 %    Platelets 199 140 - 440 thou/uL    MPV 9.8 8.5 - 12.5 fL   POCT Glucose    Collection Time: 12/10/20  7:45 AM    Specimen: Blood   Result Value Ref Range    Glucose 185 (H) 70 - 139 mg/dL   Ammonia    Collection Time: 12/10/20  8:15 AM   Result Value Ref Range    Ammonia 28 11 - 35 umol/L   Urinalysis-UC if Indicated    Collection Time: 12/10/20  9:56 AM   Result Value Ref Range    Color, UA Yellow Colorless, Yellow, Straw, Light Yellow    Clarity, UA Clear Clear    Glucose, UA Negative Negative    Bilirubin, UA Negative Negative    Ketones, UA Negative Negative    Specific Gravity, UA 1.011 1.001 - 1.030    Blood, UA Negative Negative    pH, UA 5.0 4.5 - 8.0    Protein, UA Negative Negative mg/dL    Urobilinogen, UA <2.0 E.U./dL <2.0  E.U./dL, 2.0 E.U./dL    Nitrite, UA Negative Negative    Leukocytes, UA Negative Negative   Blood Gases, Arterial    Collection Time: 12/10/20  9:59 AM   Result Value Ref Range    pH, Arterial 7.47 (H) 7.37 - 7.44    pCO2, Arterial 41 35 - 45 mm Hg    pO2, Arterial 93 (H) 75 - 85 mm Hg    Bicarbonate, Arterial Calc 29.3 (H) 23.0 - 29.0 mmol/L    O2 Sat, Arterial 96.7 (H) 95.0 - 96.0 %    Oxyhemoglobin 95.4 95.0 - 96.0 %    Base Excess, Arterial Calc 5.9 mmol/L    Ventilation Mode CPAP/PS     FIO2      PSV 5 cm H2O    Peep 5 cm H2O    Sample Stabilized Temperature 37.0 degrees C   Basic Metabolic Panel    Collection Time: 12/10/20 10:11 AM   Result Value Ref Range    Sodium 144 136 - 145 mmol/L    Potassium 3.5 3.5 - 5.0 mmol/L    Chloride 108 (H) 98 - 107 mmol/L    CO2 27 22 - 31 mmol/L    Anion Gap, Calculation 9 5 - 18 mmol/L    Glucose 200 (H) 70 - 125 mg/dL    Calcium 8.4 (L) 8.5 - 10.5 mg/dL    BUN 26 (H) 8 - 22 mg/dL    Creatinine 0.77 0.70 - 1.30 mg/dL    GFR MDRD Af Amer >60 >60 mL/min/1.73m2    GFR MDRD Non Af Amer >60 >60 mL/min/1.73m2   HM2(CBC W/O DIFF)    Collection Time: 12/10/20 10:11 AM   Result Value Ref Range    WBC 13.1 (H) 4.0 - 11.0 thou/uL    RBC 4.40 4.40 - 6.20 mill/uL    Hemoglobin 11.6 (L) 14.0 - 18.0 g/dL    Hematocrit 36.9 (L) 40.0 - 54.0 %    MCV 84 80 - 100 fL    MCH 26.4 (L) 27.0 - 34.0 pg    MCHC 31.4 (L) 32.0 - 36.0 g/dL    RDW 14.5 11.0 - 14.5 %    Platelets 256 140 - 440 thou/uL    MPV 9.9 8.5 - 12.5 fL   Blood Gases, Arterial    Collection Time: 12/10/20 11:36 AM   Result Value Ref Range    pH, Arterial 7.50 (H) 7.37 - 7.44    pCO2, Arterial 35 35 - 45 mm Hg    pO2, Arterial 80 75 - 85 mm Hg    Bicarbonate, Arterial Calc 28.3 23.0 - 29.0 mmol/L    O2 Sat, Arterial 95.6 95.0 - 96.0 %    Oxyhemoglobin 94.5 (L) 95.0 - 96.0 %    Base Excess, Arterial Calc 4.5 mmol/L    Ventilation Mode Oxymask     Flow 4.0 LPM    Sample Stabilized Temperature 37.0 degrees C   POCT Glucose     Collection Time: 12/10/20 12:08 PM    Specimen: Blood   Result Value Ref Range    Glucose 182 (H) 70 - 139 mg/dL       Total time spent for face to face visit, reviewing chart/labs/imaging studies, counseling and coordination of care was: Over 25 min More than 50% of this time was spent on counseling and coordination of care.    Reviewing chart.  Coordination of care with the nursing staff.  Discussion about extubation with the ICU staff.    Tima Francis MD  Neurologist  [facility]  Tel:- [phone]    This note was dictated using voice recognition software.  Any grammatical or context distortions are unintentional and inherent to the software.

## 2021-06-13 NOTE — PROGRESS NOTES
[facility] Pulmonary/Critical Care Consult Team Note    Mika Alvarez,  1950, MRN [id number]  Admitting Dx: Acute systolic heart failure (H) [I50.21]  Diabetes mellitus without complication (H) [E11.9]  Chronic atrial fibrillation (H) [I48.20]  NSTEMI (non-ST elevated myocardial infarction) (H) [I21.4]  Date / Time of Admission:  2020  1:57 PM    Overnight Events:  Intake/Output last 3 shifts:  I/O last 3 completed shifts:  In: 4463.5 [I.V.:2908.5; IV Piggyback:1555]  Out: 1715 [Urine:675; Chest Tube:1040]  He refused his NG placement yesterday  Was on an insulin gtt overnight  Chest tube with significant output    Assessment/Plan: Mika Alvarez is a 70 y.o. male with PMHx of HTN, HLD, poorly controlled DM, systolic and diastolic CHF, multi vessel CAD who is s/p 4v CABG with Dr. Madison 20 with a complicated post-op course s/p ableto be extubated 12/10.    PULM/ID: on minimum FiO2  - thick secretions  - continue pulm toilet  - sputum with Strep-on Vanc and Zosyn    CV: Hx of multi vessel CAD who is s/p 4v CABG   - CV Sx primary on above  - on amiodarone, on metroprolol, statin  - Monitor on tele    GI: NPO given failed swallow study  - GI proph  - getting a video swallow eval after SLP recs    RENAL: Hypernatremia, 149 <-- 149  - is on D5 for free water at 150/hr  Lab Results   Component Value Date    CREATININE 0.82 2020     - Follow BUN/Creatinine  - strict I/O's    NEURO: MRI  with 2.5 cm subacute infarct left frontal lobe associated with petechial hemorrhage.  - Neurology was consulted  - on Keppra    ENDO:-   Lab Results   Component Value Date/Time     (H) 2020 04:30 AM     (H) 2020 12:11 PM     (H) 2020 04:13 AM   - Critical Care hyperglycemic protocol  - Accuchecks and sliding scale q4  - will transition off insulin gtt and add lantus     FEN:   - electrolyte replacement protocol    MS:   - Pain control  - PT/OT consult    Pt has critical  "illness and impairs breathing on O2 such as there is high probability of imminent of life threatening deterioration in the patient's condition.    Code Status: FULL CODE    ICU DAILY CHECKLIST           Can patient transfer out of MICU? yes  FAST HUG:  Feeding:  Feeding: No.    Koenig:Yes  Analgesia/Sedation:Yes  Thromboembolic prophylaxis:Heparin  HOB>30:  Yes  Stress Ulcer Protocol Active: yes; Mode: PPI/H2 Antagonist  Glycemic Control:   Lab Results   Component Value Date/Time     (H) 12/13/2020 04:30 AM     (H) 12/12/2020 12:11 PM     (H) 12/12/2020 04:13 AM    Any glucose > 180 yes; Mode of Insulin Therapy: Sliding Scale Insulin  INTUBATED:  Can patient have daily waking:  not applicable  Can patient have spontaneous breathing trial:  not applicable  Does pt have restraints: carlee and MD RESTRAINT FOR NON-VIOLENT BEHAVIOR FACE TO FACE EVALUATION Patient's Immediate Situation: Patient demonstrated the following behaviors: Impulsive behavior, grabbing at support tubes/lines.  Patient's Reaction to the intervention Does patient understand the reason for restraint/seclusion? Unable to Express Medical Condition Is there any evidence of compromise of Skin integrity, Respiratory, Cardiovascular, Musculoskeletal, Hydration? No Behavioral ConditionIn consultation with the RN, is there a need to continue this restraint or seclusion? Yes See Restraint Flowsheet for complete restraint documentation and assessment.  PHYSICAL THERAPY AND MOBILITY: Can patient have PT and mobility trial: no Activity: ICU mobility protocol    Critical Care Time excluding procedures and family discussions greater than: 45 Minutes    Gema Kinsey DO  Pulmonary and Critical Care Attending  pgr [phone]    No Known Allergies    Meds: See MAR    Physical Exam:  /86   Pulse (!) 101   Temp 98.7  F (37.1  C) (Oral)   Resp 18   Ht 5' 10\" (1.778 m)   Wt 206 lb 14.4 oz (93.8 kg)   SpO2 98%   BMI 29.69 kg/m  "   Intake/Output this shift:  No intake/output data recorded.  GEN: sitting up in the bed  HEENT   MMD  CVS: regular rhythm, no murmurs  RESP: CTA BL , Chest tube in place with dressing  ABD: Soft, No abdominal pain with palpation, no guarding, no rigidity  EXT: Warm, well perfused, no rshes, no edema  Vasc: [place] radial pulses intact  NEURO:  CN2-12 grossly intact, moving all extremities  PSYCH: Normal affect    Pertinent Labs: Latest lab results in EHR personally reviewed.   Results from last 7 days   Lab Units 12/12/20  1455 12/10/20  1136 12/10/20  0959   LN-PH ARTERIAL  7.50* 7.50* 7.47*   LN-PO2 ARTERIAL mm Hg 63* 80 93*   LN-PCO2 ARTERIAL mm Hg 35 35 41   LN-O2 SATURATION % 93.4* 95.6 96.7*   LN-HCO3 ARTERIAL CALC mmol/L 28.3 28.3 29.3*    and   Results from last 7 days   Lab Units 12/13/20  0645 12/13/20  0430 12/12/20  0413 12/11/20  0415   LN-WHITE BLOOD CELL COUNT thou/uL  --  12.1* 14.3* 10.7   LN-HEMOGLOBIN g/dL  --  11.1* 11.7* 11.1*   LN-HEMATOCRIT %  --  37.4* 37.9* 36.4*   LN-MEAN CORPUSCULAR VOLUME fL  --  88 86 85   LN-PLATELET COUNT thou/uL 339 378 403 260       Cultures: personally reviewed.   Culture   Date Value Ref Range Status   12/10/2020 Staphylococcus epidermidis (!)  Preliminary     Gram Stain Result   Date Value Ref Range Status   12/10/2020 Gram positive cocci in clusters  Preliminary       personally reviewed images:   Imaging results from past 30 days: Echo Complete    Result Date: 12/3/2020    Technically difficult study. Definity contrast utilized.   Normal left ventricular size. Mild to moderate left ventricular hypertrophy.   Left ventricle ejection fraction is moderately globally reduced. Left ventricular ejection fraction estimated at 30 to 35%. Abnormal septal motion.   Right ventricle not optimally visualized. Right ventricular systolic function is reduced. Abnormal TAPSE   Moderate left atrial enlargement. Mild right atrial enlargement.   Aortic valve sclerosis without Doppler  evidence to suggest aortic stenosis. Mild aortic insufficiency.   Nonspecific thickening of the mitral valve leaflets with mild mitral regurgitation.   Mild tricuspid regurgitation.   Mild enlargement of the aortic root   When compared to the previous study dated 9/20/2020, the prior study was also technically challenging. Left ventricular function remains reduced and appears potentially mildly more prominently reduced on the current examination.      Xr Chest 1 View Portable    Result Date: 12/12/2020  EXAM: XR CHEST 1 VIEW PORTABLE LOCATION: [facility] DATE/TIME: 12/12/2020 12:47 PM INDICATION: s/p CABG COMPARISON: 12/8/2020     Interval extubation and removal of the right IJ [place] catheter. Right IJ line terminates over the brachiocephalic-SVC junction. Mediastinal drains are noted. Persistent and marginally increased small bilateral effusions and associated atelectasis. No pneumothorax. Cardiomegaly and prominence of the mediastinum are similar. CABG changes. Mild pulmonary vascular congestion without overt pulmonary edema.    Xr Chest 1 View Portable    Result Date: 12/8/2020  EXAM: XR CHEST 1 VIEW PORTABLE LOCATION: [facility] DATE/TIME: 12/8/2020 6:20 AM INDICATION: s/p cardiac surgery COMPARISON: 12/07/2020 FINDINGS: Endotracheal tube terminates 3.8 cm above the yariel. Right heart catheter terminates over the right main pulmonary artery. Bilateral chest tubes remain in place. Heart size is enlarged but stable status post median sternotomy. Mild prominence of central pulmonary vasculature without overt failure. Dense retrocardiac consolidation on the left is unchanged. No pneumothorax.     No significant interval change.    Xr Chest 1 View Portable    Result Date: 12/7/2020  EXAM: XR CHEST 1 VIEW PORTABLE LOCATION: [facility] DATE/TIME: 12/7/2020 1:51 PM INDICATION: s/p cardiac surgery COMPARISON: Portable AP view of the  chest 02/12/2020     New endotracheal tube is in satisfactory position. Right jugular approach sheath and PA catheter are present. The tip of the PA catheter is in the right pulmonary artery. Mediastinal drain and bilateral chest tubes are present. Coronary ostial markers and vascular clips from revascularization. Mild upper pneumomediastinum and gas in the soft tissues of the left neck. Improved lung inflation particularly of the right lower lobe with improved discrimination of the right hemidiaphragm. Subsegmental sized opacities in the left lung around the hilum and along the diaphragmatic pleura likely representing residual areas of atelectasis. No visible pleural fluid or pneumothorax.    Xr Chest 1 View Portable    Result Date: 12/2/2020  EXAM: XR CHEST 1 VIEW PORTABLE LOCATION: [facility] DATE/TIME: 12/2/2020 2:47 PM INDICATION: Shortness of breath with concern for pulmonary edema secondary to diuretic noncompliance COMPARISON: Chest x-ray 09/19/2020     Small left-sided and small to moderate right-sided pleural effusions with adjacent opacities likely reflecting atelectasis. Cardiomegaly with central vascular congestion and mild pulmonary edema.    Xr Abdomen Ap Portable    Result Date: 12/8/2020  EXAM: XR ABDOMEN AP PORTABLE LOCATION: [facility] DATE/TIME: 12/8/2020 7:30 PM INDICATION: check tube placement COMPARISON: None.     Gastric tube courses below the diaphragmatic hiatus, follows the greater curvature of the stomach with tip along the right lateral aspect of the lumbar spine presumably in the distal stomach. Mediastinal drain and bilateral chest tubes are present. Nonobstructive bowel gas pattern.     Ct Head Without Contrast    Result Date: 12/12/2020  EXAM: CT HEAD WO CONTRAST LOCATION: [facility] DATE/TIME: 12/12/2020 11:51 AM INDICATION: Stroke. Increasing confusion. COMPARISON: Head MRI 12/09/2020 TECHNIQUE:  Routine CT Head without IV contrast. Multiplanar reformats. Dose reduction techniques were used. FINDINGS: INTRACRANIAL CONTENTS: Infarct in the left frontal operculum with subtle associated petechial hemorrhage correlating with the blood products noted on MRI. No gross hemorrhagic transformation. No CT evidence of acute infarct. Chronic infarct in the left cerebellar hemisphere. Mild generalized volume loss. VISUALIZED ORBITS/SINUSES/MASTOIDS: No intraorbital abnormality. No paranasal sinus mucosal disease. No middle ear or mastoid effusion. BONES/SOFT TISSUES: No acute abnormality.     1.  Petechial hemorrhage in the left frontal opercular infarct correlating with the blood products noted on MRI. 2.  No gross hemorrhagic transformation. 3.  Chronic left cerebellar infarct. 4.  No change.    Ct Head Without Contrast    Addendum Date: 12/8/2020    Addendum/ impression: Dr. Domingo discussed the results with Dr. Kraft on 12/8/2020 10:06 PM by telephone.    Result Date: 12/8/2020  EXAM: CT HEAD WO CONTRAST LOCATION: [facility] DATE/TIME: 12/8/2020 9:12 PM INDICATION: seizure COMPARISON: None. TECHNIQUE: Routine CT Head without IV contrast. Multiplanar reformats. Dose reduction techniques were used. FINDINGS: Punctate focus of increased attenuation within the subcortical region of the left frontal lobe. This may represent a focus of petechial hemorrhage, cortical laminar necrosis, mineralization or subarachnoid hemorrhage. This is best demonstrated on axial image #23, series 2 and sagittal image #43, series 5.2. Scattered foci of decreased attenuation within the cerebral hemispheric white matter which are nonspecific, though most commonly ascribed to chronic small vessel ischemic disease. The ventricles and sulci are prominent consistent with mild brain parenchymal volume loss. Gray-white matter differentiation is maintained. The basilar cisterns are patent. The globes are unremarkable. The  partially imaged paranasal sinuses, mastoid air cells and middle ear cavities are unremarkable. The visualized skull base and calvarium are unremarkable.     1.  Punctate focus of increased attenuation within the subcortical region of the left frontal lobe. This may represent a focus of petechial hemorrhage, cortical laminar necrosis, mineralization or subarachnoid hemorrhage. This is best demonstrated on axial image #23, series 2 and sagittal image #43, series 5.2. Comparison with prior studies would be helpful if available. 2.  Otherwise recommend brain MRI for further evaluation. Additionally follow-up CT is could be performed to ensure stability or resolution.    Mr Brain With Without Contrast    Result Date: 12/9/2020  EXAM: MR BRAIN W WO CONTRAST LOCATION: [facility] DATE/TIME: 12/9/2020 1:07 PM INDICATION: Seizure. Altered mental status. Abnormal head CT, hemorrhage. COMPARISON: Head CT 12/08/2020. CONTRAST: 10 mL of gadobutrol intravenous contrast. TECHNIQUE: Routine multiplanar multisequence head MRI without and with intravenous contrast. FINDINGS: Cortical diffusion hyperintensity without most minimal restriction along the margin of the left middle and inferior frontal gyri demonstrates cortical signal loss on the susceptibility sensitive sequence with gyriform enhancement over a 2.5 x 1.5 x 1 cm area. Constellation of findings are most consistent with a subacute infarct and petechial hemorrhage. This corresponds to the abnormality identified on the comparison head CT.  Mild mucosal thickening in the sinuses. A few mastoid air cells on each side contain a small amount of fluid. Intraorbital contents are unremarkable. There is mild generalized prominence of the sulci and ventricles, consistent with underlying volume loss. Intracranial flow voids are intact. Small chronic infarct left cerebellum. There is no mass effect, midline shift, or extraaxial collection. There are scattered  foci of T2/FLAIR hyperintensity within the cerebral white matter that are nonspecific but most commonly reflect the sequela of chronic small vessel ischemic disease.     The abnormality identified on head CT corresponds to a 2.5 cm subacute infarct in the left frontal lobe associated with petechial hemorrhage. No significant mass effect.     Xr Swallow Study W Speech    Result Date: 12/12/2020  EXAM: XR SWALLOW STUDY W SPEECH OR OT LOCATION: [facility] DATE/TIME: 12/12/2020 9:28 AM INDICATION: Difficulty swallowing. COMPARISON: None. TECHNIQUE: Routine. FINDINGS: FLUOROSCOPIC TIME: 1.2 minutes NUMBER OF IMAGES: 0 Swallow study with Speech Pathology using multiple barium thicknesses. There is silent aspiration with thin liquid with no cough reflex. There is pooling of all consistencies in the valleculae with some poor over and there is penetration to the vocal cords with nectar and honey thick liquid. Chin tuck technique improved inversion of the epiglottis mildly.     1.  Silent aspiration with thin liquid. Penetration with nectar and honey consistencies. Stasis with pooling in the valleculae.     Us Carotid Bilateral    Result Date: 12/3/2020  EXAM: US CAROTID BILATERAL LOCATION: [facility] DATE/TIME: 12/3/2020 3:12 PM INDICATION: Coronary artery disease. COMPARISON: None. TECHNIQUE: Duplex exam performed utilizing 2D gray-scale imaging, Doppler interrogation with color-flow and spectral waveform analysis. The percent diameter stenosis is determined using NASCET criteria and Society of Radiologists in Ultrasound Consensus Criteria. FINDINGS: RIGHT: Mild plaque at the bifurcation. The peak systolic velocity in the ICA is less than 125 cm/sec, consistent with less than 50% stenosis. Normal velocities in the ECA. Antegrade flow within the vertebral artery. LEFT: Mild plaque at the bifurcation. The peak systolic velocity in the ICA is less than 125 cm/sec,  consistent with less than 50% stenosis. Normal velocities in the ECA. Antegrade flow within the vertebral artery. VELOCITY CHART: CCA   Right: 64/16 cm/s   Left: 91/24 cm/s ICA   Right: 71/27 cm/s   Left: 60/22 cm/s ECA   Right: 64/11 cm/s   Left: 96/14 cm/s ICA/CCA PSV Ratio   Right: 1.1   Left: 0.66     1.  Mild plaque formation, velocities consistent with less than 50% stenosis in the right internal carotid artery. 2.  Mild plaque formation, velocities consistent with less than 50% stenosis in the left internal carotid artery. 3.  Flow within the vertebral arteries is antegrade.    Us Vein Mapping For Pre Bypass Graft Bilateral    Result Date: 12/4/2020  EXAM: US VEIN MAPPING FOR PRE BYPASS GRAFT BILATERAL LOCATION: [facility] DATE/TIME: 12/3/2020 6:46 PM INDICATION: Lower extremity venous mapping prior to coronary or peripheral bypass surgery, coronary artery disease, diabetic leg ulcer COMPARISON: 9/19/2020 TECHNIQUE: Ultrasound examination of the lower extremity veins was performed, including gray-scale and compression imaging. LOWER  EXTREMITY FINDINGS:   VENOUS DIAMETERS RIGHT GREAT SAPHENOUS VEIN Upper Thigh: 3.0 mm Mid Thigh: 2.4 mm Lower Thigh: 2.5 mm Knee: The great saphenous vein is thrombosed from the knee to the ankle. LEFT GREAT SAPHENOUS VEIN Upper Thigh: 4.7 mm Mid Thigh: 4.1 mm Lower Thigh: 4.5 mm Knee: 2.9 mm Upper Calf: 3.2 mm Mid Calf: 2.2 mm Lower Calf: 2.0 mm Ankle: 1.1 mm RIGHT SMALL SAPHENOUS VEIN Not visualized LEFT SMALL SAPHENOUS VEIN The small saphenous vein is thrombosed from the knee to the ankle. There is underlying bilateral subcutaneous edema. No evidence of deep vein thrombosis.     1. Exam positive for superficial thrombophlebitis involving the right great saphenous vein from the knee to the ankle and left small saphenous vein from the knee to the ankle. 2. The right small saphenous vein is not visualized. 3. No evidence of thrombus extension into the  deep venous system. 4. Diffuse subcutaneous edema without discrete fluid collections. NOTE: ABNORMAL REPORT    Echo Monitor Ryder    Result Date: 12/7/2020    Left ventricle ejection fraction is moderately decreased. The calculated left ventricular ejection fraction is 38%.      Eeg Coma Or Sleep Only    Result Date: 12/8/2020  EEG report DATE 12/08/20 CLINICAL HISTORY This is a 70 y.o. male with left leg jerking. The EEG is done to look for underlying epilepsy. INTRODUCTION This is a routine EEG performed utilizing standard 10- 20 system of electrode placement with 20 channels of recording including one channel of EKG monitor. The patient was studied in awake and drowsy/somnolent state. EEG TIME: 31 min DESCRIPTION OF THE RECORD The EEG background consists of a generalized symmetric delta/theta background.  No sharp large amplitude rhythmic activity to suggest electrographic seizures were seen.  Mild to moderate background dysrhythmias present.  No activation maneuvers were done. IMPRESSION/REPORT/PLAN This is an abnormal EEG during wakefulness and somnolence/drowsiness due to generalized slow background.  EEG suggestive of an generalized cerebral dysfunction such as seen in an encephalopathy.  EEG is not suggestive of active ongoing seizures during the recording. Further clinical correlation is needed. Please note that the absence of epileptiform abnormalities does not exclude the possibility of epilepsy in any patient.       Patient Active Problem List   Diagnosis     Obesity     ACP Staging Stage 1 Hypertension: 140-159 / 90-99     Coronary artery disease involving native heart with other form of angina pectoris, unspecified vessel or lesion type (H)     Type 2 diabetes mellitus (H)     Joint Pain, Localized In The Shoulder     Hyperlipidemia, unspecified hyperlipidemia type     Moderate episode of recurrent major depressive disorder (H)     Pulmonary edema cardiac cause (H)     New onset atrial fibrillation (H)      Acute systolic heart failure (H)     CAD (coronary artery disease)     New onset a-fib (H)     Acute on chronic clinical systolic heart failure (H)     Atrial fibrillation with rapid ventricular response (H)     Diabetic leg ulcer (H)     Acute on chronic systolic CHF (congestive heart failure) (H)     A-fib (H)     Uncontrolled type 2 diabetes mellitus with hyperglycemia (H)     Acute respiratory failure with hypoxia (H)     On mechanically assisted ventilation (H)     S/P CABG (coronary artery bypass graft)     Seizures (H)     Cerebrovascular accident (CVA), unspecified mechanism (H)     Cerebrovascular accident (CVA) due to other mechanism (H)     Metabolic encephalopathy     Disorientation       Gema Kinsey DO  Pulmonary and Critical Care Attending  pgr [phone]

## 2021-06-13 NOTE — PROGRESS NOTES
RT Heart Teaching Worksheet       Date of Surgery 12/07/20       Date Taught 12/06/20  Time of Surgery  0730        Surgeon Dr. Madison    IS 1100 ml Achieved        IS 2550 ml Predicted        Height 70 in.      History : Smoker ?:NA   Quit ? :  NA        When? NA                       History: COPD ?:NA                                      Asthma ? : NA               CRIS ? : NA               Home Machine ? : NA                                         What Meds if Lung History?: none    Items to discuss with Patient : (done or not done)     Heart Pillow/ Coughing: done  Flutter Valve: done  Questions Answered: done    Charting that needs to be done: (done or not done)    IS charted in Flowsheet done  IS achieved placed in patient binder na  Education Charted in Ed Activity done  Charges/productivity done      Comments/ Note :NA

## 2021-06-13 NOTE — PROGRESS NOTES
"Spiritual Care Note    Spiritual Assessment:  made an in person visit with patient this morning. Patient is know to  from previous admission. Patient seems in good spirits and states he is doing well. Patient spoke openly about coming into the hospital and finding out that he will need open heart surgery next week. Patient doesn't seem shocked by the news and expressed no worries as he talks about, \"having confidence in his surgeon and knowing he will come through it ok.\" Patient shares his wife is struggling a bit with him being hospitalized and not being able to be here with his upcoming surgery but states she has wonderful support from family and friends who are looking out for her. Patient seems to be coping well and thanked the  for coming and offering support. No concerns noted.     Care Provided:   Listening and support provided.     Plan of Care: Spiritual care will continue to follow as part of patient's care team.    TOOTIE Costello, BCC    "

## 2021-06-13 NOTE — PROGRESS NOTES
RCAT Treatment Plan      Patient Score: 14  Patient Acutiy: 3    Clinical Indication for Therapy: Post CABG    Therapy Ordered: Metaneb and IS QID    Assessment Summary: Pt is on room air, has weak productive cough. Pt able to pull -600 ml with poor effort. Currently lethargic, but follow commends. Metaneb initiated for lung expansion therapy and bronchial hygiene, tolerated well.  RT will continue follow 24 hrs and reeval progress.         12/16/20 0750   Reason for Assessment (RCAT)   RCAT Assesment Re-eval   Assessment Reason  Cardiac surgery   $ RCAT Eval Time 15 min.  Yes   Vitals (RCAT)   Heart Rate 95   SpO2 95 %   Chart Assessment (RCAT)   Pulmonary Status  0   Surgical Status  3   Chest Xray  4   Patient Assessment (RCAT)    Respiratory Pattern  0   Mental Status  1   Breath Sounds  2   Cough Effectiveness  3   Level of Activity  1   O2 Required SpO2 >= 92%  0   Chart + Pt. Assessment Total Points  14   RCAT Acuity Score 14 (Acuity 3)   Clinical Indications (RCAT)   Broncho-Pulmonary Therapy Productive cough;Pt. unable to deep breath and cough spontaneously   Volume Expansion Therapy Prevent atelectasis   RCAT Order Placed  Yes       Lico Joe, LRT

## 2021-06-13 NOTE — PROGRESS NOTES
CVTS Daily Progress Note   12/15/2020   POD# 8 s/p CABGx4 kendrick PVI, DONNA excision   DOS 12/7/20 Dr. Madison   LOS: 13 days     Overnight events  Less agitated although did not sleep much during the night. 1:1 Sitter in room.     Pt seen in bed and chair, awake alert and calmer this morning. GOULD to command, but is weak and sleepy after conversation. Sats 100% on RA, non labored. Has been NPO with concern for aspiration. Leukocytosis trending up 18.3 (17.7). Hgb Sternal incision dry but has some yellow colored scabbing at upper pole.  CT continue to put out excessive serous drainage.  Return of bowel function, LE minimal edema with ulcers covered.     Afebrile, Normotensive, Atrial fib with CVR  97.3 Tm single  Blood + gram + cocci- staph epidermidis- per ID probable contaminant.   Sputum  cx +Beta hemolytic streptococcus not group A.   On Zosyn- for 7 days - started 12/10     Hypernatremia resolved 142  (149-150). Creatinine 1.06   Kendrick pleural CTs with excessive serous drainage.    Koenig replaced 12/13 for urinary retention     Denies discomfort, nausea, shortness of breath.    No further rhythmic movements of left leg.   MRI area of ischemic lesion and EEG - for seizure activity show no correlation with previous non- purposeful rhythmic movement of left leg.      PLAN  Video swallow  NJ tube placement per IR  Dietary recommendation for TFding once placed   reassess pleural CT's later today   CBC  Increased two times a day lantus dose  CXR   Transition to oral amio  Replace mag     OBJECTIVE:    Temp:  [97.5  F (36.4  C)-98.8  F (37.1  C)] 98.8  F (37.1  C)  Heart Rate:  [] 85  Resp:  [17-29] 25  BP: ()/(59-86) 113/78  Wt Readings from Last 2 Encounters:   12/15/20 0630 212 lb (96.2 kg)   12/14/20 0400 216 lb 1.6 oz (98 kg)   12/12/20 0400 206 lb 14.4 oz (93.8 kg)   12/11/20 0630 210 lb 4.8 oz (95.4 kg)   12/10/20 0500 212 lb (96.2 kg)   12/09/20 0414 217 lb 2.5 oz (98.5 kg)   12/08/20 0345 219 lb 5.7 oz  (99.5 kg)   12/07/20 0417 218 lb 8 oz (99.1 kg)   12/06/20 0301 220 lb 6.4 oz (100 kg)   12/05/20 0340 220 lb 1.6 oz (99.8 kg)   12/04/20 0354 (!) 226 lb (102.5 kg)   12/03/20 1236 (!) 223 lb (101.2 kg)   12/03/20 0441 (!) 223 lb (101.2 kg)   12/02/20 1722 (!) 224 lb 3.2 oz (101.7 kg)   12/02/20 1401 (!) 237 lb 9.6 oz (107.8 kg)   11/05/20 1302 (!) 223 lb (101.2 kg)       Current Medications:    Scheduled Meds:    acetaminophen  650 mg Oral Q6H    Or     acetaminophen  650 mg Rectal Q6H     amiodarone  200 mg Enteral Tube BID     aspirin  81 mg Enteral Tube DAILY     aspirin  150 mg Rectal DAILY     atorvastatin  80 mg Enteral Tube QHS     bisacodyL  10 mg Oral Once     famotidine (PEPCID) injection  20 mg Intravenous BID    Or     famotidine  20 mg Oral BID     furosemide  20 mg Intravenous DAILY     heparin (PF) ANTICOAGULANT  5,000 Units Subcutaneous Q8H FIXED TIMES     insulin aspart (NovoLOG) injection   Subcutaneous Q4H FIXED TIMES     insulin glargine  8 Units Subcutaneous BID     levETIRAcetam (KEPPRA) IVPB  500 mg Intravenous Q12H     lidocaine (PF)  1-5 mL Intradermal Once     magnesium hydroxide  30 mL Oral DAILY     magnesium sulfate IVPB  2 g Intravenous Once     melatonin  3 mg Enteral Tube QHS     metoprolol tartrate  25 mg Enteral Tube BID     piperacillin-tazobactam  3.375 g Intravenous Q8H     potassium chloride  10 mEq Intravenous Q1H     QUEtiapine  25 mg Oral QHS     sodium chloride bacteriostatic  1-5 mL Intradermal Once     sodium chloride  10 mL Intravenous Q8H FIXED TIMES     sodium chloride  10 mL Intravenous Q8H FIXED TIMES     sodium chloride  3 mL Intravenous Q8H FIXED TIMES     Continuous Infusions:    amiodarone 900 mg/500 ml standard 24 hour infusion       PRN Meds:.acetaminophen, acetaminophen, aluminum-magnesium hydroxide-simethicone, bisacodyL, bisacodyL, dextrose 50 % (D50W), docusate sodium (COLACE) 50 mg/5 mL oral liquid, glucagon (human recombinant), hydrALAZINE, magnesium  hydroxide, metoprolol tartrate, polyethylene glycol, sodium chloride, sodium chloride bacteriostatic, sodium chloride, sodium chloride, sodium chloride, sodium chloride, sodium chloride, sodium chloride, sodium phosphates 133 mL, traMADoL, white petrolatum    Cardiographics:    Telemetry monitoring demonstrates afib with rates in the 80s per my personal review.    Imaging:  DATE/TIME: 12/15/2020 8:58 AM     INDICATION: s/p CABG  possible aspiration  COMPARISON: 12/12/2020     IMPRESSION:   Sternal wires. Removal IJ sheath. Stable position bilateral chest drains.     Stable enlarged cardiac silhouette. Unchanged bilateral lung opacities which are likely a combination of lung infiltrates and pleural fluid. No pneumothorax. No pulmonary edema.    Lab Results (most recent, reviewed):    Hemoglobin (g/dL)   Date Value   12/14/2020 11.7 (L)     WBC (thou/uL)   Date Value   12/14/2020 17.7 (H)     Potassium (mmol/L)   Date Value   12/15/2020 3.7     Creatinine (mg/dL)   Date Value   12/15/2020 1.06     Hemoglobin A1c (%)   Date Value   12/03/2020 12.6 (H)     Magnesium (mg/dL)   Date Value   12/15/2020 2.0     Calcium (mg/dL)   Date Value   12/14/2020 7.8 (L)       UOP: 2.8  CT:  830 cc /24 hours- serous drainage    Left pleural  ml/24 hrs   Right pleural  ml/24 hrs    Physical Exam:    General: Patient seen in bed and up in chair, awake, calm answers questions appropriately    CV: Afib on monitor. CVR 80s  Pulm: Non-labored effort on RA  Chest tubes in place, serosanguinous output, no air leak.- excessive serosang drainage. Separate pleural atriums  Sternal Incision C/D/I.- some yellow scabbing at upper pole, no free drainage noted  Abd: Soft, NT, ND + BM12/14  : Koenig replaced for urinary retention  Ext: No pedal edema, SCDs in place, warm    Neuro  Awake no gross deficits noted. No further rhythmic movement left leg. Awakens easily and more calm this morning.  LE - WOC seeing for pre op non-PU ulcer:  venous     ASSESSMENT/PLAN:    Mika Alvarez is a 70 y.o. male with a history of CAD who is s/p CABGx4,PVI, DONNA excisions    Principal Problem:    Acute on chronic clinical systolic heart failure (H)  Active Problems:    ACP Staging Stage 1 Hypertension: 140-159 / 90-99    Type 2 diabetes mellitus (H)    Hyperlipidemia, unspecified hyperlipidemia type    CAD (coronary artery disease)    Atrial fibrillation with rapid ventricular response (H)    Diabetic leg ulcer (H)    Acute on chronic systolic CHF (congestive heart failure) (H)    A-fib (H)    Uncontrolled type 2 diabetes mellitus with hyperglycemia (H)    Acute respiratory failure with hypoxia (H)    On mechanically assisted ventilation (H)    S/P CABG (coronary artery bypass graft)    Seizures (H)    Cerebrovascular accident (CVA), unspecified mechanism (H)    Cerebrovascular accident (CVA) due to other mechanism (H)    Metabolic encephalopathy    Disorientation      NEURO:   - Scheduled Tylenol and PRN tramadol for pain  - Melatonin at bedtime    - Neuro following, appreciate. Initial concern for seizure given rhythmic LLE twitching although EEG negative for sz and ischemic area of brain on MRI not consistent with area.     - No further LLE rhythmic movement noted  - Keppra per Neuro started 12/10    CV:   - Normo/hypertensive  - Atrial fib RVR   - Re consulted cardiology for rhythm management  - Metoprolol 25 mg two times a day per FT   - PRN IV doses  - Amiodarone po/ mg bid  - No plan for a-fib anticoagulation given LAAE and surgeon preference  - ASA 81mg  - Atorvastatin 80mg daily  - Chest tubes- reassess later today   - responded well to gentle diuresis yesterday      PULM:   - Extubated POD #3   - Maintaining oxygen saturations on RA  -    - Encourage pulmonary toilet     FEN/GI:    - Faiiled video swallow - 12/12 and repeat 12/15   - NJ feeding tube placed after 2nd failed swallow  - Repeat video swallow no bedside exam due to silent aspiration  concerns.   - SLP to see daily  - Continue electrolyte replacement protocol  - Hyper natremia- resolved Na 142   - Diet: NPO-   - 12/15 Consult dietary for TF and free water recommendations  - Bowel regimen + BM 12/14    RENAL:  - Adequate UOP/hr. Continue to monitor closely via gardner.   - Cr 0.82 (0.92)  - gardner removed - obstructed - 12/12  - Gardner replaced for urinary retention 12/13  - Hypernatremia -resolved Na 142 149-150)      HEME:  - Acute blood loss anemia post-op.   - Hgb stable, no bleeding concerns.   - Hep subcutaneous (cleared with neuro),   - [id number]    ID:  - Ronda op ppx complete.  - Afeb   - Sputum culture 12/09 4+ polymorph. Leukocytes 4+ gram + cocci 3+ positive bacilli- Strep beta hemolytic   - Zosyn 12/10  - Vanco 12/10   - UA/UC recheck pending   - Blood cx- positive gram + cocci in clusters - staphy epidermidis- possible contaminate  - Consulted ID rec 7 days of zosyn 12/10 - 12/17  -0 12/15/ UA/UC pending     ENDO:   - Sliding scale insulin-   - Lantus increased to 8 unit(s) two times a day   - Will eventually need PTA DM meds (insulin, glipizide, Metformin)  - Na 143      PPx:   - DVT: SCDs,    - SQ heparin TID  - GI: Omeprazole     DISPO:   - Continue critical care in ICU    Follow up   Neurology - 3 weeks with MRI   MRI in 3 weeks

## 2021-06-13 NOTE — PROGRESS NOTES
NEUROLOGY PROGRESS NOTE     Mika Alvarez,  1950, MRN [id number] Date: 2020      [facility]   Code status:  Full Code   PCP: Angel Claire MD, [phone]      ASSESSMENT & PLAN   Hospital Day#: 9  Diagnosis code: Convulsive activity-rule out seizure    Convulsive activity-rule out seizure  Stroke      Head CT shows punctate focus in the left frontal lobe    MRI brain shows 2.5 cm subacute infarct in the left frontal lobe associated with petechial hemorrhage    This lesion would not fit with patient left leg jerking activity.    Most likely related to cardiac surgery    Carotid ultrasound does not show any significant stenosis.    Recent echocardiogram showed decrease ejection fraction 38%    Lipid panel and statin    On aspirin.  Subcu heparin can be used.    Blood pressure can be normalized    EEG negative for ongoing seizures    Jerking movements have resolved with the Keppra.  We will continue Keppra for right now.  Keppra level is therapeutic.    Would need repeat EEG as an outpatient and possible stopping the Keppra depending on the outpatient EEG results.    Seizure precautions    Electrolyte panel noncontributory though calcium was 8.0    WBC 13.0    Dr. Bailey to follow tomorrow.     Thank you again for this referral, please feel free to contact me if you have any questions.     CHIEF COMPLAINT Acute on chronic clinical systolic heart failure (H)     HISTORY OF PRESENT ILLNESS     We have been requested by Dr. Littlejohn to evaluate Mika Alvarez who is a 70 y.o.  male for seizures.    This 70-year-old male on whom neurology been consulted evaluate for seizures.  Patient was admitted for dyspnea from acute on chronic systolic heart failure.  Patient was known to have multivessel coronary disease.  Patient was noncompliant with diet and medication.  Patient needed a CABG yesterday.  This morning there was some rhythmic left leg jerking activity.  Patient was given Ativan which did  help resolve this activity.  The jerking lasted for a few minutes.  It did not spread to other extremities.  There was no loss of consciousness.  With the Ativan he did become somnolent.  He was concerned that this might be a seizure.  As result neurology was consulted.  Patient has not had any previous seizures that is known.  No family history of seizures.  No recent head injuries.  No drug use.  EEG was done today.  Patient remains intubated and off sedation for right now.    12/9  Patient remains intubated.  He is on sedation because he gets agitated.  Every time he is awake there is some jerking of the left leg.  EEG was negative for ongoing seizures.  Plan to possibly extubate him later today and then reexamine him.  Head CT and MRI did show a subacute infarct with petechial hemorrhage in the left frontal lobe.    12/10  Patient was extubated yesterday.  Is more alert today but still somnolent.  Continues to have some jerking movement of the left leg.  Ortiz with nursing staff about starting seizure medication.    12/11  No seizures.  The jerking movement of the left leg is resolved.  Patient continues to ask for water.  Reports no side effects to the Keppra.  Keppra level therapeutic.     PAST MEDICAL & SURGICAL HISTORY     Medical History  Patient Active Problem List    Diagnosis Date Noted     Cerebrovascular accident (CVA) due to other mechanism (H)      Metabolic encephalopathy      Cerebrovascular accident (CVA), unspecified mechanism (H)      Seizures (H)      Acute respiratory failure with hypoxia (H)      On mechanically assisted ventilation (H)      S/P CABG (coronary artery bypass graft)      Uncontrolled type 2 diabetes mellitus with hyperglycemia (H)      Acute on chronic clinical systolic heart failure (H) 12/02/2020     Atrial fibrillation with rapid ventricular response (H) 12/02/2020     Diabetic leg ulcer (H) 12/02/2020     Acute on chronic systolic CHF (congestive heart failure) (H)  12/02/2020     A-fib (H) 12/02/2020     Acute systolic heart failure (H)      New onset atrial fibrillation (H)      Pulmonary edema cardiac cause (H) 09/19/2020     CAD (coronary artery disease) 09/19/2020     New onset a-fib (H) 09/19/2020     Moderate episode of recurrent major depressive disorder (H) 06/21/2019     Hyperlipidemia, unspecified hyperlipidemia type 04/27/2016     Joint Pain, Localized In The Shoulder      Obesity      ACP Staging Stage 1 Hypertension: 140-159 / 90-99      Coronary artery disease involving native heart with other form of angina pectoris, unspecified vessel or lesion type (H)      Type 2 diabetes mellitus (H)      Past Medical History: Patient  has a past medical history of ACP Staging Stage 1 Hypertension: 140-159 / 90-99, Atrial fibrillation with rapid ventricular response (H) (12/2/2020), Coronary Artery Disease, Joint Pain, Localized In The Shoulder, Obesity, Other and unspecified hyperlipidemia (4/27/2016), and Type 2 Diabetes Mellitus With Complication.  Surgical History  He  has a past surgical history that includes Coronary Angiogram (N/A, 9/22/2020) and Left Heart Catheterization with Left Ventriculogram (N/A, 9/22/2020).     SOCIAL HISTORY     Reviewed, and he  reports that he has never smoked. He has never used smokeless tobacco. He reports current alcohol use. He reports that he does not use drugs.     FAMILY HISTORY     Reviewed, and family history includes Heart disease in his father.     ALLERGIES     No Known Allergies     REVIEW OF SYSTEMS     Unable to do review of systems due to altered mental status.     HOME & HOSPITAL MEDICATIONS     Prior to Admission Medications  Medications Prior to Admission   Medication Sig Dispense Refill Last Dose     glipiZIDE (GLUCOTROL XL) 10 MG 24 hr tablet Take 2 tablets (20 mg total) by mouth once daily. (Patient taking differently: Take 10 mg by mouth once daily. ) 180 tablet 1 Unknown at Unknown time     insulin glargine (LANTUS  "SOLOSTAR U-100 INSULIN) 100 unit/mL (3 mL) pen Inject 35 Units under the skin at bedtime. 15 mL 0 Unknown at Unknown time     losartan (COZAAR) 100 MG tablet Take 1 tablet (100 mg total) by mouth daily. 90 tablet 2 Unknown at Unknown time     metFORMIN (GLUCOPHAGE) 1000 MG tablet Take 1 tablet (1,000 mg total) by mouth 2 (two) times a day with meals. (Patient taking differently: Take 1,000 mg by mouth daily with breakfast. ) 180 tablet 0 Unknown at Unknown time     metoprolol succinate (TOPROL-XL) 100 MG 24 hr tablet Take 1 tablet (100 mg total) by mouth 2 (two) times a day. (Patient taking differently: Take 100 mg by mouth daily. ) 90 tablet 1 Unknown at Unknown time     nitroglycerin (NITROSTAT) 0.4 MG SL tablet Place 0.4 mg under the tongue every 5 (five) minutes as needed for chest pain.   Unknown at Unknown time     amLODIPine (NORVASC) 10 MG tablet TAKE 1 TABLET BY MOUTH ONCE DAILY. OVERDUE FOR DIABETIC CHECK 90 tablet 3      aspirin 81 MG EC tablet Take 81 mg by mouth daily.   Unknown at Unknown time     atorvastatin (LIPITOR) 80 MG tablet Take 1 tablet (80 mg total) by mouth daily. 30 tablet 0 Unknown at Unknown time     blood glucose test strips Use 1 each As Directed 2 (two) times a day. Accu-Chek Guide test strips. Patient is on insulin. 200 strip 3      blood-glucose meter (ACCU-CHEK ADVANTAGE DIABETES) kit Test 4 times daily.        bumetanide (BUMEX) 0.5 MG tablet Take 2 tablets (1 mg total) by mouth daily. 60 tablet 0 Unknown at Unknown time     generic lancets Use 1 each As Directed 2 (two) times a day. Accu-Chek Fastclix Lancets. Patient is insulin dependant. 200 each 3      insulin lispro (HUMALOG KWIKPEN INSULIN) 100 unit/mL pen Inject 5 Units under the skin 3 (three) times a day with meals. 15 mL 0      NEEDLES, INSULIN DISPOSABLE (BD ULTRA-FINE ROBERT PEN NEEDLES MISC) use up to 4 times daily as directed.        pen needle, diabetic (BD ULTRA-FINE SHORT PEN NEEDLE) 31 gauge x 5/16\" Ndle USE UP TO " 4 TIMES DAILY AS DIRECTED 300 each 0      rivaroxaban ANTICOAGULANT (XARELTO) 20 mg tablet Take 1 tablet (20 mg total) by mouth daily with supper. 30 tablet 0 Unknown at Unknown time       Hospital Medications    acetaminophen  650 mg Oral Q6H    Or     acetaminophen  650 mg Rectal Q6H     acetylcysteine (MUCOMYST) 20% inhalation solution  4 mL Inhalation Q8H - RT     albuterol  2.5 mg Nebulization Q8H - RT     amiodarone  200 mg Oral BID     aspirin  81 mg Enteral Tube DAILY     atorvastatin  80 mg Enteral Tube QHS     bisacodyL  10 mg Oral Once     chlorhexidine  15 mL Topical Q12H 09-21     docusate sodium (COLACE) 50 mg/5 mL oral liquid  100 mg Enteral Tube BID     famotidine (PEPCID) injection  20 mg Intravenous BID    Or     famotidine  20 mg Oral BID     [START ON 12/12/2020] furosemide  40 mg Intravenous DAILY     heparin (PF) ANTICOAGULANT  5,000 Units Subcutaneous Q8H FIXED TIMES     insulin aspart (NovoLOG) injection   Subcutaneous Q4H FIXED TIMES     insulin glargine  12 Units Subcutaneous QHS     levETIRAcetam (KEPPRA) IVPB  500 mg Intravenous Q12H     magnesium hydroxide  30 mL Oral DAILY     melatonin  3 mg Enteral Tube QHS     metoprolol succinate  12.5 mg Oral QHS     metoprolol tartrate  12.5 mg Oral 0330, 1300, 1700 - CV Metoprolol     piperacillin-tazobactam  3.375 g Intravenous Q8H     polyethylene glycol  17 g Enteral Tube DAILY     sodium chloride  10 mL Intravenous Q8H FIXED TIMES     sodium chloride  3 mL Intravenous Q8H FIXED TIMES     vancomycin  1,500 mg Intravenous Q12H        PHYSICAL EXAM     Vital signs  Temp:  [97.5  F (36.4  C)-98.4  F (36.9  C)] 97.5  F (36.4  C)  Heart Rate:  [] 110  Resp:  [16-18] 18  BP: ()/(61-98) 151/86    Weight:   210 lb 4.8 oz (95.4 kg)  Patient was given some sedation for the exam for possible seizures.  Vitals-Reviewed in chart  GENERAL -Health appearing, No apparent distress  EYES- No scleral icterus, no eyelid droop, Pupils - see Neuro  section  HEENT - Normocephalic, atraumatic, minimally responsive.  Unable to check hearing.; Oral mucosa moist and pink in color. External Ears and nose intact.   Neck - supple with no obvious lymphadenopathy or thyromegaly  PULM -on facemask  CV- Pulses present with no peripheral edema/ No significant varicosities.  MSK- Gait - see Neuro section; Strength and tone- see Neuro section; Range of motion grossly intact.  Psych-cooperative and not agitated.    Neurological  Mental status - Patient is somnolent.  Does not follow commands.  Does not open eyes minimally.  Minimal speech.  Nods head yes or no.  Cranial nerves - Pupils are reactive and symmetric; EOMI with oculocephalics, VFIT partially, NLF symmetric  Motor -no spontaneous movement.  Mild jerking of the left leg was seen.  This is very minimal.  Tone - Tone is symmetric bilaterally in upper and lower extremities.  Coordination - Finger to nose and heel to shin-does not follow commands  Gait and station -unable to ambulate due to altered mental status.  Formal gait testing cannot be done because of safety concerns from ongoing medical issues.     DIAGNOSTIC STUDIES     Pertinent Radiology   Carotid US  IMPRESSION:   1.  Mild plaque formation, velocities consistent with less than 50% stenosis in the right internal carotid artery.  2.  Mild plaque formation, velocities consistent with less than 50% stenosis in the left internal carotid artery.  3.  Flow within the vertebral arteries is antegrade.    CT  IMPRESSION:   1.  Punctate focus of increased attenuation within the subcortical region of the left frontal lobe. This may represent a focus of petechial hemorrhage, cortical laminar necrosis, mineralization or subarachnoid hemorrhage. This is best demonstrated on   axial image #23, series 2 and sagittal image #43, series 5.2. Comparison with prior studies would be helpful if available.  2.  Otherwise recommend brain MRI for further evaluation. Additionally  follow-up CT is could be performed to ensure stability or resolution.    MRI  IMPRESSION:   The abnormality identified on head CT corresponds to a 2.5 cm subacute infarct in the left frontal lobe associated with petechial hemorrhage. No significant mass effect.    Recent Results (from the past 24 hour(s))   POCT Glucose    Collection Time: 12/11/20 12:32 AM    Specimen: Blood   Result Value Ref Range    Glucose 217 (H) 70 - 139 mg/dL   Magnesium    Collection Time: 12/11/20  4:15 AM   Result Value Ref Range    Magnesium 1.9 1.8 - 2.6 mg/dL   Basic Metabolic Panel    Collection Time: 12/11/20  4:15 AM   Result Value Ref Range    Sodium 148 (H) 136 - 145 mmol/L    Potassium 3.2 (L) 3.5 - 5.0 mmol/L    Chloride 114 (H) 98 - 107 mmol/L    CO2 24 22 - 31 mmol/L    Anion Gap, Calculation 10 5 - 18 mmol/L    Glucose 204 (H) 70 - 125 mg/dL    Calcium 7.5 (L) 8.5 - 10.5 mg/dL    BUN 26 (H) 8 - 22 mg/dL    Creatinine 0.76 0.70 - 1.30 mg/dL    GFR MDRD Af Amer >60 >60 mL/min/1.73m2    GFR MDRD Non Af Amer >60 >60 mL/min/1.73m2   HM2(CBC W/O DIFF)    Collection Time: 12/11/20  4:15 AM   Result Value Ref Range    WBC 10.7 4.0 - 11.0 thou/uL    RBC 4.26 (L) 4.40 - 6.20 mill/uL    Hemoglobin 11.1 (L) 14.0 - 18.0 g/dL    Hematocrit 36.4 (L) 40.0 - 54.0 %    MCV 85 80 - 100 fL    MCH 26.1 (L) 27.0 - 34.0 pg    MCHC 30.5 (L) 32.0 - 36.0 g/dL    RDW 14.5 11.0 - 14.5 %    Platelets 260 140 - 440 thou/uL    MPV 10.1 8.5 - 12.5 fL   Levetiracetam [Keppra ]    Collection Time: 12/11/20  4:15 AM   Result Value Ref Range    Levetiracetam 16.6 6.0 - 46.0 ug/mL   POCT Glucose    Collection Time: 12/11/20  4:18 AM    Specimen: Blood   Result Value Ref Range    Glucose 194 (H) 70 - 139 mg/dL   POCT Glucose    Collection Time: 12/11/20  7:53 AM    Specimen: Blood   Result Value Ref Range    Glucose 186 (H) 70 - 139 mg/dL   Potassium    Collection Time: 12/11/20 12:07 PM   Result Value Ref Range    Potassium 3.7 3.5 - 5.0 mmol/L   POCT Glucose     Collection Time: 12/11/20 12:07 PM    Specimen: Blood   Result Value Ref Range    Glucose 214 (H) 70 - 139 mg/dL   POCT Glucose    Collection Time: 12/11/20  3:56 PM    Specimen: Blood   Result Value Ref Range    Glucose 229 (H) 70 - 139 mg/dL   POCT Glucose    Collection Time: 12/11/20  7:52 PM    Specimen: Blood   Result Value Ref Range    Glucose 216 (H) 70 - 139 mg/dL       Total time spent for face to face visit, reviewing chart/labs/imaging studies, counseling and coordination of care was: Over 25 min More than 50% of this time was spent on counseling and coordination of care.    Reviewing chart.  Coordination of care with the nursing staff.    Tima Francis MD  Neurologist  [facility]  Tel:- [phone]    This note was dictated using voice recognition software.  Any grammatical or context distortions are unintentional and inherent to the software.

## 2021-06-13 NOTE — PROGRESS NOTES
"Hospitalist Progress Note    DOS: 12/14/20    S:  Pt resting in bed, no acute complaints.     ROS: 10-point review of systems negative unless otherwise stated as above.    O:  Vital signs:  /72   Pulse (!) 121   Temp 97.8  F (36.6  C) (Oral)   Resp 24   Ht 5' 10\" (1.778 m)   Wt 216 lb 1.6 oz (98 kg)   SpO2 91%   BMI 31.01 kg/m      Intake/Output Summary (Last 24 hours) at 12/14/2020 1147  Last data filed at 12/14/2020 1000  Gross per 24 hour   Intake 3616.22 ml   Output 1906 ml   Net 1710.22 ml       Physical Exam:  General: Somnolent this morning. NAD.  Eyes: Eyes tracking.  Neck: Supple.  CV: S1, S2. Tachycardic, irregularly irregular. Peripheral pulses intact. No LE edema.  Lungs: CTAB. Not using accessory muscles. On nasal cannula.  Abdomen: Soft. BS present. No tenderness, guarding, distension.  Neuro: Sleepy this morning.  Skin: Warm, dry. No rashes.    Scheduled Meds:    acetaminophen  650 mg Oral Q6H    Or     acetaminophen  650 mg Rectal Q6H     amiodarone  200 mg Oral DAILY     aspirin  81 mg Enteral Tube DAILY     aspirin  150 mg Rectal DAILY     atorvastatin  80 mg Enteral Tube QHS     bisacodyL  10 mg Oral Once     famotidine (PEPCID) injection  20 mg Intravenous BID    Or     famotidine  20 mg Oral BID     heparin (PF) ANTICOAGULANT  5,000 Units Subcutaneous Q8H FIXED TIMES     insulin aspart (NovoLOG) injection   Subcutaneous Q4H FIXED TIMES     insulin glargine  5 Units Subcutaneous DAILY     levETIRAcetam (KEPPRA) IVPB  500 mg Intravenous Q12H     lidocaine (PF)  1-5 mL Intradermal Once     magnesium hydroxide  30 mL Oral DAILY     melatonin  3 mg Enteral Tube QHS     metoprolol tartrate  37.5 mg Oral BID     metoprolol tartrate  12.5 mg Oral 0330, 1300, 1700 - CV Metoprolol     piperacillin-tazobactam  3.375 g Intravenous Q8H     polyethylene glycol  17 g Enteral Tube DAILY     sodium chloride bacteriostatic  1-5 mL Intradermal Once     sodium chloride  10 mL Intravenous Q8H FIXED " TIMES     sodium chloride  3 mL Intravenous Q8H FIXED TIMES     Continuous Infusions:    dextrose 5% 75 mL/hr (12/14/20 1015)     PRN Meds:.acetaminophen, acetaminophen, aluminum-magnesium hydroxide-simethicone, bisacodyL, bisacodyL, dextrose 50 % (D50W), docusate sodium (COLACE) 50 mg/5 mL oral liquid, glucagon (human recombinant), hydrALAZINE, lidocaine (PF), magnesium hydroxide, metoprolol tartrate, sodium chloride, sodium chloride bacteriostatic, sodium chloride, sodium chloride, sodium chloride, sodium phosphates 133 mL, traMADoL, traZODone, white petrolatum    Significant labs, imaging, and testing reviewed.    A/P:  Mr Alvarez is a 70-year-old male with past medical history of multivessel CAD, chronic systolic and diastolic CHF, recent diagnosis of A. fib, DM 2 who presented with acute CHF, was diuresed and subsequently underwent four-vessel CABG by Dr. Madison on 12/7/2020.  Due to concerns about possible seizures CT of the head was obtained 12/8 and showed punctate focus of increased attenuation within the subcortical region of the left frontal lobe.  Subsequent MRI of the brain 12/9 demonstrated subacute 2.5 cm subacute infarct with petechial hemorrhage in the left frontal lobe.   EEG did not show any seizure activity however Keppra was started 12/10 with resolution of rhythmic left leg movements.  Patient was extubated 12/10 and is weaned down to low flow O2. Started on broad spectrum antibiotics 12/10 due to new fever and worsening leukocytosis, 1/2 blood Cx shows GPC in clusters, respiratory miryam with 4+ beta hemolytic Strep.  VFSS with mild oral and severe pharyngeal dysphagia - NG tube ordered, which pt declined.    #Multivessel CAD, s/p CABG and PVI/LAAL on 12/7/2020: Mgmt as per CV Surgery and CCM. Continue ASA. Resume statin, metoprolol when able to take PO.    #Afib with RVR, now s/p PVI and DONNA excision. Cardiology following. Holding metoprolol due to NPO. Amiodarone gtt, prn metoprolol. Cardiac  tele. Holding AC until cleared by Neurology.    #Acute diastolic heart failure, improved. Holding home bumetanide for now.    #Acute hypoxic respiratory failure, improving: Now on nasal cannula. Wean oxygen as tolerated. Encourage IC. Supplemental O2 to keep sats >94%.    #Acute encephalopathy: Encephalopathy could also be contributed by prolonged hospitalization, hypernatremia, possible pneumonia. CTH from 12/12 showed no acute changes. EEG did not show seizure activity, but pt's rhythmic left leg movement appear to be have improved with Keppra. Neurology following, recs appreciated. Check Keppra level tomorrow AM.     #Subacute left frontal stroke with petechial hemorrhage: on ASA, statin. Neurology recs appreciated.    #Fever, resolved:  #Leukocytosis: Respiratory cultures pending. UA was negative.  One of two blood cultures grew staph epi - ?contaminant. Sputum culture growing beta-hemolytic strep. On IV pip-tazo for 7d, follow up final Cx results. Fever could also be central. ID recs appreciated.    #Severe pharyngeal dysphagia: NPO, NGT to be placed. SLP recs appreciated.    #Hypernatremia, resolved: On D5, monitor sodium.    #Hyperglycemia in the setting of T2DM and D5W as above: HDSSI with glargine. Holding home glipizide, metformin.    Chronic issues:  HTN: holding home losartan, amlodipine.    FEN:  D5W 75.  NPO. SLP following.    Ppx:  -VTE: SQH.  -Bowel regimen.    Code: Full  Dispo: Inpatient - currently in ICU     LOS: 12 days     Ida Pacheco DO  [facility]  Pager #: [phone]

## 2021-06-13 NOTE — PROGRESS NOTES
"Speech Language/Pathology  Speech Therapy Daily Progress Note    Patient presents as lethargic during this session, but agreeable to participating. Session shortened as patient was unable to maintain adequate alertness for pharyngeal exercises.    An  was not applicable.    Objective  Pharyngeal Exercises  When asked how to complete the Effortful swallow, patient stated that he was supposed to \"chew hard\". Reminded patient that the objective is to swallow hard to engage all of his pharyngeal muscles. Patient completed 7 repetitions of the effortful swallow with an ice chip given each time for comfort, as well as to help stimulate a swallow response. Max cueing needed for increased effort. Patient's swallow response was markedly weak and delayed. Swallow response was absent on one attempt. He coughed with ice chips on two occasions.    Assessment  Patient's participation this PM was limited due to fatigue. Swallow was significantly weaker compared to this AM. Patient unable to tolerate ice chips when lethargic.    Plan/Recommendations  Continue current plan of care. Reinforced ice chip guidelines with patient's 1:1 NA. Speech Therapy will continue to see patient two times a day for dysphagia management as schedule permits.    The ST Care Plan has been reviewed and current plan remains appropriate and continue ST at discharge.    8 dysphagia minutes     Samantha Hector MA, CCC-SLP      "

## 2021-06-13 NOTE — PROGRESS NOTES
PROVIDER RESTRAINT FOR NON-VIOLENT BEHAVIOR FACE TO FACE EVALUATION    Patient's Immediate Situation:  Patient demonstrated the following behaviors: Pulling/tugging at invasive lines or tubes and does not respond to verbal/non-verbal redirection    Patient's Reaction to the intervention:  Does patient understand the reason for restraint/seclusion? Unable to Express    Medical Condition:  Is there any evidence of compromise of Skin integrity, Respiratory, Cardiovascular, Musculoskeletal, Hydration? No    Behavioral Condition:  In consultation with the RN, is there a need to continue this restraint or seclusion? Yes    See Restraint Flowsheet for complete restraint documentation and assessment.    Deysi Littlejohn MD

## 2021-06-13 NOTE — PROGRESS NOTES
12/12/20 0700   Oxygen Therapy/Pulse Ox   O2 Device Nasal cannula   O2 Flow Rate (L/min) 2 L/min   O2 Titration attempted (Day and Evening Shift) Yes   O2 Therapy Oxygen   SpO2 96 %   SpO2 Activity  O2 activity

## 2021-06-13 NOTE — PROGRESS NOTES
PROVIDER RESTRAINT FOR NON-VIOLENT BEHAVIOR FACE TO FACE EVALUATION    Patient's Immediate Situation:  Patient demonstrated the following behaviors: Pulling/tugging at invasive lines or tubes and does not respond to verbal/non-verbal redirection    Patient's Reaction to the intervention:  Does patient understand the reason for restraint/seclusion? Yes    Medical Condition:  Is there any evidence of compromise of Skin integrity, Respiratory, Cardiovascular, Musculoskeletal, Hydration? Yes:  Skin integrity, Respiratory, Cardiovascular, Musculoskeletal and Hydration    Behavioral Condition:  In consultation with the RN, is there a need to continue this restraint or seclusion? Yes    See Restraint Flowsheet for complete restraint documentation and assessment.    Moisés Amezcua MD

## 2021-06-13 NOTE — ED PROVIDER NOTES
EMERGENCY DEPARTMENT ENCOUNTER      NAME: Mika Alvarez  AGE: 70 y.o. male  YOB: 1950  MRN: [id number]  EVALUATION DATE & TIME: 12/2/2020  1:57 PM    PCP: Angel Claire MD    ED PROVIDER: Cory Huddleston M.D.      Chief Complaint   Patient presents with     Shortness of Breath         FINAL IMPRESSION:  1. Acute systolic heart failure (H)    2. NSTEMI (non-ST elevated myocardial infarction) (H)    3. Chronic atrial fibrillation (H)    4. Diabetes mellitus without complication (H)          ED COURSE & MEDICAL DECISION MAKING:    Pertinent Labs & Imaging studies reviewed. (See chart for details)  70 y.o. male presents to the Emergency Department for evaluation of progressive shortness of breath with orthopnea and dyspnea on exertion but no chest pain.    ED Course as of Dec 02 1645   Wed Dec 02, 2020   1414 70-year-old male presents with shortness of breath.  He said progressive shortness of breath for several months.  He reports that his been over a month since he last took his Bumex, Xarelto.  Has been followed by cardiology who recommended a coronary artery bypass graft, but the patient was reticent to be admitted for this.  Reports that since this visit (11/4/2020) he has had progressive shortness of breath, orthopnea, and burning pain in his legs.    [DS]   1415 Patient presents hypoxic to 89% on room air.  His hypoxia improved to 95 to 98% on 2 L nasal cannula.  Initially tachycardic to the 1 teens with atrial fibrillation and a known bifascicular block.  His tachycardia also improved with administration of oxygen.  He is hypertensive at 179/101.  A bedside ultrasound showed diffuse comet tails, a lines, B-lines,  consistent with volume overload    [DS]   1415 Will provide nitroglycerin.  Will provide furosemide 40 mg IV.    [DS]   1417 Echocardiogram from 9/20/2020 reviewed.  It shows reduced LVEF and global hypokinesis    [DS]   1425 Lactic acid unremarkable    [DS]   1436 CBC  unremarkable    [DS]   1442 CMP shows hyperglycemia.    [DS]   1454  Troponin elevation to 1.2 noted.  Will discuss with cardiology prior to initiation of anticoagulation as the previous plan for this patient was for coronary artery bypass graft.    [DS]   1502 Pleural effusions with adjacent atelectasis noted.  Patient is already received nitroglycerin and Lasix for same    [DS]   1509 In presence of NSTEMI, there is not a clear clinical indication for IV beta-blocker.  We will at this time plan for reinitiation of patient's oral beta-blocker as inpatient unless otherwise directed by cardiology.    [DS]   1523 I updated the patient on his plan after my discussion with Dr. Jacobson from cardiology.  I notified him that we will give him a dose of IV metoprolol since he has missed his home metoprolol and he has a high heart rate in the setting of NSTEMI.  Notified him of plan admit him to cardiac telemetry with evaluation for possible surgery during the stay.  Notified him my plan to initiate heparin given his NSTEMI and lack of recent use of Xarelto.    [DS]   1523 Patient shortness of breath is improved and he is now urinating copiously.  However, he still some increased work of breathing and nasal flaring.  He is still tachycardic to 105.  He requests a meal      [DS]   1537 Heart rate improved with Lopressor.  Heart rate now in the 80s.  Patient maintaining pulse oximetry.  Patient remains hypertensive but has additional doses of nitroglycerin written.  Discussed patient with mission for Dr. Scott.  Notified him of my discussion with Dr. Jacobson    [DS]   1604 Patient reevaluated.  His tachycardia has resolved.  His pulse oximetry is under percent on 2 L nasal cannula.  His blood pressure is 153/103.    [DS]      ED Course User Index  [DS] Karo, Cory COELLO MD      2:05 PM Met with patient for initial interview and exam. Discussed initial plan for care for their stay in the emergency department. PPE worn during  patient evaluation includes face shield, eye protection, mask, surgical cap and gloves.   2:51 PM Lab called with patient's critical troponin of 1.21.   3:14 PM I discussed with Dr. Dimas of cardiology. Recommends heparin in the event that patient may have a surgery during this admission.   3:35 PM I discussed case with Dr. Jean, hospitalist for admission. Agrees with plan and disposition.       At the conclusion of the encounter I discussed the results of all of the tests and the disposition. The questions were answered. The patient or family acknowledged understanding and was agreeable with the care plan.     Critical Care  Performed by: ALLI MELLO  Authorized by: ALLI MELLO  Total critical care time: 45 minutes  Critical care time was exclusive of separately billable procedures and treating other patients.  Critical care was necessary to treat or prevent imminent or life-threatening deterioration of the following conditions: Non-ST elevation myocardial infarction, heart failure with reduced ejection fraction resulting in pulmonary edema and acute hypoxic respiratory failure  Critical care was time spent personally by me on the following activities: development of treatment plan with patient or surrogate, discussions with consultants, examination of patient, evaluation of patient's response to treatment, obtaining history from patient or surrogate, ordering and performing treatments and interventions, ordering and review of laboratory studies, ordering and review of radiographic studies and re-evaluation of patient's condition, this excludes any separately billable procedures.    MEDICATIONS GIVEN IN THE EMERGENCY:  Medications   aspirin chewable tablet 162 mg (162 mg Oral Given 12/2/20 1528)   nitroglycerin SL tablet 0.4 mg (NITROSTAT) (0.4 mg Sublingual Given 12/2/20 1528)   heparin 25,000 units in 0.45% sodium chloride (100 units/ml) 250 mL ANTICOAGULANT infusion (1,200 Units/hr Intravenous  New Bag 12/2/20 1535)   furosemide injection 40 mg (LASIX) (40 mg Intravenous Given 12/2/20 1424)   nitroglycerin SL tablet 0.4 mg (NITROSTAT) (0.4 mg Sublingual Given 12/2/20 1423)   heparin ANTICOAGULANT - LOADING DOSE from infusion 6,000 Units (6,000 Units Intravenous Loading Dose from Bag 12/2/20 1538)   metoprolol tartrate injection 5 mg (LOPRESSOR) (5 mg Intravenous Given 12/2/20 1529)       NEW PRESCRIPTIONS STARTED AT TODAY'S ER VISIT  Current Discharge Medication List      CONTINUE these medications which have NOT CHANGED    Details   glipiZIDE (GLUCOTROL XL) 10 MG 24 hr tablet Take 2 tablets (20 mg total) by mouth once daily.  Qty: 180 tablet, Refills: 1    Associated Diagnoses: Type 2 diabetes with complication (H)      insulin glargine (LANTUS SOLOSTAR U-100 INSULIN) 100 unit/mL (3 mL) pen Inject 35 Units under the skin at bedtime.  Qty: 15 mL, Refills: 0    Comments: If LANTUS is not covered by insurance, may substitute BASAGLAR at same dose and frequency.    Associated Diagnoses: Diabetes (H)      losartan (COZAAR) 100 MG tablet Take 1 tablet (100 mg total) by mouth daily.  Qty: 90 tablet, Refills: 2    Associated Diagnoses: HTN (hypertension)      metFORMIN (GLUCOPHAGE) 1000 MG tablet Take 1 tablet (1,000 mg total) by mouth 2 (two) times a day with meals.  Qty: 180 tablet, Refills: 0    Associated Diagnoses: Diabetes (H)      metoprolol succinate (TOPROL-XL) 100 MG 24 hr tablet Take 1 tablet (100 mg total) by mouth 2 (two) times a day.  Qty: 90 tablet, Refills: 1    Associated Diagnoses: HTN (hypertension)      nitroglycerin (NITROSTAT) 0.4 MG SL tablet Place 0.4 mg under the tongue every 5 (five) minutes as needed for chest pain.      amLODIPine (NORVASC) 10 MG tablet TAKE 1 TABLET BY MOUTH ONCE DAILY. OVERDUE FOR DIABETIC CHECK  Qty: 90 tablet, Refills: 3    Associated Diagnoses: HTN (hypertension)      aspirin 81 MG EC tablet Take 81 mg by mouth daily.      atorvastatin (LIPITOR) 80 MG tablet Take  "1 tablet (80 mg total) by mouth daily.  Qty: 30 tablet, Refills: 0    Associated Diagnoses: Coronary artery disease involving native heart with other form of angina pectoris, unspecified vessel or lesion type (H)      blood glucose test strips Use 1 each As Directed 2 (two) times a day. Accu-Chek Guide test strips. Patient is on insulin.  Qty: 200 strip, Refills: 3    Associated Diagnoses: Type 2 diabetes mellitus with other specified complication, with long-term current use of insulin (H)      blood-glucose meter (ACCU-CHEK ADVANTAGE DIABETES) kit Test 4 times daily.      bumetanide (BUMEX) 0.5 MG tablet Take 2 tablets (1 mg total) by mouth daily.  Qty: 60 tablet, Refills: 0    Associated Diagnoses: Pulmonary edema cardiac cause (H)      generic lancets Use 1 each As Directed 2 (two) times a day. Accu-Chek Fastclix Lancets. Patient is insulin dependant.  Qty: 200 each, Refills: 3    Associated Diagnoses: Type 2 diabetes mellitus with other specified complication, with long-term current use of insulin (H)      insulin lispro (HUMALOG KWIKPEN INSULIN) 100 unit/mL pen Inject 5 Units under the skin 3 (three) times a day with meals.  Qty: 15 mL, Refills: 0    Associated Diagnoses: Type 2 diabetes mellitus without complication, with long-term current use of insulin (H)      NEEDLES, INSULIN DISPOSABLE (BD ULTRA-FINE ROBERT PEN NEEDLES MISC) use up to 4 times daily as directed.      pen needle, diabetic (BD ULTRA-FINE SHORT PEN NEEDLE) 31 gauge x 5/16\" Ndle USE UP TO 4 TIMES DAILY AS DIRECTED  Qty: 300 each, Refills: 0    Associated Diagnoses: Diabetes (H)      rivaroxaban ANTICOAGULANT (XARELTO) 20 mg tablet Take 1 tablet (20 mg total) by mouth daily with supper.  Qty: 30 tablet, Refills: 0    Associated Diagnoses: New onset atrial fibrillation (H)                =================================================================    HPI    Patient information was obtained from: Patient     Use of Intrepreter: N/A    Mika RODRÍGUEZ" Antonio is a 70 y.o. male with a pertinent history of CAD, diabetes, hypertension, hyperlipidemia who presents to this ED via walk-in for evaluation of shortness of breath.     Per chart review, patient was seen in cardiology clinic with Dr. Tabor on 11-5-20 and was recommended to go to the ED and undergo possible CABG due to decompensated heart failure but patient stated he preferred to go home at that time. Patient was then advised to go to the ED later in the week. Patient was contacted today by heart clinic and he stated did present to the ED in November though they did not know about Dr. Tabor's recommendations and he was instructed to go home at that time. He had continued to experience persistent dyspnea and had weight gain and edema . Patient was then instructed today to present to the ED for further evaluation.     Patient reports he feels increased shortness of breath worsening today. He states this is fairly constant and is not only with exertion. He reports he has run out of medications including his bumex and xeralto and his last dose was sometime in October. He endorses being told to go to the ED in November though patient did not as he was doing alright. He notes he has not slept for more than four hours in a few months due to his bilateral legs feeling a burning sensation. He otherwise denies any current chest pain, fever, chills, cough or other concerns at this time. Patient states he is not normally on O2 at home.     REVIEW OF SYSTEMS   Review of Systems   Constitutional: Negative for chills and fever.   Respiratory: Positive for shortness of breath. Negative for cough.    Cardiovascular: Negative for chest pain.   Musculoskeletal:        Positive for burning in legs.   Psychiatric/Behavioral: Positive for sleep disturbance.   All other systems reviewed and are negative.     PAST MEDICAL HISTORY:  Past Medical History:   Diagnosis Date     ACP Staging Stage 1 Hypertension: 140-159 / 90-99     Created  by Conversion  Replacement Utility updated for latest IMO load     Coronary Artery Disease     Created by Conversion  Replacement Utility updated for latest IMO load     Joint Pain, Localized In The Shoulder     Created by Conversion      Obesity     Created by Conversion      Other and unspecified hyperlipidemia 4/27/2016     Type 2 Diabetes Mellitus With Complication     Created by Conversion        PAST SURGICAL HISTORY:  Past Surgical History:   Procedure Laterality Date     CV CORONARY ANGIOGRAM N/A 9/22/2020    Procedure: Coronary Angiogram;  Surgeon: Darin Perez MD;  Location: [facility] Cath Lab;  Service: Cardiology     CV LEFT HEART CATHETERIZATION WITH LEFT VENTRICULOGRAM N/A 9/22/2020    Procedure: Left Heart Catheterization with Left Ventriculogram;  Surgeon: Darin Perez MD;  Location: [facility] Cath Lab;  Service: Cardiology           CURRENT MEDICATIONS:    No current facility-administered medications on file prior to encounter.      Current Outpatient Medications on File Prior to Encounter   Medication Sig     glipiZIDE (GLUCOTROL XL) 10 MG 24 hr tablet Take 2 tablets (20 mg total) by mouth once daily. (Patient taking differently: Take 10 mg by mouth once daily. )     insulin glargine (LANTUS SOLOSTAR U-100 INSULIN) 100 unit/mL (3 mL) pen Inject 35 Units under the skin at bedtime.     losartan (COZAAR) 100 MG tablet Take 1 tablet (100 mg total) by mouth daily.     metFORMIN (GLUCOPHAGE) 1000 MG tablet Take 1 tablet (1,000 mg total) by mouth 2 (two) times a day with meals. (Patient taking differently: Take 1,000 mg by mouth daily with breakfast. )     metoprolol succinate (TOPROL-XL) 100 MG 24 hr tablet Take 1 tablet (100 mg total) by mouth 2 (two) times a day. (Patient taking differently: Take 100 mg by mouth daily. )     nitroglycerin (NITROSTAT) 0.4 MG SL tablet Place 0.4 mg under the tongue every 5 (five) minutes as needed for chest pain.     amLODIPine (NORVASC) 10 MG tablet TAKE  "1 TABLET BY MOUTH ONCE DAILY. OVERDUE FOR DIABETIC CHECK     aspirin 81 MG EC tablet Take 81 mg by mouth daily.     atorvastatin (LIPITOR) 80 MG tablet Take 1 tablet (80 mg total) by mouth daily.     blood glucose test strips Use 1 each As Directed 2 (two) times a day. Accu-Chek Guide test strips. Patient is on insulin.     blood-glucose meter (ACCU-CHEK ADVANTAGE DIABETES) kit Test 4 times daily.     bumetanide (BUMEX) 0.5 MG tablet Take 2 tablets (1 mg total) by mouth daily.     generic lancets Use 1 each As Directed 2 (two) times a day. Accu-Chek Fastclix Lancets. Patient is insulin dependant.     insulin lispro (HUMALOG KWIKPEN INSULIN) 100 unit/mL pen Inject 5 Units under the skin 3 (three) times a day with meals.     NEEDLES, INSULIN DISPOSABLE (BD ULTRA-FINE ROBERT PEN NEEDLES MISC) use up to 4 times daily as directed.     pen needle, diabetic (BD ULTRA-FINE SHORT PEN NEEDLE) 31 gauge x 5/16\" Ndle USE UP TO 4 TIMES DAILY AS DIRECTED     rivaroxaban ANTICOAGULANT (XARELTO) 20 mg tablet Take 1 tablet (20 mg total) by mouth daily with supper.       ALLERGIES:  No Known Allergies    FAMILY HISTORY:  No family history on file.    SOCIAL HISTORY:   Social History     Socioeconomic History     Marital status:      Spouse name: None     Number of children: None     Years of education: None     Highest education level: None   Occupational History     None   Social Needs     Financial resource strain: None     Food insecurity     Worry: None     Inability: None     Transportation needs     Medical: None     Non-medical: None   Tobacco Use     Smoking status: Never Smoker     Smokeless tobacco: Never Used   Substance and Sexual Activity     Alcohol use: Yes     Frequency: Monthly or less     Drinks per session: 1 or 2     Comment: socially      Drug use: Never     Sexual activity: None   Lifestyle     Physical activity     Days per week: None     Minutes per session: None     Stress: None   Relationships     Social " "connections     Talks on phone: None     Gets together: None     Attends Jew service: None     Active member of club or organization: None     Attends meetings of clubs or organizations: None     Relationship status: None     Intimate partner violence     Fear of current or ex partner: None     Emotionally abused: None     Physically abused: None     Forced sexual activity: None   Other Topics Concern     None   Social History Narrative     None       VITALS:  Patient Vitals for the past 24 hrs:   BP Temp Temp src Pulse Resp SpO2 Height Weight   12/02/20 1613 -- -- -- 94 21 99 % -- --   12/02/20 1600 (!) 153/103 -- -- 94 -- 97 % -- --   12/02/20 1545 -- -- -- 96 -- 91 % -- --   12/02/20 1530 (!) 161/101 -- -- (!) 102 -- 95 % -- --   12/02/20 1500 (!) 172/93 -- -- (!) 102 -- 99 % -- --   12/02/20 1445 -- -- -- 100 -- 97 % -- --   12/02/20 1430 -- -- -- (!) 107 -- 95 % -- --   12/02/20 1415 -- -- -- 98 -- 98 % -- --   12/02/20 1407 -- -- -- -- -- 95 % -- --   12/02/20 1401 (!) 179/101 97.6  F (36.4  C) Oral (!) 115 24 (!) 88 % 5' 10\" (1.778 m) (!) 237 lb 9.6 oz (107.8 kg)       PHYSICAL EXAM    General: A&O x 3 no apparent distress  HEENT: PERRL, EOMI, moist mucous membranes  Neck: Supple, no rigidity  Cardiovascular: 2+ distal pulses, cap refill less than 2 seconds. Tachycardic with irregular rhythm, No m/r/g  Pulmonary: Chest nontender, symmetrical rise, no respiratory distress. Diminished breath sounds in bilateral bases, tachypnea with increased work of breathing  Abdomen: Nontender, no distention. No rebound, guarding, or rigidity.  Extremities: Woody edema to bilateral lower extremities to level of mid pretibial area with denuded lesions to anterior ankle bilaterally  Musculoskeletal: Gait normal; extremities atraumatic x4  Neuro: Cranial nerves II through XII intact, GCS 15; intact, symmetric strength and sensation x4 extremities  Skin: No rash, jaundice, pallor.  Warm dry and intact  Psych: Normal mood " and affect    LAB:  All pertinent labs reviewed and interpreted.  Results for orders placed or performed during the hospital encounter of 12/02/20   HM2 (CBC W/O DIFF)   Result Value Ref Range    WBC 9.4 4.0 - 11.0 thou/uL    RBC 5.51 4.40 - 6.20 mill/uL    Hemoglobin 14.7 14.0 - 18.0 g/dL    Hematocrit 47.7 40.0 - 54.0 %    MCV 87 80 - 100 fL    MCH 26.7 (L) 27.0 - 34.0 pg    MCHC 30.8 (L) 32.0 - 36.0 g/dL    RDW 14.5 11.0 - 14.5 %    Platelets 309 140 - 440 thou/uL    MPV 9.4 8.5 - 12.5 fL   Comprehensive Metabolic Panel   Result Value Ref Range    Sodium 139 136 - 145 mmol/L    Potassium 4.0 3.5 - 5.0 mmol/L    Chloride 104 98 - 107 mmol/L    CO2 25 22 - 31 mmol/L    Anion Gap, Calculation 10 5 - 18 mmol/L    Glucose 324 (H) 70 - 125 mg/dL    BUN 17 8 - 22 mg/dL    Creatinine 0.91 0.70 - 1.30 mg/dL    GFR MDRD Af Amer >60 >60 mL/min/1.73m2    GFR MDRD Non Af Amer >60 >60 mL/min/1.73m2    Bilirubin, Total 0.8 0.0 - 1.0 mg/dL    Calcium 9.1 8.5 - 10.5 mg/dL    Protein, Total 7.4 6.0 - 8.0 g/dL    Albumin 3.2 (L) 3.5 - 5.0 g/dL    Alkaline Phosphatase 169 (H) 45 - 120 U/L    AST 23 0 - 40 U/L    ALT 9 0 - 45 U/L   Troponin I   Result Value Ref Range    Troponin I 1.21 (HH) 0.00 - 0.29 ng/mL   Lactic Acid   Result Value Ref Range    Lactic Acid 1.7 0.7 - 2.0 mmol/L   BNP(B-type Natriuretic Peptide)   Result Value Ref Range     (H) 0 - 64 pg/mL   INR   Result Value Ref Range    INR 1.08 0.90 - 1.10   APTT   Result Value Ref Range    PTT 29 24 - 37 seconds   ECG 12 lead nursing unit performed   Result Value Ref Range    SYSTOLIC BLOOD PRESSURE 179 mmHg    DIASTOLIC BLOOD PRESSURE 101 mmHg    VENTRICULAR RATE 111 BPM    ATRIAL RATE 111 BPM    P-R INTERVAL      QRS DURATION 146 ms    Q-T INTERVAL 394 ms    QTC CALCULATION (BEZET) 535 ms    P Axis      R AXIS -78 degrees    T AXIS 21 degrees    MUSE DIAGNOSIS       Atrial fibrillation with rapid ventricular response with premature ventricular or aberrantly  conducted complexes  Right bundle branch block  Left anterior fascicular block  ** Bifascicular block **  Abnormal ECG  When compared with ECG of 05-NOV-2020 13:08,  No significant change was found  Confirmed by SEE ED PROVIDER NOTE FOR, ECG INTERPRETATION ([id number]),  FAUSTINO HERNANDEZ ([id number]) on 12/2/2020 2:22:18 PM         RADIOLOGY:  Reviewed all pertinent imaging. Please see official radiology report.  Xr Chest 1 View Portable    Result Date: 12/2/2020  EXAM: XR CHEST 1 VIEW PORTABLE LOCATION: [facility] DATE/TIME: 12/2/2020 2:47 PM INDICATION: Shortness of breath with concern for pulmonary edema secondary to diuretic noncompliance COMPARISON: Chest x-ray 09/19/2020     Small left-sided and small to moderate right-sided pleural effusions with adjacent opacities likely reflecting atelectasis. Cardiomegaly with central vascular congestion and mild pulmonary edema.      EKG:    Performed at: 1404    Impression: Atrial fibrillation with rapid ventricular response. RBBB. LAFB. No new ST changes. No changes from prior EKG on 11/5/20.     Rate: 111 bpm  Rhythm: Atrial fibrillation with rapid ventricular response.  Axis: -78  HI Interval: *  QRS Interval: 146 ms  QTc Interval: 535  ST Changes: No new ST changes.  Comparison: No changes from prior EKG on 11/5/20.     I have independently reviewed and interpreted the EKG(s) documented above.    PROCEDURES:   PROCEDURE: Emergency Department Limited Bedside Screening Ultrasound   ANATOMICAL WINDOW:  Lungs   INDICATIONS:  Shortness of breath and hypoxia   PROCEDURE PROVIDER: Dr. Huddleston   FINDINGS:  Diffuse a lines and B-lines bilaterally x4 lung fields   IMAGES PRINTED & SCANNED OR SAVED TO MEMORY: yes       I, Angelita Dias, am serving as a scribe to document services personally performed by Dr. Huddleston based on my observation and the provider's statements to me. I, Cory Huddleston MD attest that Angelita Dias is acting in a scribe capacity, has  observed my performance of the services and has documented them in accordance with my direction.  Any spelling or grammatic inconsistencies or inaccuracies are typographical or dictation errors.    Cory Huddleston M.D.  Emergency Medicine  [facility] EMERGENCY DEPARTMENT  [address] [address]  Dept: [phone]  Loc: [phone]       Cory Huddleston MD  12/02/20 [id number]

## 2021-06-13 NOTE — PLAN OF CARE
On day shift, patient was having focal twitching in his left leg (continued intermittently overnight). EEG abnormal. Head CT abnormal. Patient will require head MRI, but not eligible until temp pacer wires are removed (Dr. Madison aware).     Patient remained stable overnight. Sedated with Precedex to meet RASS goal 0/-1. Intermittently requiring Cardene gtt to meet BP goals. Patient very agitated at times and difficult to redirect, PRN Dilaudid x2 given (see MAR). Patient required a one-time dose of Versed to meet RASS goal. UO adequate. Repositioned Q2H. AM BG was 214, no sliding scale insulin had been ordered for this patient - Insulin gtt restarted at Algorithm 1. Will update oncoming RN.     Problem: Day of Surgery  Goal: Maintain hemodynamics  Outcome: Progressing     Problem: Day of Surgery  Goal: Day of surgery nutrition  Outcome: Not Progressing   Patient remain intubated.     Problem: Day of Surgery  Goal: Mobility/activity is initiated  Outcome: Not Progressing  Patient remains intubated/sedated.      Problem: Day of Surgery  Goal: Set pain goal expectations and achieve pain/discomfort control  Outcome: Progressing     Problem: Safety  Goal: Patient will be injury free during hospitalization  Outcome: Progressing     Problem: Pain  Goal: Patient's pain/discomfort is manageable  Outcome: Progressing

## 2021-06-13 NOTE — PROGRESS NOTES
Spiritual Care Note    Spiritual Assessment:  attempted an in person visit with patient on 12/3/20 but was unsuccessful. Patient involved in testing and unavailable for a visit.     Care Provided: No direct care provided at this time.     Plan of Care: Spiritual care will continue to follow as part of patient's care team.    TOOTIE Costello, BCC

## 2021-06-13 NOTE — PROGRESS NOTES
12/14/20 0150   Oxygen Therapy/Pulse Ox   O2 Device None (Room air)   SpO2 94 %   SpO2 Activity  R/A at rest     Patient on RA no distress. RT will continue to monitor patient.

## 2021-06-13 NOTE — SIGNIFICANT EVENT
About 10 minutes into pt's SBT, pt began having continuous rhythmic jerking in his mouth and left leg. Dr. Littlejohn called to bedside. 2mg IV Ativan given STAT, and jerking ceased immediately. Chevy Lowry updated. New orders received for neuro consult and EEG. Pt placed back on full vent support. Will continue to monitor. Lupe Gaona RN

## 2021-06-13 NOTE — PROGRESS NOTES
Progress Note    Assessment/Plan  S/P CABG X 4 POD/PVI/LAAL # 9  Postop convulsive activity however EEG did not show any seizures activity  Head CT showed punctate focus in the left frontal lobe and MRI showed 2.5 cm subacute infarct in the left frontal lobe associated with petechial hemorrhage  Postop respiratory failure with prolonged oxygen need resolving, undergoing MetaNeb treatment this morning  AVSS, rate controlled A. fib currently on amiodarone therapy 200 mg twice a day  Postop dysphagia, failed video swallow evaluation yesterday, currently has an NG tube and tolerating formula  Postop leukocytosis resolving, WBC 13.8 from 18.3 the day before, will continue to monitor closely  Patient is currently on Zosyn therapy for 7 days expiring December 17, 2020  Poorly controlled type 2 diabetes mellitus, hyperlipidemia, diabetic leg ulcers, acute on chronic systolic congestive heart failure  Sternal incision has some crusting dry and intact and lungs sounds are diminished bilaterally  Chest tube drainage 60/660 cc serous  Leave chest tubes in today due to excessive drainage  PT/OT to mobilize patient out of bed and ambulate as tolerated  Continue NG tube feeding to optimize nutrition  Will repeat swallow evaluation speech therapy  Patient may need acute care rehab at discharge  Currently not medically ready for discharge        Subjective  Denies incisional chest pain but short of breath requiring MetaNeb therapy  Objective  Awake but drowsy, laying in bed at time of evaluation, with no apparent distress  Vital signs in last 24 hours  Temp:  [97.4  F (36.3  C)-98.5  F (36.9  C)] 98.4  F (36.9  C)  Heart Rate:  [81-97] 95  Resp:  [20-25] 20  BP: ()/(64-89) 110/77  Weight:   207 lb 4.8 oz (94 kg)  Preop weight 95.7 kg  Intake/Output last 3 shifts  I/O last 3 completed shifts:  In: 263.4 [I.V.:33.4; NG/GT:180; IV Piggyback:50]  Out: 3160 [Urine:2500; Chest Tube:660]  Intake/Output this shift:  I/O this  "shift:  In: 100 [NG/GT:100]  Out: -     Review of Systems   A 12 point comprehensive review of systems was negative except as noted.    Physical Exam  /77 (Patient Position: Semi-hooks)   Pulse 95   Temp 98.4  F (36.9  C) (Axillary)   Resp 20   Ht 5' 10\" (1.778 m)   Wt 207 lb 4.8 oz (94 kg)   SpO2 95%   BMI 29.74 kg/m    Chronic A. fib, rate control, incisions are clean dry intact, lungs are diminished bilaterally  Abdomen soft and nontender  Bilateral lower extremity diabetic ulcers    Pertinent Labs   Lab Results: personally reviewed.   Lab Results   Component Value Date     12/16/2020    K 3.6 12/16/2020     (H) 12/16/2020    CO2 21 (L) 12/16/2020    BUN 16 12/16/2020    CREATININE 1.10 12/16/2020    CALCIUM 7.6 (L) 12/16/2020     Lab Results   Component Value Date    WBC 13.8 (H) 12/16/2020    HGB 11.3 (L) 12/16/2020    HCT 37.0 (L) 12/16/2020    MCV 85 12/16/2020     12/16/2020       Pertinent Radiology   Radiology Results: Personally reviewed image/s, Personally reviewed impression/s and Bilateral basal ectasis with possible small right pleural effusion  EKG Results: personally reviewed.  and rate controlled chronic A. fib    Ashmanjinder Lowry            "

## 2021-06-13 NOTE — PLAN OF CARE
Problem: Pain  Goal: Patient's pain/discomfort is manageable  12/8/2020 1026 by Lupe Gaona RN  Outcome: Progressing  12/8/2020 1024 by Lupe Gaona RN  Outcome: Progressing     Problem: Daily Care  Goal: Daily care needs are met  Outcome: Progressing     Problem: Glucose Imbalance  Goal: Achieve optimal glucose control  Outcome: Progressing     Problem: Risk of Injury Due to Unsafe Behavior  Goal: Patient will remain safe while in restraint; physical/psychological needs will be met  12/8/2020 1026 by Lupe Gaona RN  Outcome: Progressing  12/8/2020 1024 by Lupe Gaona RN  Outcome: Progressing  Goal: Alternatives to restraint will be continually assessed with use of least restrictive device and discontinuation as soon as possible  12/8/2020 1026 by Lupe Gaona RN  Outcome: Progressing  12/8/2020 1024 by Lupe Gaona RN  Outcome: Progressing  Goal: Patient/Family will be able to communicate reason for restraint and steps for restraint application and removal  12/8/2020 1026 by Lupe Gaona RN  Outcome: Progressing  12/8/2020 1024 by Lupe Gaona RN  Outcome: Progressing

## 2021-06-13 NOTE — PROGRESS NOTES
Speech Language/Pathology    Speech Therapy received notification from RN that Dr. Madison would like patient to receive oral medications in applesauce. Patient participated in a Video Swallow Study on 12/12/20. He presented with severe pharyngeal dysphagia. Evaluating SLP determined that patient is at high risk for aspiration with any/all oral intake. I reviewed study and agree with my colleague's assessment. Giving patient oral meds in applesauce or pudding is unsafe at this time. Speech Therapy has begun introducing pharyngeal exercises to improve swallow safety and effectiveness, however, anticipate that patient's swallow may be slow to recover. He will require a feeding tube both for nutrition/hydration and medication administration.     Addendum ([id number])  Received call from Damaris Martinez CNP with Cardiothoracic Surgery. Dr. Madison would like a repeat swallow evaluation either later today or tomorrow, as patient is refusing NG tube placement. Additionally, MD feels that patient is more alert now compared to 12/12, when previous study was completed. Explained that patient would need a repeat Video Swallow Study, as he demonstrated silent aspiration on last study. CNP was agreeable to this. Also explained that, given severity of dysphagia identified on previous study, it is questionable that a repeat Video Swallow Study tomorrow will yield a significantly different result. CNP verbalized understanding and explained that, if patient continues to present with severe pharyngeal dysphagia, plan will be to place NG tube.     9 conference minutes    Samantha Hector MA, CCC-SLP

## 2021-06-13 NOTE — PLAN OF CARE
Problem: Pain  Goal: Patient's pain/discomfort is manageable  Outcome: Progressing   PRN dilaudid given    Problem: Safety  Goal: Patient will be injury free during hospitalization  Outcome: Progressing     Problem: Daily Care  Goal: Daily care needs are met  Outcome: Progressing     Problem: Risk of Injury Due to Unsafe Behavior  Goal: Patient will remain safe while in restraint; physical/psychological needs will be met  Outcome: Progressing  Goal: Alternatives to restraint will be continually assessed with use of least restrictive device and discontinuation as soon as possible  Outcome: Progressing

## 2021-06-13 NOTE — PROGRESS NOTES
"Speech Language/Pathology  Bedside Swallow Evaluation    Problem:  Patient Active Problem List   Diagnosis     Obesity     ACP Staging Stage 1 Hypertension: 140-159 / 90-99     Coronary artery disease involving native heart with other form of angina pectoris, unspecified vessel or lesion type (H)     Type 2 diabetes mellitus (H)     Joint Pain, Localized In The Shoulder     Hyperlipidemia, unspecified hyperlipidemia type     Moderate episode of recurrent major depressive disorder (H)     Pulmonary edema cardiac cause (H)     New onset atrial fibrillation (H)     Acute systolic heart failure (H)     CAD (coronary artery disease)     New onset a-fib (H)     Acute on chronic clinical systolic heart failure (H)     Atrial fibrillation with rapid ventricular response (H)     Diabetic leg ulcer (H)     Acute on chronic systolic CHF (congestive heart failure) (H)     A-fib (H)     Uncontrolled type 2 diabetes mellitus with hyperglycemia (H)     Acute respiratory failure with hypoxia (H)     On mechanically assisted ventilation (H)     S/P CABG (coronary artery bypass graft)     Seizures (H)     Cerebrovascular accident (CVA), unspecified mechanism (H)       Onset date: 12/7/20  Reason for evaluation: post extubation  Pertinent History: Per H&P pt \"is a 70 y.o. old male with multivessel coronary artery disease, chronic systolic congestive heart failure, recent diagnosis of atrial fibrillation, and type 2 diabetes requiring insulin.  Recent hospitalization September 2020 with new onset atrial fibrillation and acute systolic congestive heart failure.  Echocardiogram demonstrated decreased left ventricular systolic function with EF of 42%.  Abnormal stress test followed by coronary angiogram demonstrating severe multivessel disease with recommendation to have CABG.  Patient declined proceeding with surgery as recommended.  He did follow-up with cardiology in early November and was found to be experiencing decompensated heart " "failure with ongoing atrial fibrillation and uncontrolled ventricular rate.  Instructed to proceed immediately to the ER which patient refused to do nor did he go to the ER the following week for agreement.  Now he has run out of several of his medications including his diuretic Bumex.  Parents think increasing shortness of breath over the past 48 hours.  Denies any chest pain.  Upon presentation to ER today, found to be hypoxic on room air with O2 sat of 89% with oxygen saturations improving with 2 L/min nasal cannula.  Also with atrial fibrillation with uncontrolled ventricular rate.  Chest x-ray demonstrating bilateral pleural effusions with increased vascular congestion and mild pulmonary edema.  BNP elevated at 674.  Initial troponin elevated at 1.21.  Discussed with cardiology.  Starting IV heparin.  IV Lasix administered with excellent diuresis.  Feeling improved at this time but still unstable and plans to admit to cardiac telemetry.  Noted to have severe hyperglycemia.  He has been using Lantus but admits to missing doses of Humalog.\". Results of MRI on 12/9/20 as follows \"The abnormality identified on head CT corresponds to a 2.5 cm subacute infarct in the left frontal lobe associated with petechial hemorrhage. No significant mass effect.\".    Pt was intubated from 12/7/20-12/10. His O2 sats remain between  on 3L NC. Voice is audible and volume is WNL. RN report intermittent cough with sips of thin and oral swabs.    Current Diet: NPO  Baseline Diet: Regular and thin    Assessment:    Patient presents with moderate signs and symptoms of aspiration with nectar thick and thin.    Patient demonstrated no oral dysphagia.    Suspect mild pharyngeal dysphagia due to presence of clinical signs and symptoms of aspiration and/or prior history of dysphagia.    A Video Swallow Study is not recommended at this time due to recent extubation and clear s/s aspiration with nectar thick/thin. IF s/s aspiration " persist a VFSS may be warranted at that time.    Rehab potential is good based on prior level of function and evaluation results.    Recommendations/Plan:    Recommended diet of NPO except meds crushed in puree.    Speech therapy 5 times per week    Referrals: N/A    Subjective:    Patient presents as alert and cooperative during this session.   An  was not applicable    Patient was given ice chips, puree, nectar and thin.     Objective:    Dentition/Oral hygiene: [facility]    Oral motor function was not impaired.     Bolus prep and oral control was not impaired.     Anterior-Posterior transit was not impaired.    Oral stasis did not occur with all textures trialed.    Ice chips: Patient trialed 6 bites by spoon and presented with delayed cough and wet vocal quality with final bite. Initiation of swallow appeared timely.    Puree: Patient trialed 4 ounces and presented with no s/s of aspiration. Initiation of swallow appeared timely.    Nectar: Patient trialed 2 ounces by cup and presented with immediate cough x1. Initiation of swallow appeared mildly delayed.     Thin:Patient trialed 2 sips by cup and presented with delayed cough, delayed throat clear and wet vocal quality. Initiation of swallow appeared mildly delayed.     Hyolaryngeal movement appeared present.    20 dysphagia minutes     Eileen Casillas MA, CCC-SLP

## 2021-06-13 NOTE — PLAN OF CARE
Patient alert/oriented, makes needs known, uses call light appropriately. Currently patient notes dyspnea with exertion which is improving.Patient denies fever/chills.Denies any chest pain/palpitations. Denies any lightheadedness or syncope. Pt reports leg pain and numbness, PRN Tramadol given wit some relief. Pt on Heparin ggt at 1650 units/hr and lasix ggt at 10 ml/hr. Telemetry reviewed and demonstrates   A fib controlled.

## 2021-06-13 NOTE — PLAN OF CARE
Problem: Occupational Therapy  Goal: OT Goals  Description: Patient will demonstrate the following by 12/19/2020, in order to maximize independence with ADL/IADL performance:   -Be able to get in/out of bed and up/down from chairs/toilet with mod I assist while adhering to cardiac precautions. (sternal/arm use for pacer's etc.)   -Be able to complete functional mobility in halls for a distance of 600  feet without symptoms in order to return to previous living situation or transfer to appropriate next level of care, safely.   -Be able to complete 12 stairs in order to do the same at home without symptoms independently.   -Be able to maintain vitals within normal limits during activity to ensure safe discharge.   -Be able to verbalize his/her understanding of the importance of daily home exercise program and be given the appropriate walking program given their performance to encourage daily exercise at home before discharge.   -Be able to verbalize and demonstrate understanding of precautions with ADL/home activity before discharge.   -Be able to verbalize understanding of cardiac warning signs and symptoms and emergency plan before discharge.   -Be able to verbalize understanding of cardiac lifestyle management strategies.    Goals entered on 12/4/2020 by Michelle Caceres OTR/L    Outcome: Completed       Occupational Therapy Discharge Summary    Date of OT Discharge: 12/9/2020  Refer to daily doc flowsheet for equipment issued and current functional status.  Discharge Destination: Remains in hospital, vented  Discharge Comments: Patient is not currently appropriate for skilled OT/cardiac rehab at this time due to change in medical status. Will discontinue current active order. Please reorder as indicated.     Please note that if goals are not fully met the patient is making progress towards established goals, which is documented in flowsheet notes. If further therapy is recommended it is related to documented  deficits, and is necessary to maximize functional independence in order for patient to return to previous level of function.      Barbie Speikers, OTR/L

## 2021-06-13 NOTE — PROGRESS NOTES
"Clinical Nutrition Therapy Reassessment Note    Patient was extubated yesterday  Noted had bed side swallow today & speech recommending NPO except medications crushed in puree currently.  Mild pharyngeal dysphagia noted.    Current Nutrition Prescription:   Diet: NPO   IV dextrose or Fluids:     amiodarone 900 mg/500 ml standard 24 hour infusion, Last Rate: Stopped (12/11/20 1411)       nitroprusside      Current Nutrition Intake:  NPO day 3 now.  Prior was eating >75% at most meals noted    Anthropometrics:  Height: 5' 10\" (177.8 cm)  Admission weight:237 lb 9.6 oz,12/2/20  Weight: 210 lb 4.8 oz (95.4 kg),12/11/20  Ideal body weight: 166 lb(+or-10%)  Usual body weight: ~220 lb  Weight History:218 lb(12/7),212 lb(12/10)  4% wt loss in <1-week    RD Nutrition Focused Physical Exam:  The patient has the following physical signs which could indicate malnutrition: deferred today due to busy with cares    GI Status/Output:   GI symptoms include:abdomen rounded  Bowel Sounds hypoactive per nurse  Last BM: 12/6 per nurse    Skin/Wound:  Antonio score Antonio Scale Score: 14   Bilateral lower extremity Non-PU Ulcer:Venous. WOC note reviewed 12/9    Medications:  Medications reviewed.  Note:lipitor, dulcolax daily,colace BID,lasix daily,SSI,lantus daily,milk of mag,IV-antibiotics    Labs:  Labs reviewed  Lab Results   Component Value Date/Time    PREALBUMIN 12.8 (L) 12/04/2020 09:36 AM    ALBUMIN 2.0 (L) 12/10/2020 04:00 AM     (H) 12/11/2020 04:15 AM    K 3.7 12/11/2020 12:07 PM    BUN 26 (H) 12/11/2020 04:15 AM    CREATININE 0.76 12/11/2020 04:15 AM     (H) 12/11/2020 04:15 AM   HgbA1c: 12.6%(12/3/20)-high  FSBG 174-217 mg/dL in the past 24 hrs (not within goal range)    Estimated Nutrition Needs:  Assessment weight is 81 kg, adjusted weight    Energy Needs: 4444-8528 kcals daily, 20-25 kcal/kg  Protein Needs: 97 g daily, 1.2 g/kg.  Fluid Needs: 2025 mls daily, 25 mls/kg    Malnutrition: Not noted    Nutrition " Risk Level: high risk    Nutrition dx:   Altered nutrition related laboratory values r/t DM as evidenced by HgbA1c of 12.6%.  Swallowing difficulty r/t recent intubation as evidenced by needs to stay NPO at this time per speech.    Goals:   PO intake > 75% and Diet advance -not progressing yet  No wt gain-met    Intervention:   None at this time since NPO & no enteral access  Recommend nasal gastric feeding tube if unable to start eating in the next 24-48 hrs    Monitoring/Evaluation:   Diet advancement/swallow studies ,further education needs before discharge, & skin integrity    Electronically signed by:  Cristina Hernandez RD

## 2021-06-13 NOTE — PROGRESS NOTES
Hospitalist Progress Note    Assessment/Plan    A: Patient is a 69 y/o man who has multiple medical problems including diabetes mellitus type II, chronic systolic heart failure and coronary artery disease. Patient presented with dyspnea which appears to be from acute on chronic systolic heart failure. Echocardiogram showed LVEF 30-35%. Patient also has known multi-vessel coronary artery disease that was found on coronary angiogram on 21-Sep-2020. From review, patient had been advised to f/u for CABG in the past but had not yet done so. Diabetes mellitus type II is also uncontrolled and patient has reported symptoms consistent with diabetic neuropathy.     From chart review, patient had not been compliant with diet or medication in the past. Patient was advised to present to hospital on 05-Nov-2020 for acute heart failure but did not do so at that point. Patient had stated that he would present to the hospital the following week but it does not appear that he actually did so.     Patient underwent CABG yesterday. Patient had an episode of rhythmic arm movements today.     P:  1.) Acute on chronic systolic heart failure: Acute component appears resolved. Monitoring for recurrence.  2.) Multi-vessel coronary artery disease: Patient has undergone CABG and is receiving post-operative care  3.) Diabetes mellitus type II, uncontrolled, was hyperglycemic, with diabetic neuropathy: Blood glucose being monitored.   4.) Atrial fibrillation: Anticoagulation on hold.  5.) Hypertension: Blood pressure being monitored post-operatively.   6.) Hyperlipidemia: Patient has been on atorvastatin.  7.) Bilateral leg wounds: Receiving wound care.  8.) Convulsions: Neurology to see. Awaiting EEG results.      Principal Problem:    Acute on chronic clinical systolic heart failure (H)  Active Problems:    ACP Staging Stage 1 Hypertension: 140-159 / 90-99    Type 2 diabetes mellitus (H)    Hyperlipidemia, unspecified hyperlipidemia type     CAD (coronary artery disease)    Atrial fibrillation with rapid ventricular response (H)    Diabetic leg ulcer (H)    Acute on chronic systolic CHF (congestive heart failure) (H)    A-fib (H)    Uncontrolled type 2 diabetes mellitus with hyperglycemia (H)    Acute respiratory failure with hypoxia (H)    On mechanically assisted ventilation (H)    S/P CABG (coronary artery bypass graft)      Subjective    Patient was not answering questions. Patient reportedly had an episode of jerking movements this morning.    Objective    Vital signs in last 24 hours  Temp:  [99.6  F (37.6  C)-101.8  F (38.8  C)] 100.4  F (38  C)  Heart Rate:  [] 87  Resp:  [12-34] 20  BP: ()/(49-53) 110/53  Arterial Line BP: ()/() 117/53  FiO2 (%):  [30 %-40 %] 30 % 97% O2 Device: Ventilator O2 Flow Rate (L/min): 2 L/min  Weight:   219 lb 5.7 oz (99.5 kg) Weight change: 13.7 oz (0.389 kg)    Intake/Output last 3 shifts  I/O last 3 completed shifts:  In: 2816.5 [I.V.:2316.5; IV Piggyback:500]  Out: 2714 [Urine:1987; Chest Tube:727]  Body mass index is 31.47 kg/m .    Physical Exam    General:     Patient comfortable, NAD.   HEENT:     No scleral icterus or conjunctival injection   Heart:    RRR, S1 S2 w/o murmurs.   Lungs:     Breath sounds present. No overt crackles/wheezes heard.   Abdomen:   Soft.     Pertinent Labs   Lab Results: personally reviewed.   Results from last 7 days   Lab Units 12/08/20  0359 12/07/20  1319 12/06/20  0926 12/03/20  0704 12/02/20  1416   LN-SODIUM mmol/L 141 140 134* 139 139   LN-POTASSIUM mmol/L 4.5 3.4* 3.7 3.7 4.0   LN-CHLORIDE mmol/L 110* 108* 97* 105 104   LN-CO2 mmol/L 27 25 26 27 25   LN-BLOOD UREA NITROGEN mg/dL 18 16 15 23* 17   LN-CREATININE mg/dL 0.72 0.73 0.73 0.75 0.91   LN-CALCIUM mg/dL 8.0* 8.2* 8.4* 8.7 9.1   LN-ALBUMIN g/dL  --   --   --   --  3.2*   LN-PROTEIN TOTAL g/dL  --   --   --   --  7.4   LN-BILIRUBIN TOTAL mg/dL  --   --   --   --  0.8   LN-ALKALINE PHOSPHATASE U/L   --   --   --   --  169*   LN-ALT (SGPT) U/L  --   --   --   --  9   LN-AST (SGOT) U/L  --   --   --   --  23   LN-MAGNESIUM mg/dL 2.1 2.9*  --  1.8  --      Results from last 7 days   Lab Units 12/08/20  0359 12/07/20  1319 12/07/20  1212   LN-WHITE BLOOD CELL COUNT thou/uL 13.0* 23.1* 22.7*   LN-HEMOGLOBIN g/dL 10.4* 13.2* 11.6*   LN-HEMATOCRIT % 33.6* 41.2 36.7*   LN-PLATELET COUNT thou/uL 189 247 208   LN-NEUTROPHILS RELATIVE PERCENT % 85* 84*  --    LN-MONOCYTES RELATIVE PERCENT % 9 6  --      Results from last 7 days   Lab Units 12/03/20  0704 12/03/20  0106 12/02/20  1815   LN-TROPONIN I ng/mL 1.60* 1.88* 1.63*     Results from last 7 days   Lab Units 12/08/20  1556 12/08/20  1257 12/08/20  1055 12/08/20  0900 12/08/20  0752 12/08/20  0656 12/08/20  0629 12/08/20  0601 12/08/20  0503 12/08/20  0403   LN-POC GLUCOSE FINGERSTICK- HE mg/dL 98 104 108 103 113 107 99 97 128 117       Medications  Scheduled Meds:    acetaminophen  650 mg Oral Q6H    Or     acetaminophen  650 mg Rectal Q6H     aspirin  81 mg Oral DAILY     atorvastatin  80 mg Oral QHS     bisacodyL  10 mg Oral Once     [START ON 12/10/2020] bisacodyL  10 mg Rectal Once     chlorhexidine  15 mL Topical Q12H 09-21     docusate sodium  100 mg Oral BID     famotidine (PEPCID) injection  20 mg Intravenous BID    Or     famotidine  20 mg Oral BID     furosemide  40 mg Intravenous BID - diuretic     heparin (PF) ANTICOAGULANT  5,000 Units Subcutaneous Q8H FIXED TIMES     LORazepam  2 mg Intravenous Once     [START ON 12/9/2020] magnesium hydroxide  30 mL Oral DAILY     melatonin  3 mg Oral QHS     polyethylene glycol  17 g Oral DAILY     sodium chloride  10 mL Intravenous Q8H FIXED TIMES     sodium chloride  3 mL Intravenous Q8H FIXED TIMES     Continuous Infusions:    dexmedetomidine infusion orderable (PRECEDEX) Stopped (12/08/20 6209)     dexmedetomidine infusion orderable (PRECEDEX)       dexmedetomidine infusion orderable (PRECEDEX) 0.8 mcg/kg/hr  (12/08/20 0658)     DOPamine       epinephrine 0.005 mcg/kg/min (12/08/20 1301)     insulin infusion (1 unit/mL) Stopped (12/08/20 1558)     niCARdipine Stopped (12/07/20 1320)     phenylephrine Stopped (12/07/20 1325)     sodium chloride 0.9% 25 mL/hr (12/08/20 0400)     vasopressin       PRN Meds:.acetaminophen, acetaminophen, albumin human, aluminum-magnesium hydroxide-simethicone, [START ON 12/9/2020] bisacodyL, [START ON 12/10/2020] bisacodyL, dexmedetomidine infusion orderable (PRECEDEX), diphenhydrAMINE, DOPamine, epinephrine, HYDROmorphone, insulin infusion (1 unit/mL), ketorolac, [START ON 12/9/2020] magnesium hydroxide, niCARdipine, ondansetron, oxyCODONE, phenylephrine, sodium chloride, sodium chloride, sodium chloride, sodium chloride, sodium phosphates 133 mL, traZODone, vasopressin

## 2021-06-13 NOTE — PROGRESS NOTES
Hospitalist Progress Note    Chief Complaint: Acute dyspnea     Summary: 70 y.o.male with past medical history of T2DM, severe multivessel CAD, chronic systolic and diastolic CHF, recent diagnosis of A. fib, who presented with acute CHF, was diuresed and subsequently underwent four-vessel CABG by Dr. Madison on 12/7/2020. Post-op course complicated by Afib with RVR, which was initially treated with IV amiodarone given pt's NPO status due to severe dysphagia.     Hospitalization complicated by altered mentation and seizure-like activity onset 12/8. CT Head on 12/8 showed punctate focus of increased attenuation within the subcortical region of the left frontal lobe. Subsequent MRI of the brain on 12/9 demonstrated subacute 2.5 cm subacute infarct with petechial hemorrhage in the left frontal lobe. EEG did not show any seizure activity, however Keppra was started 12/10 with resolution of rhythmic left leg movements. Neurology has followed, now signed off.     Extubated 12/10 and weaned down to room air. Started on broad spectrum antibiotics 12/10 due to new fever and worsening leukocytosis. 1 of 2 blood Cx grew staph epidermidis, and sputum cultures showed respiratory miryam with 4+ beta hemolytic Strep. 7 days IV pip-tazo completed 12/17.     VFSS with mild oral and severe pharyngeal dysphagia - NGT placed on 12/15. This may have exacerbated hypernatremia, which improved with D5W prior to NGT placement.     Assessment & Plan  Multivessel CAD, s/p CABG and PVI/LAAL on 12/7/2020:   Chest tubes still in place 12/17  Hemodynamically stable  On ASA, statin, metoprolol, IV diuretics.   Mgmt as per CV Surgery.      A Fib with RVR, now s/p PVI and DONNA excision:   Oral amiodarone, metoprolol. Prn metoprolol.  Currently NSR  Mgmt as per Cardiology and CV Surgery following.   Cardiac tele.      Acute diastolic heart failure, improving.  Holding home bumetanide for now. On IV diuretics  Mgmt as per Cardiology.     Acute hypoxic  respiratory failure, resolved: On room air.   Encourage IC.  Supplemental O2 to keep sats >94%.  Continue pulmonary toilet- On metaneb, flutter valve     Acute Metabolic encephalopathy: improving.  Encephalopathy could also be contributed by prolonged hospitalization, hypernatremia, possible pneumonia, medications. CT Head from 12/12 showed no acute changes.   EEG did not show seizure activity, but pt's rhythmic left leg movement appear to be have improved with Keppra. Continue keppra, Keppra level therapeutic, pt was started on quetiapine on 12/14   Decrease at bedtime seroquel due to somnolence     Subacute left frontal stroke with petechial hemorrhage:   on ASA, statin.   Neurology recs appreciated.     Fever, Leukocytosis: UA was negative. One of two blood cultures grew staph epi - ?contaminant. Sputum culture growing beta-hemolytic strep. Completed pip-tazo IV 7d on 12/17. ID recs appreciated.     Severe pharyngeal dysphagia: NG and tube feeding.  SLP team following, planning repeat Video swallow next week     Hyperglycemia in the setting of T2DM and D5W as above:  HDSSI with glargine. Holding home glipizide, metformin.  Increase dose of two times a day glargine     Hypernatremia, resolved.     HTN: holding home losartan, amlodipine.     Code status Full Code  DVT prophylaxis: heparin sq     Barrier's to discharge : chest tube, encephalopathy, dysphagia  Anticipated discharge date:  Several days expected  Disposition:  Likely TCU    Lines: discontinue gardner, discontinue MidLine     Malnutrition, protein/calorie, Severe: Poor appetite, weight loss, muscle wasting  Class I Obesity w BMI=30-34.9: 214 lb 9.6 oz (97.3 kg) Body mass index is 30.79 kg/m .      Principal Problem:    Acute on chronic clinical systolic heart failure (H)  Active Problems:    ACP Staging Stage 1 Hypertension: 140-159 / 90-99    Type 2 diabetes mellitus (H)    Hyperlipidemia, unspecified hyperlipidemia type    CAD (coronary artery  disease)    Atrial fibrillation with rapid ventricular response (H)    Diabetic leg ulcer (H)    Acute on chronic systolic CHF (congestive heart failure) (H)    A-fib (H)    Uncontrolled type 2 diabetes mellitus with hyperglycemia (H)    Acute respiratory failure with hypoxia (H)    On mechanically assisted ventilation (H)    S/P CABG (coronary artery bypass graft)    Seizures (H)    Cerebrovascular accident (CVA), unspecified mechanism (H)    Cerebrovascular accident (CVA) due to other mechanism (H)    Metabolic encephalopathy    Disorientation    Acute kidney failure with tubular necrosis (H)    Subjective  Patient awake, alert, can't remember much about doing dysphagia exercises.     ROS: Denies chest pain and denies shortness of breath    Objective    Physical Exam    General Appearance:  HEENT:  Cooperative, in no distress   Normocephalic, atraumatic, conjunctiva clear without icterus   Lungs:   reduced breath sounds at bases   Cardiovascular:  Regular Rate and Rythm, S1, S2, 1+ lower extremity edema bilaterally   Abdomen: Soft, non-tender and obese, active bowel sounds   Skin:  Skin color, texture normal and no rashes or lesions over face, neck, arms and legs, turgor normal.   Neurologic: Alert but confused, Facial symmetry preserved and upper & lower extremities moving well with symmetry   Reviewed telemetry, Laboratory results and Consultant recommendations    Time spent: on patient interview, exam, med rec, progress note: 12 min    Counseling of patient at bedside  Diagnosis & prognosis regarding removal of lines - 8 min  review of treatment plan SLP exercises 7 min    Coordination with: Nursing regarding remove lines and discharge planning  2 min    Total time including time spent in counseling and coordination of care: 13 minutes  Total time spent  on encounter: >=25 minutes    Khoi Luna MD, FACP  Internal Medicine Hospitalist  12/18/2020  1:40 PM

## 2021-06-13 NOTE — PROGRESS NOTES
Speech Language/Pathology  Speech Therapy Daily Progress Note    Patient presents as alert and cooperative during this session.  An  was not applicable.    Objective    Pharyngeal Exercises:  to improve safety of swallow function as relates to oral diet advancement.  -effortful: patient did not recall exercise.  He completed x40 with an ice chip every 1-2 swallows. Cough x2 with ice chips.   -CTAR: Reviewed exercise. Pt completed x1 set of isometric (15 sec) and isokinetic (15) with moderate cues and modeling for accuracy.     Assessment    Patient did not recall pharyngeal exercises from this morning. He is agreeable to complete exercises outside of therapy, however, current cognition is a barrier. Session shortened d/t pt perseverating on getting out of bed and RN/OT ready to assist.    Plan/Recommendations  Continue pharyngeal exercises    The ST Care Plan has been reviewed and current plan remains appropriate.    15 dysphagia minutes     Eileen Casillas MA, CCC-SLP

## 2021-06-13 NOTE — PROGRESS NOTES
Hospitalist Progress Note    Assessment/Plan  70-year-old male with past medical history of multivessel CAD, chronic systolic and diastolic CHF, recent diagnosis of A. fib, DM 2 who presented with acute CHF, was diuresed and subsequently underwent four-vessel CABG by Dr. Madison on 12/7/2020.  Patient remains intubated.  Due to concerns about seizures CT of the head was obtained and showed punctate focus of increased attenuation within the subcortical region of the left frontal lobe.  MRI of the brain today demonstrates subacute 2.5 cm subacute infarct with petechial hemorrhage in the left frontal lobe.    1.  Multivessel CAD, status post CABG and PVI/LAAL on 12/7/2020.  Management per CV surgery and cardiology.  On statin, aspirin  2.  Acute respiratory failure with hypoxia.  Ventilator management per pulmonary/critical care  3.  Subacute left frontal lobe stroke with petechial hemorrhage.  Holding subcu heparin given hemorrhage.  Neurology is following  4.  Seizure like activity.  EEG was negative for ongoing seizures.    5.  DM 2.  Monitor with insulin sliding scale        Barriers to Discharge : Postop recovery  Anticipated Discharge : Multiple day stay  Disposition : TBD        Subjective  Unable to obtain review of systems since patient is intubated and sedated  Per nursing staff he was following commands while off Precedex earlier in the day      Objective    Vital signs in last 24 hours  Temp:  [99.5  F (37.5  C)-101.1  F (38.4  C)] 99.5  F (37.5  C)  Heart Rate:  [63-95] 84  Resp:  [15-26] 26  BP: (105-148)/(50-75) 115/50  Arterial Line BP: (105-175)/(51-78) 137/54  FiO2 (%):  [30 %-40 %] 40 % 93% O2 Device: Ventilator O2 Flow Rate (L/min): 2 L/min  Weight:   217 lb 2.5 oz (98.5 kg) Weight change: -2 lb 3.3 oz (-1 kg)    Intake/Output last 3 shifts  I/O last 3 completed shifts:  In: 1565.5 [I.V.:1265.5; NG/GT:300]  Out: 2226 [Urine:1856; Chest Tube:370]  Intake/Output this shift:  I/O this shift:  In: -    Out: 39 [Urine:39]    Physical Exam:  GENERAL: No acute distress, intubated and sedated  CARDIAC: Regular.  S1 & S2 normal.  No gallops or murmurs. No edema  LUNGS: Clear to auscultation anteriorly.  Chest tubes noted  ABDOMEN: Soft, non-tender  NEURO: Unable to assess, patient is sedated      Pertinent Labs   Lab Results: personally reviewed.   Results from last 7 days   Lab Units 12/09/20  0357 12/08/20  0359 12/07/20  1319   LN-SODIUM mmol/L 141 141 140   LN-POTASSIUM mmol/L 4.2 4.5 3.4*   LN-CHLORIDE mmol/L 110* 110* 108*   LN-CO2 mmol/L 26 27 25   LN-BLOOD UREA NITROGEN mg/dL 27* 18 16   LN-CREATININE mg/dL 0.71 0.72 0.73   LN-CALCIUM mg/dL 8.3* 8.0* 8.2*   LN-MAGNESIUM mg/dL 1.9 2.1 2.9*     Results from last 7 days   Lab Units 12/09/20  0357 12/08/20  0359 12/07/20  1319   LN-WHITE BLOOD CELL COUNT thou/uL 10.3 13.0* 23.1*   LN-HEMOGLOBIN g/dL 10.0* 10.4* 13.2*   LN-HEMATOCRIT % 33.1* 33.6* 41.2   LN-PLATELET COUNT thou/uL 154 189 247   LN-NEUTROPHILS RELATIVE PERCENT %  --  85* 84*   LN-MONOCYTES RELATIVE PERCENT %  --  9 6     Results from last 7 days   Lab Units 12/03/20  0704 12/03/20  0106 12/02/20  1815   LN-TROPONIN I ng/mL 1.60* 1.88* 1.63*       Medications  Scheduled Meds:    acetaminophen  650 mg Oral Q6H    Or     acetaminophen  650 mg Rectal Q6H     aspirin  81 mg Enteral Tube DAILY     atorvastatin  80 mg Enteral Tube QHS     bisacodyL  10 mg Oral Once     [START ON 12/10/2020] bisacodyL  10 mg Rectal Once     chlorhexidine  15 mL Topical Q12H 09-21     docusate sodium (COLACE) 50 mg/5 mL oral liquid  100 mg Enteral Tube BID     famotidine (PEPCID) injection  20 mg Intravenous BID    Or     famotidine  20 mg Oral BID     [Held by provider] heparin (PF) ANTICOAGULANT  5,000 Units Subcutaneous Q8H FIXED TIMES     insulin aspart (NovoLOG) injection   Subcutaneous Q4H FIXED TIMES     LORazepam  2 mg Intravenous Once     magnesium hydroxide  30 mL Oral DAILY     melatonin  3 mg Enteral Tube QHS      [Held by provider] metoprolol tartrate  12.5 mg Enteral Tube BID     polyethylene glycol  17 g Enteral Tube DAILY     sodium chloride  10 mL Intravenous Q8H FIXED TIMES     sodium chloride  3 mL Intravenous Q8H FIXED TIMES     Continuous Infusions:    dexmedetomidine infusion orderable (PRECEDEX) 0.2 mcg/kg/hr (12/09/20 1641)     DOPamine       epinephrine 0.005 mcg/kg/min (12/08/20 1301)     insulin infusion (1 unit/mL) Stopped (12/09/20 1056)     niCARdipine 5 mg/hr (12/09/20 1655)     phenylephrine Stopped (12/07/20 1325)     sodium chloride 0.9% Stopped (12/09/20 1412)     vasopressin       PRN Meds:.acetaminophen, acetaminophen, albumin human, aluminum-magnesium hydroxide-simethicone, bisacodyL, [START ON 12/10/2020] bisacodyL, dexmedetomidine infusion orderable (PRECEDEX), dextrose 50 % (D50W), diazePAM, diphenhydrAMINE, DOPamine, epinephrine, glucagon (human recombinant), HYDROmorphone, insulin infusion (1 unit/mL), ketorolac, magnesium hydroxide, niCARdipine, ondansetron, oxyCODONE, phenylephrine, sodium chloride, sodium chloride, sodium chloride, sodium chloride, sodium phosphates 133 mL, traZODone, vasopressin    Pertinent Radiology   Radiology Results: Personally reviewed image/s  Results for orders placed or performed during the hospital encounter of 12/02/20   XR Chest 1 View Portable    Impression    Small left-sided and small to moderate right-sided pleural effusions with adjacent opacities likely reflecting atelectasis. Cardiomegaly with central vascular congestion and mild pulmonary edema.   US Carotid Bilateral    Impression    1.  Mild plaque formation, velocities consistent with less than 50% stenosis in the right internal carotid artery.  2.  Mild plaque formation, velocities consistent with less than 50% stenosis in the left internal carotid artery.  3.  Flow within the vertebral arteries is antegrade.   US Vein Mapping For Pre Bypass Graft Bilateral    Impression    1. Exam positive for  superficial thrombophlebitis involving the right great saphenous vein from the knee to the ankle and left small saphenous vein from the knee to the ankle.  2. The right small saphenous vein is not visualized.  3. No evidence of thrombus extension into the deep venous system.  4. Diffuse subcutaneous edema without discrete fluid collections.    NOTE: ABNORMAL REPORT   XR Chest 1 View Portable    Impression    New endotracheal tube is in satisfactory position. Right jugular approach sheath and PA catheter are present. The tip of the PA catheter is in the right pulmonary artery. Mediastinal drain and bilateral chest tubes are present. Coronary ostial markers   and vascular clips from revascularization.    Mild upper pneumomediastinum and gas in the soft tissues of the left neck.    Improved lung inflation particularly of the right lower lobe with improved discrimination of the right hemidiaphragm. Subsegmental sized opacities in the left lung around the hilum and along the diaphragmatic pleura likely representing residual areas of   atelectasis.    No visible pleural fluid or pneumothorax.   XR Chest 1 View Portable    Impression    No significant interval change.   XR Abdomen AP Portable    Impression    Gastric tube courses below the diaphragmatic hiatus, follows the greater curvature of the stomach with tip along the right lateral aspect of the lumbar spine presumably in the distal stomach. Mediastinal drain and bilateral chest tubes are present.    Nonobstructive bowel gas pattern.    CT Head Without Contrast    Impression    1.  Punctate focus of increased attenuation within the subcortical region of the left frontal lobe. This may represent a focus of petechial hemorrhage, cortical laminar necrosis, mineralization or subarachnoid hemorrhage. This is best demonstrated on   axial image #23, series 2 and sagittal image #43, series 5.2. Comparison with prior studies would be helpful if available.  2.  Otherwise  recommend brain MRI for further evaluation. Additionally follow-up CT is could be performed to ensure stability or resolution.   MR Brain With Without Contrast    Impression    The abnormality identified on head CT corresponds to a 2.5 cm subacute infarct in the left frontal lobe associated with petechial hemorrhage. No significant mass effect.             Advanced Care Planning:  Treatment/Discharge Planning discussed with ICU RN    Hayley Leung MD  Internal Medicine Hospitalist  12/9/2020

## 2021-06-13 NOTE — PROGRESS NOTES
[facility] Pulmonary/Critical Care Consult Team Note    Mika Alvarez,  1950, MRN [id number]  Admitting Dx: Acute systolic heart failure (H) [I50.21]  Diabetes mellitus without complication (H) [E11.9]  Chronic atrial fibrillation (H) [I48.20]  NSTEMI (non-ST elevated myocardial infarction) (H) [I21.4]  Date / Time of Admission:  2020  1:57 PM    Overnight Events:  Intake/Output last 3 shifts:  I/O last 3 completed shifts:  In: .7 [I.V.:163.7; IV Piggyback:]  Out:  [Urine:1338; Chest Tube:570]  Had a swallow eval this am and failed  He does not feel lalin this morning    Assessment/Plan: Mika Alvarez is a 70 y.o. male with PMHx of HTN, HLD, poorly controlled DM, systolic and diastolic CHF, multi vessel CAD who is s/p 4v CABG with Dr. Madison 20 with a complicated post-op course s/p ableto be extubated 12/10.    PULM/ID: on minimum FiO2  - thick secretions  - continue pulm toilet    CV: Hx of multi vessel CAD who is s/p 4v CABG   - Monitor on tele    GI: NPO given failed swallow study  - GI proph  - will place NG for nutrition  - may need longer term cathter placement    RENAL: Hypernatremia  - is on D5 for free water   Lab Results   Component Value Date    CREATININE 0.92 2020     - Follow BUN/Creatinine  - strict I/O's    NEURO: MRI  with 2.5 cm subacute infarct left frontal lobe associated with petechial hemorrhage.    ENDO:-   Lab Results   Component Value Date/Time     (H) 2020 04:13 AM     (H) 2020 04:15 AM     (H) 12/10/2020 10:11 AM   - Critical Care hyperglycemic protocol  - Accuchecks and sliding scale q4  - on lantus 10u two times a day     FEN:   - electrolyte replacement protocol    MS:   - Pain control  - PT/OT consult    Pt has critical illness and impairs breathing on O2 such as there is high probability of imminent of life threatening deterioration in the patient's condition.    Code Status: FULL CODE    ICU DAILY CHECKLIST    "        Can patient transfer out of MICU? yes  FAST HUG:  Feeding:  Feeding: No.    Koenig:Yes  Analgesia/Sedation:Yes  Thromboembolic prophylaxis:Heparin  HOB>30:  Yes  Stress Ulcer Protocol Active: yes; Mode: PPI/H2 Antagonist  Glycemic Control:   Lab Results   Component Value Date/Time     (H) 12/12/2020 04:13 AM     (H) 12/11/2020 04:15 AM     (H) 12/10/2020 10:11 AM    Any glucose > 180 yes; Mode of Insulin Therapy: Sliding Scale Insulin  INTUBATED:  Can patient have daily waking:  not applicable  Can patient have spontaneous breathing trial:  not applicable  Does pt have restraints: no and MD RESTRAINT FOR NON-VIOLENT BEHAVIOR FACE TO FACE EVALUATION Patient's Immediate Situation: Patient demonstrated the following behaviors: Impulsive behavior, grabbing at support tubes/lines.  Patient's Reaction to the intervention Does patient understand the reason for restraint/seclusion? Unable to Express Medical Condition Is there any evidence of compromise of Skin integrity, Respiratory, Cardiovascular, Musculoskeletal, Hydration? No Behavioral ConditionIn consultation with the RN, is there a need to continue this restraint or seclusion? Yes See Restraint Flowsheet for complete restraint documentation and assessment.  PHYSICAL THERAPY AND MOBILITY: Can patient have PT and mobility trial: no Activity: ICU mobility protocol    Critical Care Time excluding procedures and family discussions greater than: 45 Minutes    Gema Kinsey DO  Pulmonary and Critical Care Attending  pgr [phone]    No Known Allergies    Meds: See MAR    Physical Exam:  /62   Pulse (!) 138   Temp 99.7  F (37.6  C) (Oral)   Resp 20   Ht 5' 10\" (1.778 m)   Wt 206 lb 14.4 oz (93.8 kg)   SpO2 96%   BMI 29.69 kg/m    Intake/Output this shift:  No intake/output data recorded.  GEN: sitting up in there bed  HEENT   MMD  CVS: regular rhythm, no murmurs  RESP: CTA BL   ABD: Soft, No abdominal pain with palpation, no " guarding, no rigidity  EXT: Warm, well perfused, no rshes, no edema  Vasc: [place] radial pulses intact  NEURO:  CN2-12 grossly intact, moving all extremities  PSYCH: Normal affect    Pertinent Labs: Latest lab results in EHR personally reviewed.   Results from last 7 days   Lab Units 12/10/20  1136 12/10/20  0959 12/10/20  0740   LN-PH ARTERIAL  7.50* 7.47* 7.48*   LN-PO2 ARTERIAL mm Hg 80 93* 94*   LN-PCO2 ARTERIAL mm Hg 35 41 36   LN-O2 SATURATION % 95.6 96.7* 98.4*   LN-HCO3 ARTERIAL CALC mmol/L 28.3 29.3* 27.2    and   Results from last 7 days   Lab Units 12/12/20  0413 12/11/20  0415 12/10/20  1011   LN-WHITE BLOOD CELL COUNT thou/uL 14.3* 10.7 13.1*   LN-HEMOGLOBIN g/dL 11.7* 11.1* 11.6*   LN-HEMATOCRIT % 37.9* 36.4* 36.9*   LN-MEAN CORPUSCULAR VOLUME fL 86 85 84   LN-PLATELET COUNT thou/uL 403 260 256       Cultures: personally reviewed.   Culture   Date Value Ref Range Status   12/09/2020 Usual miryam with  Final   12/09/2020 4+  Beta Hemolytic Streptococcus Not Group A (!)  Final     Gram Stain Result   Date Value Ref Range Status   12/10/2020 Gram positive cocci in clusters  Preliminary       personally reviewed images:   Imaging results from past 30 days: Echo Complete    Result Date: 12/3/2020    Technically difficult study. Definity contrast utilized.   Normal left ventricular size. Mild to moderate left ventricular hypertrophy.   Left ventricle ejection fraction is moderately globally reduced. Left ventricular ejection fraction estimated at 30 to 35%. Abnormal septal motion.   Right ventricle not optimally visualized. Right ventricular systolic function is reduced. Abnormal TAPSE   Moderate left atrial enlargement. Mild right atrial enlargement.   Aortic valve sclerosis without Doppler evidence to suggest aortic stenosis. Mild aortic insufficiency.   Nonspecific thickening of the mitral valve leaflets with mild mitral regurgitation.   Mild tricuspid regurgitation.   Mild enlargement of the aortic root    When compared to the previous study dated 9/20/2020, the prior study was also technically challenging. Left ventricular function remains reduced and appears potentially mildly more prominently reduced on the current examination.      Xr Chest 1 View Portable    Result Date: 12/8/2020  EXAM: XR CHEST 1 VIEW PORTABLE LOCATION: [facility] DATE/TIME: 12/8/2020 6:20 AM INDICATION: s/p cardiac surgery COMPARISON: 12/07/2020 FINDINGS: Endotracheal tube terminates 3.8 cm above the yariel. Right heart catheter terminates over the right main pulmonary artery. Bilateral chest tubes remain in place. Heart size is enlarged but stable status post median sternotomy. Mild prominence of central pulmonary vasculature without overt failure. Dense retrocardiac consolidation on the left is unchanged. No pneumothorax.     No significant interval change.    Xr Chest 1 View Portable    Result Date: 12/7/2020  EXAM: XR CHEST 1 VIEW PORTABLE LOCATION: [facility] DATE/TIME: 12/7/2020 1:51 PM INDICATION: s/p cardiac surgery COMPARISON: Portable AP view of the chest 02/12/2020     New endotracheal tube is in satisfactory position. Right jugular approach sheath and PA catheter are present. The tip of the PA catheter is in the right pulmonary artery. Mediastinal drain and bilateral chest tubes are present. Coronary ostial markers and vascular clips from revascularization. Mild upper pneumomediastinum and gas in the soft tissues of the left neck. Improved lung inflation particularly of the right lower lobe with improved discrimination of the right hemidiaphragm. Subsegmental sized opacities in the left lung around the hilum and along the diaphragmatic pleura likely representing residual areas of atelectasis. No visible pleural fluid or pneumothorax.    Xr Chest 1 View Portable    Result Date: 12/2/2020  EXAM: XR CHEST 1 VIEW PORTABLE LOCATION: [facility] DATE/TIME:  12/2/2020 2:47 PM INDICATION: Shortness of breath with concern for pulmonary edema secondary to diuretic noncompliance COMPARISON: Chest x-ray 09/19/2020     Small left-sided and small to moderate right-sided pleural effusions with adjacent opacities likely reflecting atelectasis. Cardiomegaly with central vascular congestion and mild pulmonary edema.    Xr Abdomen Ap Portable    Result Date: 12/8/2020  EXAM: XR ABDOMEN AP PORTABLE LOCATION: [facility] DATE/TIME: 12/8/2020 7:30 PM INDICATION: check tube placement COMPARISON: None.     Gastric tube courses below the diaphragmatic hiatus, follows the greater curvature of the stomach with tip along the right lateral aspect of the lumbar spine presumably in the distal stomach. Mediastinal drain and bilateral chest tubes are present. Nonobstructive bowel gas pattern.     Ct Head Without Contrast    Addendum Date: 12/8/2020    Addendum/ impression: Dr. Domingo discussed the results with Dr. Kraft on 12/8/2020 10:06 PM by telephone.    Result Date: 12/8/2020  EXAM: CT HEAD WO CONTRAST LOCATION: [facility] DATE/TIME: 12/8/2020 9:12 PM INDICATION: seizure COMPARISON: None. TECHNIQUE: Routine CT Head without IV contrast. Multiplanar reformats. Dose reduction techniques were used. FINDINGS: Punctate focus of increased attenuation within the subcortical region of the left frontal lobe. This may represent a focus of petechial hemorrhage, cortical laminar necrosis, mineralization or subarachnoid hemorrhage. This is best demonstrated on axial image #23, series 2 and sagittal image #43, series 5.2. Scattered foci of decreased attenuation within the cerebral hemispheric white matter which are nonspecific, though most commonly ascribed to chronic small vessel ischemic disease. The ventricles and sulci are prominent consistent with mild brain parenchymal volume loss. Gray-white matter differentiation is maintained. The basilar  cisterns are patent. The globes are unremarkable. The partially imaged paranasal sinuses, mastoid air cells and middle ear cavities are unremarkable. The visualized skull base and calvarium are unremarkable.     1.  Punctate focus of increased attenuation within the subcortical region of the left frontal lobe. This may represent a focus of petechial hemorrhage, cortical laminar necrosis, mineralization or subarachnoid hemorrhage. This is best demonstrated on axial image #23, series 2 and sagittal image #43, series 5.2. Comparison with prior studies would be helpful if available. 2.  Otherwise recommend brain MRI for further evaluation. Additionally follow-up CT is could be performed to ensure stability or resolution.    Mr Brain With Without Contrast    Result Date: 12/9/2020  EXAM: MR BRAIN W WO CONTRAST LOCATION: [facility] DATE/TIME: 12/9/2020 1:07 PM INDICATION: Seizure. Altered mental status. Abnormal head CT, hemorrhage. COMPARISON: Head CT 12/08/2020. CONTRAST: 10 mL of gadobutrol intravenous contrast. TECHNIQUE: Routine multiplanar multisequence head MRI without and with intravenous contrast. FINDINGS: Cortical diffusion hyperintensity without most minimal restriction along the margin of the left middle and inferior frontal gyri demonstrates cortical signal loss on the susceptibility sensitive sequence with gyriform enhancement over a 2.5 x 1.5 x 1 cm area. Constellation of findings are most consistent with a subacute infarct and petechial hemorrhage. This corresponds to the abnormality identified on the comparison head CT.  Mild mucosal thickening in the sinuses. A few mastoid air cells on each side contain a small amount of fluid. Intraorbital contents are unremarkable. There is mild generalized prominence of the sulci and ventricles, consistent with underlying volume loss. Intracranial flow voids are intact. Small chronic infarct left cerebellum. There is no mass effect, midline  shift, or extraaxial collection. There are scattered foci of T2/FLAIR hyperintensity within the cerebral white matter that are nonspecific but most commonly reflect the sequela of chronic small vessel ischemic disease.     The abnormality identified on head CT corresponds to a 2.5 cm subacute infarct in the left frontal lobe associated with petechial hemorrhage. No significant mass effect.     Xr Swallow Study W Speech    Result Date: 12/12/2020  EXAM: XR SWALLOW STUDY W SPEECH OR OT LOCATION: [facility] DATE/TIME: 12/12/2020 9:28 AM INDICATION: Difficulty swallowing. COMPARISON: None. TECHNIQUE: Routine. FINDINGS: FLUOROSCOPIC TIME: 1.2 minutes NUMBER OF IMAGES: 0 Swallow study with Speech Pathology using multiple barium thicknesses. There is silent aspiration with thin liquid with no cough reflex. There is pooling of all consistencies in the valleculae with some poor over and there is penetration to the vocal cords with nectar and honey thick liquid. Chin tuck technique improved inversion of the epiglottis mildly.     1.  Silent aspiration with thin liquid. Penetration with nectar and honey consistencies. Stasis with pooling in the valleculae.     Us Carotid Bilateral    Result Date: 12/3/2020  EXAM: US CAROTID BILATERAL LOCATION: [facility] DATE/TIME: 12/3/2020 3:12 PM INDICATION: Coronary artery disease. COMPARISON: None. TECHNIQUE: Duplex exam performed utilizing 2D gray-scale imaging, Doppler interrogation with color-flow and spectral waveform analysis. The percent diameter stenosis is determined using NASCET criteria and Society of Radiologists in Ultrasound Consensus Criteria. FINDINGS: RIGHT: Mild plaque at the bifurcation. The peak systolic velocity in the ICA is less than 125 cm/sec, consistent with less than 50% stenosis. Normal velocities in the ECA. Antegrade flow within the vertebral artery. LEFT: Mild plaque at the bifurcation. The peak systolic  velocity in the ICA is less than 125 cm/sec, consistent with less than 50% stenosis. Normal velocities in the ECA. Antegrade flow within the vertebral artery. VELOCITY CHART: CCA   Right: 64/16 cm/s   Left: 91/24 cm/s ICA   Right: 71/27 cm/s   Left: 60/22 cm/s ECA   Right: 64/11 cm/s   Left: 96/14 cm/s ICA/CCA PSV Ratio   Right: 1.1   Left: 0.66     1.  Mild plaque formation, velocities consistent with less than 50% stenosis in the right internal carotid artery. 2.  Mild plaque formation, velocities consistent with less than 50% stenosis in the left internal carotid artery. 3.  Flow within the vertebral arteries is antegrade.    Us Vein Mapping For Pre Bypass Graft Bilateral    Result Date: 12/4/2020  EXAM: US VEIN MAPPING FOR PRE BYPASS GRAFT BILATERAL LOCATION: [facility] DATE/TIME: 12/3/2020 6:46 PM INDICATION: Lower extremity venous mapping prior to coronary or peripheral bypass surgery, coronary artery disease, diabetic leg ulcer COMPARISON: 9/19/2020 TECHNIQUE: Ultrasound examination of the lower extremity veins was performed, including gray-scale and compression imaging. LOWER  EXTREMITY FINDINGS:   VENOUS DIAMETERS RIGHT GREAT SAPHENOUS VEIN Upper Thigh: 3.0 mm Mid Thigh: 2.4 mm Lower Thigh: 2.5 mm Knee: The great saphenous vein is thrombosed from the knee to the ankle. LEFT GREAT SAPHENOUS VEIN Upper Thigh: 4.7 mm Mid Thigh: 4.1 mm Lower Thigh: 4.5 mm Knee: 2.9 mm Upper Calf: 3.2 mm Mid Calf: 2.2 mm Lower Calf: 2.0 mm Ankle: 1.1 mm RIGHT SMALL SAPHENOUS VEIN Not visualized LEFT SMALL SAPHENOUS VEIN The small saphenous vein is thrombosed from the knee to the ankle. There is underlying bilateral subcutaneous edema. No evidence of deep vein thrombosis.     1. Exam positive for superficial thrombophlebitis involving the right great saphenous vein from the knee to the ankle and left small saphenous vein from the knee to the ankle. 2. The right small saphenous vein is not visualized. 3.  No evidence of thrombus extension into the deep venous system. 4. Diffuse subcutaneous edema without discrete fluid collections. NOTE: ABNORMAL REPORT    Echo Monitor Ryder    Result Date: 12/7/2020    Left ventricle ejection fraction is moderately decreased. The calculated left ventricular ejection fraction is 38%.      Eeg Coma Or Sleep Only    Result Date: 12/8/2020  EEG report DATE 12/08/20 CLINICAL HISTORY This is a 70 y.o. male with left leg jerking. The EEG is done to look for underlying epilepsy. INTRODUCTION This is a routine EEG performed utilizing standard 10- 20 system of electrode placement with 20 channels of recording including one channel of EKG monitor. The patient was studied in awake and drowsy/somnolent state. EEG TIME: 31 min DESCRIPTION OF THE RECORD The EEG background consists of a generalized symmetric delta/theta background.  No sharp large amplitude rhythmic activity to suggest electrographic seizures were seen.  Mild to moderate background dysrhythmias present.  No activation maneuvers were done. IMPRESSION/REPORT/PLAN This is an abnormal EEG during wakefulness and somnolence/drowsiness due to generalized slow background.  EEG suggestive of an generalized cerebral dysfunction such as seen in an encephalopathy.  EEG is not suggestive of active ongoing seizures during the recording. Further clinical correlation is needed. Please note that the absence of epileptiform abnormalities does not exclude the possibility of epilepsy in any patient.       Patient Active Problem List   Diagnosis     Obesity     ACP Staging Stage 1 Hypertension: 140-159 / 90-99     Coronary artery disease involving native heart with other form of angina pectoris, unspecified vessel or lesion type (H)     Type 2 diabetes mellitus (H)     Joint Pain, Localized In The Shoulder     Hyperlipidemia, unspecified hyperlipidemia type     Moderate episode of recurrent major depressive disorder (H)     Pulmonary edema cardiac cause  (H)     New onset atrial fibrillation (H)     Acute systolic heart failure (H)     CAD (coronary artery disease)     New onset a-fib (H)     Acute on chronic clinical systolic heart failure (H)     Atrial fibrillation with rapid ventricular response (H)     Diabetic leg ulcer (H)     Acute on chronic systolic CHF (congestive heart failure) (H)     A-fib (H)     Uncontrolled type 2 diabetes mellitus with hyperglycemia (H)     Acute respiratory failure with hypoxia (H)     On mechanically assisted ventilation (H)     S/P CABG (coronary artery bypass graft)     Seizures (H)     Cerebrovascular accident (CVA), unspecified mechanism (H)     Cerebrovascular accident (CVA) due to other mechanism (H)     Metabolic encephalopathy       Gema Kinsey DO  Pulmonary and Critical Care Attending  pgr [phone]

## 2021-06-13 NOTE — PROGRESS NOTES
Clinical Nutrition Therapy Follow Up Note      Current Nutrition Prescription:   Diet: Tube Feeding, No Tray  Formula: Diabetisource @ 50ml/hr  Flushes: 100ml water flushes every 4 hours  Diet Supplements: 1 pkt Prosource daily  IV dextrose or Fluids:     dextrose 10%      Current Nutrition Intake:  Enteral nutrition access is a nasal duodenal tube, with a placement date of 12/15/20. The current tube feeding order will provide 1440 kcals, 72 grams protein, 18 grams fiber, 120 grams carbohydrate, 979mls free water from formula, 600 mls from fluid flush, for a total of 1579 mls free water daily.     Prosource brings daily totals to 1500kcals and 87g protein daily.     Anthropometrics:  Admission weight: 237 lb 9.6 oz,12/2/20  Weight: 214 lb 9.6 oz (97.3 kg) nonpitting edema all extremities and generalized    GI Status/Output:   GI symptoms include: Constipation per nurse  Bowel Sounds present per nurse  Last BM: 12/16/20 per nurse    Skin/Wound:  Venous ulcers and surgical incisoins noted.    Medications:  Medications reviewed: Prosource, famotidine, pepcid, lasix, novolog, lantus, multivitamin, zosyn, kcl    Labs:  Labs reviewed: -241, rbc4.34, hgb 11.3, hematocrit 37.0    Estimated Nutrition Needs:  Assessment weight is 81 kg, adjusted weight     Energy Needs: 9502-1694 kcals daily, 20-25 kcal/kg  Protein Needs:  g daily, 1.2-1.8 g/kg.  Fluid Needs: 2025 mls daily, 25 mls/kg or per provider    Malnutrition: Noted previously as severe    Nutrition Risk Level: high risk    Nutrition dx:  Altered nutrition related laboratory values r/t DM as evidenced by HgbA1c of 12.6%.     Swallowing difficulty r/t recent intubation & noted left frontal ischemic stroke as evidenced by dysphagia and failed VFSS    Malnutrition r/t acute illness as evidenced by NPO x 5 days & >2% wt loss in a week.    Goal Status:  PO intake > 75% and Diet advance -not progressing due to high aspiration risk  Tolerate TF & meet estimated  needs-progressing    Intervention:  Notified physician of BG > 180 x 2 in 24 hours    Ordered Magnesium and phos lab for morning draw    Continue TF as ordered    Monitoring/Evaluation:  TF tolerance, wt, labs

## 2021-06-13 NOTE — PROGRESS NOTES
NEUROLOGY PROGRESS NOTE     ASSESSMENT & PLAN   Hospital Day#: 13    Impression: Left leg jerking-no recurrence.  Would not correlate with the area of hemorrhagic stroke in the left frontal region from the location stand pain.  Would continue on Keppra for now as this may have been a simple partial seizure.  2.  Left frontal hemorrhage stroke-this appears stable clinically at this time this could be impacting his swallowing, which case could show improvement of swallowing over time.        Patient Active Problem List    Diagnosis Date Noted     Disorientation      Cerebrovascular accident (CVA) due to other mechanism (H)      Metabolic encephalopathy      Cerebrovascular accident (CVA), unspecified mechanism (H)      Seizures (H)      Acute respiratory failure with hypoxia (H)      On mechanically assisted ventilation (H)      S/P CABG (coronary artery bypass graft)      Uncontrolled type 2 diabetes mellitus with hyperglycemia (H)      Acute on chronic clinical systolic heart failure (H) 12/02/2020     Atrial fibrillation with rapid ventricular response (H) 12/02/2020     Diabetic leg ulcer (H) 12/02/2020     Acute on chronic systolic CHF (congestive heart failure) (H) 12/02/2020     A-fib (H) 12/02/2020     Acute systolic heart failure (H)      New onset atrial fibrillation (H)      Pulmonary edema cardiac cause (H) 09/19/2020     CAD (coronary artery disease) 09/19/2020     New onset a-fib (H) 09/19/2020     Moderate episode of recurrent major depressive disorder (H) 06/21/2019     Hyperlipidemia, unspecified hyperlipidemia type 04/27/2016     Joint Pain, Localized In The Shoulder      Obesity      ACP Staging Stage 1 Hypertension: 140-159 / 90-99      Coronary artery disease involving native heart with other form of angina pectoris, unspecified vessel or lesion type (H)      Type 2 diabetes mellitus (H)      Past Medical History: Patient  has a past medical history of ACP Staging Stage 1 Hypertension: 140-159 /  90-99, Atrial fibrillation with rapid ventricular response (H) (12/2/2020), Coronary Artery Disease, Joint Pain, Localized In The Shoulder, Obesity, Other and unspecified hyperlipidemia (4/27/2016), and Type 2 Diabetes Mellitus With Complication.     SUBJECTIVE     I did see patient earlier this morning but was down for video swallow study which did not go well for him.  Now with feeding tube in place.  No headache.  Jerking of leg noted.  Keppra level 14.4, which is similar to his level of 16 earlier.     OBJECTIVE     Vital signs in last 24 hours  Temp:  [97.5  F (36.4  C)-98.8  F (37.1  C)] 98.6  F (37  C)  Heart Rate:  [72-97] 87  Resp:  [17-29] 21  BP: ()/(59-89) 106/66    Weight:   212 lb (96.2 kg)    I/O last 24 hours    Intake/Output Summary (Last 24 hours) at 12/15/2020 1925  Last data filed at 12/15/2020 1530  Gross per 24 hour   Intake 1416.96 ml   Output 3165 ml   Net -1748.04 ml       Review of Systems   See above.    General Physical Exam: Patient is alert and oriented x 1 Vital signs were reviewed and are documented in EMR. Neurological Exam:  Patient kept talking about how he wanted to talk to his children.  We did discuss his it have to be through video link.  Follow simple commands appropriately.  His extraocular movements appeared full.  Face moves symmetrically.  Tongue midline.  Hand grasp appeared equal with no drift.  Lifts both legs.  No jerking of legs.  Babinski downgoing     DIAGNOSTIC STUDIES     Pertinent Radiology   Radiology Results: Xr Chest 1 View Portable    Result Date: 12/15/2020  EXAM: XR CHEST 1 VIEW PORTABLE LOCATION: [facility] DATE/TIME: 12/15/2020 8:58 AM INDICATION: s/p CABG  possible aspiration COMPARISON: 12/12/2020     Sternal wires. Removal IJ sheath. Stable position bilateral chest drains. Stable enlarged cardiac silhouette. Unchanged bilateral lung opacities which are likely a combination of lung infiltrates and pleural fluid. No  pneumothorax. No pulmonary edema.    Xr Swallow Study W Speech    Result Date: 12/15/2020  EXAM: XR SWALLOW STUDY W SPEECH OR OT LOCATION: [facility] DATE/TIME: 12/15/2020 9:12 AM INDICATION: Difficulty swallowing. Abnormal swallow study 12/12/2020. COMPARISON: 12/12/2020 TECHNIQUE: Routine. FINDINGS: FLUOROSCOPIC TIME: .4 minutes NUMBER OF IMAGES: 0 Swallow study with Speech Pathology using multiple barium thicknesses. Airway aspiration with the nectar thick barium. Weak, delayed cough. Airway penetration with the honey thick barium. Please see speech pathology's report for further details and recommendations.    Xr Ng Or Nj Tube Reposition    Result Date: 12/15/2020  EXAM: XR NJ TUBE INSERTION LOCATION: [facility] DATE/TIME: 12/15/2020 9:49 AM INDICATION: Dysphagia. Aspiration on video swallow study. NJ tube needed for medication administration and feedings. COMPARISON: None. TECHNIQUE: A 10 Slovenian feeding tube was placed under fluoroscopic guidance. FLUORO TIME: 2.3 NUMBER OF IMAGES: 1 FINDINGS: Enteric tube placed without complication.?Tip in the distal duodenum.     Pertinent Labs   Lab Results: Reviewed  Recent Results (from the past 24 hour(s))   POCT Glucose    Collection Time: 12/14/20  8:17 PM    Specimen: Blood   Result Value Ref Range    Glucose 159 (H) 70 - 139 mg/dL   POCT Glucose    Collection Time: 12/14/20 11:56 PM    Specimen: Blood   Result Value Ref Range    Glucose 145 (H) 70 - 139 mg/dL   POCT Glucose    Collection Time: 12/15/20  3:31 AM    Specimen: Blood   Result Value Ref Range    Glucose 164 (H) 70 - 139 mg/dL   Levetiracetam [Keppra ]    Collection Time: 12/15/20  6:22 AM   Result Value Ref Range    Levetiracetam 14.4 6.0 - 46.0 ug/mL   Procalcitonin    Collection Time: 12/15/20  6:22 AM   Result Value Ref Range    Procalcitonin 0.14 0.00 - 0.49 ng/mL   Potassium - Next AM    Collection Time: 12/15/20  6:23 AM   Result Value Ref Range     Potassium 3.7 3.5 - 5.0 mmol/L   Magnesium    Collection Time: 12/15/20  6:23 AM   Result Value Ref Range    Magnesium 2.0 1.8 - 2.6 mg/dL   Sodium    Collection Time: 12/15/20  6:23 AM   Result Value Ref Range    Sodium 142 136 - 145 mmol/L   Creatinine    Collection Time: 12/15/20  6:23 AM   Result Value Ref Range    Creatinine 1.06 0.70 - 1.30 mg/dL    GFR MDRD Af Amer >60 >60 mL/min/1.73m2    GFR MDRD Non Af Amer >60 >60 mL/min/1.73m2   ECG 12 lead nursing unit performed    Collection Time: 12/15/20  7:41 AM   Result Value Ref Range    SYSTOLIC BLOOD PRESSURE      DIASTOLIC BLOOD PRESSURE      VENTRICULAR RATE 88 BPM    ATRIAL RATE 81 BPM    P-R INTERVAL      QRS DURATION 144 ms    Q-T INTERVAL 466 ms    QTC CALCULATION (BEZET) 563 ms    P Axis      R AXIS -56 degrees    T AXIS 53 degrees    MUSE DIAGNOSIS       Atrial fibrillation  Right bundle branch block  Left anterior fascicular block  ** Bifascicular block **  Abnormal ECG  When compared with ECG of 14-DEC-2020 09:48,  No significant change was found  Confirmed by ALKA NOYOLA MD LOC:[facility] ([id number]) on 12/15/2020 10:07:53 AM     POCT Glucose    Collection Time: 12/15/20  8:18 AM    Specimen: Blood   Result Value Ref Range    Glucose 183 (H) 70 - 139 mg/dL   Urinalysis-UC if Indicated    Collection Time: 12/15/20 10:07 AM   Result Value Ref Range    Color, UA Yellow Colorless, Yellow, Straw, Light Yellow    Clarity, UA Cloudy (!) Clear    Glucose, UA Negative Negative    Bilirubin, UA Negative Negative    Ketones, UA Trace (!) Negative    Specific Gravity, UA 1.017 1.001 - 1.030    Blood, UA Trace (!) Negative    pH, UA 5.0 4.5 - 8.0    Protein, UA 30 mg/dL (!) Negative mg/dL    Urobilinogen, UA <2.0 E.U./dL <2.0 E.U./dL, 2.0 E.U./dL    Nitrite, UA Negative Negative    Leukocytes, UA Trace (!) Negative    Bacteria, UA Few (!) None Seen hpf    RBC, UA 0-2 None Seen, 0-2 hpf    WBC, UA 0-5 None Seen, 0-5 hpf    Squam Epithel, UA 0-5 None Seen, 0-5 lpf    Mucus,  UA Few (!) None Seen lpf   HM2(CBC w/o Differential)    Collection Time: 12/15/20 10:45 AM   Result Value Ref Range    WBC 18.3 (H) 4.0 - 11.0 thou/uL    RBC 4.69 4.40 - 6.20 mill/uL    Hemoglobin 12.1 (L) 14.0 - 18.0 g/dL    Hematocrit 40.4 40.0 - 54.0 %    MCV 86 80 - 100 fL    MCH 25.8 (L) 27.0 - 34.0 pg    MCHC 30.0 (L) 32.0 - 36.0 g/dL    RDW 14.7 (H) 11.0 - 14.5 %    Platelets 335 140 - 440 thou/uL    MPV 9.9 8.5 - 12.5 fL   POCT Glucose    Collection Time: 12/15/20 12:21 PM    Specimen: Blood   Result Value Ref Range    Glucose 143 (H) 70 - 139 mg/dL   POCT Glucose    Collection Time: 12/15/20  4:31 PM    Specimen: Blood   Result Value Ref Range    Glucose 147 (H) 70 - 139 mg/dL         HOSPITAL MEDICATIONS       acetaminophen  650 mg Oral Q6H    Or     acetaminophen  650 mg Rectal Q6H     amino acids-protein hydrolys  1 packet Enteral Tube DAILY     amiodarone  200 mg Enteral Tube BID     aspirin  81 mg Enteral Tube DAILY     aspirin  150 mg Rectal DAILY     atorvastatin  80 mg Enteral Tube QHS     bisacodyL  10 mg Oral Once     famotidine (PEPCID) injection  20 mg Intravenous BID    Or     famotidine  20 mg Oral BID     furosemide  20 mg Intravenous DAILY     heparin (PF) ANTICOAGULANT  5,000 Units Subcutaneous Q8H FIXED TIMES     insulin aspart (NovoLOG) injection   Subcutaneous Q4H FIXED TIMES     insulin glargine  8 Units Subcutaneous BID     levETIRAcetam  500 mg Enteral Tube BID     lidocaine (PF)  1-5 mL Intradermal Once     magnesium hydroxide  30 mL Oral DAILY     melatonin  3 mg Enteral Tube QHS     metoprolol tartrate  25 mg Enteral Tube BID     multivitamin with iron-mineral  15 mL Enteral Tube DAILY     piperacillin-tazobactam  3.375 g Intravenous Q8H     QUEtiapine  25 mg Oral QHS     sodium chloride bacteriostatic  1-5 mL Intradermal Once     sodium chloride  10 mL Intravenous Q8H FIXED TIMES     sodium chloride  10 mL Intravenous Q8H FIXED TIMES     sodium chloride  3 mL Intravenous Q8H FIXED  TIMES        Total time spent for face to face visit, reviewing labs/imaging studies, counseling and coordination of care was: 15 minutes. More than 50% of this time was spent on counseling and coordination of care.    Michael Bailey MD  [facility], ..  Direct Secure Messaging: [email or web address]  Tel: [phone]

## 2021-06-13 NOTE — PLAN OF CARE
Problem: Pain  Goal: Patient's pain/discomfort is manageable  Outcome: Progressing  Has denied pain   Problem: Daily Care  Goal: Daily care needs are met  Outcome: Progressing   Able to make wishes known, staff anticipate all care needs.  Problem: Decreased Mental Status Causing Increased Need for Safety  Goal: Provide a safe environment for patient (Implement appropriate elements in plan of care)  Outcome: Progressing   Bed/chair alarm on at all times  Problem: Glucose Imbalance  Goal: Achieve optimal glucose control  Outcome: Not Progressing   Continues to have bg >200, treated with corrective insulin.  Problem: Day of Surgery  Goal: Patient/Family/Caregiver demonstrates understanding of restraint use/application and pain managment  Outcome: Completed  Goal: Set pain goal expectations and achieve pain/discomfort control  Outcome: Completed  Goal: Mobility/activity is initiated  Outcome: Completed  Goal: Patient will maintain patent airway  Outcome: Completed  Goal: Day of surgery nutrition  Outcome: Completed  Goal: Maintain hemodynamics  Outcome: Completed     Problem: POD 1  Goal: Patient/Family/Caregiver demonstrates understanding of DVT and DVT treatment measures  Outcome: Completed  Goal: Patients pain/discomfort controlled to allow patient to get out of bed  Outcome: Completed  Goal: POD 1 Mobility/activity advancement  Outcome: Completed  Goal: Patients nutritional intake is adequate and patient will participate in bowel management program  Outcome: Completed  Goal: POD 1 patient will understand meds  Outcome: Completed     Problem: POD 2  Goal: Patient/Family/Caregiver demonstrates understanding of daily expectations  Outcome: Completed  Goal: Patients pain/discomfort managed  Outcome: Completed  Goal: POD 2 Mobility/activity advancement  Outcome: Completed  Goal: Patients nutritional intake is adequate and patient will participate in bowel management program  Outcome: Completed  Goal: POD 2 Patient will  understand meds  Outcome: Completed     Problem: POD 3  Goal: Patient/Family/Caregiver demonstrates understanding of incisional care and daily weights  Outcome: Completed  Goal: Continued pain/discomfort management  Outcome: Completed  Goal: POD 3 Mobility/activity advancement  Outcome: Completed  Goal: Patients nutritional intake continues to be adequate  Outcome: Completed  Goal: POD 3 Patient will understand meds  Outcome: Completed     Problem: POD 4  Goal: Patient/Family/Caregiver demonstrates understanding of discharge plan of care  Outcome: Completed  Goal: Discharge pain/discomfort management  Outcome: Completed  Goal: Post discharge mobility/activity  Outcome: Completed  Goal: Patients nutritional intake is adequate upon discharge  Outcome: Completed  Goal: Patient will understand discharge meds  Outcome: Completed   Pt is POD 12, continues to be confused, and will go to transitional care.

## 2021-06-13 NOTE — PLAN OF CARE
Problem: Pain  Goal: Patient's pain/discomfort is manageable  Outcome: Progressing     Problem: Safety  Goal: Patient will be injury free during hospitalization  Outcome: Progressing     Problem: Daily Care  Goal: Daily care needs are met  Outcome: Progressing     Problem: Potential for Falls  Goal: Patient will remain free of falls  Outcome: Progressing     Pt is A+O x4, calm and cooperative with cares. Tele is afib in the 60s.  MRSA swab completed and sent.

## 2021-06-13 NOTE — PROGRESS NOTES
Physical Therapy     12/05/20 0907   Visit Specifics   Eval Type Initial eval   Inital PT Consult 12/05/20   Bed/Tabs/Pad Alarm Applied Not applicable   Subjective Patient Comments agreeable, pleasant   General   Onset date 12/02/20   Additional Pertinent History CABG procedure planned for 12/7   Chart Reviewed Yes   PT/OT Patient/Caregiver Stated Goals wants TCU for medication management    Family/Caregiver Present No   Treatment Time   Gait Training 15   Precautions   Weight Bearing Status wbat   General Precautions Sternal   Home Living   Home Layout   (split entry )   Mobility Equipment   (none)   Prior Status   Leisure Interests Other (Comment)  ([place])   Comments See OT    Cognition   Overall Cognitive Status WFL   RLE Assessment   RLE Assessment WFL   LLE Assessment   LLE Assessment WFL   Bed Mobility   Bed Mobility Comments to/from chair   Transfer    Sit To Stand Independent   Stand To Sit Independent   Transfer Comments steady, no device   Ambulation    Weight Bearing Status wbat   Distance (ft)  400ft   Assistance Independent   Assistive Device Pushing IV pole   Verbal Cues Attention to direction   Quality of Gait/Comment steady   Pattern WFL   Stairs   Stairs Number Of Steps;Assistance;Rails;Device;Other Apparatus;Pattern;Comment   Number Of Steps 12   Assistance Close supervision   Rails Right;Bilateral   Device No device   Pattern Non-reciprocal   Endurance Deficit   Endurance Deficit Yes   Fall Risk   Fall Risk Low   Plan   Treatment/Interventions Eval only   Assessment   Barriers to Discharge None   Recommendation   PT Discharge Recommendation Other (Comment)  (Remains in hospital; likely will need re-eval after procedur)   PT Equipment Recommendation   (none)   Treatment Suggestions for Next Session eval only; likely will need re-eval after procedure.    PT Summary Pt requests TCU after d/c, CABG procedure on Monday 12/7   PT Care Plan REVIEWED DAILY Yes, goals remain appropriate     Cassy WEEKS  White, PT

## 2021-06-13 NOTE — PROGRESS NOTES
Pt  Is alert and oriented x 4. Denies pain. From home lives with wife.  Independent with ambulation in the room. BP at 1722 was 159/102. Pt had scheduled metoprolol 100 mg, losartan 100 mg, Diltiazem 120 mg. Please do recheck BP.  Pt reported  Burning on LE that has been going on for years. Heparin running at 1200 units.BG was 288 on arrival. No insuline orders available at this time.  Will be recheck at 8 pm and insuline administered. at that time. Admission completed. Full body assessments not completed at this time.

## 2021-06-13 NOTE — PROGRESS NOTES
Progress Note    Assessment/Plan  S/P CABG X 4 POD/PVI/LAAL # 1  Postop respiratory failure, remains sedated, intubated and on mechanical ventilation  Sedation: Precedex 0.8 mcg/kg/h  Vent: Vt 500, R 16, PEEP 5, FiO2 30%, SPO2 98%  Gets agitated when sedation is turned down and moves all extremities  Postop cardiogenic shock requiring epinephrine drip  Febrile temp 100.9  F, T-max 101.8  F  Hemodynamics: CI 2.8, CO 6.0, CVP 5, PAP 34/12  Mild postop acute blood loss anemia, Hgb 10.4  Poorly controlled insulin-dependent diabetes mellitus, preop A1c 12.6%  Currently on insulin drip 1.5 units/h  Will transition off of the insulin drip to sliding scale once patient is off epinephrine drip  Acute on chronic systolic congestive heart failure  A. fib with RVR status post bilateral PVI and left atrial appendage excision  Incisions are clean dry and intact, lungs sound diminished bilaterally  Chest tube drainage 190/927 cc, still bloody  Leave chest tubes in due to excessive drainage  Abdomen is soft and nondistended, bowel sounds present all 4 quadrants  Urine output marginal, no BM yet but positive bowel sounds  Weight 99.5 kg from 99.1 kg yesterday, admission weight 107.8 kg  Lasix 40 mg IV twice a day for 24 hours  A.m. blood gas looks great, will plan to wean and extubate this morning  Pull the sheath from the left groin  Wean epinephrine for CI > 2 or MAP > 70  Once extubated, will mobilize out of bed as tolerated  Keep in the unit      Subjective  Sedated intubated and on mechanical ventilation  Objective  Laying in bed calmly at time of evaluation, with no apparent distress but gets agitated when sedation is turned down  Vital signs in last 24 hours  Temp:  [97.2  F (36.2  C)-101.8  F (38.8  C)] 100.9  F (38.3  C)  Heart Rate:  [] 85  Resp:  [12-34] 16  BP: ()/(49-64) 110/53  Arterial Line BP: ()/() 199/45  FiO2 (%):  [30 %-100 %] 30 %  Weight:   219 lb 5.7 oz (99.5 kg)    Intake/Output last 3  "shifts  I/O last 3 completed shifts:  In: 5407.8 [I.V.:4097.8; Blood:550; IV Piggyback:760]  Out: 3161 [Urine:1634; Blood:600; Chest Tube:927]  Intake/Output this shift:  No intake/output data recorded.    Review of Systems   Review of systems not obtained due to inability to communicate with the patient.     Physical Exam  /53 (Patient Position: Lying)   Pulse 85   Temp (!) 100.9  F (38.3  C) (Core)   Resp 16   Ht 5' 10\" (1.778 m)   Wt 219 lb 5.7 oz (99.5 kg)   SpO2 97%   BMI 31.47 kg/m    HRR, incisions are clean dry and intact, lungs are diminished bilaterally  Abdomen soft and nondistended, with bowel sounds present all 4 quadrants  Mild bilateral nonpitting edema    Pertinent Labs   Lab Results: personally reviewed.   Lab Results   Component Value Date     12/08/2020    K 4.5 12/08/2020     (H) 12/08/2020    CO2 27 12/08/2020    BUN 18 12/08/2020    CREATININE 0.72 12/08/2020    CALCIUM 8.0 (L) 12/08/2020     Lab Results   Component Value Date    WBC 13.0 (H) 12/08/2020    HGB 10.4 (L) 12/08/2020    HCT 33.6 (L) 12/08/2020    MCV 86 12/08/2020     12/08/2020       Pertinent Radiology   Radiology Results: Personally reviewed image/s, Personally reviewed impression/s and portable chest x-ray showed bilateral pulmonary congestion  EKG Results: personally reviewed.  and normal sinus rhythm per monitor    Ash Lowry            "

## 2021-06-13 NOTE — PLAN OF CARE
Problem: Pain  Goal: Patient's pain/discomfort is manageable  Outcome: Progressing     Problem: Glucose Imbalance  Goal: Achieve optimal glucose control  Outcome: Progressing     Problem: Blood pressure is elevated above 140 over 90 mmHg  Goal: Blood pressure (BP) is within acceptable limits  Outcome: Progressing     Problem: Nutrition  Goal: Nutrition appropriate for coronary artery disease state  Outcome: Progressing

## 2021-06-13 NOTE — PROGRESS NOTES
Hospitalist Progress Note    Assessment/Plan    A: Patient is a 71 y/o man who has multiple medical problems including diabetes mellitus type II, chronic systolic heart failure and coronary artery disease. Patient presented with dyspnea which appears to be from acute on chronic systolic heart failure. Echocardiogram showed LVEF 30-35%. Patient also has known multi-vessel coronary artery disease that was found on coronary angiogram on 21-Sep-2020. From review, patient had been advised to f/u for CABG in the past but had not yet done so. Diabetes mellitus type II is also uncontrolled and patient has reported symptoms consistent with diabetic neuropathy.     From chart review, patient had not been compliant with diet or medication in the past. Patient was advised to present to hospital on 05-Nov-2020 for acute heart failure but did not do so at that point. Patient had stated that he would present to the hospital the following week but it does not appear that he actually did so.     P:  1.) Acute on chronic systolic heart failure: Patient on lasix drip at present. Monitoring response.  2.) Multi-vessel coronary artery disease: Patient currently on heparin drip. Tentatively to have CABG this coming week.  3.) Diabetes mellitus type II, uncontrolled, with hyperglycemia and with diabetic neuropathy: Will increase lantus to 35 units every evening. Patient to continue 5 units of novolog with each meal. Pain from neuropathy is currently controlled but gabapentin might become necessary if pain worsens.  4.) Atrial fibrillation: Patient on diltiazem. Xarelto on hold in anticipation of CABG some time next week.  5.) Hypertension: Patient on metoprolol and losartan.   6.) Hyperlipidemia: Patient on atorvastatin.  7.) Bilateral leg wounds: Receiving wound care.      Principal Problem:    Acute on chronic clinical systolic heart failure (H)  Active Problems:    ACP Staging Stage 1 Hypertension: 140-159 / 90-99    Type 2 diabetes  mellitus (H)    Hyperlipidemia, unspecified hyperlipidemia type    CAD (coronary artery disease)    Atrial fibrillation with rapid ventricular response (H)    Diabetic leg ulcer (H)    Acute on chronic systolic CHF (congestive heart failure) (H)    A-fib (H)    Uncontrolled type 2 diabetes mellitus with hyperglycemia (H)      Barriers to Discharge:  Heparin and lasix drips; awaiting CABG      Subjective    Patient notes no chest pain and no dyspnea. Patient notes having burning in both legs for the past 4 months from both knees to both feet. Patient notes that this pain is worse and night and is worse with blanket pressure. Patient notes that this is controlled at present.     Objective    Vital signs in last 24 hours  Temp:  [97.5  F (36.4  C)-98.3  F (36.8  C)] 97.5  F (36.4  C)  Heart Rate:  [67-75] 73  Resp:  [18-20] 18  BP: ()/(61-81) 95/61 91% O2 Device: None (Room air) O2 Flow Rate (L/min): 2 L/min  Weight:   220 lb 1.6 oz (99.8 kg) Weight change: -2 lb 14.4 oz (-1.315 kg)    Intake/Output last 3 shifts  I/O last 3 completed shifts:  In: 1329.6 [P.O.:960; I.V.:369.6]  Out: 1550 [Urine:1550]  Body mass index is 31.58 kg/m .    Physical Exam    General:     Patient comfortable, NAD.   HEENT:     No scleral icterus or conjunctival injection.   Heart:    RRR, S1 S2 w/o murmurs.   Lungs:     Breath sounds present. No crackles/wheezes heard.   Abdomen:   Soft, nontender.      Pertinent Labs   Lab Results: personally reviewed.   Results from last 7 days   Lab Units 12/03/20  0704 12/02/20  1416   LN-SODIUM mmol/L 139 139   LN-POTASSIUM mmol/L 3.7 4.0   LN-CHLORIDE mmol/L 105 104   LN-CO2 mmol/L 27 25   LN-BLOOD UREA NITROGEN mg/dL 23* 17   LN-CREATININE mg/dL 0.75 0.91   LN-CALCIUM mg/dL 8.7 9.1   LN-ALBUMIN g/dL  --  3.2*   LN-PROTEIN TOTAL g/dL  --  7.4   LN-BILIRUBIN TOTAL mg/dL  --  0.8   LN-ALKALINE PHOSPHATASE U/L  --  169*   LN-ALT (SGPT) U/L  --  9   LN-AST (SGOT) U/L  --  23   LN-MAGNESIUM mg/dL 1.8   --      Results from last 7 days   Lab Units 12/05/20  0629 12/02/20  1418   LN-WHITE BLOOD CELL COUNT thou/uL  --  9.4   LN-HEMOGLOBIN g/dL 14.0 14.7   LN-HEMATOCRIT %  --  47.7   LN-PLATELET COUNT thou/uL 276 309     Results from last 7 days   Lab Units 12/03/20  0704 12/03/20  0106 12/02/20  1815   LN-TROPONIN I ng/mL 1.60* 1.88* 1.63*     Results from last 7 days   Lab Units 12/05/20  0736 12/04/20  2042 12/04/20  1702 12/04/20  1113 12/04/20  0749 12/03/20  2031 12/03/20  1832 12/03/20  1210 12/03/20  0802 12/02/20  2357   LN-POC GLUCOSE FINGERSTICK- HE mg/dL 229* 189* 163* 227* 258* 217* 131 94 96 302*       Medications  Scheduled Meds:    aspirin  162 mg Oral DAILY     atorvastatin  80 mg Oral QHS     diltiazem  120 mg Oral DAILY     insulin aspart (NovoLOG) injection   Subcutaneous TID with meals     insulin aspart (NovoLOG) injection   Subcutaneous TID with meals     insulin glargine  35 Units Subcutaneous QHS     losartan  100 mg Oral DAILY     metoprolol succinate  100 mg Oral BID     sodium chloride  2.5 mL Intravenous Line Care     Continuous Infusions:    furosemide 10 mg/hr (12/05/20 0822)     heparin 1,650 Units/hr (12/05/20 0822)     PRN Meds:.acetaminophen, acetaminophen, bisacodyL, dextrose 50 % (D50W), glucagon (human recombinant), hydrALAZINE, magnesium hydroxide, naloxone **OR** naloxone **OR** naloxone **OR** naloxone, nitroglycerin, ondansetron **OR** ondansetron, polyethylene glycol, traMADoL

## 2021-06-13 NOTE — PROGRESS NOTES
Progress Note    Assessment/Plan  S/P CABG X 4 POD/PVI/LAAL # 11  Postop convulsive activity however EEG did not show any seizures activity  Head CT showed punctate focus in the left frontal lobe and MRI showed 2.5 cm subacute infarct in the left frontal lobe associated with petechial hemorrhage  Postop respiratory failure with prolonged oxygen now resolved, currently on room air with intermittent MetaNeb treatment saturation 92%  AVSS, rate controlled A. fib currently on amiodarone therapy 200 mg twice a day  Postop dysphagia, failed video swallow evaluation, currently has an NG tube and tolerating formula  Postop leukocytosis resolving, last WBC 12.5 from 13.8, will continue to monitor closely  Hypomagnesemia, mag 1.9, replaced with magnesium sulfate 22 g IVPB  Poorly controlled type 2 diabetes mellitus, preop A1c 12.6%, hyperlipidemia, diabetic leg ulcers, acute on chronic systolic congestive heart failure  Sternal incision has some crusting dry and intact and lungs sounds are diminished bilaterally  Chest tube drainage  90/800 cc serous  Leave chest tubes in today due to excessive drainage  We will send specimen for cholesterol/triglyceride count to rule out chylous leak  Continue PT/OT to mobilize patient out of bed and ambulate as tolerated  Continue NG tube feeding to optimize nutrition  Will repeat swallow evaluation per speech therapy  Patient may need acute care rehab at discharge, referral sent out yesterday  Currently not medically ready for discharge        Subjective  Denies incisional chest pain and or shortness of breath  Objective  Awake and alert, laying in bed at time of evaluation, with no apparent distress  Vital signs in last 24 hours  Temp:  [97.4  F (36.3  C)-98.5  F (36.9  C)] 97.7  F (36.5  C)  Heart Rate:  [] 98  Resp:  [18-20] 20  BP: (122-153)/(79-85) 122/80  Weight:   203 lb 9.6 oz (92.4 kg)  Preop weight 95.7 kg  Intake/Output last 3 shifts  I/O last 3 completed shifts:  In: 520  "[NG/GT:520]  Out: 4055 [Urine:3255; Chest Tube:800]  Intake/Output this shift:  No intake/output data recorded.    Review of Systems   A 12 point comprehensive review of systems was negative except as noted.    Physical Exam  /80 (Patient Position: Lying)   Pulse 98   Temp 97.7  F (36.5  C) (Axillary)   Resp 20   Ht 5' 10\" (1.778 m)   Wt 203 lb 9.6 oz (92.4 kg)   SpO2 92%   BMI 29.21 kg/m    Rate controlled A. fib, incisions are clean dry intact, lungs are diminished to bases  Abdomen soft and nontender  Mild persistent lower extremity edema with redness and warmth noted on the left lower extremity below the knee    Pertinent Labs   Lab Results: personally reviewed.   Lab Results   Component Value Date     12/18/2020    K 3.8 12/18/2020     (H) 12/18/2020    CO2 26 12/18/2020    BUN 14 12/18/2020    CREATININE 0.83 12/18/2020    CALCIUM 8.1 (L) 12/18/2020     Lab Results   Component Value Date    WBC 12.5 (H) 12/18/2020    HGB 11.8 (L) 12/18/2020    HCT 39.4 (L) 12/18/2020    MCV 86 12/18/2020     12/18/2020       Pertinent Radiology   Radiology Results: Reviewed and discussed with Radiologist and Not yet available for review  EKG Results: personally reviewed.  and rate controlled A. fib    Ash B Essence            "

## 2021-06-13 NOTE — PROGRESS NOTES
[facility] LTACH Unit         At the request of Chevy Lowry, cardiothoracic PA, and Mehreen Sanchez RN CM, the chart of Mika Alvarez was reviewed on 12/16 for potential admission to [facility] LTACH unit.       Currently the pt has minimal criteria for admission to LTACH. The only firm criteria at this point is chest tubes to suction which would require pulmonary approval for admission. The pt's 1:1 attendant was discontinued this morning and the IV lasix may be transitioned to oral (feeding tube). He is not requiring oxygen and his IV abx has been completed.        Today therapy is recommending ARU and [facility] is currently following the pt.  ARU would seem to be the most appropriate placement pending criteria being met for placement there.        I will continue to follow.      Umu Marie RN  LTACH Referral Specialist  [address] [address]  [email or web address]    [email or web address]  Office: [phone]  Fax: [phone]     CONFIDENTIAL Protected under [place] Statute  145.61 et seq

## 2021-06-13 NOTE — PLAN OF CARE
Problem: Daily Care  Goal: Daily care needs are met  Outcome: Progressing     Problem: Safety  Goal: Patient will be injury free during hospitalization  Outcome: Progressing     Problem: Psychosocial Needs  Goal: Demonstrates ability to cope with hospitalization/illness  Outcome: Progressing  Goal: Collaborate with patient/family/caregiver to identify patient specific goals for this hospitalization  Outcome: Progressing    Pt remained free from falls this shift. A/O x's 4, VSS, denies pain/ shortness of breath. Lung sounds clear, dim, has been in Afib. Pt has been off 1:1 since 7am this morning. Pt states he is hungry. Pt on tube feeding with increased loose stools x4. Dietary switch brand of tube feeding.  Pt understands plan of care, will continue to monitor. Call light within reach.   Leah Behr, RN

## 2021-06-13 NOTE — PROGRESS NOTES
"Care Management Follow Up Note    Length of Stay (days) 2     Patient plan of care discussed at Interdisciplinary Rounds: yes  CM Patient/Caregiver Stated Goals: Discharge to TCU     Expected Discharge Date: 12/12/20(pending CABG and recovery)  Concerns to be Addressed / Barriers to Discharge:  IV Lasix, IV Heparin, CABG procedure early next week    Anticipated Discharge Disposition:  TBD  Anticipated Discharge Services:    TBD  Selected Continued Care - Admitted Since 12/2/2020    No services have been selected for the patient.        Anticipated Discharge DME:  None    Plan:  The patient is from home with his spouse and independent at baseline. The patient stated that his wife recently had a stroke, so he is worried about going home. His goal is to go to a TCU at discharge because of \"all the medications.\" Writer asked the patient to clarify, and he said that he knows when you discharge from the hospital, there are a lot of medications. Writer explained that Physical Therapy and Occupational Therapy should do evaluations, and we will have to see what the recommendations are for discharge. Per Cardiology, there is a CABG procedure planned for early next week. Care Management to follow hospital progress to assess needs.     Anthony Crews RN    "

## 2021-06-13 NOTE — CONSULTS
Consultation - [facility]  Mika Alvarez,  1950, MRN [id number]    [facility]  Acute systolic heart failure (H) [I50.21]  Diabetes mellitus without complication (H) [E11.9]  Chronic atrial fibrillation (H) [I48.20]  NSTEMI (non-ST elevated myocardial infarction) (H) [I21.4]    PCP: Angel Claire MD, [phone]   Code status:  Full Code       Extended Emergency Contact Information  Primary Emergency Contact: Kaycee Reeder  Mobile Phone: [phone]  Relation: Child  Secondary Emergency Contact: Bel Alvarez  Address: [address] [place] [address] of [place]  Home Phone: [phone]  Mobile Phone: [phone]  Relation: Spouse       Assessment and Plan   Principal Problem:    Acute on chronic clinical systolic heart failure (H)  Active Problems:    ACP Staging Stage 1 Hypertension: 140-159 / 90-99    Type 2 diabetes mellitus (H)    Hyperlipidemia, unspecified hyperlipidemia type    CAD (coronary artery disease)    Atrial fibrillation with rapid ventricular response (H)    Diabetic leg ulcer (H)    Acute on chronic systolic CHF (congestive heart failure) (H)    A-fib (H)    Uncontrolled type 2 diabetes mellitus with hyperglycemia (H)    Acute respiratory failure with hypoxia (H)    On mechanically assisted ventilation (H)    S/P CABG (coronary artery bypass graft)    Seizures (H)    Cerebrovascular accident (CVA), unspecified mechanism (H)    Cerebrovascular accident (CVA) due to other mechanism (H)    Metabolic encephalopathy    Disorientation    Impression: Postoperative fevers following coronary bypass graft surgery.  Patient also has subtle intracranial hemorrhage.  This can cause fever.    Single positive blood culture with gram-positive cocci after 2 days incubation.  Unclear yet if this is contaminant versus pathogen.    Silent aspiration.    Possible pneumonia with beta-hemolytic  strep.    Recommendations: Agree with Zosyn and vancomycin for now.  Likely taper antibiotics tomorrow as we get more information back.    Further recommendations to follow clinical course.    Thank you for consulting [facility]    Angel Fam MD  [phone]     Chief Complaint Acute on chronic clinical systolic heart failure (H)       HPI   We have been requested by Damaris Martinez CNP to evaluate Mika Alvarez for positive blood culture who is a 70 y.o. year old male.    Patient is a 70-year-old gentleman who presented to the emergency department on December 2 which this of breath.  He had a known history of coronary artery disease.  After further diagnostic evaluations, patient underwent coronary artery bypass graft x4 with endoscopic vein harvesting and left atrial appendage excision.    Almost immediately after surgery, patient has been having fevers.  Fevers have been slowly improving over the last 4 days.    2 days ago, blood cultures x2 were obtained.  1 has gram-positive cocci in clusters, the other is no growth.    Patient has been started on intravenous vancomycin and piperacillin tazobactam.    He is being followed by neurology because of some convulsive activity.  He has been found to have petechial hemorrhage in the left frontal opercular area    At the time my visit, patient is quite sleepy, although apparently was more alert earlier.     Medical History  Active Ambulatory (Non-Hospital) Problems    Diagnosis     Acute systolic heart failure (H)     New onset atrial fibrillation (H)     Pulmonary edema cardiac cause (H)     New onset a-fib (H)     Moderate episode of recurrent major depressive disorder (H)     Joint Pain, Localized In The Shoulder     Obesity     Coronary artery disease involving native heart with other form of angina pectoris, unspecified vessel or lesion type (H)     Past Medical History:   Diagnosis Date     ACP Staging Stage 1 Hypertension:  140-159 / 90-99      Atrial fibrillation with rapid ventricular response (H) 12/2/2020     Coronary Artery Disease      Joint Pain, Localized In The Shoulder      Obesity      Other and unspecified hyperlipidemia 4/27/2016     Type 2 Diabetes Mellitus With Complication     Surgical History  He  has a past surgical history that includes Coronary Angiogram (N/A, 9/22/2020) and Left Heart Catheterization with Left Ventriculogram (N/A, 9/22/2020).   Social History  Reviewed, and he  reports that he has never smoked. He has never used smokeless tobacco. He reports current alcohol use. He reports that he does not use drugs.   Allergies  No Known Allergies Family History  Noncontributory to current problem except as mentioned above    Psychosocial Needs  Social History     Social History Narrative     Not on file     Additional psychosocial needs reviewed per nursing assessment.     Prior to Admission Medications   Medications Prior to Admission   Medication Sig Dispense Refill Last Dose     glipiZIDE (GLUCOTROL XL) 10 MG 24 hr tablet Take 2 tablets (20 mg total) by mouth once daily. (Patient taking differently: Take 10 mg by mouth once daily. ) 180 tablet 1 Unknown at Unknown time     insulin glargine (LANTUS SOLOSTAR U-100 INSULIN) 100 unit/mL (3 mL) pen Inject 35 Units under the skin at bedtime. 15 mL 0 Unknown at Unknown time     losartan (COZAAR) 100 MG tablet Take 1 tablet (100 mg total) by mouth daily. 90 tablet 2 Unknown at Unknown time     metFORMIN (GLUCOPHAGE) 1000 MG tablet Take 1 tablet (1,000 mg total) by mouth 2 (two) times a day with meals. (Patient taking differently: Take 1,000 mg by mouth daily with breakfast. ) 180 tablet 0 Unknown at Unknown time     metoprolol succinate (TOPROL-XL) 100 MG 24 hr tablet Take 1 tablet (100 mg total) by mouth 2 (two) times a day. (Patient taking differently: Take 100 mg by mouth daily. ) 90 tablet 1 Unknown at Unknown time     nitroglycerin (NITROSTAT) 0.4 MG SL tablet  "Place 0.4 mg under the tongue every 5 (five) minutes as needed for chest pain.   Unknown at Unknown time     amLODIPine (NORVASC) 10 MG tablet TAKE 1 TABLET BY MOUTH ONCE DAILY. OVERDUE FOR DIABETIC CHECK 90 tablet 3      aspirin 81 MG EC tablet Take 81 mg by mouth daily.   Unknown at Unknown time     atorvastatin (LIPITOR) 80 MG tablet Take 1 tablet (80 mg total) by mouth daily. 30 tablet 0 Unknown at Unknown time     blood glucose test strips Use 1 each As Directed 2 (two) times a day. Accu-Chek Guide test strips. Patient is on insulin. 200 strip 3      blood-glucose meter (ACCU-CHEK ADVANTAGE DIABETES) kit Test 4 times daily.        bumetanide (BUMEX) 0.5 MG tablet Take 2 tablets (1 mg total) by mouth daily. 60 tablet 0 Unknown at Unknown time     generic lancets Use 1 each As Directed 2 (two) times a day. Accu-Chek Fastclix Lancets. Patient is insulin dependant. 200 each 3      insulin lispro (HUMALOG KWIKPEN INSULIN) 100 unit/mL pen Inject 5 Units under the skin 3 (three) times a day with meals. 15 mL 0      NEEDLES, INSULIN DISPOSABLE (BD ULTRA-FINE ROBERT PEN NEEDLES MISC) use up to 4 times daily as directed.        pen needle, diabetic (BD ULTRA-FINE SHORT PEN NEEDLE) 31 gauge x 5/16\" Ndle USE UP TO 4 TIMES DAILY AS DIRECTED 300 each 0      rivaroxaban ANTICOAGULANT (XARELTO) 20 mg tablet Take 1 tablet (20 mg total) by mouth daily with supper. 30 tablet 0 Unknown at Unknown time          Review of Systems:  Pertinent items are noted in HPI., otherwise all others negative. Physical Exam:  Temp:  [98.1  F (36.7  C)-99.7  F (37.6  C)] 98.8  F (37.1  C)  Heart Rate:  [100-138] 131  Resp:  [18-20] 20  BP: (123-195)/() 123/87    /87   Pulse (!) 131   Temp 98.8  F (37.1  C) (Oral)   Resp 20   Ht 5' 10\" (1.778 m)   Wt 206 lb 14.4 oz (93.8 kg)   SpO2 93%   BMI 29.69 kg/m    General appearance: appears stated age, mild distress and slowed mentation  Head: Normocephalic, without obvious abnormality, " atraumatic  Eyes: EOMI, no icterus  Neck: no adenopathy and supple, symmetrical, trachea midline  Lungs: clear to auscultation bilaterally   Chest: Sternal wound noted appears to be intact.  2 chest tubes in place with blood-tinged fluid  Heart: Regular rate and rhythm with somewhat hyperdynamic precordium  Abdomen: soft, nontender  Extremities: Multiple bandaged wounds noted on extremities.  Skin: Skin color, texture, turgor normal. No rashes or lesions  Neurologic: Mental status: Somewhat depressed activity       Pertinent Labs  Lab Results: personally reviewed.   Results from last 7 days   Lab Units 12/12/20  0413 12/11/20  0415 12/10/20  1011   LN-WHITE BLOOD CELL COUNT thou/uL 14.3* 10.7 13.1*   LN-HEMOGLOBIN g/dL 11.7* 11.1* 11.6*   LN-HEMATOCRIT % 37.9* 36.4* 36.9*   LN-PLATELET COUNT thou/uL 403 260 256        Results from last 7 days   Lab Units 12/12/20  1211 12/12/20  0413 12/11/20  1207 12/11/20  0415   LN-SODIUM mmol/L 149* 149*  --  148*   LN-POTASSIUM mmol/L 3.5 4.2 3.7 3.2*   LN-CHLORIDE mmol/L 115* 116*  --  114*   LN-CO2 mmol/L 24 23  --  24   LN-BLOOD UREA NITROGEN mg/dL 25* 29*  --  26*   LN-CREATININE mg/dL 0.92 0.92  --  0.76   LN-CALCIUM mg/dL 8.4* 8.1*  --  7.5*     Procedure Component Value Units Date/Time   Blood Culture from PERIPHERAL SITE (2nd One) [[id number]]    Order Status: Sent Specimen: Blood from Vein, Peripheral    Sputum culture [[id number]] (Abnormal) Collected: 12/09/20 1725   Order Status: Completed Specimen: Respiratory Updated: 12/11/20 1451    Culture Usual miryam with     4+  Beta Hemolytic Streptococcus Not Group AAbnormal     Gram Stain Result 4+ Polymorphonuclear leukocytes     4+ Gram positive cocci in pairs     3+ Gram positive bacilli     1+ Gram negative bacilli   Culture, Blood Positive Work-up [[id number]] Collected: 12/10/20 1000   Order Status: Completed Specimen: Blood from Central Venous Line Updated: 12/11/20 1011    Gram Stain Result Gram positive cocci in  clusters   Blood culture from CENTRAL LINE [[id number]] (Abnormal) Collected: 12/10/20 1000   Order Status: Completed Specimen: Blood from Central Venous Line Updated: 12/11/20 1004    Anaerobic Blood Culture Bottle Positive after 24 hours incubationAbnormal           Pertinent Radiology  Radiology Results: Personally reviewed image/s and Personally reviewed impression/s     Xr Chest 1 View Portable    Result Date: 12/12/2020  EXAM: XR CHEST 1 VIEW PORTABLE LOCATION: [facility] DATE/TIME: 12/12/2020 12:47 PM INDICATION: s/p CABG COMPARISON: 12/8/2020     Interval extubation and removal of the right IJ Tuleta-Annette catheter. Right IJ line terminates over the brachiocephalic-SVC junction. Mediastinal drains are noted. Persistent and marginally increased small bilateral effusions and associated atelectasis. No pneumothorax. Cardiomegaly and prominence of the mediastinum are similar. CABG changes. Mild pulmonary vascular congestion without overt pulmonary edema.    Ct Head Without Contrast    Result Date: 12/12/2020  EXAM: CT HEAD WO CONTRAST LOCATION: [facility] DATE/TIME: 12/12/2020 11:51 AM INDICATION: Stroke. Increasing confusion. COMPARISON: Head MRI 12/09/2020 TECHNIQUE: Routine CT Head without IV contrast. Multiplanar reformats. Dose reduction techniques were used. FINDINGS: INTRACRANIAL CONTENTS: Infarct in the left frontal operculum with subtle associated petechial hemorrhage correlating with the blood products noted on MRI. No gross hemorrhagic transformation. No CT evidence of acute infarct. Chronic infarct in the left cerebellar hemisphere. Mild generalized volume loss. VISUALIZED ORBITS/SINUSES/MASTOIDS: No intraorbital abnormality. No paranasal sinus mucosal disease. No middle ear or mastoid effusion. BONES/SOFT TISSUES: No acute abnormality.     1.  Petechial hemorrhage in the left frontal opercular infarct correlating with the blood products noted on MRI.  2.  No gross hemorrhagic transformation. 3.  Chronic left cerebellar infarct. 4.  No change.    Xr Swallow Study W Speech    Result Date: 12/12/2020  EXAM: XR SWALLOW STUDY W SPEECH OR OT LOCATION: [facility] DATE/TIME: 12/12/2020 9:28 AM INDICATION: Difficulty swallowing. COMPARISON: None. TECHNIQUE: Routine. FINDINGS: FLUOROSCOPIC TIME: 1.2 minutes NUMBER OF IMAGES: 0 Swallow study with Speech Pathology using multiple barium thicknesses. There is silent aspiration with thin liquid with no cough reflex. There is pooling of all consistencies in the valleculae with some poor over and there is penetration to the vocal cords with nectar and honey thick liquid. Chin tuck technique improved inversion of the epiglottis mildly.     1.  Silent aspiration with thin liquid. Penetration with nectar and honey consistencies. Stasis with pooling in the valleculae.

## 2021-06-13 NOTE — PROGRESS NOTES
Bedside EEG was performed; no activation maneuvers were included. During the part of the EEG prior to IV prn Dilaudid, the patient was awake enough to follow a few commands and nod yes and no.     Please note that electrodes were placed on and under the chin, and also on the L thigh to also record and document the target events of twitching in these areas during the EEG.    EEG System [id number] was used for this recording.    ENMANUEL AdamsEEG T./CLTM

## 2021-06-13 NOTE — PROGRESS NOTES
Care Management Follow Up Note    Length of Stay (days) 16     Patient plan of care discussed at Interdisciplinary Rounds: yes  CM Patient/Caregiver Stated Goals: Discharge to TCU     Expected Discharge Date: 12/19/20  Concerns to be Addressed / Barriers to Discharge:  IV Lasix, Needs to be off 1:1 for at least 24 hours, PT recommendations    Anticipated Discharge Disposition: Skilled Nursing Facility  Anticipated Discharge Services:    [facility]  Selected Continued Care - Admitted Since 12/2/2020    No services have been selected for the patient.        Anticipated Discharge DME:  TBD    Plan:  Per previous notes, the patient had a CABG on 12/7, and the post-op course was complicated by a subacute left frontal infarct. Subsequent to this, he failed a video swallow and had a NJ tube placed for tube feeding, with a plan to repeat the video swallow evaluation between 12/20-12/22. The patient had a 1:1 sitter until 12/17 at 0700, and it has been discontinued.      Currently, [facility] is considering taking the patient, but there are 3 benchmarks they need for him to be accepted: 1) the patient needs to be off 1:1 at least 24 hours, 2) the patient needs to be on oral Lasix and stable, and 3) he needs to be able to participate in physical therapy.      Physical therapy will be working with the patient today, 12/17. Writer also spoke with Simona at [facility] and updated her. Care Management should follow the patient's hospital progression to update [facility] when the patient will be stable for discharge. If he is not appropriate for acute rehab, consider LTACH. Writer spoke with [facility] (extension [phone]), and updated her on the patient's status.    Anthony Cresw RN

## 2021-06-13 NOTE — PLAN OF CARE
Problem: Pain  Goal: Patient's pain/discomfort is manageable  Outcome: Progressing     Problem: Daily Care  Goal: Daily care needs are met  Outcome: Progressing     Problem: Discharge Barriers  Goal: Patient's discharge needs are met  Outcome: Progressing     Problem: Glucose Imbalance  Goal: Achieve optimal glucose control  Outcome: Progressing     Problem: Anxiety  Goal: Anxiety is at manageable level  Outcome: Progressing

## 2021-06-13 NOTE — ANESTHESIA PROCEDURE NOTES
GERRI    Patient location during procedure: OR  Start time: 12/7/2020 7:59 AM  Staffing:  Performing  Anesthesiologist: Acosta Boland MD  GERRI:  Type/Reason: Monitoring GERRI  Technique: blind insertion  Difficulty: easy  Anesthesia Monitoring: see additional note

## 2021-06-13 NOTE — PROGRESS NOTES
ICU PROGRESS NOTE:    Assessment/Plan:  70 y M with a history of HTN, HLD, poorly controlled DM, systolic and diastolic CHF, multi vessel CAD who is s/p 4v CABG with Dr. Madison 12/7/20.    CV:  S/p 4 v CABG 12/7. Systolic and diastolic heart failure, EF 35%  ? SBP goal > 90 mmHg, MAP goal > 65 mmHg.    ? Goal CI 2-3   ? Goal PAD 20  ? Vasoactive gtts per CV-surgery.   ? Temporary pacing wires present; will use in setting of symptomatic arrhythmia.  ? QTc 522     RESP:  Intubated post-operatively. Unable to wean overnight due to agitated delirium, not following commands.     Could likely wean to extubate from respiratory status. However question of seizure activity this morning. Hold on this pending neuro evaluation, any requested imaging studies.    RENAL:  ? Koenig catheter in place for accurate measurement of I/O.   ? Stable renal function    ID:    General precautions.   ? Remove lines and drains once no longer required.   ? No indication for abx presently    GI:  NPO for now  GI ppx    NEURO:  See prior note. Focal seizure-like activity this morning, resolved with ativan. Notified [facility]. Neurology consulted, EEG ordered.  Remains on minimal precedex.    HEMATOLOGIC:  ? Transfuse per CV-surgery.   ? Monitor chest tube output hourly; notify CV-surgery for excessive output or abrupt stop in output.   ? DVT prophylaxis:  SubQ heparin    ENDOCRINE:  ? RHI gtt per ICU protocol.     ICU Checklist:  Feeding:  Feeding: No.  Patient is receiving NPO      Prophylaxis:    Thromboembolic: HSQ     Stress Ulcer: PPI    VAP: peridex    Restraints? Yes      PROVIDER RESTRAINT FOR NON-VIOLENT BEHAVIOR FACE TO FACE EVALUATION    Patient's Immediate Situation:  Patient demonstrated the following behaviors: Pulling/tugging at invasive lines or tubes and does not respond to verbal/non-verbal redirection    Patient's Reaction to the intervention:  Does patient understand the reason for restraint/seclusion? Unable to  "Express    Medical Condition:  Is there any evidence of compromise of Skin integrity, Respiratory, Cardiovascular, Musculoskeletal, Hydration? No    Behavioral Condition:  In consultation with the RN, is there a need to continue this restraint or seclusion? Yes    See Restraint Flowsheet for complete restraint documentation and assessment.      DISPOSITION: ICU    CODE STATUS: Full Code    Total Critical Care Time : 48 minutes    Upon evaluation, this patient is critically ill with a high probability of imminent life threatening deterioration due to acute respiratory failure on mechanical ventilation    which required my direct personal management.      Deysi Littlejohn MD  Pulmonary and Critical Care Medicine  [facility]  Office: [phone]    ----------------------------    Overnight events:  Unable to extubate due to agitation, not following commands    Subjective:  Unable to obtain    Objective:  Physical Exam:  Vent Mode: VCV  FiO2 (%):  [30 %-80 %] 30 %  S RR:  [16] 16  S VT:  [500 mL] 500 mL  PEEP/CPAP (cm H2O):  [5 cm H2O] 5 cm H2O  Minute Ventilation (L/min):  [8.2 L/min-19.8 L/min] 8.2 L/min  PIP:  [15 cm H2O-26 cm H2O] 20 cm H2O  MAP (cm H2O):  [7-8] 7    /53 (Patient Position: Lying)   Pulse 80   Temp (!) 100.8  F (38.2  C) (Core)   Resp (!) 30   Ht 5' 10\" (1.778 m)   Wt 219 lb 5.7 oz (99.5 kg)   SpO2 96%   BMI 31.47 kg/m      Intake/Output last 3 shifts:  I/O last 3 completed shifts:  In: 5407.8 [I.V.:4097.8; Blood:550; IV Piggyback:760]  Out: 3161 [Urine:1634; Blood:600; Chest Tube:927]  Intake/Output this shift:  I/O this shift:  In: 250 [IV Piggyback:250]  Out: 1193 [Urine:1193]    Physical Exam  Gen: Intubated, sedated  HEENT: no OP lesions, no GUMARO  CV: RRR, no m/g/r  Resp: CTAB/L. Chest tubes in place  Abd: soft, nontender, BS+  Neuro: PERRL, nonfocal  Ext: no edema    IMAGING/STUDIES:  CXR:  FINDINGS: Endotracheal tube terminates 3.8 cm above the yariel. Right heart catheter " terminates over the right main pulmonary artery. Bilateral chest tubes remain in place. Heart size is enlarged but stable status post median sternotomy. Mild prominence   of central pulmonary vasculature without overt failure. Dense retrocardiac consolidation on the left is unchanged. No pneumothorax.     IMPRESSION:   No significant interval change.

## 2021-06-13 NOTE — PROGRESS NOTES
Progress Note    Assessment/Plan  S/P CABG X 4 POD/PVI/LAAL # 13  Postop convulsive activity however EEG did not show any seizures activity  Head CT showed punctate focus in the left frontal lobe and MRI showed 2.5 cm subacute infarct in the left frontal lobe associated with petechial hemorrhage  Postop respiratory failure with prolonged oxygen now resolved, currently on room air with intermittent MetaNeb treatment saturation 92%  AVSS, rate controlled A. fib currently on amiodarone therapy 200 mg twice a day  Postop dysphagia, failed video swallow evaluation, currently has an NG tube and tolerating formula  Postop leukocytosis resolving, last WBC 12.5 from 13.8, will continue to monitor closely  Poorly controlled type 2 diabetes mellitus, preop A1c 12.6%,  hyperlipidemia, diabetic leg ulcers, acute on chronic systolic congestive heart failure  Sternal incision has some crusting dry and intact and lungs sounds are diminished bilaterally  Chest tube drainage 180/820 cc serous  We will take chest tube off of suction and leave to waterseal and will also give 25% albumin 250 ml x 1 dose  It appears patient is diuresing through the chest tubes,  Increase Lasix 60 mg IV twice a day  Continue PT/OT to mobilize patient out of bed and ambulate as tolerated  Malnutrition secondary to prolonged hospitalization  NG tube feeding to optimize nutrition, tolerating well  Will repeat swallow evaluation per speech therapy  Patient may need acute care rehab at discharge, referral sent out yesterday  Currently not medically ready for discharge      Subjective  Denies incisional chest pain and no shortness of breath  Objective  Awake and but appears weak decompensated, laying in bed at time of evaluation, with no apparent distress  Vital signs in last 24 hours  Temp:  [97.4  F (36.3  C)-98.5  F (36.9  C)] 97.6  F (36.4  C)  Heart Rate:  [83-93] 93  Resp:  [18-20] 20  BP: (111-131)/(70-93) 117/70  Weight:   189 lb 9 oz (86 kg)  Preop  "weight 95.7 kg  Intake/Output last 3 shifts  I/O last 3 completed shifts:  In: 1341 [P.O.:20; I.V.:3; NG/GT:1318]  Out: 2320 [Urine:1500; Chest Tube:820]  Intake/Output this shift:  No intake/output data recorded.    Review of Systems   A 12 point comprehensive review of systems was negative except as noted.    Physical Exam  /70 (Patient Position: Semi-hooks)   Pulse 93   Temp 97.6  F (36.4  C) (Oral)   Resp 20   Ht 5' 10\" (1.778 m)   Wt 189 lb 9 oz (86 kg)   SpO2 93%   BMI 27.20 kg/m    Rate controlled A. fib, incisions are clean dry intact, lungs are diminished bilaterally  Abdomen soft nontender  No appreciable lower extremity edema at this time    Pertinent Labs   Lab Results: personally reviewed.   Lab Results   Component Value Date     12/20/2020    K 3.9 12/20/2020     (H) 12/20/2020    CO2 24 12/20/2020    BUN 15 12/20/2020    CREATININE 0.89 12/20/2020    CALCIUM 8.1 (L) 12/20/2020     Lab Results   Component Value Date    WBC 12.5 (H) 12/18/2020    HGB 11.8 (L) 12/18/2020    HCT 39.4 (L) 12/18/2020    MCV 86 12/18/2020     12/18/2020       Pertinent Radiology   Radiology Results: Personally reviewed image/s, Personally reviewed impression/s and portable chest x-ray showed bilateral pulmonary congestion right pleural effusion  EKG Results: personally reviewed.  and rate controlled A. fib    Ash MCCRACKEN Forcha      Patient seen and examined. Agree with plan.  "

## 2021-06-13 NOTE — TELEPHONE ENCOUNTER
----- Message from OMAR Tafoya sent at 12/2/2020 12:05 PM CST -----  Regarding: BEW PATIENT  General phone call:    Caller: Patient    Primary cardiologist: BAKARI    Detailed reason for call: patient is shortness of breath and wants a call back.    Best phone number: [phone]    Best time to contact: any    Ok to leave a detailed message? Yes    Device? no    Additional Info:

## 2021-06-13 NOTE — PROGRESS NOTES
CVTS Daily Progress Note   12/11/2020   POD#3 s/p CABGx4  Attending: Los   LOS: 9 days     Overnight events  No acute events, had some restlessness that was treated with precedex.     A-fib with rates <100 on amio. Afeb.   Patient extubated yesterday, sleepy today, not opening eyes for me at this time but follows some commands with hand grasps, not moving feet to command. Concern for some encephalopathy but on Dexamedetomidine.  No further rhythmic movements of left leg.   MRI area of ischemic lesion and EEG - for seizure activity show no correlation with previous non- purposeful rhythmic movement of left leg.  NPO - meds crushed with applesauce.   Chest tube output appropriate. Hgb 10.7. WBC 10.7. Ammonia WNL 28       [place]  [place] [facility] [place] [facility] [place] bedside [place]  CV metoprolol protocol    OBJECTIVE:    Temp:  [97.9  F (36.6  C)-99.5  F (37.5  C)] 98.4  F (36.9  C)  Heart Rate:  [] 91  Resp:  [14-24] 16  BP: ()/(61-98) 91/61  Arterial Line BP: (117-163)/() 151/107  FiO2 (%):  [40 %] 40 %  Wt Readings from Last 2 Encounters:   12/11/20 0630 210 lb 4.8 oz (95.4 kg)   12/10/20 0500 212 lb (96.2 kg)   12/09/20 0414 217 lb 2.5 oz (98.5 kg)   12/08/20 0345 219 lb 5.7 oz (99.5 kg)   12/07/20 0417 218 lb 8 oz (99.1 kg)   12/06/20 0301 220 lb 6.4 oz (100 kg)   12/05/20 0340 220 lb 1.6 oz (99.8 kg)   12/04/20 0354 (!) 226 lb (102.5 kg)   12/03/20 1236 (!) 223 lb (101.2 kg)   12/03/20 0441 (!) 223 lb (101.2 kg)   12/02/20 1722 (!) 224 lb 3.2 oz (101.7 kg)   12/02/20 1401 (!) 237 lb 9.6 oz (107.8 kg)   11/05/20 1302 (!) 223 lb (101.2 kg)       Current Medications:    Scheduled Meds:    acetaminophen  650 mg Oral Q6H    Or     acetaminophen  650 mg Rectal Q6H     acetylcysteine (MUCOMYST) 20% inhalation solution  4 mL Inhalation Q8H - RT     albuterol  2.5 mg Nebulization Q8H - RT     amiodarone  200 mg Oral BID     aspirin  81 mg Enteral Tube DAILY     atorvastatin  80 mg Enteral Tube QHS     bisacodyL  10 mg  Oral Once     chlorhexidine  15 mL Topical Q12H 09-21     docusate sodium (COLACE) 50 mg/5 mL oral liquid  100 mg Enteral Tube BID     famotidine (PEPCID) injection  20 mg Intravenous BID    Or     famotidine  20 mg Oral BID     furosemide  40 mg Intravenous BID - diuretic     heparin (PF) ANTICOAGULANT  5,000 Units Subcutaneous Q8H FIXED TIMES     insulin aspart (NovoLOG) injection   Subcutaneous Q4H FIXED TIMES     insulin glargine  8 Units Subcutaneous QHS     levETIRAcetam (KEPPRA) IVPB  500 mg Intravenous Q12H     magnesium hydroxide  30 mL Oral DAILY     magnesium sulfate IVPB  2 g Intravenous Once     melatonin  3 mg Enteral Tube QHS     metoprolol tartrate  12.5 mg Oral BID     piperacillin-tazobactam  3.375 g Intravenous Q8H     polyethylene glycol  17 g Enteral Tube DAILY     sodium chloride  10 mL Intravenous Q8H FIXED TIMES     sodium chloride  3 mL Intravenous Q8H FIXED TIMES     vancomycin  1,500 mg Intravenous Q12H     Continuous Infusions:    amiodarone 900 mg/500 ml standard 24 hour infusion 0.5 mg/min (12/11/20 0812)     dexmedetomidine infusion orderable (PRECEDEX) Stopped (12/11/20 0811)     nitroprusside       PRN Meds:.acetaminophen, acetaminophen, aluminum-magnesium hydroxide-simethicone, bisacodyL, bisacodyL, dexmedetomidine infusion orderable (PRECEDEX), dextrose 50 % (D50W), glucagon (human recombinant), hydrALAZINE, ketorolac, magnesium hydroxide, metoprolol tartrate, oxyCODONE, sodium chloride, sodium chloride, sodium chloride, sodium chloride, sodium phosphates 133 mL, traMADoL, traZODone    Cardiographics:    Telemetry monitoring demonstrates afib with rates in the 110s per my personal review.    Imaging:    No results found.    Lab Results (most recent, reviewed):    Hemoglobin (g/dL)   Date Value   12/11/2020 11.1 (L)     WBC (thou/uL)   Date Value   12/11/2020 10.7     Potassium (mmol/L)   Date Value   12/11/2020 3.2 (L)     Creatinine (mg/dL)   Date Value   12/11/2020 0.76      Hemoglobin A1c (%)   Date Value   12/03/2020 12.6 (H)     Magnesium (mg/dL)   Date Value   12/11/2020 1.9     Calcium (mg/dL)   Date Value   12/11/2020 7.5 (L)       UOP: 2.5L    CT: 160cc    Physical Exam:    General: Patient seen in bed. Sleepy, not opening eyes Intermittently following commands.   CV: Afib on monitor. CVR  Pulm: Non-labored effort on 3L/nc  Chest tubes in place, serosanguinous output, no air leak. Incision C/D/I.  Abd: Soft, NT, ND  : Koenig with light rani urine  Ext: Mild pedal edema, SCDs in place, warm, distal pulses intact  Neuro: Sedated, unable to fully eval. No Rhythmic movement left leg noted this morning. Intermittently follows commands.      ASSESSMENT/PLAN:    Mika Alvarez is a 70 y.o. male with a history of CAD who is s/p CABGx4.    Principal Problem:    Acute on chronic clinical systolic heart failure (H)  Active Problems:    ACP Staging Stage 1 Hypertension: 140-159 / 90-99    Type 2 diabetes mellitus (H)    Hyperlipidemia, unspecified hyperlipidemia type    CAD (coronary artery disease)    Atrial fibrillation with rapid ventricular response (H)    Diabetic leg ulcer (H)    Acute on chronic systolic CHF (congestive heart failure) (H)    A-fib (H)    Uncontrolled type 2 diabetes mellitus with hyperglycemia (H)    Acute respiratory failure with hypoxia (H)    On mechanically assisted ventilation (H)    S/P CABG (coronary artery bypass graft)    Seizures (H)    Cerebrovascular accident (CVA), unspecified mechanism (H)        NEURO:   - Scheduled Tylenol and PRN Toradol/tramadol for pain  - Sedation with Precedex- weaned off  - Melatonin at bedtime  - Neuro following, appreciate. Initially concern for seizure given rhythmic LLE twitching although EEG negative for sz and ischemic area of brain on MRI not consistent with area.     - No further rhythmic movement noted  - Keppra per Neuro  - ?Encephalopathy. Ammonia WNL today. LFTs nml    CV:   - Normotensive, - Oral meds: after  swallow eval.  - Atrial fib CVR this am  - Metoprolol XL 12.5mg at hs - per Cardiology   - CV BB protocol extra doses  - PRN IV doses  - Amiodarone gtt transition to PO   - No plan for a-fib anticoagulation given LAAE and surgeon preference  - ASA 81mg  - Atorvastatin 80mg daily  - Chest tubes- removed Med CT   Keep pleural CT another day     PULM:   - Extubated yesterday POD #3   - Maintaining oxygen saturations on 3L/nc   - Encourage pulmonary toilet when more awake    FEN/GI:  - NPO - medications crushed in apple sauce  - Possible video swallow tomorrow  - SLP seen and do swallow eval - done   - Continue electrolyte replacement protocol  - Diet:NPO crushed in applesauce  - Bowel regimen + BM     RENAL:  - Adequate UOP/hr. Continue to monitor closely via gardner.   - Cr 0.76  - Gardner to remain in for close monitoring of I/O and during period of diuresis/relative immobility.   - Diuresis with 40mg IV Lasix daily    HEME:  - Acute blood loss anemia post-op.   - Hgb stable, no bleeding concerns.   - Hep subcutaneous (cleared with neuro),   - [id number]    ID:  - Ronda op ppx complete.  - Afeb   - Sputum culture pending from 12/09 4+ polymorph. Leukocytes 4+ gram + cocci 3+ positive bacilli  - Zosyn 12/10  - Vanco 12/10   - UA neg  - Blood cx NGTD    ENDO:   - Sliding scale insulin- every 4 hours  - Will eventually need PTA DM meds (insulin, glipizide, Metformin)    PPx:   - DVT: SCDs,    - SQ heparin TID  - GI: Omeprazole     DISPO:   - Continue critical care in ICU    Follow up   Neurology - 3 weeks with MRI   MRI in 3 weeks

## 2021-06-13 NOTE — PROGRESS NOTES
Respiratory Care Note    Day 2    Current vent settings:     Vent Mode: VCV  FiO2 (%):  [30 %-40 %] 30 %  S RR:  [16] 16  S VT:  [500 mL] 500 mL  PEEP/CPAP (cm H2O):  [5 cm H2O] 5 cm H2O  Minute Ventilation (L/min):  [8.2 L/min-19.8 L/min] 9.7 L/min  PIP:  [15 cm H2O-44 cm H2O] 44 cm H2O  MAP (cm H2O):  [7-14] 14     Patient parameters:    PIP 25  PLAT 15  SBT weaned 5/5 for 46 minutes. Wean ended d/t neuro consult  Sats 97%  ABG results from this morning:    Results for ELISABETH AZEVEDO (MRN [id number]) as of 12/8/2020 15:38   Ref. Range 12/8/2020 08:20   pH, Arterial Latest Ref Range: 7.37 - 7.44  7.44   pCO2, Arterial Latest Ref Range: 35 - 45 mm Hg 41   pO2, Arterial Latest Ref Range: 75 - 85 mm Hg 93 (H)   Bicarbonate, Arterial Calc Latest Ref Range: 23.0 - 29.0 mmol/L 27.2   O2 Sat, Arterial Latest Ref Range: 95.0 - 96.0 % 98.3 (H)   Oxyhemoglobin Latest Ref Range: 95.0 - 96.0 % 96.2 (H)   POC Base Excess Calc Latest Units: mmol/L 3.4     Respiratory medications: none    Note/plan: weaning on hold pending pts neuro assessment. Plan to continue with full support for now. RT following.    NAMAN MartínezT

## 2021-06-13 NOTE — PROGRESS NOTES
Hospitalist Progress Note    Chief Complaint: Acute dyspnea    Summary: 70 y.o.male with past medical history of T2DM, severe multivessel CAD, chronic systolic and diastolic CHF, recent diagnosis of A. fib, who presented with acute CHF, was diuresed and subsequently underwent four-vessel CABG by Dr. Madison on 12/7/2020. Post-op course complicated by Afib with RVR, which was initially treated with IV amiodarone given pt's NPO status due to severe dysphagia.     Hospitalization complicated by altered mentation and seizure-like activity onset 12/8. CT Head on 12/8 showed punctate focus of increased attenuation within the subcortical region of the left frontal lobe. Subsequent MRI of the brain on 12/9 demonstrated subacute 2.5 cm subacute infarct with petechial hemorrhage in the left frontal lobe. EEG did not show any seizure activity, however Keppra was started 12/10 with resolution of rhythmic left leg movements. Neurology has followed, now signed off.     Extubated 12/10 and weaned down to room air. Started on broad spectrum antibiotics 12/10 due to new fever and worsening leukocytosis. 1 of 2 blood Cx grew staph epidermidis, and sputum cultures showed respiratory miryam with 4+ beta hemolytic Strep. 7 days IV pip-tazo completed 12/17.     VFSS with mild oral and severe pharyngeal dysphagia - NGT placed on 12/15. This may have exacerbated hypernatremia, which improved with D5W prior to NGT placement.    Assessment & Plan  Multivessel CAD, s/p CABG and PVI/LAAL on 12/7/2020:   Chest tubes still in place 12/17  Hemodynamically stable  On ASA, statin, metoprolol, IV diuretics.   Mgmt as per CV Surgery.      A Fib with RVR, now s/p PVI and DONNA excision:   Oral amiodarone, metoprolol. Prn metoprolol.  Currently NSR  Mgmt as per Cardiology and CV Surgery following.   Cardiac tele.   Holding AC until cleared by Neurology.     Acute diastolic heart failure, improved.  Holding home bumetanide for now. On IV diuretics  Mgmt  as per Cardiology.     Acute hypoxic respiratory failure, resolved: On room air.   Encourage IC.  Supplemental O2 to keep sats >94%.  Continue pulmonary toilet- On metaneb, flutter valve     Acute Metabolic encephalopathy: improving.  Encephalopathy could also be contributed by prolonged hospitalization, hypernatremia, possible pneumonia, medications. CT Head from 12/12 showed no acute changes.   EEG did not show seizure activity, but pt's rhythmic left leg movement appear to be have improved with Keppra. Continue keppra, Keppra level therapeutic, pt was started on quetiapine on 12/14   Decrease at bedtime seroquel due to somnolence     Subacute left frontal stroke with petechial hemorrhage:   on ASA, statin.   Neurology recs appreciated.     Fever, Leukocytosis: UA was negative. One of two blood cultures grew staph epi - ?contaminant. Sputum culture growing beta-hemolytic strep. Completed pip-tazo IV 7d on 12/17. ID recs appreciated.     Severe pharyngeal dysphagia: NG and tube feeding.      Hyperglycemia in the setting of T2DM and D5W as above:   HDSSI with glargine. Holding home glipizide, metformin.  Increase two times a day glargine     Hypernatremia, resolved.     HTN: holding home losartan, amlodipine.     Code status Full Code  DVT prophylaxis: heparin sq     Barrier's to discharge : chest tube, encephalopathy, dysphagia  Anticipated discharge date:  Several days expected  Disposition:  Likely TCU    Malnutrition, protein/calorie, Severe: Poor appetite, weight loss, muscle wasting  Class I Obesity w BMI=30-34.9: 214 lb 9.6 oz (97.3 kg) Body mass index is 30.79 kg/m .     Principal Problem:    Acute on chronic clinical systolic heart failure (H)  Active Problems:    ACP Staging Stage 1 Hypertension: 140-159 / 90-99    Type 2 diabetes mellitus (H)    Hyperlipidemia, unspecified hyperlipidemia type    CAD (coronary artery disease)    Atrial fibrillation with rapid ventricular response (H)    Diabetic leg ulcer  "(H)    Acute on chronic systolic CHF (congestive heart failure) (H)    A-fib (H)    Uncontrolled type 2 diabetes mellitus with hyperglycemia (H)    Acute respiratory failure with hypoxia (H)    On mechanically assisted ventilation (H)    S/P CABG (coronary artery bypass graft)    Seizures (H)    Cerebrovascular accident (CVA), unspecified mechanism (H)    Cerebrovascular accident (CVA) due to other mechanism (H)    Metabolic encephalopathy    Disorientation    Acute kidney failure with tubular necrosis (H)    Subjective  \"I'm tired. Can I get back to bed?\"     ROS: Denies chest pain, denies shortness of breath, Moving bowels well and slept well    Objective    Physical Exam    General Appearance:  HEENT:  Cooperative, in no distress   Normocephalic, atraumatic, conjunctiva clear without icterus   Lungs:   reduced breath sounds at bases   Cardiovascular:  Irregular Rate and Rhythm, S1, S2, 1+ lower extremity edema bilaterally   Abdomen: Soft, non-tender and obese, active bowel sounds   Skin:  Skin color, texture normal and no rashes or lesions over face, neck, arms and legs, turgor normal.   Neurologic: Alert & Oriented X 3, Facial symmetry preserved and upper & lower extremities moving well with symmetry   Reviewed telemetry, Laboratory results and Consultant recommendations    Time spent: on patient interview, exam, med rec, progress note: 12 min    Counseling of patient at bedside  Diagnosis & prognosis regarding dysphagia, weakness - 6 min  review of treatment plan continue diuretics, keppra 5 min    Coordination with: Nursing and PT regarding encourage activity  2 min    Total time including time spent in counseling and coordination of care: 13 minutes  Total time spent  on encounter: >=25 minutes    Khoi Luna MD, FACP  Internal Medicine Hospitalist  12/17/2020  12:20 PM    "

## 2021-06-13 NOTE — ANESTHESIA POSTPROCEDURE EVALUATION
Patient: Mika Alvarez  Procedure(s):  CORONARY ARTERY BYPASS GRAFT X 4, WITH RIGHT LEG ENDOSCOPIC VESSEL PROCUREMENT, LEFT INTERNAL MAMMARY ARTERY HARVEST, EPIAORTIC ULTRASOUND, ANESTHESIA TRANSESOPHAGEAL ECHOCARDIOGRAM, PULMONARY VEIN ISOLATION, LEFT ATRIAL APPENDAGE EXCISION  Anesthesia type: general    Patient location: ICU  Last vitals:   Vitals Value Taken Time   /62 12/07/20 1322   Temp 37.8  C (100.1  F) 12/07/20 1900   Pulse 87 12/07/20 2000   Resp 23 12/07/20 2000   SpO2 97 % 12/07/20 2000   Vitals shown include unvalidated device data.  Post vital signs: stable  Level of consciousness: responds to simple questions and sedated  Post-anesthesia pain: pain controlled  Post-anesthesia nausea and vomiting: no  Pulmonary: ETT, ventilator  Cardiovascular: stable  Hydration: adequate  Anesthetic events: no    QCDR Measures:  ASA# 11 - Ronda-op Cardiac Arrest: ASA11B - Patient did NOT experience unanticipated cardiac arrest  ASA# 12 - Ronda-op Mortality Rate: ASA12B - Patient did NOT die  ASA# 13 - PACU Re-Intubation Rate: ASA13X - Exclusion: organ donor or direct ICU transfer  ASA# 10 - Composite Anes Safety: ASA10A - No serious adverse event    Additional Notes:

## 2021-06-13 NOTE — ANESTHESIA PROCEDURE NOTES
Central line    Start time: 12/7/2020 7:40 AM  End time: 12/7/2020 7:54 AM  Patient location: OR Post-induction  Indications: vascular access and central pressure monitoring  Performing Anesthesiologist: Acosta Boland MD  Pre-procedure Checklist  Completed: patient identified, site marked, risks, benefits, and alternatives discussed, timeout performed, consent obtained, hand hygiene performed, all elements of maximal sterile barriers used including cap, mask, gown, sterile gloves, and large sheet and skin prep agent completely dried prior to procedure    Procedure Details:  Preparation: 2% chlorhexidine  Location details: right internal jugular  Site selection rationale: access  Catheter type: Introducer with [place]te  Introducer type: MAC  Lumens:double lumenWedged at: 58   Withdrawn and locked at: 54Number of attempts: 1    Ultrasound evaluation of access site: yes   Sterile gel and probe cover used in ultrasound-guided central venous catheter insertion  Vessel patent by US exam  Concurrent real time visualization of needle entry  Visualized anatomic structures normal  Ultrasound permanent image saved  No Pathological Findings  Manometry confirmation of venous access    Post-procedure:   line sutured  Assessment: blood return through all ports and free fluid flow  Complications: none

## 2021-06-13 NOTE — ANESTHESIA CARE TRANSFER NOTE
Last vitals:   Vitals:    12/07/20 1310   BP: 140/64   Pulse: 73   Resp: 16   Temp: 36.2  C (97.2  F)   SpO2: 98%     Patient's level of consciousness is unresponsive  Spontaneous respirations: no: on ventilator  Maintains airway independently: no: ett in place  Dentition unchanged: yes  Oropharynx: endotracheal tube in place    QCDR Measures:  ASA# 20 - Surgical Safety Checklist: WHO surgical safety checklist completed prior to induction    PQRS# 430 - Adult PONV Prevention: 4558F - Pt received => 2 anti-emetic agents (different classes) preop & intraop  ASA# 8 - Peds PONV Prevention: NA - Not pediatric patient, not GA or 2 or more risk factors NOT present  PQRS# 424 - Ronda-op Temp Management: 4559F - At least one body temp DOCUMENTED => 35.5C or 95.9F within required timeframe  PQRS# 426 - PACU Transfer Protocol: - Transfer of care checklist used  ASA# 14 - Acute Post-op Pain: ASA14B - Patient did NOT experience pain >= 7 out of 10

## 2021-06-13 NOTE — PROGRESS NOTES
RT Progress Note    Pt denies shortness of breath.  Respiratory rate and effort normal.  BS decreased bilaterally.  On RA.  Weak, nonproductive cough.  IS to 1250 mL today.  Also able to use Aerobika.  Tolerating metaneb.  Now that IS/Aerobika effort improved, consider d/c'ing metaneb when pt is re-RCAT'ed tomorrow.      Plan is to continue bronchial hygiene and volume expansion treatments.  Encourage deep breathing/cough and OOB as able.     Melissa Warren, LRT, 12/17/2020

## 2021-06-13 NOTE — CONSULTS
"Care Management Initial Consult    General Information:  Patient's communication limitations:  Patient was very tired and falling asleep while talking  Level of Orientation: A & O x 3     Advance Care Planning: Patient does not have advance directive, paperwork provided    Living Environment:   People in home/Living arrangements: Spouse/significant other  Current residence:  Private residence([facility])      Family/Social Support:  Care provided by/ Primary Caregiver: Self    Provides care for someone: No  Description of Support System: Children     Lifestyle & Psychosocial Needs:        Socioeconomic History     Marital status:      Spouse name: Not on file     Number of children: Not on file     Years of education: Not on file     Highest education level: Not on file     Tobacco Use     Smoking status: Never Smoker     Smokeless tobacco: Never Used   Substance and Sexual Activity     Alcohol use: Yes     Frequency: Monthly or less     Drinks per session: 1 or 2     Comment: socially      Drug use: Never       Functional Status:  Prior to admission ADL limits: No      Current Resources:   Skilled Home Care Services:  None  Community Resources:  None  Equipment currently used at home: none  Supplies currently used at home:  None    Employment:  Employment Status: RetiredNo No    Financial/Environmental Concerns/Barriers to Discharge:  IV heparin, Cardiology clearance, CV Surgery consult?      Values/Beliefs:  Spiritual, Cultural Beliefs, [employer] Practices, Values that affect care:                Additional Information:  The patient is from home where he lives with his spouse, and he is normally independent at baseline. The patient stated that his wife recently had a stroke, so he is worried about going home. His goal is to go to a TCU at discharge because of \"all the medications.\" Writer asked the patient to clarify, and he said that he knows when you discharge from the hospital, there are a lot of " medications. Writer explained that Physical Therapy and Occupational Therapy should do evaluations, but we would have to see what the recommendations are for discharge. At this point, the question will be TCU vs Home Care at discharge. Writer paged Dr. Longoria to ask for PT/OT evaluations. Transportation to be decided pending patient progression. Care Management to follow patient progression for needs.    Anthony Crews RN

## 2021-06-13 NOTE — PROCEDURES
Was called to place PICC. Patient has positive blood cultures which is a contraindication to placing a central line unless emergent. Primary nurse Juanpablo states  just needs extra access for med's. Offered to place another IV until blood cultures are negative. She is speaking with NP who ordered PICC. PICC not placed at this time waiting to hear plan.

## 2021-06-13 NOTE — PROGRESS NOTES
Speech Language/Pathology  Videofluoroscopic Swallow Study     Problem:  Patient Active Problem List   Diagnosis     Obesity     ACP Staging Stage 1 Hypertension: 140-159 / 90-99     Coronary artery disease involving native heart with other form of angina pectoris, unspecified vessel or lesion type (H)     Type 2 diabetes mellitus (H)     Joint Pain, Localized In The Shoulder     Hyperlipidemia, unspecified hyperlipidemia type     Moderate episode of recurrent major depressive disorder (H)     Pulmonary edema cardiac cause (H)     New onset atrial fibrillation (H)     Acute systolic heart failure (H)     CAD (coronary artery disease)     New onset a-fib (H)     Acute on chronic clinical systolic heart failure (H)     Atrial fibrillation with rapid ventricular response (H)     Diabetic leg ulcer (H)     Acute on chronic systolic CHF (congestive heart failure) (H)     A-fib (H)     Uncontrolled type 2 diabetes mellitus with hyperglycemia (H)     Acute respiratory failure with hypoxia (H)     On mechanically assisted ventilation (H)     S/P CABG (coronary artery bypass graft)     Seizures (H)     Cerebrovascular accident (CVA), unspecified mechanism (H)     Cerebrovascular accident (CVA) due to other mechanism (H)     Metabolic encephalopathy     Disorientation       Onset Date: 12/02/2020  Reason for Evaluation: Reassess swallow physiology & function and determine safety of oral intake  Pertinent History: As listed above. Patient participated in a previous Video Swallow Study on 12/2/20. At that time, he presented with severe pharyngeal dysphagia and was deemed to be at high risk for aspiration with any/all intake.   Current Diet: NPO  Baseline Diet: Regular textures and thin liquids    Assessment:    Patient demonstrated mild oral dysphagia and severe pharyngeal dysphagia. There has been no functional improvement in his swallow compared to previous Video Swallow Study (12/12/20). Suspect that patient's dysphagia is  multifactorial in nature.    Patient continues to be at high aspiration risk with all intake.    Rehab potential is fair to good based on prior level of function and evaluation results.    Due to his motivation to eat & drink, patient is a good candidate to complete therapeutic exercises to improve oral and/or pharyngeal phase of swallow. However, his current cognitive status may be a barrier to his ability to complete exercises accurately and independently.    Recommendations:    Plan: Continue NPO due to high aspiration risk with all intake    Strategies: Nursing to complete thorough oral cares three times a day     Speech Therapy 6 times per week    Referrals: Repeat Video Swallow Study in 5-7 days or with notable improvement in physical strength and medical status.    Subjective:    Patient presents as awake during this evaluation. He was nonverbal throughout this session.  An  was not applicable    Patient was given one teaspoon each nectar-thick liquid and honey-thick liquid. No additional presentations or consistencies were given due to high aspiration risk.    Objective:    Dentition/Oral hygiene: Adequate    Oral motor function was grossly intact. Mild generalized weakness consistent with acute illness.    Bolus prep and oral control were mildly impaired. Mastication was not assessed as patient was not given a solid during this study.    Anterior-Posterior transit was mildly impaired/effortful.    Premature spill beyond the base of the tongue into the valleculae occurred with both consistencies.    Tongue base retraction was not impaired.    Oral stasis did not occur with either consistency.    Taurus, direct aspiration occurred with nectar-thick liquid. Patient had a very delayed tracheal cough with aspiration.     Laryngeal penetration occurred with honey-thick liquid. This occurred after the swallow. Patient experienced kickback penetration of stasis in the pyriform sinuses.    Due to reduced  cognitive status, swallow strategies were not trialed under fluoroscopy.    Swallow response was delayed with nectar-thick liquid. This resulted in pourover directly into the laryngeal vestibule, leading to boston aspiration.      Epiglottic movement was in a posterior pattern consistently across texture trials.    Moderate-severe pharyngeal stasis was observed in the valleculae and pyriform sinuses with both nectar-thick and honey-thick liquid. Patient does not appear to sense stasis, as he did not independently complete dry swallows in an attempt to clear it. Stasis did not clear when patient was cued to complete a dry swallow. As mentioned above, patient experienced laryngeal penetration of pyriform sinus stasis.    Pharyngeal constriction was moderately impaired.    Hyolaryngeal elevation was mildly reduced. Hyolaryngeal excursion was weak, slow and reduced.    Cricopharyngeal function was not impaired. Cervical esophageal function was not impaired.    23 dysphagia minutes    KimberlyLAKSHMI Hector MA, CCC-SLP

## 2021-06-14 ENCOUNTER — RECORDS - HEALTHEAST (OUTPATIENT)
Dept: ADMINISTRATIVE | Facility: OTHER | Age: 71
End: 2021-06-14

## 2021-06-14 NOTE — TELEPHONE ENCOUNTER
Surgery Scheduled      Surgery/Procedure: Incision and drainage left foot     Special Equipment: TBD    Location: [facility]    Date: 02/02/2021    Time: 6:00pm     Surgeon: Dr. Prieto    OR Confirmed/ :  Yes with lacy on 2/2    Orders In:  Yes    Entered on [employer] Calendar:  Yes    Post Op: TBD  ovid Scheduled: in house pt   Blood Thinners Addressed: in house pt

## 2021-06-14 NOTE — PROGRESS NOTES
"ASSESSMENT/PLAN  1. Type 2 diabetes mellitus  Patient has not not been compliant with medication  Has not been eating well  Is been under a lot of home stressors and his daughter just recently passed away  He has some trouble with police earlier this year as well himself  He does start back on his insulin at this time with a hemoglobin A1c greater than 14% he will contact me about a week with glucose levels we can help him adjust insulin accordingly  - Glycosylated Hemoglobin A1c  - Lipid Cascade FASTING  - Comprehensive Metabolic Panel  - Microalbumin, Random Urine; Future    2. Screen for colon cancer  Referral placed  - Ambulatory referral for Colonoscopy    3. Stress at home  Patient has had some marital troubles, his daughter just recently passed away for unknown reasons  He spent some time away from his home this year after his wife called the police on him and he was arrested  Spent time discussing these matters today    4. Essential hypertension  Blood pressure goal range continue his current medications at this time    5. Mixed hyperlipidemia  Check cholesterol levels and follow-up based on results        SUBJECTIVE:   Chief Complaint   Patient presents with     Diabetes     follow up      Mika Alvarez 67 y.o. male    Current Outpatient Prescriptions   Medication Sig Dispense Refill     amLODIPine (NORVASC) 10 MG tablet Take 1 tablet (10 mg total) by mouth daily. You are overdue for a diabetic visit. 90 tablet 3     aspirin 81 MG EC tablet Take 81 mg by mouth daily.       BD INSULIN PEN NEEDLE UF SHORT 31 gauge x 5/16\" Ndle USE UP TO 4 TIMES DAILY AS DIRECTED 300 each 2     BLOOD SUGAR DIAGNOSTIC (ACCU-CHEK ARMANDO PLUS TEST STRP MISC) Test 3 times daily       blood sugar diagnostic (GLUCOSE BLOOD) Strp Test blood sugars 2 times a day.       blood-glucose meter (ACCU-CHEK ADVANTAGE DIABETES) kit Test 4 times daily.       BLOOD-GLUCOSE METER (ACCU-CHEK ARMANDO PLUS METER MISC) Use As Directed.       EFFIENT 10 mg " Tab tablet TAKE ONE TABLET BY MOUTH ONCE DAILY  30 tablet 0     glipiZIDE (GLUCOTROL XL) 10 MG 24 hr tablet Take 2 tablets (20 mg total) by mouth once daily. 180 tablet 0     insulin glargine (BASAGLAR KWIKPEN) 100 unit/mL (3 mL) pen Inject 26 Units under the skin at bedtime. 5 adj dose pen 2     insulin lispro (HUMALOG KWIKPEN) 100 unit/mL InPn Inject under the skin. As directed according to sliding scale.       LANCETS MISC Use As Directed.       LANTUS SOLOSTAR 100 unit/mL (3 mL) pen INJECT 26 UNTIS SUBCUTANEOUSLY EVERY DAY AT 9PM AS DIRECTED  20 mL 0     losartan (COZAAR) 100 MG tablet TAKE 1 TABLET (100 MG) BY MOUTH ONCE DAILY 90 tablet 2     metFORMIN (GLUCOPHAGE) 1000 MG tablet Take 1 tablet (1,000 mg total) by mouth 2 (two) times a day with meals. 180 tablet 0     metoprolol succinate (TOPROL-XL) 100 MG 24 hr tablet TAKE ONE TABLET BY MOUTH TWICE DAILY  160 tablet 0     NEEDLES, INSULIN DISPOSABLE (BD ULTRA-FINE ROBERT PEN NEEDLES MISC) use up to 4 times daily as directed.       nitroglycerin (NITROSTAT) 0.4 MG SL tablet Place 0.4 mg under the tongue every 5 (five) minutes as needed for chest pain.       ONETOUCH FINEPOINT LANCETS MISC Test blood sugars 3 times a day.       pravastatin (PRAVACHOL) 40 MG tablet TAKE TWO TABLETS BY MOUTH DAILY  60 tablet 0     cephalexin (KEFLEX) 500 MG capsule Take 500 mg by mouth 4 (four) times a day.       No current facility-administered medications for this visit.      Allergies: Review of patient's allergies indicates no known allergies.   No LMP for male patient.    HPI:   Patient is here for diabetic follow-up and medication check.  Patient is been under considerable amount of home stressors unfortunately the last years time had some marital problems, his daughter just recently passed away for unknown reasons, he was arrested earlier this year and overall has not been compliant with his medications and is planning on changing this around.  His hemoglobin A1c returned  greater than 14% which is not surprising-he has not been taking his insulin on a regular basis and he plans on going back on his regular schedule.  Discussed diet and exercise with him today  Blood pressures at goal  He is fasting and interested in fasting labs  Discussed with more regularity is needed with follow-up  Spent time discussing some of his home stressors with him today    ROS: negative except as per HPI    OBJECTIVE:   The patient appears well, alert, oriented x 3, in no distress.  /68 (Patient Site: Right Arm, Patient Position: Sitting, Cuff Size: Adult Large)  Pulse 68  Temp 98  F (36.7  C) (Oral)   Resp 16  Wt 204 lb (92.5 kg)  BMI 28.45 kg/m2    Lungs: clear, good air entry, no wheezes, rhonchi or rales.   Cardiac: S1 and S2 normal, no murmurs, regular rate and rhythm.   Abdomen: normal bowel sounds, soft   Extremities: show no edema, normal peripheral pulses.   Neurological: normal, no focal findings.  Skin: clear, dry, no rashes/lesions  Psych- normal mood and affect      Pt states an understanding and agrees to the above plan.  Greater than 25 minutes was spent today in interview and examination with Mika Alvarez with more than 50% of that time in counseling and coordination of care.

## 2021-06-14 NOTE — PROGRESS NOTES
[facility]      Facility:    [facility] [[id number]]  Code Status: FULL CODE      Chief Complaint/Reason for Visit:  Chief Complaint   Patient presents with     H & P     Acute diastolic and systolic heart failure with exacerbation, atrial fibrillation with rapid ventricular response, uncontrolled type 2 diabetes last hemoglobin A1c 12.6, acute respiratory failure that did resolve, acute metabolic encephalopathy with intermittent agitation, subacute left frontal stroke with petechial hemorrhage, sepsis with fever and leukocytosis which did resolve.  Severe pharyngeal dysphagia, malnutrition, primary hypertension, bilateral pneumothoraces which is small.       HPI:   Mika is a 70 y.o. male who was recently admitted to the hospital approximately 12/2/2020 with acute systolic heart failure exacerbation requiring diuresis.  He also had four-vessel CABG in on 12 7 and course completed.  He did have severe dysphagia however he claims he is just drinking just fine and he then was admitted to a rehab facility.  There was some altered mental status.  He did go through acute rehab unit and he was admitted there on 12/28/2000 and for ongoing rehabilitation.  He does have multilevel coronary disease a status post CABG acute diastolic and systolic heart failure left ventricular wall global hypokinesis.  He also has atrial fibrillation with rapid ventricular response rate is well controlled at this time.  His uncontrolled diabetes but I do review sugars have been running have not been running too bad lowest is 97 and highest was 160.  Did have sepsis in the hospital it did resolve and he had a subacute left frontal stroke with petechial hemorrhage that is stable at this time.  He is on atorvastatin and aspirin and Keppra.  He does have severe pharyngeal dysphagia but he does deny this and claims that he is just drinking just fine.  He came here last evening and he does want to go home.   He says he is breathing fine he has no issues and he appears euvolemic.  He does walk up and down the hallways without any issues and he does have venous stasis changes in his lower extremities bilateral with ulcers in his right lower extremity.  They are covered at this time.    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  He did refuse therapy this morning and indicated to me he is going home no matter what.  And I did discuss with him that I am against discharging people AGAINST MEDICAL ADVICE however we will set up systems for him.  He will need home PT OT home speech therapy and close follow-up with his primary care physician.  He will need home nursing for med management.    I did sit and have a good conversation with the patient about his status at this time I would prefer him to stay here however he is insistent on going home and he is not going to stay.  Is been a long-haul from at this time he needs to go home to take care of his wife and he asked me if we could do his cares at home.  I said I will look into this at this time.    Past Medical History:  Past Medical History:   Diagnosis Date     ACP Staging Stage 1 Hypertension: 140-159 / 90-99     Created by Conversion  Replacement Utility updated for latest IMO load     Acute metabolic encephalopathy      Acute on chronic systolic (congestive) heart failure (H)      Atrial fibrillation with rapid ventricular response (H) 12/02/2020     Coronary Artery Disease     Created by Conversion  Replacement Utility updated for latest IMO load     CVA (cerebral vascular accident) (H)      Dysphagia      HTN (hypertension)      Hyperglycemia      Ischemic cardiomyopathy      Joint Pain, Localized In The Shoulder     Created by Conversion      Obesity     Created by Conversion      Other and  unspecified hyperlipidemia 04/27/2016     Seizures (H)      Type 2 Diabetes Mellitus With Complication     Created by Conversion      Urinary retention            Surgical History:  Past Surgical History:   Procedure Laterality Date     CORONARY ARTERY BYPASS GRAFT  12/07/2020    Dr. Madison     CV CORONARY ANGIOGRAM N/A 09/22/2020    Procedure: Coronary Angiogram;  Surgeon: Darin Perez MD;  Location: [facility];  Service: Cardiology     CV LEFT HEART CATHETERIZATION WITH LEFT VENTRICULOGRAM N/A 09/22/2020    Procedure: Left Heart Catheterization with Left Ventriculogram;  Surgeon: Darin Perez MD;  Location: [facility];  Service: Cardiology     PICC AND MIDLINE TEAM LINE INSERTION  12/14/2020            Family History:   Family History   Problem Relation Age of Onset     Heart disease Father        Social History:    Social History     Socioeconomic History     Marital status:      Spouse name: None     Number of children: None     Years of education: None     Highest education level: None   Occupational History     None   Social Needs     Financial resource strain: None     Food insecurity     Worry: None     Inability: None     Transportation needs     Medical: None     Non-medical: None   Tobacco Use     Smoking status: Never Smoker     Smokeless tobacco: Never Used   Substance and Sexual Activity     Alcohol use: Yes     Frequency: Monthly or less     Drinks per session: 1 or 2     Comment: socially      Drug use: Never     Sexual activity: None   Lifestyle     Physical activity     Days per week: None     Minutes per session: None     Stress: None   Relationships     Social connections     Talks on phone: None     Gets together: None     Attends Church service: None     Active member of club or organization: None     Attends meetings of clubs or organizations: None     Relationship status: None     Intimate partner violence     Fear of current or ex partner: None      Emotionally abused: None     Physically abused: None     Forced sexual activity: None   Other Topics Concern     None   Social History Narrative     None          Review of Systems   Constitutional:        Patient denies any pain fevers chills nausea vomit diarrhea change in vision hearing taste or smell weakness one-sided chest pain shortness of breath.  Denies any current shortness stool polyphagia polydipsia polyuria depression or anxiety and the main review of systems is negative.       Vitals:    01/15/21 1012   BP: 151/85   Pulse: (!) 102   Resp: 18   Temp: 97.2  F (36.2  C)   SpO2: 94%       Physical Exam  Constitutional:       General: He is not in acute distress.     Appearance: He is not toxic-appearing.   HENT:      Head: Normocephalic and atraumatic.      Mouth/Throat:      Mouth: Mucous membranes are moist.      Pharynx: Oropharynx is clear.   Eyes:      General:         Right eye: No discharge.         Left eye: No discharge.      Conjunctiva/sclera: Conjunctivae normal.   Cardiovascular:      Comments: Irregularly irregular with adequate rate control.  Pulmonary:      Effort: Pulmonary effort is normal. No respiratory distress.      Breath sounds: No wheezing or rales.   Abdominal:      General: There is no distension.      Palpations: Abdomen is soft.      Tenderness: There is no abdominal tenderness.   Musculoskeletal:      Comments: Venous stasis changes in the lower extremities bilateral with open sores on the right lower extremities are covered at this time.  No signs of any severe edema at this time.   Skin:     General: Skin is warm and dry.   Neurological:      Mental Status: He is alert. Mental status is at baseline.   Psychiatric:      Comments: Patient is somewhat cantankerous at this time but we did have a good conversation.         Medication List:  Current Outpatient Medications   Medication Sig     acetaminophen (TYLENOL) 325 mg/10.15 mL Soln Take 20.3 mL by mouth every 4 (four)  hours as needed.     aspirin 81 mg chewable tablet 1 tablet (81 mg total) by Enteral Tube route daily. (Patient taking differently: Chew 81 mg daily. )     atorvastatin (LIPITOR) 80 MG tablet 1 tablet (80 mg total) by Enteral Tube route at bedtime. (Patient taking differently: Take 80 mg by mouth at bedtime. )     dextrose (DEXTROSE) 40 % gel Take 15-30 g by mouth every 15 (fifteen) minutes as needed.     dextrose 50% solution Infuse 25-50 mL into a venous catheter every 15 (fifteen) minutes as needed.     famotidine (PEPCID) 20 MG tablet 1 tablet (20 mg total) by Enteral Tube route 2 (two) times a day. (Patient taking differently: Take 20 mg by mouth 2 (two) times a day. )     glucagon,human recombinant (GLUCAGON EMERGENCY KIT, HUMAN, INJ) Inject 1 mg as directed as needed.     LANTUS U-100 INSULIN 100 unit/mL vial Inject 25 Units under the skin at bedtime. 11.65 Type 2 with hyperglycemia  Contact provider if insulin prescribed is not the preferred insulin per insurance. (Patient taking differently: Inject 18 Units under the skin every morning. and 15 units at bedtime)     levETIRAcetam (KEPPRA) 500 MG tablet Take 500 mg by mouth 2 (two) times a day.     melatonin 3 mg Tab tablet 1 tablet (3 mg total) by Enteral Tube route at bedtime. (Patient taking differently: Take 3 mg by mouth at bedtime. )     metFORMIN (GLUCOPHAGE) 1000 MG tablet 1 tablet (1,000 mg total) by Enteral Tube route 2 (two) times a day with meals. (Patient taking differently: Take 1,000 mg by mouth 2 (two) times a day with meals. )     metoprolol tartrate (LOPRESSOR) 25 MG tablet 1 tablet (25 mg total) by Enteral Tube route 2 (two) times a day. (Patient taking differently: Take 12.5 mg by mouth 2 (two) times a day. )     multivitamin with minerals tablet Take 1 tablet by mouth daily.     NOVOLOG U-100 INSULIN ASPART 100 unit/mL injection Check blood sugar four (4) times daily.  11.65 Type 2 with hyperglycemia (Patient taking differently: Inject  under the skin 3 (three) times a day. Per sliding scale; 140-189=1 unit, 190-239=2 units, 240-289=3 units, 290-339=4 units, 340-399=5 units, 400-449=6 units    Bedtime sliding scale; 200-249=1 unit, 250-299=2 units, 300-349=3 units, 350-399=4 units, >399=5 units)     ondansetron (ZOFRAN-ODT) 4 MG disintegrating tablet Take 4 mg by mouth every 6 (six) hours as needed for nausea.     senna (SENOKOT) 8.6 mg tablet Take 1 tablet by mouth 2 (two) times a day as needed for constipation.       Labs: Flu labs are as follows; blood sugars were 72, 182, 161, 123, 101, 96, 139, 148, 136, 132, 98, 89.  On 1/11/2021 sodium was 139, potassium 4.2, chloride was 105, carbon dioxide was 29, anion gap was 5, glucose 67, BUN was 10, creatinine 0.94, GFR was 83.  Magnesium 1.8, phosphorus 4.0, white count was 9.8, hemoglobin 12.0.      Assessment:    ICD-10-CM    1. Acute diastolic heart failure (H)  I50.31    2. Acute systolic heart failure (H)  I50.21    3. Uncontrolled type 2 diabetes mellitus with hyperglycemia (H)  E11.65    4. Acute respiratory failure with hypoxia (H)  J96.01    5. Sepsis due to methicillin susceptible Staphylococcus aureus, unspecified whether acute organ dysfunction present (H)  A41.01    6. Oropharyngeal dysphagia  R13.12    7. Essential hypertension  I10    8. Acute delirium  R41.0        Plan: Plan at this time patient is alert and oriented.  We are attempting to call and discuss with his family if we get his permission.  I would like him to see speech therapy today and if he still insistent to go home I am I will not do AGAINST MEDICAL ADVICE I will actually discharge him with services.  If that were to happen later today then I will discharge with current meds and treatments he will have home nursing for med management and PT OT at home.    After collaboration discussion with patient's daughter we discussed going home.  It was felt in the family's best interest that he is not ready at this time and their  mother needs more cares.  It was said that he needed 24-hour care so evaluation by physical and occupational therapy and speech therapy would be paramount to see what he can actually do at home.  This collaboration took about 20 minutes and 1 hour was spent on this admission with greater than 50% of the time dedicated to coordination care this also discussed the collaboration with going home and discussed with  as well as nurse manager.  He will need maximum services if that is the case.        Electronically signed by: Silvano Osorio,

## 2021-06-14 NOTE — ANESTHESIA PREPROCEDURE EVALUATION
Anesthesia Evaluation      Patient summary reviewed   No history of anesthetic complications     Airway   Mallampati: II  Neck ROM: full   Pulmonary - negative ROS and normal exam                          Cardiovascular - normal exam  Exercise tolerance: > or = 4 METS  (+) hypertension, CAD, dysrhythmias, angina, CHF, cardiomyopathy, hypercholesterolemia,     ECG reviewed     ROS comment: Clinically doing well from CABG 8 wks ago (no angina/SOB)       Neuro/Psych    (+) CVA ,     Endo/Other    (+) diabetes mellitus type 2 poorly controlled using insulin, arthritis, obesity,      GI/Hepatic/Renal - negative ROS           Dental - normal exam                          Anesthesia Plan  Planned anesthetic: peripheral nerve block  Pop/saph  ASA 4   Induction: intravenous   Anesthetic plan and risks discussed with: patient  Anesthesia plan special considerations: antiemetics,   Post-op plan: routine recovery

## 2021-06-14 NOTE — PROGRESS NOTES
Progress Note    Brief summary:   70 y.o.male with past medical history of uncontrolled T2DM, severe multivessel CAD, chronic systolic and diastolic CHF, recent diagnosis of A. fib, who presented with acute CHF, was diuresed and subsequently underwent four-vessel CABG by Dr. Madison on 12/7/2020. Post-op course complicated by Afib with RVR, which was initially treated with IV amiodarone given pt's NPO status due to severe dysphagia.     Hospitalization complicated by altered mentation and seizure-like activity onset 12/8. CT Head on 12/8 showed punctate focus of increased attenuation within the subcortical region of the left frontal lobe. Subsequent MRI of the brain on 12/9 demonstrated subacute 2.5 cm subacute infarct with petechial hemorrhage in the left frontal lobe. EEG did not show any seizure activity, however Keppra was started 12/10 with resolution of rhythmic left leg movements. Neurology has signed off.     Extubated 12/10 and weaned down to NC. Started on broad spectrum antibiotics 12/10 due to new fever and worsening leukocytosis. 1 of 2 blood Cx grew staph epidermidis, and sputum cultures showed respiratory miryam with 4+ beta hemolytic Strep. 7 days IV pip-tazo completed 12/17.     VFSS with mild oral and severe pharyngeal dysphagia - NGT placed on 12/15. This may have exacerbated hypernatremia, which improved with D5W .    Patient continued to have significant drainage from chest tubes, L>R. R chest tube removed yesterday and left chest tube remains.     Assessment/Plan  Principal Problem:    Acute on chronic clinical systolic heart failure (H)  Active Problems:    ACP Staging Stage 1 Hypertension: 140-159 / 90-99    Type 2 diabetes mellitus (H)    Hyperlipidemia, unspecified hyperlipidemia type    CAD (coronary artery disease)    Atrial fibrillation with rapid ventricular response (H)    Diabetic leg ulcer (H)    Acute on chronic systolic CHF (congestive heart failure) (H)    A-fib (H)     Uncontrolled type 2 diabetes mellitus with hyperglycemia (H)    Acute respiratory failure with hypoxia (H)    On mechanically assisted ventilation (H)    S/P CABG (coronary artery bypass graft)    Seizures (H)    Cerebrovascular accident (CVA), unspecified mechanism (H)    Cerebrovascular accident (CVA) due to other mechanism (H)    Metabolic encephalopathy    Disorientation    Acute kidney failure with tubular necrosis (H)      Multivessel CAD, s/p CABG and PVI/LAAL on 12/7/2020:   Increased pleural drainage, negative for chylous fluid; R chest tube taken out 12/23 , Left tube removed today, monitor for 24 hours  On diuretics-torsemide 100 mg daily  Hemodynamically stable  On ASA, statin, metoprolol  Mgmt as per CV Surgery.      A Fib with RVR, now s/p PVI and DONNA excision:   Oral amiodarone, metoprolol.  Currently NSR  Mgmt as per Cardiology and CV Surgery following.   Cardiac tele.   Anticoagulation not yet started, per [facility].     Acute diastolic heart failure, improving.  Switched to oral torsemide 100 mg daily on 12/24/20  Mgmt as per Cardiology.     Acute hypoxic respiratory failure, resolved: On room air.   Encourage IC.  Supplemental O2 to keep sats >94%.  Continue pulmonary toilet- On metaneb, flutter valve     Acute Metabolic encephalopathy: improving.  Encephalopathy could also be contributed by prolonged hospitalization, hypernatremia, possible pneumonia, medications. CT Head from 12/12 showed no new changes.   EEG did not show seizure activity, but pt's rhythmic left leg movement appear to be have improved with Keppra. Continue keppra, Keppra level therapeutic, pt was started on quetiapine on 12/14   Bed time seroquel reduced on 12/23 for somnolence     Subacute left frontal stroke with petechial hemorrhage:   on ASA, statin.   Neurology recs appreciated.     Sepsis  Fever, Leukocytosis, tachycardia noted 12/10/2020  UA was negative. One of two blood cultures grew staph epi, likely contaminant.  Sputum  culture growing beta-hemolytic strep.   Started on emperic pip-tazo IV,  completed 7d on 12/17, fever and WBC resolving.  ID recs appreciated.     Severe pharyngeal dysphagia: NG and tube feeding.  SLP team following, repeat Video swallow 12/21 shows ongoing significant dysphagia     Hyperglycemia in the setting of T2DM and D5W as above:  HDSSI with glargine. Recommend to Hold home glipizide, metformin.  Increase dose of two times a day glargine again today: 20u two times a day->25u two times a day     Hypernatremia, resolved.     HTN: holding home losartan, amlodipine.       Subjective  Patient seen and examined  No chest pain  Denied shortness of breath   No dizziness  AOX3  C.Diff  negative    Objective    Vital signs in last 24 hours  Temp:  [97.4  F (36.3  C)-98  F (36.7  C)] 98  F (36.7  C)  Heart Rate:  [72-91] 84  Resp:  [16-20] 20  BP: ()/(54-79) 118/69  Weight:   179 lb 11.2 oz (81.5 kg)    Intake/Output last 3 shifts  I/O last 3 completed shifts:  In: 360 [NG/GT:360]  Out: 2292 [Urine:2275; Chest Tube:17]  Intake/Output this shift:  No intake/output data recorded.    Physical Exam   General: awake, alert, not in distress  Eyes: no pallor  Chest: Clear to auscultation bilaterally,tube removed  Heart: RRR, normal S1 and S2  Abdomen: soft, non tender  Neuro: moving all extremities, slightly confused      Meds    amino acids-protein hydrolys  2 packet Enteral Tube DAILY     amiodarone  200 mg Enteral Tube BID     aspirin  81 mg Enteral Tube DAILY    Or     aspirin  150 mg Rectal DAILY     atorvastatin  80 mg Enteral Tube QHS     famotidine  20 mg Enteral Tube BID    Or     famotidine (PEPCID) injection  20 mg Intravenous BID     glipiZIDE  2.5 mg Enteral Tube BID AC     guar gum  1 packet Enteral Tube BID     heparin (PF) ANTICOAGULANT  5,000 Units Subcutaneous Q8H FIXED TIMES     insulin aspart (NovoLOG) injection   Subcutaneous Q6H FIXED TIMES     insulin glargine  25 Units Subcutaneous BID      levETIRAcetam  (KEPPRA) solution 100 mg/mL  500 mg Enteral Tube BID     lidocaine (PF)  1-5 mL Intradermal Once     melatonin  3 mg Enteral Tube QHS     metFORMIN  1,000 mg Enteral Tube BID with meals     metoprolol tartrate  37.5 mg Enteral Tube BID     multivitamin with iron-mineral  15 mL Enteral Tube DAILY     QUEtiapine  12.5 mg Enteral Tube QHS     sodium chloride bacteriostatic  1-5 mL Intradermal Once     sodium chloride  3 mL Intravenous Q8H FIXED TIMES     tamsulosin  0.4 mg Enteral Tube Daily after brkfst     torsemide  100 mg Enteral Tube DAILY     white petrolatum   Topical DAILY       Pertinent Labs   Lab Results: personally reviewed.   not applicable    Results from last 7 days   Lab Units 12/25/20  0758 12/24/20  1512 12/24/20  0638 12/20/20  0741 12/20/20  0741   LN-SODIUM mmol/L 140 141  --   --  143   LN-POTASSIUM mmol/L 3.9 4.0 3.8   < > 3.9   LN-CHLORIDE mmol/L 102 102  --   --  110*   LN-CO2 mmol/L 28 28  --   --  24   LN-BLOOD UREA NITROGEN mg/dL 32* 32*  --   --  15   LN-CREATININE mg/dL 1.22 1.19 1.08  --  0.89   LN-CALCIUM mg/dL 8.9 8.8  --   --  8.1*    < > = values in this interval not displayed.         Results from last 7 days   Lab Units 12/25/20  0758 12/24/20  1512 12/21/20  0715   LN-WHITE BLOOD CELL COUNT thou/uL 13.6* 14.8* 13.1*   LN-HEMOGLOBIN g/dL 12.4* 12.7* 11.9*   LN-HEMATOCRIT % 40.2 40.6 40.0   LN-PLATELET COUNT thou/uL 413 498* 364         Pertinent Radiology   Radiology Results: Personally reviewed impression/s    Echo Complete    Result Date: 12/3/2020    Technically difficult study. Definity contrast utilized.   Normal left ventricular size. Mild to moderate left ventricular hypertrophy.   Left ventricle ejection fraction is moderately globally reduced. Left ventricular ejection fraction estimated at 30 to 35%. Abnormal septal motion.   Right ventricle not optimally visualized. Right ventricular systolic function is reduced. Abnormal TAPSE   Moderate left atrial  enlargement. Mild right atrial enlargement.   Aortic valve sclerosis without Doppler evidence to suggest aortic stenosis. Mild aortic insufficiency.   Nonspecific thickening of the mitral valve leaflets with mild mitral regurgitation.   Mild tricuspid regurgitation.   Mild enlargement of the aortic root   When compared to the previous study dated 9/20/2020, the prior study was also technically challenging. Left ventricular function remains reduced and appears potentially mildly more prominently reduced on the current examination.      Xr Chest 1 View Portable    Result Date: 12/20/2020  EXAM: XR CHEST 1 VIEW PORTABLE LOCATION: [facility] DATE/TIME: 12/20/2020 9:01 AM INDICATION: Postop evaluation COMPARISON: 12/16/2020     Bilateral chest tubes. No pneumothoraces. Small right pleural effusion with passive atelectasis in the right lung. Enteric tube tip below diaphragm, off the image. Strand of atelectasis or scarring left midlung. Calcified aortic atherosclerosis. Sternotomy with CABG and vascular markers. No change from prior.    Xr Chest 1 View Portable    Result Date: 12/16/2020  EXAM: XR CHEST 1 VIEW PORTABLE LOCATION: [facility] DATE/TIME: 12/16/2020 10:31 AM INDICATION: s/p CABg COMPARISON: Portable chest radiography 12/15/2020     Feeding tube extends below the diaphragmatic hiatus and outside the field-of-view. Bilateral chest tubes are unchanged in position. The cardiac silhouette remains enlarged. Unchanged mediastinal interfaces. Graded opacities in the lower third of both right and left chest relate to pleural effusions and adjacent atelectatic lung, slightly increased from yesterday. No pneumothorax. Sternal wires are aligned. Coronary ostial markers and vascular clips are present from CABG.    Xr Chest 1 View Portable    Result Date: 12/15/2020  EXAM: XR CHEST 1 VIEW PORTABLE LOCATION: [facility] DATE/TIME: 12/15/2020 8:58  AM INDICATION: s/p CABG  possible aspiration COMPARISON: 12/12/2020     Sternal wires. Removal IJ sheath. Stable position bilateral chest drains. Stable enlarged cardiac silhouette. Unchanged bilateral lung opacities which are likely a combination of lung infiltrates and pleural fluid. No pneumothorax. No pulmonary edema.    Xr Chest 1 View Portable    Result Date: 12/12/2020  EXAM: XR CHEST 1 VIEW PORTABLE LOCATION: [facility] DATE/TIME: 12/12/2020 12:47 PM INDICATION: s/p CABG COMPARISON: 12/8/2020     Interval extubation and removal of the right IJ [place] catheter. Right IJ line terminates over the brachiocephalic-SVC junction. Mediastinal drains are noted. Persistent and marginally increased small bilateral effusions and associated atelectasis. No pneumothorax. Cardiomegaly and prominence of the mediastinum are similar. CABG changes. Mild pulmonary vascular congestion without overt pulmonary edema.    Xr Chest 1 View Portable    Result Date: 12/8/2020  EXAM: XR CHEST 1 VIEW PORTABLE LOCATION: [facility] DATE/TIME: 12/8/2020 6:20 AM INDICATION: s/p cardiac surgery COMPARISON: 12/07/2020 FINDINGS: Endotracheal tube terminates 3.8 cm above the yariel. Right heart catheter terminates over the right main pulmonary artery. Bilateral chest tubes remain in place. Heart size is enlarged but stable status post median sternotomy. Mild prominence of central pulmonary vasculature without overt failure. Dense retrocardiac consolidation on the left is unchanged. No pneumothorax.     No significant interval change.    Xr Chest 1 View Portable    Result Date: 12/7/2020  EXAM: XR CHEST 1 VIEW PORTABLE LOCATION: [facility] DATE/TIME: 12/7/2020 1:51 PM INDICATION: s/p cardiac surgery COMPARISON: Portable AP view of the chest 02/12/2020     New endotracheal tube is in satisfactory position. Right jugular approach sheath and PA catheter are present.  The tip of the PA catheter is in the right pulmonary artery. Mediastinal drain and bilateral chest tubes are present. Coronary ostial markers and vascular clips from revascularization. Mild upper pneumomediastinum and gas in the soft tissues of the left neck. Improved lung inflation particularly of the right lower lobe with improved discrimination of the right hemidiaphragm. Subsegmental sized opacities in the left lung around the hilum and along the diaphragmatic pleura likely representing residual areas of atelectasis. No visible pleural fluid or pneumothorax.    Xr Chest 1 View Portable    Result Date: 12/2/2020  EXAM: XR CHEST 1 VIEW PORTABLE LOCATION: [facility] DATE/TIME: 12/2/2020 2:47 PM INDICATION: Shortness of breath with concern for pulmonary edema secondary to diuretic noncompliance COMPARISON: Chest x-ray 09/19/2020     Small left-sided and small to moderate right-sided pleural effusions with adjacent opacities likely reflecting atelectasis. Cardiomegaly with central vascular congestion and mild pulmonary edema.    Xr Chest 2 Views    Result Date: 12/21/2020  EXAM: XR CHEST 2 VIEWS LOCATION: [facility] DATE/TIME: 12/21/2020 10:20 AM INDICATION: recheck R pleural effusion COMPARISON: PA and lateral views of the chest 12/20/2020     Unchanged position of bilateral chest tubes. Feeding tube extends below the diaphragmatic hiatus and outside the field-of-view. Improved lung inflation. Opacity in the basal right chest has considerably cleared with improved discrimination of the right hemidiaphragm consistent with evacuation of pleural fluid and improved atelectasis. Thin linear band of atelectasis radiating from the left hilum is unchanged. Decreased lung vascular congestion. Cardiac silhouette is normal in size. Ostial markers and vascular clips from CABG..    Xr Abdomen Ap Portable    Result Date: 12/8/2020  EXAM: XR ABDOMEN AP PORTABLE LOCATION:   [facility] DATE/TIME: 12/8/2020 7:30 PM INDICATION: check tube placement COMPARISON: None.     Gastric tube courses below the diaphragmatic hiatus, follows the greater curvature of the stomach with tip along the right lateral aspect of the lumbar spine presumably in the distal stomach. Mediastinal drain and bilateral chest tubes are present. Nonobstructive bowel gas pattern.     Ct Head Without Contrast    Result Date: 12/12/2020  EXAM: CT HEAD WO CONTRAST LOCATION: [facility] DATE/TIME: 12/12/2020 11:51 AM INDICATION: Stroke. Increasing confusion. COMPARISON: Head MRI 12/09/2020 TECHNIQUE: Routine CT Head without IV contrast. Multiplanar reformats. Dose reduction techniques were used. FINDINGS: INTRACRANIAL CONTENTS: Infarct in the left frontal operculum with subtle associated petechial hemorrhage correlating with the blood products noted on MRI. No gross hemorrhagic transformation. No CT evidence of acute infarct. Chronic infarct in the left cerebellar hemisphere. Mild generalized volume loss. VISUALIZED ORBITS/SINUSES/MASTOIDS: No intraorbital abnormality. No paranasal sinus mucosal disease. No middle ear or mastoid effusion. BONES/SOFT TISSUES: No acute abnormality.     1.  Petechial hemorrhage in the left frontal opercular infarct correlating with the blood products noted on MRI. 2.  No gross hemorrhagic transformation. 3.  Chronic left cerebellar infarct. 4.  No change.    Ct Head Without Contrast    Addendum Date: 12/8/2020    Addendum/ impression: Dr. Domingo discussed the results with Dr. Kraft on 12/8/2020 10:06 PM by telephone.    Result Date: 12/8/2020  EXAM: CT HEAD WO CONTRAST LOCATION: [facility] DATE/TIME: 12/8/2020 9:12 PM INDICATION: seizure COMPARISON: None. TECHNIQUE: Routine CT Head without IV contrast. Multiplanar reformats. Dose reduction techniques were used. FINDINGS: Punctate focus of increased attenuation within  the subcortical region of the left frontal lobe. This may represent a focus of petechial hemorrhage, cortical laminar necrosis, mineralization or subarachnoid hemorrhage. This is best demonstrated on axial image #23, series 2 and sagittal image #43, series 5.2. Scattered foci of decreased attenuation within the cerebral hemispheric white matter which are nonspecific, though most commonly ascribed to chronic small vessel ischemic disease. The ventricles and sulci are prominent consistent with mild brain parenchymal volume loss. Gray-white matter differentiation is maintained. The basilar cisterns are patent. The globes are unremarkable. The partially imaged paranasal sinuses, mastoid air cells and middle ear cavities are unremarkable. The visualized skull base and calvarium are unremarkable.     1.  Punctate focus of increased attenuation within the subcortical region of the left frontal lobe. This may represent a focus of petechial hemorrhage, cortical laminar necrosis, mineralization or subarachnoid hemorrhage. This is best demonstrated on axial image #23, series 2 and sagittal image #43, series 5.2. Comparison with prior studies would be helpful if available. 2.  Otherwise recommend brain MRI for further evaluation. Additionally follow-up CT is could be performed to ensure stability or resolution.    Mr Brain With Without Contrast    Result Date: 12/9/2020  EXAM: MR BRAIN W WO CONTRAST LOCATION: [facility] DATE/TIME: 12/9/2020 1:07 PM INDICATION: Seizure. Altered mental status. Abnormal head CT, hemorrhage. COMPARISON: Head CT 12/08/2020. CONTRAST: 10 mL of gadobutrol intravenous contrast. TECHNIQUE: Routine multiplanar multisequence head MRI without and with intravenous contrast. FINDINGS: Cortical diffusion hyperintensity without most minimal restriction along the margin of the left middle and inferior frontal gyri demonstrates cortical signal loss on the susceptibility sensitive  sequence with gyriform enhancement over a 2.5 x 1.5 x 1 cm area. Constellation of findings are most consistent with a subacute infarct and petechial hemorrhage. This corresponds to the abnormality identified on the comparison head CT.  Mild mucosal thickening in the sinuses. A few mastoid air cells on each side contain a small amount of fluid. Intraorbital contents are unremarkable. There is mild generalized prominence of the sulci and ventricles, consistent with underlying volume loss. Intracranial flow voids are intact. Small chronic infarct left cerebellum. There is no mass effect, midline shift, or extraaxial collection. There are scattered foci of T2/FLAIR hyperintensity within the cerebral white matter that are nonspecific but most commonly reflect the sequela of chronic small vessel ischemic disease.     The abnormality identified on head CT corresponds to a 2.5 cm subacute infarct in the left frontal lobe associated with petechial hemorrhage. No significant mass effect.     Xr Swallow Study W Speech    Result Date: 12/21/2020  EXAM: XR SWALLOW STUDY W SPEECH OR OT LOCATION: [facility] DATE/TIME: 12/21/2020 9:17 AM INDICATION: Difficulty swallowing. COMPARISON: None. TECHNIQUE: Routine. FINDINGS: FLUOROSCOPIC TIME: 2.3 minutes. NUMBER OF IMAGES: 0 Swallow study with Speech Pathology using multiple barium thicknesses. With honey consistency there is marked residual in the vallecula and piriform sinuses and [place] silent aspiration. Chin tuck and head turn maneuver did not help. With pudding consistency there is also some moderate residual which clears with additional swallowing with no penetration or aspiration.     1.  High risk for aspiration with honey consistency and likely thinner. 2.  Patient does tolerate pudding consistency.     Xr Swallow Study W Speech    Result Date: 12/15/2020  EXAM: XR SWALLOW STUDY W SPEECH OR OT LOCATION: [facility] DATE/TIME:  12/15/2020 9:12 AM INDICATION: Difficulty swallowing. Abnormal swallow study 12/12/2020. COMPARISON: 12/12/2020 TECHNIQUE: Routine. FINDINGS: FLUOROSCOPIC TIME: .4 minutes NUMBER OF IMAGES: 0 Swallow study with Speech Pathology using multiple barium thicknesses. Airway aspiration with the nectar thick barium. Weak, delayed cough. Airway penetration with the honey thick barium. Please see speech pathology's report for further details and recommendations.    Xr Swallow Study W Speech    Result Date: 12/12/2020  EXAM: XR SWALLOW STUDY W SPEECH OR OT LOCATION: [facility] DATE/TIME: 12/12/2020 9:28 AM INDICATION: Difficulty swallowing. COMPARISON: None. TECHNIQUE: Routine. FINDINGS: FLUOROSCOPIC TIME: 1.2 minutes NUMBER OF IMAGES: 0 Swallow study with Speech Pathology using multiple barium thicknesses. There is silent aspiration with thin liquid with no cough reflex. There is pooling of all consistencies in the valleculae with some poor over and there is penetration to the vocal cords with nectar and honey thick liquid. Chin tuck technique improved inversion of the epiglottis mildly.     1.  Silent aspiration with thin liquid. Penetration with nectar and honey consistencies. Stasis with pooling in the valleculae.     Us Carotid Bilateral    Result Date: 12/3/2020  EXAM: US CAROTID BILATERAL LOCATION: [facility] DATE/TIME: 12/3/2020 3:12 PM INDICATION: Coronary artery disease. COMPARISON: None. TECHNIQUE: Duplex exam performed utilizing 2D gray-scale imaging, Doppler interrogation with color-flow and spectral waveform analysis. The percent diameter stenosis is determined using NASCET criteria and Society of Radiologists in Ultrasound Consensus Criteria. FINDINGS: RIGHT: Mild plaque at the bifurcation. The peak systolic velocity in the ICA is less than 125 cm/sec, consistent with less than 50% stenosis. Normal velocities in the ECA. Antegrade flow within the vertebral  artery. LEFT: Mild plaque at the bifurcation. The peak systolic velocity in the ICA is less than 125 cm/sec, consistent with less than 50% stenosis. Normal velocities in the ECA. Antegrade flow within the vertebral artery. VELOCITY CHART: CCA   Right: 64/16 cm/s   Left: 91/24 cm/s ICA   Right: 71/27 cm/s   Left: 60/22 cm/s ECA   Right: 64/11 cm/s   Left: 96/14 cm/s ICA/CCA PSV Ratio   Right: 1.1   Left: 0.66     1.  Mild plaque formation, velocities consistent with less than 50% stenosis in the right internal carotid artery. 2.  Mild plaque formation, velocities consistent with less than 50% stenosis in the left internal carotid artery. 3.  Flow within the vertebral arteries is antegrade.    Us Vein Mapping For Pre Bypass Graft Bilateral    Addendum Date: 12/15/2020    Indication: Vessel mapping prior to a bypass graft. Coronary artery disease plan for coronary artery bypass graft.    Result Date: 12/15/2020  EXAM: US VEIN MAPPING FOR PRE BYPASS GRAFT BILATERAL LOCATION: [facility] DATE/TIME: 12/3/2020 6:46 PM INDICATION: Lower extremity venous mapping prior to coronary or peripheral bypass surgery, coronary artery disease, diabetic leg ulcer COMPARISON: 9/19/2020 TECHNIQUE: Ultrasound examination of the lower extremity veins was performed, including gray-scale and compression imaging. LOWER  EXTREMITY FINDINGS:   VENOUS DIAMETERS RIGHT GREAT SAPHENOUS VEIN Upper Thigh: 3.0 mm Mid Thigh: 2.4 mm Lower Thigh: 2.5 mm Knee: The great saphenous vein is thrombosed from the knee to the ankle. LEFT GREAT SAPHENOUS VEIN Upper Thigh: 4.7 mm Mid Thigh: 4.1 mm Lower Thigh: 4.5 mm Knee: 2.9 mm Upper Calf: 3.2 mm Mid Calf: 2.2 mm Lower Calf: 2.0 mm Ankle: 1.1 mm RIGHT SMALL SAPHENOUS VEIN Not visualized LEFT SMALL SAPHENOUS VEIN The small saphenous vein is thrombosed from the knee to the ankle. There is underlying bilateral subcutaneous edema. No evidence of deep vein thrombosis.     1. Exam positive  for superficial thrombophlebitis involving the right great saphenous vein from the knee to the ankle and left small saphenous vein from the knee to the ankle. 2. The right small saphenous vein is not visualized. 3. No evidence of thrombus extension into the deep venous system. 4. Diffuse subcutaneous edema without discrete fluid collections. NOTE: ABNORMAL REPORT    Echo Monitor [place]    Result Date: 12/7/2020    Left ventricle ejection fraction is moderately decreased. The calculated left ventricular ejection fraction is 38%.      Eeg Coma Or Sleep Only    Result Date: 12/8/2020  EEG report DATE 12/08/20 CLINICAL HISTORY This is a 70 y.o. male with left leg jerking. The EEG is done to look for underlying epilepsy. INTRODUCTION This is a routine EEG performed utilizing standard 10- 20 system of electrode placement with 20 channels of recording including one channel of EKG monitor. The patient was studied in awake and drowsy/somnolent state. EEG TIME: 31 min DESCRIPTION OF THE RECORD The EEG background consists of a generalized symmetric delta/theta background.  No sharp large amplitude rhythmic activity to suggest electrographic seizures were seen.  Mild to moderate background dysrhythmias present.  No activation maneuvers were done. IMPRESSION/REPORT/PLAN This is an abnormal EEG during wakefulness and somnolence/drowsiness due to generalized slow background.  EEG suggestive of an generalized cerebral dysfunction such as seen in an encephalopathy.  EEG is not suggestive of active ongoing seizures during the recording. Further clinical correlation is needed. Please note that the absence of epileptiform abnormalities does not exclude the possibility of epilepsy in any patient.     Xr Ng Or Nj Tube Reposition    Result Date: 12/15/2020  EXAM: XR NJ TUBE INSERTION LOCATION: [facility] DATE/TIME: 12/15/2020 9:49 AM INDICATION: Dysphagia. Aspiration on video swallow study. NJ tube needed for  medication administration and feedings. COMPARISON: None. TECHNIQUE: A 10 [place] feeding tube was placed under fluoroscopic guidance. FLUORO TIME: 2.3 NUMBER OF IMAGES: 1 FINDINGS: Enteric tube placed without complication.?Tip in the distal duodenum.           Advanced Care Planning:  Discharge Planning discussed with patient, RN, CM.   Anticipated LOS: per surgery  Barrier to discharge:acute issues  Disposition:acute rehab?  Discussed care with patietn for >35 minutes with greater than 50% of time spent in counseling and coordination of care.      MD Tanna  Hospitalist  This note was dictated using voice recognition software. Any grammatical or context distortions are unintentional and inherent to the software.

## 2021-06-14 NOTE — PLAN OF CARE
"Pt alert to self, and place. Fluctuating orientation for time and situation. Denies any pain. No shortness of breath, on RA. Lungs clear. Tube feeds through NJ at 50 ml/hr, with 180 ml flushes q4. No n/v. Ice chips given 2-3 at a time with pt at 90 degrees. Voiding using urinal. Left chest tube with serous drainage. Right chest tube removed earlier today, new dressing applied. Chest incision open to air. Mepilex x 2 on right leg, x3 on left leg, due to be changed 12/23. Up with assist of 1 and walker. Towards end of shift, pt beginning to get more restless and difficulty sleeping, cross cover text paged, awaiting response. 1:1 for impulsivity. Will continue to monitor.     Blood pressure 117/71, pulse 83, temperature 98.3  F (36.8  C), temperature source Oral, resp. rate 16, height 5' 10\" (1.778 m), weight 186 lb 9.6 oz (84.6 kg), SpO2 95 %.          Problem: Pain  Goal: Patient's pain/discomfort is manageable  Outcome: Progressing     Problem: Safety  Goal: Patient will be injury free during hospitalization  Outcome: Progressing     Problem: Daily Care  Goal: Daily care needs are met  Outcome: Progressing     Problem: Psychosocial Needs  Goal: Demonstrates ability to cope with hospitalization/illness  Outcome: Progressing     Problem: Psychosocial Needs  Goal: Collaborate with patient/family/caregiver to identify patient specific goals for this hospitalization  Outcome: Progressing     "

## 2021-06-14 NOTE — PLAN OF CARE
Problem: Physical Therapy  Goal: PT Goals  Description: Patient will participate in evaluation by 12/5/2020 in order to assess functional level and determine safe discharge plan. -met    Goal entered on 12/5/2020 by Cassy Tabor, PT    Patient will demonstrate the following by 12-28-20, in order to maximize independence with functional mobility to facilitate safe discharge:   -Supine<>sit with head of bed flat, no rail, CGA  -Sit<>stand with FWW assistive device, SBA  -Ambulate 300 feet with FWW assistive device, SBA   -Negotiate 4 stairs with single rails, SBA, in order to access home.    Goals entered on 12/17/2020 by Colt Arguelles, PT     Outcome: Adequate for Discharge    Physical Therapy Discharge Summary    Date of PT Discharge: 12/28/2020  Recommended Equipment: FWW  Discharge Destination:  ARU  Discharge Comments: continue daily walking      12/28/2020 by Douglas Cartwright, PT

## 2021-06-14 NOTE — PROGRESS NOTES
Pt. Has often been restless after 930 pm or sopast few evenings.    What I mean by that is that he is sleeping soundly for a few minutes then jumping up and wanted to go to chair then being in chair for few minutes and then jumping up and going to bed.    Each night I have been giving prn tylenol in the tube feeding and gave this afternoon at 4 pm also.  ( I did not do that other days)  He does seem to be better about the call light.  We have put warm pack on back this evening and he likes warm wash cloths on his face.     Lavender on pillow for relaxation and dtr talking to him on I pad right now.

## 2021-06-14 NOTE — PROGRESS NOTES
CVTS Daily Progress Note   12/26/2020   POD#19 s/p CABGx4, LAAE, PVI  Attending: oLs   LOS: 24 days     SUBJECTIVE/INTERVAL EVENTS:    No acute events overnight. Patient making slow progress. Awake, alert, and oriented this AM. Maintaining oxygen saturations on room air. Normotensive; dizziness resolved. Currently still with tube feeds; repeat video swallow fail. +BM. UOP adequate. Left chest tube removed finally yesterday. Right pleural tube removed 12/23. CXR with small bilateral PTX and no effusion this morning. Ambulating with therapy in the hallways. Cr somewhat bumped today (1.5). Patient denies pain and has no questions. Realizes that he needs to continue working with therapy to get better.    OBJECTIVE:    Temp:  [97.3  F (36.3  C)-97.7  F (36.5  C)] 97.3  F (36.3  C)  Heart Rate:  [68-94] 76  Resp:  [16-18] 18  BP: ()/(56-68) 90/56  Wt Readings from Last 2 Encounters:   12/26/20 0600 177 lb (80.3 kg)   12/26/20 0522 177 lb 4.8 oz (80.4 kg)   12/25/20 0351 179 lb 11.2 oz (81.5 kg)   12/24/20 0432 183 lb 1.6 oz (83.1 kg)   12/23/20 0508 184 lb (83.5 kg)   12/22/20 0611 186 lb 9.6 oz (84.6 kg)   12/21/20 0558 189 lb 14.4 oz (86.1 kg)   12/20/20 0416 189 lb 9 oz (86 kg)   12/19/20 0317 201 lb 14.4 oz (91.6 kg)   12/18/20 0647 203 lb 9.6 oz (92.4 kg)   12/17/20 0517 214 lb 9.6 oz (97.3 kg)   12/16/20 0415 207 lb 4.8 oz (94 kg)   12/16/20 0005 207 lb 4.8 oz (94 kg)   12/15/20 0630 212 lb (96.2 kg)   12/14/20 0400 216 lb 1.6 oz (98 kg)   12/12/20 0400 206 lb 14.4 oz (93.8 kg)   12/11/20 0630 210 lb 4.8 oz (95.4 kg)   12/10/20 0500 212 lb (96.2 kg)   12/09/20 0414 217 lb 2.5 oz (98.5 kg)   12/08/20 0345 219 lb 5.7 oz (99.5 kg)   12/07/20 0417 218 lb 8 oz (99.1 kg)   12/06/20 0301 220 lb 6.4 oz (100 kg)   12/05/20 0340 220 lb 1.6 oz (99.8 kg)   12/04/20 0354 (!) 226 lb (102.5 kg)   12/03/20 1236 (!) 223 lb (101.2 kg)   12/03/20 0441 (!) 223 lb (101.2 kg)   12/02/20 1722 (!) 224 lb 3.2 oz (101.7 kg)    12/02/20 1401 (!) 237 lb 9.6 oz (107.8 kg)   11/05/20 1302 (!) 223 lb (101.2 kg)       Current Medications:    Scheduled Meds:    amino acids-protein hydrolys  2 packet Enteral Tube DAILY     amiodarone  200 mg Enteral Tube BID     aspirin  81 mg Enteral Tube DAILY    Or     aspirin  150 mg Rectal DAILY     atorvastatin  80 mg Enteral Tube QHS     famotidine  20 mg Enteral Tube BID    Or     famotidine (PEPCID) injection  20 mg Intravenous BID     glipiZIDE  5 mg Enteral Tube BID AC     guar gum  1 packet Enteral Tube BID     heparin (PF) ANTICOAGULANT  5,000 Units Subcutaneous Q8H FIXED TIMES     insulin aspart (NovoLOG) injection   Subcutaneous Q6H FIXED TIMES     insulin glargine  25 Units Subcutaneous BID     levETIRAcetam  (KEPPRA) solution 100 mg/mL  500 mg Enteral Tube BID     lidocaine (PF)  1-5 mL Intradermal Once     melatonin  3 mg Enteral Tube QHS     metFORMIN  1,000 mg Enteral Tube BID with meals     metoprolol tartrate  25 mg Enteral Tube BID     multivitamin with iron-mineral  15 mL Enteral Tube DAILY     QUEtiapine  12.5 mg Enteral Tube QHS     sodium chloride bacteriostatic  1-5 mL Intradermal Once     sodium chloride  3 mL Intravenous Q8H FIXED TIMES     tamsulosin  0.4 mg Enteral Tube Daily after brkfst     torsemide  100 mg Enteral Tube DAILY     white petrolatum   Topical DAILY     Continuous Infusions:    dextrose 10%       PRN Meds:.acetaminophen, acetaminophen, aluminum-magnesium hydroxide-simethicone, bisacodyL, bisacodyL, dextrose 10%, dextrose 50 % (D50W), docusate sodium (COLACE) 50 mg/5 mL oral liquid, glucagon (human recombinant), lipase-protease-amylase **AND** sodium bicarbonate, magnesium hydroxide, polyethylene glycol, sodium chloride, sodium chloride bacteriostatic, sodium chloride, sodium phosphates 133 mL      Imaging:    Xr Chest 1 View Portable    Result Date: 12/26/2020  EXAM: XR CHEST 1 VIEW PORTABLE LOCATION: [facility] DATE/TIME: 12/26/2020  10:19 AM INDICATION: Shortness of breath. Follow-up pneumothoraces. COMPARISON: 12/26/2020.     Tiny apical pneumothoraces without significant change. No consolidation or focal pneumonia in the lungs. No pulmonary edema. No other significant change. Feeding tube present.     Xr Chest 2 Views    Result Date: 12/26/2020  EXAM: XR CHEST 2 VIEWS LOCATION: [facility] DATE/TIME: 12/26/2020 6:44 AM INDICATION: S/p chest tube removal. eval pleural effusions. please do as close to 0600 as possible for discharge planning. COMPARISON: 12/21/2020.     Removal of chest tubes. Tiny bilateral apical pneumothoraces, slightly larger on the right (approximately 6-7 mm pleural separation on the right and 3 mm on the left). Lungs are clear. Minimal pleural fluid. Stable cardiomediastinal silhouette. Sternotomy and CABG. Feeding tube courses below the diaphragm and off the field of view. Pneumothorax findings verbally communicated to patient's nurse at 7:14 AM on 12/26/2020.       Lab Results (most recent, reviewed):    Hemoglobin (g/dL)   Date Value   12/26/2020 12.4 (L)     WBC (thou/uL)   Date Value   12/26/2020 12.4 (H)     Potassium (mmol/L)   Date Value   12/26/2020 4.0     Creatinine (mg/dL)   Date Value   12/26/2020 1.51 (H)     Hemoglobin A1c (%)   Date Value   12/03/2020 12.6 (H)     Magnesium (mg/dL)   Date Value   12/18/2020 1.9     Calcium (mg/dL)   Date Value   12/26/2020 8.7       I/O:    Intake/Output Summary (Last 24 hours) at 12/26/2020 1306  Last data filed at 12/26/2020 0700  Gross per 24 hour   Intake 600 ml   Output 600 ml   Net 0 ml         Physical Exam:    General: Patient seen in bed. Alert and oriented.  CV: Afib on monitor. 2+ peripheral pulses in all extremities. Mild edema.   Pulm: Non-labored effort on room air. Chest tube in place, serous output, no air leak. Incision C/D/I.  Abd: Soft, NT, ND  Ext: Mild pedal edema, warm, distal pulses intact. Bilateral leg sores covered with  dressings, dry.  Neuro: CN grossly intact      ASSESSMENT/PLAN:    Mika Alvarez is a 70 y.o. male with a history of CAD who is POD#19 s/p CABGx4.    Principal Problem:    Acute on chronic clinical systolic heart failure (H)  Active Problems:    ACP Staging Stage 1 Hypertension: 140-159 / 90-99    Type 2 diabetes mellitus (H)    Hyperlipidemia, unspecified hyperlipidemia type    CAD (coronary artery disease)    Atrial fibrillation with rapid ventricular response (H)    Diabetic leg ulcer (H)    Acute on chronic systolic CHF (congestive heart failure) (H)    A-fib (H)    Uncontrolled type 2 diabetes mellitus with hyperglycemia (H)    Acute respiratory failure with hypoxia (H)    On mechanically assisted ventilation (H)    S/P CABG (coronary artery bypass graft)    Seizures (H)    Cerebrovascular accident (CVA), unspecified mechanism (H)    Cerebrovascular accident (CVA) due to other mechanism (H)    Metabolic encephalopathy    Disorientation    Acute kidney failure with tubular necrosis (H)        NEURO:   - PRN Tylenol for pain  - Melatonin at bedtime  - No further seizure activity since POD#1 although remains on Keppra  - Seroquel nightly    CV:   - Normotensive/borderline hypotension at times  - Metoprolol 37.5mg two times a day decreased to 25mg two times a day   - Amiodarone 200mg two times a day   - No plan for a-fib anticoagulation given LAAE and surgeon preference  - ASA 81mg  - Atorvastatin 80mg daily  - Right pleural chest tube removed 12/22. Left pleural tube removed 12/25    PULM:   - Maintaining oxygen saturations on room air   - Encourage pulmonary toilet     FEN/GI:  - NPO   - Tube feeds, tolerating  - Speech following, appreciate  - Continue electrolyte replacement protocol  - Bowel regimen    RENAL:  - Monitor UOP closely.  - Cr bump today, 1.51 from 1.22  - Diuresis with 100mg torsemide daily decreased to 50mg daily  - Will give 500cc fluid bolus      HEME:  - Acute blood loss anemia post-op.   - No  bleeding concerns. Hep subcutaneous (cleared with neuro), [id number]    ID:  - Ronda op ppx complete.  - Afebrile  - Off antibiotics  - Was on Vanco from 12/10-12/13 and Zosyn from 12/10-12/17  - C diff negative 12/24  - COVID negative 12/25 (discharge planning only)    ENDO:   - Sliding scale insulin and Lantus  - Restarted Metformin 12/23  - Increased glipizide to 5mg two times a day today    PPx:   - DVT: SCDs, SQ heparin three times a day, ambulation  - GI: Omeprazole     DISPO:   - General telemetry floor. Tentative plan for ARU discharge tomorrow.        _______  FREDY HorneC  Cardiothoracic Surgery  [phone]

## 2021-06-14 NOTE — PROGRESS NOTES
"Speech Language/Pathology  Speech Therapy Daily Progress Note    Patient presents as alert and cooperative during this session.  An  was not applicable.    Objective  Pharyngeal exercises: to address impaired swallowing safety and effectiveness  Patient unable to recall any exercises previously introduced or completed  Pt completed \"effortful swallow aided by 2-3 ice chips at a time x50 swallows given min cues for adequate effort.     Assessment  Demonstrates relatively good effort during exercises. Continues to present with severe dysphonia, able to produce minimal if any voice. Likely coincides to some degree with dysphagia, particularly decreased glottal closure. Patient making steady progress. Continued skilled SLP services are warranted to optimize patient status/progress toward goals in POC. Goals in POC not targeted in current session d/t emphasis on above areas remain appropriate.     Plan/Recommendations  Continue per plan    The ST Care Plan has been reviewed and current plan remains appropriate.    25 dysphagia minutes     Ed Cook MA, CCC-SLP      "

## 2021-06-14 NOTE — PROGRESS NOTES
Code Status:  FULL CODE  Visit Type: Discharge Summary     Facility:  [facility] [[id number]]             History of Present Illness: Mika Alvarez is a 70 y.o. male who I am seeing today for discharged from the TCU.  Patient recently hospitalized on 12/2/2020.  Patient initially presented with shortness of breath.  Past medical history includes obesity, hypertension, diabetes mellitus type 2, CAD and CHF.  Patient was eventually diagnosed with decompensated heart failure.  Patient had been hospitalized 1 month prior following CABG with severe multivessel coronary artery disease however patient deferred surgery.  Patient underwent four-vessel coronary artery bypass surgery on 12/7/2020.  He had harvesting from the right lower extremity.  Bilateral pulmonary vein isolation and left atrial appendage excision was also performed due to history of atrial fib.  Surgery was uneventful.  On postop day 1 patient had rhythmic left leg jerking movements and upward gaze.  Neurology was consulted due to concern for seizure.  He underwent EEG which was negative x2.  Head CT demonstrated pump shaped foci of increased attenuation within the subcortical region of the left frontal lobe.  An MRI was obtained which demonstrated a 2.5 cm left frontal lobe infarct with petechial hemorrhage.  Keppra was eventually started due to continued left leg jerking of both the brain lesion did not fit the symptoms.  He also had some fevers and leukocytosis post operative.  Cultures were drawn.  Sputum on 12/10 resulted with 4+ beta analytic strep and half blood cultures resulted positive blood likely contaminant as subsequent cultures were negative on 12/12.  Urine culture was negative.  NG was negative.  He was treated empirically with Vanco and Zosyn.  Vanco was discontinued after 4 days and patient completed an 8-day course of Zosyn.  White count around 13.  C. difficile was negative on 12/24.  It was also suggested that patient  may have had silent aspiration as he had failed a swallow study and video swallow.  He did have an NG tube which was placed and he received tube feedings.  He had waxing and waning of his mental status however this did improve over time.  Chest tubes were removed after a long period of time with excessive output.  Patient had some urinary retention and Koenig catheter was placed.  He was started on Flomax.  This has been discontinued he is voiding adequately.  Underlying diabetes mellitus type 2.  His glipizide was decreased to her lower dose.  He did have some atrial fib with rapid ventricular response early on in his hospital stay.  He was discharged on a short course of amiodarone.  The surgeon's preference was not to anticoagulate.  Wounds on his bilateral shins.  He has a low ejection fracture and will continue with diuresis for maintenance on torsemide.    Today patient sitting up in bedside chair.  He is very upset with the food.  Reporting dislikes of the food.  He has been very noncompliant with his diabetic diet as well as his swallowing precautions.  He is eating regular diet.  He refused to see speech for any further therapy.  Patient with recent CABG with leg harvesting.  Denies any chest pain or shortness of breath.  He has 2 open areas to his right lower extremity that are improving with topical treatment.  Only about 8.2 x 0.2 on today's visit with no drainage.  Periwound is dry and intact only trace edema.  Underlying diabetes.  He continues with blood sugars in the 2-300s however again very noncompliant with diabetic diet.  He continues on Lantus and Humalog.  I have asked him to keep a log of his blood sugars at home to present to his primary care provider with follow-up.  History of stroke post CABG.  Very little residual.  Atrial fib.  He continues on amiodarone short-term.  Hypertension.  Blood pressure satisfactory.  He did have some urinary retention postop and was started on Flomax.  He has  had no difficulty here at the TCU.    Active Ambulatory Problems     Diagnosis Date Noted     Obesity      ACP Staging Stage 1 Hypertension: 140-159 / 90-99      Coronary artery disease involving native heart with other form of angina pectoris, unspecified vessel or lesion type (H)      Type 2 diabetes mellitus (H)      Joint Pain, Localized In The Shoulder      Hyperlipidemia, unspecified hyperlipidemia type 04/27/2016     Moderate episode of recurrent major depressive disorder (H) 06/21/2019     Pulmonary edema cardiac cause (H) 09/19/2020     New onset atrial fibrillation (H)      Acute systolic heart failure (H)      CAD (coronary artery disease) 09/19/2020     New onset a-fib (H) 09/19/2020     Acute on chronic clinical systolic heart failure (H) 12/02/2020     Atrial fibrillation with rapid ventricular response (H) 12/02/2020     Diabetic leg ulcer (H) 12/02/2020     Acute on chronic systolic CHF (congestive heart failure) (H) 12/02/2020     A-fib (H) 12/02/2020     Uncontrolled type 2 diabetes mellitus with hyperglycemia (H)      Acute respiratory failure with hypoxia (H)      On mechanically assisted ventilation (H)      S/P CABG (coronary artery bypass graft)      Seizures (H)      Cerebrovascular accident (CVA), unspecified mechanism (H)      Cerebrovascular accident (CVA) due to other mechanism (H)      Metabolic encephalopathy      Disorientation      Acute kidney failure with tubular necrosis (H) 12/16/2020     Resolved Ambulatory Problems     Diagnosis Date Noted     No Resolved Ambulatory Problems     Past Medical History:   Diagnosis Date     Acute metabolic encephalopathy      Acute on chronic systolic (congestive) heart failure (H)      Coronary Artery Disease      CVA (cerebral vascular accident) (H)      Dysphagia      HTN (hypertension)      Hyperglycemia      Ischemic cardiomyopathy      Other and unspecified hyperlipidemia 04/27/2016     Type 2 Diabetes Mellitus With Complication      Urinary  retention        Current Outpatient Medications   Medication Sig     acetaminophen (TYLENOL) 325 mg/10.15 mL Soln Take 20.3 mL by mouth every 4 (four) hours as needed.     aspirin 81 mg chewable tablet 1 tablet (81 mg total) by Enteral Tube route daily. (Patient taking differently: Chew 81 mg daily. )     atorvastatin (LIPITOR) 80 MG tablet 1 tablet (80 mg total) by Enteral Tube route at bedtime. (Patient taking differently: Take 80 mg by mouth at bedtime. )     dextrose (DEXTROSE) 40 % gel Take 15-30 g by mouth every 15 (fifteen) minutes as needed.     dextrose 50% solution Infuse 25-50 mL into a venous catheter every 15 (fifteen) minutes as needed.     famotidine (PEPCID) 20 MG tablet 1 tablet (20 mg total) by Enteral Tube route 2 (two) times a day. (Patient taking differently: Take 20 mg by mouth 2 (two) times a day. )     glucagon,human recombinant (GLUCAGON EMERGENCY KIT, HUMAN, INJ) Inject 1 mg as directed as needed.     LANTUS U-100 INSULIN 100 unit/mL vial Inject 25 Units under the skin at bedtime. 11.65 Type 2 with hyperglycemia  Contact provider if insulin prescribed is not the preferred insulin per insurance. (Patient taking differently: Inject 18 Units under the skin every morning. and 15 units at bedtime)     levETIRAcetam (KEPPRA) 500 MG tablet Take 500 mg by mouth 2 (two) times a day.     melatonin 3 mg Tab tablet 1 tablet (3 mg total) by Enteral Tube route at bedtime. (Patient taking differently: Take 3 mg by mouth at bedtime. )     metFORMIN (GLUCOPHAGE) 1000 MG tablet 1 tablet (1,000 mg total) by Enteral Tube route 2 (two) times a day with meals. (Patient taking differently: Take 1,000 mg by mouth 2 (two) times a day with meals. )     metoprolol tartrate (LOPRESSOR) 25 MG tablet 1 tablet (25 mg total) by Enteral Tube route 2 (two) times a day. (Patient taking differently: Take 12.5 mg by mouth 2 (two) times a day. )     multivitamin with minerals tablet Take 1 tablet by mouth daily.     NOVOLOG  U-100 INSULIN ASPART 100 unit/mL injection Check blood sugar four (4) times daily.  11.65 Type 2 with hyperglycemia (Patient taking differently: Inject under the skin 3 (three) times a day. Per sliding scale; 140-189=1 unit, 190-239=2 units, 240-289=3 units, 290-339=4 units, 340-399=5 units, 400-449=6 units    Bedtime sliding scale; 200-249=1 unit, 250-299=2 units, 300-349=3 units, 350-399=4 units, >399=5 units)     ondansetron (ZOFRAN-ODT) 4 MG disintegrating tablet Take 4 mg by mouth every 6 (six) hours as needed for nausea.     senna (SENOKOT) 8.6 mg tablet Take 1 tablet by mouth 2 (two) times a day as needed for constipation.       No Known Allergies        Review of Systems  No fevers or chills. No headache, lightheadedness or dizziness. No SOB, chest pains or palpitations. Appetite is good. No nausea, vomiting, constipation or diarrhea. No dysuria, frequency, burning or pain with urination.  Patient denies any pain.  Denies any dysphagia.  Otherwise review of systems are negative.     Physical Exam  PHYSICAL EXAMINATION:  Vital signs: /79, pulse 88, respirations 20, temperature 97, O2 sat 96 % on room air.  Weight 196.8 pounds.  General: Awake, Alert, oriented x3, appropriately, follows simple commands, conversant  HEENT:PERRLA, Pink conjunctiva, anicteric sclerae, moist oral mucosa  NECK: Supple, without any lymphadenopathy, or masses  CVS:  S1  S2, without murmur or gallop.  Incision to chest dry and intact with glue.  LUNG: Clear to auscultation, No wheezes, rales or rhonci.  BACK: No kyphosis of the thoracic spine  ABDOMEN: Soft, nontender to palpation, with positive bowel sounds  EXTREMITIES: Good range of motion on both upper and lower extremities, trace pedal edema, Tubigrip's on, no calf tenderness  SKIN: Right lower extremity wounds about 0.2 x 0.2 with minimal serous drainage.  Periwound dry and intact. No redness or warmth.  NEUROLOGIC: Intact, pulses palpable  PSYCHIATRIC: Cognition intact.   Angry with multiple complaints on visit.      Labs:  A  Lab Results   Component Value Date    WBC 13.0 (H) 12/28/2020    HGB 13.3 (L) 12/28/2020    HCT 43.4 12/28/2020    MCV 86 12/28/2020     12/28/2020     Results for orders placed or performed during the hospital encounter of 12/02/20   Basic Metabolic Panel   Result Value Ref Range    Sodium 143 136 - 145 mmol/L    Potassium 3.9 3.5 - 5.0 mmol/L    Chloride 110 (H) 98 - 107 mmol/L    CO2 24 22 - 31 mmol/L    Anion Gap, Calculation 9 5 - 18 mmol/L    Glucose 273 (H) 70 - 125 mg/dL    Calcium 8.1 (L) 8.5 - 10.5 mg/dL    BUN 15 8 - 22 mg/dL    Creatinine 0.89 0.70 - 1.30 mg/dL    GFR MDRD Af Amer >60 >60 mL/min/1.73m2    GFR MDRD Non Af Amer >60 >60 mL/min/1.73m2           Assessment/Plan:    1. S/P CABG x 4   patient denies any chest pain.  No shortness of breath.  Continues on aspirin and statin.  Hemoglobin 12   2. Acute diastolic heart failure (H)   continues on torsemide.  Recent BMP unremarkable other than elevated glucose.   3. Uncontrolled type 2 diabetes mellitus with hyperglycemia (H)   patient noncompliant with diabetic diet.  He continues on Lantus, Metformin and sliding scale.  Metformin was decreased during hospitalization.  He will need continued monitoring of his blood sugars.  Of asked him to keep a log and follow-up with primary care provider.  Home RN to help with diabetes management.   4. Oropharyngeal dysphagia   he was evaluated by speech therapy and refused.  He is eating regular diet.   5. Essential hypertension   satisfactory control.   6. Sepsis due to methicillin susceptible Staphylococcus aureus, unspecified whether acute organ dysfunction present (H)   patient has completed his antibiotic course.   7. History of CVA (cerebrovascular accident) without residual deficits   no residual.  No further rhythmic movement of the right lower extremity.   8. Atrial fibrillation, unspecified type (H)   rate controlled with amiodarone and  metoprolol.  Amiodarone is short-term.  He should follow up with cardiology outpatient.     Okay to discharge home with current meds and treatments.  Home PT, OT, home health aide and RN for medication management, diabetes education and wound care.  Follow-up with primary care provider in 7 to 10 days.  Follow-up outpatient with cardiology and cardiovascular surgeon.    DISCHARGE PLAN/FACE TO FACE:  I certify that this patient is under my care and that I, or a nurse practitioner or physician's assistant working with me, had a face-to-face encounter that meets the physician face-to-face encounter requirements with this patient.       I certify that, based on my findings, the following services are medically necessary home health services.    My clinical findings support the need for the above skilled services.    This patient is homebound because: Recent CABG with right lower extremity vein harvesting.    The patient is, or has been, under my care and I have initiated the establishment of the plan of care. This patient will be followed by a physician who will periodically review the plan of care.    Total time spent for this visit was 35 minutes with greater than 50% of time spent face-to-face with patient reviewing discharge medications, discharge care including home care services and follow-ups.    This note has been dictated using voice recognition software. Any grammatical or context distortions are unintentional and inherent to the software    Electronically signed by: Lupe Rendon CNP

## 2021-06-14 NOTE — PROGRESS NOTES
Speech Language/Pathology  Speech Therapy Daily Progress Note    Patient presents as alert and cooperative during this session. Nursing called to report patient had questions regarding swallowing and ice chip restrictions.   An  was not applicable.    Objective  Swallow: Consulted with patient further re: POC and his questions regarding swallowing. Continued education on VFSS results, NPO status, purpose of ice chips, plan for ice chips, purpose of NG tube and hard swallow exercise. Patient verbalized understanding but also noted to perseverate on asking if he can get a hot shower. Nursing staff is aware of ice chip protocol.     Assessment  Appears to have base understanding of dysphagia and implications but needs reassurance and ongoing education.     Plan/Recommendations    The ST Care Plan has been reviewed and current plan remains appropriate.    10 dysphagia minutes     Marsha Newby MA, CCC-SLP

## 2021-06-14 NOTE — PROGRESS NOTES
Occupational Therapy Cognitive Screen  Ricardo Cognitive Assessment (MoCA): the MoCA is a screening instrument used to assist in safe treatment and discharge planning for functional occupational therapy.     Mental Functions Score:  Visuospatial / Executive Function Score: 0/5   Naming Score: 1/3  Attention Score: 1/6  Language Score: 0/3  Abstraction Score: 0/2  Delayed Recall Score: 0/5 (Total MIS: 1/15 - able to recall x1 missed word with multiple choice cue)  Orientation Score: 2/6 (following assessment, writer updated welcome board in patient's room for use as visual compensatory memory strategy to increase orientation / safety during admit)    Total Score: 4/30, indicative of mild cognitive impairment  (Normal > 26/30)    Score interpretation: Patient participated in a cognitive screen, the MoCA, today to assist with safe treatment and discharge planning today. Patient reports independent with ADL/IADL prior to admit. As of this assessment, the patient may not be able to recognize or respond to emergent situations, all hazardous activities need to be supervised or restricted, and 24-hour supervision/assist with all ADL/IADL is recommended to ensure safety. Recommend use of compensatory memory strategies to increase safety during admit, such as using notes/handouts, alarms/verbal reminders, routines (i.e., toileting schedule/routine). Skilled occupational therapy continues to be necessary to increase safety and self efficacy with roles, habits, and routines; further cognitive assessment recommended.     Barbie Koch OTR/L   12/27/2020

## 2021-06-14 NOTE — TELEPHONE ENCOUNTER
Reason for Call:  update on pt   No longer has crackles in lungs, declines nectar thick liquids, agrees to speech therapy. If crackles return has been instructed to go to urgent care, if needed. Is compliant with medications thus far. Blood sugars in upper 100's, low 200's, pt is taking his insulin.     Detailed comments: needs order for speech therapy ,eval and treat    Phone Number Patient can be reached at: Jonn [phone]    Best Time: any   Can we leave a detailed message on this number?: Yes    Call taken on 1/27/2021 at 12:35 PM by Joslyn Burton

## 2021-06-14 NOTE — PROGRESS NOTES
CVTS Daily Progress Note   12/24/2020   POD#17 s/p CABGx4, LAAE, PVI  Attending: Los   LOS: 22 days     SUBJECTIVE/INTERVAL EVENTS:    No acute events overnight. Patient making slow progress. Awake, alert, and oriented this AM. Maintaining oxygen saturations on room air. Normotensive/borderline hypotension at times with some dizziness. Currently still with tube feeds; repeat video swallow fail. +BM. UOP adequate. Chest tube output trending down on the left. Right pleural tube removed 12/23. Ambulating with therapy in the hallways. Patient denies pain and has no questions. Realizes that he needs to continue working with therapy to get better.    OBJECTIVE:    Temp:  [97.3  F (36.3  C)-98  F (36.7  C)] 97.4  F (36.3  C)  Heart Rate:  [73-91] 80  Resp:  [18-20] 20  BP: ()/(59-78) 110/78  Wt Readings from Last 2 Encounters:   12/24/20 0432 183 lb 1.6 oz (83.1 kg)   12/23/20 0508 184 lb (83.5 kg)   12/22/20 0611 186 lb 9.6 oz (84.6 kg)   12/21/20 0558 189 lb 14.4 oz (86.1 kg)   12/20/20 0416 189 lb 9 oz (86 kg)   12/19/20 0317 201 lb 14.4 oz (91.6 kg)   12/18/20 0647 203 lb 9.6 oz (92.4 kg)   12/17/20 0517 214 lb 9.6 oz (97.3 kg)   12/16/20 0415 207 lb 4.8 oz (94 kg)   12/16/20 0005 207 lb 4.8 oz (94 kg)   12/15/20 0630 212 lb (96.2 kg)   12/14/20 0400 216 lb 1.6 oz (98 kg)   12/12/20 0400 206 lb 14.4 oz (93.8 kg)   12/11/20 0630 210 lb 4.8 oz (95.4 kg)   12/10/20 0500 212 lb (96.2 kg)   12/09/20 0414 217 lb 2.5 oz (98.5 kg)   12/08/20 0345 219 lb 5.7 oz (99.5 kg)   12/07/20 0417 218 lb 8 oz (99.1 kg)   12/06/20 0301 220 lb 6.4 oz (100 kg)   12/05/20 0340 220 lb 1.6 oz (99.8 kg)   12/04/20 0354 (!) 226 lb (102.5 kg)   12/03/20 1236 (!) 223 lb (101.2 kg)   12/03/20 0441 (!) 223 lb (101.2 kg)   12/02/20 1722 (!) 224 lb 3.2 oz (101.7 kg)   12/02/20 1401 (!) 237 lb 9.6 oz (107.8 kg)   11/05/20 1302 (!) 223 lb (101.2 kg)       Current Medications:    Scheduled Meds:    amino acids-protein hydrolys  2 packet  Enteral Tube DAILY     amiodarone  200 mg Enteral Tube BID     aspirin  81 mg Enteral Tube DAILY    Or     aspirin  150 mg Rectal DAILY     atorvastatin  80 mg Enteral Tube QHS     famotidine  20 mg Enteral Tube BID    Or     famotidine (PEPCID) injection  20 mg Intravenous BID     glipiZIDE  5 mg Oral Daily with brkfst     guar gum  1 packet Enteral Tube BID     heparin (PF) ANTICOAGULANT  5,000 Units Subcutaneous Q8H FIXED TIMES     insulin aspart (NovoLOG) injection   Subcutaneous Q6H FIXED TIMES     insulin glargine  25 Units Subcutaneous BID     levETIRAcetam  (KEPPRA) solution 100 mg/mL  500 mg Enteral Tube BID     lidocaine (PF)  1-5 mL Intradermal Once     melatonin  3 mg Enteral Tube QHS     metFORMIN  1,000 mg Enteral Tube BID with meals     metoprolol tartrate  37.5 mg Enteral Tube BID     multivitamin with iron-mineral  15 mL Enteral Tube DAILY     QUEtiapine  12.5 mg Enteral Tube QHS     sodium chloride bacteriostatic  1-5 mL Intradermal Once     sodium chloride  3 mL Intravenous Q8H FIXED TIMES     tamsulosin  0.4 mg Enteral Tube Daily after brkfst     torsemide  100 mg Enteral Tube DAILY     white petrolatum   Topical DAILY     Continuous Infusions:    dextrose 10%       PRN Meds:.acetaminophen, acetaminophen, aluminum-magnesium hydroxide-simethicone, bisacodyL, bisacodyL, dextrose 10%, dextrose 50 % (D50W), docusate sodium (COLACE) 50 mg/5 mL oral liquid, glucagon (human recombinant), lipase-protease-amylase **AND** sodium bicarbonate, magnesium hydroxide, polyethylene glycol, sodium chloride, sodium chloride bacteriostatic, sodium chloride, sodium phosphates 133 mL    Cardiographics:    Telemetry monitoring demonstrates afib with rates in the 70s per my personal review.    Imaging:    No results found.    Lab Results (most recent, reviewed):    Hemoglobin (g/dL)   Date Value   12/21/2020 11.9 (L)     WBC (thou/uL)   Date Value   12/21/2020 13.1 (H)     Potassium (mmol/L)   Date Value   12/24/2020  3.8     Creatinine (mg/dL)   Date Value   12/24/2020 1.08     Hemoglobin A1c (%)   Date Value   12/03/2020 12.6 (H)     Magnesium (mg/dL)   Date Value   12/18/2020 1.9     Calcium (mg/dL)   Date Value   12/20/2020 8.1 (L)       I/O:    Intake/Output Summary (Last 24 hours) at 12/24/2020 0919  Last data filed at 12/24/2020 0602  Gross per 24 hour   Intake 1320 ml   Output 740 ml   Net 580 ml        UOP: not documented    CT: 140cc/24 hours    Physical Exam:    General: Patient seen up in chair. Alert and oriented.  CV: Afib on monitor. 2+ peripheral pulses in all extremities. Mild edema.   Pulm: Non-labored effort on room air. Chest tube in place, serous output, no air leak. Incision C/D/I.  Abd: Soft, NT, ND  Ext: Mild pedal edema, warm, distal pulses intact. Bilateral leg sores covered with dressings, dry.  Neuro: CN grossly intact      ASSESSMENT/PLAN:    Mika Alvarez is a 70 y.o. male with a history of CAD who is POD#17 s/p CABGx4.    Principal Problem:    Acute on chronic clinical systolic heart failure (H)  Active Problems:    ACP Staging Stage 1 Hypertension: 140-159 / 90-99    Type 2 diabetes mellitus (H)    Hyperlipidemia, unspecified hyperlipidemia type    CAD (coronary artery disease)    Atrial fibrillation with rapid ventricular response (H)    Diabetic leg ulcer (H)    Acute on chronic systolic CHF (congestive heart failure) (H)    A-fib (H)    Uncontrolled type 2 diabetes mellitus with hyperglycemia (H)    Acute respiratory failure with hypoxia (H)    On mechanically assisted ventilation (H)    S/P CABG (coronary artery bypass graft)    Seizures (H)    Cerebrovascular accident (CVA), unspecified mechanism (H)    Cerebrovascular accident (CVA) due to other mechanism (H)    Metabolic encephalopathy    Disorientation    Acute kidney failure with tubular necrosis (H)        NEURO:   - PRN Tylenol for pain  - Melatonin at bedtime  - No further seizure activity since POD#1 although remains on Keppra  -  Seroquel nightly    CV:   - Normotensive/borderline hypotension at times  - Metoprolol 50mg two times a day decreased to 37.5mg two times a day due to some soft pressures  - Amiodarone 200mg two times a day   - No plan for a-fib anticoagulation given LAAE and surgeon preference  - ASA 81mg  - Atorvastatin 80mg daily  - Right pleural chest tube removed 12/22. Left pleural tube remains. Hopeful for removal tomorrow.    PULM:   - Maintaining oxygen saturations on room air   - Encourage pulmonary toilet     FEN/GI:  - NPO   - Tube feeds, tolerating  - Speech following, appreciate  - Continue electrolyte replacement protocol  - Bowel regimen    RENAL:  - Adequate UOP/hr. Continue to monitor closely.  - Diuresis with 100mg torsemide daily    HEME:  - Acute blood loss anemia post-op.   - No bleeding concerns. Hep subcutaneous (cleared with neuro), [id number]    ID:  - Ronda op ppx complete.  - Afebrile  - Off antibiotics  - Was on Vanco from 12/10-12/13 and Zosyn from 12/10-12/17    ENDO:   - Sliding scale insulin and Lantus  - Restarted Metformin 12/23  - Restarting Glipizide today (lower than home dose) as he is still having some hyperglycemia.     PPx:   - DVT: SCDs, SQ heparin three times a day, ambulation  - GI: Omeprazole     DISPO:   - General telemetry floor. Tentative plan for ARU discharge 12/26.        _______  Emma Alexis PA-C  Cardiothoracic Surgery  [phone]

## 2021-06-14 NOTE — PROGRESS NOTES
CVTS Daily Progress Note   12/27/2020  POD#20 s/p CABGx4, LAAE, PVI  Attending: Los   LOS: 26 days     SUBJECTIVE/INTERVAL EVENTS:    No acute events overnight. Patient making slow progress. Sleepy this morning. Maintaining oxygen saturations on room air. Normotensive. Currently still with tube feeds; repeat video swallow fail. +BM. Serial CXR with small stable bilateral PTX and no effusion. Ambulating with therapy in the hallways. Cr downtrending (1.2 from 1.5) Patient denies pain and has no questions. Realizes that he needs to continue working with therapy to get better.    OBJECTIVE:    Temp:  [95.8  F (35.4  C)-97.8  F (36.6  C)] 97.7  F (36.5  C)  Heart Rate:  [72-85] 85  Resp:  [18] 18  BP: (108-141)/(69-84) 141/84  Wt Readings from Last 2 Encounters:   12/27/20 0354 176 lb 11.2 oz (80.2 kg)   12/26/20 0600 177 lb (80.3 kg)   12/26/20 0522 177 lb 4.8 oz (80.4 kg)   12/25/20 0351 179 lb 11.2 oz (81.5 kg)   12/24/20 0432 183 lb 1.6 oz (83.1 kg)   12/23/20 0508 184 lb (83.5 kg)   12/22/20 0611 186 lb 9.6 oz (84.6 kg)   12/21/20 0558 189 lb 14.4 oz (86.1 kg)   12/20/20 0416 189 lb 9 oz (86 kg)   12/19/20 0317 201 lb 14.4 oz (91.6 kg)   12/18/20 0647 203 lb 9.6 oz (92.4 kg)   12/17/20 0517 214 lb 9.6 oz (97.3 kg)   12/16/20 0415 207 lb 4.8 oz (94 kg)   12/16/20 0005 207 lb 4.8 oz (94 kg)   12/15/20 0630 212 lb (96.2 kg)   12/14/20 0400 216 lb 1.6 oz (98 kg)   12/12/20 0400 206 lb 14.4 oz (93.8 kg)   12/11/20 0630 210 lb 4.8 oz (95.4 kg)   12/10/20 0500 212 lb (96.2 kg)   12/09/20 0414 217 lb 2.5 oz (98.5 kg)   12/08/20 0345 219 lb 5.7 oz (99.5 kg)   12/07/20 0417 218 lb 8 oz (99.1 kg)   12/06/20 0301 220 lb 6.4 oz (100 kg)   12/05/20 0340 220 lb 1.6 oz (99.8 kg)   12/04/20 0354 (!) 226 lb (102.5 kg)   12/03/20 1236 (!) 223 lb (101.2 kg)   12/03/20 0441 (!) 223 lb (101.2 kg)   12/02/20 1722 (!) 224 lb 3.2 oz (101.7 kg)   12/02/20 1401 (!) 237 lb 9.6 oz (107.8 kg)   11/05/20 1302 (!) 223 lb (101.2 kg)        Current Medications:    Scheduled Meds:    amino acids-protein hydrolys  2 packet Enteral Tube DAILY     amiodarone  200 mg Enteral Tube BID     aspirin  81 mg Enteral Tube DAILY    Or     aspirin  150 mg Rectal DAILY     atorvastatin  80 mg Enteral Tube QHS     famotidine  20 mg Enteral Tube BID    Or     famotidine (PEPCID) injection  20 mg Intravenous BID     glipiZIDE  5 mg Enteral Tube BID AC     guar gum  1 packet Enteral Tube BID     heparin (PF) ANTICOAGULANT  5,000 Units Subcutaneous Q8H FIXED TIMES     insulin aspart (NovoLOG) injection   Subcutaneous Q6H FIXED TIMES     insulin glargine  25 Units Subcutaneous BID     levETIRAcetam  (KEPPRA) solution 100 mg/mL  500 mg Enteral Tube BID     melatonin  3 mg Enteral Tube QHS     metFORMIN  1,000 mg Enteral Tube BID with meals     metoprolol tartrate  25 mg Enteral Tube BID     multivitamin with iron-mineral  15 mL Enteral Tube DAILY     QUEtiapine  12.5 mg Enteral Tube QHS     sodium chloride bacteriostatic  1-5 mL Intradermal Once     sodium chloride  3 mL Intravenous Q8H FIXED TIMES     tamsulosin  0.4 mg Enteral Tube Daily after brkfst     torsemide  50 mg Enteral Tube DAILY     white petrolatum   Topical DAILY     Continuous Infusions:    dextrose 10%       PRN Meds:.acetaminophen, acetaminophen, aluminum-magnesium hydroxide-simethicone, bisacodyL, bisacodyL, dextrose 10%, dextrose 50 % (D50W), docusate sodium (COLACE) 50 mg/5 mL oral liquid, glucagon (human recombinant), lipase-protease-amylase **AND** sodium bicarbonate, LORazepam, magnesium hydroxide, polyethylene glycol, sodium chloride, sodium chloride bacteriostatic, sodium chloride, sodium phosphates 133 mL      Imaging:    Xr Chest 2 Views    Result Date: 12/27/2020  EXAM: XR CHEST 2 VIEWS LOCATION: [facility] DATE/TIME: 12/27/2020 9:49 AM INDICATION: Follow-up pneumothorax COMPARISON: Chest x-ray 12/26/2020     Stable tiny biapical pneumothoraces with 2 mm pleural  separation on the right. No focal pneumonic consolidation or pleural effusion. Stable heart size. Post open-heart surgery. Enteric tube with the distal visualized portion below the diaphragm.      Lab Results (most recent, reviewed):    Hemoglobin (g/dL)   Date Value   12/28/2020 13.3 (L)     WBC (thou/uL)   Date Value   12/28/2020 13.0 (H)     Potassium (mmol/L)   Date Value   12/27/2020 3.8     Creatinine (mg/dL)   Date Value   12/27/2020 1.25     Hemoglobin A1c (%)   Date Value   12/03/2020 12.6 (H)     Magnesium (mg/dL)   Date Value   12/18/2020 1.9     Calcium (mg/dL)   Date Value   12/27/2020 8.5       I/O:    Intake/Output Summary (Last 24 hours) at 12/28/2020 0730  Last data filed at 12/27/2020 2352  Gross per 24 hour   Intake 928 ml   Output 600 ml   Net 328 ml         Physical Exam:    General: Patient seen in bed. Sleepy.  CV: Afib on monitor. 2+ peripheral pulses in all extremities. Mild edema.   Pulm: Non-labored effort on room air. Incision C/D/I.  Abd: Soft, NT, ND  Ext: Mild pedal edema, warm, distal pulses intact. Bilateral leg sores covered with dressings, dry.  Neuro: CN grossly intact      ASSESSMENT/PLAN:    Mika Alvarez is a 70 y.o. male with a history of CAD who is POD#20 s/p CABGx4.    Principal Problem:    Acute on chronic clinical systolic heart failure (H)  Active Problems:    ACP Staging Stage 1 Hypertension: 140-159 / 90-99    Type 2 diabetes mellitus (H)    Hyperlipidemia, unspecified hyperlipidemia type    CAD (coronary artery disease)    Atrial fibrillation with rapid ventricular response (H)    Diabetic leg ulcer (H)    Acute on chronic systolic CHF (congestive heart failure) (H)    A-fib (H)    Uncontrolled type 2 diabetes mellitus with hyperglycemia (H)    Acute respiratory failure with hypoxia (H)    On mechanically assisted ventilation (H)    S/P CABG (coronary artery bypass graft)    Seizures (H)    Cerebrovascular accident (CVA), unspecified mechanism (H)    Cerebrovascular  accident (CVA) due to other mechanism (H)    Metabolic encephalopathy    Disorientation    Acute kidney failure with tubular necrosis (H)        NEURO:   - PRN Tylenol for pain  - Melatonin at bedtime  - No further seizure activity since POD#1 although remains on Keppra  - Seroquel nightly    CV:   - Normotensive  - Metoprolol 25mg two times a day   - Amiodarone 200mg two times a day   - No plan for a-fib anticoagulation given LAAE and surgeon preference  - ASA 81mg  - Atorvastatin 80mg daily  - Right pleural chest tube removed 12/22. Left pleural tube removed 12/25    PULM:   - Maintaining oxygen saturations on room air   - Encourage pulmonary toilet     FEN/GI:  - NPO   - Tube feeds, tolerating  - Speech following, appreciate  - Continue electrolyte replacement protocol  - Bowel regimen    RENAL:  - Monitor UOP closely.  - Cr downtrending today, continue to monitor.  - Diuresis 50mg torsemide daily      HEME:  - Acute blood loss anemia post-op.   - No bleeding concerns. Hep subcutaneous (cleared with neuro), [id number]    ID:  - Ronda op ppx complete.  - Afebrile  - Off antibiotics  - Was on Vanco from 12/10-12/13 and Zosyn from 12/10-12/17  - C diff negative 12/24  - COVID negative 12/25 (discharge planning only)    ENDO:   - Sliding scale insulin and Lantus  - Restarted PTA Metformin  - Glipizide 5mg two times a day    PPx:   - DVT: SCDs, SQ heparin three times a day, ambulation  - GI: Omeprazole     DISPO:   - General telemetry floor. Plan for ARU discharge tomorrow.        _______  Emma Alexis PA-C  Cardiothoracic Surgery  [phone]

## 2021-06-14 NOTE — TELEPHONE ENCOUNTER
Incoming call from home nurse, TCU did not send any of his medications at discharge and he is not out of medications. Can you please send asap?

## 2021-06-14 NOTE — DISCHARGE SUMMARY
Cardiovascular Surgery Discharge Summary    Primary Care Physician:  Angel Claire MD  Discharge Provider: Emma Alexis   Admission Date: 12/2/2020  Admission Diagnoses: Acute systolic heart failure (H) [I50.21]  Diabetes mellitus without complication (H) [E11.9]  Chronic atrial fibrillation (H) [I48.20]  NSTEMI (non-ST elevated myocardial infarction) (H) [I21.4]  Discharge Date: 12/28/2020   Disposition: ARU   Condition at Discharge: Improving  Code Status: Full Code     Principal Diagnosis:   Acute on chronic clinical systolic heart failure (H) s/p CABGx4, PVI, LAAE    Discharge Diagnoses:    Principal Problem:    Acute on chronic clinical systolic heart failure (H)  Active Problems:      Patient Active Problem List   Diagnosis     Obesity     ACP Staging Stage 1 Hypertension: 140-159 / 90-99     Coronary artery disease involving native heart with other form of angina pectoris, unspecified vessel or lesion type (H)     Type 2 diabetes mellitus (H)     Joint Pain, Localized In The Shoulder     Hyperlipidemia, unspecified hyperlipidemia type     Moderate episode of recurrent major depressive disorder (H)     Pulmonary edema cardiac cause (H)     New onset atrial fibrillation (H)     Acute systolic heart failure (H)     CAD (coronary artery disease)     New onset a-fib (H)     Acute on chronic clinical systolic heart failure (H)     Atrial fibrillation with rapid ventricular response (H)     Diabetic leg ulcer (H)     Acute on chronic systolic CHF (congestive heart failure) (H)     A-fib (H)     Uncontrolled type 2 diabetes mellitus with hyperglycemia (H)     Acute respiratory failure with hypoxia (H)     On mechanically assisted ventilation (H)     S/P CABG (coronary artery bypass graft)     Seizures (H)     Cerebrovascular accident (CVA), unspecified mechanism (H)     Cerebrovascular accident (CVA) due to other mechanism (H)     Metabolic encephalopathy     Disorientation     Acute kidney failure with  tubular necrosis (H)         Consult/s: Dietary, critical care medicine, diabetes education, neurology, infectious disease, PM&R    Surgery:   12/7/2020 with Dr. Madison  1.  Coronary artery bypass grafting x 4 (reversed saphenous vein graft to the right posterior descending coronary artery, reversed saphenous vein graft to the ramus intermedius branch, reversed saphenous vein graft to the diagonal branch of the left anterior descending coronary artery, and pedicled left internal mammary artery to left anterior descending coronary artery).  2.  Endoscopic vein harvest of the greater saphenous vein from the right lower extremity.  3.  Bilateral pulmonary vein isolation using full thickness radiofrequency ablation.  4.  Left atrial appendage excision    Discharge Medications:      Medication List      START taking these medications    acetaminophen 325 MG tablet  Commonly known as: TYLENOL  2 tablets (650 mg total) by Enteral Tube route every 4 (four) hours as needed.     amiodarone 200 MG tablet  Commonly known as: PACERONE  1 tablet (200 mg total) by Enteral Tube route daily. For 2 weeks, then stop     aspirin 81 mg chewable tablet  1 tablet (81 mg total) by Enteral Tube route daily.  Replaces: aspirin 81 MG EC tablet     docusate sodium 50 mg/5 mL oral liquid  Commonly known as: COLACE  10 mL (100 mg total) by Enteral Tube route 2 (two) times a day as needed.     famotidine 20 MG tablet  Commonly known as: PEPCID  1 tablet (20 mg total) by Enteral Tube route 2 (two) times a day.     glipiZIDE 5 MG tablet  Commonly known as: GLUCOTROL  1 tablet (5 mg total) by Enteral Tube route 2 (two) times a day before meals.  Replaces: glipiZIDE 10 MG 24 hr tablet     Lantus U-100 Insulin 100 unit/mL vial  Generic drug: insulin glargine  Inject 25 Units under the skin at bedtime. 11.65 Type 2 with hyperglycemia  Contact provider if insulin prescribed is not the preferred insulin per insurance.  Replaces: Lantus Solostar U-100  Insulin 100 unit/mL (3 mL) pen     levETIRAcetam 500 mg/5 mL (5 mL) Soln solution  Commonly known as: KEPPRA  5 mL (500 mg total) by Enteral Tube route 2 (two) times a day.     lipase-protease-amylase 5,000-17,000- 24,000 unit Cpdr capsule  Commonly known as: ZENPEP  2 capsules by Enteral Tube route as needed (For clogged feeding tube).     melatonin 3 mg Tab tablet  1 tablet (3 mg total) by Enteral Tube route at bedtime.     metoprolol tartrate 25 MG tablet  Commonly known as: LOPRESSOR  1 tablet (25 mg total) by Enteral Tube route 2 (two) times a day.     NovoLOG U-100 Insulin aspart 100 unit/mL injection  Generic drug: insulin aspart U-100  Check blood sugar four (4) times daily.  11.65 Type 2 with hyperglycemia     QUEtiapine 25 MG tablet  Commonly known as: SEROquel  0.5 tablets (12.5 mg total) by Enteral Tube route at bedtime.     sodium bicarbonate 325 MG tablet  1 tablet (325 mg total) by Enteral Tube route as needed (For clogged feeding tube). OTC product     tamsulosin 0.4 mg Cap  Commonly known as: Flomax  1 capsule (0.4 mg total) by Enteral Tube route daily after supper.     torsemide 100 MG tablet  Commonly known as: DEMADEX  0.5 tablets (50 mg total) by Enteral Tube route daily.     white petrolatum 41 % Oint  Commonly known as: AQUAPHOR NATURAL HEALING  For leg wounds        CHANGE how you take these medications    atorvastatin 80 MG tablet  Commonly known as: LIPITOR  1 tablet (80 mg total) by Enteral Tube route at bedtime.  What changed:     how to take this    when to take this     metFORMIN 1000 MG tablet  Commonly known as: GLUCOPHAGE  1 tablet (1,000 mg total) by Enteral Tube route 2 (two) times a day with meals.  What changed: how to take this        CONTINUE taking these medications    Accu-Chek Advantage Diabetes  Generic drug: blood glucose meter  Test 4 times daily.     BD ULTRA-FINE ROBERT PEN NEEDLES MISC  use up to 4 times daily as directed.     blood glucose test strips  Use 1 each As  "Directed 2 (two) times a day. Accu-Chek Guide test strips. Patient is on insulin.     generic lancets  Use 1 each As Directed 2 (two) times a day. Accu-Chek Fastclix Lancets. Patient is insulin dependant.     nitroglycerin 0.4 MG SL tablet  Commonly known as: NITROSTAT  Place 0.4 mg under the tongue every 5 (five) minutes as needed for chest pain.     pen needle, diabetic 31 gauge x 5/16\" Ndle  Commonly known as: BD Ultra-Fine Short Pen Needle  USE UP TO 4 TIMES DAILY AS DIRECTED        STOP taking these medications    amLODIPine 10 MG tablet  Commonly known as: NORVASC     aspirin 81 MG EC tablet  Replaced by: aspirin 81 mg chewable tablet     bumetanide 0.5 MG tablet  Commonly known as: BUMEX     glipiZIDE 10 MG 24 hr tablet  Commonly known as: GLUCOTROL XL  Replaced by: glipiZIDE 5 MG tablet     insulin lispro 100 unit/mL pen  Commonly known as: HumaLOG KwikPen Insulin     Lantus Solostar U-100 Insulin 100 unit/mL (3 mL) pen  Generic drug: insulin glargine  Replaced by: Lantus U-100 Insulin 100 unit/mL vial     losartan 100 MG tablet  Commonly known as: COZAAR     metoprolol succinate 100 MG 24 hr tablet  Commonly known as: TOPROL-XL     rivaroxaban ANTICOAGULANT 20 mg tablet  Commonly known as: XARELTO            Discharge Instructions:    Follow up appointment with Primary Care Physician: Angel Claire MD within 7 days of discharge to home.  Follow up appointment with Specialist:    Follow with CV Surgery as scheduled.    Follow-up with cardiology as scheduled    Diet: Cardiac    Activity/Restrictions: As tolerated with sternal precautions in mind (see below). No driving for 4 weeks or while on pain medication.     - Shower and wash your incisions daily with soap and water. No tub baths/hot tubs for 4 weeks. An antibacterial soap such as Dial or Safeguard is recommended.    - Check your incisions every day. If you notice any redness, drainage, or anything unusual, please call the surgeons office.    - " "No driving for 4 weeks after surgery or while on pain medication     - Do not lift anything more than 10 pounds for 6 weeks after surgery. After 6 weeks, advance lifting is tolerated.    - You may have watery drainage from your chest tube site for 2-3 weeks after surgery. Your may cover with a Band-Aid to protect your clothing. Remove the Band-Aid every day and wash the site.    - If you have a leg lesion, you may have swelling for 2-3 months. Elevate your leg any time you are not walking.    - If you feel any \"popping\" or \"clicking\" sensations in your chest, your arms are out too far or you are putting too much weight into arm movements. Do not reach over your head or out to the side to pull something. Do not do any arm exercises or use any exercise equipment that involves arm movement. If you feel your sternum moving, call the surgeon's office.    - Increase your daily activity as explained by Cardiac Rehab. You are encouraged to enroll in an Outpatient Cardiac Rehab Program.    - No active sports using your upper arms for 3 months. This includes fishing, hunting, bowling, swimming, tennis or golf.    - No physical activity such as cutting the grass, raking, vacuuming, changing sheets on your bed, snow shoveling, or using a  for 3 months.    - Use incentive spirometer 6-8 times per day for 2 weeks.       Hospital Summary:   Mika Alvarez is a 70 y.o. male who was admitted to [facility] on 12/2/2020 following ED presentation for shortness of breath. He was eventually diagnosed with decompensated heart failure. Our team had actually seen the patient a month prior following coronary angiogram demonstrating severe multi-vessel coronary artery disease, but the patient preferred to delay surgery. He was again referred to CV surgery for evaluation for possible coronary artery revascularization.     Patient was deemed a candidate for coronary artery bypass surgery, and after some improvement in his heart " failure symptoms, was taken to the operating room on 12/7/2020 where patient underwent four vessel coronary artery bypass and endoscopic vein harvest from the right leg. Bilateral pulmonary vein isolation and left atrial appendage excision was also performed due to history of atrial fibrillation. Surgery was uneventful and patient was brought to the ICU post-operatively.    On POD#1, patient was noted to have rhythmic left leg jerking movements and upward gaze. Neurology was consulted due to concern for seizure. EEG was negative for seizure (x2). Head CT demonstrated punctate foci of increased attenuation within the subcortical region of the left frontal lobe. MRI, when able to be obtained, demonstrated 2.5 cm left frontal lobe infarct w petechial hemorrhage on MRI brain. Keppra was eventually started due to continued left leg jerking, although the brain lesion would not fit with symptoms. Plan for follow-up with neurology in about 1 month (referral input).    He was weaned from pressors and then extubated on POD#3. He did have some fevers and leukocytosis for which cultures were drawn. Sputum 12/10 resulted with 4+  Beta Hemolytic Streptococcus, and 1/2 blood cultures resulted positive with likely contaminant as subsequent cultures were negative (12/12 x2). Urine culture NG. Empiric Vanc and Zosyn started and ID consulted. Vanco was discontinued after 4 days, and patient completed an 8 day course of Zosyn. Afebrile since and with stable slight leukocytosis (~13). Asymptomatic. C diff negative 12/24.      The above was possibly related to silent aspiration as patient repeatedly has failed swallow studies and video swallows. He has an NJ which he receives tube feedings. Neuro believes his swallow will recover.     Patient had waxing and waning mental status but this improved over time in the ICU. He was awake and alert, afebrile, and with stable vitals. He was transferred to the general telemetry floor on POD#8 where  patient has had return of bowel function, is maintaining oxygen saturations on room air, had his chest tubes removed after a long period of time with excessive output, and has no complaints of chest pain or shortness of breath. On 12/28/20, patient was stable enough to be discharged to ARU.    Has had 1 episode of urinary retention necessitating Koenig replacement and some episodes after which he has been able to void eventually. Started on Flomax.    He has been restarted on his PTA DMII medications (lower dose of glipizide currently) although they may need some adjustment.    Patient did have some a-fib with RVR early in his hospital stay. He has been in rate controlled a-fib for >week at this point. He will be discharged on a short course of amiodarone. Of note, surgeon preference is to not anticoagulate for a-fib s/p LAAE.    He has wounds on his bilateral shins that wound care is managing, improving.    Due to his low EF, will require maintenance diuresis. Currently on 50mg torsemide PO. He will need heart failure follow up down the road.    Vital Signs in last 24 hours:    Temp:  [95.8  F (35.4  C)-97.8  F (36.6  C)] 97.7  F (36.5  C)  Heart Rate:  [72-85] 85  Resp:  [18] 18  BP: (108-141)/(69-84) 141/84  SpO2:  [95 %-96 %] 96 %    Physical Exam:    Pertinent exam findings on day of discharge include:  Gen: Seen in bed. NAD.   CV: A-fib on monitor. No edema.  Pulm: Non-labored breathing on room air.  Abd: Soft, non-tender, non-distended  Neuro: CNs grossly intact  Inc: C/D/I    _______  Emma Alexis PA-C  Cardiothoracic Surgery  [phone]

## 2021-06-14 NOTE — PROGRESS NOTES
Care Management Follow Up Note     Length of Stay (days) 23     Patient plan of care discussed at Interdisciplinary Rounds: yes  CM Patient/Caregiver Stated Goals: Discharge to TCU     Expected Discharge Date: 112/26/20, pending Acute Rehab availability and acceptance   Concerns to be Addressed / Barriers to Discharge:  Chest tube removed 12/25/20     Anticipated Discharge Disposition: [facility]  Anticipated Discharge Services:    Acute Rehab      Selected Continued Care - Admitted Since 12/2/2020                 Destination Coordination complete                Service Provider Selected Services Address Phone Fax Patient Preferred Last Updated      [facility]  Inpatient [facility], [address] [place] [address], 5th floor, [facility] [address] [phone] -- Anthony Crews RN 12/23/2020 1413                  Anticipated Discharge DME:   NJ tube - tube feedings     Plan:  Spoke to CV surgery and pt's chest tube was removed this morning. Per previous CM note, [facility], which would be 12/26/20 if pt remains stable for 24 hours post chest tube removal. Left VM for [facility] (unknown if they have anyone in intake today). Left VM for pt's spouse Bel and requested a call back to discuss discharge plan- unknown if she knows about anticipated discharge to acute rehab. CM to follow up 12/26/20 ([place] [id number])     Previous Care Management Note on 12/23/20:   The writer spoke with Umu Marie from [facility] and Dipti Marr [facility] today. Please see their notes on 12/23 for details. At this point, [facility] can accept the patient with an NJ tube for tube feeding as long as the following criteria are met: 1) the patient has to be off of a 1:1 for at least 24 hours, and 2) the patient has to have had the chest tube removed and be medically stable for 24  hours or more. If the patient meets criteria, he could be admitted for acute rehab by Saturday. Please see Dipti Marr's note on 12/23.   History:  The patient had a CABG on 12/7, and the post-op course was complicated by a subacute left frontal infarct. Subsequent to this, he failed a video swallow and had a NJ tube placed for tube feeding.     Itzel Jc RNCM

## 2021-06-14 NOTE — ANESTHESIA CARE TRANSFER NOTE
Last vitals:   Vitals:    02/02/21 1746   BP: 101/64   Pulse: 84   Resp: 16   Temp: 36.1  C (97  F)   SpO2: 100%     Patient's level of consciousness is drowsy  Spontaneous respirations: yes  Maintains airway independently: yes  Dentition unchanged: yes  Oropharynx: oropharynx clear of all foreign objects    QCDR Measures:  ASA# 20 - Surgical Safety Checklist: WHO surgical safety checklist completed prior to induction    PQRS# 430 - Adult PONV Prevention: NA - Not adult patient, not GA or 3 or more risk factors NOT present  ASA# 8 - Peds PONV Prevention: NA - Not pediatric patient, not GA or 2 or more risk factors NOT present  PQRS# 424 - Ronda-op Temp Management: NA - MAC anesthesia or case < 60 minutes  PQRS# 426 - PACU Transfer Protocol: - Transfer of care checklist used  ASA# 14 - Acute Post-op Pain: ASA14B - Patient did NOT experience pain >= 7 out of 10

## 2021-06-14 NOTE — PROGRESS NOTES
Progress Note    Brief summary:   70 y.o.male with past medical history of uncontrolled T2DM, severe multivessel CAD, chronic systolic and diastolic CHF, recent diagnosis of A. fib, who presented with acute CHF, was diuresed and subsequently underwent four-vessel CABG by Dr. Madison on 12/7/2020. Post-op course complicated by Afib with RVR, which was initially treated with IV amiodarone given pt's NPO status due to severe dysphagia.     Hospitalization complicated by altered mentation and seizure-like activity onset 12/8. CT Head on 12/8 showed punctate focus of increased attenuation within the subcortical region of the left frontal lobe. Subsequent MRI of the brain on 12/9 demonstrated subacute 2.5 cm subacute infarct with petechial hemorrhage in the left frontal lobe. EEG did not show any seizure activity, however Keppra was started 12/10 with resolution of rhythmic left leg movements. Neurology has signed off.     Extubated 12/10 and weaned down to NC. Started on broad spectrum antibiotics 12/10 due to new fever and worsening leukocytosis. 1 of 2 blood Cx grew staph epidermidis, and sputum cultures showed respiratory miryam with 4+ beta hemolytic Strep. 7 days IV pip-tazo completed 12/17.     VFSS with mild oral and severe pharyngeal dysphagia - NGT placed on 12/15. This may have exacerbated hypernatremia, which improved with D5W .    Patient continued to have significant drainage from chest tubes, L>R. R chest tube removed yesterday and left chest tube remains    Assessment/Plan  Principal Problem:    Acute on chronic clinical systolic heart failure (H)  Active Problems:    ACP Staging Stage 1 Hypertension: 140-159 / 90-99    Type 2 diabetes mellitus (H)    Hyperlipidemia, unspecified hyperlipidemia type    CAD (coronary artery disease)    Atrial fibrillation with rapid ventricular response (H)    Diabetic leg ulcer (H)    Acute on chronic systolic CHF (congestive heart failure) (H)    A-fib (H)     Uncontrolled type 2 diabetes mellitus with hyperglycemia (H)    Acute respiratory failure with hypoxia (H)    On mechanically assisted ventilation (H)    S/P CABG (coronary artery bypass graft)    Seizures (H)    Cerebrovascular accident (CVA), unspecified mechanism (H)    Cerebrovascular accident (CVA) due to other mechanism (H)    Metabolic encephalopathy    Disorientation    Acute kidney failure with tubular necrosis (H)      Multivessel CAD, s/p CABG and PVI/LAAL on 12/7/2020:   Increased pleural drainage, negative for chylous fluid; R chest tube taken out 12/22, Left one remains and draining serous fluid  On diuretics- switched to oral today  Hemodynamically stable  On ASA, statin, metoprolol  Mgmt as per CV Surgery.      A Fib with RVR, now s/p PVI and DONNA excision:   Oral amiodarone, metoprolol.  Currently NSR  Mgmt as per Cardiology and CV Surgery following.   Cardiac tele.   Anticoagulation not yet started, per [facility].     Acute diastolic heart failure, improving.  Switched to oral torsemide today-12/22  Mgmt as per Cardiology.     Acute hypoxic respiratory failure, resolved: On room air.   Encourage IC.  Supplemental O2 to keep sats >94%.  Continue pulmonary toilet- On metaneb, flutter valve     Acute Metabolic encephalopathy: improving.  Encephalopathy could also be contributed by prolonged hospitalization, hypernatremia, possible pneumonia, medications. CT Head from 12/12 showed no new changes.   EEG did not show seizure activity, but pt's rhythmic left leg movement appear to be have improved with Keppra. Continue keppra, Keppra level therapeutic, pt was started on quetiapine on 12/14   Decrease at bedtime seroquel due to somnolence     Subacute left frontal stroke with petechial hemorrhage:   on ASA, statin.   Neurology recs appreciated.     Sepsis  Fever, Leukocytosis, tachycardia noted 12/10/2020  UA was negative. One of two blood cultures grew staph epi, likely contaminant.  Sputum culture growing  beta-hemolytic strep.   Started on emperic pip-tazo IV,  completed 7d on 12/17, fever and WBC resolving.  ID recs appreciated.     Severe pharyngeal dysphagia: NG and tube feeding.  SLP team following, repeat Video swallow 12/21 shows ongoing significant dysphagia     Hyperglycemia in the setting of T2DM and D5W as above:  HDSSI with glargine. Holding home glipizide, metformin.  Increase dose of two times a day glargine again today: 20u two times a day->25u two times a day     Hypernatremia, resolved.     HTN: holding home losartan, amlodipine.       Subjective  Patient seen and examined  Feeling tired  No chest pain  Denied shortness of breath     Objective    Vital signs in last 24 hours  Temp:  [97.4  F (36.3  C)-98.3  F (36.8  C)] 97.4  F (36.3  C)  Heart Rate:  [73-93] 73  Resp:  [16-18] 18  BP: ()/(59-74) 110/59  Weight:   184 lb (83.5 kg)    Intake/Output last 3 shifts  I/O last 3 completed shifts:  In: 1460 [NG/GT:1460]  Out: 830 [Urine:750; Chest Tube:80]  Intake/Output this shift:  I/O this shift:  In: -   Out: 600 [Urine:600]    Physical Exam   General: awake, alert, not in distress  Eyes: no pallor  Chest: Clear to auscultation bilaterally  Heart: RRR, normal S1 and S2  Abdomen: soft, non tender  Neuro: moving all extremities, slightly confused      Meds    amino acids-protein hydrolys  2 packet Enteral Tube DAILY     amiodarone  200 mg Enteral Tube BID     aspirin  81 mg Enteral Tube DAILY    Or     aspirin  150 mg Rectal DAILY     atorvastatin  80 mg Enteral Tube QHS     famotidine  20 mg Enteral Tube BID    Or     famotidine (PEPCID) injection  20 mg Intravenous BID     guar gum  1 packet Enteral Tube BID     heparin (PF) ANTICOAGULANT  5,000 Units Subcutaneous Q8H FIXED TIMES     insulin aspart (NovoLOG) injection   Subcutaneous Q6H FIXED TIMES     insulin glargine  25 Units Subcutaneous BID     levETIRAcetam  (KEPPRA) solution 100 mg/mL  500 mg Enteral Tube BID     lidocaine (PF)  1-5 mL  Intradermal Once     melatonin  3 mg Enteral Tube QHS     metFORMIN  1,000 mg Enteral Tube BID with meals     metoprolol tartrate  50 mg Enteral Tube BID     multivitamin with iron-mineral  15 mL Enteral Tube DAILY     QUEtiapine  12.5 mg Enteral Tube QHS     sodium chloride bacteriostatic  1-5 mL Intradermal Once     sodium chloride  3 mL Intravenous Q8H FIXED TIMES     [START ON 12/24/2020] torsemide  100 mg Enteral Tube DAILY     white petrolatum   Topical DAILY       Pertinent Labs   Lab Results: personally reviewed.   not applicable    Results from last 7 days   Lab Units 12/23/20  0620 12/22/20  0633 12/21/20  0715 12/20/20  0741 12/18/20  0708 12/18/20  0708   LN-SODIUM mmol/L  --   --   --  143  --  145   LN-POTASSIUM mmol/L 4.0 4.0 3.7 3.9   < > 3.8   LN-CHLORIDE mmol/L  --   --   --  110*  --  111*   LN-CO2 mmol/L  --   --   --  24  --  26   LN-BLOOD UREA NITROGEN mg/dL  --   --   --  15  --  14   LN-CREATININE mg/dL  --   --   --  0.89  --  0.83   LN-CALCIUM mg/dL  --   --   --  8.1*  --  8.1*    < > = values in this interval not displayed.         Results from last 7 days   Lab Units 12/21/20  0715 12/18/20  0708   LN-WHITE BLOOD CELL COUNT thou/uL 13.1* 12.5*   LN-HEMOGLOBIN g/dL 11.9* 11.8*   LN-HEMATOCRIT % 40.0 39.4*   LN-PLATELET COUNT thou/uL 364 285         Pertinent Radiology   Radiology Results: Personally reviewed impression/s    Echo Complete    Result Date: 12/3/2020    Technically difficult study. Definity contrast utilized.   Normal left ventricular size. Mild to moderate left ventricular hypertrophy.   Left ventricle ejection fraction is moderately globally reduced. Left ventricular ejection fraction estimated at 30 to 35%. Abnormal septal motion.   Right ventricle not optimally visualized. Right ventricular systolic function is reduced. Abnormal TAPSE   Moderate left atrial enlargement. Mild right atrial enlargement.   Aortic valve sclerosis without Doppler evidence to suggest aortic  stenosis. Mild aortic insufficiency.   Nonspecific thickening of the mitral valve leaflets with mild mitral regurgitation.   Mild tricuspid regurgitation.   Mild enlargement of the aortic root   When compared to the previous study dated 9/20/2020, the prior study was also technically challenging. Left ventricular function remains reduced and appears potentially mildly more prominently reduced on the current examination.      Xr Chest 1 View Portable    Result Date: 12/20/2020  EXAM: XR CHEST 1 VIEW PORTABLE LOCATION: [facility] DATE/TIME: 12/20/2020 9:01 AM INDICATION: Postop evaluation COMPARISON: 12/16/2020     Bilateral chest tubes. No pneumothoraces. Small right pleural effusion with passive atelectasis in the right lung. Enteric tube tip below diaphragm, off the image. Strand of atelectasis or scarring left midlung. Calcified aortic atherosclerosis. Sternotomy with CABG and vascular markers. No change from prior.    Xr Chest 1 View Portable    Result Date: 12/16/2020  EXAM: XR CHEST 1 VIEW PORTABLE LOCATION: [facility] DATE/TIME: 12/16/2020 10:31 AM INDICATION: s/p CABg COMPARISON: Portable chest radiography 12/15/2020     Feeding tube extends below the diaphragmatic hiatus and outside the field-of-view. Bilateral chest tubes are unchanged in position. The cardiac silhouette remains enlarged. Unchanged mediastinal interfaces. Graded opacities in the lower third of both right and left chest relate to pleural effusions and adjacent atelectatic lung, slightly increased from yesterday. No pneumothorax. Sternal wires are aligned. Coronary ostial markers and vascular clips are present from CABG.    Xr Chest 1 View Portable    Result Date: 12/15/2020  EXAM: XR CHEST 1 VIEW PORTABLE LOCATION: [facility] DATE/TIME: 12/15/2020 8:58 AM INDICATION: s/p CABG  possible aspiration COMPARISON: 12/12/2020     Sternal wires. Removal IJ sheath.  Stable position bilateral chest drains. Stable enlarged cardiac silhouette. Unchanged bilateral lung opacities which are likely a combination of lung infiltrates and pleural fluid. No pneumothorax. No pulmonary edema.    Xr Chest 1 View Portable    Result Date: 12/12/2020  EXAM: XR CHEST 1 VIEW PORTABLE LOCATION: [facility] DATE/TIME: 12/12/2020 12:47 PM INDICATION: s/p CABG COMPARISON: 12/8/2020     Interval extubation and removal of the right IJ [place]te catheter. Right IJ line terminates over the brachiocephalic-SVC junction. Mediastinal drains are noted. Persistent and marginally increased small bilateral effusions and associated atelectasis. No pneumothorax. Cardiomegaly and prominence of the mediastinum are similar. CABG changes. Mild pulmonary vascular congestion without overt pulmonary edema.    Xr Chest 1 View Portable    Result Date: 12/8/2020  EXAM: XR CHEST 1 VIEW PORTABLE LOCATION: [facility] DATE/TIME: 12/8/2020 6:20 AM INDICATION: s/p cardiac surgery COMPARISON: 12/07/2020 FINDINGS: Endotracheal tube terminates 3.8 cm above the yariel. Right heart catheter terminates over the right main pulmonary artery. Bilateral chest tubes remain in place. Heart size is enlarged but stable status post median sternotomy. Mild prominence of central pulmonary vasculature without overt failure. Dense retrocardiac consolidation on the left is unchanged. No pneumothorax.     No significant interval change.    Xr Chest 1 View Portable    Result Date: 12/7/2020  EXAM: XR CHEST 1 VIEW PORTABLE LOCATION: [facility] DATE/TIME: 12/7/2020 1:51 PM INDICATION: s/p cardiac surgery COMPARISON: Portable AP view of the chest 02/12/2020     New endotracheal tube is in satisfactory position. Right jugular approach sheath and PA catheter are present. The tip of the PA catheter is in the right pulmonary artery. Mediastinal drain and bilateral chest tubes  are present. Coronary ostial markers and vascular clips from revascularization. Mild upper pneumomediastinum and gas in the soft tissues of the left neck. Improved lung inflation particularly of the right lower lobe with improved discrimination of the right hemidiaphragm. Subsegmental sized opacities in the left lung around the hilum and along the diaphragmatic pleura likely representing residual areas of atelectasis. No visible pleural fluid or pneumothorax.    Xr Chest 1 View Portable    Result Date: 12/2/2020  EXAM: XR CHEST 1 VIEW PORTABLE LOCATION: [facility] DATE/TIME: 12/2/2020 2:47 PM INDICATION: Shortness of breath with concern for pulmonary edema secondary to diuretic noncompliance COMPARISON: Chest x-ray 09/19/2020     Small left-sided and small to moderate right-sided pleural effusions with adjacent opacities likely reflecting atelectasis. Cardiomegaly with central vascular congestion and mild pulmonary edema.    Xr Chest 2 Views    Result Date: 12/21/2020  EXAM: XR CHEST 2 VIEWS LOCATION: [facility] DATE/TIME: 12/21/2020 10:20 AM INDICATION: recheck R pleural effusion COMPARISON: PA and lateral views of the chest 12/20/2020     Unchanged position of bilateral chest tubes. Feeding tube extends below the diaphragmatic hiatus and outside the field-of-view. Improved lung inflation. Opacity in the basal right chest has considerably cleared with improved discrimination of the right hemidiaphragm consistent with evacuation of pleural fluid and improved atelectasis. Thin linear band of atelectasis radiating from the left hilum is unchanged. Decreased lung vascular congestion. Cardiac silhouette is normal in size. Ostial markers and vascular clips from CABG..    Xr Abdomen Ap Portable    Result Date: 12/8/2020  EXAM: XR ABDOMEN AP PORTABLE LOCATION: [facility] DATE/TIME: 12/8/2020 7:30 PM INDICATION: check tube placement COMPARISON:  None.     Gastric tube courses below the diaphragmatic hiatus, follows the greater curvature of the stomach with tip along the right lateral aspect of the lumbar spine presumably in the distal stomach. Mediastinal drain and bilateral chest tubes are present. Nonobstructive bowel gas pattern.     Ct Head Without Contrast    Result Date: 12/12/2020  EXAM: CT HEAD WO CONTRAST LOCATION: [facility] DATE/TIME: 12/12/2020 11:51 AM INDICATION: Stroke. Increasing confusion. COMPARISON: Head MRI 12/09/2020 TECHNIQUE: Routine CT Head without IV contrast. Multiplanar reformats. Dose reduction techniques were used. FINDINGS: INTRACRANIAL CONTENTS: Infarct in the left frontal operculum with subtle associated petechial hemorrhage correlating with the blood products noted on MRI. No gross hemorrhagic transformation. No CT evidence of acute infarct. Chronic infarct in the left cerebellar hemisphere. Mild generalized volume loss. VISUALIZED ORBITS/SINUSES/MASTOIDS: No intraorbital abnormality. No paranasal sinus mucosal disease. No middle ear or mastoid effusion. BONES/SOFT TISSUES: No acute abnormality.     1.  Petechial hemorrhage in the left frontal opercular infarct correlating with the blood products noted on MRI. 2.  No gross hemorrhagic transformation. 3.  Chronic left cerebellar infarct. 4.  No change.    Ct Head Without Contrast    Addendum Date: 12/8/2020    Addendum/ impression: Dr. Domingo discussed the results with Dr. Kraft on 12/8/2020 10:06 PM by telephone.    Result Date: 12/8/2020  EXAM: CT HEAD WO CONTRAST LOCATION: [facility] DATE/TIME: 12/8/2020 9:12 PM INDICATION: seizure COMPARISON: None. TECHNIQUE: Routine CT Head without IV contrast. Multiplanar reformats. Dose reduction techniques were used. FINDINGS: Punctate focus of increased attenuation within the subcortical region of the left frontal lobe. This may represent a focus of petechial hemorrhage, cortical  laminar necrosis, mineralization or subarachnoid hemorrhage. This is best demonstrated on axial image #23, series 2 and sagittal image #43, series 5.2. Scattered foci of decreased attenuation within the cerebral hemispheric white matter which are nonspecific, though most commonly ascribed to chronic small vessel ischemic disease. The ventricles and sulci are prominent consistent with mild brain parenchymal volume loss. Gray-white matter differentiation is maintained. The basilar cisterns are patent. The globes are unremarkable. The partially imaged paranasal sinuses, mastoid air cells and middle ear cavities are unremarkable. The visualized skull base and calvarium are unremarkable.     1.  Punctate focus of increased attenuation within the subcortical region of the left frontal lobe. This may represent a focus of petechial hemorrhage, cortical laminar necrosis, mineralization or subarachnoid hemorrhage. This is best demonstrated on axial image #23, series 2 and sagittal image #43, series 5.2. Comparison with prior studies would be helpful if available. 2.  Otherwise recommend brain MRI for further evaluation. Additionally follow-up CT is could be performed to ensure stability or resolution.    Mr Brain With Without Contrast    Result Date: 12/9/2020  EXAM: MR BRAIN W WO CONTRAST LOCATION: [facility] DATE/TIME: 12/9/2020 1:07 PM INDICATION: Seizure. Altered mental status. Abnormal head CT, hemorrhage. COMPARISON: Head CT 12/08/2020. CONTRAST: 10 mL of gadobutrol intravenous contrast. TECHNIQUE: Routine multiplanar multisequence head MRI without and with intravenous contrast. FINDINGS: Cortical diffusion hyperintensity without most minimal restriction along the margin of the left middle and inferior frontal gyri demonstrates cortical signal loss on the susceptibility sensitive sequence with gyriform enhancement over a 2.5 x 1.5 x 1 cm area. Constellation of findings are most consistent with  a subacute infarct and petechial hemorrhage. This corresponds to the abnormality identified on the comparison head CT.  Mild mucosal thickening in the sinuses. A few mastoid air cells on each side contain a small amount of fluid. Intraorbital contents are unremarkable. There is mild generalized prominence of the sulci and ventricles, consistent with underlying volume loss. Intracranial flow voids are intact. Small chronic infarct left cerebellum. There is no mass effect, midline shift, or extraaxial collection. There are scattered foci of T2/FLAIR hyperintensity within the cerebral white matter that are nonspecific but most commonly reflect the sequela of chronic small vessel ischemic disease.     The abnormality identified on head CT corresponds to a 2.5 cm subacute infarct in the left frontal lobe associated with petechial hemorrhage. No significant mass effect.     Xr Swallow Study W Speech    Result Date: 12/21/2020  EXAM: XR SWALLOW STUDY W SPEECH OR OT LOCATION: [facility] DATE/TIME: 12/21/2020 9:17 AM INDICATION: Difficulty swallowing. COMPARISON: None. TECHNIQUE: Routine. FINDINGS: FLUOROSCOPIC TIME: 2.3 minutes. NUMBER OF IMAGES: 0 Swallow study with Speech Pathology using multiple barium thicknesses. With honey consistency there is marked residual in the vallecula and piriform sinuses and [place] silent aspiration. Chin tuck and head turn maneuver did not help. With pudding consistency there is also some moderate residual which clears with additional swallowing with no penetration or aspiration.     1.  High risk for aspiration with honey consistency and likely thinner. 2.  Patient does tolerate pudding consistency.     Xr Swallow Study W Speech    Result Date: 12/15/2020  EXAM: XR SWALLOW STUDY W SPEECH OR OT LOCATION: [facility] DATE/TIME: 12/15/2020 9:12 AM INDICATION: Difficulty swallowing. Abnormal swallow study 12/12/2020. COMPARISON: 12/12/2020  TECHNIQUE: Routine. FINDINGS: FLUOROSCOPIC TIME: .4 minutes NUMBER OF IMAGES: 0 Swallow study with Speech Pathology using multiple barium thicknesses. Airway aspiration with the nectar thick barium. Weak, delayed cough. Airway penetration with the honey thick barium. Please see speech pathology's report for further details and recommendations.    Xr Swallow Study W Speech    Result Date: 12/12/2020  EXAM: XR SWALLOW STUDY W SPEECH OR OT LOCATION: [facility] DATE/TIME: 12/12/2020 9:28 AM INDICATION: Difficulty swallowing. COMPARISON: None. TECHNIQUE: Routine. FINDINGS: FLUOROSCOPIC TIME: 1.2 minutes NUMBER OF IMAGES: 0 Swallow study with Speech Pathology using multiple barium thicknesses. There is silent aspiration with thin liquid with no cough reflex. There is pooling of all consistencies in the valleculae with some poor over and there is penetration to the vocal cords with nectar and honey thick liquid. Chin tuck technique improved inversion of the epiglottis mildly.     1.  Silent aspiration with thin liquid. Penetration with nectar and honey consistencies. Stasis with pooling in the valleculae.     Us Carotid Bilateral    Result Date: 12/3/2020  EXAM: US CAROTID BILATERAL LOCATION: [facility] DATE/TIME: 12/3/2020 3:12 PM INDICATION: Coronary artery disease. COMPARISON: None. TECHNIQUE: Duplex exam performed utilizing 2D gray-scale imaging, Doppler interrogation with color-flow and spectral waveform analysis. The percent diameter stenosis is determined using NASCET criteria and Society of Radiologists in Ultrasound Consensus Criteria. FINDINGS: RIGHT: Mild plaque at the bifurcation. The peak systolic velocity in the ICA is less than 125 cm/sec, consistent with less than 50% stenosis. Normal velocities in the ECA. Antegrade flow within the vertebral artery. LEFT: Mild plaque at the bifurcation. The peak systolic velocity in the ICA is less than 125 cm/sec,  consistent with less than 50% stenosis. Normal velocities in the ECA. Antegrade flow within the vertebral artery. VELOCITY CHART: CCA   Right: 64/16 cm/s   Left: 91/24 cm/s ICA   Right: 71/27 cm/s   Left: 60/22 cm/s ECA   Right: 64/11 cm/s   Left: 96/14 cm/s ICA/CCA PSV Ratio   Right: 1.1   Left: 0.66     1.  Mild plaque formation, velocities consistent with less than 50% stenosis in the right internal carotid artery. 2.  Mild plaque formation, velocities consistent with less than 50% stenosis in the left internal carotid artery. 3.  Flow within the vertebral arteries is antegrade.    Us Vein Mapping For Pre Bypass Graft Bilateral    Addendum Date: 12/15/2020    Indication: Vessel mapping prior to a bypass graft. Coronary artery disease plan for coronary artery bypass graft.    Result Date: 12/15/2020  EXAM: US VEIN MAPPING FOR PRE BYPASS GRAFT BILATERAL LOCATION: [facility] DATE/TIME: 12/3/2020 6:46 PM INDICATION: Lower extremity venous mapping prior to coronary or peripheral bypass surgery, coronary artery disease, diabetic leg ulcer COMPARISON: 9/19/2020 TECHNIQUE: Ultrasound examination of the lower extremity veins was performed, including gray-scale and compression imaging. LOWER  EXTREMITY FINDINGS:   VENOUS DIAMETERS RIGHT GREAT SAPHENOUS VEIN Upper Thigh: 3.0 mm Mid Thigh: 2.4 mm Lower Thigh: 2.5 mm Knee: The great saphenous vein is thrombosed from the knee to the ankle. LEFT GREAT SAPHENOUS VEIN Upper Thigh: 4.7 mm Mid Thigh: 4.1 mm Lower Thigh: 4.5 mm Knee: 2.9 mm Upper Calf: 3.2 mm Mid Calf: 2.2 mm Lower Calf: 2.0 mm Ankle: 1.1 mm RIGHT SMALL SAPHENOUS VEIN Not visualized LEFT SMALL SAPHENOUS VEIN The small saphenous vein is thrombosed from the knee to the ankle. There is underlying bilateral subcutaneous edema. No evidence of deep vein thrombosis.     1. Exam positive for superficial thrombophlebitis involving the right great saphenous vein from the knee to the ankle and left  small saphenous vein from the knee to the ankle. 2. The right small saphenous vein is not visualized. 3. No evidence of thrombus extension into the deep venous system. 4. Diffuse subcutaneous edema without discrete fluid collections. NOTE: ABNORMAL REPORT    Echo Monitor [place]der    Result Date: 12/7/2020    Left ventricle ejection fraction is moderately decreased. The calculated left ventricular ejection fraction is 38%.      Eeg Coma Or Sleep Only    Result Date: 12/8/2020  EEG report DATE 12/08/20 CLINICAL HISTORY This is a 70 y.o. male with left leg jerking. The EEG is done to look for underlying epilepsy. INTRODUCTION This is a routine EEG performed utilizing standard 10- 20 system of electrode placement with 20 channels of recording including one channel of EKG monitor. The patient was studied in awake and drowsy/somnolent state. EEG TIME: 31 min DESCRIPTION OF THE RECORD The EEG background consists of a generalized symmetric delta/theta background.  No sharp large amplitude rhythmic activity to suggest electrographic seizures were seen.  Mild to moderate background dysrhythmias present.  No activation maneuvers were done. IMPRESSION/REPORT/PLAN This is an abnormal EEG during wakefulness and somnolence/drowsiness due to generalized slow background.  EEG suggestive of an generalized cerebral dysfunction such as seen in an encephalopathy.  EEG is not suggestive of active ongoing seizures during the recording. Further clinical correlation is needed. Please note that the absence of epileptiform abnormalities does not exclude the possibility of epilepsy in any patient.     Xr Ng Or Nj Tube Reposition    Result Date: 12/15/2020  EXAM: XR NJ TUBE INSERTION LOCATION: [facility] DATE/TIME: 12/15/2020 9:49 AM INDICATION: Dysphagia. Aspiration on video swallow study. NJ tube needed for medication administration and feedings. COMPARISON: None. TECHNIQUE: A 10 [place] feeding tube was placed under  fluoroscopic guidance. FLUORO TIME: 2.3 NUMBER OF IMAGES: 1 FINDINGS: Enteric tube placed without complication.?Tip in the distal duodenum.           Advanced Care Planning:  Discharge Planning discussed with patient   Anticipated LOS: per surgery  Barrier to discharge:acute issues  Disposition:acute rehab?  Discussed care with patietn for >35 minutes with greater than 50% of time spent in counseling and coordination of care.      Nicole Longoria MD  Hospitalist  [phone]    This note was dictated using voice recognition software. Any grammatical or context distortions are unintentional and inherent to the software.

## 2021-06-14 NOTE — PROGRESS NOTES
Progress Note    Brief summary:   70 y.o.male with past medical history of uncontrolled T2DM, severe multivessel CAD, chronic systolic and diastolic CHF, recent diagnosis of A. fib, who presented with acute CHF, was diuresed and subsequently underwent four-vessel CABG by Dr. Madison on 12/7/2020. Post-op course complicated by Afib with RVR, which was initially treated with IV amiodarone given pt's NPO status due to severe dysphagia.     Hospitalization complicated by altered mentation and seizure-like activity onset 12/8. CT Head on 12/8 showed punctate focus of increased attenuation within the subcortical region of the left frontal lobe. Subsequent MRI of the brain on 12/9 demonstrated subacute 2.5 cm subacute infarct with petechial hemorrhage in the left frontal lobe. EEG did not show any seizure activity, however Keppra was started 12/10 with resolution of rhythmic left leg movements. Neurology has signed off.     Extubated 12/10 and weaned down to NC. Started on broad spectrum antibiotics 12/10 due to new fever and worsening leukocytosis. 1 of 2 blood Cx grew staph epidermidis, and sputum cultures showed respiratory miryam with 4+ beta hemolytic Strep. 7 days IV pip-tazo completed 12/17.     VFSS with mild oral and severe pharyngeal dysphagia - NGT placed on 12/15. This may have exacerbated hypernatremia, which improved with D5W .    Patient continued to have significant drainage from chest tubes, L>R. R chest tube removed yesterday and left chest tube remains.     Assessment/Plan  Principal Problem:    Acute on chronic clinical systolic heart failure (H)  Active Problems:    ACP Staging Stage 1 Hypertension: 140-159 / 90-99    Type 2 diabetes mellitus (H)    Hyperlipidemia, unspecified hyperlipidemia type    CAD (coronary artery disease)    Atrial fibrillation with rapid ventricular response (H)    Diabetic leg ulcer (H)    Acute on chronic systolic CHF (congestive heart failure) (H)    A-fib (H)     Uncontrolled type 2 diabetes mellitus with hyperglycemia (H)    Acute respiratory failure with hypoxia (H)    On mechanically assisted ventilation (H)    S/P CABG (coronary artery bypass graft)    Seizures (H)    Cerebrovascular accident (CVA), unspecified mechanism (H)    Cerebrovascular accident (CVA) due to other mechanism (H)    Metabolic encephalopathy    Disorientation    Acute kidney failure with tubular necrosis (H)      Multivessel CAD, s/p CABG and PVI/LAAL on 12/7/2020:   Increased pleural drainage, negative for chylous fluid; R chest tube taken out 12/22, Left one remains and draining serous fluid, possible removal of left tube tomorrow  On diuretics-torsemide 100 mg daily  Hemodynamically stable  On ASA, statin, metoprolol  Mgmt as per CV Surgery.      A Fib with RVR, now s/p PVI and DONNA excision:   Oral amiodarone, metoprolol.  Currently NSR  Mgmt as per Cardiology and CV Surgery following.   Cardiac tele.   Anticoagulation not yet started, per [facility].     Acute diastolic heart failure, improving.  Switched to oral torsemide 100 mg daily on 12/24/20  Mgmt as per Cardiology.     Acute hypoxic respiratory failure, resolved: On room air.   Encourage IC.  Supplemental O2 to keep sats >94%.  Continue pulmonary toilet- On metaneb, flutter valve     Acute Metabolic encephalopathy: improving.  Encephalopathy could also be contributed by prolonged hospitalization, hypernatremia, possible pneumonia, medications. CT Head from 12/12 showed no new changes.   EEG did not show seizure activity, but pt's rhythmic left leg movement appear to be have improved with Keppra. Continue keppra, Keppra level therapeutic, pt was started on quetiapine on 12/14   Bed time seroquel reduced on 12/23 for somnolence     Subacute left frontal stroke with petechial hemorrhage:   on ASA, statin.   Neurology recs appreciated.     Sepsis  Fever, Leukocytosis, tachycardia noted 12/10/2020  UA was negative. One of two blood cultures grew  staph epi, likely contaminant.  Sputum culture growing beta-hemolytic strep.   Started on emperic pip-tazo IV,  completed 7d on 12/17, fever and WBC resolving.  ID recs appreciated.     Severe pharyngeal dysphagia: NG and tube feeding.  SLP team following, repeat Video swallow 12/21 shows ongoing significant dysphagia     Hyperglycemia in the setting of T2DM and D5W as above:  HDSSI with glargine. Recommend to Hold home glipizide, metformin.  Increase dose of two times a day glargine again today: 20u two times a day->25u two times a day     Hypernatremia, resolved.     HTN: holding home losartan, amlodipine.       Subjective  Patient seen and examined  No chest pain  Denied shortness of breath   Working with P\T    Objective    Vital signs in last 24 hours  Temp:  [97.3  F (36.3  C)-98  F (36.7  C)] 97.4  F (36.3  C)  Heart Rate:  [73-91] 80  Resp:  [18-20] 20  BP: ()/(59-78) 110/78  Weight:   183 lb 1.6 oz (83.1 kg)    Intake/Output last 3 shifts  I/O last 3 completed shifts:  In: 1320 [NG/GT:1320]  Out: 740 [Urine:600; Chest Tube:140]  Intake/Output this shift:  I/O this shift:  In: -   Out: 475 [Urine:475]    Physical Exam   General: awake, alert, not in distress  Eyes: no pallor  Chest: Clear to auscultation bilaterally, Left chest tube, no air leak 140 ml output  Heart: RRR, normal S1 and S2  Abdomen: soft, non tender  Neuro: moving all extremities, slightly confused      Meds    amino acids-protein hydrolys  2 packet Enteral Tube DAILY     amiodarone  200 mg Enteral Tube BID     aspirin  81 mg Enteral Tube DAILY    Or     aspirin  150 mg Rectal DAILY     atorvastatin  80 mg Enteral Tube QHS     famotidine  20 mg Enteral Tube BID    Or     famotidine (PEPCID) injection  20 mg Intravenous BID     glipiZIDE  2.5 mg Enteral Tube BID AC     guar gum  1 packet Enteral Tube BID     heparin (PF) ANTICOAGULANT  5,000 Units Subcutaneous Q8H FIXED TIMES     insulin aspart (NovoLOG) injection   Subcutaneous Q6H  FIXED TIMES     insulin glargine  25 Units Subcutaneous BID     levETIRAcetam  (KEPPRA) solution 100 mg/mL  500 mg Enteral Tube BID     lidocaine (PF)  1-5 mL Intradermal Once     melatonin  3 mg Enteral Tube QHS     metFORMIN  1,000 mg Enteral Tube BID with meals     metoprolol tartrate  37.5 mg Enteral Tube BID     multivitamin with iron-mineral  15 mL Enteral Tube DAILY     QUEtiapine  12.5 mg Enteral Tube QHS     sodium chloride bacteriostatic  1-5 mL Intradermal Once     sodium chloride  3 mL Intravenous Q8H FIXED TIMES     tamsulosin  0.4 mg Enteral Tube Daily after brkfst     torsemide  100 mg Enteral Tube DAILY     white petrolatum   Topical DAILY       Pertinent Labs   Lab Results: personally reviewed.   not applicable    Results from last 7 days   Lab Units 12/24/20  0638 12/23/20  0620 12/22/20  0633 12/20/20  0741 12/20/20  0741 12/18/20  0708 12/18/20  0708   LN-SODIUM mmol/L  --   --   --   --  143  --  145   LN-POTASSIUM mmol/L 3.8 4.0 4.0   < > 3.9   < > 3.8   LN-CHLORIDE mmol/L  --   --   --   --  110*  --  111*   LN-CO2 mmol/L  --   --   --   --  24  --  26   LN-BLOOD UREA NITROGEN mg/dL  --   --   --   --  15  --  14   LN-CREATININE mg/dL 1.08  --   --   --  0.89  --  0.83   LN-CALCIUM mg/dL  --   --   --   --  8.1*  --  8.1*    < > = values in this interval not displayed.         Results from last 7 days   Lab Units 12/21/20  0715 12/18/20  0708   LN-WHITE BLOOD CELL COUNT thou/uL 13.1* 12.5*   LN-HEMOGLOBIN g/dL 11.9* 11.8*   LN-HEMATOCRIT % 40.0 39.4*   LN-PLATELET COUNT thou/uL 364 285         Pertinent Radiology   Radiology Results: Personally reviewed impression/s    Echo Complete    Result Date: 12/3/2020    Technically difficult study. Definity contrast utilized.   Normal left ventricular size. Mild to moderate left ventricular hypertrophy.   Left ventricle ejection fraction is moderately globally reduced. Left ventricular ejection fraction estimated at 30 to 35%. Abnormal septal motion.    Right ventricle not optimally visualized. Right ventricular systolic function is reduced. Abnormal TAPSE   Moderate left atrial enlargement. Mild right atrial enlargement.   Aortic valve sclerosis without Doppler evidence to suggest aortic stenosis. Mild aortic insufficiency.   Nonspecific thickening of the mitral valve leaflets with mild mitral regurgitation.   Mild tricuspid regurgitation.   Mild enlargement of the aortic root   When compared to the previous study dated 9/20/2020, the prior study was also technically challenging. Left ventricular function remains reduced and appears potentially mildly more prominently reduced on the current examination.      Xr Chest 1 View Portable    Result Date: 12/20/2020  EXAM: XR CHEST 1 VIEW PORTABLE LOCATION: [facility] DATE/TIME: 12/20/2020 9:01 AM INDICATION: Postop evaluation COMPARISON: 12/16/2020     Bilateral chest tubes. No pneumothoraces. Small right pleural effusion with passive atelectasis in the right lung. Enteric tube tip below diaphragm, off the image. Strand of atelectasis or scarring left midlung. Calcified aortic atherosclerosis. Sternotomy with CABG and vascular markers. No change from prior.    Xr Chest 1 View Portable    Result Date: 12/16/2020  EXAM: XR CHEST 1 VIEW PORTABLE LOCATION: [facility] DATE/TIME: 12/16/2020 10:31 AM INDICATION: s/p CABg COMPARISON: Portable chest radiography 12/15/2020     Feeding tube extends below the diaphragmatic hiatus and outside the field-of-view. Bilateral chest tubes are unchanged in position. The cardiac silhouette remains enlarged. Unchanged mediastinal interfaces. Graded opacities in the lower third of both right and left chest relate to pleural effusions and adjacent atelectatic lung, slightly increased from yesterday. No pneumothorax. Sternal wires are aligned. Coronary ostial markers and vascular clips are present from CABG.    Xr Chest 1 View  Portable    Result Date: 12/15/2020  EXAM: XR CHEST 1 VIEW PORTABLE LOCATION: [facility] DATE/TIME: 12/15/2020 8:58 AM INDICATION: s/p CABG  possible aspiration COMPARISON: 12/12/2020     Sternal wires. Removal IJ sheath. Stable position bilateral chest drains. Stable enlarged cardiac silhouette. Unchanged bilateral lung opacities which are likely a combination of lung infiltrates and pleural fluid. No pneumothorax. No pulmonary edema.    Xr Chest 1 View Portable    Result Date: 12/12/2020  EXAM: XR CHEST 1 VIEW PORTABLE LOCATION: [facility] DATE/TIME: 12/12/2020 12:47 PM INDICATION: s/p CABG COMPARISON: 12/8/2020     Interval extubation and removal of the right IJ Mica-Annette catheter. Right IJ line terminates over the brachiocephalic-SVC junction. Mediastinal drains are noted. Persistent and marginally increased small bilateral effusions and associated atelectasis. No pneumothorax. Cardiomegaly and prominence of the mediastinum are similar. CABG changes. Mild pulmonary vascular congestion without overt pulmonary edema.    Xr Chest 1 View Portable    Result Date: 12/8/2020  EXAM: XR CHEST 1 VIEW PORTABLE LOCATION: [facility] DATE/TIME: 12/8/2020 6:20 AM INDICATION: s/p cardiac surgery COMPARISON: 12/07/2020 FINDINGS: Endotracheal tube terminates 3.8 cm above the yariel. Right heart catheter terminates over the right main pulmonary artery. Bilateral chest tubes remain in place. Heart size is enlarged but stable status post median sternotomy. Mild prominence of central pulmonary vasculature without overt failure. Dense retrocardiac consolidation on the left is unchanged. No pneumothorax.     No significant interval change.    Xr Chest 1 View Portable    Result Date: 12/7/2020  EXAM: XR CHEST 1 VIEW PORTABLE LOCATION: [facility] DATE/TIME: 12/7/2020 1:51 PM INDICATION: s/p cardiac surgery COMPARISON: Portable AP  view of the chest 02/12/2020     New endotracheal tube is in satisfactory position. Right jugular approach sheath and PA catheter are present. The tip of the PA catheter is in the right pulmonary artery. Mediastinal drain and bilateral chest tubes are present. Coronary ostial markers and vascular clips from revascularization. Mild upper pneumomediastinum and gas in the soft tissues of the left neck. Improved lung inflation particularly of the right lower lobe with improved discrimination of the right hemidiaphragm. Subsegmental sized opacities in the left lung around the hilum and along the diaphragmatic pleura likely representing residual areas of atelectasis. No visible pleural fluid or pneumothorax.    Xr Chest 1 View Portable    Result Date: 12/2/2020  EXAM: XR CHEST 1 VIEW PORTABLE LOCATION: [facility] DATE/TIME: 12/2/2020 2:47 PM INDICATION: Shortness of breath with concern for pulmonary edema secondary to diuretic noncompliance COMPARISON: Chest x-ray 09/19/2020     Small left-sided and small to moderate right-sided pleural effusions with adjacent opacities likely reflecting atelectasis. Cardiomegaly with central vascular congestion and mild pulmonary edema.    Xr Chest 2 Views    Result Date: 12/21/2020  EXAM: XR CHEST 2 VIEWS LOCATION: [facility] DATE/TIME: 12/21/2020 10:20 AM INDICATION: recheck R pleural effusion COMPARISON: PA and lateral views of the chest 12/20/2020     Unchanged position of bilateral chest tubes. Feeding tube extends below the diaphragmatic hiatus and outside the field-of-view. Improved lung inflation. Opacity in the basal right chest has considerably cleared with improved discrimination of the right hemidiaphragm consistent with evacuation of pleural fluid and improved atelectasis. Thin linear band of atelectasis radiating from the left hilum is unchanged. Decreased lung vascular congestion. Cardiac silhouette is normal in size.  Ostial markers and vascular clips from CABG..    Xr Abdomen Ap Portable    Result Date: 12/8/2020  EXAM: XR ABDOMEN AP PORTABLE LOCATION: [facility] DATE/TIME: 12/8/2020 7:30 PM INDICATION: check tube placement COMPARISON: None.     Gastric tube courses below the diaphragmatic hiatus, follows the greater curvature of the stomach with tip along the right lateral aspect of the lumbar spine presumably in the distal stomach. Mediastinal drain and bilateral chest tubes are present. Nonobstructive bowel gas pattern.     Ct Head Without Contrast    Result Date: 12/12/2020  EXAM: CT HEAD WO CONTRAST LOCATION: [facility] DATE/TIME: 12/12/2020 11:51 AM INDICATION: Stroke. Increasing confusion. COMPARISON: Head MRI 12/09/2020 TECHNIQUE: Routine CT Head without IV contrast. Multiplanar reformats. Dose reduction techniques were used. FINDINGS: INTRACRANIAL CONTENTS: Infarct in the left frontal operculum with subtle associated petechial hemorrhage correlating with the blood products noted on MRI. No gross hemorrhagic transformation. No CT evidence of acute infarct. Chronic infarct in the left cerebellar hemisphere. Mild generalized volume loss. VISUALIZED ORBITS/SINUSES/MASTOIDS: No intraorbital abnormality. No paranasal sinus mucosal disease. No middle ear or mastoid effusion. BONES/SOFT TISSUES: No acute abnormality.     1.  Petechial hemorrhage in the left frontal opercular infarct correlating with the blood products noted on MRI. 2.  No gross hemorrhagic transformation. 3.  Chronic left cerebellar infarct. 4.  No change.    Ct Head Without Contrast    Addendum Date: 12/8/2020    Addendum/ impression: Dr. Domingo discussed the results with Dr. Kraft on 12/8/2020 10:06 PM by telephone.    Result Date: 12/8/2020  EXAM: CT HEAD WO CONTRAST LOCATION: [facility] DATE/TIME: 12/8/2020 9:12 PM INDICATION: seizure COMPARISON: None. TECHNIQUE: Routine CT  Head without IV contrast. Multiplanar reformats. Dose reduction techniques were used. FINDINGS: Punctate focus of increased attenuation within the subcortical region of the left frontal lobe. This may represent a focus of petechial hemorrhage, cortical laminar necrosis, mineralization or subarachnoid hemorrhage. This is best demonstrated on axial image #23, series 2 and sagittal image #43, series 5.2. Scattered foci of decreased attenuation within the cerebral hemispheric white matter which are nonspecific, though most commonly ascribed to chronic small vessel ischemic disease. The ventricles and sulci are prominent consistent with mild brain parenchymal volume loss. Gray-white matter differentiation is maintained. The basilar cisterns are patent. The globes are unremarkable. The partially imaged paranasal sinuses, mastoid air cells and middle ear cavities are unremarkable. The visualized skull base and calvarium are unremarkable.     1.  Punctate focus of increased attenuation within the subcortical region of the left frontal lobe. This may represent a focus of petechial hemorrhage, cortical laminar necrosis, mineralization or subarachnoid hemorrhage. This is best demonstrated on axial image #23, series 2 and sagittal image #43, series 5.2. Comparison with prior studies would be helpful if available. 2.  Otherwise recommend brain MRI for further evaluation. Additionally follow-up CT is could be performed to ensure stability or resolution.    Mr Brain With Without Contrast    Result Date: 12/9/2020  EXAM: MR BRAIN W WO CONTRAST LOCATION: [facility] DATE/TIME: 12/9/2020 1:07 PM INDICATION: Seizure. Altered mental status. Abnormal head CT, hemorrhage. COMPARISON: Head CT 12/08/2020. CONTRAST: 10 mL of gadobutrol intravenous contrast. TECHNIQUE: Routine multiplanar multisequence head MRI without and with intravenous contrast. FINDINGS: Cortical diffusion hyperintensity without most minimal  restriction along the margin of the left middle and inferior frontal gyri demonstrates cortical signal loss on the susceptibility sensitive sequence with gyriform enhancement over a 2.5 x 1.5 x 1 cm area. Constellation of findings are most consistent with a subacute infarct and petechial hemorrhage. This corresponds to the abnormality identified on the comparison head CT.  Mild mucosal thickening in the sinuses. A few mastoid air cells on each side contain a small amount of fluid. Intraorbital contents are unremarkable. There is mild generalized prominence of the sulci and ventricles, consistent with underlying volume loss. Intracranial flow voids are intact. Small chronic infarct left cerebellum. There is no mass effect, midline shift, or extraaxial collection. There are scattered foci of T2/FLAIR hyperintensity within the cerebral white matter that are nonspecific but most commonly reflect the sequela of chronic small vessel ischemic disease.     The abnormality identified on head CT corresponds to a 2.5 cm subacute infarct in the left frontal lobe associated with petechial hemorrhage. No significant mass effect.     Xr Swallow Study W Speech    Result Date: 12/21/2020  EXAM: XR SWALLOW STUDY W SPEECH OR OT LOCATION: [facility] DATE/TIME: 12/21/2020 9:17 AM INDICATION: Difficulty swallowing. COMPARISON: None. TECHNIQUE: Routine. FINDINGS: FLUOROSCOPIC TIME: 2.3 minutes. NUMBER OF IMAGES: 0 Swallow study with Speech Pathology using multiple barium thicknesses. With honey consistency there is marked residual in the vallecula and piriform sinuses and boston silent aspiration. Chin tuck and head turn maneuver did not help. With pudding consistency there is also some moderate residual which clears with additional swallowing with no penetration or aspiration.     1.  High risk for aspiration with honey consistency and likely thinner. 2.  Patient does tolerate pudding consistency.     Xr Swallow  Study W Speech    Result Date: 12/15/2020  EXAM: XR SWALLOW STUDY W SPEECH OR OT LOCATION: [facility] DATE/TIME: 12/15/2020 9:12 AM INDICATION: Difficulty swallowing. Abnormal swallow study 12/12/2020. COMPARISON: 12/12/2020 TECHNIQUE: Routine. FINDINGS: FLUOROSCOPIC TIME: .4 minutes NUMBER OF IMAGES: 0 Swallow study with Speech Pathology using multiple barium thicknesses. Airway aspiration with the nectar thick barium. Weak, delayed cough. Airway penetration with the honey thick barium. Please see speech pathology's report for further details and recommendations.    Xr Swallow Study W Speech    Result Date: 12/12/2020  EXAM: XR SWALLOW STUDY W SPEECH OR OT LOCATION: [facility] DATE/TIME: 12/12/2020 9:28 AM INDICATION: Difficulty swallowing. COMPARISON: None. TECHNIQUE: Routine. FINDINGS: FLUOROSCOPIC TIME: 1.2 minutes NUMBER OF IMAGES: 0 Swallow study with Speech Pathology using multiple barium thicknesses. There is silent aspiration with thin liquid with no cough reflex. There is pooling of all consistencies in the valleculae with some poor over and there is penetration to the vocal cords with nectar and honey thick liquid. Chin tuck technique improved inversion of the epiglottis mildly.     1.  Silent aspiration with thin liquid. Penetration with nectar and honey consistencies. Stasis with pooling in the valleculae.     Us Carotid Bilateral    Result Date: 12/3/2020  EXAM: US CAROTID BILATERAL LOCATION: [facility] DATE/TIME: 12/3/2020 3:12 PM INDICATION: Coronary artery disease. COMPARISON: None. TECHNIQUE: Duplex exam performed utilizing 2D gray-scale imaging, Doppler interrogation with color-flow and spectral waveform analysis. The percent diameter stenosis is determined using NASCET criteria and Society of Radiologists in Ultrasound Consensus Criteria. FINDINGS: RIGHT: Mild plaque at the bifurcation. The peak systolic velocity in  the ICA is less than 125 cm/sec, consistent with less than 50% stenosis. Normal velocities in the ECA. Antegrade flow within the vertebral artery. LEFT: Mild plaque at the bifurcation. The peak systolic velocity in the ICA is less than 125 cm/sec, consistent with less than 50% stenosis. Normal velocities in the ECA. Antegrade flow within the vertebral artery. VELOCITY CHART: CCA   Right: 64/16 cm/s   Left: 91/24 cm/s ICA   Right: 71/27 cm/s   Left: 60/22 cm/s ECA   Right: 64/11 cm/s   Left: 96/14 cm/s ICA/CCA PSV Ratio   Right: 1.1   Left: 0.66     1.  Mild plaque formation, velocities consistent with less than 50% stenosis in the right internal carotid artery. 2.  Mild plaque formation, velocities consistent with less than 50% stenosis in the left internal carotid artery. 3.  Flow within the vertebral arteries is antegrade.    Us Vein Mapping For Pre Bypass Graft Bilateral    Addendum Date: 12/15/2020    Indication: Vessel mapping prior to a bypass graft. Coronary artery disease plan for coronary artery bypass graft.    Result Date: 12/15/2020  EXAM: US VEIN MAPPING FOR PRE BYPASS GRAFT BILATERAL LOCATION: [facility] DATE/TIME: 12/3/2020 6:46 PM INDICATION: Lower extremity venous mapping prior to coronary or peripheral bypass surgery, coronary artery disease, diabetic leg ulcer COMPARISON: 9/19/2020 TECHNIQUE: Ultrasound examination of the lower extremity veins was performed, including gray-scale and compression imaging. LOWER  EXTREMITY FINDINGS:   VENOUS DIAMETERS RIGHT GREAT SAPHENOUS VEIN Upper Thigh: 3.0 mm Mid Thigh: 2.4 mm Lower Thigh: 2.5 mm Knee: The great saphenous vein is thrombosed from the knee to the ankle. LEFT GREAT SAPHENOUS VEIN Upper Thigh: 4.7 mm Mid Thigh: 4.1 mm Lower Thigh: 4.5 mm Knee: 2.9 mm Upper Calf: 3.2 mm Mid Calf: 2.2 mm Lower Calf: 2.0 mm Ankle: 1.1 mm RIGHT SMALL SAPHENOUS VEIN Not visualized LEFT SMALL SAPHENOUS VEIN The small saphenous vein is thrombosed  from the knee to the ankle. There is underlying bilateral subcutaneous edema. No evidence of deep vein thrombosis.     1. Exam positive for superficial thrombophlebitis involving the right great saphenous vein from the knee to the ankle and left small saphenous vein from the knee to the ankle. 2. The right small saphenous vein is not visualized. 3. No evidence of thrombus extension into the deep venous system. 4. Diffuse subcutaneous edema without discrete fluid collections. NOTE: ABNORMAL REPORT    Echo Monitor Ryder    Result Date: 12/7/2020    Left ventricle ejection fraction is moderately decreased. The calculated left ventricular ejection fraction is 38%.      Eeg Coma Or Sleep Only    Result Date: 12/8/2020  EEG report DATE 12/08/20 CLINICAL HISTORY This is a 70 y.o. male with left leg jerking. The EEG is done to look for underlying epilepsy. INTRODUCTION This is a routine EEG performed utilizing standard 10- 20 system of electrode placement with 20 channels of recording including one channel of EKG monitor. The patient was studied in awake and drowsy/somnolent state. EEG TIME: 31 min DESCRIPTION OF THE RECORD The EEG background consists of a generalized symmetric delta/theta background.  No sharp large amplitude rhythmic activity to suggest electrographic seizures were seen.  Mild to moderate background dysrhythmias present.  No activation maneuvers were done. IMPRESSION/REPORT/PLAN This is an abnormal EEG during wakefulness and somnolence/drowsiness due to generalized slow background.  EEG suggestive of an generalized cerebral dysfunction such as seen in an encephalopathy.  EEG is not suggestive of active ongoing seizures during the recording. Further clinical correlation is needed. Please note that the absence of epileptiform abnormalities does not exclude the possibility of epilepsy in any patient.     Xr Ng Or Nj Tube Reposition    Result Date: 12/15/2020  EXAM: XR NJ TUBE INSERTION LOCATION: [facility]  Merged with [facility] DATE/TIME: 12/15/2020 9:49 AM INDICATION: Dysphagia. Aspiration on video swallow study. NJ tube needed for medication administration and feedings. COMPARISON: None. TECHNIQUE: A 10 Moldovan feeding tube was placed under fluoroscopic guidance. FLUORO TIME: 2.3 NUMBER OF IMAGES: 1 FINDINGS: Enteric tube placed without complication.?Tip in the distal duodenum.           Advanced Care Planning:  Discharge Planning discussed with patient, RN, CM.   Anticipated LOS: per surgery  Barrier to discharge:acute issues  Disposition:acute rehab?  Discussed care with patietn for >35 minutes with greater than 50% of time spent in counseling and coordination of care.      MD Tanna  Hospitalist  This note was dictated using voice recognition software. Any grammatical or context distortions are unintentional and inherent to the software.

## 2021-06-14 NOTE — PROGRESS NOTES
Hospitalist Progress Note        Assessment and Plan  Principal Problem:    Acute on chronic clinical systolic heart failure (H)  Active Problems:    ACP Staging Stage 1 Hypertension: 140-159 / 90-99    Type 2 diabetes mellitus (H)    Hyperlipidemia, unspecified hyperlipidemia type    CAD (coronary artery disease)    Atrial fibrillation with rapid ventricular response (H)    Diabetic leg ulcer (H)    Acute on chronic systolic CHF (congestive heart failure) (H)    A-fib (H)    Uncontrolled type 2 diabetes mellitus with hyperglycemia (H)    Acute respiratory failure with hypoxia (H)    On mechanically assisted ventilation (H)    S/P CABG (coronary artery bypass graft)    Seizures (H)    Cerebrovascular accident (CVA), unspecified mechanism (H)    Cerebrovascular accident (CVA) due to other mechanism (H)    Metabolic encephalopathy    Disorientation    Acute kidney failure with tubular necrosis (H)      Multivessel CAD S/P CABG  -  Primary management per CTS service  -  ASA  -  Atorvastatin     A-fib with RVR  -  Continue amiodarone    Uncontrolled type II diabetes mellitus, HgbA1c concentration 12.6%  -  Continue metformin  -  Continue glipizide  -  Continue insulin glargine  -  SSI  -  BG monitoring  -  Hypoglycemia protocol  -  Diabetic diet     Acute diastolic heart failure, improving  -  Continue torsemide  -  Continue metoprolol tartrate  -  Cardiology service was consulted     Acute hypoxic respiratory failure, resolved  -  Encourage IC  - Supplemental oxygen as needed     Acute metabolic encephalopathy  -  Supportive care     Subacute left frontal stroke with petechial hemorrhage  -  ASA  -  Atorvastatin  -  Continue levetiracetam  -  Neurology service was consulted     Sepsis  -  Fever, Leukocytosis, tachycardia noted 12/10/2020  -  UA was negative. One of two blood cultures grew staph epi, likely contaminant.  -  Sputum culture grew beta-hemolytic strep  -  Started on emperic pip-tazo IV,  completed 7d on  12/17, fever and WBC resolving.  -  ID service was consulted     Severe pharyngeal dysphagia  -  SLP team following, repeat video swallow study on 12/21 showed ongoing significant dysphagia  -  NGT in place  -  Clinical monitoring     Primary hypertension  -  BP controlled  -  Continue metoprolol tartrate  -  Monitoring BP      VTE risk reduction.  Subcutaneous heparin.      Discharge.  Discharge to acute rehab today.              Brief Summary  70 y.o.male with past medical history of uncontrolled T2DM, severe multivessel CAD, chronic systolic and diastolic CHF, recent diagnosis of A. fib, who presented with acute CHF, was diuresed and subsequently underwent four-vessel CABG by Dr. Madison on 12/7/2020. Post-op course complicated by Afib with RVR, which was initially treated with IV amiodarone given pt's NPO status due to severe dysphagia.     Hospitalization complicated by altered mentation and seizure-like activity onset 12/8. CT Head on 12/8 showed punctate focus of increased attenuation within the subcortical region of the left frontal lobe. Subsequent MRI of the brain on 12/9 demonstrated subacute 2.5 cm subacute infarct with petechial hemorrhage in the left frontal lobe. EEG did not show any seizure activity, however Keppra was started 12/10 with resolution of rhythmic left leg movements. Neurology has signed off.     Extubated 12/10 and weaned down to NC. Started on broad spectrum antibiotics 12/10 due to new fever and worsening leukocytosis. 1 of 2 blood Cx grew staph epidermidis, and sputum cultures showed respiratory miryam with 4+ beta hemolytic Strep. 7 days IV pip-tazo completed 12/17.     VFSS with mild oral and severe pharyngeal dysphagia - NGT placed on 12/15. This may have exacerbated hypernatremia, which improved with D5W .      Subjective and Interim Events  Did not report CP, shortness of breath or palpitations.    Physical Examination    Vital signs in last 24 hours  Temp:  [97.1  F (36.2   C)-97.7  F (36.5  C)] 97.1  F (36.2  C)  Heart Rate:  [78-85] 83  Resp:  [16-18] 16  BP: (108-141)/(69-84) 109/73 95% O2 Device: None (Room air) O2 Flow Rate (L/min): 2 L/min  Weight:   176 lb 11.2 oz (80.2 kg) Weight change:     General: Awake, comfortable, NAD   HEENT: Sclera white.  No redness or jaundice.   Cardiac: RRR, no abnormal heart sounds   Pulmonary: CTA irlanda   Abdomen: Nondistended.  Nontender to palpation.   Musculoskeletal: No joint abnormalities noted  Skin: Normally turgid   Neurologic: Awake, alert, appropriately interactive  Psychiatric: Calm      Intake/Output last 3 shifts  I/O last 3 completed shifts:  In: 1105 [NG/GT:1105]  Out: 600 [Urine:600]  Body mass index is 25.35 kg/m .      Pertinent Labs   Lab Results: personally reviewed.   Results from last 7 days   Lab Units 12/28/20 0657 12/27/20 0529 12/26/20  0534   LN-SODIUM mmol/L 143 139 138   LN-POTASSIUM mmol/L 4.4 3.8 4.0   LN-CHLORIDE mmol/L 105 100 99   LN-CO2 mmol/L 25 27 27   LN-BLOOD UREA NITROGEN mg/dL 32* 39* 38*   LN-CREATININE mg/dL 0.99 1.25 1.51*   LN-CALCIUM mg/dL 9.2 8.5 8.7   LN-ALBUMIN g/dL 2.8* 2.6* 2.5*   LN-PROTEIN TOTAL g/dL 7.2 6.8 6.9   LN-BILIRUBIN TOTAL mg/dL 0.6 0.6 0.7   LN-ALKALINE PHOSPHATASE U/L 131* 122* 125*   LN-ALT (SGPT) U/L <9 10 10   LN-AST (SGOT) U/L 25 20 22     Results from last 7 days   Lab Units 12/28/20 0657 12/27/20 0529 12/26/20  0534   LN-WHITE BLOOD CELL COUNT thou/uL 13.0* 11.6* 12.4*   LN-HEMOGLOBIN g/dL 13.3* 12.4* 12.4*   LN-HEMATOCRIT % 43.4 40.7 39.9*   LN-PLATELET COUNT thou/uL 368 383 394   LN-NEUTROPHILS RELATIVE PERCENT % 80* 78* 77*   LN-MONOCYTES RELATIVE PERCENT % 6 6 6         Results from last 7 days   Lab Units 12/28/20  1149 12/28/20  0555 12/27/20  2358 12/27/20  1806 12/27/20  1207 12/27/20  0524 12/27/20  0252 12/27/20  0001 12/26/20  1710 12/26/20  1213   LN-POC GLUCOSE FINGERSTICK- HE mg/dL 91 104 113 133 131 102 98 78 331* 190*       Pertinent Radiology   Radiology  Results: Personally reviewed impression/s     EXAM: XR CHEST 2 VIEWS  LOCATION: [facility]  DATE/TIME: 12/27/2020 9:49 AM     INDICATION: Follow-up pneumothorax  COMPARISON: Chest x-ray 12/26/2020     IMPRESSION:   Stable tiny biapical pneumothoraces with 2 mm pleural separation on the right. No focal pneumonic consolidation or pleural effusion. Stable heart size. Post open-heart surgery. Enteric tube with the distal visualized portion below the   diaphragm.          EKG Results: Personally reviewed ECG tracing.  12/27/20 ECG demonstrated bifascicular block.                    Total visit time 40 minutes; more than 50% of time spent counseling and coordinating patient care.  25 minutes of face-to-face care.  Care included review of treatment, prognosis and today's changes of the treatment plan.                                  Adrian Wolff MD, MS  Internal Medicine Hospitalist  12/28/2020

## 2021-06-14 NOTE — TELEPHONE ENCOUNTER
Reason for Call:  Other would like a call from provider     Detailed comments: Jonn from [facility] calling to speak to Dr Claire about this patients plan of care & history.  She thinks it would be beneficial to talk directly to the provider today    Phone Number Patient can be reached at: Other phone number:  [phone] (Jonn with [facility])*    Best Time: today    Can we leave a detailed message on this number?: Yes    Call taken on 1/26/2021 at 10:10 AM by Ita Le

## 2021-06-14 NOTE — PROGRESS NOTES
Care Management Follow Up Note    Length of Stay (days) 22     Patient plan of care discussed at Interdisciplinary Rounds: yes  CM Patient/Caregiver Stated Goals: Discharge to TCU             Expected Discharge Date: 12/25/20(pending chest tube)  Concerns to be Addressed / Barriers to Discharge:  Chest tube    Anticipated Discharge Disposition: Skilled Nursing Facility  Anticipated Discharge Services:    Acute Rehab  Selected Continued Care - Admitted Since 12/2/2020     Destination Coordination complete    Service Provider Selected Services Address Phone Fax Patient Preferred Last Updated    [facility]  Inpatient Rehabilitation [facility], [address] [place] [address], 5th floor, [facility] [address] [phone] -- Anthony Crews RN 12/23/2020 1413               Anticipated Discharge DME:   NJ tube - tube feedings    Plan:  Surgery plans to remove the patient's chest tube tomorrow, 12/25. This will put discharge to [facility] on Saturday, 12/26. Please see the note below for information on criteria the patient needs to meet for acute rehab.    Previous Care Management Note:   The writer spoke with Umu Marie from [facility] and Dipti Marr from [facility] today. Please see their notes on 12/23 for details. At this point, [facility] can accept the patient with an NJ tube for tube feeding as long as the following criteria are met: 1) the patient has to be off of a 1:1 for at least 24 hours, and 2) the patient has to have had the chest tube removed and be medically stable for 24 hours or more. If the patient meets criteria, he could be admitted for acute rehab by Saturday. Please see Dipti Marr's note on 12/23. For patient background, please see the previous note below.     Previous Care Management Note:   The patient had a CABG on 12/7, and the post-op course was complicated by a subacute left frontal infarct. Subsequent to this, he  failed a video swallow and had a NJ tube placed for tube feeding. The patient has a video swallow study to be completed on 12/21, and pending the results, there may be a need for a more long-term tube feeding plan.     Anthony Crews RN

## 2021-06-14 NOTE — TELEPHONE ENCOUNTER
Noted negative Covid 1/18/21  -----------------------------------  COVID-19 Virus PCR MRF  Order: [id number] - Part of Panel Order [id number]  Status:  Final result   Visible to patient:  No (not released) Next appt:  01/20/2021 at 11:30 AM in Cardiology (Miguel Tabor MD) Dx:  COVID-19  Specimen Information: Respiratory        Component 1/18/21 1030   COVID-19 VIRUS SPECIMEN SOURCE Nasopharyngeal    2019-nCOV Not Detected

## 2021-06-14 NOTE — PROGRESS NOTES
Co worker called to tell me she took the lab value over phone and now paged the on call with it. RE increased BUN again.

## 2021-06-14 NOTE — PROGRESS NOTES
12/27/2020 Received message from [place] () Acute rehab admissions requesting a MOCA (?spelling?) screen be done on patient. Called and spoke with Adriana in admissions as no notes from previous  or [facility] acute requesting ?MOCA? or if was done. Per Adriana, it is a cognitive screen and not a barrier to patient discharging to acute rehab. Currently, awaiting confirmation for bed availability for today 12/27 vs tomorrow 12/28.  Requested return call to [phone].     Called and left message for Barbie W OT to request MOCA screen on patient per acute rehab request.     Confirmed acceptance for acute rehab tomorrow 12/28 with Adriana in admissions. No bed availability today for acute rehab- scheduled medical wheelchair ride for tomorrow 12/28 @ 1300.  Updated RN and Charge.     Updated patient's daughter, Kaycee and discussed medical transport costs, expecially if not covered by insurance. Per Kaycee, she would prefer medical transport. She will also check with family and call [phone] if a family member prefers to transport patient to rehab. Patient updated.     CM following.

## 2021-06-14 NOTE — PLAN OF CARE
" Alert and oriented but confused and and forgetful, denies pain, shortness of breath. He is almost up the whole shift, moved to bed to chair and chair to bed. Per patient he is thirsty and ice chips was helpful yet at times he coughs in between.   He do self transfer even with reminders to call.   He really needs to have 1: 1.  Early of the shift pressure was soft  and c/o dizziness, held Metoprolol.  CT is intact, TF at 50 ml/hr.   He tried to urinate multiple times, bladder scan of 668 ml but refused straight cath, urinated 690  ml there after.  /59 (Patient Position: Sitting)   Pulse 91   Temp 98  F (36.7  C) (Oral)   Resp 18   Ht 5' 10\" (1.778 m)   Wt 184 lb (83.5 kg)   SpO2 95%   BMI 26.40 kg/m    Will continue to monitor.    Problem: Pain  Goal: Patient's pain/discomfort is manageable  Outcome: Progressing     Problem: Safety  Goal: Patient will be injury free during hospitalization  Outcome: Progressing     Problem: Safety  Goal: Patient will be injury free during hospitalization  Outcome: Progressing     Problem: Daily Care  Goal: Daily care needs are met  Outcome: Progressing     "

## 2021-06-14 NOTE — PROGRESS NOTES
"Occupational Therapy Cognitive Screen: Short Blessed Test (SBT)  SBT Scoring:  Year 1 X 4 (aphasic-like response noted, report \"2012\")  Month 1 X 3 (aphasic-like response noted, report \"2012\")  Phrase Repetition C trials  Time 1 X 3   Response: 3:40  Actual Time: 2:30  Count Backward 1 X 2   Months in Reverse 2 X 2   Repetition of Name/Address 2 X 2     Total Weighted Score: 20  (Range 0 - 28)     *PPE barrier noted, increased verbal volume provided.     Score interpretation: Patient participated in a cognitive screen, the SBT, to assist with safe treatment and discharge planning today. As of this assessment, patient presents with decreased cognition, making new learning difficult; further cognitive assessment recommended-occupational therapy driving assessment recommended prior return to driving. Recommend patient has increased structure and support with all ADL/IADL at this time to ensure safety. Recommend continue use of compensatory memory strategies, such as using a visual aids (i.e., updating welcome board during admit for accurate date, notes, handouts), alarms/verbal reminders. Skilled occupational therapy continues to be necessary to increase safety and self efficacy with ADL/IADL, and to further assess cognitive function as it pertains to safety with participation in daily roles, habits, and routines.    Barbie Koch OTR/SOFIYA   12/24/2020        "

## 2021-06-14 NOTE — PLAN OF CARE
Problem: Occupational Therapy  Goal: OT Goals  Description: Patient will demonstrate the following by 12/19/2020, in order to maximize independence with ADL/IADL performance:   -Be able to get in/out of bed and up/down from chairs/toilet with mod I assist while adhering to cardiac precautions. (sternal/arm use for pacer's etc.)   -Be able to complete functional mobility in halls for a distance of 600  feet without symptoms in order to return to previous living situation or transfer to appropriate next level of care, safely.   -Be able to complete 12 stairs in order to do the same at home without symptoms independently.   -Be able to maintain vitals within normal limits during activity to ensure safe discharge.   -Be able to verbalize his/her understanding of the importance of daily home exercise program and be given the appropriate walking program given their performance to encourage daily exercise at home before discharge.   -Be able to verbalize and demonstrate understanding of precautions with ADL/home activity before discharge.   -Be able to verbalize understanding of cardiac warning signs and symptoms and emergency plan before discharge.   -Be able to verbalize understanding of cardiac lifestyle management strategies.    Goals entered on 12/4/2020 by MARY Lr/L      Due to medical complications and slow progress, goals have been updated as follows:  Patient will demonstrate the following by 12/26/2020, in order to maximize independence with ADL/IADL performance:   -Be able to get in/out of bed and up/down from chairs/toilet with SBA while adhering to cardiac precautions. (sternal/arm use for pacer's etc.)   -Be able to complete functional mobility in halls for a distance of  200 feet without symptoms in order to return to previous living situation or transfer to appropriate next level of care, safely.   -Be able to maintain vitals within normal limits during activity to ensure safe discharge.    -Be able to verbalize his/her understanding of the importance of daily home exercise program and be given the appropriate walking program given their performance to encourage daily exercise at home before discharge.   -Be able to verbalize and demonstrate understanding of precautions with ADL/home activity before discharge.   -Be able to verbalize understanding of cardiac warning signs and symptoms and emergency plan before discharge.   -Be able to verbalize understanding of cardiac lifestyle management strategies.       -Complete LB dressing with mod A, using appropriate adaptive equipment PRN   -Participate in grooming/hygiene tasks with SBA in standing    Goals entered on 12/19/2020 by Danuta Kahn OTR/L      Patient will demonstrate the following by 12/30/2020, in order to maximize independence with ADL/IADL performance:   -Be able to get in/out of bed and up/down from chairs/toilet with SBA while adhering to cardiac precautions. (sternal/arm use for pacer's etc.)   -Be able to complete functional mobility in halls for a distance of  250 feet without symptoms in order to return to previous living situation or transfer to appropriate next level of care, safely.   -Be able to maintain vitals within normal limits during activity to ensure safe discharge.   -Be able to verbalize his/her understanding of the importance of daily home exercise program and be given the appropriate walking program given their performance to encourage daily exercise at home before discharge.   -Be able to verbalize and demonstrate understanding of precautions with ADL/home activity before discharge.   -Be able to verbalize understanding of cardiac warning signs and symptoms and emergency plan before discharge.   -Be able to verbalize understanding of cardiac lifestyle management strategies.       -Complete LB dressing with mod A, using appropriate adaptive equipment PRN   -Participate in grooming/hygiene tasks with SBA in  standing  New goal(s):   -Patient will participate in cognitive screen in order to assist with safe treatment and discharge planning.     Goals reviewed/updated on 12/24/2020 by MARY Green/L          Outcome: Completed    ....Occupational Therapy/Cardiac Rehab Discharge Summary    Date of OT Discharge: 12/28/2020   Refer to daily doc flowsheet for equipment issued and current functional status.  Discharge Destination: acute rehab  Discharge Comments:     Please note that if goals are not fully met the patient is making progress towards established goals, which is documented in flowsheet notes. If further therapy is recommended it is related to documented deficits, and is necessary to maximize functional independence in order for patient to return to previous level of function.      12/28/2020 by Melissa Grant OT

## 2021-06-14 NOTE — PROGRESS NOTES
Paged Dr. Alvarez: 98/68, slight dizzy, no parameter for his Metoprolol.   Pls advise. Thanks  Awaiting call back.

## 2021-06-14 NOTE — PLAN OF CARE
Problem: Safety  Goal: Patient will be injury free during hospitalization  Outcome: Progressing     Problem: Potential for Falls  Goal: Patient will remain free of falls  Outcome: Progressing     Problem: Nutrition  Goal: Nutrition appropriate for coronary artery disease state  Outcome: Progressing   Pt Aaox2, RA Leeroy any shortness of breath , NGT with Binder TF @ 50cc/hr with 120 H2O flush. Does not fallow commands or directions. Refuses cares. Coughs with ice. No s/s of distress, CB and phone in reach. Bed and chair alarm on . Will continue to monitor.  .Tracy Kim RN

## 2021-06-14 NOTE — TELEPHONE ENCOUNTER
Attempted to reach patient to assist him in making f/u appt, not able to get through on the line listed. sk/RN

## 2021-06-14 NOTE — PROGRESS NOTES
[facility] Unit         I have continued to follow Mika Alvarez for possible admission to LTACH pending clinical readiness and bed availability. Primary delays to this point have been a  plan for chest tube management and LTACH bed availability.        By criteria the only primary clinical indicator for consideration of admission to LTACH has  been his need for chest tube management. Other needs such as therapy, 1:1 reduction and nutrition mgmt via TF are secondary considerations and not qualifying criteria for admission.        The pt had his right chest tube d/c'd 12/22. His left chest tube is currently to water seal and may be discontinued soon pending MD evaluation regarding output and the pt's clinical status.       Medicare LTACH guidelines require that the pt meet length of stay criteria of approximately 25-26 days. If the remaining chest removal will be occurring soon there will be no criteria for the pt to admit to LTACH. Also, there are currently no LTACH beds available and no immediate projected discharges.          I will continue to follow in the event that a bed becomes available and the pt is appropriate for admission.         Umu Marie RN  LTACH Referral Specialist  [address] [address]  [email or web address]    [email or web address]  Office: [phone]  Fax: [phone]     CONFIDENTIAL Protected under [place] Statute  145.61 et seq

## 2021-06-14 NOTE — PROGRESS NOTES
Thank your for the referral, we will continue to follow this patient for post acute placement.    Patient meets criteria for [facility] once stable for 24 hrs off of the sitter and stable w/o chest tube for 24 hrs.  Pt is OK to come with NJ if he has met above two criteria.    Determination of admission is based upon the patient's need for an intensive interdisciplinary approach to rehabilitation, their ability to progress, their ability to tolerate intensive therapies, their need for daily physician management, their need for twenty four hour rehab nursing assistance, and their ability and willingness to participate in such a program.    Alisia Marr, PT  [facility] [place]  [facility]   [phone] (office)

## 2021-06-14 NOTE — PROGRESS NOTES
Speech Language/Pathology  Speech Therapy Daily Progress Note  Patient presents as resistive during this session.    Attempted to see pt for completion of pharyngeal exercises. Pt declined participation multiple times despite education from writer and RN. RN addressing behavior/agitation.     Plan/Recommendations  Continue ST to address dysphagia.     Repeat VFSS in about a week.    The ST Care Plan has been reviewed and current plan remains appropriate.    10 volume minutes- no charge     Eileen Casillas MA, CCC-SLP

## 2021-06-14 NOTE — PROGRESS NOTES
Code Status:  FULL CODE  Visit Type: Problem Visit (Status post CABG)     Facility:  [facility] [[id number]]             History of Present Illness: Mika Alvarez is a 70 y.o. male who I am seeing today for follow-up on the TCU.  Patient recently hospitalized on 12/2/2020.  Patient initially presented with shortness of breath.  Past medical history includes obesity, hypertension, diabetes mellitus type 2, CAD and CHF.  Patient was eventually diagnosed with decompensated heart failure.  Patient had been hospitalized 1 month prior following CABG with severe multivessel coronary artery disease however patient deferred surgery.  Patient underwent four-vessel coronary artery bypass surgery on 12/7/2020.  He had harvesting from the right lower extremity.  Bilateral pulmonary vein isolation and left atrial appendage excision was also performed due to history of atrial fib.  Surgery was uneventful.  On postop day 1 patient had rhythmic left leg jerking movements and upward gaze.  Neurology was consulted due to concern for seizure.  He underwent EEG which was negative x2.  Head CT demonstrated pump shaped foci of increased attenuation within the subcortical region of the left frontal lobe.  An MRI was obtained which demonstrated a 2.5 cm left frontal lobe infarct with petechial hemorrhage.  Keppra was eventually started due to continued left leg jerking of both the brain lesion did not fit the symptoms.  He also had some fevers and leukocytosis post operative.  Cultures were drawn.  Sputum on 12/10 resulted with 4+ beta analytic strep and half blood cultures resulted positive blood likely contaminant as subsequent cultures were negative on 12/12.  Urine culture was negative.  NG was negative.  He was treated empirically with Vanco and Zosyn.  Vanco was discontinued after 4 days and patient completed an 8-day course of Zosyn.  White count around 13.  C. difficile was negative on 12/24.  It was also suggested  that patient may have had silent aspiration as he had failed a swallow study and video swallow.  He did have an NG tube which was placed and he received tube feedings.  He had waxing and waning of his mental status however this did improve over time.  Chest tubes were removed after a long period of time with excessive output.  Patient had some urinary retention and Koenig catheter was placed.  He was started on Flomax.  This has been discontinued he is voiding adequately.  Underlying diabetes mellitus type 2.  His glipizide was decreased to her lower dose.  He did have some atrial fib with rapid ventricular response early on in his hospital stay.  He was discharged on a short course of amiodarone.  The surgeon's preference was not to anticoagulate.  Wounds on his bilateral shins.  He has a low ejection fracture and will continue with diuresis for maintenance on torsemide.    Today patient sitting up in bedside chair.  He denies any shortness of breath.  No chest pain.  Incision to his chest is well-healed.  He is eating and drinking well.  I see a box of [employer] pizza that has been consumed.  He is very noncompliant with his diabetic diet.  He tells me is having regular bowel movements.  He has trace lower extremity edema.  He does have 2 open areas to the right lower extremity with moderate serous drainage.  No redness or warmth.  I did explain the detail of events that happened during hospitalization.  He was somewhat unclear.  Reviewed his open heart surgery as well as post surgery stroke.  He does continue on amiodarone.  No chest pain or palpitations.  Heart rate is regular.  Blood pressure satisfactory.  Continues on Flomax.  He is voiding without difficulty.      Active Ambulatory Problems     Diagnosis Date Noted     Obesity      ACP Staging Stage 1 Hypertension: 140-159 / 90-99      Coronary artery disease involving native heart with other form of angina pectoris, unspecified vessel or lesion type (H)       Type 2 diabetes mellitus (H)      Joint Pain, Localized In The Shoulder      Hyperlipidemia, unspecified hyperlipidemia type 04/27/2016     Moderate episode of recurrent major depressive disorder (H) 06/21/2019     Pulmonary edema cardiac cause (H) 09/19/2020     New onset atrial fibrillation (H)      Acute systolic heart failure (H)      CAD (coronary artery disease) 09/19/2020     New onset a-fib (H) 09/19/2020     Acute on chronic clinical systolic heart failure (H) 12/02/2020     Atrial fibrillation with rapid ventricular response (H) 12/02/2020     Diabetic leg ulcer (H) 12/02/2020     Acute on chronic systolic CHF (congestive heart failure) (H) 12/02/2020     A-fib (H) 12/02/2020     Uncontrolled type 2 diabetes mellitus with hyperglycemia (H)      Acute respiratory failure with hypoxia (H)      On mechanically assisted ventilation (H)      S/P CABG (coronary artery bypass graft)      Seizures (H)      Cerebrovascular accident (CVA), unspecified mechanism (H)      Cerebrovascular accident (CVA) due to other mechanism (H)      Metabolic encephalopathy      Disorientation      Acute kidney failure with tubular necrosis (H) 12/16/2020     Resolved Ambulatory Problems     Diagnosis Date Noted     No Resolved Ambulatory Problems     Past Medical History:   Diagnosis Date     Acute metabolic encephalopathy      Acute on chronic systolic (congestive) heart failure (H)      Coronary Artery Disease      CVA (cerebral vascular accident) (H)      Dysphagia      HTN (hypertension)      Hyperglycemia      Ischemic cardiomyopathy      Other and unspecified hyperlipidemia 04/27/2016     Type 2 Diabetes Mellitus With Complication      Urinary retention        Current Outpatient Medications   Medication Sig     acetaminophen (TYLENOL) 325 mg/10.15 mL Soln Take 20.3 mL by mouth every 4 (four) hours as needed.     aspirin 81 mg chewable tablet 1 tablet (81 mg total) by Enteral Tube route daily. (Patient taking differently: Chew  81 mg daily. )     atorvastatin (LIPITOR) 80 MG tablet 1 tablet (80 mg total) by Enteral Tube route at bedtime. (Patient taking differently: Take 80 mg by mouth at bedtime. )     dextrose (DEXTROSE) 40 % gel Take 15-30 g by mouth every 15 (fifteen) minutes as needed.     dextrose 50% solution Infuse 25-50 mL into a venous catheter every 15 (fifteen) minutes as needed.     famotidine (PEPCID) 20 MG tablet 1 tablet (20 mg total) by Enteral Tube route 2 (two) times a day. (Patient taking differently: Take 20 mg by mouth 2 (two) times a day. )     glucagon,human recombinant (GLUCAGON EMERGENCY KIT, HUMAN, INJ) Inject 1 mg as directed as needed.     LANTUS U-100 INSULIN 100 unit/mL vial Inject 25 Units under the skin at bedtime. 11.65 Type 2 with hyperglycemia  Contact provider if insulin prescribed is not the preferred insulin per insurance. (Patient taking differently: Inject 18 Units under the skin every morning. and 15 units at bedtime)     levETIRAcetam (KEPPRA) 500 MG tablet Take 500 mg by mouth 2 (two) times a day.     melatonin 3 mg Tab tablet 1 tablet (3 mg total) by Enteral Tube route at bedtime. (Patient taking differently: Take 3 mg by mouth at bedtime. )     metFORMIN (GLUCOPHAGE) 1000 MG tablet 1 tablet (1,000 mg total) by Enteral Tube route 2 (two) times a day with meals. (Patient taking differently: Take 1,000 mg by mouth 2 (two) times a day with meals. )     metoprolol tartrate (LOPRESSOR) 25 MG tablet 1 tablet (25 mg total) by Enteral Tube route 2 (two) times a day. (Patient taking differently: Take 12.5 mg by mouth 2 (two) times a day. )     multivitamin with minerals tablet Take 1 tablet by mouth daily.     NOVOLOG U-100 INSULIN ASPART 100 unit/mL injection Check blood sugar four (4) times daily.  11.65 Type 2 with hyperglycemia (Patient taking differently: Inject under the skin 3 (three) times a day. Per sliding scale; 140-189=1 unit, 190-239=2 units, 240-289=3 units, 290-339=4 units, 340-399=5  units, 400-449=6 units    Bedtime sliding scale; 200-249=1 unit, 250-299=2 units, 300-349=3 units, 350-399=4 units, >399=5 units)     ondansetron (ZOFRAN-ODT) 4 MG disintegrating tablet Take 4 mg by mouth every 6 (six) hours as needed for nausea.     senna (SENOKOT) 8.6 mg tablet Take 1 tablet by mouth 2 (two) times a day as needed for constipation.       No Known Allergies        Review of Systems  No fevers or chills. No headache, lightheadedness or dizziness. No SOB, chest pains or palpitations. Appetite is good. No nausea, vomiting, constipation or diarrhea. No dysuria, frequency, burning or pain with urination.  Patient denies any pain.  Denies any dysphagia.  Otherwise review of systems are negative.     Physical Exam  PHYSICAL EXAMINATION:  Vital signs: 157/83, pulse 92, respirations 18, temperature 97.5, O2 sat 100% on room air.  Weight 196.8 pounds.  General: Awake, Alert, oriented x3, appropriately, follows simple commands, conversant  HEENT:PERRLA, Pink conjunctiva, anicteric sclerae, moist oral mucosa  NECK: Supple, without any lymphadenopathy, or masses  CVS:  S1  S2, without murmur or gallop.  Incision to chest dry and intact with glue.  LUNG: Clear to auscultation, No wheezes, rales or rhonci.  BACK: No kyphosis of the thoracic spine  ABDOMEN: Soft, nontender to palpation, with positive bowel sounds  EXTREMITIES: Good range of motion on both upper and lower extremities, trace pedal edema, Tubigrip's on, no calf tenderness  SKIN: Right lower extremity wounds with moderate serous drainage.  Maceration to the periwound.  No redness or warmth.  NEUROLOGIC: Intact, pulses palpable  PSYCHIATRIC: Cognition intact      Labs:  A  Lab Results   Component Value Date    WBC 13.0 (H) 12/28/2020    HGB 13.3 (L) 12/28/2020    HCT 43.4 12/28/2020    MCV 86 12/28/2020     12/28/2020     Results for orders placed or performed during the hospital encounter of 12/02/20   Basic Metabolic Panel   Result Value Ref  Range    Sodium 143 136 - 145 mmol/L    Potassium 3.9 3.5 - 5.0 mmol/L    Chloride 110 (H) 98 - 107 mmol/L    CO2 24 22 - 31 mmol/L    Anion Gap, Calculation 9 5 - 18 mmol/L    Glucose 273 (H) 70 - 125 mg/dL    Calcium 8.1 (L) 8.5 - 10.5 mg/dL    BUN 15 8 - 22 mg/dL    Creatinine 0.89 0.70 - 1.30 mg/dL    GFR MDRD Af Amer >60 >60 mL/min/1.73m2    GFR MDRD Non Af Amer >60 >60 mL/min/1.73m2           Assessment/Plan:    1. S/P CABG x 4   patient denies any chest pain.  No shortness of breath.  Continues on aspirin and statin.  Follow-up CBC on Thursday.   2. Acute diastolic heart failure (H)   continues on torsemide.  Follow-up BMP and BNP on Thursday.   3. Uncontrolled type 2 diabetes mellitus with hyperglycemia (H)   patient noncompliant with diabetic diet.  He continues on Lantus, Metformin and sliding scale.  Metformin was decreased during hospitalization.   4. Oropharyngeal dysphagia   he was evaluated by speech therapy and refused.  He is eating regular diet.   5. Essential hypertension   satisfactory control.   6. Sepsis due to methicillin susceptible Staphylococcus aureus, unspecified whether acute organ dysfunction present (H)   patient has completed his antibiotic course.   7. History of CVA (cerebrovascular accident) without residual deficits   no residual.  No further rhythmic movement of the right lower extremity.   8. Atrial fibrillation, unspecified type (H)   rate controlled with amiodarone and metoprolol.         45 minutes spent of which greater than 50% was face to face communication with the patient about his open heart surgery, post surgery course as well as CVA and current plan of care.    This note has been dictated using voice recognition software. Any grammatical or context distortions are unintentional and inherent to the software    Electronically signed by: Lupe Rendon, HEATHER

## 2021-06-14 NOTE — PROGRESS NOTES
CVTS Daily Progress Note   12/23/2020   POD#16 s/p CABGx4, LAAE, PVI  Attending: Los   LOS: 21 days     SUBJECTIVE/INTERVAL EVENTS:    No acute events overnight. Patient making slow progress. He is tired today as he did not sleep much last night. Alert and oriented. Maintaining oxygen saturations on room air. Normotensive. Currently still with tube feeds; repeat video swallow fail. +BM. UOP adequate. Chest tube output continues to be moderately high on the left. Right pleural tube removed yesterday. Patient denies pain and has no questions. Realizes that he needs to continue working with therapy to get better.    OBJECTIVE:    Temp:  [97.1  F (36.2  C)-98.3  F (36.8  C)] 97.4  F (36.3  C)  Heart Rate:  [78-93] 93  Resp:  [16-20] 18  BP: ()/(61-74) 104/63  Wt Readings from Last 2 Encounters:   12/23/20 0508 184 lb (83.5 kg)   12/22/20 0611 186 lb 9.6 oz (84.6 kg)   12/21/20 0558 189 lb 14.4 oz (86.1 kg)   12/20/20 0416 189 lb 9 oz (86 kg)   12/19/20 0317 201 lb 14.4 oz (91.6 kg)   12/18/20 0647 203 lb 9.6 oz (92.4 kg)   12/17/20 0517 214 lb 9.6 oz (97.3 kg)   12/16/20 0415 207 lb 4.8 oz (94 kg)   12/16/20 0005 207 lb 4.8 oz (94 kg)   12/15/20 0630 212 lb (96.2 kg)   12/14/20 0400 216 lb 1.6 oz (98 kg)   12/12/20 0400 206 lb 14.4 oz (93.8 kg)   12/11/20 0630 210 lb 4.8 oz (95.4 kg)   12/10/20 0500 212 lb (96.2 kg)   12/09/20 0414 217 lb 2.5 oz (98.5 kg)   12/08/20 0345 219 lb 5.7 oz (99.5 kg)   12/07/20 0417 218 lb 8 oz (99.1 kg)   12/06/20 0301 220 lb 6.4 oz (100 kg)   12/05/20 0340 220 lb 1.6 oz (99.8 kg)   12/04/20 0354 (!) 226 lb (102.5 kg)   12/03/20 1236 (!) 223 lb (101.2 kg)   12/03/20 0441 (!) 223 lb (101.2 kg)   12/02/20 1722 (!) 224 lb 3.2 oz (101.7 kg)   12/02/20 1401 (!) 237 lb 9.6 oz (107.8 kg)   11/05/20 1302 (!) 223 lb (101.2 kg)       Current Medications:    Scheduled Meds:    amino acids-protein hydrolys  2 packet Enteral Tube DAILY     amiodarone  200 mg Enteral Tube BID     aspirin  81  mg Enteral Tube DAILY    Or     aspirin  150 mg Rectal DAILY     atorvastatin  80 mg Enteral Tube QHS     famotidine  20 mg Enteral Tube BID    Or     famotidine (PEPCID) injection  20 mg Intravenous BID     guar gum  1 packet Enteral Tube BID     heparin (PF) ANTICOAGULANT  5,000 Units Subcutaneous Q8H FIXED TIMES     insulin aspart (NovoLOG) injection   Subcutaneous Q6H FIXED TIMES     insulin glargine  25 Units Subcutaneous BID     levETIRAcetam  (KEPPRA) solution 100 mg/mL  500 mg Enteral Tube BID     lidocaine (PF)  1-5 mL Intradermal Once     melatonin  3 mg Enteral Tube QHS     metFORMIN  1,000 mg Enteral Tube BID with meals     metoprolol tartrate  50 mg Enteral Tube BID     multivitamin with iron-mineral  15 mL Enteral Tube DAILY     QUEtiapine  12.5 mg Enteral Tube QHS     sodium chloride bacteriostatic  1-5 mL Intradermal Once     sodium chloride  3 mL Intravenous Q8H FIXED TIMES     [START ON 12/24/2020] torsemide  100 mg Enteral Tube DAILY     white petrolatum   Topical DAILY     Continuous Infusions:    dextrose 10%       PRN Meds:.acetaminophen, acetaminophen, aluminum-magnesium hydroxide-simethicone, bisacodyL, bisacodyL, dextrose 10%, dextrose 50 % (D50W), docusate sodium (COLACE) 50 mg/5 mL oral liquid, glucagon (human recombinant), lipase-protease-amylase **AND** sodium bicarbonate, magnesium hydroxide, polyethylene glycol, sodium chloride, sodium chloride bacteriostatic, sodium chloride, sodium phosphates 133 mL    Cardiographics:    Telemetry monitoring demonstrates afib with rates in the 80s per my personal review.    Imaging:    No results found.    Lab Results (most recent, reviewed):    Hemoglobin (g/dL)   Date Value   12/21/2020 11.9 (L)     WBC (thou/uL)   Date Value   12/21/2020 13.1 (H)     Potassium (mmol/L)   Date Value   12/23/2020 4.0     Creatinine (mg/dL)   Date Value   12/20/2020 0.89     Hemoglobin A1c (%)   Date Value   12/03/2020 12.6 (H)     Magnesium (mg/dL)   Date Value    12/18/2020 1.9     Calcium (mg/dL)   Date Value   12/20/2020 8.1 (L)       I/O:    Intake/Output Summary (Last 24 hours) at 12/23/2020 1031  Last data filed at 12/23/2020 0343  Gross per 24 hour   Intake 1280 ml   Output 830 ml   Net 450 ml        UOP: not documented    CT: not documented.    Physical Exam:    General: Patient seen in bed. Sleepy but awakes. Follows commands.  CV: Afib on monitor. 2+ peripheral pulses in all extremities. Mild edema.   Pulm: Non-labored effort on room air. Chest tube in place, serous output, no air leak. Incision C/D/I.  Abd: Soft, NT, ND  Ext: Mild pedal edema, warm, distal pulses intact. Bilateral leg sores covered with dressings, dry.  Neuro: CN grossly intact      ASSESSMENT/PLAN:    Mika Alvarez is a 70 y.o. male with a history of CAD who is POD#16 s/p CABGx4.    Principal Problem:    Acute on chronic clinical systolic heart failure (H)  Active Problems:    ACP Staging Stage 1 Hypertension: 140-159 / 90-99    Type 2 diabetes mellitus (H)    Hyperlipidemia, unspecified hyperlipidemia type    CAD (coronary artery disease)    Atrial fibrillation with rapid ventricular response (H)    Diabetic leg ulcer (H)    Acute on chronic systolic CHF (congestive heart failure) (H)    A-fib (H)    Uncontrolled type 2 diabetes mellitus with hyperglycemia (H)    Acute respiratory failure with hypoxia (H)    On mechanically assisted ventilation (H)    S/P CABG (coronary artery bypass graft)    Seizures (H)    Cerebrovascular accident (CVA), unspecified mechanism (H)    Cerebrovascular accident (CVA) due to other mechanism (H)    Metabolic encephalopathy    Disorientation    Acute kidney failure with tubular necrosis (H)        NEURO:   - PRN Tylenol for pain  - Melatonin at bedtime  - No further seizure activity since POD#1 although remains on Keppra  - Seroquel nightly    CV:   - Normotensive  - Metoprolol 50mg two times a day   - Amiodarone 200mg two times a day   - No plan for a-fib  anticoagulation given LAAE and surgeon preference  - ASA 81mg  - Atorvastatin 80mg daily  - Right pleural chest tube removed 12/22. Left pleural tube remains.     PULM:   - Maintaining oxygen saturations on room air   - Encourage pulmonary toilet     FEN/GI:  - NPO   - Tube feeds, tolerating  - Speech following, appreciate  - Continue electrolyte replacement protocol  - Bowel regimen    RENAL:  - Adequate UOP/hr. Continue to monitor closely.  - Diuresis with 100mg torsemide daily    HEME:  - Acute blood loss anemia post-op.   - No bleeding concerns. Hep subcutaneous (cleared with neuro), [id number]    ID:  - Ronda op ppx complete.  - Afebrile  - Off antibiotics  - Was on Vanco from 12/10-12/13 and Zosyn from 12/10-12/17    ENDO:   - Sliding scale insulin and Lantus  - Restarted Metformin today as he is having some hyperglycemia. May eventually need glipizide restarted as well.     PPx:   - DVT: SCDs, SQ heparin three times a day, ambulation  - GI: Omeprazole     DISPO:   - General telemetry floor. Will require rehab stay, TBD.        _______  Emma Alexis PA-C  Cardiothoracic Surgery  [phone]

## 2021-06-14 NOTE — PROGRESS NOTES
Clinical Nutrition Therapy Follow Up Note    Current Nutrition Prescription:   Diet: TF, no tray, Peptamen 1.5 with goal rate of 50 mL/hour with water flushes of 180 mL q 4 hours. Two packets NC ProSource and two packets Benefiber daily also ordered.  IV dextrose or Fluids:     dextrose 10%    Current Nutrition Intake:  Enteral nutrition via ND placed 12/15/20.  The current TF regimen provides 1952 kcals, 111 g protein, 234 g CHO, 67 g fat, 6 g fiber, 1080 mL water from flushes + 921 mL free fluid from formula= total of 2001 mL fluid daily    The pt continues with ice chips for PO.    Anthropometrics:  Admission weight: 217 lb-no source  Weight: 184 lb (83.5 kg)-scale  All scale weights have been in the 180s. Pt is still on diuretics.    GI Status/Output:   BM 12/23-loose  Stools noted to be loose or formed the last couple days with fiber added, vs watery.    Skin/Wound:  Venous ulcers and surgical incisions noted    Medications:  Lipitor, pepcid, novolog, lantus, keppra, metformin, multivitamin, lepressor, torsemide    Labs:  , 216, 268, 176    Estimated Nutrition Needs:  Assessment weight is 81 kg, adjusted weight     Energy Needs: 0269-9350 kcals daily, 20-25 kcal/kg  Protein Needs:  g daily, 1.2-1.8 g/kg.  Fluid Needs: 2025 mls daily, 25 mls/kg or per provider    Malnutrition: Noted previously-severe    Nutrition Risk Level: stable-high risk    Nutrition dx:  Altered nutrition related laboratory values r/t DM as evidenced by HgbA1c of 12.6%.      Swallowing difficulty r/t recent intubation & noted left frontal ischemic stroke as evidenced by dysphagia and failed VFSS     Malnutrition r/t acute illness as evidenced by NPO x 5 days & >2% wt loss in a week.    Goal Status:  PO intake > 75% and Diet advance -not progressing due to high aspiration risk-d/c goal  Tolerate TF & meet estimated needs-progressing  Normalize GI function as able-progressing    Intervention:  Continue current TF  regimen    Monitoring/Evaluation:  TF tolerance, wt, labs    Ericka Adkins, RD, LD

## 2021-06-14 NOTE — PROGRESS NOTES
Mika Alvarez  1950  [id number]    Reason for visit: Mika Alvarez is 70 y.o. year old male was scheduled to have routine telephone visit follow up today.    Hospital course was complicated by fevers and sputum 4+ beta hemolytic strep and 1/2 + blood cx likely contaminant as subsequent cultures negative. He was treated with empiric antibiotic. His mental status waxed and waned through out his hospitalization. He failed repeated swallow studies and an NJ feeding tube was placed. Pt refused a PEG tube and he was discharged to [facility] with a feeding tube.    Patient did have some a-fib with RVR early in his hospital stay and was rate controlled at time of discharge. He was discharged on a short course of amiodarone. Of note, surgeon preference is to not anticoagulate for a-fib s/p LAAE.    Pt was not seen or spoken to as he remains in Acute rehab. Updated by RN who states he continues to improve, behavior issues being his main issue. Currently he has now passed a swallow study test and is getting a pureed diet.     Pt is participating in Rehab during the day, but continues to have some behavior/change in mental status issues at night.    RN reports that his incisions are healing nicely without concern. He participates in rehab during the day    12/7/2020 with Dr. Madison  1.  Coronary artery bypass grafting x 4 (reversed saphenous vein graft to the right posterior descending coronary artery, reversed saphenous vein graft to the ramus intermedius branch, reversed saphenous vein graft to the diagonal branch of the left anterior descending coronary artery, and pedicled left internal mammary artery to left anterior descending coronary artery).  2.  Endoscopic vein harvest of the greater saphenous vein from the right lower extremity.  3.  Bilateral pulmonary vein isolation using full thickness radiofrequency ablation.  4.  Left atrial appendage excision     Day of discharge: 12/28/20  Discharge to [facility]  Past Medical  History:   Diagnosis Date     ACP Staging Stage 1 Hypertension: 140-159 / 90-99     Created by Conversion  Replacement Utility updated for latest IMO load     Atrial fibrillation with rapid ventricular response (H) 12/2/2020     Coronary Artery Disease     Created by Conversion  Replacement Utility updated for latest IMO load     Joint Pain, Localized In The Shoulder     Created by Conversion      Obesity     Created by Conversion      Other and unspecified hyperlipidemia 4/27/2016     Type 2 Diabetes Mellitus With Complication     Created by Conversion      Past Surgical History:   Procedure Laterality Date     CV CORONARY ANGIOGRAM N/A 9/22/2020    Procedure: Coronary Angiogram;  Surgeon: Darin Perez MD;  Location: [facility] Cath Lab;  Service: Cardiology     CV LEFT HEART CATHETERIZATION WITH LEFT VENTRICULOGRAM N/A 9/22/2020    Procedure: Left Heart Catheterization with Left Ventriculogram;  Surgeon: Darin Perez MD;  Location: [facility] Cath Lab;  Service: Cardiology     PICC AND MIDLINE TEAM LINE INSERTION  12/14/2020              Plan  Pe RN report     Surgically doing well. Incisions are healing well with no signs of infection. Sternum is stable.  Vital signs sometime with low B/P with interventions in place.     Reviewed sternal precautions/lifting restrictions with RN. No lifting greater than 10 lbs for 2 months. No excessive use of arm.       No need for further follow-up with CV surgery unless concerns.      Feel free to call our office with questions. [phone]

## 2021-06-14 NOTE — PROGRESS NOTES
No return call yet from [place] [facility] admissions. ( Unknown if someone was working 12/25/2020 due to holiday). Noted previous CM left message- checked messages on ext #[phone]- no return call yet.     Noted discharge orders entered. Called and spoke with María Elena in admissions @ [facility]. Per María Elena, no bed available today unless cancellation; will call this writer back if bed available.  Requested return call to [phone]. Updated RN and patient.      Per [facility] at [facility] requesting transport be set up for 1230 today, pending medical review/ acceptance. Called [facility] transport and arranged wheelchair ride as requested. Next available at 1300 today. Called María Elena back and updated -still awaiting medical acceptance. Will call back once MD confirms.     11:38 AM Received call from Radha re: unable to reach patient's family; still awaiting confirmation from rehab MD re: acceptance for today.    Called and left update for patient's daughter, Kaycee re: potential discharge today to [facility]. Requested return call to [phone] if questions.    Called and left message for patient's wife, Bel re: potential discharge today pending acceptance.  Requested return call to [phone] if questions.      Spoke with Radha in admissions- due to increasing Creatinine trend, acute rehab MD declined acceptance for today. Acute rehab admissions has spoken to JOSE D Carter with Cardiovascular surgery (CV) re: diuretic, creatinine. Plan to review again tomorrow.     Called and rescheduled wheelchair ride for tomorrow 12/27 @ 1300 pending acceptance. No return call yet from family. Due to holiday weekend, will try again tomorrow 12/28.   ____________________________________________________________________________  Late entry: 1544 Received return call from patient's daughter, Kaycee who had her mother Bel with her and cellphone on speaker so both could hear. Updated re: discharge  delayed due to kidneys/ creatinine level. Anticipate discharge tomorrow pending lab levels. No further questions at this time.

## 2021-06-14 NOTE — PLAN OF CARE
Problem: Pain  Goal: Patient's pain/discomfort is manageable  Outcome: Progressing     Problem: Daily Care  Goal: Daily care needs are met  Outcome: Progressing     Problem: Psychosocial Needs  Goal: Demonstrates ability to cope with hospitalization/illness  Outcome: Progressing   Pt denies pain or discomfort during this shift. NG tube is patent and flushes okay. Tube feeding is infusing at 50 ml/hr. Pt had been shuttling between the recliner and bed and went through this many times. Chest tube dressing is clean and intact. Chest tube out put was 17 ml. Patient used the urinal and voided ok. It looks like he needs to be prompted. Will continue to monitor.

## 2021-06-14 NOTE — TELEPHONE ENCOUNTER
Letter sent to patient to advise him to call and arrange f/u HF appt post hospital. Unable to reach him by phone. sk/RN

## 2021-06-14 NOTE — PROGRESS NOTES
Care Management Follow Up Note    Length of Stay (days) 21     Patient plan of care discussed at Interdisciplinary Rounds: yes  CM Patient/Caregiver Stated Goals: Discharge to TCU     Expected Discharge Date: 12/25/20(pending)  Concerns to be Addressed / Barriers to Discharge:  Chest Tube    Anticipated Discharge Disposition: Skilled Nursing Facility  Anticipated Discharge Services:   [facility]   Selected Continued Care - Admitted Since 12/2/2020     Destination Coordination complete    Service Provider Selected Services Address Phone Fax Patient Preferred Last Updated    [facility]  Inpatient Rehabilitation [facility], [address] [place] [address], 5th floor, [facility] [address] [phone] -- Anthony Crews RN 12/23/2020 1413               Anticipated Discharge DME:  None    Plan:  The writer spoke with Umu Marie from [facility] and Dipti Marr from [facility] today. Please see their notes on 12/23 for details. At this point, [facility] can accept the patient with an NJ tube for tube feeding as long as the following criteria are met: 1) the patient has to be off of a 1:1 for at least 24 hours, and 2) the patient has to have had the chest tube removed and be medically stable for 24 hours or more. If the patient meets criteria, he could be admitted for acute rehab by Saturday. Please see Dipti Marr's note on 12/23. For patient background, please see the previous note below.    Previous Care Management Note:   The patient had a CABG on 12/7, and the post-op course was complicated by a subacute left frontal infarct. Subsequent to this, he failed a video swallow and had a NJ tube placed for tube feeding. The patient has a video swallow study to be completed on 12/21, and pending the results, there may be a need for a more long-term tube feeding plan.     Anthony Crews, RN

## 2021-06-14 NOTE — PLAN OF CARE
Patient is alert and oriented to himself, disoriented to place, time, and situation. He is aware that he is in [place], but not that he is in a hospital. Orientation fluctuates per RN report. Vital signs have been stable, denies pain. Not on telemetry. Lung sounds are clear/diminshed.     Tube feeding running at 50 mL/hour. Ice chips provided to patient for dry mouth as well as mouth moisturizer. Frequent repositioning with pillow support, however, patient often removes pillows or sits up on his own requiring frequent boosts in bed.    Updated patient's family on plan for tomorrow. Wheelchair ride for tomorrow at 13:00 to  acute rehab per care management note.

## 2021-06-14 NOTE — PLAN OF CARE
Problem: Pain  Goal: Patient's pain/discomfort is manageable  Outcome: Progressing  Denies pain, PAINAD score 0   Problem: Daily Care  Goal: Daily care needs are met  Outcome: Progressing   All care needs are anticipated by staff.  Problem: Discharge Barriers  Goal: Patient's discharge needs are met  Outcome: Progressing   Patient will discharge to acute rehab on 12/28.  Problem: Glucose Imbalance  Goal: Achieve optimal glucose control  Outcome: Progressing   BG remains WNL.  Problem: Urinary Incontinence  Goal: Perineal skin integrity is maintained or improved  Outcome: Progressing   Incontinent of urine, perineum remains intact.  Problem: Decreased Mental Status Causing Increased Need for Safety  Goal: Provide a safe environment for patient (Implement appropriate elements in plan of care)  Outcome: Progressing   Patient's room changed to close to nursing station, bed alarm on overnight.   Problem: Nutrition  Goal: Nutrition appropriate for coronary artery disease state  Outcome: Adequate for Discharge   Has tube feeding via ND tube, Peptamen 1.5 @50 ml/hr.

## 2021-06-14 NOTE — ANESTHESIA PROCEDURE NOTES
Peripheral Block    Patient location during procedure: pre-op  Start time: 2/2/2021 5:06 PM  End time: 2/2/2021 5:10 PM  surgical anesthesia  Staffing:  Performing  Anesthesiologist: Silvano Green MD  Preanesthetic Checklist  Completed: patient identified, site marked, risks, benefits, and alternatives discussed, timeout performed, consent obtained, airway assessed, oxygen available, suction available, emergency drugs available and hand hygiene performed  Peripheral Block  Block type: sciatic, popliteal  Prep: ChloraPrep  Patient position: sitting  Patient monitoring: cardiac monitor, blood pressure, heart rate and continuous pulse oximetry  Laterality: left  Injection technique: ultrasound guided    Ultrasound used to visualize needle placement in proximity to nerve being blocked: yes   US used to visualize anesthetic spread  Visualized anatomic structures normal  No Pathological Findings  Permanent ultrasound image captured for medical record    Needle  Needle type: Stimuplex   Needle gauge: 20G  Needle length: 4 in  no peripheral nerve catheter placed  Assessment  Injection assessment: no difficulty with injection, negative aspiration for heme, no paresthesia on injection and incremental injection

## 2021-06-14 NOTE — PLAN OF CARE
Problem: Pain  Goal: Patient's pain/discomfort is manageable  Outcome: Progressing     Problem: Safety  Goal: Patient will be injury free during hospitalization  Outcome: Progressing     Problem: Daily Care  Goal: Daily care needs are met  Outcome: Progressing     Problem: Psychosocial Needs  Goal: Demonstrates ability to cope with hospitalization/illness  Outcome: Progressing  Goal: Collaborate with patient/family/caregiver to identify patient specific goals for this hospitalization  Outcome: Progressing     Problem: Discharge Barriers  Goal: Patient's discharge needs are met  Outcome: Progressing     Problem: Knowledge Deficit  Goal: Patient/family/caregiver demonstrates understanding of disease process, treatment plan, medications, and discharge instructions  Outcome: Progressing     Problem: Potential for Falls  Goal: Patient will remain free of falls  Outcome: Progressing     Problem: Acute pain with Coronary Artery Disease (CAD)  Goal: Patient and nurse identification of acute coronary pain  Outcome: Progressing     Problem: Potential for hemodynamic instability  Goal: Cardiac output is adequate  Outcome: Progressing     Problem: Risk of myocardial ischemia or infarction  Goal: Early detection of myocardial ischemia/infarction  Outcome: Progressing     Problem: Anxiety  Goal: Anxiety is at manageable level  Outcome: Progressing     Problem: Inadequate Coping  Goal: Demonstrates ability to cope effectively  Outcome: Progressing  Goal: Verbalizes adaptive coping mechanisms  Outcome: Progressing  Goal: Verbalizes personal strengths  Outcome: Progressing     Problem: Activity Intolerance/Impaired Mobility  Goal: Mobility/activity is maintained at optimum level for patient  Outcome: Progressing     Problem: Nutrition  Goal: Nutrition appropriate for coronary artery disease state  Outcome: Progressing     Problem: Potential for Compromised Skin Integrity  Goal: Skin integrity is maintained or improved  Outcome:  Progressing  Goal: Nutritional status is improving  Outcome: Progressing     Problem: Urinary Incontinence  Goal: Perineal skin integrity is maintained or improved  Outcome: Progressing     Problem: Decreased Mental Status Causing Increased Need for Safety  Goal: Provide a safe environment for patient (Implement appropriate elements in plan of care)  Outcome: Progressing  Goal: Decrease patient's symptoms  Outcome: Progressing  Goal: To ensure patient safety, patient will abstain from any threats or actions to harm self or others  Outcome: Progressing  Pt has been up and down in the bed all shift. Pt has set off the bed alarm many times. Pt appears to be anxious. Tube feeding continues at 50/hour without problem. Pt has been continent this shift. Blood glucose level at 1200 was 190. Pt received insulin coverage for this.

## 2021-06-14 NOTE — PLAN OF CARE
Problem: Pain  Goal: Patient's pain/discomfort is manageable  12/24/2020 1421 by Fawn Root, RN  Outcome: Progressing  12/24/2020 1331 by Fawn Root RN  Outcome: Progressing     Problem: Safety  Goal: Patient will be injury free during hospitalization  Outcome: Progressing     Problem: Nutrition  Goal: Nutrition appropriate for coronary artery disease state  Outcome: Progressing     Problem: Decreased Mental Status Causing Increased Need for Safety  Goal: Provide a safe environment for patient (Implement appropriate elements in plan of care)  Outcome: Progressing   Patient up to bathroom multiple times to void and assist of 1 to 2 needed to help with a walker. Tube feed per NG tube and taped securely. Patient had loose stools and incontinent of stool on the floor when stood to void in urinal. Good urine output. Hardly any drainage from chest tube connected to water seal. Plan for CT to come out tomorrow. Patient on his call light almost  every 30 minutes for bathroom, ice chips, going back to bed and many staff helping throughout the shift. Close supervision to stay with patient in the bathroom so patient does not get up alone. Bed and chair alarm used. Plan to keep patient off a 1 to 1 so he maybe discharged tomorrow.  Occupational Therapist in with patient working with him on his  cares. Patient c/o not wanting to be in this  condition and would like to speak with patient advocate and updated charge nurse.  in with the patient to discuss his concerns. Plan to keep off 1 to 1 so patient may discharge yet will closely attend to his needs. Texted MD about loose stools and need for cdiff testing. Call light in reach.

## 2021-06-14 NOTE — PROGRESS NOTES
DIABETES CARE NOTE    Pt discharging to [facility] today. Diabetes Educator consulted when pt admitted d/t elevated A1C. Was able to get some information and do a small amount of education, see note from 12/4/20. Since this time, pt has not been appropriate for education.     On 12/4 pt reported that he did not  BG meter so was not checking BG, was also interested in a CGM. I called [facility] today. They report pt picked up Accu-check Guide test strips and fastclix lancets in September (did not  meter and I did not provide him with one in September). Also noted he has refills for these. He did not/does not have RX for meter or insulins.     Interventions:  -Left Accu-Check Guide Meter (w/ fastclix lancets) with pt's belongings.  -Will send update to pt's PCP, including recommendation for f/u with OP diabetes educator.    Recommendations:  -If/when pt becomes appropriate for education would recommend diabetes educator consult IP if available or OP referral.     Yari Ruiz RD, LD, [facility]  Diabetes Educator

## 2021-06-14 NOTE — PROGRESS NOTES
Progress Note    Brief summary:   70 y.o.male with past medical history of uncontrolled T2DM, severe multivessel CAD, chronic systolic and diastolic CHF, recent diagnosis of A. fib, who presented with acute CHF, was diuresed and subsequently underwent four-vessel CABG by Dr. Madison on 12/7/2020. Post-op course complicated by Afib with RVR, which was initially treated with IV amiodarone given pt's NPO status due to severe dysphagia.     Hospitalization complicated by altered mentation and seizure-like activity onset 12/8. CT Head on 12/8 showed punctate focus of increased attenuation within the subcortical region of the left frontal lobe. Subsequent MRI of the brain on 12/9 demonstrated subacute 2.5 cm subacute infarct with petechial hemorrhage in the left frontal lobe. EEG did not show any seizure activity, however Keppra was started 12/10 with resolution of rhythmic left leg movements. Neurology has signed off.     Extubated 12/10 and weaned down to NC. Started on broad spectrum antibiotics 12/10 due to new fever and worsening leukocytosis. 1 of 2 blood Cx grew staph epidermidis, and sputum cultures showed respiratory miryam with 4+ beta hemolytic Strep. 7 days IV pip-tazo completed 12/17.     VFSS with mild oral and severe pharyngeal dysphagia - NGT placed on 12/15. This may have exacerbated hypernatremia, which improved with D5W .    Patient continued to have significant drainage from chest tubes, L>R. R chest tube removed yesterday and left chest tube remains.     Assessment/Plan  Principal Problem:    Acute on chronic clinical systolic heart failure (H)  Active Problems:    ACP Staging Stage 1 Hypertension: 140-159 / 90-99    Type 2 diabetes mellitus (H)    Hyperlipidemia, unspecified hyperlipidemia type    CAD (coronary artery disease)    Atrial fibrillation with rapid ventricular response (H)    Diabetic leg ulcer (H)    Acute on chronic systolic CHF (congestive heart failure) (H)    A-fib (H)     Uncontrolled type 2 diabetes mellitus with hyperglycemia (H)    Acute respiratory failure with hypoxia (H)    On mechanically assisted ventilation (H)    S/P CABG (coronary artery bypass graft)    Seizures (H)    Cerebrovascular accident (CVA), unspecified mechanism (H)    Cerebrovascular accident (CVA) due to other mechanism (H)    Metabolic encephalopathy    Disorientation    Acute kidney failure with tubular necrosis (H)      Multivessel CAD, s/p CABG and PVI/LAAL on 12/7/2020:   Increased pleural drainage, negative for chylous fluid; R chest tube taken out 12/23 , Left tube removed today, monitor for 24 hours  On diuretics-torsemide 100 mg daily  Hemodynamically stable  On ASA, statin, metoprolol  Mgmt as per CV Surgery.      A Fib with RVR, now s/p PVI and DONNA excision:   Oral amiodarone, metoprolol.  Currently NSR  Mgmt as per Cardiology and CV Surgery following.   Cardiac tele.   Anticoagulation not yet started, per [facility].     Acute diastolic heart failure, improving.  Switched to oral torsemide 100 mg daily on 12/24/20  Mgmt as per Cardiology.     Acute hypoxic respiratory failure, resolved: On room air.   Encourage IC.  Supplemental O2 to keep sats >94%.  Continue pulmonary toilet- On metaneb, flutter valve     Acute Metabolic encephalopathy: improving.  Encephalopathy could also be contributed by prolonged hospitalization, hypernatremia, possible pneumonia, medications. CT Head from 12/12 showed no new changes.   EEG did not show seizure activity, but pt's rhythmic left leg movement appear to be have improved with Keppra. Continue keppra, Keppra level therapeutic, pt was started on quetiapine on 12/14   Bed time seroquel reduced on 12/23 for somnolence     Subacute left frontal stroke with petechial hemorrhage:   on ASA, statin.   Neurology recs appreciated.     Sepsis  Fever, Leukocytosis, tachycardia noted 12/10/2020  UA was negative. One of two blood cultures grew staph epi, likely contaminant.  Sputum  culture growing beta-hemolytic strep.   Started on emperic pip-tazo IV,  completed 7d on 12/17, fever and WBC resolving.  ID recs appreciated.     Severe pharyngeal dysphagia: NG and tube feeding.  SLP team following, repeat Video swallow 12/21 shows ongoing significant dysphagia     Hyperglycemia in the setting of T2DM and D5W as above:  HDSSI with glargine. Recommend to Hold home glipizide, metformin.  Can be resumed after discharge.  Increase dose of two times a day glargine again today: 20u two times a day->25u two times a day     Hypernatremia, resolved.     HTN: holding home losartan, amlodipine.    Ok for discharge from my side.       Subjective  Patient seen and examined.  Denies chest pain.  Patient reported to me that he is feeling short of breath although breathing was not labored.  No fever or chills.  No cough.  No lightheadedness.  Stress test done on 12/26/2020 in the morning showed tiny apical pneumothorax.  This was discussed with JOSE D Carter and we agreed to repeat a chest x-ray.  Chest x-ray reviewed by me no change since morning.    Objective    Vital signs in last 24 hours  Temp:  [97.3  F (36.3  C)-97.7  F (36.5  C)] 97.3  F (36.3  C)  Heart Rate:  [68-94] 76  Resp:  [16-18] 18  BP: ()/(56-68) 90/56  Weight:   177 lb (80.3 kg)    Intake/Output last 3 shifts  I/O last 3 completed shifts:  In: 600 [NG/GT:600]  Out: 600 [Urine:600]  Intake/Output this shift:  No intake/output data recorded.    Physical Exam   General: awake, alert, not in distress  Eyes: no pallor  Chest: Clear to auscultation bilaterally,tube removed  Heart: RRR, normal S1 and S2  Abdomen: soft, non tender  Neuro: moving all extremities, slightly confused      Meds    amino acids-protein hydrolys  2 packet Enteral Tube DAILY     amiodarone  200 mg Enteral Tube BID     aspirin  81 mg Enteral Tube DAILY    Or     aspirin  150 mg Rectal DAILY     atorvastatin  80 mg Enteral Tube QHS     famotidine  20 mg Enteral Tube BID    Or      famotidine (PEPCID) injection  20 mg Intravenous BID     glipiZIDE  5 mg Enteral Tube BID AC     guar gum  1 packet Enteral Tube BID     heparin (PF) ANTICOAGULANT  5,000 Units Subcutaneous Q8H FIXED TIMES     insulin aspart (NovoLOG) injection   Subcutaneous Q6H FIXED TIMES     insulin glargine  25 Units Subcutaneous BID     levETIRAcetam  (KEPPRA) solution 100 mg/mL  500 mg Enteral Tube BID     lidocaine (PF)  1-5 mL Intradermal Once     melatonin  3 mg Enteral Tube QHS     metFORMIN  1,000 mg Enteral Tube BID with meals     metoprolol tartrate  25 mg Enteral Tube BID     multivitamin with iron-mineral  15 mL Enteral Tube DAILY     QUEtiapine  12.5 mg Enteral Tube QHS     sodium chloride bacteriostatic  1-5 mL Intradermal Once     sodium chloride  3 mL Intravenous Q8H FIXED TIMES     tamsulosin  0.4 mg Enteral Tube Daily after brkfst     torsemide  100 mg Enteral Tube DAILY     white petrolatum   Topical DAILY       Pertinent Labs   Lab Results: personally reviewed.   not applicable    Results from last 7 days   Lab Units 12/26/20  0534 12/25/20  0758 12/24/20  1512   LN-SODIUM mmol/L 138 140 141   LN-POTASSIUM mmol/L 4.0 3.9 4.0   LN-CHLORIDE mmol/L 99 102 102   LN-CO2 mmol/L 27 28 28   LN-BLOOD UREA NITROGEN mg/dL 38* 32* 32*   LN-CREATININE mg/dL 1.51* 1.22 1.19   LN-CALCIUM mg/dL 8.7 8.9 8.8         Results from last 7 days   Lab Units 12/26/20  0534 12/25/20  0758 12/24/20  1512   LN-WHITE BLOOD CELL COUNT thou/uL 12.4* 13.6* 14.8*   LN-HEMOGLOBIN g/dL 12.4* 12.4* 12.7*   LN-HEMATOCRIT % 39.9* 40.2 40.6   LN-PLATELET COUNT thou/uL 394 413 498*         Pertinent Radiology   Radiology Results: Personally reviewed impression/s    Echo Complete    Result Date: 12/3/2020    Technically difficult study. Definity contrast utilized.   Normal left ventricular size. Mild to moderate left ventricular hypertrophy.   Left ventricle ejection fraction is moderately globally reduced. Left ventricular ejection fraction  estimated at 30 to 35%. Abnormal septal motion.   Right ventricle not optimally visualized. Right ventricular systolic function is reduced. Abnormal TAPSE   Moderate left atrial enlargement. Mild right atrial enlargement.   Aortic valve sclerosis without Doppler evidence to suggest aortic stenosis. Mild aortic insufficiency.   Nonspecific thickening of the mitral valve leaflets with mild mitral regurgitation.   Mild tricuspid regurgitation.   Mild enlargement of the aortic root   When compared to the previous study dated 9/20/2020, the prior study was also technically challenging. Left ventricular function remains reduced and appears potentially mildly more prominently reduced on the current examination.      Xr Chest 1 View Portable    Result Date: 12/26/2020  EXAM: XR CHEST 1 VIEW PORTABLE LOCATION: [facility] DATE/TIME: 12/26/2020 10:19 AM INDICATION: Shortness of breath. Follow-up pneumothoraces. COMPARISON: 12/26/2020.     Tiny apical pneumothoraces without significant change. No consolidation or focal pneumonia in the lungs. No pulmonary edema. No other significant change. Feeding tube present.     Xr Chest 1 View Portable    Result Date: 12/20/2020  EXAM: XR CHEST 1 VIEW PORTABLE LOCATION: [facility] DATE/TIME: 12/20/2020 9:01 AM INDICATION: Postop evaluation COMPARISON: 12/16/2020     Bilateral chest tubes. No pneumothoraces. Small right pleural effusion with passive atelectasis in the right lung. Enteric tube tip below diaphragm, off the image. Strand of atelectasis or scarring left midlung. Calcified aortic atherosclerosis. Sternotomy with CABG and vascular markers. No change from prior.    Xr Chest 1 View Portable    Result Date: 12/16/2020  EXAM: XR CHEST 1 VIEW PORTABLE LOCATION: [facility] DATE/TIME: 12/16/2020 10:31 AM INDICATION: s/p CABg COMPARISON: Portable chest radiography 12/15/2020     Feeding tube extends below  the diaphragmatic hiatus and outside the field-of-view. Bilateral chest tubes are unchanged in position. The cardiac silhouette remains enlarged. Unchanged mediastinal interfaces. Graded opacities in the lower third of both right and left chest relate to pleural effusions and adjacent atelectatic lung, slightly increased from yesterday. No pneumothorax. Sternal wires are aligned. Coronary ostial markers and vascular clips are present from CABG.    Xr Chest 1 View Portable    Result Date: 12/15/2020  EXAM: XR CHEST 1 VIEW PORTABLE LOCATION: [facility] DATE/TIME: 12/15/2020 8:58 AM INDICATION: s/p CABG  possible aspiration COMPARISON: 12/12/2020     Sternal wires. Removal IJ sheath. Stable position bilateral chest drains. Stable enlarged cardiac silhouette. Unchanged bilateral lung opacities which are likely a combination of lung infiltrates and pleural fluid. No pneumothorax. No pulmonary edema.    Xr Chest 1 View Portable    Result Date: 12/12/2020  EXAM: XR CHEST 1 VIEW PORTABLE LOCATION: [facility] DATE/TIME: 12/12/2020 12:47 PM INDICATION: s/p CABG COMPARISON: 12/8/2020     Interval extubation and removal of the right IJ Clinton-Annette catheter. Right IJ line terminates over the brachiocephalic-SVC junction. Mediastinal drains are noted. Persistent and marginally increased small bilateral effusions and associated atelectasis. No pneumothorax. Cardiomegaly and prominence of the mediastinum are similar. CABG changes. Mild pulmonary vascular congestion without overt pulmonary edema.    Xr Chest 1 View Portable    Result Date: 12/8/2020  EXAM: XR CHEST 1 VIEW PORTABLE LOCATION: [facility] DATE/TIME: 12/8/2020 6:20 AM INDICATION: s/p cardiac surgery COMPARISON: 12/07/2020 FINDINGS: Endotracheal tube terminates 3.8 cm above the yariel. Right heart catheter terminates over the right main pulmonary artery. Bilateral chest tubes remain in place.  Heart size is enlarged but stable status post median sternotomy. Mild prominence of central pulmonary vasculature without overt failure. Dense retrocardiac consolidation on the left is unchanged. No pneumothorax.     No significant interval change.    Xr Chest 1 View Portable    Result Date: 12/7/2020  EXAM: XR CHEST 1 VIEW PORTABLE LOCATION: [facility] DATE/TIME: 12/7/2020 1:51 PM INDICATION: s/p cardiac surgery COMPARISON: Portable AP view of the chest 02/12/2020     New endotracheal tube is in satisfactory position. Right jugular approach sheath and PA catheter are present. The tip of the PA catheter is in the right pulmonary artery. Mediastinal drain and bilateral chest tubes are present. Coronary ostial markers and vascular clips from revascularization. Mild upper pneumomediastinum and gas in the soft tissues of the left neck. Improved lung inflation particularly of the right lower lobe with improved discrimination of the right hemidiaphragm. Subsegmental sized opacities in the left lung around the hilum and along the diaphragmatic pleura likely representing residual areas of atelectasis. No visible pleural fluid or pneumothorax.    Xr Chest 1 View Portable    Result Date: 12/2/2020  EXAM: XR CHEST 1 VIEW PORTABLE LOCATION: [facility] DATE/TIME: 12/2/2020 2:47 PM INDICATION: Shortness of breath with concern for pulmonary edema secondary to diuretic noncompliance COMPARISON: Chest x-ray 09/19/2020     Small left-sided and small to moderate right-sided pleural effusions with adjacent opacities likely reflecting atelectasis. Cardiomegaly with central vascular congestion and mild pulmonary edema.    Xr Chest 2 Views    Result Date: 12/26/2020  EXAM: XR CHEST 2 VIEWS LOCATION: [facility] DATE/TIME: 12/26/2020 6:44 AM INDICATION: S/p chest tube removal. eval pleural effusions. please do as close to 0600 as possible for discharge  planning. COMPARISON: 12/21/2020.     Removal of chest tubes. Tiny bilateral apical pneumothoraces, slightly larger on the right (approximately 6-7 mm pleural separation on the right and 3 mm on the left). Lungs are clear. Minimal pleural fluid. Stable cardiomediastinal silhouette. Sternotomy and CABG. Feeding tube courses below the diaphragm and off the field of view. Pneumothorax findings verbally communicated to patient's nurse at 7:14 AM on 12/26/2020.     Xr Chest 2 Views    Result Date: 12/21/2020  EXAM: XR CHEST 2 VIEWS LOCATION: [facility] DATE/TIME: 12/21/2020 10:20 AM INDICATION: recheck R pleural effusion COMPARISON: PA and lateral views of the chest 12/20/2020     Unchanged position of bilateral chest tubes. Feeding tube extends below the diaphragmatic hiatus and outside the field-of-view. Improved lung inflation. Opacity in the basal right chest has considerably cleared with improved discrimination of the right hemidiaphragm consistent with evacuation of pleural fluid and improved atelectasis. Thin linear band of atelectasis radiating from the left hilum is unchanged. Decreased lung vascular congestion. Cardiac silhouette is normal in size. Ostial markers and vascular clips from CABG..    Xr Abdomen Ap Portable    Result Date: 12/8/2020  EXAM: XR ABDOMEN AP PORTABLE LOCATION: [facility] DATE/TIME: 12/8/2020 7:30 PM INDICATION: check tube placement COMPARISON: None.     Gastric tube courses below the diaphragmatic hiatus, follows the greater curvature of the stomach with tip along the right lateral aspect of the lumbar spine presumably in the distal stomach. Mediastinal drain and bilateral chest tubes are present. Nonobstructive bowel gas pattern.     Ct Head Without Contrast    Result Date: 12/12/2020  EXAM: CT HEAD WO CONTRAST LOCATION: [facility] DATE/TIME: 12/12/2020 11:51 AM INDICATION: Stroke. Increasing confusion.  COMPARISON: Head MRI 12/09/2020 TECHNIQUE: Routine CT Head without IV contrast. Multiplanar reformats. Dose reduction techniques were used. FINDINGS: INTRACRANIAL CONTENTS: Infarct in the left frontal operculum with subtle associated petechial hemorrhage correlating with the blood products noted on MRI. No gross hemorrhagic transformation. No CT evidence of acute infarct. Chronic infarct in the left cerebellar hemisphere. Mild generalized volume loss. VISUALIZED ORBITS/SINUSES/MASTOIDS: No intraorbital abnormality. No paranasal sinus mucosal disease. No middle ear or mastoid effusion. BONES/SOFT TISSUES: No acute abnormality.     1.  Petechial hemorrhage in the left frontal opercular infarct correlating with the blood products noted on MRI. 2.  No gross hemorrhagic transformation. 3.  Chronic left cerebellar infarct. 4.  No change.    Ct Head Without Contrast    Addendum Date: 12/8/2020    Addendum/ impression: Dr. Domingo discussed the results with Dr. Kraft on 12/8/2020 10:06 PM by telephone.    Result Date: 12/8/2020  EXAM: CT HEAD WO CONTRAST LOCATION: [facility] DATE/TIME: 12/8/2020 9:12 PM INDICATION: seizure COMPARISON: None. TECHNIQUE: Routine CT Head without IV contrast. Multiplanar reformats. Dose reduction techniques were used. FINDINGS: Punctate focus of increased attenuation within the subcortical region of the left frontal lobe. This may represent a focus of petechial hemorrhage, cortical laminar necrosis, mineralization or subarachnoid hemorrhage. This is best demonstrated on axial image #23, series 2 and sagittal image #43, series 5.2. Scattered foci of decreased attenuation within the cerebral hemispheric white matter which are nonspecific, though most commonly ascribed to chronic small vessel ischemic disease. The ventricles and sulci are prominent consistent with mild brain parenchymal volume loss. Gray-white matter differentiation is maintained. The basilar cisterns are  patent. The globes are unremarkable. The partially imaged paranasal sinuses, mastoid air cells and middle ear cavities are unremarkable. The visualized skull base and calvarium are unremarkable.     1.  Punctate focus of increased attenuation within the subcortical region of the left frontal lobe. This may represent a focus of petechial hemorrhage, cortical laminar necrosis, mineralization or subarachnoid hemorrhage. This is best demonstrated on axial image #23, series 2 and sagittal image #43, series 5.2. Comparison with prior studies would be helpful if available. 2.  Otherwise recommend brain MRI for further evaluation. Additionally follow-up CT is could be performed to ensure stability or resolution.    Mr Brain With Without Contrast    Result Date: 12/9/2020  EXAM: MR BRAIN W WO CONTRAST LOCATION: [facility] DATE/TIME: 12/9/2020 1:07 PM INDICATION: Seizure. Altered mental status. Abnormal head CT, hemorrhage. COMPARISON: Head CT 12/08/2020. CONTRAST: 10 mL of gadobutrol intravenous contrast. TECHNIQUE: Routine multiplanar multisequence head MRI without and with intravenous contrast. FINDINGS: Cortical diffusion hyperintensity without most minimal restriction along the margin of the left middle and inferior frontal gyri demonstrates cortical signal loss on the susceptibility sensitive sequence with gyriform enhancement over a 2.5 x 1.5 x 1 cm area. Constellation of findings are most consistent with a subacute infarct and petechial hemorrhage. This corresponds to the abnormality identified on the comparison head CT.  Mild mucosal thickening in the sinuses. A few mastoid air cells on each side contain a small amount of fluid. Intraorbital contents are unremarkable. There is mild generalized prominence of the sulci and ventricles, consistent with underlying volume loss. Intracranial flow voids are intact. Small chronic infarct left cerebellum. There is no mass effect, midline shift, or  extraaxial collection. There are scattered foci of T2/FLAIR hyperintensity within the cerebral white matter that are nonspecific but most commonly reflect the sequela of chronic small vessel ischemic disease.     The abnormality identified on head CT corresponds to a 2.5 cm subacute infarct in the left frontal lobe associated with petechial hemorrhage. No significant mass effect.     Xr Swallow Study W Speech    Result Date: 12/21/2020  EXAM: XR SWALLOW STUDY W SPEECH OR OT LOCATION: [facility] DATE/TIME: 12/21/2020 9:17 AM INDICATION: Difficulty swallowing. COMPARISON: None. TECHNIQUE: Routine. FINDINGS: FLUOROSCOPIC TIME: 2.3 minutes. NUMBER OF IMAGES: 0 Swallow study with Speech Pathology using multiple barium thicknesses. With honey consistency there is marked residual in the vallecula and piriform sinuses and boston silent aspiration. Chin tuck and head turn maneuver did not help. With pudding consistency there is also some moderate residual which clears with additional swallowing with no penetration or aspiration.     1.  High risk for aspiration with honey consistency and likely thinner. 2.  Patient does tolerate pudding consistency.     Xr Swallow Study W Speech    Result Date: 12/15/2020  EXAM: XR SWALLOW STUDY W SPEECH OR OT LOCATION: [facility] DATE/TIME: 12/15/2020 9:12 AM INDICATION: Difficulty swallowing. Abnormal swallow study 12/12/2020. COMPARISON: 12/12/2020 TECHNIQUE: Routine. FINDINGS: FLUOROSCOPIC TIME: .4 minutes NUMBER OF IMAGES: 0 Swallow study with Speech Pathology using multiple barium thicknesses. Airway aspiration with the nectar thick barium. Weak, delayed cough. Airway penetration with the honey thick barium. Please see speech pathology's report for further details and recommendations.    Xr Swallow Study W Speech    Result Date: 12/12/2020  EXAM: XR SWALLOW STUDY W SPEECH OR OT LOCATION: [facility]  DATE/TIME: 12/12/2020 9:28 AM INDICATION: Difficulty swallowing. COMPARISON: None. TECHNIQUE: Routine. FINDINGS: FLUOROSCOPIC TIME: 1.2 minutes NUMBER OF IMAGES: 0 Swallow study with Speech Pathology using multiple barium thicknesses. There is silent aspiration with thin liquid with no cough reflex. There is pooling of all consistencies in the valleculae with some poor over and there is penetration to the vocal cords with nectar and honey thick liquid. Chin tuck technique improved inversion of the epiglottis mildly.     1.  Silent aspiration with thin liquid. Penetration with nectar and honey consistencies. Stasis with pooling in the valleculae.     Us Carotid Bilateral    Result Date: 12/3/2020  EXAM: US CAROTID BILATERAL LOCATION: [facility] DATE/TIME: 12/3/2020 3:12 PM INDICATION: Coronary artery disease. COMPARISON: None. TECHNIQUE: Duplex exam performed utilizing 2D gray-scale imaging, Doppler interrogation with color-flow and spectral waveform analysis. The percent diameter stenosis is determined using NASCET criteria and Society of Radiologists in Ultrasound Consensus Criteria. FINDINGS: RIGHT: Mild plaque at the bifurcation. The peak systolic velocity in the ICA is less than 125 cm/sec, consistent with less than 50% stenosis. Normal velocities in the ECA. Antegrade flow within the vertebral artery. LEFT: Mild plaque at the bifurcation. The peak systolic velocity in the ICA is less than 125 cm/sec, consistent with less than 50% stenosis. Normal velocities in the ECA. Antegrade flow within the vertebral artery. VELOCITY CHART: CCA   Right: 64/16 cm/s   Left: 91/24 cm/s ICA   Right: 71/27 cm/s   Left: 60/22 cm/s ECA   Right: 64/11 cm/s   Left: 96/14 cm/s ICA/CCA PSV Ratio   Right: 1.1   Left: 0.66     1.  Mild plaque formation, velocities consistent with less than 50% stenosis in the right internal carotid artery. 2.  Mild plaque formation, velocities consistent with less than 50%  stenosis in the left internal carotid artery. 3.  Flow within the vertebral arteries is antegrade.    Us Vein Mapping For Pre Bypass Graft Bilateral    Addendum Date: 12/15/2020    Indication: Vessel mapping prior to a bypass graft. Coronary artery disease plan for coronary artery bypass graft.    Result Date: 12/15/2020  EXAM: US VEIN MAPPING FOR PRE BYPASS GRAFT BILATERAL LOCATION: [facility] DATE/TIME: 12/3/2020 6:46 PM INDICATION: Lower extremity venous mapping prior to coronary or peripheral bypass surgery, coronary artery disease, diabetic leg ulcer COMPARISON: 9/19/2020 TECHNIQUE: Ultrasound examination of the lower extremity veins was performed, including gray-scale and compression imaging. LOWER  EXTREMITY FINDINGS:   VENOUS DIAMETERS RIGHT GREAT SAPHENOUS VEIN Upper Thigh: 3.0 mm Mid Thigh: 2.4 mm Lower Thigh: 2.5 mm Knee: The great saphenous vein is thrombosed from the knee to the ankle. LEFT GREAT SAPHENOUS VEIN Upper Thigh: 4.7 mm Mid Thigh: 4.1 mm Lower Thigh: 4.5 mm Knee: 2.9 mm Upper Calf: 3.2 mm Mid Calf: 2.2 mm Lower Calf: 2.0 mm Ankle: 1.1 mm RIGHT SMALL SAPHENOUS VEIN Not visualized LEFT SMALL SAPHENOUS VEIN The small saphenous vein is thrombosed from the knee to the ankle. There is underlying bilateral subcutaneous edema. No evidence of deep vein thrombosis.     1. Exam positive for superficial thrombophlebitis involving the right great saphenous vein from the knee to the ankle and left small saphenous vein from the knee to the ankle. 2. The right small saphenous vein is not visualized. 3. No evidence of thrombus extension into the deep venous system. 4. Diffuse subcutaneous edema without discrete fluid collections. NOTE: ABNORMAL REPORT    Echo Limited    Result Date: 12/25/2020    The left ventricular wall motion is globally hypokinetic. The estimated left ventricular ejection fraction is 35%. This represents a moderately decreased ejection fraction.   The right  ventricle is mildly dilated and its systolic function is severely reduced. TAPSE is abnormal, which is consistent with abnormal right ventricular systolic function.   Biatrial volume is moderately increased.   Limited 2D study per request.   When compared to the previous study dated 12/3/2020, no significant change.      Echo Monitor Ryder    Result Date: 12/7/2020    Left ventricle ejection fraction is moderately decreased. The calculated left ventricular ejection fraction is 38%.      Eeg Coma Or Sleep Only    Result Date: 12/8/2020  EEG report DATE 12/08/20 CLINICAL HISTORY This is a 70 y.o. male with left leg jerking. The EEG is done to look for underlying epilepsy. INTRODUCTION This is a routine EEG performed utilizing standard 10- 20 system of electrode placement with 20 channels of recording including one channel of EKG monitor. The patient was studied in awake and drowsy/somnolent state. EEG TIME: 31 min DESCRIPTION OF THE RECORD The EEG background consists of a generalized symmetric delta/theta background.  No sharp large amplitude rhythmic activity to suggest electrographic seizures were seen.  Mild to moderate background dysrhythmias present.  No activation maneuvers were done. IMPRESSION/REPORT/PLAN This is an abnormal EEG during wakefulness and somnolence/drowsiness due to generalized slow background.  EEG suggestive of an generalized cerebral dysfunction such as seen in an encephalopathy.  EEG is not suggestive of active ongoing seizures during the recording. Further clinical correlation is needed. Please note that the absence of epileptiform abnormalities does not exclude the possibility of epilepsy in any patient.     Xr Ng Or Nj Tube Reposition    Result Date: 12/15/2020  EXAM: XR NJ TUBE INSERTION LOCATION: [facility] DATE/TIME: 12/15/2020 9:49 AM INDICATION: Dysphagia. Aspiration on video swallow study. NJ tube needed for medication administration and feedings. COMPARISON:  None. TECHNIQUE: A 10 Serbian feeding tube was placed under fluoroscopic guidance. FLUORO TIME: 2.3 NUMBER OF IMAGES: 1 FINDINGS: Enteric tube placed without complication.?Tip in the distal duodenum.           Advanced Care Planning:  Discharge Planning discussed with patient, RN, CM.   Anticipated LOS: per surgery  Barrier to discharge:acute issues  Disposition:acute rehab?  Discussed care with patietn for >35 minutes with greater than 50% of time spent in counseling and coordination of care.      MD Tanna  Hospitalist  This note was dictated using voice recognition software. Any grammatical or context distortions are unintentional and inherent to the software.

## 2021-06-14 NOTE — PLAN OF CARE
Problem: Safety  Goal: Patient will be injury free during hospitalization  Outcome: Progressing     Problem: Knowledge Deficit  Goal: Patient/family/caregiver demonstrates understanding of disease process, treatment plan, medications, and discharge instructions  Outcome: Progressing     Neuro:  A&O x 3.  Denies lightheadedness with positional changes.  Pt ambulates with therapy and had bath today, sat in the chair for 3 hours.      Psych:  Cooperative and pleasant.  Non-combative and follows directions.  Is able to use the call light appropriately.     CV - History of A-Fib  LS - Clear and diminished.  Crackles noted on the right side.  Pleural CT x 1 to suction, serous drainage.    GI - Coughs after ice chips, speech following and are aware of the coughing with ice chips.  Tube  feeding @ 50 ml/hr.  Denies nausea.  No stool this shift.     - Voiding adequate amounts of urine.    Skin - Multiple wounds on lower extremities with wound care following.  Right leg wounds have resolved and are pink.  Sternum and harvest site cleansed, healing.         Pain - Denies pain.

## 2021-06-14 NOTE — TELEPHONE ENCOUNTER
Letter sent to patient requesting he call to arrange f/u appointment post hospital. He has not returned my calls x3.sk/RN

## 2021-06-14 NOTE — PLAN OF CARE
Problem: Pain  Goal: Patient's pain/discomfort is manageable  Outcome: Progressing     Problem: Safety  Goal: Patient will be injury free during hospitalization  Outcome: Progressing     Problem: Nutrition  Goal: Nutrition appropriate for coronary artery disease state  Outcome: Progressing   Patient alert and oriented x 2. No c/o pain. Up and down to the chair than bed. Bed and chair alarm on for safety. Tube feed as ordered, and chest tube to water seal with small drainage. Incontinent of stool and voided over 1000 cc of urine in urinal 6 hours. Call light in reach.

## 2021-06-14 NOTE — PROGRESS NOTES
Pt. Has been fairly good has been impulsive as far as getting in and out of bed alarms on.  For evening about over half the time he has put thecall light on.  But as the night goes on he is setting off his alarms often, getting in and out of bed. Paged  For [place]

## 2021-06-14 NOTE — PROGRESS NOTES
"Wound Ostomy  WOC Assessment         Allergies:  No Known Allergies    Diagnosis:   Patient Active Problem List    Diagnosis Date Noted     Acute kidney failure with tubular necrosis (H) 12/16/2020     Disorientation      Cerebrovascular accident (CVA) due to other mechanism (H)      Metabolic encephalopathy      Cerebrovascular accident (CVA), unspecified mechanism (H)      Seizures (H)      Acute respiratory failure with hypoxia (H)      On mechanically assisted ventilation (H)      S/P CABG (coronary artery bypass graft)      Uncontrolled type 2 diabetes mellitus with hyperglycemia (H)      Acute on chronic clinical systolic heart failure (H) 12/02/2020     Atrial fibrillation with rapid ventricular response (H) 12/02/2020     Diabetic leg ulcer (H) 12/02/2020     Acute on chronic systolic CHF (congestive heart failure) (H) 12/02/2020     A-fib (H) 12/02/2020     Acute systolic heart failure (H)      New onset atrial fibrillation (H)      Pulmonary edema cardiac cause (H) 09/19/2020     CAD (coronary artery disease) 09/19/2020     New onset a-fib (H) 09/19/2020     Moderate episode of recurrent major depressive disorder (H) 06/21/2019     Hyperlipidemia, unspecified hyperlipidemia type 04/27/2016     Joint Pain, Localized In The Shoulder      Obesity      ACP Staging Stage 1 Hypertension: 140-159 / 90-99      Coronary artery disease involving native heart with other form of angina pectoris, unspecified vessel or lesion type (H)      Type 2 diabetes mellitus (H)        Height:  5' 10\" (1.778 m)    Weight:   184 lb (83.5 kg)    Labs:  Recent Labs     12/21/20  0715   HGB 11.9*       Antonio:  Antonio Scale Score: 18    Specialty Bed:  Specialty Bed: Tonia Coreen (ICU only) (STACH)    Wound culture obtained: No    Edema:  Yes:  Localized    Anatomic Site/Laterality: BLE    Reason for ongoing care:   Wound assessment and plan of care     Encounter Type:  Subsequent Encounter Wound Type:   Non-PU Ulcer: Venous "     Tissue Damage:  Exposed fat layer    Associated conditions/Related trauma: Patient has history of venous ulcers and edema, previously had compression but this admission may have CABG per chart.    Assessment:    Bilateral shins with multiple scabs  Right shin 2 areas, 3 x 1cm, 2 x 1cm, moist fibrin versus slough with large serous drainage, start alginate under Mepilex (1 healed)  Left shin all areas scabbed, leave open to air    Tunneling/Undermining: No    Wound Bed: see above    Exudate: No    Periwound Skin: Dry/Flaky and Hyperpigmentation     Treatment Plan: Silicone foam and Alginate               Nursing care provided was wound care.    Discussed plan of care with nurse and patient.    Outcomes and treatment recommendations are to promote skin integrity and promote wound healing.    Actions taken by WOC RN: 1 minutes of education.    Planned Follow Up: Weekly.    Plan for next visit: Reassess wound(s) and Reassess skin integrity

## 2021-06-14 NOTE — PROGRESS NOTES
Hospitalist Progress Note        Assessment and Plan  Principal Problem:    Acute on chronic clinical systolic heart failure (H)  Active Problems:    ACP Staging Stage 1 Hypertension: 140-159 / 90-99    Type 2 diabetes mellitus (H)    Hyperlipidemia, unspecified hyperlipidemia type    CAD (coronary artery disease)    Atrial fibrillation with rapid ventricular response (H)    Diabetic leg ulcer (H)    Acute on chronic systolic CHF (congestive heart failure) (H)    A-fib (H)    Uncontrolled type 2 diabetes mellitus with hyperglycemia (H)    Acute respiratory failure with hypoxia (H)    On mechanically assisted ventilation (H)    S/P CABG (coronary artery bypass graft)    Seizures (H)    Cerebrovascular accident (CVA), unspecified mechanism (H)    Cerebrovascular accident (CVA) due to other mechanism (H)    Metabolic encephalopathy    Disorientation    Acute kidney failure with tubular necrosis (H)      Multivessel CAD S/P CABG  -  Primary management per CTS service  -  ASA  -  Atorvastatin     A-fib with RVR  -  Continue amiodarone    Uncontrolled type II diabetes mellitus, HgbA1c concentration 12.6%  -  Continue metformin  -  Continue glipizide  -  Continue insulin glargine  -  SSI  -  BG monitoring  -  Hypoglycemia protocol  -  Diabetic diet     Acute diastolic heart failure, improving  -  Continue torsemide  -  Continue metoprolol tartrate  -  Cardiology service was consulted     Acute hypoxic respiratory failure, resolved  -  Encourage IC  - Supplemental oxygen as needed     Acute metabolic encephalopathy  -  Supportive care     Subacute left frontal stroke with petechial hemorrhage  -  ASA  -  Atorvastatin  -  Continue levetiracetam  -  Neurology service was consulted     Sepsis  -  Fever, Leukocytosis, tachycardia noted 12/10/2020  -  UA was negative. One of two blood cultures grew staph epi, likely contaminant.  -  Sputum culture grew beta-hemolytic strep  -  Started on emperic pip-tazo IV,  completed 7d on  12/17, fever and WBC resolving.  -  ID service was consulted     Severe pharyngeal dysphagia  -  SLP team following, repeat video swallow study on 12/21 showed ongoing significant dysphagia  -  NGT in place  -  Clinical monitoring     Primary hypertension  -  BP controlled  -  Continue metoprolol tartrate  -  Monitoring BP      VTE risk reduction.  Subcutaneous heparin.      Discharge.  Possible discharge in 1 - 2 days.              Brief Summary  70 y.o.male with past medical history of uncontrolled T2DM, severe multivessel CAD, chronic systolic and diastolic CHF, recent diagnosis of A. fib, who presented with acute CHF, was diuresed and subsequently underwent four-vessel CABG by Dr. Madison on 12/7/2020. Post-op course complicated by Afib with RVR, which was initially treated with IV amiodarone given pt's NPO status due to severe dysphagia.     Hospitalization complicated by altered mentation and seizure-like activity onset 12/8. CT Head on 12/8 showed punctate focus of increased attenuation within the subcortical region of the left frontal lobe. Subsequent MRI of the brain on 12/9 demonstrated subacute 2.5 cm subacute infarct with petechial hemorrhage in the left frontal lobe. EEG did not show any seizure activity, however Keppra was started 12/10 with resolution of rhythmic left leg movements. Neurology has signed off.     Extubated 12/10 and weaned down to NC. Started on broad spectrum antibiotics 12/10 due to new fever and worsening leukocytosis. 1 of 2 blood Cx grew staph epidermidis, and sputum cultures showed respiratory miryam with 4+ beta hemolytic Strep. 7 days IV pip-tazo completed 12/17.     VFSS with mild oral and severe pharyngeal dysphagia - NGT placed on 12/15. This may have exacerbated hypernatremia, which improved with D5W .      Subjective and Interim Events  Did not report CP, shortness of breath or palpitations.    Physical Examination    Vital signs in last 24 hours  Temp:  [95.8  F (35.4   C)-97.9  F (36.6  C)] 95.8  F (35.4  C)  Heart Rate:  [72-82] 82  Resp:  [18] 18  BP: ()/(52-70) 120/69 95% O2 Device: None (Room air) O2 Flow Rate (L/min): 2 L/min  Weight:   176 lb 11.2 oz (80.2 kg) Weight change: -9.6 oz (-0.272 kg)    General: Awake, comfortable, NAD   HEENT: Sclera white.  No redness or jaundice.   Cardiac: RRR, no abnormal heart sounds   Pulmonary: CTA [place]   Abdomen: Nondistended.  Nontender to palpation.   Musculoskeletal: No joint abnormalities noted  Skin: Normally turgid   Neurologic: Awake, alert, appropriately interactive  Psychiatric: Calm      Intake/Output last 3 shifts  I/O last 3 completed shifts:  In: 763 [I.V.:3; NG/GT:760]  Out: 425 [Urine:425]  Body mass index is 25.35 kg/m .      Pertinent Labs   Lab Results: personally reviewed.   Results from last 7 days   Lab Units 12/27/20 0529 12/26/20  0534 12/25/20  0758   LN-SODIUM mmol/L 139 138 140   LN-POTASSIUM mmol/L 3.8 4.0 3.9   LN-CHLORIDE mmol/L 100 99 102   LN-CO2 mmol/L 27 27 28   LN-BLOOD UREA NITROGEN mg/dL 39* 38* 32*   LN-CREATININE mg/dL 1.25 1.51* 1.22   LN-CALCIUM mg/dL 8.5 8.7 8.9   LN-ALBUMIN g/dL 2.6* 2.5* 2.5*   LN-PROTEIN TOTAL g/dL 6.8 6.9 6.5   LN-BILIRUBIN TOTAL mg/dL 0.6 0.7 0.6   LN-ALKALINE PHOSPHATASE U/L 122* 125* 127*   LN-ALT (SGPT) U/L 10 10 10   LN-AST (SGOT) U/L 20 22 24     Results from last 7 days   Lab Units 12/27/20  0529 12/26/20  0534 12/25/20  0758   LN-WHITE BLOOD CELL COUNT thou/uL 11.6* 12.4* 13.6*   LN-HEMOGLOBIN g/dL 12.4* 12.4* 12.4*   LN-HEMATOCRIT % 40.7 39.9* 40.2   LN-PLATELET COUNT thou/uL 383 394 413   LN-NEUTROPHILS RELATIVE PERCENT % 78* 77* 77*   LN-MONOCYTES RELATIVE PERCENT % 6 6 6         Results from last 7 days   Lab Units 12/27/20  1207 12/27/20  0524 12/27/20  0252 12/27/20  0001 12/26/20  1710 12/26/20  1213 12/26/20  0619 12/26/20  0028 12/25/20  1727 12/25/20  1203   LN-POC GLUCOSE FINGERSTICK- HE mg/dL 131 102 98 78 331* 190* 186* 189* 174* 149*       Pertinent  Radiology   Radiology Results: Personally reviewed impression/s     EXAM: XR CHEST 2 VIEWS  LOCATION: [facility]  DATE/TIME: 12/27/2020 9:49 AM     INDICATION: Follow-up pneumothorax  COMPARISON: Chest x-ray 12/26/2020     IMPRESSION:   Stable tiny biapical pneumothoraces with 2 mm pleural separation on the right. No focal pneumonic consolidation or pleural effusion. Stable heart size. Post open-heart surgery. Enteric tube with the distal visualized portion below the   diaphragm.          EKG Results: Personally reviewed ECG tracing.  12/27/20 ECG demonstrated bifascicular block.                    Total visit time 40 minutes; more than 50% of time spent counseling and coordinating patient care.  25 minutes of face-to-face care.  Care included review of treatment, prognosis and today's changes of the treatment plan.                                  Adrian Wolff MD, MS  Internal Medicine Hospitalist  12/27/2020

## 2021-06-14 NOTE — TELEPHONE ENCOUNTER
Called and discussed with nurse- gave verbal orders.  Nurse will see patient again tomorrow.  Pt not compliant with medications since leaving TCU  Dr. Claire

## 2021-06-14 NOTE — PROGRESS NOTES
CVTS Daily Progress Note   12/25/2020   POD#18 s/p CABGx4, LAAE, PVI  Attending: Los   LOS: 23 days     SUBJECTIVE/INTERVAL EVENTS:    No acute events overnight. Patient making slow progress. Awake, alert, and oriented this AM. Maintaining oxygen saturations on room air. Normotensive; dizziness resolved. Currently still with tube feeds; repeat video swallow fail. +BM. UOP adequate. Chest tube output trending down on the left. Right pleural tube removed 12/23. Ambulating with therapy in the hallways. Patient denies pain and has no questions. Realizes that he needs to continue working with therapy to get better.    OBJECTIVE:    Temp:  [97.4  F (36.3  C)-98  F (36.7  C)] 98  F (36.7  C)  Heart Rate:  [72-91] 84  Resp:  [16-20] 20  BP: ()/(54-79) 118/69  Wt Readings from Last 2 Encounters:   12/25/20 0351 179 lb 11.2 oz (81.5 kg)   12/24/20 0432 183 lb 1.6 oz (83.1 kg)   12/23/20 0508 184 lb (83.5 kg)   12/22/20 0611 186 lb 9.6 oz (84.6 kg)   12/21/20 0558 189 lb 14.4 oz (86.1 kg)   12/20/20 0416 189 lb 9 oz (86 kg)   12/19/20 0317 201 lb 14.4 oz (91.6 kg)   12/18/20 0647 203 lb 9.6 oz (92.4 kg)   12/17/20 0517 214 lb 9.6 oz (97.3 kg)   12/16/20 0415 207 lb 4.8 oz (94 kg)   12/16/20 0005 207 lb 4.8 oz (94 kg)   12/15/20 0630 212 lb (96.2 kg)   12/14/20 0400 216 lb 1.6 oz (98 kg)   12/12/20 0400 206 lb 14.4 oz (93.8 kg)   12/11/20 0630 210 lb 4.8 oz (95.4 kg)   12/10/20 0500 212 lb (96.2 kg)   12/09/20 0414 217 lb 2.5 oz (98.5 kg)   12/08/20 0345 219 lb 5.7 oz (99.5 kg)   12/07/20 0417 218 lb 8 oz (99.1 kg)   12/06/20 0301 220 lb 6.4 oz (100 kg)   12/05/20 0340 220 lb 1.6 oz (99.8 kg)   12/04/20 0354 (!) 226 lb (102.5 kg)   12/03/20 1236 (!) 223 lb (101.2 kg)   12/03/20 0441 (!) 223 lb (101.2 kg)   12/02/20 1722 (!) 224 lb 3.2 oz (101.7 kg)   12/02/20 1401 (!) 237 lb 9.6 oz (107.8 kg)   11/05/20 1302 (!) 223 lb (101.2 kg)       Current Medications:    Scheduled Meds:    amino acids-protein hydrolys  2  packet Enteral Tube DAILY     amiodarone  200 mg Enteral Tube BID     aspirin  81 mg Enteral Tube DAILY    Or     aspirin  150 mg Rectal DAILY     atorvastatin  80 mg Enteral Tube QHS     famotidine  20 mg Enteral Tube BID    Or     famotidine (PEPCID) injection  20 mg Intravenous BID     glipiZIDE  2.5 mg Enteral Tube BID AC     guar gum  1 packet Enteral Tube BID     heparin (PF) ANTICOAGULANT  5,000 Units Subcutaneous Q8H FIXED TIMES     insulin aspart (NovoLOG) injection   Subcutaneous Q6H FIXED TIMES     insulin glargine  25 Units Subcutaneous BID     levETIRAcetam  (KEPPRA) solution 100 mg/mL  500 mg Enteral Tube BID     lidocaine (PF)  1-5 mL Intradermal Once     melatonin  3 mg Enteral Tube QHS     metFORMIN  1,000 mg Enteral Tube BID with meals     metoprolol tartrate  37.5 mg Enteral Tube BID     multivitamin with iron-mineral  15 mL Enteral Tube DAILY     QUEtiapine  12.5 mg Enteral Tube QHS     sodium chloride bacteriostatic  1-5 mL Intradermal Once     sodium chloride  3 mL Intravenous Q8H FIXED TIMES     tamsulosin  0.4 mg Enteral Tube Daily after brkfst     torsemide  100 mg Enteral Tube DAILY     white petrolatum   Topical DAILY     Continuous Infusions:    dextrose 10%       PRN Meds:.acetaminophen, acetaminophen, aluminum-magnesium hydroxide-simethicone, bisacodyL, bisacodyL, dextrose 10%, dextrose 50 % (D50W), docusate sodium (COLACE) 50 mg/5 mL oral liquid, glucagon (human recombinant), lipase-protease-amylase **AND** sodium bicarbonate, magnesium hydroxide, polyethylene glycol, sodium chloride, sodium chloride bacteriostatic, sodium chloride, sodium phosphates 133 mL      Imaging:    No results found.    Lab Results (most recent, reviewed):    Hemoglobin (g/dL)   Date Value   12/25/2020 12.4 (L)     WBC (thou/uL)   Date Value   12/25/2020 13.6 (H)     Potassium (mmol/L)   Date Value   12/25/2020 3.9     Creatinine (mg/dL)   Date Value   12/25/2020 1.22     Hemoglobin A1c (%)   Date Value    12/03/2020 12.6 (H)     Magnesium (mg/dL)   Date Value   12/18/2020 1.9     Calcium (mg/dL)   Date Value   12/25/2020 8.9       I/O:    Intake/Output Summary (Last 24 hours) at 12/25/2020 0839  Last data filed at 12/25/2020 0700  Gross per 24 hour   Intake 180 ml   Output 2292 ml   Net -2112 ml        UOP: 2.2+L    CT: not documented    Physical Exam:    General: Patient seen up in chair. Alert and oriented.  CV: Afib on monitor. 2+ peripheral pulses in all extremities. Mild edema.   Pulm: Non-labored effort on room air. Chest tube in place, serous output, no air leak. Incision C/D/I.  Abd: Soft, NT, ND  Ext: Mild pedal edema, warm, distal pulses intact. Bilateral leg sores covered with dressings, dry.  Neuro: CN grossly intact      ASSESSMENT/PLAN:    Mika Alvarez is a 70 y.o. male with a history of CAD who is POD#18 s/p CABGx4.    Principal Problem:    Acute on chronic clinical systolic heart failure (H)  Active Problems:    ACP Staging Stage 1 Hypertension: 140-159 / 90-99    Type 2 diabetes mellitus (H)    Hyperlipidemia, unspecified hyperlipidemia type    CAD (coronary artery disease)    Atrial fibrillation with rapid ventricular response (H)    Diabetic leg ulcer (H)    Acute on chronic systolic CHF (congestive heart failure) (H)    A-fib (H)    Uncontrolled type 2 diabetes mellitus with hyperglycemia (H)    Acute respiratory failure with hypoxia (H)    On mechanically assisted ventilation (H)    S/P CABG (coronary artery bypass graft)    Seizures (H)    Cerebrovascular accident (CVA), unspecified mechanism (H)    Cerebrovascular accident (CVA) due to other mechanism (H)    Metabolic encephalopathy    Disorientation    Acute kidney failure with tubular necrosis (H)        NEURO:   - PRN Tylenol for pain  - Melatonin at bedtime  - No further seizure activity since POD#1 although remains on Keppra  - Seroquel nightly    CV:   - Normotensive/borderline hypotension at times  - Metoprolol 37.5mg two times a day    - Amiodarone 200mg two times a day   - No plan for a-fib anticoagulation given LAAE and surgeon preference  - ASA 81mg  - Atorvastatin 80mg daily  - Right pleural chest tube removed 12/22. Left pleural tube to be removed this AM    PULM:   - Maintaining oxygen saturations on room air   - Encourage pulmonary toilet     FEN/GI:  - NPO   - Tube feeds, tolerating  - Speech following, appreciate  - Continue electrolyte replacement protocol  - Bowel regimen    RENAL:  - Adequate UOP/hr. Continue to monitor closely.  - Diuresis with 100mg torsemide daily    HEME:  - Acute blood loss anemia post-op.   - No bleeding concerns. Hep subcutaneous (cleared with neuro), [id number]    ID:  - Ronda op ppx complete.  - Afebrile  - Off antibiotics  - Was on Vanco from 12/10-12/13 and Zosyn from 12/10-12/17  - C diff negative 12/24    ENDO:   - Sliding scale insulin and Lantus  - Restarted Metformin 12/23  - Restarted Glipizide 12/24 (lower than home dose)     PPx:   - DVT: SCDs, SQ heparin three times a day, ambulation  - GI: Omeprazole     DISPO:   - General telemetry floor. Tentative plan for ARU discharge tomorrow versus 12/27.        _______  Emma Alexis PA-C  Cardiothoracic Surgery  [phone]

## 2021-06-14 NOTE — PROGRESS NOTES
Paged Dr. Kay: bladder scan 340, no urine out put since 7 PM. Please advise. Thanks  Awaiting call back

## 2021-06-14 NOTE — PROGRESS NOTES
Pt is alert but mentation fluctuates. Denies pain but has been complaining all shift of not wanting to stay here any more. Received a message from [facility] that there will be an open bed for pt tomorrow at 1 pm. Pt was not happy about the decision. Skin intact. Patient became restless and very anxious after the news of not being able to discharge today. Rn requested PRN ativan and pt had 0.5 mg. No change in behavior noted since medication was last administered. Bed /chair alarm on, and he  set it off a couple of times trying to get out of bed or chair. Attempted pulling NJ tube.  RA, with breath sounds being diminished. NPO with ice chip. Production cough with thick and clear  secretions. Pt was transferred to [facility] so that he could be watched from the charge desk.     .

## 2021-06-14 NOTE — PLAN OF CARE
Problem: Pain  Goal: Patient's pain/discomfort is manageable  Outcome: Progressing     Problem: Glucose Imbalance  Goal: Achieve optimal glucose control  Outcome: Progressing   Did c/o once during the night that his tailbone hurt-had been up in chair for 30-40 minutes.  Skin intact.  Rated it 1/10-given 2 tylenol-crushed and down feeding tube.  Did appear more comfortable one hour later and stated it helped. Tube feeding continues at 50cc/hour.  Blood sugar 78 at midnight, 98 around 0300 and 102 at 0600. No insulin coverage given tonight.  Patient restless most of night- bed alarm on and has set it off a few times tonight-will sit at edge of bed but hasn't actually stood up on his own during the night-has called for assist when moving from bed to chair.  Breath sounds diminished bilaterally.  O2 sats WNLon room air. Takes 1-2 ice chips at a time and then coughs up thick clear to white mucus,  Systolic BP runs in the 90s. Weight approx the same as yesterday.

## 2021-06-14 NOTE — PROGRESS NOTES
End of  evening shift. While pt. Answers appropriately to questions forgetful and spontaneously gets himself out of bed or into bed. Alarms on.     At end of shift chest tube appears to have 3 been tipped so started with new chamber for night shift, to get accurate reading of out put.    NG intact and running.

## 2021-06-14 NOTE — PROGRESS NOTES
Progress Note    Brief summary:   70 y.o.male with past medical history of uncontrolled T2DM, severe multivessel CAD, chronic systolic and diastolic CHF, recent diagnosis of A. fib, who presented with acute CHF, was diuresed and subsequently underwent four-vessel CABG by Dr. Madison on 12/7/2020. Post-op course complicated by Afib with RVR, which was initially treated with IV amiodarone given pt's NPO status due to severe dysphagia.     Hospitalization complicated by altered mentation and seizure-like activity onset 12/8. CT Head on 12/8 showed punctate focus of increased attenuation within the subcortical region of the left frontal lobe. Subsequent MRI of the brain on 12/9 demonstrated subacute 2.5 cm subacute infarct with petechial hemorrhage in the left frontal lobe. EEG did not show any seizure activity, however Keppra was started 12/10 with resolution of rhythmic left leg movements. Neurology has followed, now signed off.     Extubated 12/10 and weaned down to room air. Started on broad spectrum antibiotics 12/10 due to new fever and worsening leukocytosis. 1 of 2 blood Cx grew staph epidermidis, and sputum cultures showed respiratory miryam with 4+ beta hemolytic Strep. 7 days IV pip-tazo completed 12/17.     VFSS with mild oral and severe pharyngeal dysphagia - NGT placed on 12/15. This may have exacerbated hypernatremia, which improved with D5W prior to NGT placement.    Patient continued to have significant drainage from chest tubes, L>R. R chest tube removed today and left chest tube remains    Assessment/Plan  Principal Problem:    Acute on chronic clinical systolic heart failure (H)  Active Problems:    ACP Staging Stage 1 Hypertension: 140-159 / 90-99    Type 2 diabetes mellitus (H)    Hyperlipidemia, unspecified hyperlipidemia type    CAD (coronary artery disease)    Atrial fibrillation with rapid ventricular response (H)    Diabetic leg ulcer (H)    Acute on chronic systolic CHF (congestive heart  failure) (H)    A-fib (H)    Uncontrolled type 2 diabetes mellitus with hyperglycemia (H)    Acute respiratory failure with hypoxia (H)    On mechanically assisted ventilation (H)    S/P CABG (coronary artery bypass graft)    Seizures (H)    Cerebrovascular accident (CVA), unspecified mechanism (H)    Cerebrovascular accident (CVA) due to other mechanism (H)    Metabolic encephalopathy    Disorientation    Acute kidney failure with tubular necrosis (H)      Multivessel CAD, s/p CABG and PVI/LAAL on 12/7/2020:   Increased pleural drainage, negative for chylous fluid; R chest tube taken out 12/22, Left one remains and draining serous fluid  On diuretics- switched to oral today  Hemodynamically stable  On ASA, statin, metoprolol  Mgmt as per CV Surgery.      A Fib with RVR, now s/p PVI and DONNA excision:   Oral amiodarone, metoprolol.  Currently NSR  Mgmt as per Cardiology and CV Surgery following.   Cardiac tele.      Acute diastolic heart failure, improving.  Switched to oral torsemide today-12/22  Mgmt as per Cardiology.     Acute hypoxic respiratory failure, resolved: On room air.   Encourage IC.  Supplemental O2 to keep sats >94%.  Continue pulmonary toilet- On metaneb, flutter valve     Acute Metabolic encephalopathy: improving.  Encephalopathy could also be contributed by prolonged hospitalization, hypernatremia, possible pneumonia, medications. CT Head from 12/12 showed no new changes.   EEG did not show seizure activity, but pt's rhythmic left leg movement appear to be have improved with Keppra. Continue keppra, Keppra level therapeutic, pt was started on quetiapine on 12/14   Decrease at bedtime seroquel due to somnolence     Subacute left frontal stroke with petechial hemorrhage:   on ASA, statin.   Neurology recs appreciated.     Sepsis  Fever, Leukocytosis, tachycardia noted 12/10/2020  UA was negative. One of two blood cultures grew staph epi, likely contaminant.  Sputum culture growing beta-hemolytic  strep.   Started on emperic pip-tazo IV,  completed 7d on 12/17, fever and WBC resolving.  ID recs appreciated.     Severe pharyngeal dysphagia: NG and tube feeding.  SLP team following, repeat Video swallow 12/21 shows ongoing significant dysphagia     Hyperglycemia in the setting of T2DM and D5W as above:  HDSSI with glargine. Holding home glipizide, metformin.  Increase dose of two times a day glargine again today: 20u two times a day->25u two times a day     Hypernatremia, resolved.     HTN: holding home losartan, amlodipine.       Subjective  Patient seen and examined  Feeling tired  No chest pain  Denied shortness of breath     Objective    Vital signs in last 24 hours  Temp:  [97.1  F (36.2  C)-98.3  F (36.8  C)] 98.3  F (36.8  C)  Heart Rate:  [78-86] 83  Resp:  [16-20] 16  BP: ()/(65-81) 112/74  Weight:   186 lb 9.6 oz (84.6 kg)    Intake/Output last 3 shifts  I/O last 3 completed shifts:  In: 1340 [P.O.:240; NG/GT:1100]  Out: 500 [Urine:500]  Intake/Output this shift:  No intake/output data recorded.    Physical Exam   General: awake, alert, not in distress  Eyes: no pallor  Chest: Clear to auscultation bilaterally  Heart: RRR, normal S1 and S2  Abdomen: soft, non tender  Neuro: moving all extremities, slightly confused      Meds    amino acids-protein hydrolys  2 packet Enteral Tube DAILY     amiodarone  200 mg Enteral Tube BID     aspirin  81 mg Enteral Tube DAILY    Or     aspirin  150 mg Rectal DAILY     atorvastatin  80 mg Enteral Tube QHS     famotidine (PEPCID) injection  20 mg Intravenous BID    Or     famotidine  20 mg Oral BID     guar gum  1 packet Oral BID     heparin (PF) ANTICOAGULANT  5,000 Units Subcutaneous Q8H FIXED TIMES     insulin aspart (NovoLOG) injection   Subcutaneous Q6H FIXED TIMES     insulin glargine  20 Units Subcutaneous BID     levETIRAcetam  500 mg Enteral Tube BID     lidocaine (PF)  1-5 mL Intradermal Once     melatonin  3 mg Enteral Tube QHS     metoprolol tartrate   50 mg Enteral Tube BID     multivitamin with iron-mineral  15 mL Enteral Tube DAILY     QUEtiapine  12.5 mg Oral QHS     sodium chloride bacteriostatic  1-5 mL Intradermal Once     sodium chloride  3 mL Intravenous Q8H FIXED TIMES     [START ON 12/23/2020] torsemide  100 mg Oral DAILY     white petrolatum   Topical DAILY       Pertinent Labs   Lab Results: personally reviewed.   not applicable    Results from last 7 days   Lab Units 12/22/20  0633 12/21/20  0715 12/20/20  0741 12/18/20  0708 12/18/20  0708 12/16/20  0546 12/16/20  0546   LN-SODIUM mmol/L  --   --  143  --  145  --  144   LN-POTASSIUM mmol/L 4.0 3.7 3.9   < > 3.8   < > 3.6   LN-CHLORIDE mmol/L  --   --  110*  --  111*  --  113*   LN-CO2 mmol/L  --   --  24  --  26  --  21*   LN-BLOOD UREA NITROGEN mg/dL  --   --  15  --  14  --  16   LN-CREATININE mg/dL  --   --  0.89  --  0.83  --  1.10   LN-CALCIUM mg/dL  --   --  8.1*  --  8.1*  --  7.6*    < > = values in this interval not displayed.         Results from last 7 days   Lab Units 12/21/20  0715 12/18/20  0708 12/16/20  0546   LN-WHITE BLOOD CELL COUNT thou/uL 13.1* 12.5* 13.8*   LN-HEMOGLOBIN g/dL 11.9* 11.8* 11.3*   LN-HEMATOCRIT % 40.0 39.4* 37.0*   LN-PLATELET COUNT thou/uL 364 285 263         Pertinent Radiology   Radiology Results: Personally reviewed impression/s    Echo Complete    Result Date: 12/3/2020    Technically difficult study. Definity contrast utilized.   Normal left ventricular size. Mild to moderate left ventricular hypertrophy.   Left ventricle ejection fraction is moderately globally reduced. Left ventricular ejection fraction estimated at 30 to 35%. Abnormal septal motion.   Right ventricle not optimally visualized. Right ventricular systolic function is reduced. Abnormal TAPSE   Moderate left atrial enlargement. Mild right atrial enlargement.   Aortic valve sclerosis without Doppler evidence to suggest aortic stenosis. Mild aortic insufficiency.   Nonspecific thickening of  the mitral valve leaflets with mild mitral regurgitation.   Mild tricuspid regurgitation.   Mild enlargement of the aortic root   When compared to the previous study dated 9/20/2020, the prior study was also technically challenging. Left ventricular function remains reduced and appears potentially mildly more prominently reduced on the current examination.      Xr Chest 1 View Portable    Result Date: 12/20/2020  EXAM: XR CHEST 1 VIEW PORTABLE LOCATION: [facility] DATE/TIME: 12/20/2020 9:01 AM INDICATION: Postop evaluation COMPARISON: 12/16/2020     Bilateral chest tubes. No pneumothoraces. Small right pleural effusion with passive atelectasis in the right lung. Enteric tube tip below diaphragm, off the image. Strand of atelectasis or scarring left midlung. Calcified aortic atherosclerosis. Sternotomy with CABG and vascular markers. No change from prior.    Xr Chest 1 View Portable    Result Date: 12/16/2020  EXAM: XR CHEST 1 VIEW PORTABLE LOCATION: [facility] DATE/TIME: 12/16/2020 10:31 AM INDICATION: s/p CABg COMPARISON: Portable chest radiography 12/15/2020     Feeding tube extends below the diaphragmatic hiatus and outside the field-of-view. Bilateral chest tubes are unchanged in position. The cardiac silhouette remains enlarged. Unchanged mediastinal interfaces. Graded opacities in the lower third of both right and left chest relate to pleural effusions and adjacent atelectatic lung, slightly increased from yesterday. No pneumothorax. Sternal wires are aligned. Coronary ostial markers and vascular clips are present from CABG.    Xr Chest 1 View Portable    Result Date: 12/15/2020  EXAM: XR CHEST 1 VIEW PORTABLE LOCATION: [facility] DATE/TIME: 12/15/2020 8:58 AM INDICATION: s/p CABG  possible aspiration COMPARISON: 12/12/2020     Sternal wires. Removal IJ sheath. Stable position bilateral chest drains. Stable enlarged cardiac  silhouette. Unchanged bilateral lung opacities which are likely a combination of lung infiltrates and pleural fluid. No pneumothorax. No pulmonary edema.    Xr Chest 1 View Portable    Result Date: 12/12/2020  EXAM: XR CHEST 1 VIEW PORTABLE LOCATION: [facility] DATE/TIME: 12/12/2020 12:47 PM INDICATION: s/p CABG COMPARISON: 12/8/2020     Interval extubation and removal of the right IJ [place] catheter. Right IJ line terminates over the brachiocephalic-SVC junction. Mediastinal drains are noted. Persistent and marginally increased small bilateral effusions and associated atelectasis. No pneumothorax. Cardiomegaly and prominence of the mediastinum are similar. CABG changes. Mild pulmonary vascular congestion without overt pulmonary edema.    Xr Chest 1 View Portable    Result Date: 12/8/2020  EXAM: XR CHEST 1 VIEW PORTABLE LOCATION: [facility] DATE/TIME: 12/8/2020 6:20 AM INDICATION: s/p cardiac surgery COMPARISON: 12/07/2020 FINDINGS: Endotracheal tube terminates 3.8 cm above the yariel. Right heart catheter terminates over the right main pulmonary artery. Bilateral chest tubes remain in place. Heart size is enlarged but stable status post median sternotomy. Mild prominence of central pulmonary vasculature without overt failure. Dense retrocardiac consolidation on the left is unchanged. No pneumothorax.     No significant interval change.    Xr Chest 1 View Portable    Result Date: 12/7/2020  EXAM: XR CHEST 1 VIEW PORTABLE LOCATION: [facility] DATE/TIME: 12/7/2020 1:51 PM INDICATION: s/p cardiac surgery COMPARISON: Portable AP view of the chest 02/12/2020     New endotracheal tube is in satisfactory position. Right jugular approach sheath and PA catheter are present. The tip of the PA catheter is in the right pulmonary artery. Mediastinal drain and bilateral chest tubes are present. Coronary ostial markers and vascular clips from  revascularization. Mild upper pneumomediastinum and gas in the soft tissues of the left neck. Improved lung inflation particularly of the right lower lobe with improved discrimination of the right hemidiaphragm. Subsegmental sized opacities in the left lung around the hilum and along the diaphragmatic pleura likely representing residual areas of atelectasis. No visible pleural fluid or pneumothorax.    Xr Chest 1 View Portable    Result Date: 12/2/2020  EXAM: XR CHEST 1 VIEW PORTABLE LOCATION: [facility] DATE/TIME: 12/2/2020 2:47 PM INDICATION: Shortness of breath with concern for pulmonary edema secondary to diuretic noncompliance COMPARISON: Chest x-ray 09/19/2020     Small left-sided and small to moderate right-sided pleural effusions with adjacent opacities likely reflecting atelectasis. Cardiomegaly with central vascular congestion and mild pulmonary edema.    Xr Chest 2 Views    Result Date: 12/21/2020  EXAM: XR CHEST 2 VIEWS LOCATION: [facility] DATE/TIME: 12/21/2020 10:20 AM INDICATION: recheck R pleural effusion COMPARISON: PA and lateral views of the chest 12/20/2020     Unchanged position of bilateral chest tubes. Feeding tube extends below the diaphragmatic hiatus and outside the field-of-view. Improved lung inflation. Opacity in the basal right chest has considerably cleared with improved discrimination of the right hemidiaphragm consistent with evacuation of pleural fluid and improved atelectasis. Thin linear band of atelectasis radiating from the left hilum is unchanged. Decreased lung vascular congestion. Cardiac silhouette is normal in size. Ostial markers and vascular clips from CABG..    Xr Abdomen Ap Portable    Result Date: 12/8/2020  EXAM: XR ABDOMEN AP PORTABLE LOCATION: [facility] DATE/TIME: 12/8/2020 7:30 PM INDICATION: check tube placement COMPARISON: None.     Gastric tube courses below the diaphragmatic  hiatus, follows the greater curvature of the stomach with tip along the right lateral aspect of the lumbar spine presumably in the distal stomach. Mediastinal drain and bilateral chest tubes are present. Nonobstructive bowel gas pattern.     Ct Head Without Contrast    Result Date: 12/12/2020  EXAM: CT HEAD WO CONTRAST LOCATION: [facility] DATE/TIME: 12/12/2020 11:51 AM INDICATION: Stroke. Increasing confusion. COMPARISON: Head MRI 12/09/2020 TECHNIQUE: Routine CT Head without IV contrast. Multiplanar reformats. Dose reduction techniques were used. FINDINGS: INTRACRANIAL CONTENTS: Infarct in the left frontal operculum with subtle associated petechial hemorrhage correlating with the blood products noted on MRI. No gross hemorrhagic transformation. No CT evidence of acute infarct. Chronic infarct in the left cerebellar hemisphere. Mild generalized volume loss. VISUALIZED ORBITS/SINUSES/MASTOIDS: No intraorbital abnormality. No paranasal sinus mucosal disease. No middle ear or mastoid effusion. BONES/SOFT TISSUES: No acute abnormality.     1.  Petechial hemorrhage in the left frontal opercular infarct correlating with the blood products noted on MRI. 2.  No gross hemorrhagic transformation. 3.  Chronic left cerebellar infarct. 4.  No change.    Ct Head Without Contrast    Addendum Date: 12/8/2020    Addendum/ impression: Dr. Domingo discussed the results with Dr. Kraft on 12/8/2020 10:06 PM by telephone.    Result Date: 12/8/2020  EXAM: CT HEAD WO CONTRAST LOCATION: [facility] DATE/TIME: 12/8/2020 9:12 PM INDICATION: seizure COMPARISON: None. TECHNIQUE: Routine CT Head without IV contrast. Multiplanar reformats. Dose reduction techniques were used. FINDINGS: Punctate focus of increased attenuation within the subcortical region of the left frontal lobe. This may represent a focus of petechial hemorrhage, cortical laminar necrosis, mineralization or subarachnoid  hemorrhage. This is best demonstrated on axial image #23, series 2 and sagittal image #43, series 5.2. Scattered foci of decreased attenuation within the cerebral hemispheric white matter which are nonspecific, though most commonly ascribed to chronic small vessel ischemic disease. The ventricles and sulci are prominent consistent with mild brain parenchymal volume loss. Gray-white matter differentiation is maintained. The basilar cisterns are patent. The globes are unremarkable. The partially imaged paranasal sinuses, mastoid air cells and middle ear cavities are unremarkable. The visualized skull base and calvarium are unremarkable.     1.  Punctate focus of increased attenuation within the subcortical region of the left frontal lobe. This may represent a focus of petechial hemorrhage, cortical laminar necrosis, mineralization or subarachnoid hemorrhage. This is best demonstrated on axial image #23, series 2 and sagittal image #43, series 5.2. Comparison with prior studies would be helpful if available. 2.  Otherwise recommend brain MRI for further evaluation. Additionally follow-up CT is could be performed to ensure stability or resolution.    Mr Brain With Without Contrast    Result Date: 12/9/2020  EXAM: MR BRAIN W WO CONTRAST LOCATION: [facility] DATE/TIME: 12/9/2020 1:07 PM INDICATION: Seizure. Altered mental status. Abnormal head CT, hemorrhage. COMPARISON: Head CT 12/08/2020. CONTRAST: 10 mL of gadobutrol intravenous contrast. TECHNIQUE: Routine multiplanar multisequence head MRI without and with intravenous contrast. FINDINGS: Cortical diffusion hyperintensity without most minimal restriction along the margin of the left middle and inferior frontal gyri demonstrates cortical signal loss on the susceptibility sensitive sequence with gyriform enhancement over a 2.5 x 1.5 x 1 cm area. Constellation of findings are most consistent with a subacute infarct and petechial hemorrhage.  This corresponds to the abnormality identified on the comparison head CT.  Mild mucosal thickening in the sinuses. A few mastoid air cells on each side contain a small amount of fluid. Intraorbital contents are unremarkable. There is mild generalized prominence of the sulci and ventricles, consistent with underlying volume loss. Intracranial flow voids are intact. Small chronic infarct left cerebellum. There is no mass effect, midline shift, or extraaxial collection. There are scattered foci of T2/FLAIR hyperintensity within the cerebral white matter that are nonspecific but most commonly reflect the sequela of chronic small vessel ischemic disease.     The abnormality identified on head CT corresponds to a 2.5 cm subacute infarct in the left frontal lobe associated with petechial hemorrhage. No significant mass effect.     Xr Swallow Study W Speech    Result Date: 12/21/2020  EXAM: XR SWALLOW STUDY W SPEECH OR OT LOCATION: [facility] DATE/TIME: 12/21/2020 9:17 AM INDICATION: Difficulty swallowing. COMPARISON: None. TECHNIQUE: Routine. FINDINGS: FLUOROSCOPIC TIME: 2.3 minutes. NUMBER OF IMAGES: 0 Swallow study with Speech Pathology using multiple barium thicknesses. With honey consistency there is marked residual in the vallecula and piriform sinuses and [place] silent aspiration. Chin tuck and head turn maneuver did not help. With pudding consistency there is also some moderate residual which clears with additional swallowing with no penetration or aspiration.     1.  High risk for aspiration with honey consistency and likely thinner. 2.  Patient does tolerate pudding consistency.     Xr Swallow Study W Speech    Result Date: 12/15/2020  EXAM: XR SWALLOW STUDY W SPEECH OR OT LOCATION: [facility] DATE/TIME: 12/15/2020 9:12 AM INDICATION: Difficulty swallowing. Abnormal swallow study 12/12/2020. COMPARISON: 12/12/2020 TECHNIQUE: Routine. FINDINGS: FLUOROSCOPIC TIME:  .4 minutes NUMBER OF IMAGES: 0 Swallow study with Speech Pathology using multiple barium thicknesses. Airway aspiration with the nectar thick barium. Weak, delayed cough. Airway penetration with the honey thick barium. Please see speech pathology's report for further details and recommendations.    Xr Swallow Study W Speech    Result Date: 12/12/2020  EXAM: XR SWALLOW STUDY W SPEECH OR OT LOCATION: [facility] DATE/TIME: 12/12/2020 9:28 AM INDICATION: Difficulty swallowing. COMPARISON: None. TECHNIQUE: Routine. FINDINGS: FLUOROSCOPIC TIME: 1.2 minutes NUMBER OF IMAGES: 0 Swallow study with Speech Pathology using multiple barium thicknesses. There is silent aspiration with thin liquid with no cough reflex. There is pooling of all consistencies in the valleculae with some poor over and there is penetration to the vocal cords with nectar and honey thick liquid. Chin tuck technique improved inversion of the epiglottis mildly.     1.  Silent aspiration with thin liquid. Penetration with nectar and honey consistencies. Stasis with pooling in the valleculae.     Us Carotid Bilateral    Result Date: 12/3/2020  EXAM: US CAROTID BILATERAL LOCATION: [facility] DATE/TIME: 12/3/2020 3:12 PM INDICATION: Coronary artery disease. COMPARISON: None. TECHNIQUE: Duplex exam performed utilizing 2D gray-scale imaging, Doppler interrogation with color-flow and spectral waveform analysis. The percent diameter stenosis is determined using NASCET criteria and Society of Radiologists in Ultrasound Consensus Criteria. FINDINGS: RIGHT: Mild plaque at the bifurcation. The peak systolic velocity in the ICA is less than 125 cm/sec, consistent with less than 50% stenosis. Normal velocities in the ECA. Antegrade flow within the vertebral artery. LEFT: Mild plaque at the bifurcation. The peak systolic velocity in the ICA is less than 125 cm/sec, consistent with less than 50% stenosis. Normal  velocities in the ECA. Antegrade flow within the vertebral artery. VELOCITY CHART: CCA   Right: 64/16 cm/s   Left: 91/24 cm/s ICA   Right: 71/27 cm/s   Left: 60/22 cm/s ECA   Right: 64/11 cm/s   Left: 96/14 cm/s ICA/CCA PSV Ratio   Right: 1.1   Left: 0.66     1.  Mild plaque formation, velocities consistent with less than 50% stenosis in the right internal carotid artery. 2.  Mild plaque formation, velocities consistent with less than 50% stenosis in the left internal carotid artery. 3.  Flow within the vertebral arteries is antegrade.    Us Vein Mapping For Pre Bypass Graft Bilateral    Addendum Date: 12/15/2020    Indication: Vessel mapping prior to a bypass graft. Coronary artery disease plan for coronary artery bypass graft.    Result Date: 12/15/2020  EXAM: US VEIN MAPPING FOR PRE BYPASS GRAFT BILATERAL LOCATION: [facility] DATE/TIME: 12/3/2020 6:46 PM INDICATION: Lower extremity venous mapping prior to coronary or peripheral bypass surgery, coronary artery disease, diabetic leg ulcer COMPARISON: 9/19/2020 TECHNIQUE: Ultrasound examination of the lower extremity veins was performed, including gray-scale and compression imaging. LOWER  EXTREMITY FINDINGS:   VENOUS DIAMETERS RIGHT GREAT SAPHENOUS VEIN Upper Thigh: 3.0 mm Mid Thigh: 2.4 mm Lower Thigh: 2.5 mm Knee: The great saphenous vein is thrombosed from the knee to the ankle. LEFT GREAT SAPHENOUS VEIN Upper Thigh: 4.7 mm Mid Thigh: 4.1 mm Lower Thigh: 4.5 mm Knee: 2.9 mm Upper Calf: 3.2 mm Mid Calf: 2.2 mm Lower Calf: 2.0 mm Ankle: 1.1 mm RIGHT SMALL SAPHENOUS VEIN Not visualized LEFT SMALL SAPHENOUS VEIN The small saphenous vein is thrombosed from the knee to the ankle. There is underlying bilateral subcutaneous edema. No evidence of deep vein thrombosis.     1. Exam positive for superficial thrombophlebitis involving the right great saphenous vein from the knee to the ankle and left small saphenous vein from the knee to the ankle.  2. The right small saphenous vein is not visualized. 3. No evidence of thrombus extension into the deep venous system. 4. Diffuse subcutaneous edema without discrete fluid collections. NOTE: ABNORMAL REPORT    Echo Monitor [place]    Result Date: 12/7/2020    Left ventricle ejection fraction is moderately decreased. The calculated left ventricular ejection fraction is 38%.      Eeg Coma Or Sleep Only    Result Date: 12/8/2020  EEG report DATE 12/08/20 CLINICAL HISTORY This is a 70 y.o. male with left leg jerking. The EEG is done to look for underlying epilepsy. INTRODUCTION This is a routine EEG performed utilizing standard 10- 20 system of electrode placement with 20 channels of recording including one channel of EKG monitor. The patient was studied in awake and drowsy/somnolent state. EEG TIME: 31 min DESCRIPTION OF THE RECORD The EEG background consists of a generalized symmetric delta/theta background.  No sharp large amplitude rhythmic activity to suggest electrographic seizures were seen.  Mild to moderate background dysrhythmias present.  No activation maneuvers were done. IMPRESSION/REPORT/PLAN This is an abnormal EEG during wakefulness and somnolence/drowsiness due to generalized slow background.  EEG suggestive of an generalized cerebral dysfunction such as seen in an encephalopathy.  EEG is not suggestive of active ongoing seizures during the recording. Further clinical correlation is needed. Please note that the absence of epileptiform abnormalities does not exclude the possibility of epilepsy in any patient.     Xr Ng Or Nj Tube Reposition    Result Date: 12/15/2020  EXAM: XR NJ TUBE INSERTION LOCATION: [facility] DATE/TIME: 12/15/2020 9:49 AM INDICATION: Dysphagia. Aspiration on video swallow study. NJ tube needed for medication administration and feedings. COMPARISON: None. TECHNIQUE: A 10 Hungarian feeding tube was placed under fluoroscopic guidance. FLUORO TIME: 2.3 NUMBER OF  IMAGES: 1 FINDINGS: Enteric tube placed without complication.?Tip in the distal duodenum.           Advanced Care Planning:  Discharge Planning discussed with patient   Anticipated LOS: per surgery  Barrier to discharge:acute issues  Disposition:acute rehab?  Discussed care with patietn for >35 minutes with greater than 50% of time spent in counseling and coordination of care.      Nicole Longoria MD  Hospitalist  [phone]    This note was dictated using voice recognition software. Any grammatical or context distortions are unintentional and inherent to the software.

## 2021-06-14 NOTE — PROGRESS NOTES
Care Management Discharge Note    Discharge Planning:  Expected Discharge Date: 12/28/20  Concerns to be Addressed / Barriers to Discharge: Resolved      Anticipated Discharge Disposition:  Rehab Facility - Inpatient     Anticipated Discharge Services:  [facility]    Anticipated Discharge DME:  NG tube, tube feeding      Patient/family educated on Medicare website which has current facility and service quality ratings:  N/A    Referrals Placed by CM/SW:         Selected Continued Care - Admitted Since 12/2/2020     Destination Coordination complete    Service Provider Selected Services Address Phone Fax Patient Preferred Last Updated    [facility]  Inpatient [facility], [address] [place] [address] [address] [phone] -- Anthony Crews RN 12/23/2020 1202                Education Provided on the Discharge Plan: Review d/c options, Discuss d/c      Patient/Family in Agreement with the Plan: Yes(daughter)   Disposition Comments:   The [facility] He will be going via [facility] stretcher due to safety concerns - the patient is confused and impulsive, he has a NG tube, and he is getting continuous tube feeding. The writer called and updated the patient's daughter, Kaycee, and she will update the rest of her family. The writer also called and talked to Dipti with acute rehab, and she is aware of the discharge. Ride time is 1200.    Anthony Crews, RN

## 2021-06-14 NOTE — PROGRESS NOTES
[facility] Patient Outreach:     Discharge Date::  1/23/21      Reason for TCU stay (discharge diagnosis)::  CABG x 4, CVA, CHF, DM, dysphagia, sepsis      Are you feeling better, the same or worse since your discharge?:  Patient is feeling better          As part of your discharge plan, did they discuss home care with you?: Yes        Have your seen them yet, or are they scheduled to visit?: Yes                Do you have any follow up visits scheduled with your PCP or Specialist?:  No          I'm glad to hear you're doing well and we want you to continue to do well. Your PCP would like to see you for a follow-up visit. Can we help set that up for your today?: No        (RN) Provided patient the PCP's phone number to call if they have any questions or concerns?: No

## 2021-06-14 NOTE — ANESTHESIA POSTPROCEDURE EVALUATION
Patient: Mika Alvarez  Procedure(s):  INCISION AND DRAINAGE, Left foot (Left)  Anesthesia type: regional    Patient location: PACU  Last vitals:   Vitals Value Taken Time   /75 02/02/21 1805   Temp 36.1  C (97  F) 02/02/21 1746   Pulse 79 02/02/21 1811   Resp 44 02/02/21 1809   SpO2 96 % 02/02/21 1811   Vitals shown include unvalidated device data.  Post vital signs: stable  Level of consciousness: awake and responds to simple questions  Post-anesthesia pain: pain controlled  Post-anesthesia nausea and vomiting: no  Pulmonary: unassisted, return to baseline  Cardiovascular: stable and blood pressure at baseline  Hydration: adequate  Anesthetic events: no    QCDR Measures:  ASA# 11 - Ronda-op Cardiac Arrest: ASA11B - Patient did NOT experience unanticipated cardiac arrest  ASA# 12 - Ronda-op Mortality Rate: ASA12B - Patient did NOT die  ASA# 13 - PACU Re-Intubation Rate: NA - No ETT / LMA used for case  ASA# 10 - Composite Anes Safety: ASA10A - No serious adverse event    Additional Notes:

## 2021-06-14 NOTE — PLAN OF CARE
Patient alert and oriented to self. Patient up and down most of the time tonight, denies pain, RA, oxygen saturation >90%, soft Bps. Patient NJT in place, TF running at goal rate 50 ml/hr, patient tolerating TF well. Patent''s blood sugar at 0000  And 0600 hours, 186 mg/dl and 189 mg/dl, 6 units of corrective insulin administered both times. Patient had chest x-ray this morning, radiologist called and said that, two small pneumothorax noted bilaterally, and that repeat chest x-ray needs to be done sometime this afternoon. CV surgery and morning RN updated. Patient currently sleeping. Will continue to closely monitor the patient for now.     Problem: Pain  Goal: Patient's pain/discomfort is manageable  Outcome: Progressing     Problem: Safety  Goal: Patient will be injury free during hospitalization  Outcome: Progressing     Problem: Daily Care  Goal: Daily care needs are met  Outcome: Progressing     Problem: Psychosocial Needs  Goal: Demonstrates ability to cope with hospitalization/illness  Outcome: Progressing     Problem: Glucose Imbalance  Goal: Achieve optimal glucose control  Outcome: Progressing     Problem: Blood pressure is elevated above 140 over 90 mmHg  Goal: Blood pressure (BP) is within acceptable limits  Outcome: Progressing

## 2021-06-14 NOTE — PROGRESS NOTES
Speech Language/Pathology  Speech Therapy Daily Progress Note    Patient presents as alert and cooperative during this session.  An  was not applicable.    Objective    Pharyngeal Exercises:  to improve safety of swallow function as relates to oral diet advancement.  -effortful: pt recalled exercise STEPHEN but declined participation d/t fatigue.  -CTAR: pt did not recall exercise but was open to review. Provided explanation and pt completed x1 set isometric (x10 sec hold) and x10 isokinetic. Pt fatigued quickly and declined further completion.    Assessment    Patient was open to review of pharyngeal exercises and participated in completion of CTAR. Limited exercise completion d/t pt report of fatigue.     Plan/Recommendations  POC    The  Care Plan has been reviewed and current plan remains appropriate.    10 dysphagia minutes     Eileen Casillas MA, CCC-SLP

## 2021-06-15 NOTE — TELEPHONE ENCOUNTER
Received today paper work from [facility].    Paper work given to Erinn    Please fax to [phone]

## 2021-06-15 NOTE — TELEPHONE ENCOUNTER
"Beena from [facility] is calling to update provider.    Patient was released from the hospital last week and is refusing home care say \" I don't need it\".  He is not answering there phone calls and are unable to reach him. PT has been the only one that been able to speak to patient.  Patient has stated that he is not taking any of his meds and is coughing up blood. Patient refuses to go back to the ER.     Beena is at lost on what they should do with patient.    Beena would like to speak to provider.     Beena's office number is   [phone] ext: [phone]  Or cell phone # [phone]  "

## 2021-06-15 NOTE — TELEPHONE ENCOUNTER
Reason for Call:  Other prescription     Detailed comments: Payal RN with [facility] calling as do not have a med list for pt, RN called [facility]- they need refills sent over on all patients medications as the discharging provider from hospital did not send them (they have a status of no print)     Please fax updated med list to   Fax # [phone] (attn Torres & Payal) as they do not have any listed    Homecare orders: Requesting orders for wound vac- how often is it needing to be changed?  They were not aware that pt had wound vac until arriving.  Those orders can also be faxed to the above fax or called to RN    Phone Number Patient can be reached at: Other phone number:  [phone]*    Best Time: na    Can we leave a detailed message on this number?: Yes    Call taken on 3/9/2021 at 12:08 PM by Ita Le

## 2021-06-15 NOTE — TELEPHONE ENCOUNTER
Who is calling:  Jennifer Moreira (daughter)  Reason for Call:  Patient was discharge feb. 26th,2021.Patient was advise he was suppose to have home healthcare. Home healthcare has not reach out to him. Patient has not taken any medication since he been he been discharge. Or any healthcare...  Daughter Jennifer is very concern.Please call Jennifer (daughter) at [phone].   Date of last appointment with primary care:   Okay to leave a detailed message: yes

## 2021-06-15 NOTE — TELEPHONE ENCOUNTER
"Left message on wife's mobile number as main number says \"call cannot be completed at this time\". When they call back please see MD message below   "

## 2021-06-15 NOTE — TELEPHONE ENCOUNTER
----- Message from Angel Claire MD sent at 3/15/2021 12:46 PM CDT -----  Please contact patient - see if he has home care coming to the house, did he get his medications and does he have appointment with podiatry scheduled?  Dr. Claire

## 2021-06-15 NOTE — TELEPHONE ENCOUNTER
Received MTM referral from Hospital Discharge Transitions of Care     Patient was not reachable after several attempts, will route to MTM Pharmacist/Provider as an FYI. Left MTM scheduling information on patients voicemail.    Thank you for the referral,    Oz Deutsch, MTM coordinator

## 2021-06-15 NOTE — PATIENT INSTRUCTIONS - HE
Please take ensure protein supplement 2 times daily.         - Wound Vac Instructions    1. 3x weekly and as needed cleanse the wound with NS    2. Pat dry    3. Apply Cavilon no sting barrier wipe to the skin surrounding the wound to protect from drainage/maceration    4. Apply drape to veronica wound. Cut strip of drape and apply to skin if bridging is needed; plan this area in advance; should not be over bony prominence     5. Cut the foam to fit the size of the wound    6. Apply foam to wound    7. Cut narrow strip of foam if bridging if needed    8. Cover foam with drape to obtain air tight seal    9. Cut opening the size of a quarter for where the suction pad will be applied    10. Apply Suction pad    11. 125mmHG suction continuous     [employer] [facility] can be reached at [phone], 24 hours a day 7 days a week     - If you do not have a back up plan in place:     If the negative pressure wound therapy malfunctions or unable to maintain seal: dressing must be removed and reapplied within 2 hours of the incident. If unable to reapply negative pressure wound dressing, place Normal Saline moistened gauze in wound bed and cover with appropriate dressing to keep wound bed moist.  Change wet-to-dry dressing two times a day until healthcare staff can re-implement negative pressure therapy. Change canister at least weekly.  [employer] [facility] can be reached at [phone], 24 hours a day 7 days a week    - Information on Vacuum-Assisted Closure of a Wound  Vacuum-assisted closure (VAC) of a wound is a type of treatment to help wounds heal. It s also known as negative pressure wound therapy. During the treatment, a device lowers air pressure on the wound. This can help the wound heal more quickly.  - Understanding the wound VAC system  A wound VAC system has several parts. A foam or gauze dressing is put directly on the wound. The dressing is changed every 24 to 72 hours. An adhesive film covers and seals the  dressing and wound. A drainage tube leads from under the adhesive film and connects to a portable vacuum pump. This pump removes air pressure over the wound. It may do this constantly. Or it may do it in cycles. During the treatment, you ll need to carry the portable pump everywhere you go.  - Why wound VAC is used  You might need this therapy for a recent traumatic wound. Or you may need it for a chronic wound. This is a wound that does not heal the way it should over time. This can happen with wounds in people who have diabetes. You may need a wound VAC if you ve had a recent skin graft. And you may need a wound VAC for a large wound. Large wounds can take a longer time to heal.  A wound vacuum system may help your wound heal more quickly by:    Draining extra fluid from the wound    Reducing swelling    Reducing bacteria in the wound    Keeping your wound moist and warm    Helping draw together wound edges    Increasing blood flow to your wound    Decreasing inflammation  Wound VAC offers some other advantages over other types of wound care. It may decrease your overall discomfort. The dressings usually need to be changed less often. And they may be easier to keep in position.  - Risks of wound VAC  Wound VAC has some rare risks, such as:    Bleeding (which may be severe)    Wound infection    An abnormal connection between the intestinal tract and the skin (enteric fistula)  Proper training in dressing changes can help reduce the risk for these complications. Also, your doctor will carefully evaluate you to make sure you are a good candidate for the therapy. Certain problems can increase your risk for complications. These include:    Exposed organs or blood vessels    High risk of bleeding from another medical problem    Wound infection    Nearby bone infection    Dead wound tissue    Cancer tissue    Fragile skin, such as from aging or longtime use of topical steroids    Allergy to adhesive    Very poor blood  flow to your wound    Wounds close to joints that may reopen because of movement  Your doctor will discuss the risks that apply to you. Make sure to talk with him or her about all of your questions and concerns.  - Getting ready for wound VAC  You likely won t need to do much to get ready for wound VAC. In some cases, you may need to wait a while before having this therapy. For example, your doctor may first need to treat an infection in your wound. Dead or damaged tissue may also need to be removed from your wound.  You or a caregiver may need training on how to use the wound VAC device. This is done if you will be able to have your wound vacuum therapy at home. In other cases, you may need to have your wound vacuum therapy in a health care facility.  - On the day of your procedure  A health care provider will cover your wound with foam or gauze wound dressing. An adhesive film will be put over the dressing and wound. This seals the wound. The foam connects to a drainage tube, which leads to a vacuum pump. This pump is portable. When the pump is turned on, it draws fluid through the foam and out the drainage tubing. The pump may run constantly, or it may cycle off and on. Your exact setup will depend on the specific type of wound vacuum system that you use.  - Managing your wound  You may need the dressing changed about once a day. You may need it changed more or less often, depending on your wound. You or your caregiver may be trained to do this at home. Or it may be done by a visiting health care provider. Your doctor may prescribe a pain medicine. This is to prevent or reduce pain during the dressing change.  You will likely need to use the wound VAC system for several weeks or months. During this time, you ll carry the portable pump everywhere you go.  - Nutrition for wound healing  During this time, make sure you follow a healthy diet. This is needed so the wound can heal and to prevent infection. Your doctor  can tell you more about what to include in your diet during this time.  follow up with your doctor if you have a medical condition that led to your wound, such as diabetes. He or she can help you prevent future wounds.  - Follow-up care  Your doctor will carefully keep track of your healing. Make sure to keep all follow-up appointments.  - When to call your health care provider  Call your health care provider right away if you have any of these:    Fever of 100.4 F (38.0 C) or higher    Increased redness, swelling, or warmth around wound    Increased pain    Bright red blood or blood clots in tubing or the collection chamber of the vacuum

## 2021-06-15 NOTE — TELEPHONE ENCOUNTER
There was a separate message that patient had a noon appointment today for nurse visit- is that not accurate?  Pt was expecting someone at noon?  Please contact patient to see if nursing stopped at the house.

## 2021-06-15 NOTE — TELEPHONE ENCOUNTER
Spoke to Beena. She states at this time they do not have a nurse available to see patient.   Everytime they try to reach out to patient to set up home nursing no one answers wife's phone.   Beena is in the process of having to transfer patient to a different homecare agency. She states it looks like they have an appt scheduled to see him at the end of the week.

## 2021-06-15 NOTE — TELEPHONE ENCOUNTER
Updated Beena from Home care.  She states that they will get a nurse out there. They also have been calling patient wife phone with no luck.

## 2021-06-15 NOTE — TELEPHONE ENCOUNTER
Please give verbal orders.  Please sha up all medications based off of last hospital discharge  Please help arrange follow up with podiatrist for wound evaluation  Please find [employer] mobility paperwork as patient has no transit and we will need to fill out so he can attend follow up

## 2021-06-15 NOTE — TELEPHONE ENCOUNTER
I left a voicemail for Beena COTO, at [facility] yesterday and today to get patient set up with home care, and to call wife Bel's phone [phone].    Spoke to patient's daughter Jennifer and she states home care is coming out today at 12:00 PM.  Scheduled virtual visit with Dr. Claire today at 11:10 AM to follow up.

## 2021-06-15 NOTE — TELEPHONE ENCOUNTER
If daughter is on release please call and get more information.  Please see if patient is having home care come to the house? This was set up from the hospital?  If not patient should be seen this week by one of the providers- will need 40 min visit for hospital follow up.

## 2021-06-15 NOTE — TELEPHONE ENCOUNTER
----- Message from Angel Claire MD sent at 3/4/2021 12:14 PM CST -----  Beena's office number is   [phone] ext: [phone]  Or cell phone # [phone]    Please contact Beena with home care    Welfare check complete with patient  He is willing to take services- but really wants nursing help  His cell does not work- please use wives phone  [phone]    Dr. Claire

## 2021-06-15 NOTE — TELEPHONE ENCOUNTER
Fax is requesting most recent OV note be faxed.   Last OV 10/8/2020 was faxed to requested number. Note is electronically signed as Dr Claire not in clinic today.

## 2021-06-15 NOTE — TELEPHONE ENCOUNTER
Payal COTO called back, she assessed wound & wound vac- would like to have order to discharge wound vac.     0.1cm depth crater type wound the size of a pea- no drainage in canister    Would like orders for guaze packing & to cover with adhesive dressing to change M-W-F

## 2021-06-15 NOTE — TELEPHONE ENCOUNTER
"RN cannot approve Refill Request    RN can NOT refill this medication medication not on med list. Last office visit: 3/9/2021 Angel Claire MD Last Physical: Visit date not found Last MTM visit: Visit date not found Last visit same specialty: 3/9/2021 Angel Claire MD.  Next visit within 3 mo: Visit date not found  Next physical within 3 mo: Visit date not found      Lupe Macias, [facility] Triage/Med Refill 3/11/2021    Requested Prescriptions   Pending Prescriptions Disp Refills     pen needle, diabetic (BD ULTRA-FINE SHORT PEN NEEDLE) 31 gauge x 5/16\" Ndle [Pharmacy Med Name: BD Pen Needle Short U/F Miscellaneous 31G X 8 MM] 300 each 0     Sig: USE UP TO 4 TIMES DAILY AS DIRECTED       Diabetic Supplies Refill Protocol Passed - 3/11/2021 12:54 PM        Passed - Visit with PCP or prescribing provider visit in last 6 months     Last office visit with prescriber/PCP: 3/9/2021 Angel Claire MD OR same dept: 3/9/2021 Angel Claire MD OR same specialty: 3/9/2021 Angel Claire MD  Last physical: Visit date not found Last MTM visit: Visit date not found   Next visit within 3 mo: Visit date not found  Next physical within 3 mo: Visit date not found  Prescriber OR PCP: Angel Claire MD  Last diagnosis associated with med order: 1. Diabetes (H)  - BD ULTRA-FINE SHORT PEN NEEDLE 31 gauge x 5/16\" Ndle [Pharmacy Med Name: BD Pen Needle Short U/F Miscellaneous 31G X 8 MM]; USE UP TO 4 TIMES DAILY AS DIRECTED  Dispense: 300 each; Refill: 0    If protocol passes may refill for 12 months if within 3 months of last provider visit (or a total of 15 months).             Passed - A1C in last 6 months     Hemoglobin A1c   Date Value Ref Range Status   12/03/2020 12.6 (H) <=5.6 % Final     Comment:     Prediabetes:   HBA1c       5.7 to 6.4%        Diabetes:        HBA1c        >=6.5%   Patients with Hgb F >5%, total bilirubin >10.0 mg/dL, abnormal red cell turnover, severe " renal or hepatic disease or malignancy should not have this A1C method used to diagnose or monitor diabetes.

## 2021-06-15 NOTE — TELEPHONE ENCOUNTER
"Incoming call from patient - patient states no nurse has come out to see him. Relayed message to patient that nurse cant get a hold of pt or his wife. Patient states \"his door is open and ready for nurse\". Patient is wondering if we can reach out to Home Care and call his wife's phone #.   "

## 2021-06-15 NOTE — PROGRESS NOTES
FOOT AND ANKLE SURGERY/PODIATRY Progress Note      ASSESSMENT:   Diabetic Ulceration left foot      TREATMENT:  -The left foot ulceration continues to probe to deep tissues. I recommend continued use of the wound vac at 125 mmHg and strict non-weight bearing.     -After discussion of risk factors and consent obtained 2% Lidocaine HCL jelly was applied, under clean conditions, the left and foot ulceration(s) were debrided using #15 blade scalpel.  Devitalized and nonviable tissue, along with any fibrin and slough, was removed to improve granulation tissue formation, stimulate wound healing, decrease overall bacteria load, disrupt biofilm formation and decrease edge senescence.  Total excisional debridement was 1.2 sq cm into the muscle/fascia with a depth of 2 cm.   Ulcers were improved afterwards and .  Measures were as noted on the flow sheet. Patient tolerated this well. A gauze dressing was applied. Wound vac to be applied at his facility. Protein supplements two times a day.     -He will follow-up in 2 weeks.    Jose Alberto Prieto DPM  [facility] [place]      HPI: Mika ANNE Alvarez was seen again today for a left foot ulceration. He has used the wound vac but admits applying pressure to the left foot with standing.       Past Medical History:   Diagnosis Date     ACP Staging Stage 1 Hypertension: 140-159 / 90-99     Created by Conversion  Replacement Utility updated for latest IMO load     Acute metabolic encephalopathy      Acute on chronic systolic (congestive) heart failure (H)      Atrial fibrillation with rapid ventricular response (H) 12/02/2020     Coronary Artery Disease     Created by Conversion  Replacement Utility updated for latest IMO load     CVA (cerebral vascular accident) (H)      Dysphagia      HTN (hypertension)      Hyperglycemia      Ischemic cardiomyopathy      Joint Pain, Localized In The Shoulder     Created by Conversion      Obesity     Created by Conversion      Other and  unspecified hyperlipidemia 04/27/2016     Seizures (H)      Type 2 Diabetes Mellitus With Complication     Created by Conversion      Urinary retention        Past Surgical History:   Procedure Laterality Date     CORONARY ARTERY BYPASS GRAFT  12/07/2020    Dr. Madison     CV CORONARY ANGIOGRAM N/A 09/22/2020    Procedure: Coronary Angiogram;  Surgeon: Darin Perez MD;  Location: [facility];  Service: Cardiology     CV LEFT HEART CATHETERIZATION WITH LEFT VENTRICULOGRAM N/A 09/22/2020    Procedure: Left Heart Catheterization with Left Ventriculogram;  Surgeon: Darin Perez MD;  Location: [facility];  Service: Cardiology     INCISION AND DRAINAGE OF WOUND Left 2/2/2021    Procedure: INCISION AND DRAINAGE, Left foot;  Surgeon: Jose Alberto Prieto DPM;  Location: [facility];  Service: Podiatry     PICC AND MIDLINE TEAM LINE INSERTION  12/14/2020            No Known Allergies      Current Outpatient Medications:      acetaminophen (TYLENOL) 325 mg/10.15 mL Soln, Take 20.3 mL by mouth every 4 (four) hours as needed., Disp: , Rfl:      aspirin 81 mg chewable tablet, Chew 1 tablet (81 mg total) daily., Disp: , Rfl:      atorvastatin (LIPITOR) 80 MG tablet, Take 1 tablet (80 mg total) by mouth at bedtime., Disp: , Rfl:      famotidine (PEPCID) 20 MG tablet, Take 1 tablet (20 mg total) by mouth 2 (two) times a day., Disp: , Rfl:      glucagon,human recombinant (GLUCAGON EMERGENCY KIT, HUMAN, INJ), Inject 1 mg as directed as needed., Disp: , Rfl:      insulin glargine (LANTUS SOLOSTAR PEN) 100 unit/mL (3 mL) pen, Inject 30 Units under the skin daily before breakfast. Plus am dose., Disp: 15 mL, Rfl: 0     levETIRAcetam (KEPPRA) 500 MG tablet, Take 1 tablet (500 mg total) by mouth 2 (two) times a day., Disp: 180 tablet, Rfl: 0     losartan (COZAAR) 25 MG tablet, Take 0.5 tablets (12.5 mg total) by mouth daily. Hold for SBP < 90, Disp: 30 tablet, Rfl: 0     melatonin 3 mg Tab tablet,  Take 1 tablet (3 mg total) by mouth at bedtime., Disp: , Rfl:      metFORMIN (GLUCOPHAGE) 1000 MG tablet, Take 1 tablet (1,000 mg total) by mouth 2 (two) times a day with meals., Disp: , Rfl:      metoprolol succinate (TOPROL-XL) 100 MG 24 hr tablet, Take 1 tablet (100 mg total) by mouth daily. Hold for SBP < 90 or HR < 60, Disp:  , Rfl: 0     multivitamin with minerals tablet, Take 1 tablet by mouth daily., Disp: , Rfl:      NOVOLOG FLEXPEN U-100 INSULIN 100 unit/mL (3 mL) injection pen, Check blood sugar three (3) times daily. 11.65 Type 2 with hyperglycemia, Disp: , Rfl: 0     ondansetron (ZOFRAN-ODT) 4 MG disintegrating tablet, Take 4 mg by mouth every 6 (six) hours as needed for nausea., Disp: , Rfl:      senna (SENOKOT) 8.6 mg tablet, Take 1 tablet by mouth 2 (two) times a day as needed for constipation., Disp: , Rfl:      spironolactone (ALDACTONE) 25 MG tablet, Take 0.5 tablets (12.5 mg total) by mouth daily. Hold for SBP < 100, Disp: , Rfl: 0     torsemide (DEMADEX) 20 MG tablet, Take 2 tablets (40 mg total) by mouth daily., Disp:  , Rfl: 0    Review of Systems - 10 point Review of Systems is negative except for left foot ulcer which is noted in HPI.      OBJECTIVE:  Vitals:    02/17/21 1312   BP: 126/78   Pulse: 76   Resp: 18   Temp: 97.3  F (36.3  C)     General appearance: Patient is alert and fully cooperative with history & exam.  No sign of distress is noted during the visit.   Vascular: Dorsalis pedis non-palpableLeft.  Dermatologic:    VASC Wound 02/17/21 left foot  (Active)   Pre Size Length 1 02/17/21 1300   Pre Size Width 1.2 02/17/21 1300   Pre Size Depth 2 02/17/21 1300   Pre Total Sq cm 1.2 02/17/21 1300       Wound 01/29/21 Non-pressure related ulcer Leg Left (Active)       Wound 01/29/21 Other wound type (comment) Foot Anterior (front);Right;Lateral (side/outer) (Active)       Incision 02/02/21 Surgical Foot Left (Active)   Granular tissue plantar lateral left foot ulceration, probes to deep  tissues. No erythema left foot.   Neurologic: Diminished to light touch Left.  Musculoskeletal: Contracted digits noted Left.    Imaging:     Xr Chest 1 View Portable    Result Date: 1/29/2021  EXAM: XR CHEST 1 VIEW PORTABLE LOCATION: [facility] DATE/TIME: 1/29/2021 2:36 PM INDICATION: Fatigue, weakness, recent bypass surgery COMPARISON: 12/27/2020     Sternotomy. Heart size upper limits of normal. Mild interstitial infiltrates in both mid to lower lungs is new. Most suggestive of mild pulmonary edema. Tiny bilateral pleural effusions.    Xr Foot Left 3 Or More Vws    Result Date: 2/1/2021  EXAM: XR FOOT LEFT 3 OR MORE VWS LOCATION: [facility] DATE/TIME: 2/1/2021 3:59 PM INDICATION: osteomyelitis 5th metatarsal left COMPARISON: 2/1/2020     Chronic-appearing fracture of the base of the fifth metatarsal. No radiographic evidence of osteomyelitis. Diffuse demineralization. Plantar calcaneal spurring. Vascular calcifications.    Mr Forefoot With Without Contrast Left    Result Date: 2/1/2021  EXAM: MR FOREFOOT W WO CONTRAST LEFT LOCATION: [facility] DATE/TIME: 2/1/2021 3:52 PM INDICATION: Forefoot ulcer. Concern for osteomyelitis. COMPARISON: Foot radiographic exam 11/10/2016. TECHNIQUE: Routine. Additional postgadolinium T1 sequences were obtained. IV CONTRAST: Gadavist, 8 mL. FINDINGS:  JOINTS AND BONES: No acute left foot fracture. No evidence for stress fracture. Chronic nonunited avulsive fracture from the right fifth metatarsal base as seen on image 7 of series 4. Presumed mildly inflamed intraosseous ganglion cyst third metatarsal base. Mild great toe MTP and metatarsal sesamoid joint osteoarthritis. No hallux tibial or fibular sesamoiditis or fracture. Great toe plantar plate appears intact. Mild talonavicular and naviculocuneiform as well as polyarticular TMT joint chondrosis. No definite MR evidence for osteomyelitis. TENDONS: No  tendon tear, tendinopathy, or tenosynovitis. LIGAMENTS: Lisfranc ligament intact. No subluxation. MUSCLES AND SOFT TISSUES: Atrophy of the abductor digiting minimi, which can be seen with Fairchild's neuropathy. Mild diffuse muscle edema with interfascial edema suggestive of denervation edema. No significant myositis. Focal area of plantar cellulitis along the plantar surface of the fifth metatarsal head with small fluid collection as seen best on image 9 of series 10 measuring approximately 4 x 3 x 6 mm and could represent a small abscess or adventitial bursitis in the appropriate clinical scenario.  No evidence for adjacent osteomyelitis or septic arthritis.     1.  Tiny fluid collection with adjacent inflammation along the plantar surface of the fifth metatarsal head near the fifth MTP joint, 4 x 3 x 6 mm. Considerations would include a small abscess or adventitial bursitis. 2.  No evidence for adjacent left fifth MTP joint septic arthritis or osteomyelitis of the fifth metatarsal head or fifth toe proximal phalanx. 3.  Mild great toe MTP and metatarsal sesamoid joint osteoarthritis. 4.  Degenerative osteoarthritis left midfoot. 5.  No acute left foot fracture or stress fracture. 6.  Chronic nonunited avulsive fracture fifth metatarsal base with shallow ulcer at the fifth metatarsal plantar lateral. No evidence for adjacent osteomyelitis.     Us Prasad Doppler No Exercise, 1-2 Levels, Bilateral    Result Date: 2/2/2021  [facility] DATE: 2/2/2021 2:25 PM EXAM: RESTING ANKLE-BRACHIAL INDICES (ABIs) AND DUPLEX ARTERIAL ULTRASOUND OF THE LOWER EXTREMITIES BILATERALLY INDICATION: PAD. COMPARISON: None. TECHNIQUE: Duplex imaging is performed utilizing gray-scale, two-dimensional images, and color-flow imaging. Doppler waveform analysis and spectral Doppler imaging is also performed. PRASAD FINDINGS: SEGMENTAL BP RIGHT (mmHg) Brachial: No measurement Ankle (PT): >254; Index -NC- Ankle (DP): >254; Index -NC-  Digit: 65; Index 0.63 LEFT (mmHg) Brachial: 104 Ankle (PT): 129; Index 1.24 Ankle (DP): 97; Index 0.93 Digit: 56; Index 0.54 The resting right ankle-brachial index is unable to be calculated given noncompressible arteries of the right lower extremity. The resting left ankle-brachial index measures 1.24. The toe brachial indices measure 0.63 on the right and 0.54 on the left. Digital pressures suggest potential wound healing bilaterally. DUPLEX ARTERIAL ULTRASOUND FINDINGS: VELOCITIES (cm/s): RIGHT EIA: 87 CFA: 64 PFA: 24 SFA-Proximal: 61 SFA-Mid: 65 SFA-Distal: 53 Popliteal: 59 PTA : 60 NINI: 50 DPA: 69 VELOCITIES (cm/s): LEFT (cm/s) EIA: 87 CFA: 66 PFA: 22 SFA-Proximal: 57 SFA-Mid: 63 SFA-Distal: 55 Popliteal: 53 PTA : 95 NINI: 63 DPA: 9 RIGHT LOWER EXTREMITY: Multiphasic waveforms noted throughout the right lower extremity. No focally elevated velocities. LEFT LOWER EXTREMITY: Diminished dorsalis pedis artery waveform. Otherwise multiphasic waveforms throughout the left lower extremity. No focally elevated velocities.     1. RIGHT LOWER EXTREMITY: Noncompressible arteries of the right lower extremity suggestive of calcific atherosclerotic involvement. Multiphasic waveforms noted throughout the right lower extremity. No focally elevated velocities to suggest a site of functional stenosis. 2. LEFT LOWER EXTREMITY: Normal resting ankle-brachial index. Digital pressures suggest potential wound healing. Diminished dorsalis pedis artery waveform. Otherwise normal multiphasic waveforms throughout the left lower extremity. No focally elevated velocities to suggest a site of functional stenosis.    Us Arterial Legs Bilateral Complete    Result Date: 2/2/2021  [facility] DATE: 2/2/2021 2:25 PM EXAM: RESTING ANKLE-BRACHIAL INDICES (ABIs) AND DUPLEX ARTERIAL ULTRASOUND OF THE LOWER EXTREMITIES BILATERALLY INDICATION: PAD. COMPARISON: None. TECHNIQUE: Duplex imaging is performed utilizing gray-scale,  two-dimensional images, and color-flow imaging. Doppler waveform analysis and spectral Doppler imaging is also performed. PRASAD FINDINGS: SEGMENTAL BP RIGHT (mmHg) Brachial: No measurement Ankle (PT): >254; Index -NC- Ankle (DP): >254; Index -NC- Digit: 65; Index 0.63 LEFT (mmHg) Brachial: 104 Ankle (PT): 129; Index 1.24 Ankle (DP): 97; Index 0.93 Digit: 56; Index 0.54 The resting right ankle-brachial index is unable to be calculated given noncompressible arteries of the right lower extremity. The resting left ankle-brachial index measures 1.24. The toe brachial indices measure 0.63 on the right and 0.54 on the left. Digital pressures suggest potential wound healing bilaterally. DUPLEX ARTERIAL ULTRASOUND FINDINGS: VELOCITIES (cm/s): RIGHT EIA: 87 CFA: 64 PFA: 24 SFA-Proximal: 61 SFA-Mid: 65 SFA-Distal: 53 Popliteal: 59 PTA : 60 NINI: 50 DPA: 69 VELOCITIES (cm/s): LEFT (cm/s) EIA: 87 CFA: 66 PFA: 22 SFA-Proximal: 57 SFA-Mid: 63 SFA-Distal: 55 Popliteal: 53 PTA : 95 NINI: 63 DPA: 9 RIGHT LOWER EXTREMITY: Multiphasic waveforms noted throughout the right lower extremity. No focally elevated velocities. LEFT LOWER EXTREMITY: Diminished dorsalis pedis artery waveform. Otherwise multiphasic waveforms throughout the left lower extremity. No focally elevated velocities.     1. RIGHT LOWER EXTREMITY: Noncompressible arteries of the right lower extremity suggestive of calcific atherosclerotic involvement. Multiphasic waveforms noted throughout the right lower extremity. No focally elevated velocities to suggest a site of functional stenosis. 2. LEFT LOWER EXTREMITY: Normal resting ankle-brachial index. Digital pressures suggest potential wound healing. Diminished dorsalis pedis artery waveform. Otherwise normal multiphasic waveforms throughout the left lower extremity. No focally elevated velocities to suggest a site of functional stenosis.    Us Venous Legs Bilateral    Result Date: 1/29/2021  EXAM: US VENOUS LEGS BILATERAL  "LOCATION: [facility] DATE/TIME: 1/29/2021 4:20 PM INDICATION: Bilateral leg swelling R>L, post op patient COMPARISON: None. TECHNIQUE: Venous Duplex ultrasound of bilateral lower extremities with and without compression, augmentation and duplex. Color flow and spectral Doppler with waveform analysis performed. FINDINGS: Exam includes the common femoral, femoral, popliteal veins as well as segmentally visualized deep calf veins and greater saphenous vein. RIGHT: No deep vein thrombosis. No superficial thrombophlebitis. No popliteal cyst. LEFT: No deep vein thrombosis. No superficial thrombophlebitis. No popliteal cyst.     1.  No deep venous thrombosis in the bilateral lower extremities.    Poc Us Guidance Needle Placement    Result Date: 2/2/2021  Ultrasound was performed as guidance to an anesthesia procedure.  Click \"PACS images\" hyperlink below to view any stored images.  For specific procedure details, view procedure note authored by anesthesia.         Picture: None    "

## 2021-06-15 NOTE — PROGRESS NOTES
Mika Alvarez is a 70 y.o. male who is being evaluated via a billable telephone visit.      What phone number would you like to be contacted at? [phone]  How would you like to obtain your AVS? AVS Preference: Mail a copy.    Assessment & Plan     Coronary artery disease involving other coronary artery bypass graft without angina pectoris  Patient is homebound at this time  Patient has not been seen in person since his latest discharge  Patient has been hospitalized multiple times last few months for coronary artery disease foot wound atrial fibrillation and a stroke  Patient states that he has been awaiting home nursing but is not had anyone come to the home  Per home care he has been refusing services  Patient states he is not on any medications and has not been taking anything since his recent discharge  He has an appointment at noon today for home care to come out to the house he is ready and awaiting their care  He is inquiring about [employer] [facility] which forms were filled out the prior week  Discussed with him the necessity of setting up follow-up with his podiatrist for his wound  Discussed the necessity of starting back on his medications  We will send refills of medications but await notes from home care for further evaluation    Type 2 diabetes mellitus with other specified complication, with long-term current use of insulin (H)  Patient was discharged on long-acting and mealtime insulin  States has not been taking any insulin since the discharge from the hospital  Has not been checking his glucose levels  Patient states he has nobody to take him to his appointments and is awaiting much mobility  Discussed with him he needs to start back on his medication is asking for refills  Will await evaluation by home care nurse which is coming this afternoon    History of stroke  Patient has a history of stroke reviewed records from previous hospitalizations      25 minutes spent on the date of the encounter doing  "chart review, patient visit and documentation        BMI:   Estimated body mass index is 26.02 kg/m  as calculated from the following:    Height as of 2/26/21: 5' 10\" (1.778 m).    Weight as of 2/26/21: 181 lb 6 oz (82.3 kg).   The following high BMI interventions were performed this visit: encouragement to exercise    No follow-ups on file.    Angel Claire MD  [facility]    Subjective   Mika Alvarez is 70 y.o. and presents today for the following health issues   HPI     Patient is interviewed via phone visit today.  Patient is homebound and unable to get out to his home nor appointments.  He is requesting [employer] we did inform him that the forms were filled out last week and mailed to him.  He states he is not been able to get out to his mailbox.  Patient does not have family members I can bring him to appointments-he is in need of follow-up with his foot specialist for a foot wound that has a wound VAC.  Patient has had multiple hospitalizations for atrial fibrillation stroke CABG and wound in the last couple months and have not seen him since then these hospitalizations.  Patient states that he is awaiting home care but they have been not coming out of the home-this is a different report from home care that states that he has been refusing services.  Last week a welfare check was called to the police due to the patient not answering his calls please to check on him and stated that he was fine.  Discussed with the patient has not been taking his medications nor checking his sugars so we have no idea what his glucose levels have been running at.  He does anticipate a home nurse to be coming to his home today at noon and will be awaiting their assessment to help with further cares.  Stressed the importance of setting up his follow-up with cardiology and wound care.  Discussed the importance of going back on his medications and checking his glucose levels.  Refills of " patient's medications was sent to the pharmacy.  Await for home care assessment wondering if patient is able to care for himself at home.    Spent time reviewing patient's records from multiple hospitalizations.      Review of Systems  Negative other than above      Objective       Vitals:  No vitals were obtained today due to virtual visit.    Physical Exam  No physical exam completed patient is no apparent distress talking without complication on the phone          Phone call duration: 20 minutes

## 2021-06-15 NOTE — TELEPHONE ENCOUNTER
Incoming call from [facility] following up on message. Relayed MD message. She said she needs a written order. Letter done for MD to sign. Meds pended for refill. Will fax order and med list. [employer] paperwork has already been done and is under media

## 2021-06-15 NOTE — TELEPHONE ENCOUNTER
Called and spoke with home care.  Pt refusing services, states not taking medicines and coughing up blood.  Dr. Claire has attempted to call no answer- will contact local police for welfare check.  Pt might be be capable of caring for himself.  Dr. Claire

## 2021-06-15 NOTE — TELEPHONE ENCOUNTER
"Incoming call from Viviane with [facility]. She would like to let Dr. Claire know that when she called patient last night that she will be going today to his house to resume homecare, patient stated \"I dont need it\" and hung up on Viviane. If there are any questions, OK to call Viviane at [phone]. OK to leave detailed message if she dont .   "

## 2021-06-15 NOTE — TELEPHONE ENCOUNTER
Incoming call from pt requesting provider to complete form for [employer]. Form printed and in your basket.    Please mail to pt when complete so he can fill out his portion

## 2021-06-15 NOTE — TELEPHONE ENCOUNTER
Left detailed message for Beena and [facility] that patient would like home care nurse to come out to see him.  Patient's wife cell left on Beena's voicemail to call patient back.

## 2021-06-15 NOTE — TELEPHONE ENCOUNTER
Faxed 2/17/21 OV note ATTN: Liliana Acuna at [facility] to: [phone]

## 2021-06-16 NOTE — TELEPHONE ENCOUNTER
Pt has not been seen since multiple hospitalizations.  With history of seizure in the last few months patient should not be driving and will need clearance from neurology to drive.  Dr. Claire

## 2021-06-16 NOTE — TELEPHONE ENCOUNTER
Received [facility]TM referral from transition of care     Patient was not reachable after several attempts, will route to MTM Pharmacist/Provider as an FYI. Left [facility]TM scheduling information on patients voicemail.    Thank you for the referral,    Merissa Sierra, MTM Coordinator

## 2021-06-16 NOTE — PATIENT INSTRUCTIONS - HE
-Follow up with podiatry for foot  -Follow up with Diabetic education at [facility]-   -Check medications at home- verify with list you bring home- contact Dr. Claire if there is anything not correct

## 2021-06-16 NOTE — TELEPHONE ENCOUNTER
Incoming call from nurse Jo wanting to update you about pt. He is supposed to be getting skilled nursing 3 times weekly. Nurse said that he has only been seen twice because pt is never home and wife does not answer her phone for them to schedule appts. Deysi said they basically just show up and hope that he will be there. Every time they have shown up and he is not there nurse has verified with his wife if he is out driving or if he was picked up and wife has verified that he has been out driving every time. They are planning to discharge him from home care Friday. Deysi is going to attempt to see him Wednesday and give him the 48 hour notice of discharge. She said they are possibly going to be filing a vulnerable adult report also since he is not supposed to be driving without clearance and he is out driving almost daily.

## 2021-06-16 NOTE — TELEPHONE ENCOUNTER
Received referral for home care for patient. When calling to confirm services he was listing off several home care nurses that see him already.    I had patient find document/folder that was the name of the home care he receives and is [facility] based out of [place]. Discussed that he can't have 2 home care services and he doesn't want 2 home care services. Cancelled referral.    Lavonne WEEKS RN Clinical Coordinator  [facility]  [phone]

## 2021-06-16 NOTE — PROGRESS NOTES
Repeating 4's   40 friends down there   Place for [address] in [place]  40 friends [place] of [place]- both called care free        Assessment & Plan     Type 2 diabetes mellitus with foot ulcer, with long-term current use of insulin (H)  A1c last month over 10 %  Pt is non-compliant with insulin and check glucose levels  We can not safely adjust medication today because he has not been taking it regularly- nor has he been checking his glucose levels  Discussed with him that he needs to check regularly- and give me glucose readings otherwise I can not safely adjust insulin levels  Pt states he will start checking levels and get those glucose levels to me- recommend follow up otherwise in 3 months  - Ambulatory referral to Diabetes Education Program (CDE)    History of stroke  Pt denies history of stroke- can not remember that this occurred  Reviewed records with him- suggestive that area of stroke has affected judgement and memory  If patient not caring for himself in the upcoming weeks will have to consider safety check at home with vulnerable adult- as in past history patient has stated wife and children are not involved    Severe episode of recurrent major depressive disorder, without psychotic features (H)  Pt has hx of sever depression  No current treatment as he has been resistant in the past- with current medical history will have to be aware of possibly starting selective serotonin reuptake inhibitor- but again- prior to last 6 months patient has been resistant.  Atrial fibrillation, unspecified type (H)  Pt is in normal sinus today  On aspirin  Will monitor going forward- medication non-compliance would be a large concern for anticoagulation other than aspirin    Coronary artery disease involving native heart, angina presence unspecified, unspecified vessel or lesion type  Pt is on statin- but medication compliance is a concern  Pt notes taking his medication for conditions less than 50 % of the time  Discussed  "regularity of medications is necessity- without we can not adjust medications safely    Essential hypertension  Blood pressure not at goal  Pt has not been taking medication- so states will start taking regularly        45 minutes spent on the date of the encounter doing chart review, review of outside records, review of test results, patient visit and documentation        BMI:   Estimated body mass index is 26.54 kg/m  as calculated from the following:    Height as of 3/17/21: 5' 10\" (1.778 m).    Weight as of this encounter: 185 lb (83.9 kg).   The following high BMI interventions were performed this visit: encouragement to exercise    No follow-ups on file.    Angel Claire MD  [facility]    Subjective   Mika Alvarez is 71 y.o. and presents today for the following health issues   HPI     Pt is here for multiple hospital follow ups.  Pt is suppose to be non- weight bearing for foot ulceration- he is not in boot nor is he non-weight bearing.  Discussed need for podiatry follow up.  Pt has his of CAD- followed by atrial fib and stroke.  We discussed hospitalization- pt is not recalling some of the hospitalizations.  Area of stroke has possibly affected memory and decisions- pt has not been compliant with medications.  His a1c is above 10%- he has missed almost 50% of his medications- home care has stopped coming to the home because patient is not home bound- he has often found not at home driving.  Pt's blood pressure is not at goal- but again patient medication non-compliant.  Discussed with patient today that we can't make changes until he is compliant with taking current medication- we reviewed hospitalization, medications- pt will update us in two weeks with glucose and blood pressures.  If patient dose not follow up consideration for check on welfare for ability to care for oneself will be made.      Review of Systems  Negative other than above      Objective    /90 " (Patient Site: Left Arm, Patient Position: Sitting, Cuff Size: Adult Large)   Pulse 88   Resp 20   Wt 185 lb (83.9 kg)   BMI 26.54 kg/m    Body mass index is 26.54 kg/m .  Physical Exam  Physical Examination: General appearance - alert, well appearing, and in no distress  Mental status - alert, oriented to person, place, and time  Eyes - pupils equal and reactive, extraocular eye movements intact  Chest - clear to auscultation, no wheezes, rales or rhonchi, symmetric air entry  Heart - normal rate, regular rhythm, normal S1, S2, no murmurs, rubs, clicks or gallops  Neurological - alert, oriented, normal speech, no focal findings or movement disorder noted  Musculoskeletal - no joint tenderness, deformity or swelling  Skin - foot has open ulceration/ callus 1 cm

## 2021-06-16 NOTE — TELEPHONE ENCOUNTER
Reason for Call:  Other driving     Detailed comments: pt calling as homecare nurse told him that Dr Claire said he couldn't drive.  Pt states he feels fine to drive & wants to discuss with Dr Claire     Phone Number Patient can be reached at: Home number on file [phone] (home)    Best Time: na    Can we leave a detailed message on this number?: Yes    Call taken on 3/26/2021 at 4:42 PM by Ita Le

## 2021-06-17 NOTE — PROGRESS NOTES
ASSESSMENT/PLAN  1. Diabetes  Patient is here to follow-up on his diabetes that has not been even a month since his last hemoglobin A1c check  He has not been checking his glucose levels  I had a discussion today to await further blood work till he make some dietary and exercise changes  I want him to start checking his glucose readings daily to give me some data points to help him adjust insulin but I cannot do so until he shows me some of these data points  He will work on checking his glucose multiple times daily over the next few weeks and drop his glucose levels off to me at clinic  The interim continue with the current dosing at 26 units daily glipizide and metformin  Try to incorporate more exercise and walking  Discussed goal of reducing hemoglobin A1c down to closer to 7%        SUBJECTIVE:   Chief Complaint   Patient presents with     Diabetes     diabetes check     Mika ANNE Alvarez 67 y.o. male    Current Outpatient Prescriptions   Medication Sig Dispense Refill     amLODIPine (NORVASC) 10 MG tablet Take 1 tablet (10 mg total) by mouth daily. You are overdue for a diabetic visit. 90 tablet 3     aspirin 81 MG EC tablet Take 81 mg by mouth daily.       BASAGLAR KWIKPEN 100 unit/mL (3 mL) pen INJECT 26 UNITS UNDER THE SKIN AT BEDTIME. 15 mL 1     blood glucose test (ACCU-CHEK ARMANDO PLUS TEST STRP) strips Use 1 each As Directed 3 (three) times a day. Test 3 times daily 200 strip 0     blood sugar diagnostic (GLUCOSE BLOOD) Strp Test blood sugars 2 times a day.       blood-glucose meter (ACCU-CHEK ADVANTAGE DIABETES) kit Test 4 times daily.       BLOOD-GLUCOSE METER (ACCU-CHEK ARMANDO PLUS METER MISC) Use As Directed.       cephalexin (KEFLEX) 500 MG capsule Take 500 mg by mouth 4 (four) times a day.       EFFIENT 10 mg Tab tablet TAKE ONE TABLET BY MOUTH ONCE DAILY  30 tablet 0     glipiZIDE (GLUCOTROL XL) 10 MG 24 hr tablet Take 2 tablets (20 mg total) by mouth once daily. 180 tablet 0     insulin lispro (HUMALOG  "KWIKPEN) 100 unit/mL InPn Inject under the skin. As directed according to sliding scale.       LANCETS MISC Use As Directed.       LANTUS SOLOSTAR 100 unit/mL (3 mL) pen INJECT 26 UNTIS SUBCUTANEOUSLY EVERY DAY AT 9PM AS DIRECTED  20 mL 0     losartan (COZAAR) 100 MG tablet TAKE 1 TABLET BY MOUTH ONCE DAILY. 90 tablet 2     metFORMIN (GLUCOPHAGE) 1000 MG tablet Take 1 tablet (1,000 mg total) by mouth 2 (two) times a day with meals. 180 tablet 0     metoprolol succinate (TOPROL-XL) 100 MG 24 hr tablet TAKE ONE TABLET BY MOUTH TWICE DAILY  160 tablet 0     NEEDLES, INSULIN DISPOSABLE (BD ULTRA-FINE ROBERT PEN NEEDLES MISC) use up to 4 times daily as directed.       nitroglycerin (NITROSTAT) 0.4 MG SL tablet Place 0.4 mg under the tongue every 5 (five) minutes as needed for chest pain.       ONETOUCH FINEPOINT LANCETS MISC Test blood sugars 3 times a day.       pen needle, diabetic (BD INSULIN PEN NEEDLE UF SHORT) 31 gauge x 5/16\" Ndle USE UP TO 4 TIMES DAILY AS DIRECTED 300 each 2     pravastatin (PRAVACHOL) 40 MG tablet TAKE TWO TABLETS BY MOUTH DAILY  180 tablet 2     No current facility-administered medications for this visit.      Allergies: Review of patient's allergies indicates no known allergies.   No LMP for male patient.    HPI:   Patient is here for follow-up on diabetes  Patient was seen a month ago by one my partners and hemoglobin A1c was drawn at 10% patient states that he thought this was elevated from self but reminded him that he is greater than 14% back in November  He has not really made any exercise changes or medication changes in the last few months but has been making some better dietary choices  He does not want to check his hemoglobin A1c today  In reviewing his glucometer which he brought in today he checked his glucose level this morning which was 150 but had not checked his glucose level for the prior month  Discussed with him for me to be able to adjust insulin levels and help him reduce " his glucose levels on a regular basis we really needed to be checking his glucose levels on a more consistent basis at least daily if not multiple times daily  Patient is agreement to start checking his glucose levels more frequently and drop those levels off to me at clinic and we can adjust his insulin safely    ROS: negative except as per HPI    OBJECTIVE:   The patient appears well, alert, oriented x 3, in no distress.  /68 (Patient Site: Right Arm, Patient Position: Sitting, Cuff Size: Adult Regular)  Pulse 64  Temp 97.9  F (36.6  C) (Oral)   Resp 16  Wt 206 lb 4 oz (93.6 kg)  BMI 28.77 kg/m2    Lungs: clear, good air entry, no wheezes, rhonchi or rales.   Cardiac: S1 and S2 normal, no murmurs, regular rate and rhythm.   Extremities: show no edema, normal peripheral pulses.   Neurological: normal, no focal findings.  Skin: clear, dry, no rashes/lesions  Psych- normal mood and affect      Pt states an understanding and agrees to the above plan.

## 2021-06-18 NOTE — LETTER
Letter by Angel Claire MD at      Author: Angel Claire MD Service: -- Author Type: --    Filed:  Encounter Date: 3/12/2019 Status: (Other)       Mika Alvarez  [address] [address]             March 12, 2019         Dear Mr. Alvarez,    Below are the results from your recent visit:    Resulted Orders   Glycosylated Hemoglobin A1c   Result Value Ref Range    Hemoglobin A1c 10.8 (H) 3.5 - 6.0 %      Comment:      This is a corrected result. Previous result was <2.5 % on 3/8/2019 at 0817 CST   HM2(CBC w/o Differential)   Result Value Ref Range    WBC 9.0 4.0 - 11.0 thou/uL    RBC 5.37 4.40 - 6.20 mill/uL    Hemoglobin 16.1 14.0 - 18.0 g/dL    Hematocrit 48.0 40.0 - 54.0 %    MCV 89 80 - 100 fL    MCH 29.9 27.0 - 34.0 pg    MCHC 33.5 32.0 - 36.0 g/dL    RDW 10.8 (L) 11.0 - 14.5 %    Platelets 244 140 - 440 thou/uL    MPV 7.8 7.0 - 10.0 fL   Comprehensive Metabolic Panel   Result Value Ref Range    Sodium 137 136 - 145 mmol/L    Potassium 3.9 3.5 - 5.0 mmol/L    Chloride 103 98 - 107 mmol/L    CO2 22 22 - 31 mmol/L    Anion Gap, Calculation 12 5 - 18 mmol/L    Glucose 266 (H) 70 - 125 mg/dL    BUN 16 8 - 22 mg/dL    Creatinine 0.83 0.70 - 1.30 mg/dL    GFR MDRD Af Amer >60 >60 mL/min/1.73m2    GFR MDRD Non Af Amer >60 >60 mL/min/1.73m2    Bilirubin, Total 0.7 0.0 - 1.0 mg/dL    Calcium 9.4 8.5 - 10.5 mg/dL    Protein, Total 7.0 6.0 - 8.0 g/dL    Albumin 3.8 3.5 - 5.0 g/dL    Alkaline Phosphatase 120 45 - 120 U/L    AST 14 0 - 40 U/L    ALT <9 0 - 45 U/L    Narrative    Fasting Glucose reference range is 70-99 mg/dL per  American Diabetes Association (ADA) guidelines.   Lipid [place] FASTING   Result Value Ref Range    Cholesterol 131 <=199 mg/dL    Triglycerides 102 <=149 mg/dL    HDL Cholesterol 41 >=40 mg/dL    LDL Calculated 70 <=129 mg/dL    Patient Fasting > 8hrs? Yes    PSA, Annual Screen (Prostatic-Specific Antigen)   Result Value Ref Range    PSA 0.2 0.0 - 4.5 ng/mL     Narrative    Method is Abbott Prostate-Specific Antigen (PSA)  Standard-WHO 1st International (90:10)       Mika,             Your PSA level is 0.2- we will recheck in one year.             Kidney and liver function are normal.             Cholesterol levels are at goal range.             No concerns on blood work except the elevated Hgb A1c- Please get the glucose levels to Dr. Claire as discussed so insulin can be adjusted.       Please call with questions or contact us using [employer].    Sincerely,        Electronically signed by Angel Claire MD

## 2021-06-18 NOTE — PROGRESS NOTES
"ASSESSMENT/PLAN  1. Moderate episode of recurrent major depressive disorder (H)  Pt is currently not on medications  Has been offered in the past multiple times but is willing at this time to try something as he feels things are getting worse  He has had his daughters death, and two relatives in the last 6 months  He is in financial trouble and he is overwhelmed  He is lacking motivation and energy and is in agreement to start medication  We will start sertraline and f/u In one month sooner if any problems- pt will contact me with any concerns.  Pt has had thoughts of hurting himself in the past- nothing current and no plans  Would not hurt himself he states because he \"would not do that to his family\" he states  He declines referral to therapy  Discussed making himself leave his home daily- getting out and not sitting around  [place]        SUBJECTIVE:   Chief Complaint   Patient presents with     Depression     Has gotten worse      Mika Alvarez 68 y.o. male    Current Outpatient Prescriptions   Medication Sig Dispense Refill     amLODIPine (NORVASC) 10 MG tablet Take 1 tablet (10 mg total) by mouth daily. You are overdue for a diabetic visit. 90 tablet 3     aspirin 81 MG EC tablet Take 81 mg by mouth daily.       BASAGLAR KWIKPEN 100 unit/mL (3 mL) pen INJECT 26 UNITS UNDER THE SKIN AT BEDTIME. 15 mL 1     blood glucose test (ACCU-CHEK ARMANDO PLUS TEST STRP) strips Use 1 each As Directed 3 (three) times a day. Test 3 times daily 200 strip 0     blood sugar diagnostic (GLUCOSE BLOOD) Strp Test blood sugars 2 times a day.       blood-glucose meter (ACCU-CHEK ADVANTAGE DIABETES) kit Test 4 times daily.       BLOOD-GLUCOSE METER (ACCU-CHEK ARMANDO PLUS METER MISC) Use As Directed.       cephalexin (KEFLEX) 500 MG capsule Take 500 mg by mouth 4 (four) times a day.       glipiZIDE (GLUCOTROL XL) 10 MG 24 hr tablet Take 2 tablets (20 mg total) by " "mouth once daily. 180 tablet 0     insulin lispro (HUMALOG KWIKPEN) 100 unit/mL InPn Inject under the skin. As directed according to sliding scale.       LANCETS MISC Use As Directed.       LANTUS SOLOSTAR 100 unit/mL (3 mL) pen INJECT 26 UNTIS SUBCUTANEOUSLY EVERY DAY AT 9PM AS DIRECTED  20 mL 0     losartan (COZAAR) 100 MG tablet TAKE 1 TABLET BY MOUTH ONCE DAILY. 90 tablet 2     metFORMIN (GLUCOPHAGE) 1000 MG tablet Take 1 tablet (1,000 mg total) by mouth 2 (two) times a day with meals. 180 tablet 0     metoprolol succinate (TOPROL-XL) 100 MG 24 hr tablet TAKE ONE TABLET BY MOUTH TWICE DAILY  160 tablet 0     NEEDLES, INSULIN DISPOSABLE (BD ULTRA-FINE ROBERT PEN NEEDLES MISC) use up to 4 times daily as directed.       nitroglycerin (NITROSTAT) 0.4 MG SL tablet Place 0.4 mg under the tongue every 5 (five) minutes as needed for chest pain.       ONETOUCH FINEPOINT LANCETS MISC Test blood sugars 3 times a day.       pen needle, diabetic (BD INSULIN PEN NEEDLE UF SHORT) 31 gauge x 5/16\" Ndle USE UP TO 4 TIMES DAILY AS DIRECTED 300 each 2     prasugrel (EFFIENT) 10 mg Tab tablet Take 1 tablet (10 mg total) by mouth daily. 90 tablet 0     pravastatin (PRAVACHOL) 40 MG tablet TAKE TWO TABLETS BY MOUTH DAILY  180 tablet 2     sertraline (ZOLOFT) 50 MG tablet Take 1 tablet (50 mg total) by mouth daily. 30 tablet 2     No current facility-administered medications for this visit.      Allergies: Review of patient's allergies indicates no known allergies.   No LMP for male patient.    HPI:   Pt called in today with worsening depression asking to be seen- we advised him to come in today for discussion and evaluation over lunch.  He has had worsening problems with depression and we had conversations in the past about starting medications- he was not interested.  He has had worsening symptoms and is willing to start something at this time.  We discussed different options today.  We discussed other deaths in his family as well as " "his daughters in Nov of 2017.  He has had financial hardships as well and feels like he has his back against the wall- he has thoughts of hurting himself but no plans and states he would not do that to his family.  He has no thoughts of doing that to himself at this time.  He is not interested in therapy at this time.  We discussed property that he has and the need for selling it.    ROS: negative except as per HPI    OBJECTIVE:   The patient appears well, alert, oriented x 3, in no distress.  /72 (Patient Site: Right Arm, Patient Position: Sitting, Cuff Size: Adult Large)  Pulse 62  Temp 97.7  F (36.5  C) (Oral)   Resp 16  Ht 5' 11\" (1.803 m)  Wt 197 lb 8 oz (89.6 kg)  BMI 27.55 kg/m2    Lungs: clear, good air entry, no wheezes, rhonchi or rales.   Cardiac: S1 and S2 normal, no murmurs, regular rate and rhythm.   Abdomen: normal bowel sounds, soft without tenderness, guarding, mass or organomegaly.   Extremities: show no edema, normal peripheral pulses.   Neurological: normal, no focal findings.  Skin: clear, dry, no rashes/lesions  Psych- normal mood and affect      Pt states an understanding and agrees to the above plan.  Greater than 25 minutes was spent today in interview and examination with Mika Alvarez with more than 50% of that time in counseling and coordination of care.    "

## 2021-06-19 NOTE — LETTER
Letter by Angel Claire MD at      Author: Angel Claire MD Service: -- Author Type: --    Filed:  Encounter Date: 11/19/2019 Status: Signed         Mika Alvarez  [address] [facility] [address]             November 19, 2019         Dear Mr. Alvarez,    Below are the results from your recent visit:    Resulted Orders   Glycosylated Hemoglobin A1c   Result Value Ref Range    Hemoglobin A1c 11.1 (H) 3.5 - 6.0 %   Microalbumin, Random Urine   Result Value Ref Range    Microalbumin, Random Urine 37.35 (H) 0.00 - 1.99 mg/dL    Creatinine, Urine 219.7 mg/dL    Microalbumin/Creatinine Ratio Random Urine 170.0 (H) <=19.9 mg/g    Narrative    Microalbumin, Random Urine  <2.0 mg/dL . . . . . . . . Normal  3.0-30.0 mg/dL . . . . . . Microalbuminuria  >30.0 mg/dL . . . . . .  . Clinical Proteinuria    Microalbumin/Creatinine Ratio, Random Urine  <20 mg/g . . . . .. . . . Normal   mg/g . . . . . . . Microalbuminuria  >300 mg/g . . . . . . . . Clinical Proteinuria       Lipid [place] FASTING   Result Value Ref Range    Cholesterol 134 <=199 mg/dL    Triglycerides 124 <=149 mg/dL    HDL Cholesterol 45 >=40 mg/dL    LDL Calculated 64 <=129 mg/dL    Patient Fasting > 8hrs? Yes    Comprehensive Metabolic Panel   Result Value Ref Range    Sodium 137 136 - 145 mmol/L    Potassium 4.0 3.5 - 5.0 mmol/L    Chloride 105 98 - 107 mmol/L    CO2 22 22 - 31 mmol/L    Anion Gap, Calculation 10 5 - 18 mmol/L    Glucose 218 (H) 70 - 125 mg/dL    BUN 19 8 - 22 mg/dL    Creatinine 0.80 0.70 - 1.30 mg/dL    GFR MDRD Af Amer >60 >60 mL/min/1.73m2    GFR MDRD Non Af Amer >60 >60 mL/min/1.73m2    Bilirubin, Total 0.7 0.0 - 1.0 mg/dL    Calcium 9.0 8.5 - 10.5 mg/dL    Protein, Total 7.0 6.0 - 8.0 g/dL    Albumin 3.9 3.5 - 5.0 g/dL    Alkaline Phosphatase 99 45 - 120 U/L    AST 19 0 - 40 U/L    ALT 12 0 - 45 U/L    Narrative    Fasting Glucose reference range is 70-99 mg/dL per  American Diabetes Association (ADA)  guidelines.       Mika  Cholesterol levels are normal.  Liver function is normal.  Elevated glucose levels are showing some damage to the kidney function as protein levels in the urine are increasing- continue to work on improving glucose levels.    Please call with questions or contact us using [employer].    Sincerely,        Electronically signed by Angel Claire MD

## 2021-06-20 ENCOUNTER — RECORDS - HEALTHEAST (OUTPATIENT)
Dept: ADMINISTRATIVE | Facility: OTHER | Age: 71
End: 2021-06-20

## 2021-06-20 NOTE — PROGRESS NOTES
ASSESSMENT/PLAN  1. Diabetes mellitus (H)  Patient's A1c remains out of goal range at 9.7%  Patient does not check his glucose on a regular basis and we had a discussion today that I cannot adjust his insulin safely without knowing what his glucose levels are in it 1 time a day they are running at those levels  Encouraged him to check his glucose on a regular basis over the next 7-10 day.  And drop those glucose levels off from the clinic  We can adjust insulin levels and at that time  Recommend follow-up in 3 months at clinic for diabetic follow-up  - Glycosylated Hemoglobin A1c    2. Major depression, recurrent (H)  Majority of discussion today discussing patient's uncontrolled depressive symptoms  Patient denies any plan to hurt himself but has had thoughts that he be better off not being here-he clearly states however that he would never act upon it  He is not willing to try another antidepressant medication as he felt so bad after taking 1 of the pills previously of sertraline  Offered him referral to psychology and psychiatry which he declined  Patient's feels the combination of different life factors on him right now are just not able to be changed and so nothing will help him  I discussed with him clearly that I think he could benefit from getting more assistance into his home to help his wife which he would benefit from greatly as well both physically and mentally  Patient is in a work with his wife and their primary doctor to get home care at their home for the wife to hopefully get nursing physical therapy and Occupational Therapy in the home  Again patient declined any medications to help with his depressive symptoms  He has had thoughts on a regular basis on how he does not like his life -but he states he never act upon it he does not have a plan at this time  Like to have him follow-up at shorter intervals-he is to work on getting an appointment his wife's primary physician        SUBJECTIVE:   Chief  "Complaint   Patient presents with     Diabetes     Medication check, fasting labs     Depression     Stopped taking Sertraline, states he took for one day and he felt dizzy      Mika ANNE Alvarez 68 y.o. male    Current Outpatient Prescriptions   Medication Sig Dispense Refill     amLODIPine (NORVASC) 10 MG tablet TAKE 1 TABLET BY MOUTH ONCE DAILY. OVERDUE FOR DIABETIC CHECK  90 tablet 2     aspirin 81 MG EC tablet Take 81 mg by mouth daily.       BASAGLAR KWIKPEN 100 unit/mL (3 mL) pen INJECT 26 UNITS UNDER THE SKIN AT BEDTIME. 15 mL 1     blood glucose test (ACCU-CHEK ARMANDO PLUS TEST STRP) strips Use 1 each As Directed 3 (three) times a day. Test 3 times daily 200 strip 0     blood sugar diagnostic (GLUCOSE BLOOD) Strp Test blood sugars 2 times a day.       blood-glucose meter (ACCU-CHEK ADVANTAGE DIABETES) kit Test 4 times daily.       BLOOD-GLUCOSE METER (ACCU-CHEK ARMANDO PLUS METER MISC) Use As Directed.       glipiZIDE (GLUCOTROL XL) 10 MG 24 hr tablet Take 2 tablets (20 mg total) by mouth once daily. 180 tablet 0     insulin lispro (HUMALOG KWIKPEN) 100 unit/mL InPn Inject under the skin. As directed according to sliding scale.       LANCETS MISC Use As Directed.       losartan (COZAAR) 100 MG tablet TAKE 1 TABLET BY MOUTH ONCE DAILY. 90 tablet 2     metFORMIN (GLUCOPHAGE) 1000 MG tablet Take 1 tablet (1,000 mg total) by mouth 2 (two) times a day with meals. 180 tablet 0     metoprolol succinate (TOPROL-XL) 100 MG 24 hr tablet TAKE ONE TABLET BY MOUTH TWICE DAILY  160 tablet 0     NEEDLES, INSULIN DISPOSABLE (BD ULTRA-FINE ROBERT PEN NEEDLES MISC) use up to 4 times daily as directed.       nitroglycerin (NITROSTAT) 0.4 MG SL tablet Place 0.4 mg under the tongue every 5 (five) minutes as needed for chest pain.       ONETOUCH FINEPOINT LANCETS MISC Test blood sugars 3 times a day.       pen needle, diabetic (BD INSULIN PEN NEEDLE UF SHORT) 31 gauge x 5/16\" Ndle USE UP TO 4 TIMES DAILY AS DIRECTED 300 each 2     " prasugrel (EFFIENT) 10 mg Tab tablet Take 1 tablet (10 mg total) by mouth daily. 90 tablet 0     pravastatin (PRAVACHOL) 40 MG tablet TAKE TWO TABLETS BY MOUTH DAILY  180 tablet 2     No current facility-administered medications for this visit.      Allergies: Review of patient's allergies indicates no known allergies.   No LMP for male patient.    HPI:   Patient is here for diabetic follow-up today.  He has not had good control over his diabetes his A1c remains elevated.  He does not check his glucose on a regular basis and we discussed with him the need to monitor this so I can make adjustments in his insulin.  Patient states he is not eating well and is not exercising.  Patient has not been taking good care of himself as he feels he is obligated to be the caregiver for his wife who is not been doing well.  He states his wife is unable to dress herself or feed herself so he does all household work and full-time cares for her.  This is made his life a lot different than what he expected his like to be at this point and is overwhelmingly depressive for him-we discussed with him referral to psychiatry which he declined.  We previously try to start him on sertraline but he took 1 dose and became extremely dizzy so he refuses any other oral medications even after extensive discussion that other ones he might not have side effects from.  He continues to say how bad his life is in multiple different aspects financially taking care of his wife being a caregiver-he continues today there is nothing that can be done for him and we inquired on his thoughts of hurting himself-patient has poor eye contact at times but then states that he has thought about it all the time but would not act upon it he denies any plan for harming himself or anyone else.  The patient also lost his daughter in November 2017 suddenly-he has been having a lot of financial issues although he sold the property of his recently which has helped him  "somewhat.  He states he eats out of cans and premade foods which he knows is not good for his diabetes.  We discussed with him doing something different moving forward to help him have a different outlook.    Discussed with him the options of working with his wife's primary it seems as though she could benefit from some skilled nursing and PT OT at home-this would help him with her caregiving hopefully to help her teach new skills that could help her be more independent at home and this will also help with his depressive symptoms.    ROS: negative except as per HPI    OBJECTIVE:   The patient appears well, alert, oriented x 3, in no distress.  /76 (Patient Site: Left Arm, Patient Position: Sitting, Cuff Size: Adult Regular)  Pulse 62  Temp 97.2  F (36.2  C) (Oral)   Resp 16  Ht 5' 10\" (1.778 m)  Wt 196 lb 9.6 oz (89.2 kg)  BMI 28.21 kg/m2    Lungs: clear, good air entry, no wheezes, rhonchi or rales.   Cardiac: S1 and S2 normal, no murmurs, regular rate and rhythm.   Abdomen: normal bowel sounds, soft  Extremities: show no edema, normal peripheral pulses.   Neurological: normal, no focal findings.  Skin: clear, dry, no rashes/lesions  Psych- anxious and moving around a lot today      Pt states an understanding and agrees to the above plan.  Greater than 25 minutes was spent today in interview and examination with Mika Alvarez with more than 50% of that time in counseling and coordination of care.    "

## 2021-06-20 NOTE — LETTER
Letter by Juanpablo Matthews, PharmD at      Author: Juanpablo Matthews, Jane Service: -- Author Type: --    Filed:  Encounter Date: 2020 Status: (Other)             10/5/2020      Mika Alvarez  [address] [facility] [address]            Dear Mika Alvarez     Thank you for talking with me on 2020 about your health and medications. [facility] MTM (Medication Therapy Management) program helps you understand your medications and use them safely.    Along with this letter are an action plan (Medication Action Plan) and a medication list (Personal Medication List). The action plan has steps you should take to help you get the best results from your medications. The medication list will help you keep track of your medications and how to use them the right way.       Have your action plan and medication list with you when you talk with your doctors, pharmacists, and other health care providers.    Ask your doctors, pharmacists, and other healthcare providers to update them at every visit.     Take your medication list with you if you go to the hospital or emergency room.     Give a copy of the action plan and medication list to your family or caregivers.     If you want to talk about this letter or any of the papers with it, please call [phone]. We look forward to working with you, your doctors, and other healthcare providers to help you stay healthy.    Sincerely,     Juanpablo Matthews    _  Medication Action Plan For Mika Alvarez, : 1950     This action plan will help you get the best results from your medications if you:     1. Read What we talked about.   2. Take the steps listed in the What I need to do boxes.   3. Fill in What I did and when I did it.   4. Fill in My follow-up plan and Questions I want to ask.     Have this action plan with you when you talk with your doctors, pharmacists, and other healthcare providers in your care team. Share this with your family or caregivers too.    Date  Prepared: 10/5/2020      What we talked about:  New Onset Atrial Fibrillation     What I need to do:  Increase bumetanide to 2 tablets by mouth once daily     What I did and when I did it:          What we talked about:  Diabetes     What I need to do:  Increase frequency of blood sugar monitoring     What I did and when I did it:            My follow-up plan (add notes about next steps):            Questions I want to ask (include topics about medications or therapy):          If you have any questions about your action plan, call Juanpablo STANTON Byron at [phone], Monday-Friday 8:00-4:30pm  _  This medication list was made for you after we talked. We also used information from your doctors chart.      Use blank rows to add new medications. Then fill in the dates you started using them.     Cross out medications when you no longer use them. Then write the date and why you stopped using them.     Ask your doctors, pharmacists, and other healthcare providers to update this list at every visit.  Keep this list up-to-date with:  ? prescription medications  ? over the counter drugs  ? herbals  ? vitamins  ? minerals          If you go to the hospital or emergency room, take this list with you. Share this with your family or caregivers too.    Date Prepared: 10/5/2020      Allergies or side effects: No Known Allergies      Medication: amLODIPine (NORVASC) 10 MG tablet      How I use it: TAKE 1 TABLET BY MOUTH ONCE DAILY. OVERDUE FOR DIABETIC CHECK      Why I use it: HTN (Hypertension)    Prescriber: Angel Claire MD      Date I started using it:     Date I stopped using it:       Why I stopped using it:          Medication: aspirin 81 MG EC tablet      How I use it: Take 81 mg by mouth daily.      Why I use it: Coronary Artery Disease    Prescriber: Angel Claire MD      Date I started using it:     Date I stopped using it:       Why I stopped using it:          Medication: atorvastatin (LIPITOR) 80 MG  tablet      How I use it: Take 1 tablet (80 mg total) by mouth daily.      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 Accu-Chek Guide test strips. Patient is on insulin.      Why I use it: Type 2 diabetes mellitus with other specified complication, with long-term current use of insulin (H)    Prescriber: Angel Claire MD      Date I started using it:     Date I stopped using it:       Why I stopped using it:          Medication: blood-glucose meter (ACCU-CHEK ADVANTAGE DIABETES) kit      How I use it: Test 4 times daily.      Why I use it:  Type 2 diabetes    Prescriber: Angel Claire MD      Date I started using it:     Date I stopped using it:       Why I stopped using it:          Medication: bumetanide (BUMEX) 0.5 MG tablet      How I use it: Take 2 tablets (1 mg total) by mouth daily.      Why I use it: Pulmonary Edema Cardiac Cause (H)    Prescriber: Tonia Dumont MD      Date I started using it:     Date I stopped using it:       Why I stopped using it:          Medication: generic lancets      How I use it: Use 1 each As Directed 2 (two) times a day. Accu-Chek Fastclix Lancets. Patient is insulin dependant.      Why I use it: Type 2 diabetes mellitus with other specified complication, with long-term current use of insulin (H)    Prescriber: Angel Claire MD      Date I started using it:     Date I stopped using it:       Why I stopped using it:          Medication: glipiZIDE (GLUCOTROL XL) 10 MG 24 hr tablet      How I use it: Take 2 tablets (20 mg total) by mouth once daily.      Why I use it: Type 2 diabetes with complication (H)    Prescriber: Angel Huitron  MD [place]      Date I started using it:     Date I stopped using it:       Why I stopped using it:             Medication: insulin glargine (LANTUS SOLOSTAR U-100 INSULIN) 100 unit/mL (3 mL) pen      How I use it: Inject 35 Units under the skin at bedtime.      Why I use it: Diabetes (H)    Prescriber: Angel Claire MD      Date I started using it:     Date I stopped using it:       Why I stopped using it:          Medication: losartan (COZAAR) 100 MG tablet      How I use it: Take 1 tablet (100 mg total) by mouth daily.      Why I use it: HTN (Hypertension)    Prescriber: Angel Claire MD      Date I started using it:     Date I stopped using it:       Why I stopped using it:          Medication: metFORMIN (GLUCOPHAGE) 1000 MG tablet      How I use it: Take 1 tablet (1,000 mg total) by mouth 2 (two) times a day with meals.      Why I use it: Diabetes (H)    Prescriber: Angel Claire MD      Date I started using it:     Date I stopped using it:       Why I stopped using it:             Medication: metoprolol succinate (TOPROL-XL) 100 MG 24 hr tablet      How I use it: Take 1 tablet (100 mg total) by mouth 2 (two) times a day.      Why I use it: HTN (Hypertension)    Prescriber: Tonia Dumont MD      Date I started using it:     Date I stopped using it:       Why I stopped using it:          Medication: NEEDLES, INSULIN DISPOSABLE (BD ULTRA-FINE ROBERT PEN NEEDLES MISC)      How I use it: use up to 4 times daily as directed.      Why I use it:  Type 2 diabetes    Prescriber: Angel Claire MD      Date I started using it:     Date I stopped using it:       Why I stopped using it:          Medication: nitroglycerin (NITROSTAT) 0.4 MG SL tablet      How I use it: Place 0.4 mg under the tongue every 5 (five) minutes as needed for chest pain.      Why I use it:  Coronary artery disease    Prescriber: Angel Claire MD      Date I started using it:     Date I stopped using it:      "  Why I stopped using it:          Medication: pen needle, diabetic (BD ULTRA-FINE SHORT PEN NEEDLE) 31 gauge x 5/16\" Ndle      How I use it: USE UP TO 4 TIMES DAILY AS DIRECTED      Why I use it: Diabetes (H)    Prescriber: Angel Claire MD      Date I started using it:     Date I stopped using it:       Why I stopped using it:          Medication: rivaroxaban ANTICOAGULANT (XARELTO) 20 mg tablet      How I use it: Take 1 tablet (20 mg total) by mouth daily with supper.      Why I use it: New Onset Atrial Fibrillation (H)    Prescriber: Tonia Dumont MD      Date I started using it:     Date I stopped using it:       Why I stopped using it:          Medication:       How I use it:       Why I use it:     Prescriber:       Date I started using it:     Date I stopped using it:       Why I stopped using it:          Medication:       How I use it:       Why I use it:     Prescriber:       Date I started using it:     Date I stopped using it:       Why I stopped using it:          Medication:       How I use it:       Why I use it:     Prescriber:       Date I started using it:     Date I stopped using it:       Why I stopped using it:          Other Information:      If you have any questions about your medication list, call [phone]    According to the Paperwork Reduction Act of 1995, no persons are required to respond to a collection of information unless it displays a valid OMB control number. The valid OMB number for this information collection is [id number]. The time required to complete this information collection is estimated to average 37.76 minutes per response, including the time to review instructions, searching existing data resources, gather the data needed, and complete and review the information collection. If you have any comments concerning the accuracy of the time estimate(s) or suggestions for improving this form, please write to: CMS, Attn: LIZZIE Reports Clearance Officer, [facility] " [address], [place], [place] [address].

## 2021-06-21 ENCOUNTER — TRANSFERRED RECORDS (OUTPATIENT)
Dept: HEALTH INFORMATION MANAGEMENT | Facility: CLINIC | Age: 71
End: 2021-06-21

## 2021-06-21 NOTE — LETTER
Letter by Lupe Rendon CNP at      Author: Lupe Rendon CNP Service: -- Author Type: --    Filed:  Encounter Date: 1/19/2021 Status: (Other)         Patient: Mika Alvarez   MR Number: [id number]   YOB: 1950   Date of Visit: 1/19/2021     Code Status:  FULL CODE  Visit Type: Problem Visit (Status post CABG)     Facility:  [facility] [[id number]]             History of Present Illness: Mika Alvarez is a 70 y.o. male who I am seeing today for follow-up on the TCU.  Patient recently hospitalized on 12/2/2020.  Patient initially presented with shortness of breath.  Past medical history includes obesity, hypertension, diabetes mellitus type 2, CAD and CHF.  Patient was eventually diagnosed with decompensated heart failure.  Patient had been hospitalized 1 month prior following CABG with severe multivessel coronary artery disease however patient deferred surgery.  Patient underwent four-vessel coronary artery bypass surgery on 12/7/2020.  He had harvesting from the right lower extremity.  Bilateral pulmonary vein isolation and left atrial appendage excision was also performed due to history of atrial fib.  Surgery was uneventful.  On postop day 1 patient had rhythmic left leg jerking movements and upward gaze.  Neurology was consulted due to concern for seizure.  He underwent EEG which was negative x2.  Head CT demonstrated pump shaped foci of increased attenuation within the subcortical region of the left frontal lobe.  An MRI was obtained which demonstrated a 2.5 cm left frontal lobe infarct with petechial hemorrhage.  Keppra was eventually started due to continued left leg jerking of both the brain lesion did not fit the symptoms.  He also had some fevers and leukocytosis post operative.  Cultures were drawn.  Sputum on 12/10 resulted with 4+ beta analytic strep and half blood cultures resulted positive blood likely contaminant as subsequent cultures were negative on  12/12.  Urine culture was negative.  NG was negative.  He was treated empirically with Vanco and Zosyn.  Vanco was discontinued after 4 days and patient completed an 8-day course of Zosyn.  White count around 13.  C. difficile was negative on 12/24.  It was also suggested that patient may have had silent aspiration as he had failed a swallow study and video swallow.  He did have an NG tube which was placed and he received tube feedings.  He had waxing and waning of his mental status however this did improve over time.  Chest tubes were removed after a long period of time with excessive output.  Patient had some urinary retention and Koenig catheter was placed.  He was started on Flomax.  This has been discontinued he is voiding adequately.  Underlying diabetes mellitus type 2.  His glipizide was decreased to her lower dose.  He did have some atrial fib with rapid ventricular response early on in his hospital stay.  He was discharged on a short course of amiodarone.  The surgeon's preference was not to anticoagulate.  Wounds on his bilateral shins.  He has a low ejection fracture and will continue with diuresis for maintenance on torsemide.    Today patient sitting up in bedside chair.  He denies any shortness of breath.  No chest pain.  Incision to his chest is well-healed.  He is eating and drinking well.  I see a box of [place] [employer] pizza that has been consumed.  He is very noncompliant with his diabetic diet.  He tells me is having regular bowel movements.  He has trace lower extremity edema.  He does have 2 open areas to the right lower extremity with moderate serous drainage.  No redness or warmth.  I did explain the detail of events that happened during hospitalization.  He was somewhat unclear.  Reviewed his open heart surgery as well as post surgery stroke.  He does continue on amiodarone.  No chest pain or palpitations.  Heart rate is regular.  Blood pressure satisfactory.  Continues on Flomax.  He is voiding  without difficulty.      Active Ambulatory Problems     Diagnosis Date Noted   ? Obesity    ? ACP Staging Stage 1 Hypertension: 140-159 / 90-99    ? Coronary artery disease involving native heart with other form of angina pectoris, unspecified vessel or lesion type (H)    ? Type 2 diabetes mellitus (H)    ? Joint Pain, Localized In The Shoulder    ? Hyperlipidemia, unspecified hyperlipidemia type 04/27/2016   ? Moderate episode of recurrent major depressive disorder (H) 06/21/2019   ? Pulmonary edema cardiac cause (H) 09/19/2020   ? New onset atrial fibrillation (H)    ? Acute systolic heart failure (H)    ? CAD (coronary artery disease) 09/19/2020   ? New onset a-fib (H) 09/19/2020   ? Acute on chronic clinical systolic heart failure (H) 12/02/2020   ? Atrial fibrillation with rapid ventricular response (H) 12/02/2020   ? Diabetic leg ulcer (H) 12/02/2020   ? Acute on chronic systolic CHF (congestive heart failure) (H) 12/02/2020   ? A-fib (H) 12/02/2020   ? Uncontrolled type 2 diabetes mellitus with hyperglycemia (H)    ? Acute respiratory failure with hypoxia (H)    ? On mechanically assisted ventilation (H)    ? S/P CABG (coronary artery bypass graft)    ? Seizures (H)    ? Cerebrovascular accident (CVA), unspecified mechanism (H)    ? Cerebrovascular accident (CVA) due to other mechanism (H)    ? Metabolic encephalopathy    ? Disorientation    ? Acute kidney failure with tubular necrosis (H) 12/16/2020     Resolved Ambulatory Problems     Diagnosis Date Noted   ? No Resolved Ambulatory Problems     Past Medical History:   Diagnosis Date   ? Acute metabolic encephalopathy    ? Acute on chronic systolic (congestive) heart failure (H)    ? Coronary Artery Disease    ? CVA (cerebral vascular accident) (H)    ? Dysphagia    ? HTN (hypertension)    ? Hyperglycemia    ? Ischemic cardiomyopathy    ? Other and unspecified hyperlipidemia 04/27/2016   ? Type 2 Diabetes Mellitus With Complication    ? Urinary retention         Current Outpatient Medications   Medication Sig   ? acetaminophen (TYLENOL) 325 mg/10.15 mL Soln Take 20.3 mL by mouth every 4 (four) hours as needed.   ? aspirin 81 mg chewable tablet 1 tablet (81 mg total) by Enteral Tube route daily. (Patient taking differently: Chew 81 mg daily. )   ? atorvastatin (LIPITOR) 80 MG tablet 1 tablet (80 mg total) by Enteral Tube route at bedtime. (Patient taking differently: Take 80 mg by mouth at bedtime. )   ? dextrose (DEXTROSE) 40 % gel Take 15-30 g by mouth every 15 (fifteen) minutes as needed.   ? dextrose 50% solution Infuse 25-50 mL into a venous catheter every 15 (fifteen) minutes as needed.   ? famotidine (PEPCID) 20 MG tablet 1 tablet (20 mg total) by Enteral Tube route 2 (two) times a day. (Patient taking differently: Take 20 mg by mouth 2 (two) times a day. )   ? glucagon,human recombinant (GLUCAGON EMERGENCY KIT, HUMAN, INJ) Inject 1 mg as directed as needed.   ? LANTUS U-100 INSULIN 100 unit/mL vial Inject 25 Units under the skin at bedtime. 11.65 Type 2 with hyperglycemia  Contact provider if insulin prescribed is not the preferred insulin per insurance. (Patient taking differently: Inject 18 Units under the skin every morning. and 15 units at bedtime)   ? levETIRAcetam (KEPPRA) 500 MG tablet Take 500 mg by mouth 2 (two) times a day.   ? melatonin 3 mg Tab tablet 1 tablet (3 mg total) by Enteral Tube route at bedtime. (Patient taking differently: Take 3 mg by mouth at bedtime. )   ? metFORMIN (GLUCOPHAGE) 1000 MG tablet 1 tablet (1,000 mg total) by Enteral Tube route 2 (two) times a day with meals. (Patient taking differently: Take 1,000 mg by mouth 2 (two) times a day with meals. )   ? metoprolol tartrate (LOPRESSOR) 25 MG tablet 1 tablet (25 mg total) by Enteral Tube route 2 (two) times a day. (Patient taking differently: Take 12.5 mg by mouth 2 (two) times a day. )   ? multivitamin with minerals tablet Take 1 tablet by mouth daily.   ? NOVOLOG U-100 INSULIN  ASPART 100 unit/mL injection Check blood sugar four (4) times daily.  11.65 Type 2 with hyperglycemia (Patient taking differently: Inject under the skin 3 (three) times a day. Per sliding scale; 140-189=1 unit, 190-239=2 units, 240-289=3 units, 290-339=4 units, 340-399=5 units, 400-449=6 units    Bedtime sliding scale; 200-249=1 unit, 250-299=2 units, 300-349=3 units, 350-399=4 units, >399=5 units)   ? ondansetron (ZOFRAN-ODT) 4 MG disintegrating tablet Take 4 mg by mouth every 6 (six) hours as needed for nausea.   ? senna (SENOKOT) 8.6 mg tablet Take 1 tablet by mouth 2 (two) times a day as needed for constipation.       No Known Allergies        Review of Systems  No fevers or chills. No headache, lightheadedness or dizziness. No SOB, chest pains or palpitations. Appetite is good. No nausea, vomiting, constipation or diarrhea. No dysuria, frequency, burning or pain with urination.  Patient denies any pain.  Denies any dysphagia.  Otherwise review of systems are negative.     Physical Exam  PHYSICAL EXAMINATION:  Vital signs: 157/83, pulse 92, respirations 18, temperature 97.5, O2 sat 100% on room air.  Weight 196.8 pounds.  General: Awake, Alert, oriented x3, appropriately, follows simple commands, conversant  HEENT:PERRLA, Pink conjunctiva, anicteric sclerae, moist oral mucosa  NECK: Supple, without any lymphadenopathy, or masses  CVS:  S1  S2, without murmur or gallop.  Incision to chest dry and intact with glue.  LUNG: Clear to auscultation, No wheezes, rales or rhonci.  BACK: No kyphosis of the thoracic spine  ABDOMEN: Soft, nontender to palpation, with positive bowel sounds  EXTREMITIES: Good range of motion on both upper and lower extremities, trace pedal edema, Tubigrip's on, no calf tenderness  SKIN: Right lower extremity wounds with moderate serous drainage.  Maceration to the periwound.  No redness or warmth.  NEUROLOGIC: Intact, pulses palpable  PSYCHIATRIC: Cognition intact      Labs:  A  Lab Results    Component Value Date    WBC 13.0 (H) 12/28/2020    HGB 13.3 (L) 12/28/2020    HCT 43.4 12/28/2020    MCV 86 12/28/2020     12/28/2020     Results for orders placed or performed during the hospital encounter of 12/02/20   Basic Metabolic Panel   Result Value Ref Range    Sodium 143 136 - 145 mmol/L    Potassium 3.9 3.5 - 5.0 mmol/L    Chloride 110 (H) 98 - 107 mmol/L    CO2 24 22 - 31 mmol/L    Anion Gap, Calculation 9 5 - 18 mmol/L    Glucose 273 (H) 70 - 125 mg/dL    Calcium 8.1 (L) 8.5 - 10.5 mg/dL    BUN 15 8 - 22 mg/dL    Creatinine 0.89 0.70 - 1.30 mg/dL    GFR MDRD Af Amer >60 >60 mL/min/1.73m2    GFR MDRD Non Af Amer >60 >60 mL/min/1.73m2           Assessment/Plan:    1. S/P CABG x 4   patient denies any chest pain.  No shortness of breath.  Continues on aspirin and statin.  Follow-up CBC on Thursday.   2. Acute diastolic heart failure (H)   continues on torsemide.  Follow-up BMP and BNP on Thursday.   3. Uncontrolled type 2 diabetes mellitus with hyperglycemia (H)   patient noncompliant with diabetic diet.  He continues on Lantus, Metformin and sliding scale.  Metformin was decreased during hospitalization.   4. Oropharyngeal dysphagia   he was evaluated by speech therapy and refused.  He is eating regular diet.   5. Essential hypertension   satisfactory control.   6. Sepsis due to methicillin susceptible Staphylococcus aureus, unspecified whether acute organ dysfunction present (H)   patient has completed his antibiotic course.   7. History of CVA (cerebrovascular accident) without residual deficits   no residual.  No further rhythmic movement of the right lower extremity.   8. Atrial fibrillation, unspecified type (H)   rate controlled with amiodarone and metoprolol.         45 minutes spent of which greater than 50% was face to face communication with the patient about his open heart surgery, post surgery course as well as CVA and current plan of care.    This note has been dictated using voice  recognition software. Any grammatical or context distortions are unintentional and inherent to the software    Electronically signed by: Lupe Rendon CNP

## 2021-06-21 NOTE — LETTER
Letter by Angel Claire MD at      Author: Angel Claire MD Service: -- Author Type: --    Filed:  Encounter Date: 3/2/2021 Status: (Other)         03/02/21     Mika Alvarez  [address] [facility] [address]          Dear Dr. Nadine Brennan, who discharged you from [facility], recommended you schedule a Medication Therapy Management (MTM) appointment. MTM is designed to help you get the most of out of your medications. They will specifically focus on the medications from your recent hospital admission.     During an MTM appointment, you will meet with a specially trained pharmacist to review all of your medications, both prescription and over-the-counter. This can be over the phone or in person. There is a pharmacist that works closely with Dr. Claire at the [facility]. They will make sure your medications are the best choice for you, safe, and working well. The MTM pharmacist works together with you and your doctor to help you understand your medications, solve any problems related to your medications, and help you meet your health goals.     To make an appointment, please call the MTM scheduling line at [phone] and toll free [phone].      We look forward to hearing from you!    Sincerely,       Luzmaria Betancourt, Pharm.D., [facility]  Medication Therapy Management Pharmacist  [facility]

## 2021-06-21 NOTE — LETTER
Letter by Lupe Rendon CNP at      Author: Lupe Rendon CNP Service: -- Author Type: --    Filed:  Encounter Date: 1/21/2021 Status: (Other)         Patient: Mika Alvarez   MR Number: [id number]   YOB: 1950   Date of Visit: 1/21/2021     Code Status:  FULL CODE  Visit Type: Discharge Summary     Facility:  [facility] [[id number]]             History of Present Illness: Mika Alvarez is a 70 y.o. male who I am seeing today for discharged from the TCU.  Patient recently hospitalized on 12/2/2020.  Patient initially presented with shortness of breath.  Past medical history includes obesity, hypertension, diabetes mellitus type 2, CAD and CHF.  Patient was eventually diagnosed with decompensated heart failure.  Patient had been hospitalized 1 month prior following CABG with severe multivessel coronary artery disease however patient deferred surgery.  Patient underwent four-vessel coronary artery bypass surgery on 12/7/2020.  He had harvesting from the right lower extremity.  Bilateral pulmonary vein isolation and left atrial appendage excision was also performed due to history of atrial fib.  Surgery was uneventful.  On postop day 1 patient had rhythmic left leg jerking movements and upward gaze.  Neurology was consulted due to concern for seizure.  He underwent EEG which was negative x2.  Head CT demonstrated pump shaped foci of increased attenuation within the subcortical region of the left frontal lobe.  An MRI was obtained which demonstrated a 2.5 cm left frontal lobe infarct with petechial hemorrhage.  Keppra was eventually started due to continued left leg jerking of both the brain lesion did not fit the symptoms.  He also had some fevers and leukocytosis post operative.  Cultures were drawn.  Sputum on 12/10 resulted with 4+ beta analytic strep and half blood cultures resulted positive blood likely contaminant as subsequent cultures were negative on 12/12.  Urine  culture was negative.  NG was negative.  He was treated empirically with Vanco and Zosyn.  Vanco was discontinued after 4 days and patient completed an 8-day course of Zosyn.  White count around 13.  C. difficile was negative on 12/24.  It was also suggested that patient may have had silent aspiration as he had failed a swallow study and video swallow.  He did have an NG tube which was placed and he received tube feedings.  He had waxing and waning of his mental status however this did improve over time.  Chest tubes were removed after a long period of time with excessive output.  Patient had some urinary retention and Koenig catheter was placed.  He was started on Flomax.  This has been discontinued he is voiding adequately.  Underlying diabetes mellitus type 2.  His glipizide was decreased to her lower dose.  He did have some atrial fib with rapid ventricular response early on in his hospital stay.  He was discharged on a short course of amiodarone.  The surgeon's preference was not to anticoagulate.  Wounds on his bilateral shins.  He has a low ejection fracture and will continue with diuresis for maintenance on torsemide.    Today patient sitting up in bedside chair.  He is very upset with the food.  Reporting dislikes of the food.  He has been very noncompliant with his diabetic diet as well as his swallowing precautions.  He is eating regular diet.  He refused to see speech for any further therapy.  Patient with recent CABG with leg harvesting.  Denies any chest pain or shortness of breath.  He has 2 open areas to his right lower extremity that are improving with topical treatment.  Only about 8.2 x 0.2 on today's visit with no drainage.  Periwound is dry and intact only trace edema.  Underlying diabetes.  He continues with blood sugars in the 2-300s however again very noncompliant with diabetic diet.  He continues on Lantus and Humalog.  I have asked him to keep a log of his blood sugars at home to present to  his primary care provider with follow-up.  History of stroke post CABG.  Very little residual.  Atrial fib.  He continues on amiodarone short-term.  Hypertension.  Blood pressure satisfactory.  He did have some urinary retention postop and was started on Flomax.  He has had no difficulty here at the TCU.    Active Ambulatory Problems     Diagnosis Date Noted   ? Obesity    ? ACP Staging Stage 1 Hypertension: 140-159 / 90-99    ? Coronary artery disease involving native heart with other form of angina pectoris, unspecified vessel or lesion type (H)    ? Type 2 diabetes mellitus (H)    ? Joint Pain, Localized In The Shoulder    ? Hyperlipidemia, unspecified hyperlipidemia type 04/27/2016   ? Moderate episode of recurrent major depressive disorder (H) 06/21/2019   ? Pulmonary edema cardiac cause (H) 09/19/2020   ? New onset atrial fibrillation (H)    ? Acute systolic heart failure (H)    ? CAD (coronary artery disease) 09/19/2020   ? New onset a-fib (H) 09/19/2020   ? Acute on chronic clinical systolic heart failure (H) 12/02/2020   ? Atrial fibrillation with rapid ventricular response (H) 12/02/2020   ? Diabetic leg ulcer (H) 12/02/2020   ? Acute on chronic systolic CHF (congestive heart failure) (H) 12/02/2020   ? A-fib (H) 12/02/2020   ? Uncontrolled type 2 diabetes mellitus with hyperglycemia (H)    ? Acute respiratory failure with hypoxia (H)    ? On mechanically assisted ventilation (H)    ? S/P CABG (coronary artery bypass graft)    ? Seizures (H)    ? Cerebrovascular accident (CVA), unspecified mechanism (H)    ? Cerebrovascular accident (CVA) due to other mechanism (H)    ? Metabolic encephalopathy    ? Disorientation    ? Acute kidney failure with tubular necrosis (H) 12/16/2020     Resolved Ambulatory Problems     Diagnosis Date Noted   ? No Resolved Ambulatory Problems     Past Medical History:   Diagnosis Date   ? Acute metabolic encephalopathy    ? Acute on chronic systolic (congestive) heart failure (H)     ? Coronary Artery Disease    ? CVA (cerebral vascular accident) (H)    ? Dysphagia    ? HTN (hypertension)    ? Hyperglycemia    ? Ischemic cardiomyopathy    ? Other and unspecified hyperlipidemia 04/27/2016   ? Type 2 Diabetes Mellitus With Complication    ? Urinary retention        Current Outpatient Medications   Medication Sig   ? acetaminophen (TYLENOL) 325 mg/10.15 mL Soln Take 20.3 mL by mouth every 4 (four) hours as needed.   ? aspirin 81 mg chewable tablet 1 tablet (81 mg total) by Enteral Tube route daily. (Patient taking differently: Chew 81 mg daily. )   ? atorvastatin (LIPITOR) 80 MG tablet 1 tablet (80 mg total) by Enteral Tube route at bedtime. (Patient taking differently: Take 80 mg by mouth at bedtime. )   ? dextrose (DEXTROSE) 40 % gel Take 15-30 g by mouth every 15 (fifteen) minutes as needed.   ? dextrose 50% solution Infuse 25-50 mL into a venous catheter every 15 (fifteen) minutes as needed.   ? famotidine (PEPCID) 20 MG tablet 1 tablet (20 mg total) by Enteral Tube route 2 (two) times a day. (Patient taking differently: Take 20 mg by mouth 2 (two) times a day. )   ? glucagon,human recombinant (GLUCAGON EMERGENCY KIT, HUMAN, INJ) Inject 1 mg as directed as needed.   ? LANTUS U-100 INSULIN 100 unit/mL vial Inject 25 Units under the skin at bedtime. 11.65 Type 2 with hyperglycemia  Contact provider if insulin prescribed is not the preferred insulin per insurance. (Patient taking differently: Inject 18 Units under the skin every morning. and 15 units at bedtime)   ? levETIRAcetam (KEPPRA) 500 MG tablet Take 500 mg by mouth 2 (two) times a day.   ? melatonin 3 mg Tab tablet 1 tablet (3 mg total) by Enteral Tube route at bedtime. (Patient taking differently: Take 3 mg by mouth at bedtime. )   ? metFORMIN (GLUCOPHAGE) 1000 MG tablet 1 tablet (1,000 mg total) by Enteral Tube route 2 (two) times a day with meals. (Patient taking differently: Take 1,000 mg by mouth 2 (two) times a day with meals. )    ? metoprolol tartrate (LOPRESSOR) 25 MG tablet 1 tablet (25 mg total) by Enteral Tube route 2 (two) times a day. (Patient taking differently: Take 12.5 mg by mouth 2 (two) times a day. )   ? multivitamin with minerals tablet Take 1 tablet by mouth daily.   ? NOVOLOG U-100 INSULIN ASPART 100 unit/mL injection Check blood sugar four (4) times daily.  11.65 Type 2 with hyperglycemia (Patient taking differently: Inject under the skin 3 (three) times a day. Per sliding scale; 140-189=1 unit, 190-239=2 units, 240-289=3 units, 290-339=4 units, 340-399=5 units, 400-449=6 units    Bedtime sliding scale; 200-249=1 unit, 250-299=2 units, 300-349=3 units, 350-399=4 units, >399=5 units)   ? ondansetron (ZOFRAN-ODT) 4 MG disintegrating tablet Take 4 mg by mouth every 6 (six) hours as needed for nausea.   ? senna (SENOKOT) 8.6 mg tablet Take 1 tablet by mouth 2 (two) times a day as needed for constipation.       No Known Allergies        Review of Systems  No fevers or chills. No headache, lightheadedness or dizziness. No SOB, chest pains or palpitations. Appetite is good. No nausea, vomiting, constipation or diarrhea. No dysuria, frequency, burning or pain with urination.  Patient denies any pain.  Denies any dysphagia.  Otherwise review of systems are negative.     Physical Exam  PHYSICAL EXAMINATION:  Vital signs: /79, pulse 88, respirations 20, temperature 97, O2 sat 96 % on room air.  Weight 196.8 pounds.  General: Awake, Alert, oriented x3, appropriately, follows simple commands, conversant  HEENT:PERRLA, Pink conjunctiva, anicteric sclerae, moist oral mucosa  NECK: Supple, without any lymphadenopathy, or masses  CVS:  S1  S2, without murmur or gallop.  Incision to chest dry and intact with glue.  LUNG: Clear to auscultation, No wheezes, rales or rhonci.  BACK: No kyphosis of the thoracic spine  ABDOMEN: Soft, nontender to palpation, with positive bowel sounds  EXTREMITIES: Good range of motion on both upper and  lower extremities, trace pedal edema, Tubigrip's on, no calf tenderness  SKIN: Right lower extremity wounds about 0.2 x 0.2 with minimal serous drainage.  Periwound dry and intact. No redness or warmth.  NEUROLOGIC: Intact, pulses palpable  PSYCHIATRIC: Cognition intact.  Angry with multiple complaints on visit.      Labs:  A  Lab Results   Component Value Date    WBC 13.0 (H) 12/28/2020    HGB 13.3 (L) 12/28/2020    HCT 43.4 12/28/2020    MCV 86 12/28/2020     12/28/2020     Results for orders placed or performed during the hospital encounter of 12/02/20   Basic Metabolic Panel   Result Value Ref Range    Sodium 143 136 - 145 mmol/L    Potassium 3.9 3.5 - 5.0 mmol/L    Chloride 110 (H) 98 - 107 mmol/L    CO2 24 22 - 31 mmol/L    Anion Gap, Calculation 9 5 - 18 mmol/L    Glucose 273 (H) 70 - 125 mg/dL    Calcium 8.1 (L) 8.5 - 10.5 mg/dL    BUN 15 8 - 22 mg/dL    Creatinine 0.89 0.70 - 1.30 mg/dL    GFR MDRD Af Amer >60 >60 mL/min/1.73m2    GFR MDRD Non Af Amer >60 >60 mL/min/1.73m2           Assessment/Plan:    1. S/P CABG x 4   patient denies any chest pain.  No shortness of breath.  Continues on aspirin and statin.  Hemoglobin 12   2. Acute diastolic heart failure (H)   continues on torsemide.  Recent BMP unremarkable other than elevated glucose.   3. Uncontrolled type 2 diabetes mellitus with hyperglycemia (H)   patient noncompliant with diabetic diet.  He continues on Lantus, Metformin and sliding scale.  Metformin was decreased during hospitalization.  He will need continued monitoring of his blood sugars.  Of asked him to keep a log and follow-up with primary care provider.  Home RN to help with diabetes management.   4. Oropharyngeal dysphagia   he was evaluated by speech therapy and refused.  He is eating regular diet.   5. Essential hypertension   satisfactory control.   6. Sepsis due to methicillin susceptible Staphylococcus aureus, unspecified whether acute organ dysfunction present (H)   patient  has completed his antibiotic course.   7. History of CVA (cerebrovascular accident) without residual deficits   no residual.  No further rhythmic movement of the right lower extremity.   8. Atrial fibrillation, unspecified type (H)   rate controlled with amiodarone and metoprolol.  Amiodarone is short-term.  He should follow up with cardiology outpatient.     Okay to discharge home with current meds and treatments.  Home PT, OT, home health aide and RN for medication management, diabetes education and wound care.  Follow-up with primary care provider in 7 to 10 days.  Follow-up outpatient with cardiology and cardiovascular surgeon.    DISCHARGE PLAN/FACE TO FACE:  I certify that this patient is under my care and that I, or a nurse practitioner or physician's assistant working with me, had a face-to-face encounter that meets the physician face-to-face encounter requirements with this patient.       I certify that, based on my findings, the following services are medically necessary home health services.    My clinical findings support the need for the above skilled services.    This patient is homebound because: Recent CABG with right lower extremity vein harvesting.    The patient is, or has been, under my care and I have initiated the establishment of the plan of care. This patient will be followed by a physician who will periodically review the plan of care.    Total time spent for this visit was 35 minutes with greater than 50% of time spent face-to-face with patient reviewing discharge medications, discharge care including home care services and follow-ups.    This note has been dictated using voice recognition software. Any grammatical or context distortions are unintentional and inherent to the software    Electronically signed by: Lupe Rendon CNP

## 2021-06-21 NOTE — LETTER
Letter by Angel Claire MD at      Author: Angel Claire MD Service: -- Author Type: --    Filed:  Encounter Date: 3/23/2021 Status: (Other)         03/23/21     Mika RODRÍGUEZ Alvarez  [address] [address]          Dear Dr. Betty Brennan, the physician who discharged you from [facility], has recommended you schedule a Medication Therapy Management (MTM) appointment to discuss your diabetes and medication change since discharge. MTM is designed to help you get the most of out of your medications.     During an MTM appointment, you will meet with a specially trained pharmacist to review all of your medications, both prescription and over-the-counter. This can be on the phone or in person. There is a pharmacist that works at [facility] very closely with Dr. Claire and Dr. Sarabia. They will make sure your medications are the best choice for you, safe, and working well. The MTM pharmacist works together with you and your doctor to help you understand your medications, solve any problems related to your medications, and help you meet your health goals.     To make an appointment, please call the MTM scheduling line at [phone] and toll free [phone].      We look forward to hearing from you!    Sincerely,       Luzmaria Betancourt, Pharm.D., [facility]  Medication Therapy Management Pharmacist  [facility]

## 2021-06-21 NOTE — LETTER
Letter by Maritza Michele RN at      Author: Maritza Michele RN Service: -- Author Type: --    Filed:  Encounter Date: 1/13/2021 Status: (Other)         Mikaeusebio Alvarez  [address] [facility] [address]           Dear Mika,                                                                                                       January 13, 2021      We have been trying to reach out to you to assist you in making a follow-up appointment with our Heart Failure providers after your recent hospitalization.   I have tried leaving you messages, but unable to connect with you.    Please call during the hours of 8-4:30pm to my number directly and I can assist you in making this   appointment. It is important to seek this follow up to make sure that you are doing well, and establishing a connection with our clinic to use as a resource when questions or concerns arise.                Maritza Michele, RN, BSN  [facility]  [phone]

## 2021-06-21 NOTE — LETTER
Letter by Miguel Tabor MD at      Author: Miguel Tabor MD Service: -- Author Type: --    Filed:  Encounter Date: 5/3/2021 Status: (Other)         Mika RODRÍGUEZ Antonio  [address] [address]      May 3, 2021      Dear Mika,    This letter is to remind you that you will be due for your follow up appointment with Dr. Miguel Tabor in May, 2021 . To help ensure you are in the best health possible, a regular follow-up with your cardiologist is essential.     Please call our Patient Scheduling Line at [phone] to schedule your appointment at your earliest convenience.  If you have recently scheduled an appointment, please disregard this letter.    We look forward to seeing you again. As always, we are available at the number  above for any questions or concerns you may have.      Sincerely,     The Physicians and Staff of [facility]

## 2021-06-21 NOTE — LETTER
Letter by Hilary Trinh CNP at      Author: Hilary Trinh CNP Service: -- Author Type: --    Filed:  Encounter Date: 1/28/2021 Status: (Other)         Mika Alvarez  [address] [address]      January 28, 2021      Dear Mika,    This letter is to remind you that you are due for a consult appointment with Hilary Trinh CNP in February, 2021 . To help ensure you are in the best health possible, a regular follow-up with your cardiologist is essential.     Please call our Patient Scheduling Line at [phone] to schedule your appointment at your earliest convenience.  If you have recently scheduled an appointment, please disregard this letter.    We look forward to seeing you again. As always, we are available at the number  above for any questions or concerns you may have.      Sincerely,     The Physicians and Staff of [facility]

## 2021-06-21 NOTE — LETTER
Letter by Silvano Osorio DO at      Author: Silvano Osorio DO Service: -- Author Type: --    Filed:  Encounter Date: 1/15/2021 Status: (Other)         Patient: Mika Alvarez   MR Number: [id number]   YOB: 1950   Date of Visit: 1/15/2021     [facility]      Facility:    [facility] [[id number]]  Code Status: FULL CODE      Chief Complaint/Reason for Visit:  Chief Complaint   Patient presents with   ? H & P     Acute diastolic and systolic heart failure with exacerbation, atrial fibrillation with rapid ventricular response, uncontrolled type 2 diabetes last hemoglobin A1c 12.6, acute respiratory failure that did resolve, acute metabolic encephalopathy with intermittent agitation, subacute left frontal stroke with petechial hemorrhage, sepsis with fever and leukocytosis which did resolve.  Severe pharyngeal dysphagia, malnutrition, primary hypertension, bilateral pneumothoraces which is small.       HPI:   Mika is a 70 y.o. male who was recently admitted to the hospital approximately 12/2/2020 with acute systolic heart failure exacerbation requiring diuresis.  He also had four-vessel CABG in on 12 7 and course completed.  He did have severe dysphagia however he claims he is just drinking just fine and he then was admitted to a rehab facility.  There was some altered mental status.  He did go through acute rehab unit and he was admitted there on 12/28/2000 and for ongoing rehabilitation.  He does have multilevel coronary disease a status post CABG acute diastolic and systolic heart failure left ventricular wall global hypokinesis.  He also has atrial fibrillation with rapid ventricular response rate is well controlled at this time.  His uncontrolled diabetes but I do review sugars have been running have not been running too bad lowest is 97 and highest was 160.  Did have sepsis in the hospital it did resolve and he had a subacute left frontal stroke with  petechial hemorrhage that is stable at this time.  He is on atorvastatin and aspirin and Keppra.  He does have severe pharyngeal dysphagia but he does deny this and claims that he is just drinking just fine.  He came here last evening and he does want to go home.  He says he is breathing fine he has no issues and he appears euvolemic.  He does walk up and down the hallways without any issues and he does have venous stasis changes in his lower extremities bilateral with ulcers in his right lower extremity.  They are covered at this time.    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  He did refuse therapy this morning and indicated to me he is going home no matter what.  And I did discuss with him that I am against discharging people AGAINST MEDICAL ADVICE however we will set up systems for him.  He will need home PT OT home speech therapy and close follow-up with his primary care physician.  He will need home nursing for med management.    I did sit and have a good conversation with the patient about his status at this time I would prefer him to stay here however he is insistent on going home and he is not going to stay.  Is been a long-haul from at this time he needs to go home to take care of his wife and he asked me if we could do his cares at home.  I said I will look into this at this time.    Past Medical History:  Past Medical History:   Diagnosis Date   ? ACP Staging Stage 1 Hypertension: 140-159 / 90-99     Created by Conversion  Replacement Utility updated for latest IMO load   ? Acute metabolic encephalopathy    ? Acute on chronic systolic (congestive) heart failure (H)    ? Atrial fibrillation with rapid ventricular response (H) 12/02/2020   ? Coronary Artery Disease     Created by Conversion  Replacement Utility updated for latest  IMO load   ? CVA (cerebral vascular accident) (H)    ? Dysphagia    ? HTN (hypertension)    ? Hyperglycemia    ? Ischemic cardiomyopathy    ? Joint Pain, Localized In The Shoulder     Created by Conversion    ? Obesity     Created by Conversion    ? Other and unspecified hyperlipidemia 04/27/2016   ? Seizures (H)    ? Type 2 Diabetes Mellitus With Complication     Created by Conversion    ? Urinary retention            Surgical History:  Past Surgical History:   Procedure Laterality Date   ? CORONARY ARTERY BYPASS GRAFT  12/07/2020    Dr. Madison   ? CV CORONARY ANGIOGRAM N/A 09/22/2020    Procedure: Coronary Angiogram;  Surgeon: Darin Perez MD;  Location: [facility] Cath Lab;  Service: Cardiology   ? CV LEFT HEART CATHETERIZATION WITH LEFT VENTRICULOGRAM N/A 09/22/2020    Procedure: Left Heart Catheterization with Left Ventriculogram;  Surgeon: Darin Perez MD;  Location: [facility] Cath Lab;  Service: Cardiology   ? PICC AND MIDLINE TEAM LINE INSERTION  12/14/2020            Family History:   Family History   Problem Relation Age of Onset   ? Heart disease Father        Social History:    Social History     Socioeconomic History   ? Marital status:      Spouse name: None   ? Number of children: None   ? Years of education: None   ? Highest education level: None   Occupational History   ? None   Social Needs   ? Financial resource strain: None   ? Food insecurity     Worry: None     Inability: None   ? Transportation needs     Medical: None     Non-medical: None   Tobacco Use   ? Smoking status: Never Smoker   ? Smokeless tobacco: Never Used   Substance and Sexual Activity   ? Alcohol use: Yes     Frequency: Monthly or less     Drinks per session: 1 or 2     Comment: socially    ? Drug use: Never   ? Sexual activity: None   Lifestyle   ? Physical activity     Days per week: None     Minutes per session: None   ? Stress: None   Relationships   ? Social connections     Talks on phone: None      Gets together: None     Attends [employer] witness service: None     Active member of club or organization: None     Attends meetings of clubs or organizations: None     Relationship status: None   ? Intimate partner violence     Fear of current or ex partner: None     Emotionally abused: None     Physically abused: None     Forced sexual activity: None   Other Topics Concern   ? None   Social History Narrative   ? None          Review of Systems   Constitutional:        Patient denies any pain fevers chills nausea vomit diarrhea change in vision hearing taste or smell weakness one-sided chest pain shortness of breath.  Denies any current shortness stool polyphagia polydipsia polyuria depression or anxiety and the main review of systems is negative.       Vitals:    01/15/21 1012   BP: 151/85   Pulse: (!) 102   Resp: 18   Temp: 97.2  F (36.2  C)   SpO2: 94%       Physical Exam  Constitutional:       General: He is not in acute distress.     Appearance: He is not toxic-appearing.   HENT:      Head: Normocephalic and atraumatic.      Mouth/Throat:      Mouth: Mucous membranes are moist.      Pharynx: Oropharynx is clear.   Eyes:      General:         Right eye: No discharge.         Left eye: No discharge.      Conjunctiva/sclera: Conjunctivae normal.   Cardiovascular:      Comments: Irregularly irregular with adequate rate control.  Pulmonary:      Effort: Pulmonary effort is normal. No respiratory distress.      Breath sounds: No wheezing or rales.   Abdominal:      General: There is no distension.      Palpations: Abdomen is soft.      Tenderness: There is no abdominal tenderness.   Musculoskeletal:      Comments: Venous stasis changes in the lower extremities bilateral with open sores on the right lower extremities are covered at this time.  No signs of any severe edema at this time.   Skin:     General: Skin is warm and dry.   Neurological:      Mental Status: He is alert. Mental status is at baseline.   Psychiatric:       Comments: Patient is somewhat cantankerous at this time but we did have a good conversation.         Medication List:  Current Outpatient Medications   Medication Sig   ? acetaminophen (TYLENOL) 325 mg/10.15 mL Soln Take 20.3 mL by mouth every 4 (four) hours as needed.   ? aspirin 81 mg chewable tablet 1 tablet (81 mg total) by Enteral Tube route daily. (Patient taking differently: Chew 81 mg daily. )   ? atorvastatin (LIPITOR) 80 MG tablet 1 tablet (80 mg total) by Enteral Tube route at bedtime. (Patient taking differently: Take 80 mg by mouth at bedtime. )   ? dextrose (DEXTROSE) 40 % gel Take 15-30 g by mouth every 15 (fifteen) minutes as needed.   ? dextrose 50% solution Infuse 25-50 mL into a venous catheter every 15 (fifteen) minutes as needed.   ? famotidine (PEPCID) 20 MG tablet 1 tablet (20 mg total) by Enteral Tube route 2 (two) times a day. (Patient taking differently: Take 20 mg by mouth 2 (two) times a day. )   ? glucagon,human recombinant (GLUCAGON EMERGENCY KIT, HUMAN, INJ) Inject 1 mg as directed as needed.   ? LANTUS U-100 INSULIN 100 unit/mL vial Inject 25 Units under the skin at bedtime. 11.65 Type 2 with hyperglycemia  Contact provider if insulin prescribed is not the preferred insulin per insurance. (Patient taking differently: Inject 18 Units under the skin every morning. and 15 units at bedtime)   ? levETIRAcetam (KEPPRA) 500 MG tablet Take 500 mg by mouth 2 (two) times a day.   ? melatonin 3 mg Tab tablet 1 tablet (3 mg total) by Enteral Tube route at bedtime. (Patient taking differently: Take 3 mg by mouth at bedtime. )   ? metFORMIN (GLUCOPHAGE) 1000 MG tablet 1 tablet (1,000 mg total) by Enteral Tube route 2 (two) times a day with meals. (Patient taking differently: Take 1,000 mg by mouth 2 (two) times a day with meals. )   ? metoprolol tartrate (LOPRESSOR) 25 MG tablet 1 tablet (25 mg total) by Enteral Tube route 2 (two) times a day. (Patient taking differently: Take 12.5 mg by mouth 2  (two) times a day. )   ? multivitamin with minerals tablet Take 1 tablet by mouth daily.   ? NOVOLOG U-100 INSULIN ASPART 100 unit/mL injection Check blood sugar four (4) times daily.  11.65 Type 2 with hyperglycemia (Patient taking differently: Inject under the skin 3 (three) times a day. Per sliding scale; 140-189=1 unit, 190-239=2 units, 240-289=3 units, 290-339=4 units, 340-399=5 units, 400-449=6 units    Bedtime sliding scale; 200-249=1 unit, 250-299=2 units, 300-349=3 units, 350-399=4 units, >399=5 units)   ? ondansetron (ZOFRAN-ODT) 4 MG disintegrating tablet Take 4 mg by mouth every 6 (six) hours as needed for nausea.   ? senna (SENOKOT) 8.6 mg tablet Take 1 tablet by mouth 2 (two) times a day as needed for constipation.       Labs: Flu labs are as follows; blood sugars were 72, 182, 161, 123, 101, 96, 139, 148, 136, 132, 98, 89.  On 1/11/2021 sodium was 139, potassium 4.2, chloride was 105, carbon dioxide was 29, anion gap was 5, glucose 67, BUN was 10, creatinine 0.94, GFR was 83.  Magnesium 1.8, phosphorus 4.0, white count was 9.8, hemoglobin 12.0.      Assessment:    ICD-10-CM    1. Acute diastolic heart failure (H)  I50.31    2. Acute systolic heart failure (H)  I50.21    3. Uncontrolled type 2 diabetes mellitus with hyperglycemia (H)  E11.65    4. Acute respiratory failure with hypoxia (H)  J96.01    5. Sepsis due to methicillin susceptible Staphylococcus aureus, unspecified whether acute organ dysfunction present (H)  A41.01    6. Oropharyngeal dysphagia  R13.12    7. Essential hypertension  I10    8. Acute delirium  R41.0        Plan: Plan at this time patient is alert and oriented.  We are attempting to call and discuss with his family if we get his permission.  I would like him to see speech therapy today and if he still insistent to go home I am I will not do AGAINST MEDICAL ADVICE I will actually discharge him with services.  If that were to happen later today then I will discharge with current  meds and treatments he will have home nursing for med management and PT OT at home.    After collaboration discussion with patient's daughter we discussed going home.  It was felt in the family's best interest that he is not ready at this time and their mother needs more cares.  It was said that he needed 24-hour care so evaluation by physical and occupational therapy and speech therapy would be paramount to see what he can actually do at home.  This collaboration took about 20 minutes and 1 hour was spent on this admission with greater than 50% of the time dedicated to coordination care this also discussed the collaboration with going home and discussed with  as well as nurse manager.  He will need maximum services if that is the case.        Electronically signed by: Silvano Osorio,

## 2021-06-21 NOTE — LETTER
Letter by Gema Sarabia DO at      Author: Gema Sarabia DO Service: -- Author Type: --    Filed:  Encounter Date: 3/29/2021 Status: (Other)         Mika Alvarez  [address] [address]      2021      Dear Mika Alvarez,   : 1950      This letter is in regards to the appointment that you had scheduled on 2021 at the [facility] with Dr. Sarabia.     The [facility] strives to see all patients in a timely manner and we need your help to achieve this.  The above-mentioned appointment was missed and we do not have record of a cancellation by you.  Whenever possible, we request appointment cancellations at least 72 hours in advance.  This time allows us to offer the appointment to another patient in need.      If you feel you have received this letter in error, or if you need to reschedule this appointment, please call our office so that we may update our records.      Sincerely,    [facility]

## 2021-06-21 NOTE — LETTER
Letter by Angel Claire MD at      Author: Angel Claire MD Service: -- Author Type: --    Filed:  Encounter Date: 3/10/2021 Status: (Other)         March 10, 2021     Patient: Mika Alvarez   YOB: 1950   Date of Visit: 3/10/2021       To Whom it May Concern:    Ok to discontinue wound vac.     Pack with gauze and cover with adhesive dressing. Change dressing M, W, F.     Patient to follow up with podiatry for wound assessment.      Sincerely,              Electronically signed by Angel Claire MD

## 2021-06-21 NOTE — LETTER
Letter by Miguel Tabor MD at      Author: Miguel Tabor MD Service: -- Author Type: --    Filed:  Encounter Date: 3/22/2021 Status: (Other)         Mika ANNE Alvarez  [address] [address]      March 22, 2021      Dear Mika,    This letter is to remind you that you will be due for your follow up appointment with Marci Ruelas CNP in April, 2021. To help ensure you are in the best health possible, a regular follow-up with your cardiologist is essential.     Please call our Patient Scheduling Line at [phone] to schedule your appointment at your earliest convenience.  If you have recently scheduled an appointment, please disregard this letter.    We look forward to seeing you again. As always, we are available at the number  above for any questions or concerns you may have.      Sincerely,     The Physicians and Staff of [facility]

## 2021-06-21 NOTE — LETTER
Letter by Maritza Michele RN at      Author: Maritza Michele RN Service: -- Author Type: --    Filed:  Encounter Date: 1/13/2021 Status: (Other)         Mikaeusebio Alvarez  [address] [facility] [address]           Dear Mika,                                                                                                       January 13, 2021      We have been trying to reach out to you to assist you in making a follow-up appointment with our Heart Failure providers after your recent hospitalization.   I have tried leaving you messages, but unable to connect with you.    Please call during the hours of 8-4:30pm to my number directly and I can assist you in making this   appointment. It is important to seek this follow up to make sure that you are doing well, and establishing a connection with our clinic to use as a resource when questions or concerns arise.                Maritza Michele, RN, BSN  [facility]  [phone]

## 2021-06-21 NOTE — PROGRESS NOTES
"WOC Note by Edilma Callahan RN, WOC at 12/3/2020  1:05 PM     Author: Edilma Callahan RN, WOC Service: -- Author Type: Registered Nurse    Filed: 12/3/2020  2:45 PM Date of Service: 12/3/2020  1:05 PM Status: Signed    : Edilma Callahan RN, WOC (Registered Nurse)       Wound Ostomy  WOC Assessment with Photograph        Allergies:  No Known Allergies    Diagnosis:   Patient Active Problem List    Diagnosis Date Noted   ? Acute on chronic clinical systolic heart failure (H) 12/02/2020   ? Atrial fibrillation with rapid ventricular response (H) 12/02/2020   ? Diabetic leg ulcer (H) 12/02/2020   ? Acute on chronic systolic CHF (congestive heart failure) (H) 12/02/2020   ? Acute systolic heart failure (H)    ? New onset atrial fibrillation (H)    ? Pulmonary edema cardiac cause (H) 09/19/2020   ? CAD (coronary artery disease) 09/19/2020   ? New onset a-fib (H) 09/19/2020   ? Moderate episode of recurrent major depressive disorder (H) 06/21/2019   ? Hyperlipidemia, unspecified hyperlipidemia type 04/27/2016   ? Joint Pain, Localized In The Shoulder    ? Obesity    ? ACP Staging Stage 1 Hypertension: 140-159 / 90-99    ? Coronary artery disease involving native heart with other form of angina pectoris, unspecified vessel or lesion type (H)    ? Type 2 diabetes mellitus (H)        Height:  5' 10\" (1.778 m)    Weight:   (!) 223 lb (101.2 kg)    Labs:  Recent Labs     12/02/20  1416 12/02/20  1418 12/03/20  0704   HGBA1C  --   --  12.6*   HGB  --  14.7  --    ALBUMIN 3.2*  --   --        Antonio:  Antonio Scale Score: 22    Specialty Bed:  Specialty Bed: [place] Position Pro (ICU only) (STACH)    Wound culture obtained: No    Edema:  Yes:  Localized    Anatomic Site/Laterality: BLE    Reason for ongoing care:   Wound assessment and plan of care     Encounter Type:  Initial Wound Type:   Non-PU Ulcer: Venous     Tissue Damage:  Exposed fat layer    Associated conditions/Related trauma: Patient has history of " venous ulcers and edema, previously had compression but this admission may have CABG per chart.    Assessment:    Bilateral shins with multiple scabs  Right shin 2 open areas with minimal depth  Left medial/posterior shin 5 x 4cm partial thickness  Minimal edema.    Tunneling/Undermining: No    Wound Bed: see above    Exudate: Yes Serous Small    Periwound Skin: Edema, Erythema and Hyperpigmentation     Treatment Plan: Adaptic to open areas, abd pads and wrap with kerlix               Nursing care provided was wound care.    Discussed plan of care with nurse and patient.    Outcomes and treatment recommendations are to promote skin integrity and promote wound healing.    Actions taken by WOC RN: 5 minutes of education and WOC Discharge recommendations entered.    Planned Follow Up: Early next week.    Plan for next visit: Reassess wound(s) and Reassess skin integrity

## 2021-06-21 NOTE — LETTER
Letter by Marci Ruelas CNP at      Author: Marci Ruelas CNP Service: -- Author Type: --    Filed:  Encounter Date: 3/16/2021 Status: (Other)         Mika Alvarez  [address] [facility] [address]      March 16, 2021      Dear Mika,    We have attempted to contact you to schedule your two week Heart Failure follow-up appointment with one of the providers in the Heart Failure Clinic, per discharge instructions for your recent hospitalization.     Please call our Patient Scheduling Line at [phone] to schedule your appointment.    Your health and follow-up medical care are important to us.  Please call our office as soon as possible so that we may schedule your appointment.  If you have already scheduled your appointment, please disregard this letter.      Sincerely,     The Providers and Staff of [facility] [place]

## 2021-06-23 NOTE — TELEPHONE ENCOUNTER
Spoke to patient's wife letting her know that patient is due for diabetic check. She will have him call back and schedule an appointment.

## 2021-06-24 NOTE — PROGRESS NOTES
ASSESSMENT/PLAN  1. Type 2 diabetes mellitus with hyperglycemia, without long-term current use of insulin (H)  Patient has been noncompliant with medications  Patient has not been exercising  He is been eating higher content sugar and carbohydrate foods  A1c returns at greater than 10%  He has not been checking his glucose levels  Discussed with him to be able to adjust insulin levels and EDC glucose readings throughout the day so I can safely adjust his insulin  He has become more compliant with taking his medications on a regular basis  He will contact me in a couple weeks with glucose readings so we can adjust insulin  Discussed dietary changes  - Glycosylated Hemoglobin A1c    2. Screen for colon cancer  Patient is due for colon cancer screening and would like to try cologuard  - Cologuard    3. Health care maintenance  Patient is fasting will obtain fasting blood work today  - HM2(CBC w/o Differential)  - Comprehensive Metabolic Panel  - Lipid Cascade FASTING  - PSA, Annual Screen (Prostatic-Specific Antigen)    4. Encounter for screening for malignant neoplasm of prostate   He is due for prostate cancer screening which will obtain a PSA today  - PSA, Annual Screen (Prostatic-Specific Antigen)    5. Essential hypertension  Blood pressure is at goal range we will have him continue with current medications  In review of patient's medications it does not look as though he is been feeling some of his medications on a regular basis  On our medication chart it shows him still to be taking metoprolol and he states that he is taking it twice daily however he has not had medication filled per our records over 2 years  I asked him to go home with his medication list and assess and see what medications he has been taking and get that information back to me so we can have an accurate medication list    6. Coronary artery disease involving native heart without angina pectoris, unspecified vessel or lesion type  Patient has  "a history of coronary artery disease  Goal of LDL below 80        SUBJECTIVE:   Chief Complaint   Patient presents with     Diabetes     Medication check, fasting labs     Mika Alvarez 68 y.o. male    Current Outpatient Medications   Medication Sig Dispense Refill     amLODIPine (NORVASC) 10 MG tablet TAKE 1 TABLET BY MOUTH ONCE DAILY. OVERDUE FOR DIABETIC CHECK  90 tablet 2     aspirin 81 MG EC tablet Take 81 mg by mouth daily.       blood glucose test (ACCU-CHEK ARMANDO PLUS TEST STRP) strips Use 1 each As Directed 3 (three) times a day. Test 3 times daily 200 strip 0     blood-glucose meter (ACCU-CHEK ADVANTAGE DIABETES) kit Test 4 times daily.       glipiZIDE (GLUCOTROL XL) 10 MG 24 hr tablet Take 2 tablets (20 mg total) by mouth once daily. 180 tablet 0     insulin glargine (BASAGLAR KWIKPEN U-100 INSULIN) 100 unit/mL (3 mL) pen INJECT 26 UNITS UNDER THE SKIN AT BEDTIME. 15 mL 1     LANCETS MISC Use As Directed.       losartan (COZAAR) 100 MG tablet TAKE 1 TABLET BY MOUTH ONCE DAILY. 90 tablet 2     metFORMIN (GLUCOPHAGE) 1000 MG tablet Take 1 tablet (1,000 mg total) by mouth 2 (two) times a day with meals. 180 tablet 0     metoprolol succinate (TOPROL-XL) 100 MG 24 hr tablet TAKE ONE TABLET BY MOUTH TWICE DAILY  160 tablet 0     NEEDLES, INSULIN DISPOSABLE (BD ULTRA-FINE ROBERT PEN NEEDLES MISC) use up to 4 times daily as directed.       nitroglycerin (NITROSTAT) 0.4 MG SL tablet Place 0.4 mg under the tongue every 5 (five) minutes as needed for chest pain.       pen needle, diabetic (BD INSULIN PEN NEEDLE UF SHORT) 31 gauge x 5/16\" Ndle USE UP TO 4 TIMES DAILY AS DIRECTED 300 each 2     prasugrel (EFFIENT) 10 mg Tab tablet Take 1 tablet (10 mg total) by mouth daily. 90 tablet 1     pravastatin (PRAVACHOL) 40 MG tablet TAKE TWO TABLETS BY MOUTH DAILY  180 tablet 2     No current facility-administered medications for this visit.      Allergies: Patient has no known allergies.   No LMP for male patient.    HPI: "   Patient is here for diabetic follow-up  Patient upon further discussion is not been diabetic diet compliant he is not been exercising  Review of his medications he is not fully aware of the medications that he should be taking and reviewing the date of refill I am suspicious that is not been taking some of his medications as they have not been requested for refill in over a year  He will go home and check medications that he is taking and update me with an accurate list  He has not been using his glucometer nor checking sugar levels  His A1c returns greater than 10%  Discussed with him that we need to have him become more compliant with his day-to-day medications we need to see glucose levels so we can adjust insulin dosing  We will not adjust insulin dosing until I see glucose levels  He states his glucometers battery is dead and he needs to find a new battery  Is a history of coronary artery disease and is on a statin therapy-we have on record is taking 80 mg of cholesterol medications daily but he states he is only taking 40  Discussed with her the importance of regularity and compliance with his medications  Discussed with him diet and exercise  Patient's blood pressure is at goal range however I do not consistently    Patient like to be screened with genetic testing for colon cancer  We will obtain fasting blood work today and follow-up based on results    ROS: negative except as per HPI    OBJECTIVE:   The patient appears well, alert, oriented x 3, in no distress.  /74 (Patient Site: Left Arm, Patient Position: Sitting, Cuff Size: Adult Large)   Pulse 64   Temp 97.9  F (36.6  C) (Oral)   Resp 16   Wt 215 lb 9.6 oz (97.8 kg)   BMI 30.94 kg/m        Lungs: clear, good air entry, no wheezes, rhonchi or rales.   Cardiac: S1 and S2 normal, no murmurs, regular rate and rhythm.   Abdomen: normal bowel sounds, soft   Extremities: show no edema, normal peripheral pulses.   Neurological: normal, no focal  findings.  Skin: clear, dry, no rashes/lesions  Psych- normal mood and affect      Pt states an understanding and agrees to the above plan.  Greater than 25 minutes was spent today in interview and examination with Mika Alvarez with more than 50% of that time in counseling and coordination of care.

## 2021-06-24 NOTE — TELEPHONE ENCOUNTER
Patient Returning Call  Reason for call:  Patient returning call  Information relayed to patient:  PSA level is 0.2- we will recheck in one year.  Kidney and liver function are normal.  Cholesterol levels are at goal range.  No concerns on blood work except the elevated Hgba1c- get the glucose levels to Dr. Claire as discussed so insulin can be adjusted  Patient has additional questions:  No  If YES, what are your questions/concerns:  N/A  Okay to leave a detailed message?: No call back needed

## 2021-06-24 NOTE — TELEPHONE ENCOUNTER
Left message to call back for: Mika   Information to relay to patient:  LMTCB please given below message from Dr Claire.   Letter mailed with results.

## 2021-06-24 NOTE — TELEPHONE ENCOUNTER
Called [facility] pharmacy to see when patient had Metoprolol filled last.     Patient had Metoprolol last filled on 11/2017

## 2021-06-24 NOTE — TELEPHONE ENCOUNTER
----- Message from Angel Claire MD sent at 3/11/2019  6:43 PM CDT -----  PSA level is 0.2- we will recheck in one year.  Kidney and liver function are normal.  Cholesterol levels are at goal range.  No concerns on blood work except the elevated Hgba1c- get the glucose levels to Dr. Claire as discussed so insulin can be adjusted.

## 2021-06-24 NOTE — TELEPHONE ENCOUNTER
RN cannot approve Refill Request    RN can NOT refill this medication PCP messaged that patient is overdue for Labs and Office Visit.       Chandrika Prieto, [facility] Triage/Med Refill 2/22/2019    Requested Prescriptions   Pending Prescriptions Disp Refills     prasugrel (EFFIENT) 10 mg Tab tablet 90 tablet 0     Sig: Take 1 tablet (10 mg total) by mouth daily.    Clopidogrel/Prasugrel/Ticagrelor Refill Protocol Failed - 2/22/2019 11:41 AM       Failed - PCP or prescribing provider visit in past 6 months      Last office visit with prescriber/PCP: Visit date not found OR same dept: 8/21/2018 Angel Claire MD OR same specialty: 8/21/2018 Angel Claire MD Last physical: Visit date not found Last MTM visit: Visit date not found     Next appt within 3 mo: Visit date not found  Next physical within 3 mo: Visit date not found  Prescriber OR PCP: Angel Claire MD  Last diagnosis associated with med order: 1. Coronary atherosclerosis  - prasugrel (EFFIENT) 10 mg Tab tablet; Take 1 tablet (10 mg total) by mouth daily.  Dispense: 90 tablet; Refill: 0    If protocol passes may refill for 6 months if within 3 months of last provider visit (or a total of 9 months).             Failed - Hemoglobin in past 12 months    Hemoglobin   Date Value Ref Range Status   11/30/2016 15.5 14.0 - 18.0 g/dL Final

## 2021-06-24 NOTE — TELEPHONE ENCOUNTER
Refill Request  Did you contact pharmacy: Yes.  Date: 2/22/19  Medication name:   Requested Prescriptions     Pending Prescriptions Disp Refills     prasugrel (EFFIENT) 10 mg Tab tablet 90 tablet 0     Sig: Take 1 tablet (10 mg total) by mouth daily.     Who prescribed the medication: Angel Claire MD  [facility] and [facility]  Is patient out of medication: No.  1 days left  Patient notified refills processed in 72 hours:  yes  Okay to leave a detailed message: no

## 2021-06-25 NOTE — TELEPHONE ENCOUNTER
RN cannot approve Refill Request    RN can NOT refill this medication Protocol failed and NO refill given.    Chandrika Prieto, [facility] Triage/Med Refill 3/19/2019    Requested Prescriptions   Pending Prescriptions Disp Refills     insulin glargine (BASAGLAR KWIKPEN U-100 INSULIN) 100 unit/mL (3 mL) pen 15 mL 1     Sig: INJECT 26 UNITS UNDER THE SKIN AT BEDTIME.    Insulin/GLP-1 Refill Protocol Failed - 3/19/2019  1:54 PM       Failed - Microalbumin in last year    Microalbumin, Random Urine   Date Value Ref Range Status   03/16/2018 16.71 (H) 0.00 - 1.99 mg/dL Final                 Passed - Visit with PCP or prescribing provider visit in last 6 months    Last office visit with prescriber/PCP: 3/8/2019 OR same dept: 3/8/2019 Angel Claire MD OR same specialty: 3/8/2019 Angel Claire MD Last physical: Visit date not found Last MTM visit: Visit date not found     Next appt within 3 mo: Visit date not found  Next physical within 3 mo: Visit date not found  Prescriber OR PCP: Angel Claire MD  Last diagnosis associated with med order: 1. Diabetes (H)  - insulin glargine (BASAGLAR KWIKPEN U-100 INSULIN) 100 unit/mL (3 mL) pen; INJECT 26 UNITS UNDER THE SKIN AT BEDTIME.  Dispense: 15 mL; Refill: 1    If protocol passes may refill for 6 months if within 3 months of last provider visit (or a total of 9 months).             Passed - A1C in last 6 months    Hemoglobin A1c   Date Value Ref Range Status   03/08/2019 10.8 (H) 3.5 - 6.0 % Corrected     Comment:     This is a corrected result. Previous result was <2.5 % on 3/8/2019 at 0817 CST              Passed - Blood pressure in last year    BP Readings from Last 1 Encounters:   03/08/19 130/74            Passed - Creatinine done in last year    Creatinine   Date Value Ref Range Status   03/08/2019 0.83 0.70 - 1.30 mg/dL Final

## 2021-06-26 NOTE — PROGRESS NOTES
Progress Notes by Silvano Sanchez MD at 3/16/2018  9:30 AM     Author: Silvano Sanchez MD Service: -- Author Type: Physician    Filed: 3/16/2018 10:12 AM Encounter Date: 3/16/2018 Status: Signed    : Silvano Sanchez MD (Physician)       DIAGNOSIS:  1. Health care maintenance  Pneumococcal polysaccharide vaccine 23-valent 1 yo or older, subq/IM   2. Insomnia  Thyroid Cascade   3. Type 2 diabetes mellitus  Glycosylated Hemoglobin A1c       PLAN:  Patient Instructions   Try Melatonin 4 mg at bedtime.    You may speak with Dr Claire regarding an anti-depressant at your upcoming appt     If you are not doing better.    Avoid caffeine (paty your diet Mountain Dew)    Sleep hygiene discussed (shut off TV one hour before bed and dim lighting, soft music)    Check A1C and Thyroid cascade    Pneumovax        Treating Insomnia  Good sleeping habits are a key part of treatment. If needed, some medications may help you sleep better at first. Making healthy lifestyle changes and learning to relax can improve your sleep. Treating insomnia takes commitment, but trust that your efforts will pay off. Talk to your health care provider before taking any medication.    Healthy lifestyle  Your lifestyle affects your health and your sleep. Here are some healthy habits:    Keep a regular sleep schedule. Go to bed and get up at the same time each day.    Exercise regularly. It may help you reduce stress. Avoid strenuous exercise for 2 to 4 hours before bedtime.    Avoid or limit naps, especially in the late afternoon.    Use your bed only for sleep and sex.    Dont spend too much time in bed trying to fall asleep. If you cant fall asleep, get up and do something (no electronics) until you become tired and drowsy.    Avoid or limit caffeine and nicotine for up to 6 hours before bedtime. They can keep you awake at night.     Also avoid alcohol for at least 4 to 6 hours before bedtime. It may help you fall  asleep at first, but you will have more awakenings throughout the night, and your sleep will not be restful.  Before bedtime  To sleep better every night, try these tips:    Have a bedtime routine to let your body and mind know when its time to sleep.    Think of going to bed as relaxing and enjoyable. Sleep will come sooner.    If your worries dont let you sleep, write them down in a diary. Then close it, and go to bed.    Make sure the room is not too hot or too cold. If its not dark enough, an eye mask can help. If its noisy, try using earplugs.  Learn to relax  Stress, anxiety, and body tension may keep you awake at night. To unwind before bedtime, try a warm bath, meditation, or yoga. Also, try the following:    Deep breathing. Sit or lie back in a chair. Take a slow, deep breath. Hold it for 5 counts. Then breathe out slowly through your mouth. Keep doing this until you feel relaxed.    Progressive muscle relaxation. Tense and then relax the muscles in your body as you breathe deeply. Start with your feet and work up your body to your neck and face.  Date Last Reviewed: 7/18/2015 2000-2016 [employer]. [address] [place] [address], [place] [address]. All rights reserved. This information is not intended as a substitute for professional medical care. Always follow your healthcare professional's instructions.            HPI:  Not sleeping well at night for the past couple months.  Unable to get to sleep.  Hasn't tried melatonin yet.   Daughter passed away in Nov 2018.          Current Outpatient Prescriptions on File Prior to Visit   Medication Sig Dispense Refill   ? amLODIPine (NORVASC) 10 MG tablet Take 1 tablet (10 mg total) by mouth daily. You are overdue for a diabetic visit. 90 tablet 3   ? aspirin 81 MG EC tablet Take 81 mg by mouth daily.     ? BASAGLAR KWIKPEN 100 unit/mL (3 mL) pen INJECT 26 UNITS UNDER THE SKIN AT BEDTIME. 15 mL 1   ? BLOOD SUGAR DIAGNOSTIC (ACCU-CHEK ARMANDO PLUS TEST STRP  "MISC) Test 3 times daily     ? blood sugar diagnostic (GLUCOSE BLOOD) Strp Test blood sugars 2 times a day.     ? blood-glucose meter (ACCU-CHEK ADVANTAGE DIABETES) kit Test 4 times daily.     ? BLOOD-GLUCOSE METER (ACCU-CHEK ARMANDO PLUS METER MISC) Use As Directed.     ? EFFIENT 10 mg Tab tablet TAKE ONE TABLET BY MOUTH ONCE DAILY  30 tablet 0   ? glipiZIDE (GLUCOTROL XL) 10 MG 24 hr tablet Take 2 tablets (20 mg total) by mouth once daily. 180 tablet 0   ? insulin lispro (HUMALOG KWIKPEN) 100 unit/mL InPn Inject under the skin. As directed according to sliding scale.     ? LANCETS MISC Use As Directed.     ? losartan (COZAAR) 100 MG tablet TAKE 1 TABLET BY MOUTH ONCE DAILY. 90 tablet 2   ? metFORMIN (GLUCOPHAGE) 1000 MG tablet Take 1 tablet (1,000 mg total) by mouth 2 (two) times a day with meals. 180 tablet 0   ? metoprolol succinate (TOPROL-XL) 100 MG 24 hr tablet TAKE ONE TABLET BY MOUTH TWICE DAILY  160 tablet 0   ? NEEDLES, INSULIN DISPOSABLE (BD ULTRA-FINE ROBERT PEN NEEDLES MISC) use up to 4 times daily as directed.     ? nitroglycerin (NITROSTAT) 0.4 MG SL tablet Place 0.4 mg under the tongue every 5 (five) minutes as needed for chest pain.     ? ONETOUCH FINEPOINT LANCETS MISC Test blood sugars 3 times a day.     ? pen needle, diabetic (BD INSULIN PEN NEEDLE UF SHORT) 31 gauge x 5/16\" Ndle USE UP TO 4 TIMES DAILY AS DIRECTED 300 each 2   ? pravastatin (PRAVACHOL) 40 MG tablet TAKE TWO TABLETS BY MOUTH DAILY  180 tablet 2   ? [DISCONTINUED] pravastatin (PRAVACHOL) 40 MG tablet TAKE TWO TABLETS BY MOUTH DAILY  60 tablet 0   ? cephalexin (KEFLEX) 500 MG capsule Take 500 mg by mouth 4 (four) times a day.     ? LANTUS SOLOSTAR 100 unit/mL (3 mL) pen INJECT 26 UNTIS SUBCUTANEOUSLY EVERY DAY AT 9PM AS DIRECTED  20 mL 0     No current facility-administered medications on file prior to visit.        Pmh: reviewed  Psh: reviewed  Allergy:  reviewed      EXAM:    /68 (Patient Site: Left Arm, Patient Position: " Sitting, Cuff Size: Adult Large)  Pulse 76  Temp 98.1  F (36.7  C) (Oral)   Resp 16  Wt 216 lb 9.6 oz (98.2 kg)  BMI 30.21 kg/m2  GEN:   ALERT, NAD, ORIENTED TIMES THREE  NECK: SUPPLE WITHOUT ADENOPATHY OR THYROMEGALY  LUNGS: CTA  COR: RRR WITHOUT MURMUR  SKIN: NO RASH , ULCERS OR LESIONS NOTED  EXT: WITHOUT EDEMA OR SWELLING      PHQ-9:  13  No results found for this or any previous visit (from the past 168 hour(s)).       Results for orders placed or performed in visit on 11/30/17   Comprehensive Metabolic Panel   Result Value Ref Range    Sodium 138 136 - 145 mmol/L    Potassium 4.5 3.5 - 5.0 mmol/L    Chloride 105 98 - 107 mmol/L    CO2 20 (L) 22 - 31 mmol/L    Anion Gap, Calculation 13 5 - 18 mmol/L    Glucose 289 (H) 70 - 125 mg/dL    BUN 17 8 - 22 mg/dL    Creatinine 0.79 0.70 - 1.30 mg/dL    GFR MDRD Af Amer >60 >60 mL/min/1.73m2    GFR MDRD Non Af Amer >60 >60 mL/min/1.73m2    Bilirubin, Total 0.7 0.0 - 1.0 mg/dL    Calcium 9.5 8.5 - 10.5 mg/dL    Protein, Total 7.2 6.0 - 8.0 g/dL    Albumin 3.6 3.5 - 5.0 g/dL    Alkaline Phosphatase 110 45 - 120 U/L    AST 19 0 - 40 U/L    ALT 13 0 - 45 U/L     Lab Results   Component Value Date    HGBA1C >14.0 (H) 11/30/2017     Lab Results   Component Value Date    CHOL 163 11/30/2017    CHOL 125 01/15/2016    CHOL 130 04/10/2015     Lab Results   Component Value Date    HDL 48 11/30/2017    HDL 40 01/15/2016    HDL 40 04/10/2015     Lab Results   Component Value Date    LDLCALC 90 11/30/2017    LDLCALC 65 01/15/2016    LDLCALC 66 04/10/2015     Lab Results   Component Value Date    TRIG 123 11/30/2017    TRIG 102 01/15/2016    TRIG 120 04/10/2015     Lab Results   Component Value Date    MICROALBUR 6.19 (H) 08/18/2016     No results found for: TSH, Q7NIKGD, Y7VMAPB, THYROIDAB      No components found for: CHOLHDL

## 2021-06-29 NOTE — PROGRESS NOTES
Progress Notes by Miguel Tabor MD at 11/5/2020 12:50 PM     Author: Miguel Tabor MD Service: -- Author Type: Physician    Filed: 11/5/2020  3:10 PM Encounter Date: 11/5/2020 Status: Signed    : Miguel Tabor MD (Physician)           Thank you, Angel Gatica MD, for asking the [facility] team to see Mr. Mika Alvarez for follow-up.      Assessment/Recommendations   Assessment:    1. 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 NYHA class IV, appears fluid-overloaded.  2. Persistent atrial fibrillation with RVR in clinic today.  3. Multivessel coronary artery disease.  4. IDDM2 with circulatory complication.  5. Benign essential hypertension.  6. Hyperlipidemia.    Plan:  1. Recommended going to the ER for admission at [facility], as he is currently in decompensated heart failure and should be medically optimized and undergo CABG during the same hospitalization. He would rather go home first and do yardwork this weekend, which is against my medical advice. He says he is willing to come in Monday, so I instructed him to come to the ED at [facility] no later than Monday morning, or immediately if his symptoms worsen.  2. Continue bumetanide 1 mg daily for now. Could increase the dose but he really needs admission for IV diuresis.   3. Continue metoprolol succinate 100 mg. Avoid going up on dose for now as he is in heart failure.  4. Continue other cardiac medications.  5. Will schedule for 3 month follow-up with me, assuming he goes to the hospital and undergoes surgery.          History of Present Illness   Mr. Mika Alvarez is a 70 y.o. male with a significant past history of CAD and IDDM2 who was admitted at the end of September 2020 for new-onset atrial fibrillation and heart failure. He was found to have multivessel CAD on cardiac cath 9/22/2020. Bypass surgery while inpatient was recommended, but  he wanted to return as an outpatient in October to do this.     He says he never heard from anyone in surgery so never got his surgery set up. He says he is down 40 pounds from when he first came into the hospital. However he has swelling in both legs and both his knees are very tight and painful. He says he is not getting chest pain or shortness of breath with exertion, but has problems breathing when he tries to lie down and sleep. He has not had dizziness, syncope, or palpitations. He claims he is compliant with all his medication.        Cardiac Problems and Cardiac Diagnostics     Most Recent Cardiac testing:  ECG today (personaly reviewed and interpreted): AF with V-rate 107, RBBB and LAFB with QRS duration 154 ms    ECHO (report reviewed):   Echo results:   Results for orders placed during the hospital encounter of 09/19/20   Echo Complete [ECH10] 09/20/2020    Narrative   When compared to the previous study dated 11/1/2011, left ventricular   systolic function is reduced.    Normal left ventricular size, borderline LVH, global hypokinesis. Left   ventricle ejection fraction is moderately decreased. The calculated left   ventricular ejection fraction is 42%.    Normal right ventricular size with mildly reduced right ventricular   systolic function.    Moderately enlarged left atrium.    No obvious valvular disease.          Stress test (results reviewed): dated 9/21/2020 revealed   ?  1.The nuclear stress test is abnormal.  ?  2.Negative pharmacological regadenoson ECG for ischemia.  ?  3.There is a small area of ischemia in the basal inferior segment(s) of the left ventricle.  This appears to be veronica-infarct to below.  ?  4.There is a medium sized area of a mild degree of nontransmural infarction in the mid to distal inferior and inferolateral segment(s) of the left ventricle.  Some of this could represent attenuation artifact.  ?  5.The left ventricular ejection fraction at stress is 46%.  There is more  pronounced inferior hypokinesis.  ?  6.A prior study was conducted on 8/18/2015.  Inferior scar and ischemia are new, prior scan showed anterior ischemia which is not present on current study.  Ejection fraction has dropped from 64% to 46%.    Cardiac Cath (results reviewed) 9/22/2020:   LM:  Mild disease  RI:  Stent patent, moderate mid-RI disease  LCx:  Mild disease  LAD:  Long segment of stenosis across D1  RCA:  Severe ostial RCA stenosis with slow flow distally.  Collateral filling by LCx.  Patent stent in rPL.     Access:  Right radial     Contrast:  110     No complications.     Impression/Plan:  - Multivessel coronary artery disease in diabetic male patient with mildly reduced LV systolic function.  Will have cardiothoracic surgery evaluate patient for potential bypass.  Consideration of SVG-RI, SVG-rPDA and LIMA-LAD.  Patient and family updated on plan of care.     Medications  Allergies   Current Outpatient Medications   Medication Sig Dispense Refill   ? amLODIPine (NORVASC) 10 MG tablet TAKE 1 TABLET BY MOUTH ONCE DAILY. OVERDUE FOR DIABETIC CHECK 90 tablet 3   ? aspirin 81 MG EC tablet Take 81 mg by mouth daily.     ? blood glucose test strips Use 1 each As Directed 2 (two) times a day. Accu-Chek Guide test strips. Patient is on insulin. 200 strip 3   ? blood-glucose meter (ACCU-CHEK ADVANTAGE DIABETES) kit Test 4 times daily.     ? generic lancets Use 1 each As Directed 2 (two) times a day. Accu-Chek Fastclix Lancets. Patient is insulin dependant. 200 each 3   ? glipiZIDE (GLUCOTROL XL) 10 MG 24 hr tablet Take 2 tablets (20 mg total) by mouth once daily. 180 tablet 1   ? insulin glargine (LANTUS SOLOSTAR U-100 INSULIN) 100 unit/mL (3 mL) pen Inject 35 Units under the skin at bedtime. 15 mL 0   ? insulin lispro (HUMALOG KWIKPEN INSULIN) 100 unit/mL pen Inject 5 Units under the skin 3 (three) times a day with meals. 15 mL 0   ? losartan (COZAAR) 100 MG tablet Take 1 tablet (100 mg total) by mouth daily.  "90 tablet 2   ? metFORMIN (GLUCOPHAGE) 1000 MG tablet Take 1 tablet (1,000 mg total) by mouth 2 (two) times a day with meals. 180 tablet 0   ? metoprolol succinate (TOPROL-XL) 100 MG 24 hr tablet Take 1 tablet (100 mg total) by mouth 2 (two) times a day. 90 tablet 1   ? NEEDLES, INSULIN DISPOSABLE (BD ULTRA-FINE ROBERT PEN NEEDLES MISC) use up to 4 times daily as directed.     ? nitroglycerin (NITROSTAT) 0.4 MG SL tablet Place 0.4 mg under the tongue every 5 (five) minutes as needed for chest pain.     ? pen needle, diabetic (BD ULTRA-FINE SHORT PEN NEEDLE) 31 gauge x 5/16\" Ndle USE UP TO 4 TIMES DAILY AS DIRECTED 300 each 0   ? rivaroxaban ANTICOAGULANT (XARELTO) 20 mg tablet Take 1 tablet (20 mg total) by mouth daily with supper. 30 tablet 0   ? atorvastatin (LIPITOR) 80 MG tablet Take 1 tablet (80 mg total) by mouth daily. 30 tablet 0   ? bumetanide (BUMEX) 0.5 MG tablet Take 2 tablets (1 mg total) by mouth daily. 60 tablet 0     No current facility-administered medications for this visit.       No Known Allergies     Physical Examination Review of Systems   Vitals:    11/05/20 1302   BP: 140/88   Pulse: 97   Resp: 24   SpO2: 95%     Body mass index is 32 kg/m .  Wt Readings from Last 3 Encounters:   11/05/20 (!) 223 lb (101.2 kg)   10/08/20 221 lb 12.8 oz (100.6 kg)   09/23/20 (!) 245 lb 6.4 oz (111.3 kg)       General Appearance:   Pleasant  male, appears  stated age. Short of breath lying at 45 degrees on the exam table improved with sitting up, normal body habitus   ENT/Mouth: Facemask      EYES:  no scleral icterus, normal conjunctivae   Neck: no carotid bruits. No anterior cervical lymphadenopaty   Respiratory:   Bibasilar inspiratory crackles, no rales or wheezing, equal chest wall expansion    Cardiovascular:   Irregularly irregular, tachycardic. Normal first and second heart sounds with no murmurs, rubs, or gallops; the carotid, radial and posterior tibial pulses are intact, Jugular venous pressure 15 cm " H2O, 2+ pitting lower extremity edema bilaterally    Abdomen/GI:  no organomegaly, masses, bruits, or tenderness; bowel sounds are present   Extremities: no cyanosis or clubbing   Skin: no xanthelasma, warm.    Heme/lymph/ Immunology No apparent bleeding noted.   Neurologic: Alert and oriented. normal gait, no tremors     Psychiatric: Pleasant, calm, appropriate affect.    A complete 10 system review of systems was performed and is negative except as mentioned in the HPI or below:  General: Weight Loss  Eyes: WNL  Ears/Nose/Throat: WNL  Lungs: Shortness of Breath  Heart: Shortness of Breath with activity, Leg Swelling  Stomach: WNL  Bladder: Frequent Urination at Night  Muscle/Joints: WNL  Skin: Poor Wound Healing  Nervous System: Loss of Balance  Mental Health: WNL     Blood: WNL       Past History   Past Medical History:   Past Medical History:   Diagnosis Date   ? ACP Staging Stage 1 Hypertension: 140-159 / 90-99     Created by Conversion  Replacement Utility updated for latest IMO load   ? Coronary Artery Disease     Created by Conversion  Replacement Utility updated for latest IMO load   ? Joint Pain, Localized In The Shoulder     Created by Conversion    ? Obesity     Created by Conversion    ? Other and unspecified hyperlipidemia 4/27/2016   ? Type 2 Diabetes Mellitus With Complication     Created by Conversion        Past Surgical History:   Past Surgical History:   Procedure Laterality Date   ? CV CORONARY ANGIOGRAM N/A 9/22/2020    Procedure: Coronary Angiogram;  Surgeon: Darin Perez MD;  Location: [facility] Cath Lab;  Service: Cardiology   ? CV LEFT HEART CATHETERIZATION WITH LEFT VENTRICULOGRAM N/A 9/22/2020    Procedure: Left Heart Catheterization with Left Ventriculogram;  Surgeon: Darin Perez MD;  Location: [facility] Cath Lab;  Service: Cardiology       Family History:   No history of premature CAD.     Social History:   Social History     Socioeconomic History   ? Marital status:       Spouse name: Not on file   ? Number of children: Not on file   ? Years of education: Not on file   ? Highest education level: Not on file   Occupational History   ? Not on file   Social Needs   ? Financial resource strain: Not on file   ? Food insecurity     Worry: Not on file     Inability: Not on file   ? Transportation needs     Medical: Not on file     Non-medical: Not on file   Tobacco Use   ? Smoking status: Never Smoker   ? Smokeless tobacco: Never Used   Substance and Sexual Activity   ? Alcohol use: Yes     Frequency: Monthly or less     Drinks per session: 1 or 2     Comment: socially    ? Drug use: Never   ? Sexual activity: Not on file   Lifestyle   ? Physical activity     Days per week: Not on file     Minutes per session: Not on file   ? Stress: Not on file   Relationships   ? Social connections     Talks on phone: Not on file     Gets together: Not on file     Attends Mandaeism service: Not on file     Active member of club or organization: Not on file     Attends meetings of clubs or organizations: Not on file     Relationship status: Not on file   ? Intimate partner violence     Fear of current or ex partner: Not on file     Emotionally abused: Not on file     Physically abused: Not on file     Forced sexual activity: Not on file   Other Topics Concern   ? Not on file   Social History Narrative   ? Not on file              Lab Results    Chemistry/lipid CBC Cardiac Enzymes/BNP/TSH/INR   Lab Results   Component Value Date    CHOL 134 11/15/2019    HDL 45 11/15/2019    LDLCALC 64 11/15/2019    TRIG 124 11/15/2019    CREATININE 0.76 10/07/2020    BUN 16 10/07/2020    K 4.1 10/07/2020     10/07/2020     10/07/2020    CO2 21 (L) 10/07/2020    Lab Results   Component Value Date    WBC 8.3 09/23/2020    HGB 13.5 (L) 09/23/2020    HCT 42.7 09/23/2020    MCV 89 09/23/2020     09/23/2020    Lab Results   Component Value Date    CKMB 3 10/31/2011    TROPONINI 0.02 09/19/2020      (H) 09/19/2020    TSH 2.34 09/21/2020    INR 1.17 (H) 09/19/2020        Miguel Tabor MD [facility]  Non-Invasive Cardiologist  [facility]  Pager [phone]

## 2021-06-30 NOTE — PROGRESS NOTES
Progress Notes by Harika Camacho MD at 12/21/2020 10:15 AM     Author: Harika Camacho MD Service: Cardiology Author Type: Physician    Filed: 12/21/2020 11:26 AM Date of Service: 12/21/2020 10:15 AM Status: Signed    : Harika Camacho MD (Physician)         HEART CARE NOTE          Assessment/Recommendations     1. HFrEF/Bi-ventricular dysfunction 2/2 ICM  Assessment / Plan    Presented in ADHF now s/p CABG x 4 + PVI/LAAL      Progressing well in the post-operative period --> s/p extubation and currently hemodynamically stable off pressors/inotropes    Continue IV furosemide (good response at increased dosing) until able to tolerate PO --> no changes to regimen today     Current Pharmacotherapy AHA Guideline-Directed Medical Therapy   Lisinopril on hold 2/2 borderline BP Lisinopril 20 mg twice daily   Metoprolol tartrate   -  50 mg two times a day until able to take PO Carvedilol 25 mg twice daily   Spironolactone - not started Spironolactone 25 mg once daily   Hydralazine NA Hydralazine 100 mg three times daily   Isosorbide dinitrate NA Isosorbide dinitrate 40 mg three times daily      2. Multi-vessel CAD  Assessment / Plan    S/p CABG    Continue high intensity atorvastatin, ASA and BBBBlocker (as detailed above)       3. Atrial fibrillation  Assessment / Plan    S/p PVI and LAAL; currently in afib metoprolol and amiodarone gtt; consider IV digoxin bolus if RVR recurs     4. HLP  Assessment / Plan    Continue high intensity atorvastatin     5. DM2  Assessment / Plan    Management per primary team      History of Present Illness/Subjective    Mr. Mika Alvarez is a 70 y.o. male with a PMHx significant for multivessel coronary artery disease, chronic systolic congestive heart failure, recent diagnosis of atrial fibrillation, and type 2 diabetes requiring insulin who presents in ADHF c/b afib with RVR now s/p CABG + PVI + LAAL.     Mr. Alvarez is alert; no acute  complaints     ECG: Personally reviewed. atrial fibrillation, with RVR.     ECHO (personnaly Reviewed):    When compared to the previous study dated 11/1/2011, left ventricular systolic function is reduced.    Normal left ventricular size, borderline LVH, global hypokinesis. Left ventricle ejection fraction is moderately decreased. The calculated left ventricular ejection fraction is 42%.    Normal right ventricular size with mildly reduced right ventricular systolic function.    Moderately enlarged left atrium.    No obvious valvular disease.          Physical Examination Review of Systems   Vitals:    12/21/20 0733   BP: 112/72   Pulse: 85   Resp: 18   Temp: 97.6  F (36.4  C)   SpO2: 92%     Body mass index is 27.25 kg/m .  Wt Readings from Last 3 Encounters:   12/21/20 189 lb 14.4 oz (86.1 kg)   11/05/20 (!) 223 lb (101.2 kg)   10/08/20 221 lb 12.8 oz (100.6 kg)     General Appearance:   no distress, normal body habitus   ENT/Mouth: membranes moist, no oral lesions or bleeding gums.      EYES:  no scleral icterus, normal conjunctivae   Neck: no carotid bruits or thyromegaly   Chest/Lungs:   lungs are clear to auscultation, no rales or wheezing, equal chest wall expansion    Cardiovascular:   Irregular. Normal first and second heart sounds with o murmurs, rubs, or gallops; the carotid, radial and posterior tibial pulses are intact, mild JVD and trace LE edema bilaterally    Abdomen:  no organomegaly, masses, bruits, or tenderness; bowel sounds are present   Extremities: no cyanosis or clubbing   Skin: no xanthelasma, warm.    Neurologic: normal gait, normal  bilateral, no tremors     Psychiatric: alert and oriented x3, calm     A complete 10 systems ROS was reviewed  And is negative except what is listed in the HPI.          Medical History  Surgical History Family History Social History   Past Medical History:   Diagnosis Date   ? ACP Staging Stage 1 Hypertension: 140-159 / 90-99     Created by Conversion   Replacement Utility updated for latest IMO load   ? Atrial fibrillation with rapid ventricular response (H) 12/2/2020   ? Coronary Artery Disease     Created by Conversion  Replacement Utility updated for latest IMO load   ? Joint Pain, Localized In The Shoulder     Created by Conversion    ? Obesity     Created by Conversion    ? Other and unspecified hyperlipidemia 4/27/2016   ? Type 2 Diabetes Mellitus With Complication     Created by Conversion     Past Surgical History:   Procedure Laterality Date   ? CV CORONARY ANGIOGRAM N/A 9/22/2020    Procedure: Coronary Angiogram;  Surgeon: Darin Perez MD;  Location: [facility] Cath Lab;  Service: Cardiology   ? CV LEFT HEART CATHETERIZATION WITH LEFT VENTRICULOGRAM N/A 9/22/2020    Procedure: Left Heart Catheterization with Left Ventriculogram;  Surgeon: Darin Perez MD;  Location: [facility] Cath Lab;  Service: Cardiology   ? PICC AND MIDLINE TEAM LINE INSERTION  12/14/2020         no family history of premature coronary artery disease Social History     Socioeconomic History   ? Marital status:      Spouse name: Not on file   ? Number of children: Not on file   ? Years of education: Not on file   ? Highest education level: Not on file   Occupational History   ? Not on file   Social Needs   ? Financial resource strain: Not on file   ? Food insecurity     Worry: Not on file     Inability: Not on file   ? Transportation needs     Medical: Not on file     Non-medical: Not on file   Tobacco Use   ? Smoking status: Never Smoker   ? Smokeless tobacco: Never Used   Substance and Sexual Activity   ? Alcohol use: Yes     Frequency: Monthly or less     Drinks per session: 1 or 2     Comment: socially    ? Drug use: Never   ? Sexual activity: Not on file   Lifestyle   ? Physical activity     Days per week: Not on file     Minutes per session: Not on file   ? Stress: Not on file   Relationships   ? Social connections     Talks on phone: Not on file     Gets  together: Not on file     Attends Synagogue service: Not on file     Active member of club or organization: Not on file     Attends meetings of clubs or organizations: Not on file     Relationship status: Not on file   ? Intimate partner violence     Fear of current or ex partner: Not on file     Emotionally abused: Not on file     Physically abused: Not on file     Forced sexual activity: Not on file   Other Topics Concern   ? Not on file   Social History Narrative   ? Not on file           Lab Results    Chemistry/lipid CBC Cardiac Enzymes/BNP/TSH/INR   Lab Results   Component Value Date    CHOL 134 11/15/2019    HDL 45 11/15/2019    LDLCALC 64 11/15/2019    TRIG 124 12/18/2020    CREATININE 0.89 12/20/2020    BUN 15 12/20/2020    K 3.7 12/21/2020     12/20/2020     (H) 12/20/2020    CO2 24 12/20/2020    Lab Results   Component Value Date    WBC 13.1 (H) 12/21/2020    HGB 11.9 (L) 12/21/2020    HCT 40.0 12/21/2020    MCV 86 12/21/2020     12/21/2020    Lab Results   Component Value Date    CKMB 3 10/31/2011    TROPONINI 1.60 (HH) 12/03/2020     (H) 12/02/2020    TSH 2.34 09/21/2020    INR 1.41 (H) 12/08/2020     Lab Results   Component Value Date    CKMB 3 10/31/2011    TROPONINI 1.60 (HH) 12/03/2020          Weight:    Wt Readings from Last 3 Encounters:   12/21/20 189 lb 14.4 oz (86.1 kg)   11/05/20 (!) 223 lb (101.2 kg)   10/08/20 221 lb 12.8 oz (100.6 kg)       Allergies  No Known Allergies      Surgical History  Past Surgical History:   Procedure Laterality Date   ? CV CORONARY ANGIOGRAM N/A 9/22/2020    Procedure: Coronary Angiogram;  Surgeon: Darin Perez MD;  Location: [facility] Cath Lab;  Service: Cardiology   ? CV LEFT HEART CATHETERIZATION WITH LEFT VENTRICULOGRAM N/A 9/22/2020    Procedure: Left Heart Catheterization with Left Ventriculogram;  Surgeon: Darin Perez MD;  Location: [facility] Cath Lab;  Service: Cardiology   ? PICC AND MIDLINE TEAM LINE INSERTION   12/14/2020            Social History  Tobacco:   Social History     Tobacco Use   Smoking Status Never Smoker   Smokeless Tobacco Never Used    Alcohol:   Social History     Substance and Sexual Activity   Alcohol Use Yes   ? Frequency: Monthly or less   ? Drinks per session: 1 or 2    Comment: socially     Illicit Drugs:   Social History     Substance and Sexual Activity   Drug Use Never       Family History  Family History   Problem Relation Age of Onset   ? Heart disease Father           Shalonda Camacho on 12/21/2020      cc: Angel Claire MD,

## 2021-06-30 NOTE — PROGRESS NOTES
Progress Notes by Harika Camacho MD at 12/22/2020  9:53 AM     Author: Harika Camacho MD Service: Cardiology Author Type: Physician    Filed: 12/22/2020  9:59 AM Date of Service: 12/22/2020  9:53 AM Status: Signed    : Harika Camacho MD (Physician)         HEART CARE NOTE          Assessment/Recommendations     1. HFrEF/Bi-ventricular dysfunction 2/2 ICM  Assessment / Plan    Presented in ADHF now s/p CABG x 4 + PVI/LAAL      Progressing well in the post-operative period --> s/p extubation and currently hemodynamically stable off pressors/inotropes    Will transition to PO torsemide today and monitor response     Current Pharmacotherapy AHA Guideline-Directed Medical Therapy   Lisinopril on hold 2/2 borderline BP Lisinopril 20 mg twice daily   Metoprolol tartrate   -  50 mg two times a day until able to take PO Carvedilol 25 mg twice daily   Spironolactone - not started Spironolactone 25 mg once daily   Hydralazine NA Hydralazine 100 mg three times daily   Isosorbide dinitrate NA Isosorbide dinitrate 40 mg three times daily      2. Multi-vessel CAD  Assessment / Plan    S/p CABG    Continue high intensity atorvastatin, ASA and BBBBlocker (as detailed above)       3. Atrial fibrillation  Assessment / Plan    S/p PVI and LAAL; currently in afib metoprolol and amiodarone gtt; consider IV digoxin bolus if RVR recurs     4. HLP  Assessment / Plan    Continue high intensity atorvastatin     5. DM2  Assessment / Plan    Management per primary team      Cardiology team will sign-off for now. Please do not hesitate to consult us again is new questions or concerns arise.      History of Present Illness/Subjective      Mr. Mika Alvarez is a 70 y.o. male with a PMHx significant for multivessel coronary artery disease, chronic systolic congestive heart failure, recent diagnosis of atrial fibrillation, and type 2 diabetes requiring insulin who presents in ADHF c/b afib with RVR now s/p  CABG + PVI + LAAL.     Mr. Alvarez is alert; no acute complaints     ECG: Personally reviewed. atrial fibrillation, with RVR.     ECHO (personnaly Reviewed):    When compared to the previous study dated 11/1/2011, left ventricular systolic function is reduced.    Normal left ventricular size, borderline LVH, global hypokinesis. Left ventricle ejection fraction is moderately decreased. The calculated left ventricular ejection fraction is 42%.    Normal right ventricular size with mildly reduced right ventricular systolic function.    Moderately enlarged left atrium.    No obvious valvular disease.          Physical Examination Review of Systems   Vitals:    12/22/20 0726   BP: 92/65   Pulse: 86   Resp: 20   Temp: 97.7  F (36.5  C)   SpO2: 96%     Body mass index is 26.77 kg/m .  Wt Readings from Last 3 Encounters:   12/22/20 186 lb 9.6 oz (84.6 kg)   11/05/20 (!) 223 lb (101.2 kg)   10/08/20 221 lb 12.8 oz (100.6 kg)     General Appearance:   no distress, normal body habitus   ENT/Mouth: membranes moist, no oral lesions or bleeding gums.      EYES:  no scleral icterus, normal conjunctivae   Neck: no carotid bruits or thyromegaly   Chest/Lungs:   lungs are clear to auscultation, no rales or wheezing, equal chest wall expansion    Cardiovascular:   Irregular. Normal first and second heart sounds with no murmurs, rubs, or gallops; the carotid, radial and posterior tibial pulses are intact, no JVD and trace LE edema bilaterally    Abdomen:  no organomegaly, masses, bruits, or tenderness; bowel sounds are present   Extremities: no cyanosis or clubbing   Skin: no xanthelasma, warm.    Neurologic:   alert and oriented x3, calm    Psychiatric: alert and oriented x3, calm     A complete 10 systems ROS was reviewed  And is negative except what is listed in the HPI.          Medical History  Surgical History Family History Social History   Past Medical History:   Diagnosis Date   ? ACP Staging Stage 1 Hypertension: 140-159 / 90-99      Created by Conversion  Replacement Utility updated for latest IMO load   ? Atrial fibrillation with rapid ventricular response (H) 12/2/2020   ? Coronary Artery Disease     Created by Conversion  Replacement Utility updated for latest IMO load   ? Joint Pain, Localized In The Shoulder     Created by Conversion    ? Obesity     Created by Conversion    ? Other and unspecified hyperlipidemia 4/27/2016   ? Type 2 Diabetes Mellitus With Complication     Created by Conversion     Past Surgical History:   Procedure Laterality Date   ? CV CORONARY ANGIOGRAM N/A 9/22/2020    Procedure: Coronary Angiogram;  Surgeon: Darin Perez MD;  Location: [facility] Cath Lab;  Service: Cardiology   ? CV LEFT HEART CATHETERIZATION WITH LEFT VENTRICULOGRAM N/A 9/22/2020    Procedure: Left Heart Catheterization with Left Ventriculogram;  Surgeon: Darin Perez MD;  Location: [facility] Cath Lab;  Service: Cardiology   ? PICC AND MIDLINE TEAM LINE INSERTION  12/14/2020         no family history of premature coronary artery disease Social History     Socioeconomic History   ? Marital status:      Spouse name: Not on file   ? Number of children: Not on file   ? Years of education: Not on file   ? Highest education level: Not on file   Occupational History   ? Not on file   Social Needs   ? Financial resource strain: Not on file   ? Food insecurity     Worry: Not on file     Inability: Not on file   ? Transportation needs     Medical: Not on file     Non-medical: Not on file   Tobacco Use   ? Smoking status: Never Smoker   ? Smokeless tobacco: Never Used   Substance and Sexual Activity   ? Alcohol use: Yes     Frequency: Monthly or less     Drinks per session: 1 or 2     Comment: socially    ? Drug use: Never   ? Sexual activity: Not on file   Lifestyle   ? Physical activity     Days per week: Not on file     Minutes per session: Not on file   ? Stress: Not on file   Relationships   ? Social connections     Talks on  phone: Not on file     Gets together: Not on file     Attends Gnosticist service: Not on file     Active member of club or organization: Not on file     Attends meetings of clubs or organizations: Not on file     Relationship status: Not on file   ? Intimate partner violence     Fear of current or ex partner: Not on file     Emotionally abused: Not on file     Physically abused: Not on file     Forced sexual activity: Not on file   Other Topics Concern   ? Not on file   Social History Narrative   ? Not on file           Lab Results    Chemistry/lipid CBC Cardiac Enzymes/BNP/TSH/INR   Lab Results   Component Value Date    CHOL 134 11/15/2019    HDL 45 11/15/2019    LDLCALC 64 11/15/2019    TRIG 124 12/18/2020    CREATININE 0.89 12/20/2020    BUN 15 12/20/2020    K 4.0 12/22/2020     12/20/2020     (H) 12/20/2020    CO2 24 12/20/2020    Lab Results   Component Value Date    WBC 13.1 (H) 12/21/2020    HGB 11.9 (L) 12/21/2020    HCT 40.0 12/21/2020    MCV 86 12/21/2020     12/21/2020    Lab Results   Component Value Date    CKMB 3 10/31/2011    TROPONINI 1.60 (HH) 12/03/2020     (H) 12/02/2020    TSH 2.34 09/21/2020    INR 1.41 (H) 12/08/2020     Lab Results   Component Value Date    CKMB 3 10/31/2011    TROPONINI 1.60 (HH) 12/03/2020          Weight:    Wt Readings from Last 3 Encounters:   12/22/20 186 lb 9.6 oz (84.6 kg)   11/05/20 (!) 223 lb (101.2 kg)   10/08/20 221 lb 12.8 oz (100.6 kg)       Allergies  No Known Allergies      Surgical History  Past Surgical History:   Procedure Laterality Date   ? CV CORONARY ANGIOGRAM N/A 9/22/2020    Procedure: Coronary Angiogram;  Surgeon: Darin Perez MD;  Location: [facility] Cath Lab;  Service: Cardiology   ? CV LEFT HEART CATHETERIZATION WITH LEFT VENTRICULOGRAM N/A 9/22/2020    Procedure: Left Heart Catheterization with Left Ventriculogram;  Surgeon: Darin Perez MD;  Location: [facility] Cath Lab;  Service: Cardiology   ? PICC AND  MIDLINE TEAM LINE INSERTION  12/14/2020            Social History  Tobacco:   Social History     Tobacco Use   Smoking Status Never Smoker   Smokeless Tobacco Never Used    Alcohol:   Social History     Substance and Sexual Activity   Alcohol Use Yes   ? Frequency: Monthly or less   ? Drinks per session: 1 or 2    Comment: socially     Illicit Drugs:   Social History     Substance and Sexual Activity   Drug Use Never       Family History  Family History   Problem Relation Age of Onset   ? Heart disease Father           Shalonda Camacho on 12/22/2020      cc: Angel Claire MD,

## 2021-06-30 NOTE — PROGRESS NOTES
Progress Notes by Harika Camacho MD at 12/17/2020  9:12 AM     Author: Harika Camacho MD Service: Cardiology Author Type: Physician    Filed: 12/17/2020 11:51 AM Date of Service: 12/17/2020  9:12 AM Status: Signed    : Harika Camacho MD (Physician)         HEART CARE NOTE          Assessment/Recommendations     1. HFrEF/Bi-ventricular dysfunction 2/2 ICM  Assessment / Plan    Presented in ADHF now s/p CABG x 4 + PVI/LAAL      Progressing well in the post-operative period --> s/p extubation and currently hemodynamically stable off pressors/inotropes    Adequate response to daily 20 mg IV furosemide--> no changes to regimen today     Current Pharmacotherapy AHA Guideline-Directed Medical Therapy   Lisinopril on hold 2/2 borderline BP Lisinopril 20 mg twice daily   Metoprolol tartrate   -  25 mg two times a day until able to take PO Carvedilol 25 mg twice daily   Spironolactone - not started Spironolactone 25 mg once daily   Hydralazine NA Hydralazine 100 mg three times daily   Isosorbide dinitrate NA Isosorbide dinitrate 40 mg three times daily      2. Multi-vessel CAD  Assessment / Plan    S/p CABG    Continue high intensity atorvastatin, ASA and BBBBlocker (as detailed above)       3. Atrial fibrillation  Assessment / Plan    S/p PVI and LAAL; currently in afib metoprolol and amiodarone gtt; consider IV digoxin bolus if RVR recurs     4. HLP  Assessment / Plan    Continue high intensity atorvastatin     5. DM2  Assessment / Plan    Management per primary team      History of Present Illness/Subjective      Mr. Mika Alvarez is a 70 y.o. male with a PMHx significant for multivessel coronary artery disease, chronic systolic congestive heart failure, recent diagnosis of atrial fibrillation, and type 2 diabetes requiring insulin who presents in ADHF c/b afib with RVR now s/p CABG + PVI + LAAL.     Mr. Alvarez is alert; no acute complaints     ECG: Personally reviewed. atrial  fibrillation, with RVR.     ECHO (personnaly Reviewed):    When compared to the previous study dated 11/1/2011, left ventricular systolic function is reduced.    Normal left ventricular size, borderline LVH, global hypokinesis. Left ventricle ejection fraction is moderately decreased. The calculated left ventricular ejection fraction is 42%.    Normal right ventricular size with mildly reduced right ventricular systolic function.    Moderately enlarged left atrium.    No obvious valvular disease.          Physical Examination Review of Systems   Vitals:    12/17/20 0737   BP:    Pulse:    Resp:    Temp:    SpO2: 96%     Body mass index is 30.79 kg/m .  Wt Readings from Last 3 Encounters:   12/17/20 214 lb 9.6 oz (97.3 kg)   11/05/20 (!) 223 lb (101.2 kg)   10/08/20 221 lb 12.8 oz (100.6 kg)     General Appearance:   no distress, normal body habitus   ENT/Mouth: membranes moist, no oral lesions or bleeding gums.      EYES:  no scleral icterus, normal conjunctivae   Neck: no carotid bruits or thyromegaly   Chest/Lungs:   lungs are clear to auscultation, no rales or wheezing, equal chest wall expansion    Cardiovascular:   Irregular. Normal first and second heart sounds with no rubs, or gallops; the carotid, radial and posterior tibial pulses are intact, + JVD and LE edema bilaterally    Abdomen:  no organomegaly, masses, bruits, or tenderness; bowel sounds are present   Extremities: no cyanosis or clubbing   Skin: no xanthelasma, warm.    Neurologic: alert     Psychiatric: alert and oriented x3, calm     A complete 10 systems ROS was reviewed  And is negative except what is listed in the HPI.          Medical History  Surgical History Family History Social History   Past Medical History:   Diagnosis Date   ? ACP Staging Stage 1 Hypertension: 140-159 / 90-99     Created by Conversion  Replacement Utility updated for latest IMO load   ? Atrial fibrillation with rapid ventricular response (H) 12/2/2020   ? Coronary  Artery Disease     Created by Conversion  Replacement Utility updated for latest IMO load   ? Joint Pain, Localized In The Shoulder     Created by Conversion    ? Obesity     Created by Conversion    ? Other and unspecified hyperlipidemia 4/27/2016   ? Type 2 Diabetes Mellitus With Complication     Created by Conversion     Past Surgical History:   Procedure Laterality Date   ? CV CORONARY ANGIOGRAM N/A 9/22/2020    Procedure: Coronary Angiogram;  Surgeon: Darin ePrez MD;  Location: [facility] Cath Lab;  Service: Cardiology   ? CV LEFT HEART CATHETERIZATION WITH LEFT VENTRICULOGRAM N/A 9/22/2020    Procedure: Left Heart Catheterization with Left Ventriculogram;  Surgeon: Darin Perez MD;  Location: [facility] Cath Lab;  Service: Cardiology   ? PICC AND MIDLINE TEAM LINE INSERTION  12/14/2020         no family history of premature coronary artery disease Social History     Socioeconomic History   ? Marital status:      Spouse name: Not on file   ? Number of children: Not on file   ? Years of education: Not on file   ? Highest education level: Not on file   Occupational History   ? Not on file   Social Needs   ? Financial resource strain: Not on file   ? Food insecurity     Worry: Not on file     Inability: Not on file   ? Transportation needs     Medical: Not on file     Non-medical: Not on file   Tobacco Use   ? Smoking status: Never Smoker   ? Smokeless tobacco: Never Used   Substance and Sexual Activity   ? Alcohol use: Yes     Frequency: Monthly or less     Drinks per session: 1 or 2     Comment: socially    ? Drug use: Never   ? Sexual activity: Not on file   Lifestyle   ? Physical activity     Days per week: Not on file     Minutes per session: Not on file   ? Stress: Not on file   Relationships   ? Social connections     Talks on phone: Not on file     Gets together: Not on file     Attends [employer] service: Not on file     Active member of club or organization: Not on file     Attends  meetings of clubs or organizations: Not on file     Relationship status: Not on file   ? Intimate partner violence     Fear of current or ex partner: Not on file     Emotionally abused: Not on file     Physically abused: Not on file     Forced sexual activity: Not on file   Other Topics Concern   ? Not on file   Social History Narrative   ? Not on file           Lab Results    Chemistry/lipid CBC Cardiac Enzymes/BNP/TSH/INR   Lab Results   Component Value Date    CHOL 134 11/15/2019    HDL 45 11/15/2019    LDLCALC 64 11/15/2019    TRIG 124 11/15/2019    CREATININE 1.10 12/16/2020    BUN 16 12/16/2020    K 3.5 12/17/2020     12/16/2020     (H) 12/16/2020    CO2 21 (L) 12/16/2020    Lab Results   Component Value Date    WBC 13.8 (H) 12/16/2020    HGB 11.3 (L) 12/16/2020    HCT 37.0 (L) 12/16/2020    MCV 85 12/16/2020     12/16/2020    Lab Results   Component Value Date    CKMB 3 10/31/2011    TROPONINI 1.60 (HH) 12/03/2020     (H) 12/02/2020    TSH 2.34 09/21/2020    INR 1.41 (H) 12/08/2020     Lab Results   Component Value Date    CKMB 3 10/31/2011    TROPONINI 1.60 (HH) 12/03/2020          Weight:    Wt Readings from Last 3 Encounters:   12/17/20 214 lb 9.6 oz (97.3 kg)   11/05/20 (!) 223 lb (101.2 kg)   10/08/20 221 lb 12.8 oz (100.6 kg)       Allergies  No Known Allergies      Surgical History  Past Surgical History:   Procedure Laterality Date   ? CV CORONARY ANGIOGRAM N/A 9/22/2020    Procedure: Coronary Angiogram;  Surgeon: Darin Perez MD;  Location: [facility] Cath Lab;  Service: Cardiology   ? CV LEFT HEART CATHETERIZATION WITH LEFT VENTRICULOGRAM N/A 9/22/2020    Procedure: Left Heart Catheterization with Left Ventriculogram;  Surgeon: Darin Perez MD;  Location: [facility] Cath Lab;  Service: Cardiology   ? PICC AND MIDLINE TEAM LINE INSERTION  12/14/2020            Social History  Tobacco:   Social History     Tobacco Use   Smoking Status Never Smoker   Smokeless  Tobacco Never Used    Alcohol:   Social History     Substance and Sexual Activity   Alcohol Use Yes   ? Frequency: Monthly or less   ? Drinks per session: 1 or 2    Comment: socially     Illicit Drugs:   Social History     Substance and Sexual Activity   Drug Use Never       Family History  Family History   Problem Relation Age of Onset   ? Heart disease Father           Shalonda Camacho on 12/17/2020      cc: Angel Claire MD,

## 2021-06-30 NOTE — PROGRESS NOTES
Progress Notes by Harika Camacho MD at 12/8/2020  9:52 AM     Author: Harika Camacho MD Service: Cardiology Author Type: Physician    Filed: 12/8/2020 11:11 AM Date of Service: 12/8/2020  9:52 AM Status: Signed    : Harika Camacho MD (Physician)         HEART CARE NOTE          Assessment/Recommendations     1. HFrEF/Bi-ventricular dysfunction 2/2 ICM  Assessment / Plan    Presented in ADHF now s/p CABG x 4 + PVI/LAAL      Progressing well in the post-operative period -->tolerating vent wean and currently with adequate hemodynamics on epi -> will likely be able to wean epi once extubated    Hypervolemic on physical exam --> started on IV diuresis with adequate response so far; no changes at the moment; goal UOP to be bet negative 1-2L over 24 hours    GDMT on hold until no longer requiring epi/pressors     2. Multi-vessel CAD  Assessment / Plan    S/p CABG    Continue high intensity atorvastatin, ASA; continue to hold BBlocker and losartan until extubated and hemodynamically stable without epi/pressor requirements     3. Atrial fibrillation  Assessment / Plan    S/p PVI and LAAL; currently in NSR     4. HLP  Assessment / Plan    Continue high intensity atorvastatin     5. DM2  Assessment / Plan    Management per primary team      History of Present Illness/Subjective      Mr. Mika Alvarez is a 70 y.o. male with a PMHx significant for multivessel coronary artery disease, chronic systolic congestive heart failure, recent diagnosis of atrial fibrillation, and type 2 diabetes requiring insulin who presents in ADHF c/b afib with RVR now POD#1 s/p CABG + PVI + LAAL.     Intubated and sedated     ECG: Personally reviewed. atrial fibrillation, with RVR.     ECHO (personnaly Reviewed):    When compared to the previous study dated 11/1/2011, left ventricular systolic function is reduced.    Normal left ventricular size, borderline LVH, global hypokinesis. Left ventricle ejection  fraction is moderately decreased. The calculated left ventricular ejection fraction is 42%.    Normal right ventricular size with mildly reduced right ventricular systolic function.    Moderately enlarged left atrium.    No obvious valvular disease.          Physical Examination Review of Systems   Vitals:    12/08/20 0900   BP:    Pulse: 85   Resp: 16   Temp:    SpO2: 97%     Body mass index is 31.47 kg/m .  Wt Readings from Last 3 Encounters:   12/08/20 219 lb 5.7 oz (99.5 kg)   11/05/20 (!) 223 lb (101.2 kg)   10/08/20 221 lb 12.8 oz (100.6 kg)     General Appearance:   Intubated/sedated   ENT/Mouth: membranes moist, no oral lesions or bleeding gums.      EYES:  no scleral icterus, normal conjunctivae   Neck: no carotid bruits or thyromegaly   Chest/Lungs:   lungs are clear to auscultation, no rales or wheezing, equal chest wall expansion    Cardiovascular:   Regular. Normal first and second heart sounds with no murmurs, rubs, or gallops; the carotid, radial and posterior tibial pulses are intact, 1+ LE edema bilaterally    Abdomen:  no organomegaly, masses, bruits, or tenderness; bowel sounds are present   Extremities: no cyanosis or clubbing   Skin: no xanthelasma, warm.    Neurologic: Intubated/sedated     Psychiatric: Intubated/sedated    A complete 10 systems ROS was reviewed  And is negative except what is listed in the HPI.          Medical History  Surgical History Family History Social History   Past Medical History:   Diagnosis Date   ? ACP Staging Stage 1 Hypertension: 140-159 / 90-99     Created by Conversion  Replacement Utility updated for latest IMO load   ? Atrial fibrillation with rapid ventricular response (H) 12/2/2020   ? Coronary Artery Disease     Created by Conversion  Replacement Utility updated for latest IMO load   ? Joint Pain, Localized In The Shoulder     Created by Conversion    ? Obesity     Created by Conversion    ? Other and unspecified hyperlipidemia 4/27/2016   ? Type 2  Diabetes Mellitus With Complication     Created by Conversion     Past Surgical History:   Procedure Laterality Date   ? CV CORONARY ANGIOGRAM N/A 9/22/2020    Procedure: Coronary Angiogram;  Surgeon: Darin Perez MD;  Location: [facility] Cath Lab;  Service: Cardiology   ? CV LEFT HEART CATHETERIZATION WITH LEFT VENTRICULOGRAM N/A 9/22/2020    Procedure: Left Heart Catheterization with Left Ventriculogram;  Surgeon: Darin Perez MD;  Location: [facility] Cath Lab;  Service: Cardiology    no family history of premature coronary artery disease Social History     Socioeconomic History   ? Marital status:      Spouse name: Not on file   ? Number of children: Not on file   ? Years of education: Not on file   ? Highest education level: Not on file   Occupational History   ? Not on file   Social Needs   ? Financial resource strain: Not on file   ? Food insecurity     Worry: Not on file     Inability: Not on file   ? Transportation needs     Medical: Not on file     Non-medical: Not on file   Tobacco Use   ? Smoking status: Never Smoker   ? Smokeless tobacco: Never Used   Substance and Sexual Activity   ? Alcohol use: Yes     Frequency: Monthly or less     Drinks per session: 1 or 2     Comment: socially    ? Drug use: Never   ? Sexual activity: Not on file   Lifestyle   ? Physical activity     Days per week: Not on file     Minutes per session: Not on file   ? Stress: Not on file   Relationships   ? Social connections     Talks on phone: Not on file     Gets together: Not on file     Attends Buddhist service: Not on file     Active member of club or organization: Not on file     Attends meetings of clubs or organizations: Not on file     Relationship status: Not on file   ? Intimate partner violence     Fear of current or ex partner: Not on file     Emotionally abused: Not on file     Physically abused: Not on file     Forced sexual activity: Not on file   Other Topics Concern   ? Not on file    Social History Narrative   ? Not on file           Lab Results    Chemistry/lipid CBC Cardiac Enzymes/BNP/TSH/INR   Lab Results   Component Value Date    CHOL 134 11/15/2019    HDL 45 11/15/2019    LDLCALC 64 11/15/2019    TRIG 124 11/15/2019    CREATININE 0.72 12/08/2020    BUN 18 12/08/2020    K 4.5 12/08/2020     12/08/2020     (H) 12/08/2020    CO2 27 12/08/2020    Lab Results   Component Value Date    WBC 13.0 (H) 12/08/2020    HGB 10.4 (L) 12/08/2020    HCT 33.6 (L) 12/08/2020    MCV 86 12/08/2020     12/08/2020    Lab Results   Component Value Date    CKMB 3 10/31/2011    TROPONINI 1.60 (HH) 12/03/2020     (H) 12/02/2020    TSH 2.34 09/21/2020    INR 1.41 (H) 12/08/2020     Lab Results   Component Value Date    CKMB 3 10/31/2011    TROPONINI 1.60 (HH) 12/03/2020          Weight:    Wt Readings from Last 3 Encounters:   12/08/20 219 lb 5.7 oz (99.5 kg)   11/05/20 (!) 223 lb (101.2 kg)   10/08/20 221 lb 12.8 oz (100.6 kg)       Allergies  No Known Allergies      Surgical History  Past Surgical History:   Procedure Laterality Date   ? CV CORONARY ANGIOGRAM N/A 9/22/2020    Procedure: Coronary Angiogram;  Surgeon: Darin Perez MD;  Location: [facility] Cath Lab;  Service: Cardiology   ? CV LEFT HEART CATHETERIZATION WITH LEFT VENTRICULOGRAM N/A 9/22/2020    Procedure: Left Heart Catheterization with Left Ventriculogram;  Surgeon: Darin Perez MD;  Location: [facility] Cath Lab;  Service: Cardiology       Social History  Tobacco:   Social History     Tobacco Use   Smoking Status Never Smoker   Smokeless Tobacco Never Used    Alcohol:   Social History     Substance and Sexual Activity   Alcohol Use Yes   ? Frequency: Monthly or less   ? Drinks per session: 1 or 2    Comment: socially     Illicit Drugs:   Social History     Substance and Sexual Activity   Drug Use Never       Family History  Family History   Problem Relation Age of Onset   ? Heart disease Father            Shalonda Camacho on 12/8/2020      cc: Angel Claire MD,

## 2021-06-30 NOTE — PROGRESS NOTES
Progress Notes by Harika Camacho MD at 12/15/2020 10:17 AM     Author: Harika Camacho MD Service: Cardiology Author Type: Physician    Filed: 12/15/2020 12:13 PM Date of Service: 12/15/2020 10:17 AM Status: Signed    : Harika Camacho MD (Physician)         HEART CARE NOTE          Assessment/Recommendations     1. HFrEF/Bi-ventricular dysfunction 2/2 ICM  Assessment / Plan    Presented in ADHF now s/p CABG x 4 + PVI/LAAL      Progressing well in the post-operative period --> s/p extubation and currently hemodynamically stable off pressors/inotropes    Adequate response to 20 mg IV furosemide x1 yesterday --> will continue daily for now as daily fluid intake remains above recommend allowance         Current Pharmacotherapy AHA Guideline-Directed Medical Therapy   Lisinopril on hold 2/2 borderline BP Lisinopril 20 mg twice daily   Metoprolol tartrate   -  25 mg two times a day until able to take PO Carvedilol 25 mg twice daily   Spironolactone - not started Spironolactone 25 mg once daily   Hydralazine NA Hydralazine 100 mg three times daily   Isosorbide dinitrate NA Isosorbide dinitrate 40 mg three times daily      2. Multi-vessel CAD  Assessment / Plan    S/p CABG    Continue high intensity atorvastatin, ASA and BBBBlocker as detailed above       3. Atrial fibrillation  Assessment / Plan    S/p PVI and LAAL; currently in afib metoprolol and amiodarone gtt; consider IV digoxin bolus if RVR recurs     4. HLP  Assessment / Plan    Continue high intensity atorvastatin     5. DM2  Assessment / Plan    Management per primary team      History of Present Illness/Subjective      Mr. Mkia Alvarez is a 70 y.o. male with a PMHx significant for multivessel coronary artery disease, chronic systolic congestive heart failure, recent diagnosis of atrial fibrillation, and type 2 diabetes requiring insulin who presents in ADHF c/b afib with RVR now s/p CABG + PVI + LAAL.     Mr. Alvarez is  alert; no acute complaints     ECG: Personally reviewed. atrial fibrillation, with RVR.     ECHO (personnaly Reviewed):    When compared to the previous study dated 11/1/2011, left ventricular systolic function is reduced.    Normal left ventricular size, borderline LVH, global hypokinesis. Left ventricle ejection fraction is moderately decreased. The calculated left ventricular ejection fraction is 42%.    Normal right ventricular size with mildly reduced right ventricular systolic function.    Moderately enlarged left atrium.    No obvious valvular disease.          Physical Examination Review of Systems   Vitals:    12/15/20 0615   BP: 113/78   Pulse: 85   Resp: 25   Temp:    SpO2: 95%     Body mass index is 30.42 kg/m .  Wt Readings from Last 3 Encounters:   12/15/20 212 lb (96.2 kg)   11/05/20 (!) 223 lb (101.2 kg)   10/08/20 221 lb 12.8 oz (100.6 kg)     General Appearance:   no distress, normal body habitus   ENT/Mouth: membranes moist, no oral lesions or bleeding gums.      EYES:  no scleral icterus, normal conjunctivae   Neck: no carotid bruits or thyromegaly   Chest/Lungs:   lungs are clear to auscultation, no rales or wheezing, equal chest wall expansion    Cardiovascular:   Irregular. Normal first and second heart sounds with no rubs, or gallops; the carotid, radial and posterior tibial pulses are intact, + JVD and trace-1+ LE edema bilaterally    Abdomen:  no organomegaly, masses, bruits, or tenderness; bowel sounds are present   Extremities: no cyanosis or clubbing   Skin: no xanthelasma, warm.    Neurologic: normal gait, normal  bilateral, no tremors     Psychiatric: alert and oriented x3, calm     A complete 10 systems ROS was reviewed  And is negative except what is listed in the HPI.          Medical History  Surgical History Family History Social History   Past Medical History:   Diagnosis Date   ? ACP Staging Stage 1 Hypertension: 140-159 / 90-99     Created by Conversion  Replacement Utility  updated for latest IMO load   ? Atrial fibrillation with rapid ventricular response (H) 12/2/2020   ? Coronary Artery Disease     Created by Conversion  Replacement Utility updated for latest IMO load   ? Joint Pain, Localized In The Shoulder     Created by Conversion    ? Obesity     Created by Conversion    ? Other and unspecified hyperlipidemia 4/27/2016   ? Type 2 Diabetes Mellitus With Complication     Created by Conversion     Past Surgical History:   Procedure Laterality Date   ? CV CORONARY ANGIOGRAM N/A 9/22/2020    Procedure: Coronary Angiogram;  Surgeon: Darin Perez MD;  Location: [facility] Cath Lab;  Service: Cardiology   ? CV LEFT HEART CATHETERIZATION WITH LEFT VENTRICULOGRAM N/A 9/22/2020    Procedure: Left Heart Catheterization with Left Ventriculogram;  Surgeon: Darin Perez MD;  Location: [facility] Cath Lab;  Service: Cardiology   ? PICC AND MIDLINE TEAM LINE INSERTION  12/14/2020         no family history of premature coronary artery disease Social History     Socioeconomic History   ? Marital status:      Spouse name: Not on file   ? Number of children: Not on file   ? Years of education: Not on file   ? Highest education level: Not on file   Occupational History   ? Not on file   Social Needs   ? Financial resource strain: Not on file   ? Food insecurity     Worry: Not on file     Inability: Not on file   ? Transportation needs     Medical: Not on file     Non-medical: Not on file   Tobacco Use   ? Smoking status: Never Smoker   ? Smokeless tobacco: Never Used   Substance and Sexual Activity   ? Alcohol use: Yes     Frequency: Monthly or less     Drinks per session: 1 or 2     Comment: socially    ? Drug use: Never   ? Sexual activity: Not on file   Lifestyle   ? Physical activity     Days per week: Not on file     Minutes per session: Not on file   ? Stress: Not on file   Relationships   ? Social connections     Talks on phone: Not on file     Gets together: Not on file      Attends Congregation service: Not on file     Active member of club or organization: Not on file     Attends meetings of clubs or organizations: Not on file     Relationship status: Not on file   ? Intimate partner violence     Fear of current or ex partner: Not on file     Emotionally abused: Not on file     Physically abused: Not on file     Forced sexual activity: Not on file   Other Topics Concern   ? Not on file   Social History Narrative   ? Not on file           Lab Results    Chemistry/lipid CBC Cardiac Enzymes/BNP/TSH/INR   Lab Results   Component Value Date    CHOL 134 11/15/2019    HDL 45 11/15/2019    LDLCALC 64 11/15/2019    TRIG 124 11/15/2019    CREATININE 1.06 12/15/2020    BUN 12 12/14/2020    K 3.7 12/15/2020     12/15/2020     (H) 12/14/2020    CO2 26 12/14/2020    Lab Results   Component Value Date    WBC 17.7 (H) 12/14/2020    HGB 11.7 (L) 12/14/2020    HCT 38.5 (L) 12/14/2020    MCV 86 12/14/2020     12/14/2020    Lab Results   Component Value Date    CKMB 3 10/31/2011    TROPONINI 1.60 (HH) 12/03/2020     (H) 12/02/2020    TSH 2.34 09/21/2020    INR 1.41 (H) 12/08/2020     Lab Results   Component Value Date    CKMB 3 10/31/2011    TROPONINI 1.60 (HH) 12/03/2020          Weight:    Wt Readings from Last 3 Encounters:   12/15/20 212 lb (96.2 kg)   11/05/20 (!) 223 lb (101.2 kg)   10/08/20 221 lb 12.8 oz (100.6 kg)       Allergies  No Known Allergies      Surgical History  Past Surgical History:   Procedure Laterality Date   ? CV CORONARY ANGIOGRAM N/A 9/22/2020    Procedure: Coronary Angiogram;  Surgeon: Darin Perez MD;  Location: [facility] Cath Lab;  Service: Cardiology   ? CV LEFT HEART CATHETERIZATION WITH LEFT VENTRICULOGRAM N/A 9/22/2020    Procedure: Left Heart Catheterization with Left Ventriculogram;  Surgeon: Darin Perez MD;  Location: [facility] Cath Lab;  Service: Cardiology   ? PICC AND MIDLINE TEAM LINE INSERTION  12/14/2020             Social History  Tobacco:   Social History     Tobacco Use   Smoking Status Never Smoker   Smokeless Tobacco Never Used    Alcohol:   Social History     Substance and Sexual Activity   Alcohol Use Yes   ? Frequency: Monthly or less   ? Drinks per session: 1 or 2    Comment: socially     Illicit Drugs:   Social History     Substance and Sexual Activity   Drug Use Never       Family History  Family History   Problem Relation Age of Onset   ? Heart disease Father           Shalonda Camacho on 12/15/2020      cc: Angel Claire MD,

## 2021-06-30 NOTE — PROGRESS NOTES
Progress Notes by Harika Camacho MD at 12/20/2020  9:25 AM     Author: Harika Camacho MD Service: Cardiology Author Type: Physician    Filed: 12/20/2020 12:15 PM Date of Service: 12/20/2020  9:25 AM Status: Signed    : Harika Camacho MD (Physician)         HEART CARE NOTE          Assessment/Recommendations     1. HFrEF/Bi-ventricular dysfunction 2/2 ICM  Assessment / Plan    Presented in ADHF now s/p CABG x 4 + PVI/LAAL      Progressing well in the post-operative period --> s/p extubation and currently hemodynamically stable off pressors/inotropes    Continue IV furosemide (good response at increased dosing) until able to tolerate PO --> no changes to regimen today     Current Pharmacotherapy AHA Guideline-Directed Medical Therapy   Lisinopril on hold 2/2 borderline BP Lisinopril 20 mg twice daily   Metoprolol tartrate   -  50 mg two times a day until able to take PO Carvedilol 25 mg twice daily   Spironolactone - not started Spironolactone 25 mg once daily   Hydralazine NA Hydralazine 100 mg three times daily   Isosorbide dinitrate NA Isosorbide dinitrate 40 mg three times daily      2. Multi-vessel CAD  Assessment / Plan    S/p CABG    Continue high intensity atorvastatin, ASA and BBBBlocker (as detailed above)       3. Atrial fibrillation  Assessment / Plan    S/p PVI and LAAL; currently in afib metoprolol and amiodarone gtt; consider IV digoxin bolus if RVR recurs     4. HLP  Assessment / Plan    Continue high intensity atorvastatin     5. DM2  Assessment / Plan    Management per primary team      History of Present Illness/Subjective      Mr. Mika Alvarez is a 70 y.o. male with a PMHx significant for multivessel coronary artery disease, chronic systolic congestive heart failure, recent diagnosis of atrial fibrillation, and type 2 diabetes requiring insulin who presents in ADHF c/b afib with RVR now s/p CABG + PVI + LAAL.     Mr. Alvarez is alert; no acute  complaints     ECG: Personally reviewed. atrial fibrillation, with RVR.     ECHO (personnaly Reviewed):    When compared to the previous study dated 11/1/2011, left ventricular systolic function is reduced.    Normal left ventricular size, borderline LVH, global hypokinesis. Left ventricle ejection fraction is moderately decreased. The calculated left ventricular ejection fraction is 42%.    Normal right ventricular size with mildly reduced right ventricular systolic function.    Moderately enlarged left atrium.    No obvious valvular disease.          Physical Examination Review of Systems   Vitals:    12/20/20 0724   BP: 117/70   Pulse: 93   Resp: 20   Temp: 97.6  F (36.4  C)   SpO2: 93%     Body mass index is 27.2 kg/m .  Wt Readings from Last 3 Encounters:   12/20/20 189 lb 9 oz (86 kg)   11/05/20 (!) 223 lb (101.2 kg)   10/08/20 221 lb 12.8 oz (100.6 kg)     General Appearance:   no distress, normal body habitus   ENT/Mouth: membranes moist, no oral lesions or bleeding gums.      EYES:  no scleral icterus, normal conjunctivae   Neck: no carotid bruits or thyromegaly   Chest/Lungs:   lungs are clear to auscultation, no rales or wheezing, equal chest wall expansion    Cardiovascular:   Irregular. Normal first and second heart sounds with no murmurs, rubs, or gallops; the carotid, radial and posterior tibial pulses are intact, no JVD and trace LE edema bilaterally    Abdomen:  no organomegaly, masses, bruits, or tenderness; bowel sounds are present   Extremities: no cyanosis or clubbing   Skin: no xanthelasma, warm.    Neurologic: normal gait, normal  bilateral, no tremors     Psychiatric: alert and oriented x3, calm     A complete 10 systems ROS was reviewed  And is negative except what is listed in the HPI.          Medical History  Surgical History Family History Social History   Past Medical History:   Diagnosis Date   ? ACP Staging Stage 1 Hypertension: 140-159 / 90-99     Created by Conversion   Replacement Utility updated for latest IMO load   ? Atrial fibrillation with rapid ventricular response (H) 12/2/2020   ? Coronary Artery Disease     Created by Conversion  Replacement Utility updated for latest IMO load   ? Joint Pain, Localized In The Shoulder     Created by Conversion    ? Obesity     Created by Conversion    ? Other and unspecified hyperlipidemia 4/27/2016   ? Type 2 Diabetes Mellitus With Complication     Created by Conversion     Past Surgical History:   Procedure Laterality Date   ? CV CORONARY ANGIOGRAM N/A 9/22/2020    Procedure: Coronary Angiogram;  Surgeon: Darin Perez MD;  Location: [facility] Cath Lab;  Service: Cardiology   ? CV LEFT HEART CATHETERIZATION WITH LEFT VENTRICULOGRAM N/A 9/22/2020    Procedure: Left Heart Catheterization with Left Ventriculogram;  Surgeon: Darin Perez MD;  Location: [facility] Cath Lab;  Service: Cardiology   ? PICC AND MIDLINE TEAM LINE INSERTION  12/14/2020         no family history of premature coronary artery disease Social History     Socioeconomic History   ? Marital status:      Spouse name: Not on file   ? Number of children: Not on file   ? Years of education: Not on file   ? Highest education level: Not on file   Occupational History   ? Not on file   Social Needs   ? Financial resource strain: Not on file   ? Food insecurity     Worry: Not on file     Inability: Not on file   ? Transportation needs     Medical: Not on file     Non-medical: Not on file   Tobacco Use   ? Smoking status: Never Smoker   ? Smokeless tobacco: Never Used   Substance and Sexual Activity   ? Alcohol use: Yes     Frequency: Monthly or less     Drinks per session: 1 or 2     Comment: socially    ? Drug use: Never   ? Sexual activity: Not on file   Lifestyle   ? Physical activity     Days per week: Not on file     Minutes per session: Not on file   ? Stress: Not on file   Relationships   ? Social connections     Talks on phone: Not on file     Gets  together: Not on file     Attends Yarsanism service: Not on file     Active member of club or organization: Not on file     Attends meetings of clubs or organizations: Not on file     Relationship status: Not on file   ? Intimate partner violence     Fear of current or ex partner: Not on file     Emotionally abused: Not on file     Physically abused: Not on file     Forced sexual activity: Not on file   Other Topics Concern   ? Not on file   Social History Narrative   ? Not on file           Lab Results    Chemistry/lipid CBC Cardiac Enzymes/BNP/TSH/INR   Lab Results   Component Value Date    CHOL 134 11/15/2019    HDL 45 11/15/2019    LDLCALC 64 11/15/2019    TRIG 124 12/18/2020    CREATININE 0.89 12/20/2020    BUN 15 12/20/2020    K 3.9 12/20/2020     12/20/2020     (H) 12/20/2020    CO2 24 12/20/2020    Lab Results   Component Value Date    WBC 12.5 (H) 12/18/2020    HGB 11.8 (L) 12/18/2020    HCT 39.4 (L) 12/18/2020    MCV 86 12/18/2020     12/18/2020    Lab Results   Component Value Date    CKMB 3 10/31/2011    TROPONINI 1.60 (HH) 12/03/2020     (H) 12/02/2020    TSH 2.34 09/21/2020    INR 1.41 (H) 12/08/2020     Lab Results   Component Value Date    CKMB 3 10/31/2011    TROPONINI 1.60 (HH) 12/03/2020          Weight:    Wt Readings from Last 3 Encounters:   12/20/20 189 lb 9 oz (86 kg)   11/05/20 (!) 223 lb (101.2 kg)   10/08/20 221 lb 12.8 oz (100.6 kg)       Allergies  No Known Allergies      Surgical History  Past Surgical History:   Procedure Laterality Date   ? CV CORONARY ANGIOGRAM N/A 9/22/2020    Procedure: Coronary Angiogram;  Surgeon: Darin Perez MD;  Location: [facility] Cath Lab;  Service: Cardiology   ? CV LEFT HEART CATHETERIZATION WITH LEFT VENTRICULOGRAM N/A 9/22/2020    Procedure: Left Heart Catheterization with Left Ventriculogram;  Surgeon: Darin Perez MD;  Location: [facility] Cath Lab;  Service: Cardiology   ? PICC AND MIDLINE TEAM LINE INSERTION   12/14/2020            Social History  Tobacco:   Social History     Tobacco Use   Smoking Status Never Smoker   Smokeless Tobacco Never Used    Alcohol:   Social History     Substance and Sexual Activity   Alcohol Use Yes   ? Frequency: Monthly or less   ? Drinks per session: 1 or 2    Comment: socially     Illicit Drugs:   Social History     Substance and Sexual Activity   Drug Use Never       Family History  Family History   Problem Relation Age of Onset   ? Heart disease Father           Shalonda Camacho on 12/20/2020      cc: Angel Claire MD,

## 2021-06-30 NOTE — PROGRESS NOTES
Progress Notes by Harika Camacho MD at 12/11/2020  9:27 AM     Author: Harika Camacho MD Service: Cardiology Author Type: Physician    Filed: 12/11/2020  9:33 AM Date of Service: 12/11/2020  9:27 AM Status: Signed    : Harika Camacho MD (Physician)         HEART CARE NOTE          Assessment/Recommendations     1. HFrEF/Bi-ventricular dysfunction 2/2 ICM  Assessment / Plan    Presented in ADHF now s/p CABG x 4 + PVI/LAAL      Progressing well in the post-operative period --> s/p extubation and currently hemodynamically stable off pressors/inotropes    Near euvolemia on physical exam --> can transition to PO diuresis if pateint cleared for oral intake, otherwise, reduce IV furosemide to 40 mg one time daily      Current Pharmacotherapy AHA Guideline-Directed Medical Therapy   Lisinopril on hold 2/2 borderline BP Lisinopril 20 mg twice daily   Metoprolol succinate  -  12.5 mg daily  Carvedilol 25 mg twice daily   Spironolactone - Spironolactone 25 mg once daily   Hydralazine NA Hydralazine 100 mg three times daily   Isosorbide dinitrate NA Isosorbide dinitrate 40 mg three times daily     2. Multi-vessel CAD  Assessment / Plan    S/p CABG    Continue high intensity atorvastatin, ASA; start BBBBlocker as detailed above      3. Atrial fibrillation  Assessment / Plan    S/p PVI and LAAL; currently in afib and rate controlled on amiodarone gtt; consider IV digoxin bolus if RVR recurs     4. HLP  Assessment / Plan    Continue high intensity atorvastatin     5. DM2  Assessment / Plan    Management per primary team       History of Present Illness/Subjective      Mr. Mika Alvarez is a 70 y.o. male with a PMHx significant for multivessel coronary artery disease, chronic systolic congestive heart failure, recent diagnosis of atrial fibrillation, and type 2 diabetes requiring insulin who presents in ADHF c/b afib with RVR now s/p CABG + PVI + LAAL.     Mr. Alvarez is s/p extubation, but,  although he is alert, he is not oriented. Unable to answer questions relevently     ECG: Personally reviewed. atrial fibrillation, with RVR.     ECHO (personnaly Reviewed):    When compared to the previous study dated 11/1/2011, left ventricular systolic function is reduced.    Normal left ventricular size, borderline LVH, global hypokinesis. Left ventricle ejection fraction is moderately decreased. The calculated left ventricular ejection fraction is 42%.    Normal right ventricular size with mildly reduced right ventricular systolic function.    Moderately enlarged left atrium.    No obvious valvular disease.          Physical Examination Review of Systems   Vitals:    12/11/20 0820   BP:    Pulse: 91   Resp: 16   Temp:    SpO2: 98%     Body mass index is 30.17 kg/m .  Wt Readings from Last 3 Encounters:   12/11/20 210 lb 4.8 oz (95.4 kg)   11/05/20 (!) 223 lb (101.2 kg)   10/08/20 221 lb 12.8 oz (100.6 kg)     General Appearance:   no distress, normal body habitus   ENT/Mouth: membranes moist, no oral lesions or bleeding gums.      EYES:  no scleral icterus, normal conjunctivae   Neck: no carotid bruits or thyromegaly   Chest/Lungs:   lungs are clear to auscultation, no rales or wheezing, equal chest wall expansion    Cardiovascular:   Irregular. Normal first and second heart sounds with no murmurs, rubs, or gallops; the carotid, radial and posterior tibial pulses are intact, no JVD and trace LE  edema bilaterally    Abdomen:  no organomegaly, masses, bruits, or tenderness; bowel sounds are present   Extremities: no cyanosis or clubbing   Skin: no xanthelasma, warm.    Neurologic: alert     Psychiatric: alert     A complete 10 systems ROS was reviewed  And is negative except what is listed in the HPI.          Medical History  Surgical History Family History Social History   Past Medical History:   Diagnosis Date   ? ACP Staging Stage 1 Hypertension: 140-159 / 90-99     Created by Conversion  Replacement Utility  updated for latest IMO load   ? Atrial fibrillation with rapid ventricular response (H) 12/2/2020   ? Coronary Artery Disease     Created by Conversion  Replacement Utility updated for latest IMO load   ? Joint Pain, Localized In The Shoulder     Created by Conversion    ? Obesity     Created by Conversion    ? Other and unspecified hyperlipidemia 4/27/2016   ? Type 2 Diabetes Mellitus With Complication     Created by Conversion     Past Surgical History:   Procedure Laterality Date   ? CV CORONARY ANGIOGRAM N/A 9/22/2020    Procedure: Coronary Angiogram;  Surgeon: Darin Perez MD;  Location: [facility] Cath Lab;  Service: Cardiology   ? CV LEFT HEART CATHETERIZATION WITH LEFT VENTRICULOGRAM N/A 9/22/2020    Procedure: Left Heart Catheterization with Left Ventriculogram;  Surgeon: Darin Perez MD;  Location: [facility] Cath Lab;  Service: Cardiology    no family history of premature coronary artery disease Social History     Socioeconomic History   ? Marital status:      Spouse name: Not on file   ? Number of children: Not on file   ? Years of education: Not on file   ? Highest education level: Not on file   Occupational History   ? Not on file   Social Needs   ? Financial resource strain: Not on file   ? Food insecurity     Worry: Not on file     Inability: Not on file   ? Transportation needs     Medical: Not on file     Non-medical: Not on file   Tobacco Use   ? Smoking status: Never Smoker   ? Smokeless tobacco: Never Used   Substance and Sexual Activity   ? Alcohol use: Yes     Frequency: Monthly or less     Drinks per session: 1 or 2     Comment: socially    ? Drug use: Never   ? Sexual activity: Not on file   Lifestyle   ? Physical activity     Days per week: Not on file     Minutes per session: Not on file   ? Stress: Not on file   Relationships   ? Social connections     Talks on phone: Not on file     Gets together: Not on file     Attends Worship service: Not on file     Active  member of club or organization: Not on file     Attends meetings of clubs or organizations: Not on file     Relationship status: Not on file   ? Intimate partner violence     Fear of current or ex partner: Not on file     Emotionally abused: Not on file     Physically abused: Not on file     Forced sexual activity: Not on file   Other Topics Concern   ? Not on file   Social History Narrative   ? Not on file           Lab Results    Chemistry/lipid CBC Cardiac Enzymes/BNP/TSH/INR   Lab Results   Component Value Date    CHOL 134 11/15/2019    HDL 45 11/15/2019    LDLCALC 64 11/15/2019    TRIG 124 11/15/2019    CREATININE 0.76 12/11/2020    BUN 26 (H) 12/11/2020    K 3.2 (L) 12/11/2020     (H) 12/11/2020     (H) 12/11/2020    CO2 24 12/11/2020    Lab Results   Component Value Date    WBC 10.7 12/11/2020    HGB 11.1 (L) 12/11/2020    HCT 36.4 (L) 12/11/2020    MCV 85 12/11/2020     12/11/2020    Lab Results   Component Value Date    CKMB 3 10/31/2011    TROPONINI 1.60 (HH) 12/03/2020     (H) 12/02/2020    TSH 2.34 09/21/2020    INR 1.41 (H) 12/08/2020     Lab Results   Component Value Date    CKMB 3 10/31/2011    TROPONINI 1.60 (HH) 12/03/2020          Weight:    Wt Readings from Last 3 Encounters:   12/11/20 210 lb 4.8 oz (95.4 kg)   11/05/20 (!) 223 lb (101.2 kg)   10/08/20 221 lb 12.8 oz (100.6 kg)       Allergies  No Known Allergies      Surgical History  Past Surgical History:   Procedure Laterality Date   ? CV CORONARY ANGIOGRAM N/A 9/22/2020    Procedure: Coronary Angiogram;  Surgeon: Darin Perez MD;  Location: [facility] Cath Lab;  Service: Cardiology   ? CV LEFT HEART CATHETERIZATION WITH LEFT VENTRICULOGRAM N/A 9/22/2020    Procedure: Left Heart Catheterization with Left Ventriculogram;  Surgeon: Darin Perez MD;  Location: [facility] Cath Lab;  Service: Cardiology       Social History  Tobacco:   Social History     Tobacco Use   Smoking Status Never Smoker   Smokeless  Tobacco Never Used    Alcohol:   Social History     Substance and Sexual Activity   Alcohol Use Yes   ? Frequency: Monthly or less   ? Drinks per session: 1 or 2    Comment: socially     Illicit Drugs:   Social History     Substance and Sexual Activity   Drug Use Never       Family History  Family History   Problem Relation Age of Onset   ? Heart disease Father           Shalonda Camacho on 12/11/2020      cc: Angel Claire MD,

## 2021-06-30 NOTE — PROGRESS NOTES
Progress Notes by Harika Camacho MD at 12/18/2020  9:25 AM     Author: Harika Camacho MD Service: Cardiology Author Type: Physician    Filed: 12/18/2020 12:08 PM Date of Service: 12/18/2020  9:25 AM Status: Signed    : Harika Camacho MD (Physician)         HEART CARE NOTE          Assessment/Recommendations     1. HFrEF/Bi-ventricular dysfunction 2/2 ICM  Assessment / Plan    Presented in ADHF now s/p CABG x 4 + PVI/LAAL      Progressing well in the post-operative period --> s/p extubation and currently hemodynamically stable off pressors/inotropes    Continue IV furosemide (good response at increased dosing) until able to tolerate PO --> no changes to regimen today     Current Pharmacotherapy AHA Guideline-Directed Medical Therapy   Lisinopril on hold 2/2 borderline BP Lisinopril 20 mg twice daily   Metoprolol tartrate   -  25 mg two times a day until able to take PO Carvedilol 25 mg twice daily   Spironolactone - not started Spironolactone 25 mg once daily   Hydralazine NA Hydralazine 100 mg three times daily   Isosorbide dinitrate NA Isosorbide dinitrate 40 mg three times daily      2. Multi-vessel CAD  Assessment / Plan    S/p CABG    Continue high intensity atorvastatin, ASA and BBBBlocker (as detailed above)       3. Atrial fibrillation  Assessment / Plan    S/p PVI and LAAL; currently in afib metoprolol and amiodarone gtt; consider IV digoxin bolus if RVR recurs     4. HLP  Assessment / Plan    Continue high intensity atorvastatin     5. DM2  Assessment / Plan    Management per primary team      History of Present Illness/Subjective      Mr. Mika Alvarez is a 70 y.o. male with a PMHx significant for multivessel coronary artery disease, chronic systolic congestive heart failure, recent diagnosis of atrial fibrillation, and type 2 diabetes requiring insulin who presents in ADHF c/b afib with RVR now s/p CABG + PVI + LAAL.     Mr. Alvarez is alert; no acute  complaints     ECG: Personally reviewed. atrial fibrillation, with RVR.     ECHO (personnaly Reviewed):    When compared to the previous study dated 11/1/2011, left ventricular systolic function is reduced.    Normal left ventricular size, borderline LVH, global hypokinesis. Left ventricle ejection fraction is moderately decreased. The calculated left ventricular ejection fraction is 42%.    Normal right ventricular size with mildly reduced right ventricular systolic function.    Moderately enlarged left atrium.    No obvious valvular disease.          Physical Examination Review of Systems   Vitals:    12/18/20 0855   BP:    Pulse: 98   Resp:    Temp:    SpO2:      Body mass index is 29.21 kg/m .  Wt Readings from Last 3 Encounters:   12/18/20 203 lb 9.6 oz (92.4 kg)   11/05/20 (!) 223 lb (101.2 kg)   10/08/20 221 lb 12.8 oz (100.6 kg)     General Appearance:   no distress, normal body habitus   ENT/Mouth: membranes moist, no oral lesions or bleeding gums.      EYES:  no scleral icterus, normal conjunctivae   Neck: no carotid bruits or thyromegaly   Chest/Lungs:   lungs are clear to auscultation, no rales or wheezing, equal chest wall expansion    Cardiovascular:   Irregular. Normal first and second heart sounds with no rubs, or gallops; the carotid, radial and posterior tibial pulses are intact, + JVD  And 1+ LE edema bilaterally    Abdomen:  no organomegaly, masses, bruits, or tenderness; bowel sounds are present   Extremities: no cyanosis or clubbing   Skin: no xanthelasma, warm.    Neurologic: alert and oriented x3, calm     Psychiatric: alert and oriented x3, calm     A complete 10 systems ROS was reviewed  And is negative except what is listed in the HPI.          Medical History  Surgical History Family History Social History   Past Medical History:   Diagnosis Date   ? ACP Staging Stage 1 Hypertension: 140-159 / 90-99     Created by Conversion  Replacement Utility updated for latest IMO load   ? Atrial  fibrillation with rapid ventricular response (H) 12/2/2020   ? Coronary Artery Disease     Created by Conversion  Replacement Utility updated for latest IMO load   ? Joint Pain, Localized In The Shoulder     Created by Conversion    ? Obesity     Created by Conversion    ? Other and unspecified hyperlipidemia 4/27/2016   ? Type 2 Diabetes Mellitus With Complication     Created by Conversion     Past Surgical History:   Procedure Laterality Date   ? CV CORONARY ANGIOGRAM N/A 9/22/2020    Procedure: Coronary Angiogram;  Surgeon: Darin Perez MD;  Location: [facility] Cath Lab;  Service: Cardiology   ? CV LEFT HEART CATHETERIZATION WITH LEFT VENTRICULOGRAM N/A 9/22/2020    Procedure: Left Heart Catheterization with Left Ventriculogram;  Surgeon: Darin Perez MD;  Location: [facility] Cath Lab;  Service: Cardiology   ? PICC AND MIDLINE TEAM LINE INSERTION  12/14/2020         no family history of premature coronary artery disease Social History     Socioeconomic History   ? Marital status:      Spouse name: Not on file   ? Number of children: Not on file   ? Years of education: Not on file   ? Highest education level: Not on file   Occupational History   ? Not on file   Social Needs   ? Financial resource strain: Not on file   ? Food insecurity     Worry: Not on file     Inability: Not on file   ? Transportation needs     Medical: Not on file     Non-medical: Not on file   Tobacco Use   ? Smoking status: Never Smoker   ? Smokeless tobacco: Never Used   Substance and Sexual Activity   ? Alcohol use: Yes     Frequency: Monthly or less     Drinks per session: 1 or 2     Comment: socially    ? Drug use: Never   ? Sexual activity: Not on file   Lifestyle   ? Physical activity     Days per week: Not on file     Minutes per session: Not on file   ? Stress: Not on file   Relationships   ? Social connections     Talks on phone: Not on file     Gets together: Not on file     Attends Confucianist service: Not on  file     Active member of club or organization: Not on file     Attends meetings of clubs or organizations: Not on file     Relationship status: Not on file   ? Intimate partner violence     Fear of current or ex partner: Not on file     Emotionally abused: Not on file     Physically abused: Not on file     Forced sexual activity: Not on file   Other Topics Concern   ? Not on file   Social History Narrative   ? Not on file           Lab Results    Chemistry/lipid CBC Cardiac Enzymes/BNP/TSH/INR   Lab Results   Component Value Date    CHOL 134 11/15/2019    HDL 45 11/15/2019    LDLCALC 64 11/15/2019    TRIG 124 11/15/2019    CREATININE 0.83 12/18/2020    BUN 14 12/18/2020    K 3.8 12/18/2020     12/18/2020     (H) 12/18/2020    CO2 26 12/18/2020    Lab Results   Component Value Date    WBC 12.5 (H) 12/18/2020    HGB 11.8 (L) 12/18/2020    HCT 39.4 (L) 12/18/2020    MCV 86 12/18/2020     12/18/2020    Lab Results   Component Value Date    CKMB 3 10/31/2011    TROPONINI 1.60 (HH) 12/03/2020     (H) 12/02/2020    TSH 2.34 09/21/2020    INR 1.41 (H) 12/08/2020     Lab Results   Component Value Date    CKMB 3 10/31/2011    TROPONINI 1.60 (HH) 12/03/2020          Weight:    Wt Readings from Last 3 Encounters:   12/18/20 203 lb 9.6 oz (92.4 kg)   11/05/20 (!) 223 lb (101.2 kg)   10/08/20 221 lb 12.8 oz (100.6 kg)       Allergies  No Known Allergies      Surgical History  Past Surgical History:   Procedure Laterality Date   ? CV CORONARY ANGIOGRAM N/A 9/22/2020    Procedure: Coronary Angiogram;  Surgeon: Darin Perez MD;  Location: [facility] Cath Lab;  Service: Cardiology   ? CV LEFT HEART CATHETERIZATION WITH LEFT VENTRICULOGRAM N/A 9/22/2020    Procedure: Left Heart Catheterization with Left Ventriculogram;  Surgeon: Darin Perez MD;  Location: [facility] Cath Lab;  Service: Cardiology   ? PICC AND MIDLINE TEAM LINE INSERTION  12/14/2020            Social History  Tobacco:   Social  History     Tobacco Use   Smoking Status Never Smoker   Smokeless Tobacco Never Used    Alcohol:   Social History     Substance and Sexual Activity   Alcohol Use Yes   ? Frequency: Monthly or less   ? Drinks per session: 1 or 2    Comment: socially     Illicit Drugs:   Social History     Substance and Sexual Activity   Drug Use Never       Family History  Family History   Problem Relation Age of Onset   ? Heart disease Father           Shalonda Camacho on 12/18/2020      cc: Angel Claire MD,

## 2021-06-30 NOTE — PROGRESS NOTES
Progress Notes by Colt Tabares MD at 12/5/2020 11:16 AM     Author: Colt Tabares MD Service: Cardiology Author Type: Physician    Filed: 12/5/2020 11:23 AM Date of Service: 12/5/2020 11:16 AM Status: Signed    : Colt Tabares MD (Physician)       [facility] HEART [facility]              Cardiology Progress Note    Assessment:  Acute on chronic systolic failure, improving but still with marked peripheral edema  Severe two-vessel coronary artery disease, probable bypass this admission  Diabetes mellitus with stasis changes  Chronic atrial fibrillation      Principal Problem:    Acute on chronic clinical systolic heart failure (H)  Active Problems:    ACP Staging Stage 1 Hypertension: 140-159 / 90-99    Type 2 diabetes mellitus (H)    Hyperlipidemia, unspecified hyperlipidemia type    CAD (coronary artery disease)    Atrial fibrillation with rapid ventricular response (H)    Diabetic leg ulcer (H)    Acute on chronic systolic CHF (congestive heart failure) (H)    A-fib (H)    Uncontrolled type 2 diabetes mellitus with hyperglycemia (H)     LOS: 3 days     Recommendations:  Continue IV furosemide  CV surgery note, appreciated, coronary artery bypass graft surgery soon  On IV heparin pending surgery    Subjective:  Denies shortness of breath or chest pains    Current Facility-Administered Medications   Medication Dose Route Frequency Provider Last Rate Last Admin   ? acetaminophen suppository 650 mg (TYLENOL)  650 mg Rectal Q4H PRN Cristopher Jean MD       ? acetaminophen tablet 500-1,000 mg (TYLENOL)  500-1,000 mg Oral Q4H PRN Cristopher Jean MD   1,000 mg at 12/03/20 0453   ? aspirin chewable tablet 162 mg  162 mg Oral DAILY Cristopher Jean MD   162 mg at 12/05/20 0818   ? atorvastatin tablet 80 mg (LIPITOR)  80 mg Oral QHS Cristopher Jean MD   80 mg at 12/04/20 2050   ? bisacodyL suppository 10 mg (DULCOLAX)  10 mg Rectal Daily PRN Cristopher Jean MD       ? dextrose 50  % (D50W) syringe 20-50 mL  20-50 mL Intravenous Q15 Min PRN Cristopher Jean MD       ? diltiazem 24 hr capsule 120 mg (CARDIZEM CD)  120 mg Oral DAILY Cristopher Jean MD   120 mg at 12/05/20 0818   ? furosemide 100 mg/100 ml in NS (1 mg/ml)  10 mg/hr Intravenous Continuous Harika Camacho MD 10 mL/hr at 12/05/20 0822 10 mg/hr at 12/05/20 0822   ? glucagon (human recombinant) injection 1 mg  1 mg Subcutaneous Q15 Min PRN Cristopher Jean MD       ? heparin 25,000 units in 0.45% sodium chloride (100 units/ml) 250 mL ANTICOAGULANT infusion  0-5,000 Units/hr Intravenous Continuous Cristopher Jean MD 16.5 mL/hr at 12/05/20 0822 1,650 Units/hr at 12/05/20 0822   ? hydrALAZINE tablet 12.5 mg (APRESOLINE)  12.5 mg Oral Q6H PRN Cristopher Jean MD       ? insulin aspart U-100 injection pen (NovoLOG)   Subcutaneous TID with meals Cristopher Jean MD   5 Units at 12/05/20 0819   ? insulin aspart U-100 injection pen (NovoLOG)   Subcutaneous TID with meals Cristopher Jean MD   6 Units at 12/05/20 0819   ? insulin glargine injection 35 Units (LANTUS)  35 Units Subcutaneous QHS Quinn Patel MD   35 Units at 12/04/20 2050   ? losartan tablet 100 mg (COZAAR)  100 mg Oral DAILY Cristopher Jean MD   100 mg at 12/05/20 0818   ? magnesium hydroxide suspension 30 mL (MILK OF MAG)  30 mL Oral Daily PRN Cristopher Jean MD       ? metoprolol succinate 24 hr tablet 100 mg (TOPROL-XL)  100 mg Oral BID Cristopher Jean MD   100 mg at 12/05/20 0818   ? naloxone injection 0.2 mg (NARCAN)  0.2 mg Intravenous Q2 Min PRN Cristopher Jean MD        Or   ? naloxone injection 0.2 mg (NARCAN)  0.2 mg Intramuscular Q2 Min PRN Cristopher Jean MD        Or   ? naloxone injection 0.4 mg (NARCAN)  0.4 mg Intravenous Q2 Min PRN Cristopher Jean MD        Or   ? naloxone injection 0.4 mg (NARCAN)  0.4 mg Intramuscular Q2 Min PRN Cristopher Jean MD       ?  nitroglycerin SL tablet 0.4 mg (NITROSTAT)  0.4 mg Sublingual Q5 Min PRN Cristopher Jean MD   0.4 mg at 12/02/20 1528   ? ondansetron injection 4 mg (ZOFRAN)  4 mg Intravenous Q4H PRN Cristopher Jean MD        Or   ? ondansetron tablet 8 mg (ZOFRAN)  8 mg Oral Q8H PRN Cristopher Jean MD       ? polyethylene glycol packet 17 g (MIRALAX)  17 g Oral Daily PRN Cristopher Jean MD       ? sodium chloride flush 2.5 mL (NS)  2.5 mL Intravenous Line Care Cristopher Jean MD   2.5 mL at 12/04/20 0818   ? traMADoL tablet 50 mg (ULTRAM)  50 mg Oral Q6H PRN Cristopher Jean MD   50 mg at 12/05/20 0819       Objective:   Vital signs in last 24 hours:  Temp:  [97.8  F (36.6  C)-98.3  F (36.8  C)] 97.8  F (36.6  C)  Heart Rate:  [67-75] 67  Resp:  [18-20] 18  BP: (103-127)/(68-81) 120/81  Weight:   220 lb 1.6 oz (99.8 kg)   Weight change: -2 lb 14.4 oz (-1.315 kg)      Physical Exam:  Chest: Clear to auscultation  Cardiovascular: JVP 6 cm, S1-S2 normal without murmurs or gallops  Extremities: Warm, 2+ peripheral edema in the thighs, extremities bandaged presumably with stasis changes    Telemetry:   Atrial fibrillation with controlled ventricular response in the 70s    Lab Results: is   Lab Results   Component Value Date     12/03/2020    K 3.7 12/03/2020     12/03/2020    CO2 27 12/03/2020    BUN 23 (H) 12/03/2020    CREATININE 0.75 12/03/2020    CALCIUM 8.7 12/03/2020     Lab Results   Component Value Date    WBC 9.4 12/02/2020    HGB 14.0 12/05/2020    HCT 47.7 12/02/2020    MCV 87 12/02/2020     12/05/2020     Lab Results   Component Value Date    CKMB 3 10/31/2011    TROPONINI 1.60 (HH) 12/03/2020     Lab Results   Component Value Date     (H) 12/02/2020

## 2021-06-30 NOTE — PROGRESS NOTES
Progress Notes by Colt Tabares MD at 12/6/2020  8:37 AM     Author: Colt Tabares MD Service: Cardiology Author Type: Physician    Filed: 12/6/2020  8:49 AM Date of Service: 12/6/2020  8:37 AM Status: Signed    : Colt Tabares MD (Physician)       [facility] HEART [facility]              Cardiology Progress Note    Assessment:  Acute on chronic systolic failure, improving but still withmild peripheral edema  Severe two-vessel coronary artery disease, probable bypass this admission  Diabetes mellitus with stasis changes  Chronic atrial fibrillation      Principal Problem:    Acute on chronic clinical systolic heart failure (H)  Active Problems:    ACP Staging Stage 1 Hypertension: 140-159 / 90-99    Type 2 diabetes mellitus (H)    Hyperlipidemia, unspecified hyperlipidemia type    CAD (coronary artery disease)    Atrial fibrillation with rapid ventricular response (H)    Diabetic leg ulcer (H)    Acute on chronic systolic CHF (congestive heart failure) (H)    A-fib (H)    Uncontrolled type 2 diabetes mellitus with hyperglycemia (H)     LOS: 4 days     Recommendations:  Check BMP  CV surgery note, appreciated, coronary artery bypass graft surgery tomorrow  On IV heparin pending surgery    Subjective:  Denies shortness of breath or chest pains    Current Facility-Administered Medications   Medication Dose Route Frequency Provider Last Rate Last Admin   ? acetaminophen suppository 650 mg (TYLENOL)  650 mg Rectal Q4H PRN Cristopher Jean MD       ? acetaminophen tablet 500-1,000 mg (TYLENOL)  500-1,000 mg Oral Q4H PRN Cristopher Jean MD   1,000 mg at 12/03/20 0453   ? aspirin chewable tablet 162 mg  162 mg Oral DAILY Cristopher Jean MD   162 mg at 12/06/20 0811   ? atorvastatin tablet 80 mg (LIPITOR)  80 mg Oral QHS Cristopher Jean MD   80 mg at 12/05/20 2048   ? bisacodyL suppository 10 mg (DULCOLAX)  10 mg Rectal Daily PRN Cristopher Jean MD       ? dextrose 50 % (D50W)  syringe 20-50 mL  20-50 mL Intravenous Q15 Min PRN Cristopher Jean MD       ? diltiazem 24 hr capsule 120 mg (CARDIZEM CD)  120 mg Oral DAILY Cristopher Jean MD   120 mg at 12/06/20 0812   ? furosemide 100 mg/100 ml in NS (1 mg/ml)  10 mg/hr Intravenous Continuous Harika Camacho MD 10 mL/hr at 12/06/20 0824 10 mg/hr at 12/06/20 0824   ? glucagon (human recombinant) injection 1 mg  1 mg Subcutaneous Q15 Min PRN Cristopher Jean MD       ? heparin 25,000 units in 0.45% sodium chloride (100 units/ml) 250 mL ANTICOAGULANT infusion  0-5,000 Units/hr Intravenous Continuous Cristopher Jean MD 16.5 mL/hr at 12/06/20 0824 1,650 Units/hr at 12/06/20 0824   ? hydrALAZINE tablet 12.5 mg (APRESOLINE)  12.5 mg Oral Q6H PRN Cristopher Jean MD       ? insulin aspart U-100 injection pen (NovoLOG)   Subcutaneous TID with meals Cristopher Jean MD   5 Units at 12/06/20 0818   ? insulin aspart U-100 injection pen (NovoLOG)   Subcutaneous TID with meals Cristopher Jean MD   6 Units at 12/06/20 0818   ? insulin glargine injection 40 Units (LANTUS)  40 Units Subcutaneous QHS Quinn Patel MD   40 Units at 12/05/20 2211   ? losartan tablet 100 mg (COZAAR)  100 mg Oral DAILY Cristopher Jean MD   100 mg at 12/06/20 0812   ? magnesium hydroxide suspension 30 mL (MILK OF MAG)  30 mL Oral Daily PRN Cristopher Jean MD       ? metoprolol succinate 24 hr tablet 100 mg (TOPROL-XL)  100 mg Oral BID Cristopher Jean MD   100 mg at 12/06/20 0812   ? naloxone injection 0.2 mg (NARCAN)  0.2 mg Intravenous Q2 Min PRN Cristopher Jean MD        Or   ? naloxone injection 0.2 mg (NARCAN)  0.2 mg Intramuscular Q2 Min PRN Cristopher Jean MD        Or   ? naloxone injection 0.4 mg (NARCAN)  0.4 mg Intravenous Q2 Min PRN Cristopher Jean MD        Or   ? naloxone injection 0.4 mg (NARCAN)  0.4 mg Intramuscular Q2 Min PRN Cristopher Jean MD       ? nitroglycerin  SL tablet 0.4 mg (NITROSTAT)  0.4 mg Sublingual Q5 Min PRN Cristopher Jean MD   0.4 mg at 12/02/20 1528   ? ondansetron injection 4 mg (ZOFRAN)  4 mg Intravenous Q4H PRN Cristopher Jean MD        Or   ? ondansetron tablet 8 mg (ZOFRAN)  8 mg Oral Q8H PRN Cristopher Jean MD       ? polyethylene glycol packet 17 g (MIRALAX)  17 g Oral Daily PRN Cristopher Jean MD       ? sodium chloride bacteriostatic 0.9 % injection 0.1-0.3 mL  0.1-0.3 mL Subcutaneous PRN Acosta Boland MD       ? sodium chloride flush 2.5 mL (NS)  2.5 mL Intravenous Line Care Cristopher Jean MD   2.5 mL at 12/04/20 0818   ? sodium chloride flush 3 mL (NS)  3 mL Intravenous Line Care Acosta Boland MD       ? traMADoL tablet 50 mg (ULTRAM)  50 mg Oral Q6H PRN Cristopher Jean MD   50 mg at 12/06/20 0812       Objective:   Vital signs in last 24 hours:  Temp:  [97.5  F (36.4  C)-98.7  F (37.1  C)] 98.7  F (37.1  C)  Heart Rate:  [70-73] 73  Resp:  [16-20] 18  BP: ()/(61-84) 130/84  Weight:   220 lb 6.4 oz (100 kg)   Weight change: 4.8 oz (0.136 kg)      Physical Exam:  Chest: Clear to auscultation  Cardiovascular: JVP <5cm, S1-S2 normal without murmurs or gallops  Extremities: Warm, trace edema in the thighs, extremities bandaged presumably with stasis changes    Telemetry:   Atrial fibrillation with controlled ventricular response in the 70s    Lab Results: is   Lab Results   Component Value Date     12/03/2020    K 3.7 12/03/2020     12/03/2020    CO2 27 12/03/2020    BUN 23 (H) 12/03/2020    CREATININE 0.75 12/03/2020    CALCIUM 8.7 12/03/2020     Lab Results   Component Value Date    WBC 9.4 12/02/2020    HGB 14.0 12/05/2020    HCT 47.7 12/02/2020    MCV 87 12/02/2020     12/05/2020     Lab Results   Component Value Date    CKMB 3 10/31/2011    TROPONINI 1.60 (HH) 12/03/2020     Lab Results   Component Value Date     (H) 12/02/2020

## 2021-06-30 NOTE — PROGRESS NOTES
Progress Notes by Harika Camacho MD at 12/9/2020  9:42 AM     Author: Harika Camacho MD Service: Cardiology Author Type: Physician    Filed: 12/9/2020 11:58 AM Date of Service: 12/9/2020  9:42 AM Status: Signed    : Harika Camacho MD (Physician)         HEART CARE NOTE          Assessment/Recommendations          1. HFrEF/Bi-ventricular dysfunction 2/2 ICM  Assessment / Plan    Presented in ADHF now s/p CABG x 4 + PVI/LAAL      Progressing well in the post-operative period -->tolerating vent wean and currently hemodynamically stable off epi    Hypervolemic on physical exam --> started on IV diuresis and approaching euvolemia; titrate to goal UOP to be bet negative 1-2L over 24 hours    Would hold BBlockers (metoprolol) and CChannel blockers (nicardipine) until euvolemic given biventricular dysfunction and volume overload; if increased afterload is a concern, recommend starting nitroprusside      2. Multi-vessel CAD  Assessment / Plan    S/p CABG    Continue high intensity atorvastatin, ASA; continue to hold BBlocker and losartan until extubated, euvolemic, and hemodynamically stable without epi/pressor requirements     3. Atrial fibrillation  Assessment / Plan    S/p PVI and LAAL; currently in NSR     4. HLP  Assessment / Plan    Continue high intensity atorvastatin     5. DM2  Assessment / Plan    Management per primary team    History of Present Illness/Subjective    Mr. Mika Alvarez is a 70 y.o. male with a PMHx significant for multivessel coronary artery disease, chronic systolic congestive heart failure, recent diagnosis of atrial fibrillation, and type 2 diabetes requiring insulin who presents in ADHF c/b afib with RVR now POD#1 s/p CABG + PVI + LAAL.     Intubated and sedated     ECG: Personally reviewed. atrial fibrillation, with RVR.     ECHO (personnaly Reviewed):    When compared to the previous study dated 11/1/2011, left ventricular systolic function is  reduced.    Normal left ventricular size, borderline LVH, global hypokinesis. Left ventricle ejection fraction is moderately decreased. The calculated left ventricular ejection fraction is 42%.    Normal right ventricular size with mildly reduced right ventricular systolic function.    Moderately enlarged left atrium.    No obvious valvular disease.          Physical Examination Review of Systems   Vitals:    12/09/20 0930   BP:    Pulse: 65   Resp:    Temp:    SpO2: 97%     Body mass index is 31.16 kg/m .  Wt Readings from Last 3 Encounters:   12/09/20 217 lb 2.5 oz (98.5 kg)   11/05/20 (!) 223 lb (101.2 kg)   10/08/20 221 lb 12.8 oz (100.6 kg)     General Appearance:   intubated   ENT/Mouth: membranes moist, no oral lesions or bleeding gums.      EYES:  no scleral icterus, normal conjunctivae   Neck: no carotid bruits or thyromegaly   Chest/Lungs:   lungs are clear to auscultation, no rales or wheezing, equal chest wall expansion    Cardiovascular:   Regular. Normal first and second heart sounds with no rubs, or gallops; the carotid, radial and posterior tibial pulses are intact, + JVD and LE  edema bilaterally    Abdomen:  no organomegaly, masses, bruits, or tenderness; bowel sounds are present   Extremities: no cyanosis or clubbing   Skin: no xanthelasma, warm.    Neurologic: intubated     Psychiatric: intubated    A complete 10 systems ROS was reviewed  And is negative except what is listed in the HPI.          Medical History  Surgical History Family History Social History   Past Medical History:   Diagnosis Date   ? ACP Staging Stage 1 Hypertension: 140-159 / 90-99     Created by Conversion  Replacement Utility updated for latest IMO load   ? Atrial fibrillation with rapid ventricular response (H) 12/2/2020   ? Coronary Artery Disease     Created by Conversion  Replacement Utility updated for latest IMO load   ? Joint Pain, Localized In The Shoulder     Created by Conversion    ? Obesity     Created by  Conversion    ? Other and unspecified hyperlipidemia 4/27/2016   ? Type 2 Diabetes Mellitus With Complication     Created by Conversion     Past Surgical History:   Procedure Laterality Date   ? CV CORONARY ANGIOGRAM N/A 9/22/2020    Procedure: Coronary Angiogram;  Surgeon: Darin Perez MD;  Location: [facility] Cath Lab;  Service: Cardiology   ? CV LEFT HEART CATHETERIZATION WITH LEFT VENTRICULOGRAM N/A 9/22/2020    Procedure: Left Heart Catheterization with Left Ventriculogram;  Surgeon: Darin Perez MD;  Location: [facility] Cath Lab;  Service: Cardiology    no family history of premature coronary artery disease Social History     Socioeconomic History   ? Marital status:      Spouse name: Not on file   ? Number of children: Not on file   ? Years of education: Not on file   ? Highest education level: Not on file   Occupational History   ? Not on file   Social Needs   ? Financial resource strain: Not on file   ? Food insecurity     Worry: Not on file     Inability: Not on file   ? Transportation needs     Medical: Not on file     Non-medical: Not on file   Tobacco Use   ? Smoking status: Never Smoker   ? Smokeless tobacco: Never Used   Substance and Sexual Activity   ? Alcohol use: Yes     Frequency: Monthly or less     Drinks per session: 1 or 2     Comment: socially    ? Drug use: Never   ? Sexual activity: Not on file   Lifestyle   ? Physical activity     Days per week: Not on file     Minutes per session: Not on file   ? Stress: Not on file   Relationships   ? Social connections     Talks on phone: Not on file     Gets together: Not on file     Attends [employer] service: Not on file     Active member of club or organization: Not on file     Attends meetings of clubs or organizations: Not on file     Relationship status: Not on file   ? Intimate partner violence     Fear of current or ex partner: Not on file     Emotionally abused: Not on file     Physically abused: Not on file     Forced  sexual activity: Not on file   Other Topics Concern   ? Not on file   Social History Narrative   ? Not on file           Lab Results    Chemistry/lipid CBC Cardiac Enzymes/BNP/TSH/INR   Lab Results   Component Value Date    CHOL 134 11/15/2019    HDL 45 11/15/2019    LDLCALC 64 11/15/2019    TRIG 124 11/15/2019    CREATININE 0.71 12/09/2020    BUN 27 (H) 12/09/2020    K 4.2 12/09/2020     12/09/2020     (H) 12/09/2020    CO2 26 12/09/2020    Lab Results   Component Value Date    WBC 10.3 12/09/2020    HGB 10.0 (L) 12/09/2020    HCT 33.1 (L) 12/09/2020    MCV 87 12/09/2020     12/09/2020    Lab Results   Component Value Date    CKMB 3 10/31/2011    TROPONINI 1.60 (HH) 12/03/2020     (H) 12/02/2020    TSH 2.34 09/21/2020    INR 1.41 (H) 12/08/2020     Lab Results   Component Value Date    CKMB 3 10/31/2011    TROPONINI 1.60 (HH) 12/03/2020          Weight:    Wt Readings from Last 3 Encounters:   12/09/20 217 lb 2.5 oz (98.5 kg)   11/05/20 (!) 223 lb (101.2 kg)   10/08/20 221 lb 12.8 oz (100.6 kg)       Allergies  No Known Allergies      Surgical History  Past Surgical History:   Procedure Laterality Date   ? CV CORONARY ANGIOGRAM N/A 9/22/2020    Procedure: Coronary Angiogram;  Surgeon: Darin Perez MD;  Location: [facility] Cath Lab;  Service: Cardiology   ? CV LEFT HEART CATHETERIZATION WITH LEFT VENTRICULOGRAM N/A 9/22/2020    Procedure: Left Heart Catheterization with Left Ventriculogram;  Surgeon: Darin Perez MD;  Location: [facility] Cath Lab;  Service: Cardiology       Social History  Tobacco:   Social History     Tobacco Use   Smoking Status Never Smoker   Smokeless Tobacco Never Used    Alcohol:   Social History     Substance and Sexual Activity   Alcohol Use Yes   ? Frequency: Monthly or less   ? Drinks per session: 1 or 2    Comment: socially     Illicit Drugs:   Social History     Substance and Sexual Activity   Drug Use Never       Family History  Family History    Problem Relation Age of Onset   ? Heart disease Father           Shalonda Camacho on 12/9/2020      cc: Angel Claire MD,

## 2021-06-30 NOTE — PROGRESS NOTES
Progress Notes by Harika Camacho MD at 12/14/2020  9:50 AM     Author: Harika Camacho MD Service: Cardiology Author Type: Physician    Filed: 12/14/2020  1:06 PM Date of Service: 12/14/2020  9:50 AM Status: Signed    : Harika Camacho MD (Physician)         HEART CARE NOTE          Assessment/Recommendations        1. HFrEF/Bi-ventricular dysfunction 2/2 ICM  Assessment / Plan    Presented in ADHF now s/p CABG x 4 + PVI/LAAL      Progressing well in the post-operative period --> s/p extubation and currently hemodynamically stable off pressors/inotropes    Diuresis on hold over the weekend 2/2 progressive hypernatremia; volume status now trending up ---> will given 20 mg IV furosemide x1 and monitor response now that Na is improved         Current Pharmacotherapy AHA Guideline-Directed Medical Therapy   Lisinopril on hold 2/2 borderline BP Lisinopril 20 mg twice daily   Metoprolol tartrate   -  37.5 mg two times a day until able to take PO Carvedilol 25 mg twice daily   Spironolactone - not started Spironolactone 25 mg once daily   Hydralazine NA Hydralazine 100 mg three times daily   Isosorbide dinitrate NA Isosorbide dinitrate 40 mg three times daily      2. Multi-vessel CAD  Assessment / Plan    S/p CABG    Continue high intensity atorvastatin, ASA and BBBBlocker as detailed above       3. Atrial fibrillation  Assessment / Plan    S/p PVI and LAAL; currently in afib metoprolol and amiodarone gtt; consider IV digoxin bolus if RVR recurs     4. HLP  Assessment / Plan    Continue high intensity atorvastatin     5. DM2  Assessment / Plan    Management per primary team      History of Present Illness/Subjective      Mr. Mika Alvarez is a 70 y.o. male with a PMHx significant for multivessel coronary artery disease, chronic systolic congestive heart failure, recent diagnosis of atrial fibrillation, and type 2 diabetes requiring insulin who presents in ADHF c/b afib with RVR now  s/p CABG + PVI + LAAL.     Mr. Alvarez is alert, but does not appear to be oriented.      ECG: Personally reviewed. atrial fibrillation, with RVR.     ECHO (personnaly Reviewed):    When compared to the previous study dated 11/1/2011, left ventricular systolic function is reduced.    Normal left ventricular size, borderline LVH, global hypokinesis. Left ventricle ejection fraction is moderately decreased. The calculated left ventricular ejection fraction is 42%.    Normal right ventricular size with mildly reduced right ventricular systolic function.    Moderately enlarged left atrium.    No obvious valvular disease.          Physical Examination Review of Systems   Vitals:    12/14/20 0700   BP: 131/77   Pulse: (!) 116   Resp: 20   Temp: 97.3  F (36.3  C)   SpO2: 92%     Body mass index is 31.01 kg/m .  Wt Readings from Last 3 Encounters:   12/14/20 216 lb 1.6 oz (98 kg)   11/05/20 (!) 223 lb (101.2 kg)   10/08/20 221 lb 12.8 oz (100.6 kg)     General Appearance:   no distress, normal body habitus   ENT/Mouth: membranes moist, no oral lesions or bleeding gums.      EYES:  no scleral icterus, normal conjunctivae   Neck: no carotid bruits or thyromegaly   Chest/Lungs:   lungs are clear to auscultation, no rales or wheezing, equal chest wall expansion    Cardiovascular:   Irregular. Normal first and second heart sounds with no rubs, or gallops; the carotid, radial and posterior tibial pulses are intact, mild JVD and 1+ LE  edema bilaterally    Abdomen:  no organomegaly, masses, bruits, or tenderness; bowel sounds are present   Extremities: no cyanosis or clubbing   Skin: no xanthelasma, warm.    Neurologic: alert     Psychiatric: alert      A complete 10 systems ROS was reviewed  And is negative except what is listed in the HPI.          Medical History  Surgical History Family History Social History   Past Medical History:   Diagnosis Date   ? ACP Staging Stage 1 Hypertension: 140-159 / 90-99     Created by Conversion   Replacement Utility updated for latest IMO load   ? Atrial fibrillation with rapid ventricular response (H) 12/2/2020   ? Coronary Artery Disease     Created by Conversion  Replacement Utility updated for latest IMO load   ? Joint Pain, Localized In The Shoulder     Created by Conversion    ? Obesity     Created by Conversion    ? Other and unspecified hyperlipidemia 4/27/2016   ? Type 2 Diabetes Mellitus With Complication     Created by Conversion     Past Surgical History:   Procedure Laterality Date   ? CV CORONARY ANGIOGRAM N/A 9/22/2020    Procedure: Coronary Angiogram;  Surgeon: Darin Perez MD;  Location: [facility] Cath Lab;  Service: Cardiology   ? CV LEFT HEART CATHETERIZATION WITH LEFT VENTRICULOGRAM N/A 9/22/2020    Procedure: Left Heart Catheterization with Left Ventriculogram;  Surgeon: Darin Perez MD;  Location: [facility] Cath Lab;  Service: Cardiology    no family history of premature coronary artery disease Social History     Socioeconomic History   ? Marital status:      Spouse name: Not on file   ? Number of children: Not on file   ? Years of education: Not on file   ? Highest education level: Not on file   Occupational History   ? Not on file   Social Needs   ? Financial resource strain: Not on file   ? Food insecurity     Worry: Not on file     Inability: Not on file   ? Transportation needs     Medical: Not on file     Non-medical: Not on file   Tobacco Use   ? Smoking status: Never Smoker   ? Smokeless tobacco: Never Used   Substance and Sexual Activity   ? Alcohol use: Yes     Frequency: Monthly or less     Drinks per session: 1 or 2     Comment: socially    ? Drug use: Never   ? Sexual activity: Not on file   Lifestyle   ? Physical activity     Days per week: Not on file     Minutes per session: Not on file   ? Stress: Not on file   Relationships   ? Social connections     Talks on phone: Not on file     Gets together: Not on file     Attends Jew service: Not on  file     Active member of club or organization: Not on file     Attends meetings of clubs or organizations: Not on file     Relationship status: Not on file   ? Intimate partner violence     Fear of current or ex partner: Not on file     Emotionally abused: Not on file     Physically abused: Not on file     Forced sexual activity: Not on file   Other Topics Concern   ? Not on file   Social History Narrative   ? Not on file           Lab Results    Chemistry/lipid CBC Cardiac Enzymes/BNP/TSH/INR   Lab Results   Component Value Date    CHOL 134 11/15/2019    HDL 45 11/15/2019    LDLCALC 64 11/15/2019    TRIG 124 11/15/2019    CREATININE 0.82 12/13/2020    BUN 20 12/13/2020    K 3.7 12/14/2020     12/14/2020     (H) 12/13/2020    CO2 25 12/13/2020    Lab Results   Component Value Date    WBC 12.1 (H) 12/13/2020    HGB 11.1 (L) 12/13/2020    HCT 37.4 (L) 12/13/2020    MCV 88 12/13/2020     12/13/2020    Lab Results   Component Value Date    CKMB 3 10/31/2011    TROPONINI 1.60 (HH) 12/03/2020     (H) 12/02/2020    TSH 2.34 09/21/2020    INR 1.41 (H) 12/08/2020     Lab Results   Component Value Date    CKMB 3 10/31/2011    TROPONINI 1.60 (HH) 12/03/2020          Weight:    Wt Readings from Last 3 Encounters:   12/14/20 216 lb 1.6 oz (98 kg)   11/05/20 (!) 223 lb (101.2 kg)   10/08/20 221 lb 12.8 oz (100.6 kg)       Allergies  No Known Allergies      Surgical History  Past Surgical History:   Procedure Laterality Date   ? CV CORONARY ANGIOGRAM N/A 9/22/2020    Procedure: Coronary Angiogram;  Surgeon: Darin Perez MD;  Location: [facility] Cath Lab;  Service: Cardiology   ? CV LEFT HEART CATHETERIZATION WITH LEFT VENTRICULOGRAM N/A 9/22/2020    Procedure: Left Heart Catheterization with Left Ventriculogram;  Surgeon: Darin Perez MD;  Location: [facility] Cath Lab;  Service: Cardiology       Social History  Tobacco:   Social History     Tobacco Use   Smoking Status Never Smoker    Smokeless Tobacco Never Used    Alcohol:   Social History     Substance and Sexual Activity   Alcohol Use Yes   ? Frequency: Monthly or less   ? Drinks per session: 1 or 2    Comment: socially     Illicit Drugs:   Social History     Substance and Sexual Activity   Drug Use Never       Family History  Family History   Problem Relation Age of Onset   ? Heart disease Father           Shalonda Camacho on 12/14/2020      cc: Angel Claire MD,

## 2021-06-30 NOTE — PROGRESS NOTES
Progress Notes by Davis Marquez MD at 12/12/2020 11:21 AM     Author: Davis Marquez MD Service: Cardiology Author Type: Physician    Filed: 12/12/2020 11:35 AM Date of Service: 12/12/2020 11:21 AM Status: Signed    : Davis Marquez MD (Physician)          [facility]  Cardiology      Progress Note: Davis Marquez    Primary Care: Dr. Claire, Angel Huitron MD    Assessment:         Atrial fibrillation with RVR: The patient prior history of atrial fibrillation and now has recurrent atrial fibrillation post CABG + PVI + DONNA excision.  Not surprising given the recent surgery as well as sinus other comorbid conditions.  Rate control will be challenging due to the inability to give p.o. medications at this time.  Agree with initiation of amiodarone however I will switch this over to IV which will also help with his rate control.  IV metoprolol to help with rate control and if needed IV diltiazem.    CABG x4: Postop day 5.  The patient's hemodynamics are reasonably stable without pressors atrial fibrillation with RVR.    Mental status changes:The patient is currently evaluated/managed by the hospitalist and neurology services.  Would not recommend bolus anticoagulant therapy until cleared by neurology.      Principal Problem:    Acute on chronic clinical systolic heart failure (H)  Active Problems:    ACP Staging Stage 1 Hypertension: 140-159 / 90-99    Type 2 diabetes mellitus (H)    Hyperlipidemia, unspecified hyperlipidemia type    CAD (coronary artery disease)    Atrial fibrillation with rapid ventricular response (H)    Diabetic leg ulcer (H)    Acute on chronic systolic CHF (congestive heart failure) (H)    A-fib (H)    Uncontrolled type 2 diabetes mellitus with hyperglycemia (H)    Acute respiratory failure with hypoxia (H)    On mechanically assisted ventilation (H)    S/P CABG (coronary artery bypass graft)    Seizures (H)    Cerebrovascular accident (CVA), unspecified mechanism (H)     Cerebrovascular accident (CVA) due to other mechanism (H)    Metabolic encephalopathy    Disorientation     LOS: 10 days       Recommendations:    Switch oral amiodarone to IV amiodarone    Use IV metoprolol to attempt rate control.  If unsuccessful in the patient's ventricular response is compromising his hemodynamics then consider IV diltiazem.      Subjective:  Mika Alvarez (70 y.o. male) is seen in follow-up for CABG x4, pulmonary vein isolation, and left atrial appendage excision.  The patient is postop day 5.  This is the first time I have met the patient and although he is responsive there is delay and his answers are quite short.  The staff stated there is does represent some change from his baseline mental status.  He has no complaints of palpitations or chest discomfort at this time.    Current Facility-Administered Medications   Medication Dose Route Frequency Provider Last Rate Last Admin   ? acetaminophen tablet 650 mg (TYLENOL)  650 mg Oral Q6H Emma Alexis PA-C   650 mg at 12/12/20 0826    Or   ? acetaminophen suppository 650 mg (TYLENOL)  650 mg Rectal Q6H Emma Alexis PA-C   650 mg at 12/11/20 0116   ? acetaminophen suppository 650 mg (TYLENOL)  650 mg Rectal Q6H PRN Emma Alexis PA-C       ? acetaminophen tablet 650 mg (TYLENOL)  650 mg Oral Q4H PRN Emma Alexis PA-C       ? acetylcysteine 200 mg/mL (20 %) solution 4 mL (MUCOMYST)  4 mL Inhalation Q8H - RT Deysi Littlejohn MD   4 mL at 12/12/20 0800   ? albuterol nebulizer solution 2.5 mg (PROVENTIL)  2.5 mg Nebulization Q8H - RT Deysi Littlejohn MD   2.5 mg at 12/12/20 0800   ? aluminum-magnesium hydroxide-simethicone 200-200-20 mg/5 mL suspension 30 mL (MAALOX ADVANCED)  30 mL Oral Q4H PRN Emma Alexis PA-C       ? amiodarone tablet 200 mg (PACERONE)  200 mg Oral BID Emma Alexis PA-C   200 mg at 12/12/20 0826   ? aspirin chewable tablet 81 mg  81 mg Enteral Tube DAILY Deysi Littlejohn MD   81 mg at 12/12/20 0826   ?  atorvastatin tablet 80 mg (LIPITOR)  80 mg Enteral Tube QHS Deysi Littlejohn MD   80 mg at 12/11/20 2001   ? bisacodyL EC tablet 10 mg (DULCOLAX)  10 mg Oral Once Emma Alexis PA-C       ? bisacodyL EC tablet 10 mg (DULCOLAX)  10 mg Oral Daily PRN Emma Alexis PA-C       ? bisacodyL suppository 10 mg (DULCOLAX)  10 mg Rectal Daily PRN Emma Alexis PA-C       ? chlorhexidine 0.12 % solution 15 mL (PERIDEX)  15 mL Topical Q12H 09-21 Deysi Littlejohn MD   15 mL at 12/11/20 2002   ? dextrose 5%  100 mL/hr Intravenous Continuous Hayley Leung MD 50 mL/hr at 12/12/20 0826 50 mL/hr at 12/12/20 0826   ? dextrose 50 % (D50W) syringe 20-50 mL  20-50 mL Intravenous Q15 Min PRN Ash Lowry PA-C       ? docusate sodium 50 mg/5 mL oral liquid 100 mg (COLACE)  100 mg Enteral Tube BID Deysi Littlejohn MD   100 mg at 12/10/20 0805   ? famotidine 20 mg injection  20 mg Intravenous BID Emanuel Madison MD   20 mg at 12/11/20 2001    Or   ? famotidine tablet 20 mg (PEPCID)  20 mg Oral BID Emanuel Madison MD   20 mg at 12/12/20 0826   ? glucagon (human recombinant) injection 1 mg  1 mg Subcutaneous Q15 Min PRN Ash Lowry PA-C       ? heparin (PF) ANTICOAGULANT subcutaneous injection 5,000 Units  5,000 Units Subcutaneous Q8H FIXED TIMES Emma Alexis PA-C   5,000 Units at 12/12/20 0650   ? hydrALAZINE injection 10-20 mg (APRESOLINE)  10-20 mg Intravenous Q6H PRN Emma Alexis PA-C   20 mg at 12/12/20 0506   ? insulin aspart U-100 injection pen (NovoLOG)   Subcutaneous Q4H FIXED TIMES Emma Alexis PA-C   9 Units at 12/12/20 0832   ? insulin glargine injection 10 Units (LANTUS)  10 Units Subcutaneous BID Damaris Martinez CNP       ? levETIRAcetam 500 mg in sodium chloride 0.9% 100 mL (KEPPRA)  500 mg Intravenous Q12H Tima Francis Rai,  mL/hr at 12/12/20 0826 500 mg at 12/12/20 0826   ? lidocaine (PF) 10 mg/mL (1 %) injection 1-5 mL (XYLOCAINE-MPF)  1-5 mL  Intradermal Once PRN Damaris Martinez CNP       ? magnesium hydroxide suspension 30 mL (MILK OF MAG)  30 mL Oral DAILY Emma Alexis PA-C   30 mL at 12/10/20 0805   ? magnesium hydroxide suspension 30 mL (MILK OF MAG)  30 mL Oral Daily PRN Emma Alexis PA-C       ? melatonin tablet 3 mg  3 mg Enteral Tube QHS Deysi Littlejohn MD   3 mg at 12/11/20 2002   ? metoprolol tartrate injection 2.5-5 mg (LOPRESSOR)  2.5-5 mg Intravenous Q5 Min PRN Emma Alexis PA-C   5 mg at 12/12/20 0429   ? metoprolol tartrate tablet 12.5 mg (LOPRESSOR)  12.5 mg Oral 0330, 1300, 1700 - CV Metoprolol Damaris Martinez CNP   12.5 mg at 12/12/20 0415   ? metoprolol tartrate tablet 50 mg (LOPRESSOR)  50 mg Oral BID Damaris Martinez CNP       ? nitroprusside 50 mg/250 ml in NS (0.2 mg/ml)  0.25-5 mcg/kg/min Intravenous Continuous Emanuel Madison MD       ? piperacillin-tazobactam 3.375 g in NaCl 0.9 % 50 mL (MINI-BAG Plus) (ZOSYN)  3.375 g Intravenous Q8H Jayla Chacko CNP 12.5 mL/hr at 12/12/20 0827 3.375 g at 12/12/20 0827   ? polyethylene glycol packet 17 g (MIRALAX)  17 g Enteral Tube DAILY Deysi Littlejohn MD   17 g at 12/10/20 0805   ? QUEtiapine tablet 25 mg (SEROquel)  25 mg Oral Bedtime PRN Damaris Martinez CNP       ? sodium chloride 0.65 % nasal spray 1-2 spray (OCEAN)  1-2 spray Each Nare Q1H PRN Emma Alexis PA-C       ? sodium chloride bacteriostatic 0.9 % injection 1-5 mL  1-5 mL Intradermal Once PRN Damaris Martinez CNP       ? sodium chloride flush 10 mL (NS)  10 mL Intravenous Q8H FIXED TIMES Emanuel Madison MD   10 mL at 12/12/20 0600   ? sodium chloride flush 10 mL (NS)  10 mL Intravenous PRN Emanuel Madison MD       ? sodium chloride flush 10 mL (NS)  10 mL Intravenous PRN Emanuel Madison MD       ? sodium chloride flush 3 mL (NS)  3 mL Intravenous Q8H FIXED TIMES Emanuel Madison MD   3 mL at 12/12/20 0600   ? sodium chloride flush 3 mL (NS)  3 mL Intravenous  PRN Emanuel Madison MD       ? sodium phosphates 133 mL rectal enema 1 enema (for FLEET)  1 enema Rectal Once PRN Emma Alexis PA-C       ? traMADoL tablet 50 mg (ULTRAM)  50 mg Oral Q6H PRN Damaris Martinez CNP       ? traZODone tablet 50 mg (DESYREL)  50 mg Oral Bedtime PRN Emma Alexis PA-C       ? vancomycin 1,500 mg in sodium chloride 0.9% 500 mL 1,500 mg (VANCOCIN)  1,500 mg Intravenous Q12H Emma Alexis PA-C   1,500 mg at 12/12/20 0218       Objective:   Vital signs in last 24 hours:  Temp:  [97.5  F (36.4  C)-99.7  F (37.6  C)] 99.7  F (37.6  C)  Heart Rate:  [100-138] 138  Resp:  [18-20] 20  BP: (107-195)/() 131/62  Weight:   206 lb 14.4 oz (93.8 kg)   Weight change: -3 lb 6.4 oz (-1.542 kg)      Physical Exam:  General: The patient is alert and responds to verbal stimuli and will give short answers to questions.  The patient is in no acute distress at the time of my evaluation.  Eyes: Pupils are equal, round, and reactive to light.  Conjunctiva and sclera are clear.  ENT: Oral mucosa is moist and without redness. No evident nasal discharge.  Pulmonary: Lungs are notable for good airflow bilaterally, and some scattered rhonchi, but no rales.    Cardiovascular exam: Rhythm is irregular and tachycardic.  S1 and S2 are normal. No apparent marlo. No significant murmur is present. JVP is normal. Lower extremities demonstrate no significant edema. Distal pulses are intact bilaterally.  Abdomen is flat, soft, and nontender.  Musculoskeletal: Spine is straight. Extremities without deformity.  Neuro: Gait is not observed as the patient is at bedrest.     Skin is warm, dry, with chronic scaling and discoloration of the lower extremities bilaterally.          Cardiographics:   Cardiac telemetry, personally reviewed demonstrates atrial fibrillation with elevated ventricular response between  bpm with IVCD.    Radiology:      Lab Results:   Lab Results   Component Value Date     (H)  12/12/2020    K 4.2 12/12/2020     (H) 12/12/2020    CO2 23 12/12/2020    BUN 29 (H) 12/12/2020    CREATININE 0.92 12/12/2020    CALCIUM 8.1 (L) 12/12/2020     Lab Results   Component Value Date    WBC 14.3 (H) 12/12/2020    HGB 11.7 (L) 12/12/2020    HCT 37.9 (L) 12/12/2020    MCV 86 12/12/2020     12/12/2020     Lab Results   Component Value Date    INR 1.41 (H) 12/08/2020       Outside record review:

## 2021-06-30 NOTE — PROGRESS NOTES
Progress Notes by Harika Camacho MD at 12/10/2020 12:21 PM     Author: Harika Camacho MD Service: Cardiology Author Type: Physician    Filed: 12/10/2020  1:13 PM Date of Service: 12/10/2020 12:21 PM Status: Signed    : Harika Camacho MD (Physician)         HEART CARE NOTE          Assessment/Recommendations     1. HFrEF/Bi-ventricular dysfunction 2/2 ICM  Assessment / Plan    Presented in ADHF now s/p CABG x 4 + PVI/LAAL      Progressing well in the post-operative period --> s/p extubation and currently hemodynamically stable off pressors/inotropes    Hypervolemic on physical exam --> started on IV diuresis and approaching euvolemia; titrate to goal UOP to be bet negative 1-2L over 24 hours    Would hold BBlockers (metoprolol) and CChannel blockers (nicardipine) until euvolemic given biventricular dysfunction and volume overload; if increased afterload is a concern, recommend starting nitroprusside      2. Multi-vessel CAD  Assessment / Plan    S/p CABG    Continue high intensity atorvastatin, ASA; continue to hold BBlocker and losartan until extubated, euvolemic, and hemodynamically stable without epi/pressor requirements     3. Atrial fibrillation  Assessment / Plan    S/p PVI and LAAL; currently in afib and rate controlled on amiodarone gtt; consider IV digoxin bolus if RVR recurs     4. HLP  Assessment / Plan    Continue high intensity atorvastatin     5. DM2  Assessment / Plan    Management per primary team       History of Present Illness/Subjective      Mr. Mika Alvarez is a 70 y.o. male with a PMHx significant for multivessel coronary artery disease, chronic systolic congestive heart failure, recent diagnosis of atrial fibrillation, and type 2 diabetes requiring insulin who presents in ADHF c/b afib with RVR now s/p CABG + PVI + LAAL.     Mr. Alvarez is s/p extubation, but, although he is alert, he is not oriented. Unable to answer questions relevently     ECG:  Personally reviewed. atrial fibrillation, with RVR.     ECHO (personnaly Reviewed):    When compared to the previous study dated 11/1/2011, left ventricular systolic function is reduced.    Normal left ventricular size, borderline LVH, global hypokinesis. Left ventricle ejection fraction is moderately decreased. The calculated left ventricular ejection fraction is 42%.    Normal right ventricular size with mildly reduced right ventricular systolic function.    Moderately enlarged left atrium.    No obvious valvular disease.          Physical Examination Review of Systems   Vitals:    12/10/20 1200   BP:    Pulse: (!) 115   Resp: 20   Temp: 99.4  F (37.4  C)   SpO2: 97%     Body mass index is 30.42 kg/m .  Wt Readings from Last 3 Encounters:   12/10/20 212 lb (96.2 kg)   11/05/20 (!) 223 lb (101.2 kg)   10/08/20 221 lb 12.8 oz (100.6 kg)     General Appearance:   alert   ENT/Mouth: membranes moist, no oral lesions or bleeding gums.      EYES:  no scleral icterus, normal conjunctivae   Neck: no carotid bruits or thyromegaly   Chest/Lungs:   lungs are clear to auscultation, no rales or wheezing, equal chest wall expansion    Cardiovascular:   Regular. Normal first and second heart sounds with no rubs, or gallops; the carotid, radial and posterior tibial pulses are intact, + JVD and LE  edema bilaterally    Abdomen:  no organomegaly, masses, bruits, or tenderness; bowel sounds are present   Extremities: no cyanosis or clubbing   Skin: no xanthelasma, warm.    Neurologic: alert      Psychiatric: alert      A complete 10 systems ROS was reviewed  And is negative except what is listed in the HPI.          Medical History  Surgical History Family History Social History   Past Medical History:   Diagnosis Date   ? ACP Staging Stage 1 Hypertension: 140-159 / 90-99     Created by Conversion  Replacement Utility updated for latest IMO load   ? Atrial fibrillation with rapid ventricular response (H) 12/2/2020   ? Coronary Artery  Disease     Created by Conversion  Replacement Utility updated for latest IMO load   ? Joint Pain, Localized In The Shoulder     Created by Conversion    ? Obesity     Created by Conversion    ? Other and unspecified hyperlipidemia 4/27/2016   ? Type 2 Diabetes Mellitus With Complication     Created by Conversion     Past Surgical History:   Procedure Laterality Date   ? CV CORONARY ANGIOGRAM N/A 9/22/2020    Procedure: Coronary Angiogram;  Surgeon: Darin Perez MD;  Location: [facility] Cath Lab;  Service: Cardiology   ? CV LEFT HEART CATHETERIZATION WITH LEFT VENTRICULOGRAM N/A 9/22/2020    Procedure: Left Heart Catheterization with Left Ventriculogram;  Surgeon: Darin Perez MD;  Location: [facility] Cath Lab;  Service: Cardiology    no family history of premature coronary artery disease Social History     Socioeconomic History   ? Marital status:      Spouse name: Not on file   ? Number of children: Not on file   ? Years of education: Not on file   ? Highest education level: Not on file   Occupational History   ? Not on file   Social Needs   ? Financial resource strain: Not on file   ? Food insecurity     Worry: Not on file     Inability: Not on file   ? Transportation needs     Medical: Not on file     Non-medical: Not on file   Tobacco Use   ? Smoking status: Never Smoker   ? Smokeless tobacco: Never Used   Substance and Sexual Activity   ? Alcohol use: Yes     Frequency: Monthly or less     Drinks per session: 1 or 2     Comment: socially    ? Drug use: Never   ? Sexual activity: Not on file   Lifestyle   ? Physical activity     Days per week: Not on file     Minutes per session: Not on file   ? Stress: Not on file   Relationships   ? Social connections     Talks on phone: Not on file     Gets together: Not on file     Attends [employer] service: Not on file     Active member of club or organization: Not on file     Attends meetings of clubs or organizations: Not on file     Relationship  status: Not on file   ? Intimate partner violence     Fear of current or ex partner: Not on file     Emotionally abused: Not on file     Physically abused: Not on file     Forced sexual activity: Not on file   Other Topics Concern   ? Not on file   Social History Narrative   ? Not on file           Lab Results    Chemistry/lipid CBC Cardiac Enzymes/BNP/TSH/INR   Lab Results   Component Value Date    CHOL 134 11/15/2019    HDL 45 11/15/2019    LDLCALC 64 11/15/2019    TRIG 124 11/15/2019    CREATININE 0.77 12/10/2020    BUN 26 (H) 12/10/2020    K 3.5 12/10/2020     12/10/2020     (H) 12/10/2020    CO2 27 12/10/2020    Lab Results   Component Value Date    WBC 13.1 (H) 12/10/2020    HGB 11.6 (L) 12/10/2020    HCT 36.9 (L) 12/10/2020    MCV 84 12/10/2020     12/10/2020    Lab Results   Component Value Date    CKMB 3 10/31/2011    TROPONINI 1.60 (HH) 12/03/2020     (H) 12/02/2020    TSH 2.34 09/21/2020    INR 1.41 (H) 12/08/2020     Lab Results   Component Value Date    CKMB 3 10/31/2011    TROPONINI 1.60 (HH) 12/03/2020          Weight:    Wt Readings from Last 3 Encounters:   12/10/20 212 lb (96.2 kg)   11/05/20 (!) 223 lb (101.2 kg)   10/08/20 221 lb 12.8 oz (100.6 kg)       Allergies  No Known Allergies      Surgical History  Past Surgical History:   Procedure Laterality Date   ? CV CORONARY ANGIOGRAM N/A 9/22/2020    Procedure: Coronary Angiogram;  Surgeon: Darin Perez MD;  Location: [facility] Cath Lab;  Service: Cardiology   ? CV LEFT HEART CATHETERIZATION WITH LEFT VENTRICULOGRAM N/A 9/22/2020    Procedure: Left Heart Catheterization with Left Ventriculogram;  Surgeon: Darin Perez MD;  Location: [facility] Cath Lab;  Service: Cardiology       Social History  Tobacco:   Social History     Tobacco Use   Smoking Status Never Smoker   Smokeless Tobacco Never Used    Alcohol:   Social History     Substance and Sexual Activity   Alcohol Use Yes   ? Frequency: Monthly or less    ? Drinks per session: 1 or 2    Comment: socially     Illicit Drugs:   Social History     Substance and Sexual Activity   Drug Use Never       Family History  Family History   Problem Relation Age of Onset   ? Heart disease Father           Shalonda Camacho on 12/10/2020      cc: Angel Claire MD,

## 2021-06-30 NOTE — PROGRESS NOTES
Progress Notes by Harika Camacho MD at 12/4/2020  9:29 AM     Author: Harika Camacho MD Service: Cardiology Author Type: Physician    Filed: 12/4/2020 11:46 AM Date of Service: 12/4/2020  9:29 AM Status: Signed    : Harika Camacho MD (Physician)         HEART CARE NOTE          Assessment/Recommendations     1. HFrEF/Bi-ventricular dysfunction 2/2 ICM  Assessment / Plan    Presents hypervolemic in ADHF with inadequate UOP overnight --> will bolus with furosemide and start gtt     Exacerbation in the setting of known multi-vessel CAD as detailed on coronary angiogram on 9/22/20 --> will continue to optimize/decongest from a HF standpoint; Dr. Madison to review, CABG tentatively planned for early next week    GDMT as detailed below      Current Pharmacotherapy AHA Guideline-Directed Medical Therapy   Losartan 100 mg daily Lisinopril 20 mg twice daily   Metoprolol succinate 100 mg daily (patient is warm and wet) Carvedilol 25 mg twice daily   Spironolactone NA Spironolactone 25 mg once daily   Hydralazine NA Hydralazine 100 mg three times daily   Isosorbide dinitrate NA Isosorbide dinitrate 40 mg three times daily      2. Multi-vessel CAD  Assessment / Plan    Recent coronary angiogram significant for;  Angiogram:  LM:  Mild disease  RI:  Stent patent, moderate mid-RI disease  LCx:  Mild disease  LAD:  Long segment of stenosis across D1  RCA:  Severe ostial RCA stenosis with slow flow distally.  Collateral filling by LCx.  Patent stent in rPL.       Patient now presents in ADHF in addition to + troponin  --> will plan to optimize from HF standpoint --> CABG tentatively planned for early next week     Patient denies any chest pain or anginal equivalents    Continue heparin gtt, high intensity atorvastatin, ASA, metoprolol succinate, losartan     3. Atrial fibrillation  Assessment / Plan    Initially presented in RVR, now rate controlled    Continue heparin gtt     4.  HLP  Assessment / Plan    Continue high intensity atorvastatin     5. DM2  Assessment / Plan    Management per PMD    Currently on ISS, glipizide       History of Present Illness/Subjective      Mr. Mika Alvarez is a 70 y.o. male with a PMHx significant for multivessel coronary artery disease, chronic systolic congestive heart failure, recent diagnosis of atrial fibrillation, and type 2 diabetes requiring insulin who presents in ADHF c/b afib with RVR     Today, Mr. Alvarez denies any acute events or complaints     ECG: Personally reviewed. atrial fibrillation, with RVR.     ECHO (personnaly Reviewed):    When compared to the previous study dated 11/1/2011, left ventricular systolic function is reduced.    Normal left ventricular size, borderline LVH, global hypokinesis. Left ventricle ejection fraction is moderately decreased. The calculated left ventricular ejection fraction is 42%.    Normal right ventricular size with mildly reduced right ventricular systolic function.    Moderately enlarged left atrium.    No obvious valvular disease.          Physical Examination Review of Systems   Vitals:    12/04/20 0753   BP: 126/79   Pulse: 75   Resp: 18   Temp: 98.6  F (37  C)   SpO2: 93%     Body mass index is 32.43 kg/m .  Wt Readings from Last 3 Encounters:   12/04/20 (!) 226 lb (102.5 kg)   11/05/20 (!) 223 lb (101.2 kg)   10/08/20 221 lb 12.8 oz (100.6 kg)     General Appearance:   no distress, normal body habitus   ENT/Mouth: membranes moist, no oral lesions or bleeding gums.      EYES:  no scleral icterus, normal conjunctivae   Neck: no carotid bruits or thyromegaly   Chest/Lungs:   lungs are clear to auscultation, no rales or wheezing, equal chest wall expansion    Cardiovascular:   Irregular. Normal first and second heart sounds with no murmurs, rubs, or gallops; the carotid, radial and posterior tibial pulses are intact, + JVD and LE edema bilaterally    Abdomen:  no organomegaly, masses, bruits, or tenderness;  bowel sounds are present   Extremities: no cyanosis or clubbing   Skin: no xanthelasma, warm.    Neurologic: normal gait, normal  bilateral, no tremors     Psychiatric: alert and oriented x3, calm     A complete 10 systems ROS was reviewed  And is negative except what is listed in the HPI.          Medical History  Surgical History Family History Social History   Past Medical History:   Diagnosis Date   ? ACP Staging Stage 1 Hypertension: 140-159 / 90-99     Created by Conversion  Replacement Utility updated for latest IMO load   ? Atrial fibrillation with rapid ventricular response (H) 12/2/2020   ? Coronary Artery Disease     Created by Conversion  Replacement Utility updated for latest IMO load   ? Joint Pain, Localized In The Shoulder     Created by Conversion    ? Obesity     Created by Conversion    ? Other and unspecified hyperlipidemia 4/27/2016   ? Type 2 Diabetes Mellitus With Complication     Created by Conversion     Past Surgical History:   Procedure Laterality Date   ? CV CORONARY ANGIOGRAM N/A 9/22/2020    Procedure: Coronary Angiogram;  Surgeon: Darin Perez MD;  Location: [facility] Cath Lab;  Service: Cardiology   ? CV LEFT HEART CATHETERIZATION WITH LEFT VENTRICULOGRAM N/A 9/22/2020    Procedure: Left Heart Catheterization with Left Ventriculogram;  Surgeon: Darin Perez MD;  Location: [facility] Cath Lab;  Service: Cardiology    no family history of premature coronary artery disease Social History     Socioeconomic History   ? Marital status:      Spouse name: Not on file   ? Number of children: Not on file   ? Years of education: Not on file   ? Highest education level: Not on file   Occupational History   ? Not on file   Social Needs   ? Financial resource strain: Not on file   ? Food insecurity     Worry: Not on file     Inability: Not on file   ? Transportation needs     Medical: Not on file     Non-medical: Not on file   Tobacco Use   ? Smoking status: Never Smoker    ? Smokeless tobacco: Never Used   Substance and Sexual Activity   ? Alcohol use: Yes     Frequency: Monthly or less     Drinks per session: 1 or 2     Comment: socially    ? Drug use: Never   ? Sexual activity: Not on file   Lifestyle   ? Physical activity     Days per week: Not on file     Minutes per session: Not on file   ? Stress: Not on file   Relationships   ? Social connections     Talks on phone: Not on file     Gets together: Not on file     Attends Zoroastrianism service: Not on file     Active member of club or organization: Not on file     Attends meetings of clubs or organizations: Not on file     Relationship status: Not on file   ? Intimate partner violence     Fear of current or ex partner: Not on file     Emotionally abused: Not on file     Physically abused: Not on file     Forced sexual activity: Not on file   Other Topics Concern   ? Not on file   Social History Narrative   ? Not on file           Lab Results    Chemistry/lipid CBC Cardiac Enzymes/BNP/TSH/INR   Lab Results   Component Value Date    CHOL 134 11/15/2019    HDL 45 11/15/2019    LDLCALC 64 11/15/2019    TRIG 124 11/15/2019    CREATININE 0.75 12/03/2020    BUN 23 (H) 12/03/2020    K 3.7 12/03/2020     12/03/2020     12/03/2020    CO2 27 12/03/2020    Lab Results   Component Value Date    WBC 9.4 12/02/2020    HGB 14.7 12/02/2020    HCT 47.7 12/02/2020    MCV 87 12/02/2020     12/02/2020    Lab Results   Component Value Date    CKMB 3 10/31/2011    TROPONINI 1.60 (HH) 12/03/2020     (H) 12/02/2020    TSH 2.34 09/21/2020    INR 1.08 12/02/2020     Lab Results   Component Value Date    CKMB 3 10/31/2011    TROPONINI 1.60 (HH) 12/03/2020          Weight:    Wt Readings from Last 3 Encounters:   12/04/20 (!) 226 lb (102.5 kg)   11/05/20 (!) 223 lb (101.2 kg)   10/08/20 221 lb 12.8 oz (100.6 kg)       Allergies  No Known Allergies      Surgical History  Past Surgical History:   Procedure Laterality Date   ? CV  CORONARY ANGIOGRAM N/A 9/22/2020    Procedure: Coronary Angiogram;  Surgeon: Darin Perez MD;  Location: [facility] Cath Lab;  Service: Cardiology   ? CV LEFT HEART CATHETERIZATION WITH LEFT VENTRICULOGRAM N/A 9/22/2020    Procedure: Left Heart Catheterization with Left Ventriculogram;  Surgeon: Darin Perez MD;  Location: [facility] Cath Lab;  Service: Cardiology       Social History  Tobacco:   Social History     Tobacco Use   Smoking Status Never Smoker   Smokeless Tobacco Never Used    Alcohol:   Social History     Substance and Sexual Activity   Alcohol Use Yes   ? Frequency: Monthly or less   ? Drinks per session: 1 or 2    Comment: socially     Illicit Drugs:   Social History     Substance and Sexual Activity   Drug Use Never       Family History  Family History   Problem Relation Age of Onset   ? Heart disease Father           Shalonda Camacho on 12/4/2020      cc: Angel Claire MD,

## 2021-06-30 NOTE — PROGRESS NOTES
Progress Notes by Davis Marquez MD at 12/13/2020  2:00 PM     Author: Davis Marquez MD Service: Cardiology Author Type: Physician    Filed: 12/13/2020  2:06 PM Date of Service: 12/13/2020  2:00 PM Status: Signed    : Davis Marquez MD (Physician)          [facility] [place]  Cardiac Electrophysiology     Progress Note: Davis Marquez    Primary Care: Dr. Claire, Angel Huitron MD    Assessment:         Atrial fibrillation with RVR: Prior history of atrial fibrillation status post CABG + PVI + DONNA excision.  Patient was placed on IV amiodarone and beta-blocker to control rate and attempt to convert the patient.  Patient's ventricular response is somewhat improved but he remains in atrial fibrillation.  Patient needs a feeding tube prior to being able to switch over to oral medical therapy.    Postop day 6 status post CABG x4: Patient continues to be hemodynamically stable despite atrial fibrillation and comorbid conditions.    Recent CVA with mental status changes: The patient is high aspiration risk and attempts to place a feeding tube are continuing.    Principal Problem:    Acute on chronic clinical systolic heart failure (H)  Active Problems:    ACP Staging Stage 1 Hypertension: 140-159 / 90-99    Type 2 diabetes mellitus (H)    Hyperlipidemia, unspecified hyperlipidemia type    CAD (coronary artery disease)    Atrial fibrillation with rapid ventricular response (H)    Diabetic leg ulcer (H)    Acute on chronic systolic CHF (congestive heart failure) (H)    A-fib (H)    Uncontrolled type 2 diabetes mellitus with hyperglycemia (H)    Acute respiratory failure with hypoxia (H)    On mechanically assisted ventilation (H)    S/P CABG (coronary artery bypass graft)    Seizures (H)    Cerebrovascular accident (CVA), unspecified mechanism (H)    Cerebrovascular accident (CVA) due to other mechanism (H)    Metabolic encephalopathy    Disorientation     LOS: 11 days       Recommendations:    Continue  with IV amiodarone infusion at 0.5 mg/min    I modified the patient IV metoprolol dosing but would recommend continuing with a target heart rate of less than 100 bpm    Plan to switch over to oral medical therapy once the feeding tube is placed.    Subjective:  Mika Alvarez (70 y.o. male) is seen in follow-up for sustained atrial fibrillation with rapid ventricular response following complex cardiac surgery.  Patient is more alert and talkative today.  He indicates no shortness of breath.  He is not aware of his arrhythmia.  He offers no specific complaints about his current medications.    Current Facility-Administered Medications   Medication Dose Route Frequency Provider Last Rate Last Admin   ? acetaminophen tablet 650 mg (TYLENOL)  650 mg Oral Q6H Emma Alexis PA-C   650 mg at 12/12/20 0826    Or   ? acetaminophen suppository 650 mg (TYLENOL)  650 mg Rectal Q6H Emma Alexis PA-C   650 mg at 12/11/20 0116   ? acetaminophen suppository 650 mg (TYLENOL)  650 mg Rectal Q6H PRN Emma Alexis PA-C       ? acetaminophen tablet 650 mg (TYLENOL)  650 mg Oral Q4H PRN Emma Alexis PA-C       ? aluminum-magnesium hydroxide-simethicone 200-200-20 mg/5 mL suspension 30 mL (MAALOX ADVANCED)  30 mL Oral Q4H PRN Emma Alexis PA-C       ? amiodarone 900 mg in NaCl 0.9% (non-PVC) 500 mL (CORDARONE standard 24 hour infusion)  0.5 mg/min Intravenous Continuous Davis Marquez MD 16.7 mL/hr at 12/13/20 1226 0.5 mg/min at 12/13/20 1226   ? aspirin chewable tablet 81 mg  81 mg Enteral Tube DAILY Deysi Littlejohn MD   81 mg at 12/12/20 0826   ? aspirin suppository 150 mg  150 mg Rectal DAILY Damaris Martinez CNP       ? atorvastatin tablet 80 mg (LIPITOR)  80 mg Enteral Tube QHS Deysi Littlejohn MD   80 mg at 12/11/20 2001   ? bisacodyL EC tablet 10 mg (DULCOLAX)  10 mg Oral Once Emma Alexis PA-C       ? bisacodyL EC tablet 10 mg (DULCOLAX)  10 mg Oral Daily PRN Emma Alexis PA-C       ? bisacodyL  suppository 10 mg (DULCOLAX)  10 mg Rectal Daily PRN Emma Alexis PA-C       ? dextrose 5%  150 mL/hr Intravenous Continuous Hayley Leung  mL/hr at 12/13/20 1130 150 mL/hr at 12/13/20 1130   ? dextrose 50 % (D50W) syringe 20-50 mL  20-50 mL Intravenous Q15 Min PRN Ash Lowry PA-C       ? docusate sodium 50 mg/5 mL oral liquid 100 mg (COLACE)  100 mg Enteral Tube BID PRN Damaris Martinez CNP       ? famotidine 20 mg injection  20 mg Intravenous BID Emanuel Madison MD   20 mg at 12/13/20 0809    Or   ? famotidine tablet 20 mg (PEPCID)  20 mg Oral BID Emanuel Madison MD   20 mg at 12/12/20 0826   ? glucagon (human recombinant) injection 1 mg  1 mg Subcutaneous Q15 Min PRN Ash Lowry PA-C       ? heparin (PF) ANTICOAGULANT subcutaneous injection 5,000 Units  5,000 Units Subcutaneous Q8H FIXED TIMES Emma Alexis PA-C   5,000 Units at 12/13/20 0557   ? hydrALAZINE injection 10-20 mg (APRESOLINE)  10-20 mg Intravenous Q6H PRN Emma Alexis PA-C   20 mg at 12/12/20 0506   ? insulin aspart U-100 injection pen (NovoLOG)   Subcutaneous Q4H FIXED TIMES Amelie Gema, DO       ? insulin glargine injection 5 Units (LANTUS)  5 Units Subcutaneous DAILY AmelieGema gallagher, DO   5 Units at 12/13/20 1300   ? levETIRAcetam 500 mg in sodium chloride 0.9% 100 mL (KEPPRA)  500 mg Intravenous Q12H Tima Francis Rai,  mL/hr at 12/13/20 0756 500 mg at 12/13/20 0756   ? lidocaine (PF) 10 mg/mL (1 %) injection 1-5 mL (XYLOCAINE-MPF)  1-5 mL Intradermal Once PRN Damaris Martinez, CNP       ? magnesium hydroxide suspension 30 mL (MILK OF MAG)  30 mL Oral DAILY Emma Alexis PA-C   30 mL at 12/10/20 0805   ? magnesium hydroxide suspension 30 mL (MILK OF MAG)  30 mL Oral Daily PRN Emma Alexis PA-C       ? magnesium sulfate in water 2 gram/50 mL (4 %) IVPB 2 g  2 g Intravenous Once Damaris Martinez, CNP 25 mL/hr at 12/13/20 1221 2 g at 12/13/20 1221   ? melatonin tablet 3 mg  3  mg Enteral Tube QHS Deysi Littlejohn MD   3 mg at 12/11/20 2002   ? metoprolol tartrate injection 5 mg (LOPRESSOR)  5 mg Intravenous Q1H PRN Davis Marquez MD       ? [Held by provider] metoprolol tartrate tablet 12.5 mg (LOPRESSOR)  12.5 mg Oral 0330, 1300, 1700 - CV Metoprolol Damaris Martinez CNP   12.5 mg at 12/12/20 0415   ? [Held by provider] metoprolol tartrate tablet 50 mg (LOPRESSOR)  50 mg Oral BID Damaris Martinez CNP       ? piperacillin-tazobactam 3.375 g in NaCl 0.9 % 50 mL (MINI-BAG Plus) (ZOSYN)  3.375 g Intravenous Q8H Jayla Chacko CNP 12.5 mL/hr at 12/13/20 0815 3.375 g at 12/13/20 0815   ? polyethylene glycol packet 17 g (MIRALAX)  17 g Enteral Tube DAILY Deysi Littlejohn MD   17 g at 12/10/20 0805   ? sodium chloride 0.65 % nasal spray 1-2 spray (OCEAN)  1-2 spray Each Nare Q1H PRN Emma Alexis PA-C       ? sodium chloride bacteriostatic 0.9 % injection 1-5 mL  1-5 mL Intradermal Once PRN Damaris Martinez CNP       ? sodium chloride flush 10 mL (NS)  10 mL Intravenous Q8H FIXED TIMES Emanuel Madison MD   10 mL at 12/12/20 2127   ? sodium chloride flush 10 mL (NS)  10 mL Intravenous PRN Emanuel Madison MD       ? sodium chloride flush 10 mL (NS)  10 mL Intravenous PRN Emanuel Madison MD       ? sodium chloride flush 3 mL (NS)  3 mL Intravenous Q8H FIXED TIMES Emanuel Madison MD   3 mL at 12/12/20 2127   ? sodium chloride flush 3 mL (NS)  3 mL Intravenous PRN Emanuel Madison MD       ? sodium phosphates 133 mL rectal enema 1 enema (for FLEET)  1 enema Rectal Once PRN Emma Alexis PA-C       ? traMADol tablet 25 mg (ULTRAM)  25 mg Oral Q6H PRN Damaris Martinez CNP       ? traZODone tablet 50 mg (DESYREL)  50 mg Oral Bedtime PRN Emma Alexis PA-C       ? vancomycin 1,500 mg in sodium chloride 0.9% 500 mL 1,500 mg (VANCOCIN)  1,500 mg Intravenous Q12H Emma Alexis PA-C   1,500 mg at 12/13/20 0258   ? white petrolatum ointment  (AQUAPHOR NATURAL HEALING)   Topical QID Hayley Reilly MD           Objective:   Vital signs in last 24 hours:  Temp:  [97.5  F (36.4  C)-99  F (37.2  C)] 97.6  F (36.4  C)  Heart Rate:  [101-131] 117  Resp:  [18-23] 23  BP: (108-150)/() 129/85  FiO2 (%):  [70 %] 70 %  Weight:   206 lb 14.4 oz (93.8 kg)   Weight change:       Physical Exam:  General: The patient is alert oriented to person place and situation.  The patient is in no acute distress at the time of my evaluation.  Eyes: Pupils are equal, round, and reactive to light.  Conjunctiva and sclera are clear.  ENT: Oral mucosa is moist and without redness. No evident nasal discharge.  Pulmonary: Lungs are notable for good airflow bilaterally.  I do not appreciate any significant rales, rhonchi, or wheezes.    Cardiovascular exam: Rhythm is irregular. S1 and S2 are normal. No apparent marlo. No significant murmur is present. JVP is normal/mildly elevated.  Lower extremities demonstrate no significant edema. Distal pulses are intact bilaterally.  Abdomen is flat, soft, and nontender.  Musculoskeletal: Spine is straight. Extremities without deformity.  Neuro: Gait is unobserved with the patient is resting in a chair.     Skin is warm, dry, and otherwise intact.        Cardiographics:   Cardiac telemetry, personally reviewed demonstrates atrial fibrillation with ventricular response ranging between 100 to 120 bpm.  No significant pauses.  No significant ventricular arrhythmia.    Radiology:      Lab Results:   Lab Results   Component Value Date     (H) 12/13/2020    K 3.2 (L) 12/13/2020     (H) 12/13/2020    CO2 25 12/13/2020    BUN 20 12/13/2020    CREATININE 0.82 12/13/2020    CALCIUM 8.0 (L) 12/13/2020     Lab Results   Component Value Date    WBC 12.1 (H) 12/13/2020    HGB 11.1 (L) 12/13/2020    HCT 37.4 (L) 12/13/2020    MCV 88 12/13/2020     12/13/2020     Lab Results   Component Value Date    INR 1.41 (H) 12/08/2020        Outside record review:

## 2021-06-30 NOTE — PROGRESS NOTES
Progress Notes by Harika Camacho MD at 12/16/2020  9:18 AM     Author: Harika Camacho MD Service: Cardiology Author Type: Physician    Filed: 12/16/2020 11:58 AM Date of Service: 12/16/2020  9:18 AM Status: Addendum    : Harika Camacho MD (Physician)    Related Notes: Original Note by Harika Camacho MD (Physician) filed at 12/16/2020 11:57 AM         HEART CARE NOTE          Assessment/Recommendations     1. HFrEF/Bi-ventricular dysfunction 2/2 ICM  Assessment / Plan    Presented in ADHF now s/p CABG x 4 + PVI/LAAL      Progressing well in the post-operative period --> s/p extubation and currently hemodynamically stable off pressors/inotropes    Adequate response to daily 20 mg IV furosemide--> no changes to regimen today     Current Pharmacotherapy AHA Guideline-Directed Medical Therapy   Lisinopril on hold 2/2 borderline BP Lisinopril 20 mg twice daily   Metoprolol tartrate   -  25 mg two times a day until able to take PO Carvedilol 25 mg twice daily   Spironolactone - not started Spironolactone 25 mg once daily   Hydralazine NA Hydralazine 100 mg three times daily   Isosorbide dinitrate NA Isosorbide dinitrate 40 mg three times daily      2. Multi-vessel CAD  Assessment / Plan    S/p CABG    Continue high intensity atorvastatin, ASA and BBBBlocker (as detailed above)       3. Atrial fibrillation  Assessment / Plan    S/p PVI and LAAL; currently in afib metoprolol and amiodarone gtt; consider IV digoxin bolus if RVR recurs     4. HLP  Assessment / Plan    Continue high intensity atorvastatin     5. DM2  Assessment / Plan    Management per primary team         History of Present Illness/Subjective      Mr. Mika Alvarez is a 70 y.o. male with a PMHx significant for multivessel coronary artery disease, chronic systolic congestive heart failure, recent diagnosis of atrial fibrillation, and type 2 diabetes requiring insulin who presents in ADHF c/b afib with  RVR now s/p CABG + PVI + LAAL.     Mr. Alvarez is alert; no acute complaints     ECG: Personally reviewed. atrial fibrillation, with RVR.     ECHO (personnaly Reviewed):    When compared to the previous study dated 11/1/2011, left ventricular systolic function is reduced.    Normal left ventricular size, borderline LVH, global hypokinesis. Left ventricle ejection fraction is moderately decreased. The calculated left ventricular ejection fraction is 42%.    Normal right ventricular size with mildly reduced right ventricular systolic function.    Moderately enlarged left atrium.    No obvious valvular disease.          Physical Examination Review of Systems   Vitals:    12/16/20 0750   BP:    Pulse: 95   Resp:    Temp:    SpO2: 95%     Body mass index is 29.74 kg/m .  Wt Readings from Last 3 Encounters:   12/16/20 207 lb 4.8 oz (94 kg)   11/05/20 (!) 223 lb (101.2 kg)   10/08/20 221 lb 12.8 oz (100.6 kg)     General Appearance:   no distress, normal body habitus   ENT/Mouth: membranes moist, no oral lesions or bleeding gums.      EYES:  no scleral icterus, normal conjunctivae   Neck: no carotid bruits or thyromegaly   Chest/Lungs:   lungs are clear to auscultation, no rales or wheezing, equal chest wall expansion    Cardiovascular:   Irregular. Normal first and second heart sounds with no rubs, or gallops; the carotid, radial and posterior tibial pulses are intact, + JVD and 1-2+ LE edema bilaterally    Abdomen:  no organomegaly, masses, bruits, or tenderness; bowel sounds are present   Extremities: no cyanosis or clubbing   Skin: no xanthelasma, warm.    Neurologic: arousable     Psychiatric: arousable    A complete 10 systems ROS was reviewed  And is negative except what is listed in the HPI.          Medical History  Surgical History Family History Social History   Past Medical History:   Diagnosis Date   ? ACP Staging Stage 1 Hypertension: 140-159 / 90-99     Created by Conversion  Replacement Utility updated for  latest IMO load   ? Atrial fibrillation with rapid ventricular response (H) 12/2/2020   ? Coronary Artery Disease     Created by Conversion  Replacement Utility updated for latest IMO load   ? Joint Pain, Localized In The Shoulder     Created by Conversion    ? Obesity     Created by Conversion    ? Other and unspecified hyperlipidemia 4/27/2016   ? Type 2 Diabetes Mellitus With Complication     Created by Conversion     Past Surgical History:   Procedure Laterality Date   ? CV CORONARY ANGIOGRAM N/A 9/22/2020    Procedure: Coronary Angiogram;  Surgeon: Darin Perez MD;  Location: [facility] Cath Lab;  Service: Cardiology   ? CV LEFT HEART CATHETERIZATION WITH LEFT VENTRICULOGRAM N/A 9/22/2020    Procedure: Left Heart Catheterization with Left Ventriculogram;  Surgeon: Darin Perez MD;  Location: [facility] Cath Lab;  Service: Cardiology   ? PICC AND MIDLINE TEAM LINE INSERTION  12/14/2020         no family history of premature coronary artery disease Social History     Socioeconomic History   ? Marital status:      Spouse name: Not on file   ? Number of children: Not on file   ? Years of education: Not on file   ? Highest education level: Not on file   Occupational History   ? Not on file   Social Needs   ? Financial resource strain: Not on file   ? Food insecurity     Worry: Not on file     Inability: Not on file   ? Transportation needs     Medical: Not on file     Non-medical: Not on file   Tobacco Use   ? Smoking status: Never Smoker   ? Smokeless tobacco: Never Used   Substance and Sexual Activity   ? Alcohol use: Yes     Frequency: Monthly or less     Drinks per session: 1 or 2     Comment: socially    ? Drug use: Never   ? Sexual activity: Not on file   Lifestyle   ? Physical activity     Days per week: Not on file     Minutes per session: Not on file   ? Stress: Not on file   Relationships   ? Social connections     Talks on phone: Not on file     Gets together: Not on file     Attends  Anabaptist service: Not on file     Active member of club or organization: Not on file     Attends meetings of clubs or organizations: Not on file     Relationship status: Not on file   ? Intimate partner violence     Fear of current or ex partner: Not on file     Emotionally abused: Not on file     Physically abused: Not on file     Forced sexual activity: Not on file   Other Topics Concern   ? Not on file   Social History Narrative   ? Not on file           Lab Results    Chemistry/lipid CBC Cardiac Enzymes/BNP/TSH/INR   Lab Results   Component Value Date    CHOL 134 11/15/2019    HDL 45 11/15/2019    LDLCALC 64 11/15/2019    TRIG 124 11/15/2019    CREATININE 1.10 12/16/2020    BUN 16 12/16/2020    K 3.6 12/16/2020     12/16/2020     (H) 12/16/2020    CO2 21 (L) 12/16/2020    Lab Results   Component Value Date    WBC 13.8 (H) 12/16/2020    HGB 11.3 (L) 12/16/2020    HCT 37.0 (L) 12/16/2020    MCV 85 12/16/2020     12/16/2020    Lab Results   Component Value Date    CKMB 3 10/31/2011    TROPONINI 1.60 (HH) 12/03/2020     (H) 12/02/2020    TSH 2.34 09/21/2020    INR 1.41 (H) 12/08/2020     Lab Results   Component Value Date    CKMB 3 10/31/2011    TROPONINI 1.60 (HH) 12/03/2020          Weight:    Wt Readings from Last 3 Encounters:   12/16/20 207 lb 4.8 oz (94 kg)   11/05/20 (!) 223 lb (101.2 kg)   10/08/20 221 lb 12.8 oz (100.6 kg)       Allergies  No Known Allergies      Surgical History  Past Surgical History:   Procedure Laterality Date   ? CV CORONARY ANGIOGRAM N/A 9/22/2020    Procedure: Coronary Angiogram;  Surgeon: Darin Perez MD;  Location: [facility] Cath Lab;  Service: Cardiology   ? CV LEFT HEART CATHETERIZATION WITH LEFT VENTRICULOGRAM N/A 9/22/2020    Procedure: Left Heart Catheterization with Left Ventriculogram;  Surgeon: Darin Perez MD;  Location: [facility] Cath Lab;  Service: Cardiology   ? PICC AND MIDLINE TEAM LINE INSERTION  12/14/2020            Social  History  Tobacco:   Social History     Tobacco Use   Smoking Status Never Smoker   Smokeless Tobacco Never Used    Alcohol:   Social History     Substance and Sexual Activity   Alcohol Use Yes   ? Frequency: Monthly or less   ? Drinks per session: 1 or 2    Comment: socially     Illicit Drugs:   Social History     Substance and Sexual Activity   Drug Use Never       Family History  Family History   Problem Relation Age of Onset   ? Heart disease Father           Shalonda Camacho on 12/16/2020      cc: Angel Claire MD,

## 2021-06-30 NOTE — PROGRESS NOTES
Progress Notes by Harika Camacho MD at 12/19/2020  8:51 AM     Author: Harika Camacho MD Service: Cardiology Author Type: Physician    Filed: 12/19/2020 12:03 PM Date of Service: 12/19/2020  8:51 AM Status: Signed    : Harika Camacho MD (Physician)         HEART CARE NOTE          Assessment/Recommendations     1. HFrEF/Bi-ventricular dysfunction 2/2 ICM  Assessment / Plan    Presented in ADHF now s/p CABG x 4 + PVI/LAAL      Progressing well in the post-operative period --> s/p extubation and currently hemodynamically stable off pressors/inotropes    Continue IV furosemide (good response at increased dosing) until able to tolerate PO --> no changes to regimen today     Current Pharmacotherapy AHA Guideline-Directed Medical Therapy   Lisinopril on hold 2/2 borderline BP Lisinopril 20 mg twice daily   Metoprolol tartrate   -  25 mg two times a day until able to take PO Carvedilol 25 mg twice daily   Spironolactone - not started Spironolactone 25 mg once daily   Hydralazine NA Hydralazine 100 mg three times daily   Isosorbide dinitrate NA Isosorbide dinitrate 40 mg three times daily      2. Multi-vessel CAD  Assessment / Plan    S/p CABG    Continue high intensity atorvastatin, ASA and BBBBlocker (as detailed above)       3. Atrial fibrillation  Assessment / Plan    S/p PVI and LAAL; currently in afib metoprolol and amiodarone gtt; consider IV digoxin bolus if RVR recurs     4. HLP  Assessment / Plan    Continue high intensity atorvastatin     5. DM2  Assessment / Plan    Management per primary team         History of Present Illness/Subjective      Mr. Mika Alvarez is a 70 y.o. male with a PMHx significant for multivessel coronary artery disease, chronic systolic congestive heart failure, recent diagnosis of atrial fibrillation, and type 2 diabetes requiring insulin who presents in ADHF c/b afib with RVR now s/p CABG + PVI + LAAL.     Mr. Alvarez is alert; no acute  complaints     ECG: Personally reviewed. atrial fibrillation, with RVR.     ECHO (personnaly Reviewed):    When compared to the previous study dated 11/1/2011, left ventricular systolic function is reduced.    Normal left ventricular size, borderline LVH, global hypokinesis. Left ventricle ejection fraction is moderately decreased. The calculated left ventricular ejection fraction is 42%.    Normal right ventricular size with mildly reduced right ventricular systolic function.    Moderately enlarged left atrium.    No obvious valvular disease.          Physical Examination Review of Systems   Vitals:    12/19/20 0729   BP: 137/87   Pulse: 99   Resp: 18   Temp: 97.5  F (36.4  C)   SpO2: 94%     Body mass index is 28.97 kg/m .  Wt Readings from Last 3 Encounters:   12/19/20 201 lb 14.4 oz (91.6 kg)   11/05/20 (!) 223 lb (101.2 kg)   10/08/20 221 lb 12.8 oz (100.6 kg)     General Appearance:   no distress, normal body habitus   ENT/Mouth: membranes moist, no oral lesions or bleeding gums.      EYES:  no scleral icterus, normal conjunctivae   Neck: no carotid bruits or thyromegaly   Chest/Lungs:   lungs are clear to auscultation, no rales or wheezing, equal chest wall expansion    Cardiovascular:   Irregular. Normal first and second heart sounds with no murmurs, rubs, or gallops; the carotid, radial and posterior tibial pulses are intact, + JVD and trace - 1+ LE edema bilaterally    Abdomen:  no organomegaly, masses, bruits, or tenderness; bowel sounds are present   Extremities: no cyanosis or clubbing   Skin: no xanthelasma, warm.    Neurologic: alert and oriented x3, calm      Psychiatric: alert and oriented x3, calm     A complete 10 systems ROS was reviewed  And is negative except what is listed in the HPI.          Medical History  Surgical History Family History Social History   Past Medical History:   Diagnosis Date   ? ACP Staging Stage 1 Hypertension: 140-159 / 90-99     Created by Conversion  Replacement  Utility updated for latest IMO load   ? Atrial fibrillation with rapid ventricular response (H) 12/2/2020   ? Coronary Artery Disease     Created by Conversion  Replacement Utility updated for latest IMO load   ? Joint Pain, Localized In The Shoulder     Created by Conversion    ? Obesity     Created by Conversion    ? Other and unspecified hyperlipidemia 4/27/2016   ? Type 2 Diabetes Mellitus With Complication     Created by Conversion     Past Surgical History:   Procedure Laterality Date   ? CV CORONARY ANGIOGRAM N/A 9/22/2020    Procedure: Coronary Angiogram;  Surgeon: Darin Perez MD;  Location: [facility] Lab;  Service: Cardiology   ? CV LEFT HEART CATHETERIZATION WITH LEFT VENTRICULOGRAM N/A 9/22/2020    Procedure: Left Heart Catheterization with Left Ventriculogram;  Surgeon: Darin Perez MD;  Location: [facility] Cath Lab;  Service: Cardiology   ? PICC AND MIDLINE TEAM LINE INSERTION  12/14/2020         no family history of premature coronary artery disease Social History     Socioeconomic History   ? Marital status:      Spouse name: Not on file   ? Number of children: Not on file   ? Years of education: Not on file   ? Highest education level: Not on file   Occupational History   ? Not on file   Social Needs   ? Financial resource strain: Not on file   ? Food insecurity     Worry: Not on file     Inability: Not on file   ? Transportation needs     Medical: Not on file     Non-medical: Not on file   Tobacco Use   ? Smoking status: Never Smoker   ? Smokeless tobacco: Never Used   Substance and Sexual Activity   ? Alcohol use: Yes     Frequency: Monthly or less     Drinks per session: 1 or 2     Comment: socially    ? Drug use: Never   ? Sexual activity: Not on file   Lifestyle   ? Physical activity     Days per week: Not on file     Minutes per session: Not on file   ? Stress: Not on file   Relationships   ? Social connections     Talks on phone: Not on file     Gets together: Not  on file     Attends Taoist service: Not on file     Active member of club or organization: Not on file     Attends meetings of clubs or organizations: Not on file     Relationship status: Not on file   ? Intimate partner violence     Fear of current or ex partner: Not on file     Emotionally abused: Not on file     Physically abused: Not on file     Forced sexual activity: Not on file   Other Topics Concern   ? Not on file   Social History Narrative   ? Not on file           Lab Results    Chemistry/lipid CBC Cardiac Enzymes/BNP/TSH/INR   Lab Results   Component Value Date    CHOL 134 11/15/2019    HDL 45 11/15/2019    LDLCALC 64 11/15/2019    TRIG 124 12/18/2020    CREATININE 0.83 12/18/2020    BUN 14 12/18/2020    K 3.5 12/19/2020     12/18/2020     (H) 12/18/2020    CO2 26 12/18/2020    Lab Results   Component Value Date    WBC 12.5 (H) 12/18/2020    HGB 11.8 (L) 12/18/2020    HCT 39.4 (L) 12/18/2020    MCV 86 12/18/2020     12/18/2020    Lab Results   Component Value Date    CKMB 3 10/31/2011    TROPONINI 1.60 (HH) 12/03/2020     (H) 12/02/2020    TSH 2.34 09/21/2020    INR 1.41 (H) 12/08/2020     Lab Results   Component Value Date    CKMB 3 10/31/2011    TROPONINI 1.60 (HH) 12/03/2020          Weight:    Wt Readings from Last 3 Encounters:   12/19/20 201 lb 14.4 oz (91.6 kg)   11/05/20 (!) 223 lb (101.2 kg)   10/08/20 221 lb 12.8 oz (100.6 kg)       Allergies  No Known Allergies      Surgical History  Past Surgical History:   Procedure Laterality Date   ? CV CORONARY ANGIOGRAM N/A 9/22/2020    Procedure: Coronary Angiogram;  Surgeon: Darin Perez MD;  Location: [facility] Cath Lab;  Service: Cardiology   ? CV LEFT HEART CATHETERIZATION WITH LEFT VENTRICULOGRAM N/A 9/22/2020    Procedure: Left Heart Catheterization with Left Ventriculogram;  Surgeon: Darin Perez MD;  Location: [facility] Cath Lab;  Service: Cardiology   ? PICC AND MIDLINE TEAM LINE INSERTION   12/14/2020            Social History  Tobacco:   Social History     Tobacco Use   Smoking Status Never Smoker   Smokeless Tobacco Never Used    Alcohol:   Social History     Substance and Sexual Activity   Alcohol Use Yes   ? Frequency: Monthly or less   ? Drinks per session: 1 or 2    Comment: socially     Illicit Drugs:   Social History     Substance and Sexual Activity   Drug Use Never       Family History  Family History   Problem Relation Age of Onset   ? Heart disease Father           Shalonda Camacho on 12/19/2020      cc: Angel Claire MD,

## 2021-07-01 NOTE — CONSULTS
Consults by Ash Lowry PA-C at 12/4/2020  1:11 PM     Author: Ash Lowry PA-C Service: Cardiothoracic Surgery Author Type: Physician Assistant    Filed: 12/4/2020  2:03 PM Date of Service: 12/4/2020  1:11 PM Status: Attested    : Ash Lowry PA-C (Physician Assistant) Cosigner: Emanuel Madison MD at 12/5/2020  8:27 AM     Consult Orders    1. Inpatient consult to Cardiovascular and Thoracic Surgery Reason for Consult? Multivessel CAD; Did you contact the consulting MD? Yes; Consult priority: Today (routine); Communication for MD: No phone communication necessary for now [[id number]] ordered by Harika Camacho MD at 12/04/20 1148          Attestation signed by Emanuel Madison MD at 12/5/2020  8:27 AM    I saw and evaluated Mr. Alvarez. I reviewed his most recent echocardiogram and his previous coronary angiogram. We will obtain vein mapping of the bilateral lower extremities and we will check a Barbeau test on each forearm. We will plan coronary artery bypass grafting, bilateral pulmonary vein isolation, and left atrial appendage excision on Monday. He may need an IABP to wean off of cardiopulmonary bypass and so we will place a femoral arterial line at the beginning of the operation so the IABP can be placed intra-operatively if needed.    Emanuel Madison MD PhD                      Cardiothoracic Surgery Consult    Date of Service: 12/4/2020    REFERRING CARDIOLOGIST: Doug Bustillos MD    REASON FOR CONSULTATION: Mika Alvarez is a 70 y.o. year-old male with known severe multivessel coronary artery disease and and ischemic heart failure with chronic systolic dysfunction and persistent atrial fibrillation.        HISTORY OF PRESENT ILLNESS: Mika Alvarez is a 70 y.o. year-old man with coronary artery disease risk factors including hypertension, dyslipidemia, and type 2 diabetes mellitus. He presents with heart failure an new onset atrial  fibrillation. Echocardiography demonstrates a mild-moderately diminished LVEF consistent with chronic systolic dysfunction. Coronary angiography demonstrates severe multivessel coronary artery disease with diffusely diseased targets. He was started on Xarelto for anticoagulation for atrial fibrillation back in September 2020 and he has been on chronic dual antiplatelet therapy (Prasugrel and ASA) prior to the the Xarelto therapy.  Patient was evaluated in September 2020 and coronary artery revascularization was recommended however patient, however patient wanted to finish some construction work at his home to make the home suitable for his wife for as had a stroke.  His plan was to pursue coronary revascularization sometimes after October once his home is adapted to care for his wife.  He stated that recently he has been having progressive activity intolerance and decided that it was time to come to the hospital for coronary revascularization.  On presentation to the hospital, patient is noted to be in acute dilated heart failure with volume overload.  Currently on IV Lasix therapy for volume optimization preop.  Coronary revascularization is tentatively scheduled for Monday, December 7, 2020.    PAST MEDICAL HISTORY:   Past Medical History:   Diagnosis Date   ? ACP Staging Stage 1 Hypertension: 140-159 / 90-99     Created by Conversion  Replacement Utility updated for latest IMO load   ? Atrial fibrillation with rapid ventricular response (H) 12/2/2020   ? Coronary Artery Disease     Created by Conversion  Replacement Utility updated for latest IMO load   ? Joint Pain, Localized In The Shoulder     Created by Conversion    ? Obesity     Created by Conversion    ? Other and unspecified hyperlipidemia 4/27/2016   ? Type 2 Diabetes Mellitus With Complication     Created by Conversion        PAST SURGICAL HISTORY:   Past Surgical History:   Procedure Laterality Date   ? CV CORONARY ANGIOGRAM N/A 9/22/2020    Procedure:  Coronary Angiogram;  Surgeon: Darin Perez MD;  Location: [facility] Cath Lab;  Service: Cardiology   ? CV LEFT HEART CATHETERIZATION WITH LEFT VENTRICULOGRAM N/A 9/22/2020    Procedure: Left Heart Catheterization with Left Ventriculogram;  Surgeon: Darin Perez MD;  Location: [facility] Cath Lab;  Service: Cardiology       ALLERGIES:   No Known Allergies       CURRENT MEDICATIONS:  No current facility-administered medications on file prior to encounter.      Current Outpatient Medications on File Prior to Encounter   Medication Sig Dispense Refill   ? glipiZIDE (GLUCOTROL XL) 10 MG 24 hr tablet Take 2 tablets (20 mg total) by mouth once daily. (Patient taking differently: Take 10 mg by mouth once daily. ) 180 tablet 1   ? insulin glargine (LANTUS SOLOSTAR U-100 INSULIN) 100 unit/mL (3 mL) pen Inject 35 Units under the skin at bedtime. 15 mL 0   ? losartan (COZAAR) 100 MG tablet Take 1 tablet (100 mg total) by mouth daily. 90 tablet 2   ? metFORMIN (GLUCOPHAGE) 1000 MG tablet Take 1 tablet (1,000 mg total) by mouth 2 (two) times a day with meals. (Patient taking differently: Take 1,000 mg by mouth daily with breakfast. ) 180 tablet 0   ? metoprolol succinate (TOPROL-XL) 100 MG 24 hr tablet Take 1 tablet (100 mg total) by mouth 2 (two) times a day. (Patient taking differently: Take 100 mg by mouth daily. ) 90 tablet 1   ? nitroglycerin (NITROSTAT) 0.4 MG SL tablet Place 0.4 mg under the tongue every 5 (five) minutes as needed for chest pain.     ? amLODIPine (NORVASC) 10 MG tablet TAKE 1 TABLET BY MOUTH ONCE DAILY. OVERDUE FOR DIABETIC CHECK 90 tablet 3   ? aspirin 81 MG EC tablet Take 81 mg by mouth daily.     ? atorvastatin (LIPITOR) 80 MG tablet Take 1 tablet (80 mg total) by mouth daily. 30 tablet 0   ? blood glucose test strips Use 1 each As Directed 2 (two) times a day. Accu-Chek Guide test strips. Patient is on insulin. 200 strip 3   ? blood-glucose meter (ACCU-CHEK ADVANTAGE DIABETES) kit Test 4  "times daily.     ? bumetanide (BUMEX) 0.5 MG tablet Take 2 tablets (1 mg total) by mouth daily. 60 tablet 0   ? generic lancets Use 1 each As Directed 2 (two) times a day. Accu-Chek Fastclix Lancets. Patient is insulin dependant. 200 each 3   ? insulin lispro (HUMALOG KWIKPEN INSULIN) 100 unit/mL pen Inject 5 Units under the skin 3 (three) times a day with meals. 15 mL 0   ? NEEDLES, INSULIN DISPOSABLE (BD ULTRA-FINE ROBERT PEN NEEDLES MISC) use up to 4 times daily as directed.     ? pen needle, diabetic (BD ULTRA-FINE SHORT PEN NEEDLE) 31 gauge x 5/16\" Ndle USE UP TO 4 TIMES DAILY AS DIRECTED 300 each 0   ? rivaroxaban ANTICOAGULANT (XARELTO) 20 mg tablet Take 1 tablet (20 mg total) by mouth daily with supper. 30 tablet 0         FAMILY HISTORY:   Family History   Problem Relation Age of Onset   ? Heart disease Father      The family history was reviewed and was pertinent to the patient's disease/illness as noted above.    SOCIAL HISTORY:   Social History     Socioeconomic History   ? Marital status:      Spouse name: Not on file   ? Number of children: Not on file   ? Years of education: Not on file   ? Highest education level: Not on file   Occupational History   ? Not on file   Social Needs   ? Financial resource strain: Not on file   ? Food insecurity     Worry: Not on file     Inability: Not on file   ? Transportation needs     Medical: Not on file     Non-medical: Not on file   Tobacco Use   ? Smoking status: Never Smoker   ? Smokeless tobacco: Never Used   Substance and Sexual Activity   ? Alcohol use: Yes     Frequency: Monthly or less     Drinks per session: 1 or 2     Comment: socially    ? Drug use: Never   ? Sexual activity: Not on file   Lifestyle   ? Physical activity     Days per week: Not on file     Minutes per session: Not on file   ? Stress: Not on file   Relationships   ? Social connections     Talks on phone: Not on file     Gets together: Not on file     Attends [employer] service: Not on " "file     Active member of club or organization: Not on file     Attends meetings of clubs or organizations: Not on file     Relationship status: Not on file   ? Intimate partner violence     Fear of current or ex partner: Not on file     Emotionally abused: Not on file     Physically abused: Not on file     Forced sexual activity: Not on file   Other Topics Concern   ? Not on file   Social History Narrative   ? Not on file         REVIEW OF SYSTEMS:  Review of Systems - Negative except as noted above.  A complete 10 point review of systems was obtained and is negative other than the above stated complaints    PHYSICAL EXAMINATION:   Vitals: /68 (Patient Position: Sitting)   Pulse 69   Temp 98  F (36.7  C) (Oral)   Resp 18   Ht 5' 10\" (1.778 m)   Wt (!) 226 lb (102.5 kg)   SpO2 93%   BMI 32.43 kg/m    GENERAL:  Well developed and well nourished   HEENT: conjunctiva anicteric and sclera clear   NECK: Neck supple. No adenopathy. Thyroid symmetric, normal size.  CARDIOVASCULAR: irregularly irregular rhythm with rate 69, atrial fibrillation  RESPIRATIONS: chest clear, no wheezing, rales, normal symmetric air entry, Heart exam - S1, S2 normal, no murmur, no gallop, rate regular  ABDOMEN: soft, non-tender; bowel sounds normal; no masses,  no organomegalyno masses palpable and soft, non-tender   EXTREMITIES: Bilateral lower extremity ulcers and bilateral thigh scleroderma-like  skin; with nonpitting edema  NEUROLOGIC: intact and symmetric with no focal deficits.   PSYCHIATRIC: alert and oriented x3, pleasant     LABORATORY STUDIES:   Lab Results   Component Value Date    WBC 9.4 12/02/2020    HGB 14.7 12/02/2020    HCT 47.7 12/02/2020    MCV 87 12/02/2020     12/02/2020      Lab Results   Component Value Date    CREATININE 0.75 12/03/2020    BUN 23 (H) 12/03/2020     12/03/2020    K 3.7 12/03/2020     12/03/2020    CO2 27 12/03/2020     Lab Results   Component Value Date    HGBA1C 12.6 (H) " 12/03/2020       EKG: unchanged from previous tracings, atrial fibrillation, rate 69.    CARDIAC CATHETERIZATION: This study was personally reviewed by me  LM:  Mild disease  RI:  Stent patent, moderate mid-RI disease  LCx:  Mild disease  LAD:  Long segment of stenosis across D1  RCA:  Severe ostial RCA stenosis with slow flow distally.  Collateral filling by LCx.  Patent stent in rPL.      TRANSTHORACIC ECHOCARDIOGRAM: This study was personally reviewed by me    Technically difficult study. Definity contrast utilized.    Normal left ventricular size. Mild to moderate left ventricular hypertrophy.    Left ventricle ejection fraction is moderately globally reduced. Left ventricular ejection fraction estimated at 30 to 35%. Abnormal septal motion.    Right ventricle not optimally visualized. Right ventricular systolic function is reduced. Abnormal TAPSE    Moderate left atrial enlargement. Mild right atrial enlargement.    Aortic valve sclerosis without Doppler evidence to suggest aortic stenosis. Mild aortic insufficiency.    Nonspecific thickening of the mitral valve leaflets with mild mitral regurgitation.    Mild tricuspid regurgitation.    Mild enlargement of the aortic root    When compared to the previous study dated 9/20/2020, the prior study was also technically challenging. Left ventricular function remains reduced and appears potentially mildly more prominently reduced on the current examination.    IMPRESSION AND PLAN: Mika Alvarez is a 70 y.o. year-old male with severe multivessel coronary artery disease and ischemic heart failure with chronic systolic dysfunction and persistent atrial fibrillation rate 67-75. Back in September 2020, Dr. Emanuel Madison MD, PhD recommend coronary artery bypass grafting, Craig IV maze, and left atrial appendage excision. I discussed the technical details of the procedure with the patient, as well as the expected postoperative course and recovery. The risks include but  are not limited to bleeding, infection, stroke, heart or graft failure, dysrhythmia, respiratory failure, kidney or liver injury, bowel or limb ischemia, and death. The patient's last calculated STS risk for mortality was <2%, and the calculated risk of major morbidity or mortality 10-20%. The patient understands these risks and wishes to undergo the operation during this hospitalization.     The patient is not having chest pain and his heart failure symptoms are much less with medical optimization since admission and the diuretic therapy.  We will keep the patient off Xarelto till after coronary revascularization.  Surgery is tentatively scheduled for Monday, December 7, 2020 with plans to explore the right thigh for saphenous vein graft conduit and also left radial right radial artery harvest.  Patient has bilateral lower extremity ulcers below disease and will try to avoid having seen veins below the knees.  Vein mapping showed left greater saphenous vein to be too big to be used for bypass conduit      Thank you very much for this referral.       Ash Lowry PA-C  12/4/2020 1:12 PM    [phone]

## 2021-07-01 NOTE — CONSULTS
Consults by Harika Camacho MD at 12/3/2020  9:35 AM     Author: Harika Camacho MD Service: Cardiology Author Type: Physician    Filed: 12/3/2020 11:25 AM Date of Service: 12/3/2020  9:35 AM Status: Addendum    : Harika Camacho MD (Physician)    Related Notes: Original Note by Harika Camacho MD (Physician) filed at 12/3/2020 11:24 AM     Consult Orders    1. Inpatient consult to Cardiology Reason for Consult? Severe coronary artery disease with acute on chronic systolic congestive heart failure and elevated troponin; Did you contact the consulting MD? No; Consult priority: Tomorrow (routine); Communic... [[id number]] ordered by Cristopher Jean MD at 12/02/20 1728               HEART CARE NOTE        Thank you, Dr. Longoria, for asking the [facility] Heart Care team to see Mika Alvarez to evaluate ADHF.      Assessment/Recommendations     1. HFrEF/Bi-ventricular dysfunction 2/2 ICM  Assessment / Plan    Presents hypervolemic in ADHF. However, good response to current strategy --> no changes    Exacerbation int he setting of known multi-vessel CAD as detailed on coronary angiogram on 9/22/20 --> will optimize/decongest from a HF standpoint and consult CTS regarding OHS timing;  Goal for intervention this admission given CAD and multiple episodes of decompensation within the past few months which do not juan francisco well for long term survial    GDMT as detailed below     Current Pharmacotherapy AHA Guideline-Directed Medical Therapy   Losartan 100 mg daily Lisinopril 20 mg twice daily   Metoprolol succinate 100 mg daily (patient is warm and wet) Carvedilol 25 mg twice daily   Spironolactone NA Spironolactone 25 mg once daily   Hydralazine NA Hydralazine 100 mg three times daily   Isosorbide dinitrate NA Isosorbide dinitrate 40 mg three times daily     2. Multi-vessel CAD  Assessment / Plan    Recent coronary angiogram significant for;  Angiogram:  LM:  Mild  disease  RI:  Stent patent, moderate mid-RI disease  LCx:  Mild disease  LAD:  Long segment of stenosis across D1  RCA:  Severe ostial RCA stenosis with slow flow distally.  Collateral filling by LCx.  Patent stent in rPL.       Patient now presents in ADHF in addition to + troponin  --> will plan to optimize from HF standpoint --> will reach out to CTS regarding timing of CABG    Patient denies any chest pain or anginal equivalents    Continue heparin gtt, high intensity atorvastatin, ASA, metoprolol succinate, losartan    3. Atrial fibrillation  Assessment / Plan    Initially presented in RVR, now rate controlled    Continue heparin gtt    4. HLP  Assessment / Plan    Continue high intensity atorvastatin    5. DM2  Assessment / Plan    Management per PMD    Currently on ISS, glipizide     History of Present Illness/Subjective    Mr. Mika Alvarez is a 70 y.o. male with a PMHx significant for multivessel coronary artery disease, chronic systolic congestive heart failure, recent diagnosis of atrial fibrillation, and type 2 diabetes requiring insulin who presents in ADHF c/b afib with RVR    Today, Mr. Alvarez reports that he hasn't had a good night's sleep in the last 4 months 2/2 orthopnea and LE swelling/pain. He reports that this has been progressive since last discharge, but he sees significant improvement since IV diuresis. He was able to sleep last night with less orthopnea and the fluid in his extremities has significantly improved. He reports that he's amenable to CABG during this admission.     ECG: Personally reviewed. atrial fibrillation, with RVR.    ECHO (personnaly Reviewed):    When compared to the previous study dated 11/1/2011, left ventricular systolic function is reduced.    Normal left ventricular size, borderline LVH, global hypokinesis. Left ventricle ejection fraction is moderately decreased. The calculated left ventricular ejection fraction is 42%.    Normal right ventricular size with mildly  reduced right ventricular systolic function.    Moderately enlarged left atrium.    No obvious valvular disease.        Physical Examination Review of Systems   Vitals:    12/03/20 0715   BP: 122/69   Pulse: 87   Resp: 20   Temp: 97.8  F (36.6  C)   SpO2: 92%     Body mass index is 32 kg/m .  Wt Readings from Last 3 Encounters:   12/03/20 (!) 223 lb (101.2 kg)   11/05/20 (!) 223 lb (101.2 kg)   10/08/20 221 lb 12.8 oz (100.6 kg)     General Appearance:   no distress, normal body habitus   ENT/Mouth: membranes moist, no oral lesions or bleeding gums.      EYES:  no scleral icterus, normal conjunctivae   Neck: no carotid bruits or thyromegaly   Chest/Lungs:   lungs are clear to auscultation, no rales or wheezing, equal chest wall expansion    Cardiovascular:   IIregular. Normal first and second heart sounds with no murmurs, rubs, or gallops; the carotid, radial and posterior tibial pulses are intact, + JVD and 1-2+ LE edema bilaterally    Abdomen:  no organomegaly, masses, bruits, or tenderness; bowel sounds are present   Extremities: no cyanosis or clubbing   Skin: no xanthelasma, warm.    Neurologic: normal gait, normal  bilateral, no tremors     Psychiatric: alert and oriented x3, calm     A complete 10 systems ROS was reviewed  And is negative except what is listed in the HPI.          Medical History  Surgical History Family History Social History   Past Medical History:   Diagnosis Date   ? ACP Staging Stage 1 Hypertension: 140-159 / 90-99     Created by Conversion  Replacement Utility updated for latest IMO load   ? Atrial fibrillation with rapid ventricular response (H) 12/2/2020   ? Coronary Artery Disease     Created by Conversion  Replacement Utility updated for latest IMO load   ? Joint Pain, Localized In The Shoulder     Created by Conversion    ? Obesity     Created by Conversion    ? Other and unspecified hyperlipidemia 4/27/2016   ? Type 2 Diabetes Mellitus With Complication     Created by  Conversion     Past Surgical History:   Procedure Laterality Date   ? CV CORONARY ANGIOGRAM N/A 9/22/2020    Procedure: Coronary Angiogram;  Surgeon: Darin Perez MD;  Location: [facility] Cath Lab;  Service: Cardiology   ? CV LEFT HEART CATHETERIZATION WITH LEFT VENTRICULOGRAM N/A 9/22/2020    Procedure: Left Heart Catheterization with Left Ventriculogram;  Surgeon: Darin Perez MD;  Location: [facility] Cath Lab;  Service: Cardiology    no family history of premature coronary artery disease Social History     Socioeconomic History   ? Marital status:      Spouse name: Not on file   ? Number of children: Not on file   ? Years of education: Not on file   ? Highest education level: Not on file   Occupational History   ? Not on file   Social Needs   ? Financial resource strain: Not on file   ? Food insecurity     Worry: Not on file     Inability: Not on file   ? Transportation needs     Medical: Not on file     Non-medical: Not on file   Tobacco Use   ? Smoking status: Never Smoker   ? Smokeless tobacco: Never Used   Substance and Sexual Activity   ? Alcohol use: Yes     Frequency: Monthly or less     Drinks per session: 1 or 2     Comment: socially    ? Drug use: Never   ? Sexual activity: Not on file   Lifestyle   ? Physical activity     Days per week: Not on file     Minutes per session: Not on file   ? Stress: Not on file   Relationships   ? Social connections     Talks on phone: Not on file     Gets together: Not on file     Attends [employer] service: Not on file     Active member of club or organization: Not on file     Attends meetings of clubs or organizations: Not on file     Relationship status: Not on file   ? Intimate partner violence     Fear of current or ex partner: Not on file     Emotionally abused: Not on file     Physically abused: Not on file     Forced sexual activity: Not on file   Other Topics Concern   ? Not on file   Social History Narrative   ? Not on file           Lab  Results    Chemistry/lipid CBC Cardiac Enzymes/BNP/TSH/INR   Lab Results   Component Value Date    CHOL 134 11/15/2019    HDL 45 11/15/2019    LDLCALC 64 11/15/2019    TRIG 124 11/15/2019    CREATININE 0.75 12/03/2020    BUN 23 (H) 12/03/2020    K 3.7 12/03/2020     12/03/2020     12/03/2020    CO2 27 12/03/2020    Lab Results   Component Value Date    WBC 9.4 12/02/2020    HGB 14.7 12/02/2020    HCT 47.7 12/02/2020    MCV 87 12/02/2020     12/02/2020    Lab Results   Component Value Date    CKMB 3 10/31/2011    TROPONINI 1.60 (HH) 12/03/2020     (H) 12/02/2020    TSH 2.34 09/21/2020    INR 1.08 12/02/2020     Lab Results   Component Value Date    CKMB 3 10/31/2011    TROPONINI 1.60 (HH) 12/03/2020          Weight:    Wt Readings from Last 3 Encounters:   12/03/20 (!) 223 lb (101.2 kg)   11/05/20 (!) 223 lb (101.2 kg)   10/08/20 221 lb 12.8 oz (100.6 kg)       Allergies  No Known Allergies      Surgical History  Past Surgical History:   Procedure Laterality Date   ? CV CORONARY ANGIOGRAM N/A 9/22/2020    Procedure: Coronary Angiogram;  Surgeon: Darin Perez MD;  Location: [facility] Cath Lab;  Service: Cardiology   ? CV LEFT HEART CATHETERIZATION WITH LEFT VENTRICULOGRAM N/A 9/22/2020    Procedure: Left Heart Catheterization with Left Ventriculogram;  Surgeon: Darin Perez MD;  Location: [facility] Cath Lab;  Service: Cardiology       Social History  Tobacco:   Social History     Tobacco Use   Smoking Status Never Smoker   Smokeless Tobacco Never Used    Alcohol:   Social History     Substance and Sexual Activity   Alcohol Use Yes   ? Frequency: Monthly or less   ? Drinks per session: 1 or 2    Comment: socially     Illicit Drugs:   Social History     Substance and Sexual Activity   Drug Use Never       Family History  Family History   Problem Relation Age of Onset   ? Heart disease Father           Shalonda Camacho on 12/3/2020      cc: Angel Claire MD,

## 2021-07-04 NOTE — ADDENDUM NOTE
Addendum Note by Silvano Green MD at 2/2/2021 10:13 PM     Author: Silvano Green MD Service: -- Author Type: Physician    Filed: 2/2/2021 10:13 PM Date of Service: 2/2/2021 10:13 PM Status: Signed    : Silvano Green MD (Physician)       Addendum  created 02/02/21 2213 by Silvano Green MD    Intraprocedure Staff edited

## 2021-07-14 PROBLEM — I50.1 PULMONARY EDEMA CARDIAC CAUSE (H): Status: RESOLVED | Noted: 2020-09-19 | Resolved: 2021-03-18

## 2022-08-17 NOTE — PROGRESS NOTES
[facility]     Medicine Progress Note - Hospitalist Service       Date of Admission:  12/28/2020  Assessment & Plan        Mika Alvarez is a 70 year old man with a past medical history of systolic heart failure, A-fib, DM type 2, severe multivessel CAD, who presented 12/2/20 with acute systolic heart failure exacerbation required diuresis then underwent 4 vessel CABG 12/7/20, course complicated by severe dysphagia, possible sieuzre like activity left frontal lobe stroke, sepsis, acute hypoxia with small bi apical pneumothoraces and, altered mental status. Admitted to acute rehab unit 12/28/20 for ongoing rehabilitation and medical management. Internal medicine consulted for co-management.   Individual problems and their management are outlined below        #Multivessel CAD S/P CABG  #Acute diastolic and systolic heart failure, improving  - Midline incision healing well  -Reduce torsemide to 20 mg daily, Daily weights ( weight as of 12/31 at 177 lbs) - Was 175 on 12/29  BNP on 12/31 at 1732 (  -Monitor BMP Bi weekly   - Reduce metoprolol to 12.5 BID due to asymptomatic hypotension   - ASA  - Atorvastatin  - Echo on 12/25:  The left ventricular wall motion is globally hypokinetic. The estimated  left ventricular ejection fraction is 35%. This represents a moderately  decreased ejection fraction.The right ventricle is mildly dilated and its systolic function is  severely reduced. TAPSE is abnormal, which is consistent with abnormal  right ventricular systolic function.  Biatrial volume is moderately increased.     #A-fib with RVR  - Currently rate controlled   - Continue amiodarone ( expected for 14 more days and stop) . Metoprolol as above   -Of note, surgeon preference is to not anticoagulate for a-fib s/p LAAE( direct extract from patient's discharge summary)      #Uncontrolled type II diabetes mellitus, HgbA1c  12.6%  - Continue metformin 1000 mg BID  - Stop Glipizide on  12/31 to prevent hypoglycemia   - Reduce  insulin glargine to 23 units BID ( from 25 units) on12/31   - SSI( Medium Intesnity) Q 4 hrs while on tube feeding   - Hypoglycemia protocol  - Diabetic diet  - When patient advances with oral diet, the regimen will have to be re-looked into         Acute hypoxic respiratory failure, resolved  -  Encourage IC  - Supplemental oxygen as needed  - Patient to have CXR today      Acute metabolic encephalopathy  - Supportive care  - Likely multifactorial ( post operative status , stroke and possibly pre existing status secondary to uncontrolled DM type 2  And HTN)   - We may need to explore more around decision making capacity etc.      Subacute left frontal stroke with petechial hemorrhage  - ASA  - Atorvastatin  - Continue levetiracetam    #Sepsis, Resolved   - Fever, Leukocytosis, tachycardia noted 12/10/2020  - UA was negative. One of two blood cultures grew staph epi, likely contaminant.  - Sputum culture grew beta-hemolytic strep  - Started on emperic pip-tazo IV,  completed 7d on 12/17, fever and WBC resolving.  - WBC down to 11.5 on 12/31     #Severe pharyngeal dysphagia  # Malnutrition, unclassified     - SLP team to follow patient here in ARU   video swallow study on 12/21 showed ongoing significant dysphagia  - NGT in place  - Clinical monitoring  -Patinet to have X ray today      Primary hypertension  - BP controlled  - Continue metoprolol tartrate  - Monitoring BP     # Bilateral Pneumothoraces   X Ray on 12/26 with  Tiny apical pneumothoraces without significant change. No consolidation or focal pneumonia in the lungs. No pulmonary edema. No other significant change.  Continue to monitor clinically. Patinet to have CXR today       VTE risk reduction. Subcutaneous heparin.                  Diet: NPO for Medical/Clinical Reasons Except for: NPO but receiving Tube Feeding, Ice Chips  Adult Formula Drip Feeding: Continuous Peptamen 1.5; Nasoduodenal tube; Goal Rate: 50;  mL/hr; Medication - Feeding Tube Flush Frequency: At least 15-30 mL water before and after medication administration and with tube clogging; Amount to Send (Nut...    DVT Prophylaxis: Heparin SQ  Koenig Catheter: not present  Code Status: Full Code           Disposition Plan   Expected discharge:   To be determined by the primary team     The patient's care was discussed with the Bedside Nurse and Primary team.    Paulo Soria MD  Hospitalist Service  [facility]   Contact information available via [facility] Paging/Directory    ______________________________________________________________________    Interval History   Events in the last 24 hrs noted. Patient was mildly restless overnight, but much more pleasant this morning   He is complaining of a raspy voice. This has been present since admission. SLP evaluating patient' Has an episodic cough  No fever or chills       Data reviewed today: I reviewed all medications, new labs and imaging results over the last 24 hours. I personally reviewed no images or EKG's today.    Physical Exam   Vital Signs: Temp: 95.1  F (35.1  C) Temp src: Axillary BP: 97/47 Pulse: 78   Resp: 16 SpO2: 97 % O2 Device: None (Room air)    Weight: 177 lbs 0 oz  General Appearance: Resting comfortably. Not in distress  Respiratory: Clear breath sounds bilaterally   Cardiovascular: Normal heart sounds. Irregular heart rate No murmurs   GI: Soft, non tender. Normal bowel sounds   Skin: Multiple areas in the lower extremity with dressings   Other: Awake and alert. Moving all 4 limbs     Data   Recent Labs   Lab 12/31/20  0538 12/29/20  0703   WBC 11.5* 11.9*   HGB 13.5 13.7   MCV 93 88    371    139   POTASSIUM 4.5 4.4   CHLORIDE 106 104   CO2 26 28   BUN 36* 36*   CR 1.05 1.08   ANIONGAP 6 7   JEET 9.0 8.9   GLC 72 146*      Cheek To Nose Interpolation Flap Division And Inset Text: Division and inset of the cheek to nose interpolation flap was performed to achieve optimal aesthetic result, restore normal anatomic appearance and avoid distortion of normal anatomy, expedite and facilitate wound healing, achieve optimal functional result and because linear closure either not possible or would produce suboptimal result. The patient was prepped and draped in the usual manner. The pedicle was infiltrated with local anesthesia. The pedicle was sectioned with a #15 blade. The pedicle was de-bulked and trimmed to match the shape of the defect. Hemostasis was achieved. The flap donor site and free margin of the flap were secured with deep buried sutures and the wound edges were re-approximated.

## 2023-11-09 DIAGNOSIS — Z12.11 COLON CANCER SCREENING: ICD-10-CM

## 2023-11-23 ENCOUNTER — LAB (OUTPATIENT)
Dept: FAMILY MEDICINE | Facility: CLINIC | Age: 73
End: 2023-11-23

## 2023-11-23 DIAGNOSIS — Z12.11 COLON CANCER SCREENING: ICD-10-CM

## 2023-12-20 LAB — NONINV COLON CA DNA+OCC BLD SCRN STL QL: NORMAL

## 2024-09-24 NOTE — TELEPHONE ENCOUNTER
Medication Question or Clarification  Who is calling: Pharmacy: [facility]   What medication are you calling about? insulin glargine (BASAGLAR KWIKPEN U-100 INSULIN) 100 unit/mL (3 mL) pen, : Inject 30 Units under the skin at bedtime. INJECT 26 UNITS UNDER THE SKIN AT BEDTIME. - Subcutaneous  Who prescribed the medication?: Angel Claire MD  What is your question/concern?: Pharmacist wants to confirm patient is supposed to be taking 30 units. Sig states both 26 and 30. Please advise, thank you.   Pharmacy: [id number] - [place] [id number] Pereira to leave a detailed message?: Yes  Site CMT - Please call the pharmacy to obtain any additional needed information.  
Relayed message below to pharmacy  
The patient's doses has been changed to 30  Please update pharmacy  
clear